# Patient Record
Sex: MALE | Race: WHITE | Employment: OTHER | URBAN - METROPOLITAN AREA
[De-identification: names, ages, dates, MRNs, and addresses within clinical notes are randomized per-mention and may not be internally consistent; named-entity substitution may affect disease eponyms.]

---

## 2017-01-06 ENCOUNTER — HOSPITAL ENCOUNTER (INPATIENT)
Age: 82
LOS: 10 days | Discharge: HOME | DRG: 871 | End: 2017-01-16
Attending: EMERGENCY MEDICINE | Admitting: HOSPITALIST
Payer: MEDICARE

## 2017-01-06 ENCOUNTER — APPOINTMENT (OUTPATIENT)
Dept: GENERAL RADIOLOGY | Age: 82
DRG: 871 | End: 2017-01-06
Attending: EMERGENCY MEDICINE
Payer: MEDICARE

## 2017-01-06 DIAGNOSIS — E87.1 HYPONATREMIA: ICD-10-CM

## 2017-01-06 DIAGNOSIS — H10.33 ACUTE CONJUNCTIVITIS OF BOTH EYES, UNSPECIFIED ACUTE CONJUNCTIVITIS TYPE: ICD-10-CM

## 2017-01-06 DIAGNOSIS — J18.9 COMMUNITY ACQUIRED PNEUMONIA: Primary | ICD-10-CM

## 2017-01-06 DIAGNOSIS — R65.10 SIRS (SYSTEMIC INFLAMMATORY RESPONSE SYNDROME) (HCC): ICD-10-CM

## 2017-01-06 PROBLEM — J15.9 COMMUNITY ACQUIRED BACTERIAL PNEUMONIA: Status: ACTIVE | Noted: 2017-01-06

## 2017-01-06 PROBLEM — A41.9 SEPSIS (HCC): Status: ACTIVE | Noted: 2017-01-06

## 2017-01-06 LAB
ALBUMIN SERPL BCP-MCNC: 3.2 G/DL (ref 3.4–5)
ALBUMIN/GLOB SERPL: 0.9 {RATIO} (ref 0.8–1.7)
ALP SERPL-CCNC: 118 U/L (ref 45–117)
ALT SERPL-CCNC: 41 U/L (ref 16–61)
ANION GAP BLD CALC-SCNC: 10 MMOL/L (ref 3–18)
APPEARANCE UR: CLEAR
AST SERPL W P-5'-P-CCNC: 33 U/L (ref 15–37)
BACTERIA URNS QL MICRO: NEGATIVE /HPF
BASOPHILS # BLD AUTO: 0 K/UL (ref 0–0.06)
BASOPHILS # BLD: 0 % (ref 0–2)
BILIRUB SERPL-MCNC: 0.7 MG/DL (ref 0.2–1)
BILIRUB UR QL: NEGATIVE
BNP SERPL-MCNC: 6466 PG/ML (ref 0–1800)
BUN SERPL-MCNC: 30 MG/DL (ref 7–18)
BUN/CREAT SERPL: 21 (ref 12–20)
CALCIUM SERPL-MCNC: 8.5 MG/DL (ref 8.5–10.1)
CHLORIDE SERPL-SCNC: 98 MMOL/L (ref 100–108)
CO2 SERPL-SCNC: 23 MMOL/L (ref 21–32)
COLOR UR: ABNORMAL
CREAT SERPL-MCNC: 1.41 MG/DL (ref 0.6–1.3)
DIFFERENTIAL METHOD BLD: ABNORMAL
EOSINOPHIL # BLD: 0 K/UL (ref 0–0.4)
EOSINOPHIL NFR BLD: 0 % (ref 0–5)
EPITH CASTS URNS QL MICRO: NORMAL /LPF (ref 0–5)
ERYTHROCYTE [DISTWIDTH] IN BLOOD BY AUTOMATED COUNT: 13.2 % (ref 11.6–14.5)
GLOBULIN SER CALC-MCNC: 3.4 G/DL (ref 2–4)
GLUCOSE SERPL-MCNC: 140 MG/DL (ref 74–99)
GLUCOSE UR STRIP.AUTO-MCNC: NEGATIVE MG/DL
HCT VFR BLD AUTO: 40.2 % (ref 36–48)
HGB BLD-MCNC: 13.4 G/DL (ref 13–16)
HGB UR QL STRIP: NEGATIVE
KETONES UR QL STRIP.AUTO: NEGATIVE MG/DL
LACTATE BLD-SCNC: 0.8 MMOL/L (ref 0.4–2)
LACTATE BLD-SCNC: 2.2 MMOL/L (ref 0.4–2)
LEUKOCYTE ESTERASE UR QL STRIP.AUTO: NEGATIVE
LYMPHOCYTES # BLD AUTO: 4 % (ref 21–52)
LYMPHOCYTES # BLD: 0.5 K/UL (ref 0.9–3.6)
MCH RBC QN AUTO: 30.6 PG (ref 24–34)
MCHC RBC AUTO-ENTMCNC: 33.3 G/DL (ref 31–37)
MCV RBC AUTO: 91.8 FL (ref 74–97)
MONOCYTES # BLD: 1.3 K/UL (ref 0.05–1.2)
MONOCYTES NFR BLD AUTO: 11 % (ref 3–10)
NEUTS SEG # BLD: 10 K/UL (ref 1.8–8)
NEUTS SEG NFR BLD AUTO: 85 % (ref 40–73)
NITRITE UR QL STRIP.AUTO: NEGATIVE
PH UR STRIP: 5.5 [PH] (ref 5–8)
PLATELET # BLD AUTO: 270 K/UL (ref 135–420)
PMV BLD AUTO: 9.2 FL (ref 9.2–11.8)
POTASSIUM SERPL-SCNC: 4.1 MMOL/L (ref 3.5–5.5)
PROT SERPL-MCNC: 6.6 G/DL (ref 6.4–8.2)
PROT UR STRIP-MCNC: 300 MG/DL
RBC # BLD AUTO: 4.38 M/UL (ref 4.7–5.5)
RBC #/AREA URNS HPF: NORMAL /HPF (ref 0–5)
SODIUM SERPL-SCNC: 131 MMOL/L (ref 136–145)
SP GR UR REFRACTOMETRY: 1.02 (ref 1–1.03)
TSH SERPL DL<=0.05 MIU/L-ACNC: 3.66 UIU/ML (ref 0.36–3.74)
UROBILINOGEN UR QL STRIP.AUTO: 1 EU/DL (ref 0.2–1)
WBC # BLD AUTO: 11.8 K/UL (ref 4.6–13.2)
WBC URNS QL MICRO: NORMAL /HPF (ref 0–4)

## 2017-01-06 PROCEDURE — 80053 COMPREHEN METABOLIC PANEL: CPT | Performed by: EMERGENCY MEDICINE

## 2017-01-06 PROCEDURE — 87086 URINE CULTURE/COLONY COUNT: CPT | Performed by: EMERGENCY MEDICINE

## 2017-01-06 PROCEDURE — 81001 URINALYSIS AUTO W/SCOPE: CPT | Performed by: EMERGENCY MEDICINE

## 2017-01-06 PROCEDURE — 74011000250 HC RX REV CODE- 250: Performed by: EMERGENCY MEDICINE

## 2017-01-06 PROCEDURE — 93005 ELECTROCARDIOGRAM TRACING: CPT

## 2017-01-06 PROCEDURE — 74011250637 HC RX REV CODE- 250/637: Performed by: HOSPITALIST

## 2017-01-06 PROCEDURE — 74011000258 HC RX REV CODE- 258: Performed by: EMERGENCY MEDICINE

## 2017-01-06 PROCEDURE — 83605 ASSAY OF LACTIC ACID: CPT

## 2017-01-06 PROCEDURE — 87040 BLOOD CULTURE FOR BACTERIA: CPT | Performed by: EMERGENCY MEDICINE

## 2017-01-06 PROCEDURE — 96365 THER/PROPH/DIAG IV INF INIT: CPT

## 2017-01-06 PROCEDURE — 74011250636 HC RX REV CODE- 250/636: Performed by: HOSPITALIST

## 2017-01-06 PROCEDURE — 83880 ASSAY OF NATRIURETIC PEPTIDE: CPT | Performed by: EMERGENCY MEDICINE

## 2017-01-06 PROCEDURE — 74011250637 HC RX REV CODE- 250/637: Performed by: EMERGENCY MEDICINE

## 2017-01-06 PROCEDURE — 74011250637 HC RX REV CODE- 250/637: Performed by: FAMILY MEDICINE

## 2017-01-06 PROCEDURE — 77030020263 HC SOL INJ SOD CL0.9% LFCR 1000ML

## 2017-01-06 PROCEDURE — 96368 THER/DIAG CONCURRENT INF: CPT

## 2017-01-06 PROCEDURE — 74011250636 HC RX REV CODE- 250/636: Performed by: EMERGENCY MEDICINE

## 2017-01-06 PROCEDURE — 74011000250 HC RX REV CODE- 250: Performed by: HOSPITALIST

## 2017-01-06 PROCEDURE — 71010 XR CHEST PORT: CPT

## 2017-01-06 PROCEDURE — 85025 COMPLETE CBC W/AUTO DIFF WBC: CPT | Performed by: EMERGENCY MEDICINE

## 2017-01-06 PROCEDURE — 84443 ASSAY THYROID STIM HORMONE: CPT | Performed by: EMERGENCY MEDICINE

## 2017-01-06 PROCEDURE — 77010033678 HC OXYGEN DAILY

## 2017-01-06 PROCEDURE — 96366 THER/PROPH/DIAG IV INF ADDON: CPT

## 2017-01-06 PROCEDURE — 99285 EMERGENCY DEPT VISIT HI MDM: CPT

## 2017-01-06 PROCEDURE — 65660000000 HC RM CCU STEPDOWN

## 2017-01-06 RX ORDER — ENALAPRIL MALEATE 10 MG/1
20 TABLET ORAL DAILY
Status: DISCONTINUED | OUTPATIENT
Start: 2017-01-07 | End: 2017-01-16 | Stop reason: HOSPADM

## 2017-01-06 RX ORDER — BENZONATATE 100 MG/1
100 CAPSULE ORAL
Status: DISCONTINUED | OUTPATIENT
Start: 2017-01-06 | End: 2017-01-16 | Stop reason: HOSPADM

## 2017-01-06 RX ORDER — LEVOFLOXACIN 5 MG/ML
750 INJECTION, SOLUTION INTRAVENOUS
Status: DISCONTINUED | OUTPATIENT
Start: 2017-01-06 | End: 2017-01-11

## 2017-01-06 RX ORDER — ONDANSETRON 2 MG/ML
4 INJECTION INTRAMUSCULAR; INTRAVENOUS
Status: DISCONTINUED | OUTPATIENT
Start: 2017-01-06 | End: 2017-01-16 | Stop reason: HOSPADM

## 2017-01-06 RX ORDER — ADHESIVE BANDAGE
30 BANDAGE TOPICAL DAILY PRN
Status: DISCONTINUED | OUTPATIENT
Start: 2017-01-06 | End: 2017-01-16 | Stop reason: HOSPADM

## 2017-01-06 RX ORDER — METOPROLOL TARTRATE 50 MG/1
50 TABLET ORAL 2 TIMES DAILY
Status: DISCONTINUED | OUTPATIENT
Start: 2017-01-06 | End: 2017-01-16 | Stop reason: HOSPADM

## 2017-01-06 RX ORDER — SODIUM CHLORIDE 0.9 % (FLUSH) 0.9 %
5-10 SYRINGE (ML) INJECTION AS NEEDED
Status: DISCONTINUED | OUTPATIENT
Start: 2017-01-06 | End: 2017-01-16 | Stop reason: HOSPADM

## 2017-01-06 RX ORDER — HYDROCODONE BITARTRATE AND ACETAMINOPHEN 5; 325 MG/1; MG/1
1 TABLET ORAL
Status: DISCONTINUED | OUTPATIENT
Start: 2017-01-06 | End: 2017-01-16 | Stop reason: HOSPADM

## 2017-01-06 RX ORDER — DICYCLOMINE HYDROCHLORIDE 10 MG/1
10 CAPSULE ORAL
Status: DISCONTINUED | OUTPATIENT
Start: 2017-01-06 | End: 2017-01-16 | Stop reason: HOSPADM

## 2017-01-06 RX ORDER — ACETAMINOPHEN 500 MG
1000 TABLET ORAL
Status: COMPLETED | OUTPATIENT
Start: 2017-01-06 | End: 2017-01-06

## 2017-01-06 RX ORDER — PANTOPRAZOLE SODIUM 40 MG/1
40 TABLET, DELAYED RELEASE ORAL
Status: DISCONTINUED | OUTPATIENT
Start: 2017-01-07 | End: 2017-01-16 | Stop reason: HOSPADM

## 2017-01-06 RX ORDER — ZOLPIDEM TARTRATE 5 MG/1
5 TABLET ORAL
Status: DISCONTINUED | OUTPATIENT
Start: 2017-01-06 | End: 2017-01-09

## 2017-01-06 RX ORDER — AMLODIPINE BESYLATE 10 MG/1
10 TABLET ORAL DAILY
Status: DISCONTINUED | OUTPATIENT
Start: 2017-01-07 | End: 2017-01-16 | Stop reason: HOSPADM

## 2017-01-06 RX ORDER — CIPROFLOXACIN HYDROCHLORIDE 3.5 MG/ML
1 SOLUTION/ DROPS TOPICAL
Status: DISPENSED | OUTPATIENT
Start: 2017-01-06 | End: 2017-01-08

## 2017-01-06 RX ORDER — SODIUM CHLORIDE 9 MG/ML
50 INJECTION, SOLUTION INTRAVENOUS CONTINUOUS
Status: DISCONTINUED | OUTPATIENT
Start: 2017-01-06 | End: 2017-01-07

## 2017-01-06 RX ADMIN — ACETAMINOPHEN 1000 MG: 500 TABLET ORAL at 17:01

## 2017-01-06 RX ADMIN — SODIUM CHLORIDE 1000 ML: 900 INJECTION, SOLUTION INTRAVENOUS at 16:03

## 2017-01-06 RX ADMIN — AZTREONAM 2 G: 2 INJECTION, POWDER, LYOPHILIZED, FOR SOLUTION INTRAMUSCULAR; INTRAVENOUS at 23:33

## 2017-01-06 RX ADMIN — METOPROLOL TARTRATE 50 MG: 50 TABLET ORAL at 22:23

## 2017-01-06 RX ADMIN — AZTREONAM 2 G: 2 INJECTION, POWDER, LYOPHILIZED, FOR SOLUTION INTRAMUSCULAR; INTRAVENOUS at 17:01

## 2017-01-06 RX ADMIN — ZOLPIDEM TARTRATE 5 MG: 5 TABLET, FILM COATED ORAL at 23:27

## 2017-01-06 RX ADMIN — BENZONATATE 100 MG: 100 CAPSULE ORAL at 23:27

## 2017-01-06 RX ADMIN — CIPROFLOXACIN HYDROCHLORIDE 1 DROP: 3 SOLUTION/ DROPS OPHTHALMIC at 23:27

## 2017-01-06 RX ADMIN — LEVOFLOXACIN 750 MG: 5 INJECTION, SOLUTION INTRAVENOUS at 17:36

## 2017-01-06 RX ADMIN — APIXABAN 5 MG: 2.5 TABLET, FILM COATED ORAL at 22:23

## 2017-01-06 RX ADMIN — SODIUM CHLORIDE 50 ML/HR: 900 INJECTION, SOLUTION INTRAVENOUS at 22:22

## 2017-01-06 NOTE — IP AVS SNAPSHOT
Current Discharge Medication List  
  
Take these medications at their scheduled times Dose & Instructions Dispensing Information Comments Morning Noon Evening Bedtime  
 amLODIPine 10 mg tablet Commonly known as:  [place] Citron Your next dose is: Today, Tomorrow Other:  ____________ Dose:  10 mg Take 1 Tab by mouth daily. Quantity:  90 Tab Refills:  3 ELIQUIS 5 mg tablet Generic drug:  apixaban Your next dose is: Today, Tomorrow Other:  ____________ Dose:  5 mg Take 5 mg by mouth two (2) times a day. Refills:  0  
     
   
   
   
  
 enalapril 20 mg tablet Commonly known as:  Sheree Goring Your next dose is: Today, Tomorrow Other:  ____________ Dose:  20 mg Take 20 mg by mouth daily. Refills:  0  
     
   
   
   
  
 levoFLOXacin 750 mg tablet Commonly known as:  Sandy Stallion Your next dose is: Today, Tomorrow Other:  ____________ Dose:  750 mg Take 1 Tab by mouth every fourty-eight (48) hours. Quantity:  5 Tab Refills:  0  
     
   
   
   
  
 metoprolol tartrate 100 mg IR tablet Commonly known as:  LOPRESSOR Your next dose is: Today, Tomorrow Other:  ____________ Dose:  100 mg Take 100 mg by mouth daily. Refills:  0 PriLOSEC OTC 20 mg tablet Generic drug:  omeprazole Your next dose is: Today, Tomorrow Other:  ____________ Dose:  20 mg Take 20 mg by mouth daily. Indications: HEARTBURN Refills:  0 Take these medications as needed Dose & Instructions Dispensing Information Comments Morning Noon Evening Bedtime BENTYL 10 mg capsule Generic drug:  dicyclomine Your next dose is: Today, Tomorrow Other:  ____________ Dose:  10 mg Take 10 mg by mouth three (3) times daily as needed for Nausea (max 3x/day). Refills:  0  
     
   
   
   
  
 nitroglycerin 0.3 mg SL tablet Commonly known as:  NITROSTAT Your next dose is: Today, Tomorrow Other:  ____________ Dose:  0.3 mg  
1 Tab by SubLINGual route every five (5) minutes as needed for Chest Pain (up to 3 total 1 every 5 min). Quantity:  1 Bottle Refills:  0 Where to Get Your Medications Information about where to get these medications is not yet available ! Ask your nurse or doctor about these medications  
  levoFLOXacin 750 mg tablet  
 nitroglycerin 0.3 mg SL tablet

## 2017-01-06 NOTE — IP AVS SNAPSHOT
Lissett Wolfe 
 
 
 [address] [place] [address] 
[phone] Patient: Lance Osler MRN: [id number] WG You are allergic to the following Allergen Reactions Beta-Blockers (Beta-Adrenergic Blocking Agts) Drowsiness Penicillins Swelling Statins-Hmg-Coa Reductase Inhibitors Drowsiness Immunizations Administered for This Admission Name Date Influenza Vaccine (Quad) PF  Deferred () Recent Documentation Height Weight BMI Smoking Status 1.753 m 64.2 kg 20.91 kg/m2 Former Smoker Emergency Contacts Name Discharge Info Relation Home Work Mobile Rylan Clements  Spouse [4] [phone] Lani Beebe  Spouse [3] [phone] About your hospitalization You were admitted on:  2017 You last received care in the:  [address] [place] [address] Road You were discharged on:  2017 Unit phone number:  [phone] Why you were hospitalized Your primary diagnosis was:  Sepsis (Hcc) Your diagnoses also included:  Coronary Atherosclerosis Of Native Coronary Artery, Dyslipidemia, Atrial Fibrillation (Hcc), Community Acquired Bacterial Pneumonia, Cap (Community Acquired Pneumonia) Providers Seen During Your Hospitalizations Provider Role Specialty Primary office phone Ander Cuba DO Attending Provider Emergency Medicine [phone] Glory Snyder MD Attending Provider [facility] [phone] Mellisa Small MD Attending Provider Internal Medicine [phone] Your Primary Care Physician (PCP) Primary Care Physician Office Phone Office Fax [address]274 [address]  [phone] Follow-up Information Follow up With Details Comments Contact Info Luis Alfredo Chowdhury MD Schedule an appointment as soon as possible for a visit in 1 week  [facility] [address] 64 [facility] 83 [phone] 
[phone] Teodoro Mercedes MD Schedule an appointment as soon as possible for a visit in 1 week  [address] [address] [phone] Current Discharge Medication List  
  
START taking these medications Dose & Instructions Dispensing Information Comments Morning Noon Evening Bedtime  
 levoFLOXacin 750 mg tablet Commonly known as:  Sanjana Born Your next dose is: Today, Tomorrow Other:  _________ Dose:  750 mg Take 1 Tab by mouth every fourty-eight (48) hours. Quantity:  5 Tab Refills:  0  
     
   
   
   
  
 nitroglycerin 0.3 mg SL tablet Commonly known as:  NITROSTAT Your next dose is: Today, Tomorrow Other:  _________ Dose:  0.3 mg  
1 Tab by SubLINGual route every five (5) minutes as needed for Chest Pain (up to 3 total 1 every 5 min). Quantity:  1 Bottle Refills:  0 CONTINUE these medications which have NOT CHANGED Dose & Instructions Dispensing Information Comments Morning Noon Evening Bedtime  
 amLODIPine 10 mg tablet Commonly known as:  Sima Darting Your next dose is: Today, Tomorrow Other:  _________ Dose:  10 mg Take 1 Tab by mouth daily. Quantity:  90 Tab Refills:  3 BENTYL 10 mg capsule Generic drug:  dicyclomine Your next dose is: Today, Tomorrow Other:  _________ Dose:  10 mg Take 10 mg by mouth three (3) times daily as needed for Nausea (max 3x/day). Refills:  0  
     
   
   
   
  
 ELIQUIS 5 mg tablet Generic drug:  apixaban Your next dose is: Today, Tomorrow Other:  _________ Dose:  5 mg Take 5 mg by mouth two (2) times a day. Refills:  0  
     
   
   
   
  
 enalapril 20 mg tablet Commonly known as:  Henrique Wing  
   
 Your next dose is: Today, Tomorrow Other:  _________ Dose:  20 mg Take 20 mg by mouth daily. Refills:  0  
     
   
   
   
  
 metoprolol tartrate 100 mg IR tablet Commonly known as:  LOPRESSOR Your next dose is: Today, Tomorrow Other:  _________ Dose:  100 mg Take 100 mg by mouth daily. Refills:  0 PriLOSEC OTC 20 mg tablet Generic drug:  omeprazole Your next dose is: Today, Tomorrow Other:  _________ Dose:  20 mg Take 20 mg by mouth daily. Indications: HEARTBURN Refills:  0 Where to Get Your Medications Information on where to get these meds will be given to you by the nurse or doctor. ! Ask your nurse or doctor about these medications  
  levoFLOXacin 750 mg tablet  
 nitroglycerin 0.3 mg SL tablet Discharge Instructions Angina: Care Instructions Your Care Instructions You have a problem called angina. Angina happens when there is not enough blood flow to your heart muscle. Angina is a sign of coronary artery disease (CAD). CAD occurs when blood vessels that supply the heart become narrowed. Having CAD increases your risk of a heart attack. Chest pain or pressure is the most common symptom of angina. But some people have other symptoms, like: 
· Pain, pressure, or a strange feeling in the back, neck, jaw, or upper belly, or in one or both shoulders or arms. · Shortness of breath. · Nausea or vomiting. · Lightheadedness or sudden weakness. · Fast or irregular heartbeat. Women are somewhat more likely than men to have angina symptoms like shortness of breath, nausea, and back or jaw pain. Angina can be dangerous. That's why it is important to pay attention to your symptoms. Know what is typical for you, learn how to control your symptoms, and understand when you need to get treatment. A change in your usual pattern of symptoms is an emergency. It may mean that you are having a heart attack. The doctor has checked you carefully, but problems can develop later. If you notice any problems or new symptoms, get medical treatment right away. Follow-up care is a key part of your treatment and safety. Be sure to make and go to all appointments, and call your doctor if you are having problems. It's also a good idea to know your test results and keep a list of the medicines you take. How can you care for yourself at home? Medicines · If your doctor has given you nitroglycerin for angina symptoms, keep it with you at all times. If you have symptoms, sit down and rest, and take the first dose of nitroglycerin as directed. If your symptoms get worse or are not getting better within 5 minutes, call 911 right away. Stay on the phone. The emergency  will give you further instructions. · If your doctor advises it, take 1 low-dose aspirin a day to prevent heart attack. · Be safe with medicines. Take your medicines exactly as prescribed. Call your doctor if you think you are having a problem with your medicine. You will get more details on the specific medicines your doctor prescribes. Lifestyle changes · Do not smoke. If you need help quitting, talk to your doctor about stop-smoking programs and medicines. These can increase your chances of quitting for good. · Eat a heart-healthy diet that is low in saturated fat and salt, and is high in fiber. Talk to your doctor or a dietitian about healthy eating. · Stay at a healthy weight. Or lose weight if you need to. Activity · Talk to your doctor about a level of activity that is safe for you. · If an activity causes angina symptoms, stop and rest. 
When should you call for help? Call 911 anytime you think you may need emergency care. For example, call if: 
· You passed out (lost consciousness). · You have symptoms of a heart attack. These may include: ¨ Chest pain or pressure, or a strange feeling in the chest. 
¨ Sweating. ¨ Shortness of breath. ¨ Nausea or vomiting. ¨ Pain, pressure, or a strange feeling in the back, neck, jaw, or upper belly or in one or both shoulders or arms. ¨ Lightheadedness or sudden weakness. ¨ A fast or irregular heartbeat. After you call 911, the  may tell you to chew 1 adult-strength or 2 to 4 low-dose aspirin. Wait for an ambulance. Do not try to drive yourself. · You have angina symptoms that do not go away with rest or are not getting better within 5 minutes after you take a dose of nitroglycerin. Call your doctor now or seek immediate medical care if: 
· You are having angina symptoms more often than usual, or they are different or worse than usual. 
· You feel dizzy or lightheaded, or you feel like you may faint. Watch closely for changes in your health, and be sure to contact your doctor if you have any problems. Where can you learn more? Go to http://mitraWebGen Systemsshaw.info/. Enter H129 in the search box to learn more about \"Angina: Care Instructions. \" Current as of: January 27, 2016 Content Version: 11.1 © 3641-7772 [employer]. Care instructions adapted under license by [employer] (which disclaims liability or warranty for this information). If you have questions about a medical condition or this instruction, always ask your healthcare professional. [place] [address] any warranty or liability for your use of this information. Atrial Fibrillation: Care Instructions Your Care Instructions Atrial fibrillation is an irregular and often fast heartbeat. Treating this condition is important for several reasons. It can cause blood clots, which can travel from your heart to your brain and cause a stroke. If you have a fast heartbeat, you may feel lightheaded, dizzy, and weak.  An irregular heartbeat can also increase your risk for heart failure. Atrial fibrillation is often the result of another heart condition, such as high blood pressure or coronary artery disease. Making changes to improve your heart condition will help you stay healthy and active. Follow-up care is a key part of your treatment and safety. Be sure to make and go to all appointments, and call your doctor if you are having problems. It's also a good idea to know your test results and keep a list of the medicines you take. How can you care for yourself at home? Medicines · Take your medicines exactly as prescribed. Call your doctor if you think you are having a problem with your medicine. You will get more details on the specific medicines your doctor prescribes. · If your doctor has given you a blood thinner to prevent a stroke, be sure you get instructions about how to take your medicine safely. Blood thinners can cause serious bleeding problems. · Do not take any vitamins, over-the-counter drugs, or herbal products without talking to your doctor first. 
Lifestyle changes · Do not smoke. Smoking can increase your chance of a stroke and heart attack. If you need help quitting, talk to your doctor about stop-smoking programs and medicines. These can increase your chances of quitting for good. · Eat a heart-healthy diet. · Stay at a healthy weight. Lose weight if you need to. · Limit alcohol to 2 drinks a day for men and 1 drink a day for women. Too much alcohol can cause health problems. · Avoid colds and flu. Get a pneumococcal vaccine shot. If you have had one before, ask your doctor whether you need another dose. Get a flu shot every year. If you must be around people with colds or flu, wash your hands often. Activity · If your doctor recommends it, get more exercise. Walking is a good choice. Bit by bit, increase the amount you walk every day.  Try for at least 30 minutes on most days of the week. You also may want to swim, bike, or do other activities. Your doctor may suggest that you join a cardiac rehabilitation program so that you can have help increasing your physical activity safely. · Start light exercise if your doctor says it is okay. Even a small amount will help you get stronger, have more energy, and manage stress. Walking is an easy way to get exercise. Start out by walking a little more than you did in the hospital. Gradually increase the amount you walk. · When you exercise, watch for signs that your heart is working too hard. You are pushing too hard if you cannot talk while you are exercising. If you become short of breath or dizzy or have chest pain, sit down and rest immediately. · Check your pulse regularly. Place two fingers on the artery at the palm side of your wrist, in line with your thumb. If your heartbeat seems uneven or fast, talk to your doctor. When should you call for help? Call 911 anytime you think you may need emergency care. For example, call if: 
· You have symptoms of a heart attack. These may include: ¨ Chest pain or pressure, or a strange feeling in the chest. 
¨ Sweating. ¨ Shortness of breath. ¨ Nausea or vomiting. ¨ Pain, pressure, or a strange feeling in the back, neck, jaw, or upper belly or in one or both shoulders or arms. ¨ Lightheadedness or sudden weakness. ¨ A fast or irregular heartbeat. After you call 911, the  may tell you to chew 1 adult-strength or 2 to 4 low-dose aspirin. Wait for an ambulance. Do not try to drive yourself. · You have symptoms of a stroke. These may include: 
¨ Sudden numbness, tingling, weakness, or loss of movement in your face, arm, or leg, especially on only one side of your body. ¨ Sudden vision changes. ¨ Sudden trouble speaking. ¨ Sudden confusion or trouble understanding simple statements. ¨ Sudden problems with walking or balance. ¨ A sudden, severe headache that is different from past headaches. · You passed out (lost consciousness). Call your doctor now or seek immediate medical care if: 
· You have new or increased shortness of breath. · You feel dizzy or lightheaded, or you feel like you may faint. · Your heart rate becomes irregular. · You can feel your heart flutter in your chest or skip heartbeats. Tell your doctor if these symptoms are new or worse. Watch closely for changes in your health, and be sure to contact your doctor if you have any problems. Where can you learn more? Go to http://mitra-shaw.info/. Enter U020 in the search box to learn more about \"Atrial Fibrillation: Care Instructions. \" Current as of: January 27, 2016 Content Version: 11.1 © 2006-2016 [employer]. Care instructions adapted under license by [employer] (which disclaims liability or warranty for this information). If you have questions about a medical condition or this instruction, always ask your healthcare professional. [place] [address] any warranty or liability for your use of this information. Patient armband removed and shredded. DISCHARGE SUMMARY from Nurse The following personal items are in your possession at time of discharge: 
 
Dental Appliances: None Visual Aid: With patient, Glasses, At bedside Home Medications: None Jewelry: Watch, Ring, Bracelet (yellow ring black watch white and yellow bracelets ) Clothing: Bathrobe, Pajamas, Slippers, Undergarments, Socks Other Valuables: Cell Phone Personal Items Sent to Safe: none PATIENT INSTRUCTIONS: 
 
After general anesthesia or intravenous sedation, for 24 hours or while taking prescription Narcotics: · Limit your activities · Do not drive and operate hazardous machinery · Do not make important personal or business decisions · Do  not drink alcoholic beverages · If you have not urinated within 8 hours after discharge, please contact your surgeon on call. Report the following to your surgeon: 
· Excessive pain, swelling, redness or odor of or around the surgical area · Temperature over 100.5 · Nausea and vomiting lasting longer than 4 hours or if unable to take medications · Any signs of decreased circulation or nerve impairment to extremity: change in color, persistent  numbness, tingling, coldness or increase pain · Any questions What to do at Home: 
Recommended activity: Activity as tolerated. If you experience any of the following symptoms shortness of breath, difficulty breathing, weight gain of more than 5 pounds in one week, chest pain, nausea, vomiting, diarrhea, temperature greater than 100.5 or bleeding please follow up with your primary care physician or call 911. *  Please give a list of your current medications to your Primary Care Provider. *  Please update this list whenever your medications are discontinued, doses are 
    changed, or new medications (including over-the-counter products) are added. *  Please carry medication information at all times in case of emergency situations. These are general instructions for a healthy lifestyle: No smoking/ No tobacco products/ Avoid exposure to second hand smoke Surgeon General's Warning:  Quitting smoking now greatly reduces serious risk to your health. Obesity, smoking, and sedentary lifestyle greatly increases your risk for illness A healthy diet, regular physical exercise & weight monitoring are important for maintaining a healthy lifestyle You may be retaining fluid if you have a history of heart failure or if you experience any of the following symptoms:  Weight gain of 3 pounds or more overnight or 5 pounds in a week, increased swelling in our hands or feet or shortness of breath while lying flat in bed.   Please call your doctor as soon as you notice any of these symptoms; do not wait until your next office visit. Recognize signs and symptoms of STROKE: 
 
F-face looks uneven A-arms unable to move or move unevenly S-speech slurred or non-existent T-time-call 911 as soon as signs and symptoms begin-DO NOT go Back to bed or wait to see if you get better-TIME IS BRAIN. Warning Signs of HEART ATTACK Call 911 if you have these symptoms: 
? Chest discomfort. Most heart attacks involve discomfort in the center of the chest that lasts more than a few minutes, or that goes away and comes back. It can feel like uncomfortable pressure, squeezing, fullness, or pain. ? Discomfort in other areas of the upper body. Symptoms can include pain or discomfort in one or both arms, the back, neck, jaw, or stomach. ? Shortness of breath with or without chest discomfort. ? Other signs may include breaking out in a cold sweat, nausea, or lightheadedness. Don't wait more than five minutes to call [address]! Fast action can save your life. Calling 911 is almost always the fastest way to get lifesaving treatment. Emergency Medical Services staff can begin treatment when they arrive  up to an hour sooner than if someone gets to the hospital by car. The discharge information has been reviewed with the patient. The patient verbalized understanding. Discharge medications reviewed with the patient and appropriate educational materials and side effects teaching were provided. Discharge Instructions Attachments/References PNEUMONIA (ENGLISH) LEVOFLOXACIN (BY INJECTION) (ENGLISH) NITROGLYCERIN, RAPID RELEASE (BY MOUTH) (ENGLISH) Discharge Orders None [facility] Announcement We are excited to announce that we are making your provider's discharge notes available to you in [employer].   You will see these notes when they are completed and signed by the physician that discharged you from your recent hospital stay. If you have any questions or concerns about any information you see in [employer], please call the Health Information Department where you were seen or reach out to your Primary Care Provider for more information about your plan of care. Introducing [facility]! Giovanni Cameron introduces [employer] patient portal. Now you can access parts of your medical record, email your doctor's office, and request medication refills online. 1. In your internet browser, go to [email or web address] 2. Click on the First Time User? Click Here link in the Sign In box. You will see the New Member Sign Up page. 3. Enter your [employer] Access Code exactly as it appears below. You will not need to use this code after youve completed the sign-up process. If you do not sign up before the expiration date, you must request a new code. · [employer] Access Code: [email or web address] Expires: 3/26/2017  3:35 PM 
 
4. Enter the last four digits of your Social Security Number (xxxx) and Date of Birth (mm/dd/yyyy) as indicated and click Submit. You will be taken to the next sign-up page. 5. Create a [employer] ID. This will be your [employer] login ID and cannot be changed, so think of one that is secure and easy to remember. 6. Create a [employer] password. You can change your password at any time. 7. Enter your Password Reset Question and Answer. This can be used at a later time if you forget your password. 8. Enter your e-mail address. You will receive e-mail notification when new information is available in [address] 9. Click Sign Up. You can now view and download portions of your medical record. 10. Click the Download Summary menu link to download a portable copy of your medical information.  
 
If you have questions, please visit the Frequently Asked Questions section of [employer]. Remember, MyChart is NOT to be used for urgent needs. For medical emergencies, dial 911. Now available from your iPhone and Android! General Information Please provide this summary of care documentation to your next provider. Patient Signature:  ____________________________________________________________ Date:  ____________________________________________________________  
  
Erin Newcomer Provider Signature:  ____________________________________________________________ Date:  ____________________________________________________________ More Information Pneumonia: Care Instructions Your Care Instructions Pneumonia is an infection of the lungs. Most cases are caused by infections from bacteria or viruses. Pneumonia may be mild or very severe. If it is caused by bacteria, you will be treated with antibiotics. It may take a few weeks to a few months to recover fully from pneumonia, depending on how sick you were and whether your overall health is good. Follow-up care is a key part of your treatment and safety. Be sure to make and go to all appointments, and call your doctor if you are having problems. Its also a good idea to know your test results and keep a list of the medicines you take. How can you care for yourself at home? · Take your antibiotics exactly as directed. Do not stop taking the medicine just because you are feeling better. You need to take the full course of antibiotics. · Take your medicines exactly as prescribed. Call your doctor if you think you are having a problem with your medicine. · Get plenty of rest and sleep. You may feel weak and tired for a while, but your energy level will improve with time. · To prevent dehydration, drink plenty of fluids, enough so that your urine is light yellow or clear like water.  Choose water and other caffeine-free clear liquids until you feel better. If you have kidney, heart, or liver disease and have to limit fluids, talk with your doctor before you increase the amount of fluids you drink. · Take care of your cough so you can rest. A cough that brings up mucus from your lungs is common with pneumonia. It is one way your body gets rid of the infection. But if coughing keeps you from resting or causes severe fatigue and chest-wall pain, talk to your doctor. He or she may suggest that you take a medicine to reduce the cough. · Use a vaporizer or humidifier to add moisture to your bedroom. Follow the directions for cleaning the machine. · Do not smoke or allow others to smoke around you. Smoke will make your cough last longer. If you need help quitting, talk to your doctor about stop-smoking programs and medicines. These can increase your chances of quitting for good. · Take an over-the-counter pain medicine, such as acetaminophen (Tylenol), ibuprofen (Advil, Motrin), or naproxen (Aleve). Read and follow all instructions on the label. · Do not take two or more pain medicines at the same time unless the doctor told you to. Many pain medicines have acetaminophen, which is Tylenol. Too much acetaminophen (Tylenol) can be harmful. · If you were given a spirometer to measure how well your lungs are working, use it as instructed. This can help your doctor tell how your recovery is going. · To prevent pneumonia in the future, talk to your doctor about getting a flu vaccine (once a year) and a pneumococcal vaccine (one time only for most people). When should you call for help? Call 911 anytime you think you may need emergency care. For example, call if: 
· You have severe trouble breathing. Call your doctor now or seek immediate medical care if: 
· You cough up dark brown or bloody mucus (sputum). · You have new or worse trouble breathing. · You are dizzy or lightheaded, or you feel like you may faint. Watch closely for changes in your health, and be sure to contact your doctor if: 
· You have a new or higher fever. · You are coughing more deeply or more often. · You are not getting better after 2 days (48 hours). · You do not get better as expected. Where can you learn more? Go to http://mitra-shaw.info/. Enter [id number] in the search box to learn more about \"Pneumonia: Care Instructions. \" Current as of: May 23, 2016 Content Version: 11.1 © 9963-1000 [employer]. Care instructions adapted under license by [employer] (which disclaims liability or warranty for this information). If you have questions about a medical condition or this instruction, always ask your healthcare professional. [address] any warranty or liability for your use of this information. Levofloxacin (By injection) Levofloxacin ([id number]-a-sin) Treats infections. This medicine is a quinolone antibiotic. Brand Name(s):Levaquin There may be other brand names for this medicine. When This Medicine Should Not Be Used: This medicine is not right for everyone. You should not receive this medicine if you had an allergic reaction to levofloxacin or to similar medicines. How to Use This Medicine:  
Injectable · Your doctor will prescribe your dose and schedule. This medicine is given through a needle placed in a vein. · A nurse or other health provider will give you this medicine. · Drink extra fluids so you will urinate more often and help prevent kidney problems. · This medicine should come with a Medication Guide. Ask your pharmacist for a copy if you do not have one. · Your doctor may give you a few doses of this medicine until your condition improves, and then you may be switched to an oral medicine that works the same way. Talk to your doctor if you have any concerns about this. Drugs and Foods to Avoid: Ask your doctor or pharmacist before using any other medicine, including over-the-counter medicines, vitamins, and herbal products. · Some medicines and foods can affect how levofloxacin works. Tell your doctor if you are using any of the following: ¨ Theophylline ¨ Steroid medicine (such as hydrocortisone, methylprednisolone, prednisone) ¨ Blood thinner (such as warfarin) ¨ Diabetes medicine ¨ NSAID pain or arthritis medicine (such as aspirin, diclofenac, ibuprofen, naproxen, celecoxib) ¨ Medicine for heart rhythm problems (such as quinidine, procainamide, amiodarone, sotalol) Warnings While Using This Medicine: · Tell your doctor if you are pregnant or breastfeeding, or if you have kidney disease, liver disease, diabetes, heart disease, heart rhythm problems (such as QT prolongation or a slow heartbeat), myasthenia gravis, or a history of seizures, epilepsy, head injury, or stroke. Tell your doctor if you have ever had tendon or joint problems, including rheumatoid arthritis, or if you have received a transplant. · This medicine may cause the following problems: 
¨ Tendinitis and tendon rupture (may happen after treatment ends) ¨ Liver damage ¨ Severe diarrhea ¨ Nerve damage in the arms or legs ¨ Heart rhythm changes ¨ Blood sugar level changes · This medicine may make you feel dizzy or lightheaded. Do not drive or do anything else that could be dangerous until you know how this medicine affects you. · This medicine can cause diarrhea. Call your doctor if the diarrhea becomes severe, does not stop, or is bloody. Do not take any medicine to stop diarrhea until you have talked to your doctor. Diarrhea can occur 2 months or more after you stop taking this medicine. · This medicine may make your skin more sensitive to sunlight. Wear sunscreen. Do not use sunlamps or tanning beds.  
· Tell any doctor or dentist who treats you that you are using this medicine. This medicine may affect certain medical test results. · Call your doctor if your symptoms do not improve or if they get worse. Possible Side Effects While Using This Medicine:  
Call your doctor right away if you notice any of these side effects: · Allergic reaction: Itching or hives, swelling in your face or hands, swelling or tingling in your mouth or throat, chest tightness, trouble breathing · Blistering, peeling, or red skin rash · Change in how much or how often you urinate · Dark urine or pale stools, nausea, vomiting, loss of appetite, stomach pain, yellow skin or eyes · Diarrhea that may contain blood · Fainting, dizziness, or lightheadedness · Fast, slow, or uneven heartbeat, chest pain · Numbness, tingling, or burning pain in your hands, arms, legs, or feet · Pain, stiffness, swelling, or bruises around your ankle, leg, shoulder, or other joint · Seizures, severe headache, unusual thoughts or behaviors, trouble sleeping, confusion · Unusual bleeding, bruising, or weakness If you notice these less serious side effects, talk with your doctor: · Mild headache · Mild nausea or diarrhea · Pain, itching, burning, swelling, or a lump under your skin where the needle is placed If you notice other side effects that you think are caused by this medicine, tell your doctor. Call your doctor for medical advice about side effects. You may report side effects to FDA at 4-270-FDA-1029 © 2016 [address] is for End User's use only and may not be sold, redistributed or otherwise used for commercial purposes. The above information is an  only. It is not intended as medical advice for individual conditions or treatments. Talk to your doctor, nurse or pharmacist before following any medical regimen to see if it is safe and effective for you. Nitroglycerin, Rapid Release (By mouth) Nitroglycerin (jzk-[id number]-MLXB-er-in) Treats or prevents angina (chest pain). This medicine is a nitrate. Brand Name(s):NitroMist, Nitroglycerin Lingual Aerosol, Nitrolingual, Nitroquick, Nitrostat There may be other brand names for this medicine. When This Medicine Should Not Be Used: This medicine is not right for everyone. Do not use it if you had an allergic reaction to nitroglycerin or similar medicines. How to Use This Medicine:  
Spray, Tablet · Your doctor will tell you how much medicine to use. Do not use more than directed. Sit down before you take this medicine, because it could make you lightheaded. · You may use this medicine 5 to 10 minutes before an activity that can cause angina. This may help prevent the attack. · Read and follow the patient instructions that come with this medicine. Talk to your doctor or pharmacist if you have any questions. · Tablets: Wet the tablet with saliva and place it under your tongue or inside your cheek. Let the tablet dissolve. Do not chew, crush, or swallow the tablet. Wait 5 minutes. If you still have pain, take a second tablet. Do not take more than 3 tablets in 15 minutes. If you still have pain after you take a total of 3 tablets, this is an emergency. Call 911. Do not drive yourself to the hospital. 
· Store the tablets at room temperature in the original container, away from heat, moisture, and direct light. · Spray: ¨ To prime the spray before you use it for the first time:  
§ Point the Nitrolingual® Pumpspray bottle away from your face. Pump the spray 5 times. The medicine is now ready to use. § If you do not use the medicine for 6 weeks, pump the spray 1 time to re-prime the bottle. § If you do not use the medicine for 3 months, pump the spray 5 times to re-prime the bottle. § Point the Spiration away from your face. Pump the spray 10 times. The medicine is now ready to use. § If you do not use the medicine for 6 weeks, pump the spray 2 times to re-prime the bottle. ¨ To use the spray:  
§ Hold the bottle upright and close to your mouth. Open your mouth and spray the medicine onto or under your tongue. Close your mouth right away. Do not inhale the spray or get it in your eyes. Do not rinse your mouth for at least 5 to 10 minutes. § Wait 5 minutes. If you still have pain, use another spray. Do not use more than 3 sprays within 15 minutes. § If you still have pain after you use a total of 3 sprays, this is an emergency. Call 911. Do not drive yourself to the hospital. 
¨ Do not shake the bottle. Do not use the spray near heat, open flame, or while smoking. ¨ Check the fluid level regularly according to the medicine directions. Refill your prescription before the level falls too low. ¨ Store the spray in an upright position at room temperature, away from heat. Do not open or burn the spray container. Drugs and Foods to Avoid: Ask your doctor or pharmacist before using any other medicine, including over-the-counter medicines, vitamins, and herbal products. · Do not use this medicine if you are also using avanafil, riociguat, sildenafil, tadalafil, or vardenafil. · Some foods and medicines can affect how nitroglycerin works. Tell your doctor if you are using any of the following: ¨ Alteplase, aspirin, heparin ¨ Blood pressure medicines ¨ Diuretic (water pill) ¨ Ergot medicines · Tell your doctor if you are using any medicine that makes your mouth dry, such as medicines that treat depression. Warnings While Using This Medicine: · Tell your doctor if you are pregnant or breastfeeding, or if you have kidney disease, anemia, low blood pressure, heart failure, heart disease, an enlarged heart, or a recent heart attack or stroke. Tell your doctor if you have a history of a head injury. · This medicine may cause severe low blood pressure. This can make you dizzy or lightheaded.  Do not drive or do anything else that could be dangerous until you know how this medicine affects you. These symptoms may be worse if you drink alcohol. · Medicine that treats chest pain sometimes causes headaches when you first start using it. This is normal. Do not stop using the medicine to avoid headaches. Ask your doctor if you can take aspirin or acetaminophen to treat the headache. · Keep all medicine out of the reach of children. Never share your medicine with anyone. Possible Side Effects While Using This Medicine:  
Call your doctor right away if you notice any of these side effects: · Allergic reaction: Itching or hives, swelling in your face or hands, swelling or tingling in your mouth or throat, chest tightness, trouble breathing · Blurred vision · Increased chest pain, fast or slow heartbeat · Severe or ongoing dizziness, lightheadedness, or fainting · Throbbing, severe, or ongoing headache, confusion, low fever, vision problems · Trouble breathing, cold sweat, blue skin, lips, or nails · Warmth or redness in your face, neck, arms, or upper chest 
If you notice these less serious side effects, talk with your doctor: · Dry mouth · Nausea or vomiting · Numbness or tingling in your hands, ankles, or feet If you notice other side effects that you think are caused by this medicine, tell your doctor. Call your doctor for medical advice about side effects. You may report side effects to FDA at 0-799-FDA-1662 © 2016 [address] is for End User's use only and may not be sold, redistributed or otherwise used for commercial purposes. The above information is an  only. It is not intended as medical advice for individual conditions or treatments. Talk to your doctor, nurse or pharmacist before following any medical regimen to see if it is safe and effective for you.

## 2017-01-06 NOTE — H&P
Medicine History and Physical    Patient: Yelena Nunes   Age:  80 y.o. Assessment   Principal Problem:    Sepsis ([address] (1/6/2017)    Active Problems:    Dyslipidemia ()      Coronary artery disease ()      Atrial fibrillation ()      Overview: CHADS score 3  (+CHF, +HTN, +AGE, -DM, -CVA)      Community acquired bacterial pneumonia (1/6/2017)          Plan     1)  Sepsis 2/2 CAP:  Continue levaquin and aztreonam, tele monitoring, slow IVF, bmp and cbc in am    2)  Hx of Afib- continue elliquis    3)  CAD/CABG/HTN:  Continue BB, norvasc, vasotec    DISPO  -Pt to be admitted  at this time for reasons addressed above, continued hospitalization for ongoing assessment and treatment indicated     Anticipated Date of Discharge: 1/9  Anticipated Disposition (home, SNF) :home    Chief Complaint:   Chief Complaint   Patient presents with    Chest Pain    Cough    Shortness of Breath         HPI:   Yelena Nunes is a 80y.o. year old male who presents with increasing fatigue for at least last 1.5 weeks. Patient presented to ED for fatigue and dehydration about 1.5 weeks ago and had elevated TSH was seen by pcp since then whom did not change or add any meds. For last several days he has noticed cough , b/l eye exudate, increased fatigue. Family is at bedside during interview and provides some history along with the patient. He denies feeling feverish or having chills. He has chronic intermittent Nausea, no vomiting, hemetemesis, hemoptysis or melena. No dysuria. cxray and clinical exam with possible R sided infiltrate. + fever and increased wbc on labs. + dry cough no exudate      Review of Systems - positive responses in bold type   Constitutional: Negative for fever, chills, diaphoresis and unexpected weight change. HENT: Negative for ear pain, congestion, sore throat, rhinorrhea, drooling, trouble swallowing, neck pain and tinnitus.    Eyes: Negative for photophobia, pain, redness and visual disturbance. + exudate  Respiratory: negative for shortness of breath, cough, choking, chest tightness, wheezing or stridor. Cardiovascular: Negative for chest pain, palpitations and leg swelling. Gastrointestinal: Negative for nausea, vomiting, abdominal pain, diarrhea, constipation, blood in stool, abdominal distention and anal bleeding. Genitourinary: Negative for dysuria, urgency, frequency, hematuria, flank pain and difficulty urinating. Musculoskeletal: Negative for back pain and arthralgias. Skin: Negative for color change, rash and wound. Neurological: Negative for dizziness, seizures, syncope, speech difficulty, light-headedness or headaches. Hematological: Does not bruise/bleed easily. Psychiatric/Behavioral: Negative for suicidal ideas, hallucinations, behavioral problems, self-injury or agitation       Past Medical History:  Past Medical History   Diagnosis Date    Atrial fibrillation      CHADS score 3  (+CHF, +HTN, +AGE, -DM, -CVA)    CABG      2008   LIMA - LAD,   SVG - RCA    Cardiomyopathy      EF 30-35% (ECHO 6/14)    Coronary artery disease     Dyslipidemia     Hypertension     Hypothyroid     Pacemaker     Peripheral vascular disease     Renal artery stenosis      bilateral stents    Sick sinus syndrome        Past Surgical History:  Past Surgical History   Procedure Laterality Date    Hx coronary artery bypass graft       2008   LIMA - LAD,   SVG - RCA       Family History:  No family history on file.     Social History:  Social History     Social History    Marital status:      Spouse name: N/A    Number of children: N/A    Years of education: N/A     Social History Main Topics    Smoking status: Former Smoker    Smokeless tobacco: Not on file    Alcohol use No    Drug use: No    Sexual activity: Not on file     Other Topics Concern    Not on file     Social History Narrative       Home Medications:  Prior to Admission medications    Medication Sig Start Date End Date Taking? Authorizing Provider   apixaban (ELIQUIS) 5 mg tablet Take 5 mg by mouth two (2) times a day. Dustin Melo MD   metoprolol tartrate (LOPRESSOR) 100 mg IR tablet Take 100 mg by mouth daily. Dustin Melo MD   enalapril (VASOTEC) 20 mg tablet Take 20 mg by mouth daily. Dustin Melo MD   amLODIPine (NORVASC) 10 mg tablet Take 1 Tab by mouth daily. 5/29/15   Joie Flores MD   TRAMADOL HCL (TRAMADOL PO) Take 50 mg by mouth daily. Historical Provider   omeprazole (PRILOSEC OTC) 20 mg tablet Take 20 mg by mouth daily. Indications: HEARTBURN    Historical Provider   dicyclomine (BENTYL) 10 mg capsule Take 10 mg by mouth three (3) times daily as needed for Nausea (max 3x/day). Historical Provider       Allergies: Allergies   Allergen Reactions    Beta-Blockers (Beta-Adrenergic Blocking Agts) Drowsiness    Penicillins Swelling    Statins-Hmg-Coa Reductase Inhibitors Drowsiness           Physical Exam:     Visit Vitals    BP (!) 188/139    Pulse 70    Temp (!) 102.2 °F (39 °C)    Resp 23    Ht 5' 9\" (1.753 m)    Wt 63.5 kg (140 lb)    SpO2 98%    BMI 20.67 kg/m2       Physical Exam:  General appearance: alert, cooperative, no distress, appears stated age  Head: Normocephalic, without obvious abnormality, atraumatic  Neck: supple, trachea midline  Lungs: R LL crackles and decreased breath sounds  Heart: paced  Abdomen: soft, non-tender.  Bowel sounds normal. No masses,  no organomegaly  Extremities: extremities normal, atraumatic, no cyanosis or edema  Skin: Skin color, texture, turgor normal. No rashes or lesions  Neurologic: Grossly normal  PSY: mood and affect normal, appropriately behaved     Intake and Output:  Current Shift:     Last three shifts:       Lab/Data Reviewed:  CMP:   Lab Results   Component Value Date/Time     (L) 01/06/2017 03:35 PM    K 4.1 01/06/2017 03:35 PM    CL 98 (L) 01/06/2017 03:35 PM    CO2 23 01/06/2017 03:35 PM    AGAP 10 01/06/2017 03:35 PM     (H) 01/06/2017 03:35 PM    BUN 30 (H) 01/06/2017 03:35 PM    CREA 1.41 (H) 01/06/2017 03:35 PM    GFRAA 58 (L) 01/06/2017 03:35 PM    GFRNA 48 (L) 01/06/2017 03:35 PM    CA 8.5 01/06/2017 03:35 PM    ALB 3.2 (L) 01/06/2017 03:35 PM    TP 6.6 01/06/2017 03:35 PM    GLOB 3.4 01/06/2017 03:35 PM    AGRAT 0.9 01/06/2017 03:35 PM    SGOT 33 01/06/2017 03:35 PM    ALT 41 01/06/2017 03:35 PM     CBC:   Lab Results   Component Value Date/Time    WBC 11.8 01/06/2017 03:35 PM    HGB 13.4 01/06/2017 03:35 PM    HCT 40.2 01/06/2017 03:35 PM     01/06/2017 03:35 PM     All Cardiac Markers in the last 24 hours: No results found for: CPK, CKMMB, CKMB, RCK3, CKMBT, CKNDX, CKND1, DARNELL, TROPT, TROIQ, MARIA ESTHER, TROPT, TNIPOC, BNP, BNPP    Chest X-Ray is obtained; CXR reviewed independently -possible R UL PNA      EKG: tracing reviewed  independently - Ventricular paced    So Najera MD    January 6, 2017

## 2017-01-06 NOTE — ED PROVIDER NOTES
HPI Comments: 3:42 PM Meka Vyas is a 80 y.o. male with a history of hypertension, CABG, CAD, Afib, renal artery stenosis, and peripheral vascular disease presenting to the ED via EMS with dry cough that began one week ago. The patient also reports bilateral eye discharge, musculoskeletal CP, fever 102.2F, and he states his \"legs gave out\" as associated symptoms. He did not receive the influenza vaccine this season. Pt denies nausea, vomiting, and  diarrhea. No other complaints at this time. The history is provided by the patient and the EMS personnel. Past Medical History:   Diagnosis Date    Atrial fibrillation      CHADS score 3  (+CHF, +HTN, +AGE, -DM, -CVA)    CABG      2008   LIMA - LAD,   SVG - RCA    Cardiomyopathy      EF 30-35% (ECHO 6/14)    Coronary artery disease     Dyslipidemia     Hypertension     Hypothyroid     Pacemaker     Peripheral vascular disease     Renal artery stenosis      bilateral stents    Sick sinus syndrome        Past Surgical History:   Procedure Laterality Date    Hx coronary artery bypass graft       2008   LIMA - LAD,   SVG - RCA         No family history on file. Social History     Social History    Marital status:      Spouse name: N/A    Number of children: N/A    Years of education: N/A     Occupational History    Not on file. Social History Main Topics    Smoking status: Former Smoker    Smokeless tobacco: Not on file    Alcohol use No    Drug use: No    Sexual activity: Not on file     Other Topics Concern    Not on file     Social History Narrative         ALLERGIES: Beta-blockers (beta-adrenergic blocking agts); Penicillins; and Statins-hmg-coa reductase inhibitors    Review of Systems   Constitutional: Positive for fever. Negative for appetite change, chills, diaphoresis, fatigue and unexpected weight change.    HENT: Negative for congestion, dental problem, ear discharge, ear pain, hearing loss, nosebleeds, rhinorrhea, sinus pressure, sore throat, tinnitus, trouble swallowing and voice change. Eyes: Positive for discharge. Negative for photophobia, pain, redness and visual disturbance. Respiratory: Positive for cough. Negative for choking, chest tightness, shortness of breath, wheezing and stridor. Cardiovascular: Positive for chest pain. Negative for palpitations and leg swelling. Gastrointestinal: Negative for abdominal distention, abdominal pain, anal bleeding, blood in stool, constipation, diarrhea, nausea and vomiting. Genitourinary: Negative for decreased urine volume, difficulty urinating, discharge, dysuria, flank pain, frequency, genital sores, hematuria, penile pain, penile swelling, scrotal swelling, testicular pain and urgency. Musculoskeletal: Negative for neck pain and neck stiffness. Neurological: Negative for tremors, seizures, syncope, speech difficulty, weakness, light-headedness and headaches. Hematological: Negative for adenopathy. Does not bruise/bleed easily. Psychiatric/Behavioral: Negative for agitation, behavioral problems, confusion, hallucinations, self-injury, sleep disturbance and suicidal ideas. The patient is not nervous/anxious and is not hyperactive. All other systems reviewed and are negative. Vitals:    01/06/17 1534 01/06/17 1543 01/06/17 1553 01/06/17 1635   BP:       Pulse: 70      Resp: 23      Temp:  98.6 °F (37 °C) (!) 102.2 °F (39 °C)    SpO2: 98%      Weight:    63.5 kg (140 lb)   Height:    5' 9\" (1.753 m)            Physical Exam   Constitutional: He is oriented to person, place, and time. He appears well-developed and well-nourished. He appears distressed. HENT:   Head: Normocephalic and atraumatic. Right Ear: External ear normal.   Left Ear: External ear normal.   Nose: Nose normal.   Mouth/Throat: Oropharynx is clear and moist. No oropharyngeal exudate. Eyes: EOM are normal. Pupils are equal, round, and reactive to light. Right eye exhibits no discharge. Left eye exhibits no discharge. No scleral icterus. Conjunctiva mild injected bilaterally. Crusting of both eyelashes. Yellow drainage noted from both eyes. Neck: Normal range of motion. Neck supple. JVD present. No tracheal deviation present. No thyromegaly present. Cardiovascular: Normal rate, regular rhythm and intact distal pulses. Exam reveals no gallop and no friction rub. Murmur (Gr II/VI systolic murmur) heard. Pulmonary/Chest: Effort normal and breath sounds normal. No stridor. No respiratory distress. He has no wheezes. He has no rales. He exhibits no tenderness. Abdominal: Soft. Bowel sounds are normal. He exhibits no distension and no mass. There is no tenderness. There is no rebound and no guarding. Genitourinary: Penis normal.   Musculoskeletal: Normal range of motion. He exhibits no edema, tenderness or deformity. Lymphadenopathy:     He has no cervical adenopathy. Neurological: He is alert and oriented to person, place, and time. No cranial nerve deficit. He exhibits normal muscle tone. Coordination normal.   Skin: Skin is warm and dry. No rash noted. He is not diaphoretic. No erythema. No pallor. Psychiatric: He has a normal mood and affect. His behavior is normal. Judgment and thought content normal.   Nursing note and vitals reviewed. MDM  Number of Diagnoses or Management Options  Acute conjunctivitis of both eyes, unspecified acute conjunctivitis type:   Community acquired pneumonia:   Hyponatremia:   SIRS (systemic inflammatory response syndrome) ([facility] [address]):   Diagnosis management comments: Differential includes:  Pneumonia, CHF, Bronchitis, Influenza, Arrhythmia, metabolic derangement. Patient rectal temp was noted to 102.2. Sepsis ordered set initiated.          Amount and/or Complexity of Data Reviewed  Clinical lab tests: ordered and reviewed  Tests in the radiology section of CPT®: ordered and reviewed  Tests in the medicine section of CPT®: reviewed and ordered  Discussion of test results with the performing providers: yes  Decide to obtain previous medical records or to obtain history from someone other than the patient: yes  Obtain history from someone other than the patient: yes  Review and summarize past medical records: yes  Discuss the patient with other providers: yes  Independent visualization of images, tracings, or specimens: yes    Risk of Complications, Morbidity, and/or Mortality  Presenting problems: high  Diagnostic procedures: high  Management options: high      ED Course       Procedures    -------------------------------------------------------------------------------------------------------------------  PROGRESS NOTE:  4:17 PM Pt's sons at the bedside speaking with Zuleika Seals DO. Sons state he was recently seen by his PCP.   5:07 PM Pt reevaluated at this time and is resting comfortably in NAD. Discussed results and findings, as well as, diagnosis and plan for admission. Pt's family verbalizes understanding and agreement with plan. All questions addressed at this time. CONSULTATIONS:  5:05 PM Zuleika Seals DO discussed care with Dr. Jd Lockett, [facility]) who accepts the patient for admission. Standard discussion; including history of patients chief complaint, available diagnostic results, and treatment course.       ORDERS:  Orders Placed This Encounter    SEVERE SEPSIS AND SEPTIC SHOCK BUNDLE INITIATED    CULTURE, BLOOD    CULTURE, BLOOD    CULTURE, URINE    XR CHEST PORT    URINALYSIS W/ RFLX MICROSCOPIC    METABOLIC PANEL, COMPREHENSIVE    CBC WITH AUTOMATED DIFF    TSH 3RD GENERATION    PRO-BNP    POC LACTIC ACID    VITAL SIGNS - PER UNIT ROUTINE    STRICT I & O    NEUROLOGIC STATUS ASSESSMENT - PER UNIT ROUTINE    NOTIFY PROVIDER: SPECIFY Notify provider within one hour to start vasopressors if patient is unable to maintain a MAP of greater than or equal to 65 mmHg despite fluid resuscitation CONTINUOUS STAT    MEASURE RECTAL TEMPERATURE    POC LACTIC ACID    EKG, 12 LEAD, INITIAL    SALINE LOCK IV ONE TIME STAT    sodium chloride (NS) flush 5-10 mL    aztreonam (AZACTAM) 2 g in 0.9% sodium chloride (MBP/ADV) 100 mL MBP    levoFLOXacin (LEVAQUIN) 750 mg in D5W IVPB    sodium chloride 0.9 % bolus infusion 1,000 mL    acetaminophen (TYLENOL) tablet 1,000 mg    DISCONTD: vancomycin (VANCOCIN) 1,000 mg in 0.9% sodium chloride (MBP/ADV) 250 mL adv           EKG INTERPRETATIONS:  ED EKG interpretation:  Rhythm: ventricular paced rhythm . Rate (approx.): 70; This EKG was interpreted by Sin Aguilar DO,ED Provider. RADIOLOGY RESULTS:    XR CHEST PORT      Right upper lobe opacity   Per Sin Aguilar DO             LAB RESULTS:    Recent Results (from the past 12 hour(s))   METABOLIC PANEL, COMPREHENSIVE    Collection Time: 01/06/17  3:35 PM   Result Value Ref Range    Sodium 131 (L) 136 - 145 mmol/L    Potassium 4.1 3.5 - 5.5 mmol/L    Chloride 98 (L) 100 - 108 mmol/L    CO2 23 21 - 32 mmol/L    Anion gap 10 3.0 - 18 mmol/L    Glucose 140 (H) 74 - 99 mg/dL    BUN 30 (H) 7.0 - 18 MG/DL    Creatinine 1.41 (H) 0.6 - 1.3 MG/DL    BUN/Creatinine ratio 21 (H) 12 - 20      GFR est AA 58 (L) >60 ml/min/1.73m2    GFR est non-AA 48 (L) >60 ml/min/1.73m2    Calcium 8.5 8.5 - 10.1 MG/DL    Bilirubin, total 0.7 0.2 - 1.0 MG/DL    ALT 41 16 - 61 U/L    AST 33 15 - 37 U/L    Alk.  phosphatase 118 (H) 45 - 117 U/L    Protein, total 6.6 6.4 - 8.2 g/dL    Albumin 3.2 (L) 3.4 - 5.0 g/dL    Globulin 3.4 2.0 - 4.0 g/dL    A-G Ratio 0.9 0.8 - 1.7     CBC WITH AUTOMATED DIFF    Collection Time: 01/06/17  3:35 PM   Result Value Ref Range    WBC 11.8 4.6 - 13.2 K/uL    RBC 4.38 (L) 4.70 - 5.50 M/uL    HGB 13.4 13.0 - 16.0 g/dL    HCT 40.2 36.0 - 48.0 %    MCV 91.8 74.0 - 97.0 FL    MCH 30.6 24.0 - 34.0 PG    MCHC 33.3 31.0 - 37.0 g/dL    RDW 13.2 11.6 - 14.5 %    PLATELET 783 585 - 420 K/uL    MPV 9.2 9.2 - 11.8 FL    NEUTROPHILS 85 (H) 40 - 73 %    LYMPHOCYTES 4 (L) 21 - 52 %    MONOCYTES 11 (H) 3 - 10 %    EOSINOPHILS 0 0 - 5 %    BASOPHILS 0 0 - 2 %    ABS. NEUTROPHILS 10.0 (H) 1.8 - 8.0 K/UL    ABS. LYMPHOCYTES 0.5 (L) 0.9 - 3.6 K/UL    ABS. MONOCYTES 1.3 (H) 0.05 - 1.2 K/UL    ABS. EOSINOPHILS 0.0 0.0 - 0.4 K/UL    ABS. BASOPHILS 0.0 0.0 - 0.06 K/UL    DF AUTOMATED     EKG, 12 LEAD, INITIAL    Collection Time: 01/06/17  3:38 PM   Result Value Ref Range    Ventricular Rate 70 BPM    Atrial Rate 68 BPM    QRS Duration 168 ms    Q-T Interval 426 ms    QTC Calculation (Bezet) 460 ms    Calculated R Axis -86 degrees    Calculated T Axis 84 degrees    Diagnosis       Ventricular-paced rhythm  Abnormal ECG  When compared with ECG of 26-DEC-2016 15:30,  No significant change was found     POC LACTIC ACID    Collection Time: 01/06/17  3:45 PM   Result Value Ref Range    Lactic Acid (POC) 2.2 (HH) 0.4 - 2.0 mmol/L         DISPOSITION:  Diagnosis:   1. Community acquired pneumonia    2. SIRS (systemic inflammatory response syndrome) (HCC)    3. Hyponatremia            Disposition: admitted      Follow-up Information     None                 Patient's Medications   Start Taking    No medications on file   Continue Taking    AMLODIPINE (NORVASC) 10 MG TABLET    Take 1 Tab by mouth daily. APIXABAN (ELIQUIS) 5 MG TABLET    Take 5 mg by mouth two (2) times a day. DICYCLOMINE (BENTYL) 10 MG CAPSULE    Take 10 mg by mouth three (3) times daily as needed for Nausea (max 3x/day). ENALAPRIL (VASOTEC) 20 MG TABLET    Take 20 mg by mouth daily. METOPROLOL TARTRATE (LOPRESSOR) 100 MG IR TABLET    Take 100 mg by mouth daily. OMEPRAZOLE (PRILOSEC OTC) 20 MG TABLET    Take 20 mg by mouth daily. Indications: HEARTBURN    TRAMADOL HCL (TRAMADOL PO)    Take 50 mg by mouth daily.    These Medications have changed    No medications on file   Stop Taking    No medications on file -------------------------------------------------------------------------------------------------------------------  Orville Okeefe am scribing for and in the presence of Jesus Jones DO. Signed by: Tiam Morales, 01/06/17, 3:53 PM        Provider Attestation:  I personally performed the services described in the documentation, reviewed the documentation, as recorded by the scribe in my presence, and it accurately and completely records my words and actions. Dr. Kelleen Olszewski.  Maycol Roe D.O. 3:53 PM

## 2017-01-06 NOTE — PROGRESS NOTES
Reason for Renal Dosing:  Per Renal Dosing Policy    Patient clinical status and labs ordered/reviewed. Pt Weight Weight: 63.5 kg (140 lb)   Serum Creatinine Lab Results   Component Value Date/Time    Creatinine 1.41 01/06/2017 03:35 PM    Creatinine (POC) 1.2 06/13/2014 11:43 AM       Creatinine Clearance Estimated Creatinine Clearance: 33.8 mL/min (based on Cr of 1.41). BUN Lab Results   Component Value Date/Time    BUN 30 01/06/2017 03:35 PM    BUN (POC) 21 06/13/2014 11:43 AM       WBC Lab Results   Component Value Date/Time    WBC 11.8 01/06/2017 03:35 PM      Temperature Temp: (!) 102.2 °F (39 °C)     HR Pulse (Heart Rate): 70     BP BP: (!) 188/139             Drug type: Antibiotic indicated for  Pneumonia (CAP)    Drug/dose: Levofloxacin 750 mg every 24 hours was adjusted to Levofloxacin 750 mg every 48 hours     Continue to monitor.     Signed Linden Gould, PHARMD  Date 1/6/2017  Time 4:49 PM

## 2017-01-07 ENCOUNTER — APPOINTMENT (OUTPATIENT)
Dept: GENERAL RADIOLOGY | Age: 82
DRG: 871 | End: 2017-01-07
Attending: HOSPITALIST
Payer: MEDICARE

## 2017-01-07 LAB
ANION GAP BLD CALC-SCNC: 10 MMOL/L (ref 3–18)
ATRIAL RATE: 68 BPM
BUN SERPL-MCNC: 31 MG/DL (ref 7–18)
BUN/CREAT SERPL: 26 (ref 12–20)
CALCIUM SERPL-MCNC: 7.8 MG/DL (ref 8.5–10.1)
CALCULATED R AXIS, ECG10: -86 DEGREES
CALCULATED T AXIS, ECG11: 84 DEGREES
CHLORIDE SERPL-SCNC: 101 MMOL/L (ref 100–108)
CK MB CFR SERPL CALC: 2.9 % (ref 0–4)
CK MB SERPL-MCNC: 2.4 NG/ML (ref 0.5–3.6)
CK SERPL-CCNC: 83 U/L (ref 39–308)
CO2 SERPL-SCNC: 23 MMOL/L (ref 21–32)
CREAT SERPL-MCNC: 1.18 MG/DL (ref 0.6–1.3)
DIAGNOSIS, 93000: NORMAL
ERYTHROCYTE [DISTWIDTH] IN BLOOD BY AUTOMATED COUNT: 13.4 % (ref 11.6–14.5)
GLUCOSE SERPL-MCNC: 102 MG/DL (ref 74–99)
HCT VFR BLD AUTO: 33.8 % (ref 36–48)
HGB BLD-MCNC: 11.2 G/DL (ref 13–16)
MCH RBC QN AUTO: 30.6 PG (ref 24–34)
MCHC RBC AUTO-ENTMCNC: 33.1 G/DL (ref 31–37)
MCV RBC AUTO: 92.3 FL (ref 74–97)
PLATELET # BLD AUTO: 226 K/UL (ref 135–420)
PMV BLD AUTO: 9.3 FL (ref 9.2–11.8)
POTASSIUM SERPL-SCNC: 4.1 MMOL/L (ref 3.5–5.5)
Q-T INTERVAL, ECG07: 426 MS
QRS DURATION, ECG06: 168 MS
QTC CALCULATION (BEZET), ECG08: 460 MS
RBC # BLD AUTO: 3.66 M/UL (ref 4.7–5.5)
SODIUM SERPL-SCNC: 134 MMOL/L (ref 136–145)
TROPONIN I SERPL-MCNC: 0.03 NG/ML (ref 0–0.04)
TROPONIN I SERPL-MCNC: <0.02 NG/ML (ref 0–0.04)
VENTRICULAR RATE, ECG03: 70 BPM
WBC # BLD AUTO: 9.4 K/UL (ref 4.6–13.2)

## 2017-01-07 PROCEDURE — 77030029684 HC NEB SM VOL KT MONA -A

## 2017-01-07 PROCEDURE — 71010 XR CHEST PORT: CPT

## 2017-01-07 PROCEDURE — G8979 MOBILITY GOAL STATUS: HCPCS

## 2017-01-07 PROCEDURE — 85027 COMPLETE CBC AUTOMATED: CPT | Performed by: HOSPITALIST

## 2017-01-07 PROCEDURE — 97535 SELF CARE MNGMENT TRAINING: CPT

## 2017-01-07 PROCEDURE — 77010033678 HC OXYGEN DAILY

## 2017-01-07 PROCEDURE — G8978 MOBILITY CURRENT STATUS: HCPCS

## 2017-01-07 PROCEDURE — 74011000250 HC RX REV CODE- 250: Performed by: EMERGENCY MEDICINE

## 2017-01-07 PROCEDURE — 74011000250 HC RX REV CODE- 250: Performed by: HOSPITALIST

## 2017-01-07 PROCEDURE — 74011250637 HC RX REV CODE- 250/637: Performed by: HOSPITALIST

## 2017-01-07 PROCEDURE — 74011000258 HC RX REV CODE- 258: Performed by: EMERGENCY MEDICINE

## 2017-01-07 PROCEDURE — 93005 ELECTROCARDIOGRAM TRACING: CPT

## 2017-01-07 PROCEDURE — 74011250637 HC RX REV CODE- 250/637

## 2017-01-07 PROCEDURE — 97116 GAIT TRAINING THERAPY: CPT

## 2017-01-07 PROCEDURE — 80048 BASIC METABOLIC PNL TOTAL CA: CPT | Performed by: HOSPITALIST

## 2017-01-07 PROCEDURE — 74011250636 HC RX REV CODE- 250/636: Performed by: HOSPITALIST

## 2017-01-07 PROCEDURE — 82550 ASSAY OF CK (CPK): CPT | Performed by: HOSPITALIST

## 2017-01-07 PROCEDURE — 74011000250 HC RX REV CODE- 250

## 2017-01-07 PROCEDURE — 97165 OT EVAL LOW COMPLEX 30 MIN: CPT

## 2017-01-07 PROCEDURE — 36415 COLL VENOUS BLD VENIPUNCTURE: CPT | Performed by: HOSPITALIST

## 2017-01-07 PROCEDURE — 97161 PT EVAL LOW COMPLEX 20 MIN: CPT

## 2017-01-07 PROCEDURE — 65660000000 HC RM CCU STEPDOWN

## 2017-01-07 PROCEDURE — 74011250637 HC RX REV CODE- 250/637: Performed by: FAMILY MEDICINE

## 2017-01-07 PROCEDURE — 94640 AIRWAY INHALATION TREATMENT: CPT

## 2017-01-07 RX ORDER — MORPHINE SULFATE 2 MG/ML
2 INJECTION, SOLUTION INTRAMUSCULAR; INTRAVENOUS
Status: DISCONTINUED | OUTPATIENT
Start: 2017-01-07 | End: 2017-01-16 | Stop reason: HOSPADM

## 2017-01-07 RX ORDER — IPRATROPIUM BROMIDE AND ALBUTEROL SULFATE 2.5; .5 MG/3ML; MG/3ML
SOLUTION RESPIRATORY (INHALATION)
Status: COMPLETED
Start: 2017-01-07 | End: 2017-01-07

## 2017-01-07 RX ORDER — NITROGLYCERIN 0.4 MG/1
TABLET SUBLINGUAL
Status: DISPENSED
Start: 2017-01-07 | End: 2017-01-08

## 2017-01-07 RX ORDER — MORPHINE SULFATE 2 MG/ML
2 INJECTION, SOLUTION INTRAMUSCULAR; INTRAVENOUS ONCE
Status: COMPLETED | OUTPATIENT
Start: 2017-01-07 | End: 2017-01-07

## 2017-01-07 RX ORDER — NITROGLYCERIN 0.4 MG/1
0.4 TABLET SUBLINGUAL AS NEEDED
Status: DISCONTINUED | OUTPATIENT
Start: 2017-01-07 | End: 2017-01-16 | Stop reason: HOSPADM

## 2017-01-07 RX ORDER — NITROGLYCERIN 0.4 MG/1
TABLET SUBLINGUAL
Status: COMPLETED
Start: 2017-01-07 | End: 2017-01-07

## 2017-01-07 RX ORDER — IPRATROPIUM BROMIDE AND ALBUTEROL SULFATE 2.5; .5 MG/3ML; MG/3ML
3 SOLUTION RESPIRATORY (INHALATION)
Status: DISCONTINUED | OUTPATIENT
Start: 2017-01-07 | End: 2017-01-16 | Stop reason: HOSPADM

## 2017-01-07 RX ADMIN — CIPROFLOXACIN HYDROCHLORIDE 1 DROP: 3 SOLUTION/ DROPS OPHTHALMIC at 14:30

## 2017-01-07 RX ADMIN — ENALAPRIL MALEATE 20 MG: 10 TABLET ORAL at 10:33

## 2017-01-07 RX ADMIN — METOPROLOL TARTRATE 50 MG: 50 TABLET ORAL at 10:33

## 2017-01-07 RX ADMIN — IPRATROPIUM BROMIDE AND ALBUTEROL SULFATE 3 ML: .5; 3 SOLUTION RESPIRATORY (INHALATION) at 13:19

## 2017-01-07 RX ADMIN — CIPROFLOXACIN HYDROCHLORIDE 1 DROP: 3 SOLUTION/ DROPS OPHTHALMIC at 17:36

## 2017-01-07 RX ADMIN — CIPROFLOXACIN HYDROCHLORIDE 1 DROP: 3 SOLUTION/ DROPS OPHTHALMIC at 16:00

## 2017-01-07 RX ADMIN — METOPROLOL TARTRATE 50 MG: 50 TABLET ORAL at 17:36

## 2017-01-07 RX ADMIN — MORPHINE SULFATE 2 MG: 2 INJECTION, SOLUTION INTRAMUSCULAR; INTRAVENOUS at 13:08

## 2017-01-07 RX ADMIN — CIPROFLOXACIN HYDROCHLORIDE 1 DROP: 3 SOLUTION/ DROPS OPHTHALMIC at 22:38

## 2017-01-07 RX ADMIN — BENZONATATE 100 MG: 100 CAPSULE ORAL at 22:37

## 2017-01-07 RX ADMIN — CIPROFLOXACIN HYDROCHLORIDE 1 DROP: 3 SOLUTION/ DROPS OPHTHALMIC at 10:34

## 2017-01-07 RX ADMIN — AZTREONAM 2 G: 2 INJECTION, POWDER, LYOPHILIZED, FOR SOLUTION INTRAMUSCULAR; INTRAVENOUS at 17:36

## 2017-01-07 RX ADMIN — NITROGLYCERIN: 0.4 TABLET SUBLINGUAL at 12:49

## 2017-01-07 RX ADMIN — IPRATROPIUM BROMIDE AND ALBUTEROL SULFATE 3 ML: .5; 3 SOLUTION RESPIRATORY (INHALATION) at 12:51

## 2017-01-07 RX ADMIN — APIXABAN 5 MG: 2.5 TABLET, FILM COATED ORAL at 10:33

## 2017-01-07 RX ADMIN — HYDROCODONE BITARTRATE AND ACETAMINOPHEN 1 TABLET: 5; 325 TABLET ORAL at 12:49

## 2017-01-07 RX ADMIN — APIXABAN 5 MG: 2.5 TABLET, FILM COATED ORAL at 17:36

## 2017-01-07 RX ADMIN — BENZONATATE 100 MG: 100 CAPSULE ORAL at 10:40

## 2017-01-07 RX ADMIN — ZOLPIDEM TARTRATE 5 MG: 5 TABLET, FILM COATED ORAL at 22:37

## 2017-01-07 RX ADMIN — PANTOPRAZOLE SODIUM 40 MG: 40 TABLET, DELAYED RELEASE ORAL at 10:33

## 2017-01-07 RX ADMIN — CIPROFLOXACIN HYDROCHLORIDE 1 DROP: 3 SOLUTION/ DROPS OPHTHALMIC at 20:37

## 2017-01-07 RX ADMIN — AZTREONAM 2 G: 2 INJECTION, POWDER, LYOPHILIZED, FOR SOLUTION INTRAMUSCULAR; INTRAVENOUS at 10:33

## 2017-01-07 RX ADMIN — AMLODIPINE BESYLATE 10 MG: 10 TABLET ORAL at 10:33

## 2017-01-07 NOTE — PROGRESS NOTES
Medicine Progress Note    Patient: Severiano Skeeter   Age:  80 y.o.  DOA: 1/6/2017   Admit Dx / CC: CAP (community acquired pneumonia)  LOS:  LOS: 1 day     Assessment/Plan   Principal Problem:    Sepsis ([address] (1/6/2017)    Active Problems:    Dyslipidemia ()      Coronary artery disease ()      Atrial fibrillation ()      Overview: CHADS score 3  (+CHF, +HTN, +AGE, -DM, -CVA)      Community acquired bacterial pneumonia (1/6/2017)      CAP (community acquired pneumonia) (1/6/2017)        Additional Plan notes     1) Sepsis 2/2 CAP: RUL pna ,Continue levaquin and aztreonam, tele monitoring, stop fluids, bmp and cbc in am     2) Hx of Afib- continue elliquis     3) CAD/CABG/HTN: Continue BB, norvasc, vasotec     4)  CP- significant hx, RRT this am, not relieved with nitro x 1, morphine with some affect, neb given,  Xray read pending. Trop negative, repeat 7 pm      DISPO     Anticipated Date of Discharge: 1/9  Anticipated Disposition (home, SNF) : home    Subjective:   Patient seen and examined. Complains of mid sternal pressure like CP. Objective:     Visit Vitals    /61    Pulse 70    Temp 98.2 °F (36.8 °C)    Resp 20    Ht 5' 9\" (1.753 m)    Wt 67.4 kg (148 lb 8 oz)    SpO2 94%    BMI 21.93 kg/m2       Physical Exam:  General appearance: alert, cooperative, no distress, appears stated age  Head: Normocephalic, without obvious abnormality, atraumatic  Neck: supple, trachea midline  Lungs: decrease bs R Lung upper and lower. Heart: regular rate and rhythm, S1, S2 normal, no murmur, click, rub or gallop  Abdomen: soft, non-tender.  Bowel sounds normal. No masses,  no organomegaly  Extremities: extremities normal, atraumatic, no cyanosis or edema  Skin: Skin color, texture, turgor normal. No rashes or lesions  Neurologic: Grossly normal  PSY: Mood and affect normal, appropriately behaved    Intake and Output:  Current Shift:     Last three shifts:  01/05 1901 - 01/07 0700  In: 240 [P.O.:240]  Out: 200 [Urine:200]    Lab/Data Reviewed:  CMP:   Lab Results   Component Value Date/Time     (L) 01/07/2017 04:00 AM    K 4.1 01/07/2017 04:00 AM     01/07/2017 04:00 AM    CO2 23 01/07/2017 04:00 AM    AGAP 10 01/07/2017 04:00 AM     (H) 01/07/2017 04:00 AM    BUN 31 (H) 01/07/2017 04:00 AM    CREA 1.18 01/07/2017 04:00 AM    GFRAA >60 01/07/2017 04:00 AM    GFRNA 59 (L) 01/07/2017 04:00 AM    CA 7.8 (L) 01/07/2017 04:00 AM    ALB 3.2 (L) 01/06/2017 03:35 PM    TP 6.6 01/06/2017 03:35 PM    GLOB 3.4 01/06/2017 03:35 PM    AGRAT 0.9 01/06/2017 03:35 PM    SGOT 33 01/06/2017 03:35 PM    ALT 41 01/06/2017 03:35 PM     CBC:   Lab Results   Component Value Date/Time    WBC 9.4 01/07/2017 04:00 AM    HGB 11.2 (L) 01/07/2017 04:00 AM    HCT 33.8 (L) 01/07/2017 04:00 AM     01/07/2017 04:00 AM     All Cardiac Markers in the last 24 hours:   Lab Results   Component Value Date/Time    CPK 83 01/07/2017 12:48 PM    CKMB 2.4 01/07/2017 12:48 PM    CKND1 2.9 01/07/2017 12:48 PM    TROIQ 0.03 01/07/2017 12:48 PM       Medications Reviewed:  Current Facility-Administered Medications   Medication Dose Route Frequency    nitroglycerin (NITROSTAT) tablet 0.4 mg  0.4 mg SubLINGual PRN    albuterol-ipratropium (DUO-NEB) 2.5 MG-0.5 MG/3 ML  3 mL Nebulization Q4H PRN    nitroglycerin (NITROSTAT) 0.4 mg tablet        morphine injection 2 mg  2 mg IntraVENous Q3H PRN    sodium chloride (NS) flush 5-10 mL  5-10 mL IntraVENous PRN    aztreonam (AZACTAM) 2 g in 0.9% sodium chloride (MBP/ADV) 100 mL MBP  2 g IntraVENous Q8H    levoFLOXacin (LEVAQUIN) 750 mg in D5W IVPB  750 mg IntraVENous Q48H    amLODIPine (NORVASC) tablet 10 mg  10 mg Oral DAILY    apixaban (ELIQUIS) tablet 5 mg  5 mg Oral BID    dicyclomine (BENTYL) capsule 10 mg  10 mg Oral TID PRN    enalapril (VASOTEC) tablet 20 mg  20 mg Oral DAILY    metoprolol tartrate (LOPRESSOR) tablet 50 mg  50 mg Oral BID    HYDROcodone-acetaminophen (NORCO) 5-325 mg per tablet 1 Tab  1 Tab Oral Q4H PRN    ondansetron (ZOFRAN) injection 4 mg  4 mg IntraVENous Q4H PRN    magnesium hydroxide (MILK OF MAGNESIA) 400 mg/5 mL oral suspension 30 mL  30 mL Oral DAILY PRN    ciprofloxacin HCl (CILOXIN) 0.3 % ophthalmic solution 1 Drop  1 Drop Both Eyes Q2H    pantoprazole (PROTONIX) tablet 40 mg  40 mg Oral ACB    influenza vaccine 2016-17 (36mos+)(PF) (FLUZONE/FLUARIX/FLULAVAL QUAD) injection 0.5 mL  0.5 mL IntraMUSCular PRIOR TO DISCHARGE    benzonatate (TESSALON) capsule 100 mg  100 mg Oral TID PRN    zolpidem (AMBIEN) tablet 5 mg  5 mg Oral QHS PRN       Zenaida Mina MD    January 7, 2017

## 2017-01-07 NOTE — PROGRESS NOTES
TRANSFER - IN REPORT:    Verbal report received from sbar(name) on Dmitri Gregorio  being received from ed(unit) for routine progression of care      Report consisted of patients Situation, Background, Assessment and   Recommendations(SBAR). Information from the following report(s) SBAR, Kardex, Intake/Output, MAR, Recent Results, Med Rec Status and Cardiac Rhythm v paced was reviewed with the receiving nurse. Opportunity for questions and clarification was provided. Assessment completed upon patients arrival to unit and care assumed. 2100  Pt connected to telemetry and verified with tech, v paced. Admission database completed at this time. Pt resting comfortably, call bell within reach, side rails up x 3, bed low and locked. Pt instructed to call for assistance    2240  Pt requesting something for sleep and cough. Page out to hospitalist.    2245  Call back from hospitalist new order for tesslon perles and haile. 2345  Pt requesting not to be woken up for eye drops. Bedside and Verbal shift change report given to *** (oncoming nurse) by ciro tinoco rn  (offgoing nurse). Report included the following information SBAR, Kardex, Intake/Output, MAR, Recent Results and Cardiac Rhythm v paced.

## 2017-01-07 NOTE — PROGRESS NOTES
RRT:  Chest pressure. Mid sternal     systolic, gave 1 nitro, duoneb. Still SOB  Stat xray ordered, stop fluids, 2 mg morphine. q 6 prn duoneb. Monitor. Sating 98% on 5 L currently.   By exam wheezing b/L

## 2017-01-07 NOTE — ROUTINE PROCESS
Responded to RRT this morning. Primary RN, charge RN, RT, NS and MD at bedside. Orders received, see MD note. No need for transfer at this time. Will continue to monitor.

## 2017-01-07 NOTE — PROGRESS NOTES
Problem: Mobility Impaired (Adult and Pediatric)  Goal: *Acute Goals and Plan of Care (Insert Text)  Physical Therapy Goals  Initiated 1/7/2017 and to be accomplished within 7 day(s)   1. Patient will transfer from bed to chair and chair to bed with modified independence using the least restrictive device. 2. Patient will perform sit to stand with modified independence. 3. Patient will ambulate with modified independence for 150  feet with the least restrictive device. 4. Patient will ascend/descend 3 stairs with bilateral handrail(s) with supervision/set-up. Outcome: Progressing Towards Goal  PHYSICAL THERAPY EVALUATION     Patient: Clair Hernández (89 y.o. male)  Date: 1/7/2017  Primary Diagnosis: CAP (community acquired pneumonia)        Precautions: Fall         ASSESSMENT :  Patient is an 79 yo male admitted to the hospital following episode of fatigue and SOB. Based on the objective data described below, the patient presents with gait instability and overall dec activity tolerance with SOB upon exertion. During evaluation, pt demo independent bed mobility, (S) for sit to stand transfer, (S) to ambulate 100ft w/ RW on 5L O2. Pt required intermittent cueing to maintain body within limits of RW however demo ability to maneuver around obstacles without imbalance using RW. Pt with inc fatigue noted and elevated BP following gait training. Pt lives home w/ wife with family members nearby and access to bathroom/bedroom on first floor. Patient will benefit from skilled intervention to address the above impairments.   Patients rehabilitation potential is considered to be Good  Factors which may influence rehabilitation potential include:   [ ]         None noted  [ ]         Mental ability/status  [ ]         Medical condition  [ ]         Home/family situation and support systems  [ ]         Safety awareness  [ ]         Pain tolerance/management  [ ]         Other:      Recommendations for nursing:  Written on communication board: (S) w/ RW and O2  Verbally communicated to: Nurse       PLAN :  Recommendations and Planned Interventions:  [ ]           Bed Mobility Training             [X]    Neuromuscular Re-Education  [X]           Transfer Training                   [ ]    Orthotic/Prosthetic Training  [X]           Gait Training                          [ ]    Modalities  [X]           Therapeutic Exercises          [ ]    Edema Management/Control  [X]           Therapeutic Activities            [X]    Patient and Family Training/Education  [ ]           Other (comment):     Frequency/Duration: Patient will be followed by physical therapy 1-2 times per day/4-7 days per week to address goals. Discharge Recommendations: Home w/ RW once able to negotiate 3 steps (pt has RW at home)   Further Equipment Recommendations for Discharge: Pt has RW. Home O2 may be necessary          SUBJECTIVE:   Patient stated I feel OK, I plan on going home from here.       OBJECTIVE DATA SUMMARY:       Past Medical History   Diagnosis Date    Atrial fibrillation         CHADS score 3  (+CHF, +HTN, +AGE, -DM, -CVA)    CABG         2008   LIMA - LAD,   SVG - RCA    Cardiomyopathy         EF 30-35% (ECHO 6/14)    Coronary artery disease      Dyslipidemia      Hypertension      Hypothyroid      Pacemaker      Peripheral vascular disease      Renal artery stenosis         bilateral stents    Sick sinus syndrome       Past Surgical History   Procedure Laterality Date    Hx coronary artery bypass graft           2008   LIMA - LAD,   SVG - RCA     Barriers to Learning/Limitations: None  Compensate with: visual, verbal, tactile, kinesthetic cues/model     G CODE:Mobility  Current  CI= 1-19%   Goal  CI= 1-19%.   The severity rating is based on the Other Functional Assessment     Eval Complexity: History: HIGH Complexity :3+ comorbidities / personal factors will impact the outcome/ POC Exam:MEDIUM Complexity : 3 Standardized tests and measures addressing body structure, function, activity limitation and / or participation in recreation  Presentation: LOW Complexity : Stable, uncomplicated  Clinical Decision Making:Low Complexity Stable and/or unchanging characteristics Overall Complexity:LOW      Prior Level of Function/Home Situation:   Home Situation  Home Environment: Private residence  # Steps to Enter: 3  Rails to Enter: Yes  Hand Rails : Bilateral  One/Two Story Residence: Two story  # of Interior Steps: 13  Height of Each Step (in): 7 inches  Interior Rails: Right  Lift Chair Available: No  Living Alone: No (Lives with wife)  Support Systems: Family member(s), Child(jay)  Patient Expects to be Discharged to[de-identified] Private residence  Current DME Used/Available at Home: Cane, straight  Tub or Shower Type: Tub (w seat and grab bars)  Critical Behavior:  Neurologic State: Alert; Appropriate for age  Orientation Level: Oriented X4  Cognition: Appropriate decision making; Follows commands     Psychosocial  Patient Behaviors: Calm  Purposeful Interaction: Yes  Strength:  5/5 LE myotomes    Tone & Sensation: WNL   Range Of Motion: WNL   Functional Mobility:  Bed Mobility:  Rolling: Independent  Supine to Sit: Independent  Sit to Supine: Independent     Transfers:  Sit to Stand: Supervision  Stand to Sit: Supervision   Balance:   Sitting: Intact; Without support  Standing: Intact; With support  Ambulation/Gait Training:  Distance (ft): 100 Feet (ft)  Assistive Device: Walker, rolling  Ambulation - Level of Assistance: Supervision  Base of Support: Narrowed     Speed/Pilar: Slow  Step Length: Left shortened;Right shortened     Pain:  Pre treatment pain level: 0/10  Post treatment pain level: 0/10  Pain Scale 1: Numeric (0 - 10)   Activity Tolerance:   Generally decreased  Please refer to the flowsheet for vital signs taken during this treatment.   After treatment:   [ ]         Patient left in no apparent distress sitting up in chair  [X]         Patient left in no apparent distress in bed  [X]         Call bell left within reach  [X]         Nursing notified  [ ]         Caregiver present  [ ]         Bed alarm activated      COMMUNICATION/EDUCATION:   [X]         Fall prevention education was provided and the patient/caregiver indicated understanding. [X]         Patient/family have participated as able in goal setting and plan of care. [ ]         Patient/family agree to work toward stated goals and plan of care. [ ]         Patient understands intent and goals of therapy, but is neutral about his/her participation. [ ]         Patient is unable to participate in goal setting and plan of care.      Thank you for this referral.  Fernanda Brothers, DPT   Time Calculation: 29 mins

## 2017-01-07 NOTE — PROGRESS NOTES
[facility]   Discharge Planning/ Assessment    Reasons for Intervention: Chart reviewed. Met with pt., verified all demographics. States has MCR/BC ins. [facility] Dr. Zia Alegria is his PCP. NOK: Zehra Elizabeth, spouse, with whom he lives with & designates can participate in his discharge process. Has cane. States independent with ADL's prior to admit. PLAN: home when medically stable. Will cont to follow for any needs. Pat [address] [facility] [address] [address].      High Risk Criteria  [x] Yes  []No   Physician Referral  [] Yes  [x]No        Date    Nursing Referral  [] Yes  [x]No        Date    Patient/Family Request  [] Yes  [x]No        Date       Resources:    Medicare  [x] Yes  []No   Medicaid  [] Yes  [x]No   No Resources  [] Yes  [x]No   Private Insurance  [x] Yes  []No    Name/Phone Number    Other  [] Yes  [x]No        (i.e. Workman's Comp)         Prior Services:    Prior Services  [] Yes  [x]No   Home Health  [] Yes  [x]No   [address]  [] Yes  [x]No        Number of [address]  [] Yes  [x]No       Meals on Wheels  [] Yes  [x]No   Office on Aging  [] Yes  [x]No   Transportation Services  [] Yes  [x]No   Nursing Home  [] Yes  [x]No        Nursing Home Name    [facility]  [] Yes  [x]No        [address] Name    Other       Information Source:      Information obtained from  [x] Patient  [] Parent   [] [address]  [] Child  [] Spouse   [] Significant Other/Partner   [] Friend      [] EMS    [] Nursing Home Chart          [] Other:   Chart Review  [x] Yes  []No     Family/Support System:    Patient lives with  [] Alone    [x] Spouse   [] Significant Other  [] Children  [] Caretaker   [] Parent  [] Sibling     [] Other       Other Support System:    Is the patient responsible for care of others  [] Yes  [x]No   Information of person caring for patient on  discharge    Managers financial affairs independently  [x] Yes  []No   If no, explain: Status Prior to Admission:    Mental Status  [x] Awake  [x] Alert  [x] Oriented  [x] Quiet/Calm [] Lethargic/Sedated   [] Disoriented  [] Restless/Anxious  [] Combative   Personal Care  [] Dependent  [x] Independent Personal Care  [] Requires Assistance   Meal Preparation Ability  [x] Independent   [] Standby Assistance   [] Minimal Assistance   [] Moderate Assistance  [] Maximum Assistance     [] Total Assistance   Chores  [x] Independent with Chores   [] N/A Nursing Home Resident   [] Requires Assistance   Bowel/Bladder  [x] Continent  [] Catheter  [] Incontinent  [] Ostomy Self-Care    [] Urine Diversion Self-Care  [] Maximum Assistance     [] Total Assistance   Number of Persons needed for assistance    DME at home  [] Yaima Valentin  [x] Stacia Valentin   [] Commode    [] Bathroom/Grab Bars  [] Hospital Bed  [] Nebulizer  [] Oxygen           [] Raised Toilet Seat  [] Shower Chair  [] Side Rails for Bed   [] Tub Transfer Bench   [] Spencer Stewart  [] Carl Ivory, Standard      [] Other:   Vendor      Treatment Presently Receiving:    Current Treatments  [] Chemotherapy  [] Dialysis  [] Insulin  [x] IVAB [x] IVF   [] O2  [] PCA   [x] PT   [] RT   [] Tube Feedings   [] Wound Care     Psychosocial Evaluation:    Verbalized Knowledge of Disease Process  [] Patient  []Family   Coping with Disease Process  [] Patient  []Family   Requires Further Counseling Coping with Disease Process  [] Patient  []Family     Identified Projected Needs:    Home Health Aid  [] Yes  [x]No   Transportation  [] Yes  [x]No   Education  [] Yes  [x]No        Specific Education     Financial Counseling  [] Yes  [x]No   Inability to Care for Self/Will Require 24 hour care  [] Yes  [x]No   Pain Management  [] Yes  [x]No   Home Infusion Therapy  [] Yes  [x]No   Oxygen Therapy  [] Yes  [x]No   DME  [] Yes  [x]No   Long Term Care Placement  [] Yes  [x]No   Rehab  [] Yes  [x]No   Physical Therapy  [] Yes  [x]No   Needs Anticipated At This Time  [] Yes []No     Intra-Hospital Referral:    Home Health Liasion  [] Yes  [x]No     [] Yes  [x]No   Patient Representative  [] Yes  [x]No   Staff for Teaching Needs  [] Yes  [x]No   Specialty Teaching Needs     Diabetic Educator  [] Yes  [x]No   Referral for Diabetic Educator Needed  [] Yes  [x]No  If Yes, place order for Nutritionist or Diabetic Consult     Tentative Discharge Plan:    Home with No Services  [x] Yes  []No   Home with [address]  [] Yes  [x]No        If Yes, specify type    Home Care Program  [] Yes  [x]No        If Yes, specify type    Meals on Wheels  [] Yes  [x]No   Office of Aging  [] Yes  [x]No   NHP  [] Yes  [x]No   Return to the Nursing Home  [] Yes  [x]No   Rehab Therapy  [] Yes  [x]No   Acute Rehab  [] Yes  [x]No   Subacute Rehab  [] Yes  [x]No   Private Care  [] Yes  [x]No   Substance Abuse Referral  [] Yes  [x]No   Transportation  [] Yes  [x]No   Chore Service  [] Yes  [x]No   Inpatient Hospice  [] Yes  [x]No   OP RT  [] Yes  [x] No   OP Hemo  [] Yes  [x] No   OP PT  [] Yes  [x]No   Support Group  [] Yes  [x]No   Reach to Recovery  [] Yes  [x]No   OP Oncology Clinic  [] Yes  [x]No   Clinic Appointment  [] Yes  [x]No   DME  [] Yes  [x]No   Comments    Name of D/C Planner or  Given to Patient or Family Anthony Albrecht   Phone Number Pager: [phone]        Extension Ext. [phone]. [phone]   Date 1-7-2017   Time    If you are discharged home, whom do you designate to participate in your discharge plan and receive any information needed? Enter name of Pepe Ross        Phone # of designee [phone]        Address of [address] [place] [address].  Estefani Pedro [id number]        Updated         Patient refused to designate any           individual

## 2017-01-07 NOTE — ACP (ADVANCE CARE PLANNING)
Patient has designated ___his spouse_____________________ to participate in his/her discharge plan and to receive any needed information. Name: Haily Hood  [facility]  [facility]  [place] [facility] [address]  Phone number: [phone]

## 2017-01-07 NOTE — PROGRESS NOTES
1030 Pt sleeping in bed, NAD. Assessment completed, scheduled meds provided, PRN cough meds provided. Call bell in reach, will continue to monitor. 1430 Pt sitting up in bed talking on phone, pt states he feel much better. Pt able to speak without shortness of breath, pt denies pressure in chest at this time.

## 2017-01-07 NOTE — PROGRESS NOTES
Problem: Self Care Deficits Care Plan (Adult)  Goal: *Acute Goals and Plan of Care (Insert Text)  Outcome: Resolved/Met Date Met:  01/07/17  OCCUPATIONAL THERAPY EVALUATION/DISCHARGE     Patient: [facility] (15 y.o. male)  Date: 1/7/2017  Primary Diagnosis: CAP (community acquired pneumonia)        Precautions:   Fall      ASSESSMENT AND RECOMMENDATIONS:  Based on the objective data described below, the patient is able to perform basic self care tasks and functional transfers without assistance. Min SOB noted with activity. Patient educated on energy conservation techniques, pursed lip breathing and self pacing. He verbalized/demonstrated understanding. Patient has supportive wife at home to assist prn and all needed DME for home safety. Skilled occupational therapy is not indicated at this time. Discharge Recommendations: None  Further Equipment Recommendations for Discharge: N/A       COMPLEXITY      Eval Complexity: History: LOW Complexity : Brief history review ; Examination: LOW Complexity : 1-3 performance deficits relating to physical, cognitive , or psychosocial skils that result in activity limitations and / or participation restrictions ; Decision Making:LOW Complexity : No comorbidities that affect functional and no verbal or physical assistance needed to complete eval tasks          G-CODES:      Self Care  Current  CI= 1-19%   Goal  CI= 1-19%   D/C  CI= 1-19%. The severity rating is based on the Other :functional assessment      SUBJECTIVE:   Patient stated I need to go to the bathroom.       OBJECTIVE DATA SUMMARY:       Past Medical History   Diagnosis Date    Atrial fibrillation         CHADS score 3  (+CHF, +HTN, +AGE, -DM, -CVA)    CABG         2008   LIMA - LAD,   SVG - RCA    Cardiomyopathy         EF 30-35% (ECHO 6/14)    Coronary artery disease      Dyslipidemia      Hypertension      Hypothyroid      Pacemaker      Peripheral vascular disease      Renal artery stenosis         bilateral stents    Sick sinus syndrome       Past Surgical History   Procedure Laterality Date    Hx coronary artery bypass graft           2008   LIMA - LAD,   SVG - RCA     Prior Level of Function/Home Situation: Pt was independent with basic self care tasks and used a SPC occasionally for functional mobility PTA. Home Situation  Home Environment: Private residence  # Steps to Enter: 3  Rails to Enter: Yes  Hand Rails : Bilateral  One/Two Story Residence: Two story  # of Interior Steps: 13  Height of Each Step (in): 7 inches  Interior Rails: Right  Lift Chair Available: No  Living Alone: No (Lives with wife)  Support Systems: Family member(s), Child(jay)  Patient Expects to be Discharged to[de-identified] Private residence  Current DME Used/Available at Home: Cane, straight  Tub or Shower Type: Tub/Shower combination (with grab bar/seat)  [X]     Right hand dominant       [ ]     Left hand dominant  Cognitive/Behavioral Status:  Neurologic State: Alert  Orientation Level: Oriented X4  Cognition: Appropriate decision making; Follows commands  Safety/Judgement: Awareness of environment; Fall prevention  Skin: Intact on UEs  Edema: None noted in UEs  Vision/Perceptual:    Acuity: Within Defined Limits    Coordination:  Fine Motor Skills-Upper: Left Intact; Right Intact    Gross Motor Skills-Upper: Left Intact; Right Intact  Balance:  Sitting: Intact; Without support  Standing: Intact; With support  Strength:  Strength:  Within functional limits (UEs)  Tone & Sensation:  Sensation: Intact (UEs)  Range of Motion:  AROM: Within functional limits (UEs)  Functional Mobility and Transfers for ADLs:  Bed Mobility:  Rolling: Independent  Supine to Sit: Independent  Sit to Supine: Independent  Transfers:  Sit to Stand: Supervision              Toilet Transfer : Supervision  ADL Assessment:  Feeding: Independent     Oral Facial Hygiene/Grooming: Independent     Bathing: Supervision     Upper Body Dressing: Independent     Lower Body Dressing: Independent     Toileting: Independent  ADL Intervention:  Patient maneuvered to the bathroom with supervision to stand and urinate. No assist given with clothing management or hygiene. He was also able to wash his face while standing at the sink without assistance. Cognitive Retraining  Safety/Judgement: Awareness of environment; Fall prevention     Pain:  Pt reports 0/10 pain or discomfort prior to treatment. Pt reports 0/10 pain or discomfort post treatment. Activity Tolerance:   Good  Please refer to the flowsheet for vital signs taken during this treatment. After treatment:   [ ]  Patient left in no apparent distress sitting up in chair  [X]  Patient left in no apparent distress in bed  [X]  Call bell left within reach  [ ]  Nursing notified  [ ]  Caregiver present  [ ]  Bed alarm activated      COMMUNICATION/EDUCATION: Patient educated on role of OT and POC. He verbalized understanding. Communication/Collaboration:  [X]      Home safety education was provided and the patient/caregiver indicated understanding. [X]      Patient/family have participated as able and agree with findings and recommendations. [ ]      Patient is unable to participate in plan of care at this time.      Jose Chapman MS OTR/L  Time Calculation: 25 mins

## 2017-01-07 NOTE — PROGRESS NOTES
conducted an initial consultation and Spiritual Assessment for Dayna Carmen, who is a 80 y.o.,male. Patients Primary Language is: [place]. According to the patients EMR Gnosticism Affiliation is: Other. The reason the Patient came to the hospital is:   Patient Active Problem List    Diagnosis Date Noted    [facility] 01/06/2017    Community acquired bacterial pneumonia 01/06/2017    CAP (community acquired pneumonia) 01/06/2017    CHF (congestive heart failure) ([facility] 09/07/2015    S/P CABG x 2 08/25/2015    Atherosclerotic NAHED (renal artery stenosis), bilateral ([facility] 08/25/2015    Intolerance of drug 08/25/2015    Chest pain 08/25/2015    Unstable angina ([facility] 08/25/2015    Hypertension     Dyslipidemia     Coronary artery disease     Cardiomyopathy     Atrial fibrillation     Sick sinus syndrome         The  provided the following Interventions:  Initiated a relationship of care and support. Explored issues of angelina, belief, spirituality and Latter day/ritual needs while hospitalized. Listened empathically. Provided information about Spiritual Care Services. Offered prayer and assurance of continued prayers on patient's behalf. Chart reviewed. The following outcomes were achieved:  Patient shared limited information about both their medical narrative and spiritual journey/beliefs. Patient processed feeling about current hospitalization. Patient expressed gratitude for 's visit. Assessment:  Patient does not have any Latter day/cultural needs that will affect patients preferences in health care. There are no further spiritual or Latter day issues which require intervention at this time. Plan:  Chaplains will continue to follow and will provide pastoral care on an as needed/requested basis.  recommends bedside caregivers page  on duty if patient shows signs of acute spiritual or emotional distress.       Tali Zabala [facility]  [phone]

## 2017-01-08 ENCOUNTER — APPOINTMENT (OUTPATIENT)
Dept: GENERAL RADIOLOGY | Age: 82
DRG: 871 | End: 2017-01-08
Attending: HOSPITALIST
Payer: MEDICARE

## 2017-01-08 LAB
ANION GAP BLD CALC-SCNC: 10 MMOL/L (ref 3–18)
ATRIAL RATE: 159 BPM
BACTERIA SPEC CULT: NORMAL
BUN SERPL-MCNC: 35 MG/DL (ref 7–18)
BUN/CREAT SERPL: 29 (ref 12–20)
CALCIUM SERPL-MCNC: 8.4 MG/DL (ref 8.5–10.1)
CALCULATED R AXIS, ECG10: -93 DEGREES
CALCULATED T AXIS, ECG11: 84 DEGREES
CHLORIDE SERPL-SCNC: 103 MMOL/L (ref 100–108)
CO2 SERPL-SCNC: 22 MMOL/L (ref 21–32)
CREAT SERPL-MCNC: 1.22 MG/DL (ref 0.6–1.3)
DIAGNOSIS, 93000: NORMAL
ERYTHROCYTE [DISTWIDTH] IN BLOOD BY AUTOMATED COUNT: 13.6 % (ref 11.6–14.5)
GLUCOSE SERPL-MCNC: 106 MG/DL (ref 74–99)
HCT VFR BLD AUTO: 36.5 % (ref 36–48)
HGB BLD-MCNC: 11.8 G/DL (ref 13–16)
MCH RBC QN AUTO: 30.3 PG (ref 24–34)
MCHC RBC AUTO-ENTMCNC: 32.3 G/DL (ref 31–37)
MCV RBC AUTO: 93.8 FL (ref 74–97)
PLATELET # BLD AUTO: 260 K/UL (ref 135–420)
PMV BLD AUTO: 9.4 FL (ref 9.2–11.8)
POTASSIUM SERPL-SCNC: 4.7 MMOL/L (ref 3.5–5.5)
Q-T INTERVAL, ECG07: 456 MS
QRS DURATION, ECG06: 188 MS
QTC CALCULATION (BEZET), ECG08: 492 MS
RBC # BLD AUTO: 3.89 M/UL (ref 4.7–5.5)
SERVICE CMNT-IMP: NORMAL
SODIUM SERPL-SCNC: 135 MMOL/L (ref 136–145)
VENTRICULAR RATE, ECG03: 70 BPM
WBC # BLD AUTO: 9.8 K/UL (ref 4.6–13.2)

## 2017-01-08 PROCEDURE — 74011000250 HC RX REV CODE- 250: Performed by: HOSPITALIST

## 2017-01-08 PROCEDURE — 74011250636 HC RX REV CODE- 250/636: Performed by: EMERGENCY MEDICINE

## 2017-01-08 PROCEDURE — 85027 COMPLETE CBC AUTOMATED: CPT | Performed by: HOSPITALIST

## 2017-01-08 PROCEDURE — 74011250636 HC RX REV CODE- 250/636

## 2017-01-08 PROCEDURE — 74011250637 HC RX REV CODE- 250/637: Performed by: FAMILY MEDICINE

## 2017-01-08 PROCEDURE — 74011250636 HC RX REV CODE- 250/636: Performed by: HOSPITALIST

## 2017-01-08 PROCEDURE — 77010033678 HC OXYGEN DAILY

## 2017-01-08 PROCEDURE — 74011250637 HC RX REV CODE- 250/637: Performed by: HOSPITALIST

## 2017-01-08 PROCEDURE — 74011000258 HC RX REV CODE- 258: Performed by: EMERGENCY MEDICINE

## 2017-01-08 PROCEDURE — 65660000000 HC RM CCU STEPDOWN

## 2017-01-08 PROCEDURE — 74011000250 HC RX REV CODE- 250: Performed by: EMERGENCY MEDICINE

## 2017-01-08 PROCEDURE — 71010 XR CHEST PORT: CPT

## 2017-01-08 PROCEDURE — 80048 BASIC METABOLIC PNL TOTAL CA: CPT | Performed by: HOSPITALIST

## 2017-01-08 PROCEDURE — 36415 COLL VENOUS BLD VENIPUNCTURE: CPT | Performed by: HOSPITALIST

## 2017-01-08 RX ORDER — FUROSEMIDE 10 MG/ML
20 INJECTION INTRAMUSCULAR; INTRAVENOUS ONCE
Status: COMPLETED | OUTPATIENT
Start: 2017-01-08 | End: 2017-01-08

## 2017-01-08 RX ORDER — FUROSEMIDE 10 MG/ML
INJECTION INTRAMUSCULAR; INTRAVENOUS
Status: COMPLETED
Start: 2017-01-08 | End: 2017-01-08

## 2017-01-08 RX ADMIN — CIPROFLOXACIN HYDROCHLORIDE 1 DROP: 3 SOLUTION/ DROPS OPHTHALMIC at 14:10

## 2017-01-08 RX ADMIN — IPRATROPIUM BROMIDE AND ALBUTEROL SULFATE 3 ML: .5; 3 SOLUTION RESPIRATORY (INHALATION) at 13:55

## 2017-01-08 RX ADMIN — APIXABAN 5 MG: 2.5 TABLET, FILM COATED ORAL at 17:40

## 2017-01-08 RX ADMIN — AMLODIPINE BESYLATE 10 MG: 10 TABLET ORAL at 11:44

## 2017-01-08 RX ADMIN — HYDROCODONE BITARTRATE AND ACETAMINOPHEN 1 TABLET: 5; 325 TABLET ORAL at 21:34

## 2017-01-08 RX ADMIN — ENALAPRIL MALEATE 20 MG: 10 TABLET ORAL at 11:44

## 2017-01-08 RX ADMIN — CIPROFLOXACIN HYDROCHLORIDE 1 DROP: 3 SOLUTION/ DROPS OPHTHALMIC at 21:34

## 2017-01-08 RX ADMIN — CIPROFLOXACIN HYDROCHLORIDE 1 DROP: 3 SOLUTION/ DROPS OPHTHALMIC at 06:11

## 2017-01-08 RX ADMIN — CIPROFLOXACIN HYDROCHLORIDE 1 DROP: 3 SOLUTION/ DROPS OPHTHALMIC at 12:00

## 2017-01-08 RX ADMIN — CIPROFLOXACIN HYDROCHLORIDE 1 DROP: 3 SOLUTION/ DROPS OPHTHALMIC at 00:42

## 2017-01-08 RX ADMIN — MORPHINE SULFATE 2 MG: 2 INJECTION, SOLUTION INTRAMUSCULAR; INTRAVENOUS at 13:55

## 2017-01-08 RX ADMIN — CIPROFLOXACIN HYDROCHLORIDE 1 DROP: 3 SOLUTION/ DROPS OPHTHALMIC at 03:55

## 2017-01-08 RX ADMIN — AZTREONAM 2 G: 2 INJECTION, POWDER, LYOPHILIZED, FOR SOLUTION INTRAMUSCULAR; INTRAVENOUS at 18:12

## 2017-01-08 RX ADMIN — APIXABAN 5 MG: 2.5 TABLET, FILM COATED ORAL at 11:44

## 2017-01-08 RX ADMIN — CIPROFLOXACIN HYDROCHLORIDE 1 DROP: 3 SOLUTION/ DROPS OPHTHALMIC at 02:06

## 2017-01-08 RX ADMIN — LEVOFLOXACIN 750 MG: 5 INJECTION, SOLUTION INTRAVENOUS at 21:59

## 2017-01-08 RX ADMIN — CIPROFLOXACIN HYDROCHLORIDE 1 DROP: 3 SOLUTION/ DROPS OPHTHALMIC at 07:33

## 2017-01-08 RX ADMIN — FUROSEMIDE 20 MG: 10 INJECTION INTRAMUSCULAR; INTRAVENOUS at 13:55

## 2017-01-08 RX ADMIN — CIPROFLOXACIN HYDROCHLORIDE 1 DROP: 3 SOLUTION/ DROPS OPHTHALMIC at 17:40

## 2017-01-08 RX ADMIN — ZOLPIDEM TARTRATE 5 MG: 5 TABLET, FILM COATED ORAL at 21:34

## 2017-01-08 RX ADMIN — PANTOPRAZOLE SODIUM 40 MG: 40 TABLET, DELAYED RELEASE ORAL at 11:44

## 2017-01-08 RX ADMIN — METOPROLOL TARTRATE 50 MG: 50 TABLET ORAL at 17:40

## 2017-01-08 RX ADMIN — FUROSEMIDE 20 MG: 10 INJECTION, SOLUTION INTRAMUSCULAR; INTRAVENOUS at 13:55

## 2017-01-08 RX ADMIN — AZTREONAM 2 G: 2 INJECTION, POWDER, LYOPHILIZED, FOR SOLUTION INTRAMUSCULAR; INTRAVENOUS at 11:40

## 2017-01-08 RX ADMIN — AZTREONAM 2 G: 2 INJECTION, POWDER, LYOPHILIZED, FOR SOLUTION INTRAMUSCULAR; INTRAVENOUS at 00:41

## 2017-01-08 RX ADMIN — METOPROLOL TARTRATE 50 MG: 50 TABLET ORAL at 11:44

## 2017-01-08 NOTE — PROGRESS NOTES
Medicine Progress Note    Patient: Baylee Bassett   Age:  80 y.o.  DOA: 1/6/2017   Admit Dx / CC: CAP (community acquired pneumonia)  LOS:  LOS: 2 days     Assessment/Plan   Principal Problem:    Sepsis ([address] (1/6/2017)    Active Problems:    Dyslipidemia ()      Coronary artery disease ()      Atrial fibrillation ()      Overview: CHADS score 3  (+CHF, +HTN, +AGE, -DM, -CVA)      Community acquired bacterial pneumonia (1/6/2017)      CAP (community acquired pneumonia) (1/6/2017)        Additional Plan notes   No changes today    1) Sepsis 2/2 CAP: RUL pna ,Continue levaquin and aztreonam, tele monitoring,     2) Hx of Afib- continue elliquis     3) CAD/CABG/HTN: Continue BB, norvasc, vasotec     4)  CP- non currently patient states mostly occurs after eating    DISPO     Anticipated Date of Discharge: 1/11  Anticipated Disposition (home, SNF) : home    Subjective:   Patient seen and examined. No complaints today , worsening SOB/CP with eating    Objective:     Visit Vitals    /70    Pulse 70    Temp 98.2 °F (36.8 °C)    Resp 18    Ht 5' 9\" (1.753 m)    Wt 64 kg (141 lb)    SpO2 91%    BMI 20.82 kg/m2       Physical Exam:  General appearance: alert, cooperative, no distress, appears stated age  Head: Normocephalic, without obvious abnormality, atraumatic  Neck: supple, trachea midline  Lungs: decrease bs R Lung upper and lower. Heart: regular rate and rhythm, S1, S2 normal, no murmur, click, rub or gallop  Abdomen: soft, non-tender.  Bowel sounds normal. No masses,  no organomegaly  Extremities: extremities normal, atraumatic, no cyanosis or edema  Skin: Skin color, texture, turgor normal. No rashes or lesions  Neurologic: Grossly normal  PSY: Mood and affect normal, appropriately behaved    Intake and Output:  Current Shift:     Last three shifts:  01/06 1901 - 01/08 0700  In: 1230 [P.O.:580; I.V.:650]  Out: 650 [Urine:650]    Lab/Data Reviewed:  CMP:   Lab Results   Component Value Date/Time  (L) 01/08/2017 04:25 AM    K 4.7 01/08/2017 04:25 AM     01/08/2017 04:25 AM    CO2 22 01/08/2017 04:25 AM    AGAP 10 01/08/2017 04:25 AM     (H) 01/08/2017 04:25 AM    BUN 35 (H) 01/08/2017 04:25 AM    CREA 1.22 01/08/2017 04:25 AM    GFRAA >60 01/08/2017 04:25 AM    GFRNA 56 (L) 01/08/2017 04:25 AM    CA 8.4 (L) 01/08/2017 04:25 AM     CBC:   Lab Results   Component Value Date/Time    WBC 9.8 01/08/2017 04:25 AM    HGB 11.8 (L) 01/08/2017 04:25 AM    HCT 36.5 01/08/2017 04:25 AM     01/08/2017 04:25 AM     All Cardiac Markers in the last 24 hours:   Lab Results   Component Value Date/Time    CPK 83 01/07/2017 12:48 PM    CKMB 2.4 01/07/2017 12:48 PM    CKND1 2.9 01/07/2017 12:48 PM    TROIQ <0.02 01/07/2017 07:00 PM    TROIQ 0.03 01/07/2017 12:48 PM       Medications Reviewed:  Current Facility-Administered Medications   Medication Dose Route Frequency    nitroglycerin (NITROSTAT) tablet 0.4 mg  0.4 mg SubLINGual PRN    albuterol-ipratropium (DUO-NEB) 2.5 MG-0.5 MG/3 ML  3 mL Nebulization Q4H PRN    morphine injection 2 mg  2 mg IntraVENous Q3H PRN    sodium chloride (NS) flush 5-10 mL  5-10 mL IntraVENous PRN    aztreonam (AZACTAM) 2 g in 0.9% sodium chloride (MBP/ADV) 100 mL MBP  2 g IntraVENous Q8H    levoFLOXacin (LEVAQUIN) 750 mg in D5W IVPB  750 mg IntraVENous Q48H    amLODIPine (NORVASC) tablet 10 mg  10 mg Oral DAILY    apixaban (ELIQUIS) tablet 5 mg  5 mg Oral BID    dicyclomine (BENTYL) capsule 10 mg  10 mg Oral TID PRN    enalapril (VASOTEC) tablet 20 mg  20 mg Oral DAILY    metoprolol tartrate (LOPRESSOR) tablet 50 mg  50 mg Oral BID    HYDROcodone-acetaminophen (NORCO) 5-325 mg per tablet 1 Tab  1 Tab Oral Q4H PRN    ondansetron (ZOFRAN) injection 4 mg  4 mg IntraVENous Q4H PRN    magnesium hydroxide (MILK OF MAGNESIA) 400 mg/5 mL oral suspension 30 mL  30 mL Oral DAILY PRN    ciprofloxacin HCl (CILOXIN) 0.3 % ophthalmic solution 1 Drop  1 Drop Both Eyes Q2H  pantoprazole (PROTONIX) tablet 40 mg  40 mg Oral ACB    influenza vaccine 2016-17 (36mos+)(PF) (FLUZONE/FLUARIX/FLULAVAL QUAD) injection 0.5 mL  0.5 mL IntraMUSCular PRIOR TO DISCHARGE    benzonatate (TESSALON) capsule 100 mg  100 mg Oral TID PRN    zolpidem (AMBIEN) tablet 5 mg  5 mg Oral QHS PRN       Tramaine German MD    January 8, 2017

## 2017-01-08 NOTE — PROGRESS NOTES
2033 -- Bedside and Verbal shift change report given to Mo Greenwood RN (oncoming nurse) by Sarabjit Mae RN (offgoing nurse). Report included the following information SBAR, Procedure Summary, Intake/Output, MAR, Recent Results. Pt up in bed, no indication of pain or acute distress. Bed locked and lowered, siderails up x3. Call bell at bedside, will continue to monitor. Clarified with pt to wake every 2 hours for eyedrops, pt agreed. Eye drops administered     2235 -- Eye drops administered.      0022 -- Shift re-assessment completed, no change in pt condition. Eye drops administered       0206 -- Eye drops administered. 1488  -- Eye drops administered. 9208 -- Shift re-assessment completed, no change in pt condition.      0735 -- Bedside and Verbal shift change report given to Sarabjit Lozano RN  (oncoming nurse) by Becky Horvath RN (offgoing nurse) Report included the following information SBAR, Procedure Summary, Intake/Output, MAR, Recent Results. Pt up in bed, sleeping, no indication of pain or acute distress. Bed locked and lowered, siderails up x3. Call bell at bedside. Eye drops administered.

## 2017-01-08 NOTE — PROGRESS NOTES
0725 Bedside and Verbal shift change report given to Nkechi Gonzalez RN (oncoming nurse) by Kamla Navas RN (offgoing nurse). Report included the following information SBAR, Procedure Summary, Intake/Output, MAR, Recent Results and Cardiac Rhythm V-paced. Pt resting in bed, NAD. No c/o pain at this time. Bed locked and lowered, siderails up x3. Call bell at bedside, will continue to monitor. 1128 Pt resting in bed, sleeping, NAD. Scheduled med provided. Assessment completed. Call bell in reach. 1320 Dr Niurka Andrade paged pt exhibiting SOB. Per Dr Vinicius Plummer IV morphine and 20mg IV lasix\" ordered one time. Will administer and continue to observe. 1520 Pt sleeping in bed, pt exhibiting shallow breathing, however O2 sats wnl. Will continue to monitor. 1739 Pt states he is feeling generally weak, VS obtained; wnl. Scheduled meds provided, will continue to monitor. 1745 RRT called. Stat Xray orders received. No other intervention ordered, will continue to monitor pt    2015 Dr. Trav Guillory paged for D-dimer/CTPA to r/o PE for SOB. Orders for CTA received. Bedside and Verbal shift change report given to Nkechi Gonzalez RN (oncoming nurse) by Kamla Navas RN (offgoing nurse). Report included the following information SBAR, Procedure Summary, Intake/Output, MAR, Recent Results and Cardiac Rhythm NSR.

## 2017-01-09 ENCOUNTER — APPOINTMENT (OUTPATIENT)
Dept: CT IMAGING | Age: 82
DRG: 871 | End: 2017-01-09
Attending: FAMILY MEDICINE
Payer: MEDICARE

## 2017-01-09 LAB
ANION GAP BLD CALC-SCNC: 11 MMOL/L (ref 3–18)
BUN SERPL-MCNC: 39 MG/DL (ref 7–18)
BUN/CREAT SERPL: 31 (ref 12–20)
CALCIUM SERPL-MCNC: 8.1 MG/DL (ref 8.5–10.1)
CHLORIDE SERPL-SCNC: 106 MMOL/L (ref 100–108)
CO2 SERPL-SCNC: 19 MMOL/L (ref 21–32)
CREAT SERPL-MCNC: 1.25 MG/DL (ref 0.6–1.3)
ERYTHROCYTE [DISTWIDTH] IN BLOOD BY AUTOMATED COUNT: 13.3 % (ref 11.6–14.5)
GLUCOSE SERPL-MCNC: 112 MG/DL (ref 74–99)
HCT VFR BLD AUTO: 33.7 % (ref 36–48)
HGB BLD-MCNC: 11 G/DL (ref 13–16)
MCH RBC QN AUTO: 30.1 PG (ref 24–34)
MCHC RBC AUTO-ENTMCNC: 32.6 G/DL (ref 31–37)
MCV RBC AUTO: 92.3 FL (ref 74–97)
PLATELET # BLD AUTO: 275 K/UL (ref 135–420)
PMV BLD AUTO: 9.1 FL (ref 9.2–11.8)
POTASSIUM SERPL-SCNC: 3.8 MMOL/L (ref 3.5–5.5)
RBC # BLD AUTO: 3.65 M/UL (ref 4.7–5.5)
SODIUM SERPL-SCNC: 136 MMOL/L (ref 136–145)
WBC # BLD AUTO: 10.3 K/UL (ref 4.6–13.2)

## 2017-01-09 PROCEDURE — 74011000258 HC RX REV CODE- 258: Performed by: EMERGENCY MEDICINE

## 2017-01-09 PROCEDURE — 71275 CT ANGIOGRAPHY CHEST: CPT

## 2017-01-09 PROCEDURE — 74011636320 HC RX REV CODE- 636/320: Performed by: HOSPITALIST

## 2017-01-09 PROCEDURE — 77010033678 HC OXYGEN DAILY

## 2017-01-09 PROCEDURE — 80048 BASIC METABOLIC PNL TOTAL CA: CPT | Performed by: HOSPITALIST

## 2017-01-09 PROCEDURE — 36415 COLL VENOUS BLD VENIPUNCTURE: CPT | Performed by: HOSPITALIST

## 2017-01-09 PROCEDURE — 85027 COMPLETE CBC AUTOMATED: CPT | Performed by: HOSPITALIST

## 2017-01-09 PROCEDURE — 74011000250 HC RX REV CODE- 250: Performed by: EMERGENCY MEDICINE

## 2017-01-09 PROCEDURE — 97110 THERAPEUTIC EXERCISES: CPT

## 2017-01-09 PROCEDURE — 74011250637 HC RX REV CODE- 250/637: Performed by: FAMILY MEDICINE

## 2017-01-09 PROCEDURE — 74011250637 HC RX REV CODE- 250/637: Performed by: HOSPITALIST

## 2017-01-09 PROCEDURE — 65660000000 HC RM CCU STEPDOWN

## 2017-01-09 PROCEDURE — 74011000250 HC RX REV CODE- 250: Performed by: NURSE PRACTITIONER

## 2017-01-09 RX ORDER — TETRAHYDROZOLINE HCL 0.05 %
1 DROPS OPHTHALMIC (EYE) 4 TIMES DAILY
Status: DISCONTINUED | OUTPATIENT
Start: 2017-01-09 | End: 2017-01-16 | Stop reason: HOSPADM

## 2017-01-09 RX ADMIN — PANTOPRAZOLE SODIUM 40 MG: 40 TABLET, DELAYED RELEASE ORAL at 08:50

## 2017-01-09 RX ADMIN — AMLODIPINE BESYLATE 10 MG: 10 TABLET ORAL at 08:49

## 2017-01-09 RX ADMIN — TETRAHYDROZOLINE HYDROCHLORIDE 1 DROP: 0.5 SOLUTION/ DROPS OPHTHALMIC at 17:24

## 2017-01-09 RX ADMIN — APIXABAN 5 MG: 2.5 TABLET, FILM COATED ORAL at 08:49

## 2017-01-09 RX ADMIN — AZTREONAM 2 G: 2 INJECTION, POWDER, LYOPHILIZED, FOR SOLUTION INTRAMUSCULAR; INTRAVENOUS at 08:44

## 2017-01-09 RX ADMIN — METOPROLOL TARTRATE 50 MG: 50 TABLET ORAL at 08:51

## 2017-01-09 RX ADMIN — TETRAHYDROZOLINE HYDROCHLORIDE 1 DROP: 0.5 SOLUTION/ DROPS OPHTHALMIC at 12:26

## 2017-01-09 RX ADMIN — ENALAPRIL MALEATE 20 MG: 10 TABLET ORAL at 08:54

## 2017-01-09 RX ADMIN — AZTREONAM 2 G: 2 INJECTION, POWDER, LYOPHILIZED, FOR SOLUTION INTRAMUSCULAR; INTRAVENOUS at 23:41

## 2017-01-09 RX ADMIN — APIXABAN 5 MG: 2.5 TABLET, FILM COATED ORAL at 17:20

## 2017-01-09 RX ADMIN — METOPROLOL TARTRATE 50 MG: 50 TABLET ORAL at 17:20

## 2017-01-09 RX ADMIN — TETRAHYDROZOLINE HYDROCHLORIDE 1 DROP: 0.5 SOLUTION/ DROPS OPHTHALMIC at 21:45

## 2017-01-09 RX ADMIN — BENZONATATE 100 MG: 100 CAPSULE ORAL at 23:40

## 2017-01-09 RX ADMIN — HYDROCODONE BITARTRATE AND ACETAMINOPHEN 1 TABLET: 5; 325 TABLET ORAL at 03:15

## 2017-01-09 RX ADMIN — BENZONATATE 100 MG: 100 CAPSULE ORAL at 04:53

## 2017-01-09 RX ADMIN — AZTREONAM 2 G: 2 INJECTION, POWDER, LYOPHILIZED, FOR SOLUTION INTRAMUSCULAR; INTRAVENOUS at 03:15

## 2017-01-09 RX ADMIN — IOPAMIDOL 80 ML: 612 INJECTION, SOLUTION INTRAVENOUS at 00:07

## 2017-01-09 RX ADMIN — AZTREONAM 2 G: 2 INJECTION, POWDER, LYOPHILIZED, FOR SOLUTION INTRAMUSCULAR; INTRAVENOUS at 17:22

## 2017-01-09 NOTE — PROGRESS NOTES
Rapid response called. Arrived promptly at pt's bedside and he c/o of intermittent SOB but denied CP  Vitals: Stable  Exam unremarkable  Plan:  Stat CXR ordered  Discussed with nurse and pt.

## 2017-01-09 NOTE — PROGRESS NOTES
[facility]  Hospitalist Division        Inpatient Daily Progress Note    Daily progress Note    Patient: Luis Gant MRN: [id number]  CSN: [id number]    YOB: 1930  Age: 80 y.o. Sex: male    DOA: 1/6/2017 LOS:  LOS: 3 days                    Chief Complaint:  Fatigue, dehydration       Subjective:      Standing at sink- brushing teeth- c/o fatigue. Denies chest pain. Reports shortness of breath on ambulation. Objective:      Visit Vitals    /71 (BP 1 Location: Left arm)    Pulse 70    Temp 97.7 °F (36.5 °C)    Resp 20    Ht 5' 9\" (1.753 m)    Wt 70.4 kg (155 lb 4.8 oz)    SpO2 90%    BMI 22.93 kg/m2         Physical Exam:  General appearance: alert, cooperative, appears stated age  Head: Normocephalic, without obvious abnormality, atraumatic  Lungs: clear to auscultation bilaterally  Heart: regular rate and rhythm, S1, S2 normal, no murmur, click, rub or gallop  Abdomen: soft, non tender, non distended. Normoactive bowel sounds  Extremities: extremities normal, atraumatic, no cyanosis.  BLE trace edema   Skin: Skin color, texture, turgor normal. No rashes or lesions  Neurologic: Grossly normal  PSY: appropriately behaved        Intake and Output:  Current Shift:  01/09 0701 - 01/09 1900  In: 240 [P.O.:240]  Out: 200 [Urine:200]  Last three shifts:  01/07 1901 - 01/09 0700  In: 730 [P.O.:180; I.V.:550]  Out: 450 [Urine:450]    Recent Results (from the past 24 hour(s))   METABOLIC PANEL, BASIC    Collection Time: 01/09/17  5:15 AM   Result Value Ref Range    Sodium 136 136 - 145 mmol/L    Potassium 3.8 3.5 - 5.5 mmol/L    Chloride 106 100 - 108 mmol/L    CO2 19 (L) 21 - 32 mmol/L    Anion gap 11 3.0 - 18 mmol/L    Glucose 112 (H) 74 - 99 mg/dL    BUN 39 (H) 7.0 - 18 MG/DL    Creatinine 1.25 0.6 - 1.3 MG/DL    BUN/Creatinine ratio 31 (H) 12 - 20      GFR est AA >60 >60 ml/min/1.73m2    GFR est non-AA 55 (L) >60 ml/min/1.73m2    Calcium 8.1 (L) 8.5 - 10.1 MG/DL   CBC W/O DIFF    Collection Time: 01/09/17  5:15 AM   Result Value Ref Range    WBC 10.3 4.6 - 13.2 K/uL    RBC 3.65 (L) 4.70 - 5.50 M/uL    HGB 11.0 (L) 13.0 - 16.0 g/dL    HCT 33.7 (L) 36.0 - 48.0 %    MCV 92.3 74.0 - 97.0 FL    MCH 30.1 24.0 - 34.0 PG    MCHC 32.6 31.0 - 37.0 g/dL    RDW 13.3 11.6 - 14.5 %    PLATELET 260 812 - 127 K/uL    MPV 9.1 (L) 9.2 - 11.8 FL           Lab Results   Component Value Date/Time    Glucose 112 01/09/2017 05:15 AM    Glucose 106 01/08/2017 04:25 AM    Glucose 102 01/07/2017 04:00 AM    Glucose 140 01/06/2017 03:35 PM    Glucose 118 12/26/2016 03:45 PM        Assessment/Plan:     Patient Active Problem List   Diagnosis Code    Hypertension     Dyslipidemia E78.5    Coronary artery disease I25.10    Cardiomyopathy I42.9    Atrial fibrillation I48.91    Sick sinus syndrome I49.5    S/P CABG x 2 Z95.1    Atherosclerotic NAHED (renal artery stenosis), bilateral (HCC) I70.1    Intolerance of drug Z78.9    Chest pain R07.9    Unstable angina ([facility]) I20.0    CHF (congestive heart failure) ([facility]) I50.9    Sepsis ([facility] [address] A41.9    Community acquired bacterial pneumonia J15.9    CAP (community acquired pneumonia) J18.9       A/P:  Sepsis 2/2 CAP: continue Azactam, Levaquin. Continue O2 Monitor respiratory status   Atrial fibrillation: continue Eliquis   CAD/HTN: continue Norvasc, Vasotec, Lopressor.  Monitor BP control   GI prophylaxis: Protonix   DVT prophylaxis: Eliquis covers   Continue PT   CBC, BMP in am       PRADIP ZelayaP-TONG Marquez [address]  Pager:  [phone]  Office:  [phone]

## 2017-01-09 NOTE — PROGRESS NOTES
1569 - Pt alert, sitting up eating. AM meds given. C/o eyes burning, which he says is r/t PNA. Crusting noted, with redness in both eyes. This has been a chronic problem, but has worsened lately. Denies eye itching. Dry cough. Denies pain. ~ 1535 - Bedside and Verbal shift change report given to Emi Pal RN (oncoming nurse) by Malika Sandoval RN (offgoing nurse). Report included the following information SBAR, Kardex, Intake/Output, MAR and Recent Results. Pt handed off in stable condition.

## 2017-01-09 NOTE — PROGRESS NOTES
1913 -- Bedside and Verbal shift change report given to Huang Garrett RN (oncoming nurse) by Lucia Edmonds RN (offgoing nurse). Report included the following information SBAR, Procedure Summary, Intake/Output, MAR, Recent Results. Pt up in bed, no indication of pain or acute distress. Bed locked and lowered, siderails up x3. Call bell at bedside, will continue to monitor. Clarified with pt to wake for hours for eyedrops, pt agreed. Eye drops administered. A change has been made to original order, pt to get eye drops only once tonight. 2130 -- Notified by ANNA Benson, blood pressure elevated 180/90. Assessed pt, sent 20 minutes reviewing steps in procedure for upcoming CT Scan. Pt verbalized how very nervous he was about procedure. Explain to pt that I will stay with him and provided PRN pain medication for headache. Pt states head just started hurting after being told he would be tested but was not given a specific time. Explained to pt as soon as Radiology calls with a time, I would let him know. 2254-- Blood pressure now is 140/75, will continue to monitor. 2350 -- Pt taken for CT Scan off unit for under dual control with Lucia Lowe. Oxygen applied. RN's waited with pt until test completed. 0015 -- Pt returned to floor. Pt back in room resting quietly. 7533 -- Shift re-assessment completed, no change in pt condition. 3962 -- PRN pain medication given for headache, will continue to monitor. 0590 -- Shift re-assessment completed, no change in pt condition. 0457 -- PRN cough medication administered. 0700 -- Bedside and Verbal shift change report given to Chen Thomas RN  (oncoming nurse) by Marga Pinedo RN (offgoing nurse) Report included the following information SBAR, Procedure Summary, Intake/Output, MAR, Recent Results. Pt up in bed, sleeping, no indication of pain or acute distress. Bed locked and lowered, siderails up x3. Call bell at bedside.  RN aware pt wants bath after breakfast.

## 2017-01-09 NOTE — PROGRESS NOTES
Nutrition initial assessment/  Plan of care      RECOMMENDATIONS:     1. Cardiac diet; 1500 ml   2. Monitor weight, labs and PO intake  3. RD to follow     GOALS:     1. PO intake meets >75% of protein/calorie needs by 1/16  2. Weight Maintenance/Gradual weight gain (1-2 lb by 1/16)       ASSESSMENT:     Weight status is classified as normal per BMI of 23. PO intake is adequate. Labs noted. Nutrition recommendations listed. RD to follow. Nutrition Diagnoses:   No nutrition diagnosis at this time. Nutrition Risk:  [] High  [] Moderate [x]  Low    SUBJECTIVE/OBJECTIVE:     Patient admitted with pneumonia. Denies food allergies and problems with chewing/swallowing. Patient reports having a good appetite PTA and weight has been stable at 140 lbs. Current weight on record is 155 lb. Per chart review, weight of 140 lb on previous admission on 9/7/15. Denies GI complaints. 100% intake of meals per vitals. Will monitor. Information Obtained from:    [x] Chart Review   [x] Patient   [] Family/Caregiver   [] Nurse/Physician   [] Interdisciplinary Meeting/Rounds    Diet:Cardiac diet; 1500 ml   Medications: [x] Reviewed    Allergies: [x] Reviewed   Encounter Diagnoses     ICD-10-CM ICD-9-CM   1. Community acquired pneumonia J18.9 486   2. SIRS (systemic inflammatory response syndrome) (HCC) R65.10 995.90   3. Hyponatremia E87.1 276.1   4.  Acute conjunctivitis of both eyes, unspecified acute conjunctivitis type H10.33 372.00     Past Medical History   Diagnosis Date    Atrial fibrillation      CHADS score 3  (+CHF, +HTN, +AGE, -DM, -CVA)    CABG      2008   LIMA - LAD,   SVG - RCA    Cardiomyopathy      EF 30-35% (ECHO 6/14)    Coronary artery disease     Dyslipidemia     Hypertension     Hypothyroid     Pacemaker     Peripheral vascular disease     Renal artery stenosis      bilateral stents    Sick sinus syndrome       Labs:  Lab Results   Component Value Date/Time    Sodium 136 01/09/2017 05:15 AM Potassium 3.8 01/09/2017 05:15 AM    Chloride 106 01/09/2017 05:15 AM    CO2 19 01/09/2017 05:15 AM    Anion gap 11 01/09/2017 05:15 AM    Glucose 112 01/09/2017 05:15 AM    BUN 39 01/09/2017 05:15 AM    Creatinine 1.25 01/09/2017 05:15 AM    Calcium 8.1 01/09/2017 05:15 AM    Magnesium 1.9 12/26/2016 03:45 PM    Phosphorus 3.4 03/01/2011 03:35 AM    Albumin 3.2 01/06/2017 03:35 PM     Anthropometrics: BMI (calculated): 23  Last 3 Recorded Weights in this Encounter    01/06/17 2041 01/08/17 0219 01/09/17 0245   Weight: 67.4 kg (148 lb 8 oz) 64 kg (141 lb) 70.4 kg (155 lb 4.8 oz)    Ht Readings from Last 1 Encounters:   01/09/17 5' 9\" (1.753 m)     Patient Vitals for the past 100 hrs:   % Diet Eaten   01/09/17 1139 100 %   01/07/17 1509 100 %     IBW: 160 lb %IBW: 97% UBW: 140 lb %UBW: 111%   [] Weight Loss [] Weight Gain [x] Weight Stable (per pt)    Estimated Nutrition Needs: [x] MSJ  [] Other:  Calories: 1790 Kcal Based on:   [x] Actual BW    Protein:   70-85 g Based on:   [x] Actual BW    Fluid:       2112-6062 ml Based on:   [x] Actual BW      [x] No Cultural, Yarsanism or ethnic dietary need identified.     [] Cultural, Yarsanism and ethnic food preferences identified and addressed     Wt Status:  [x] Normal (18.6 - 24.9) [] Underweight (<18.5) [] Overweight (25 - 29.9) [] Mild Obesity (30 - 34.9)  [] Moderate Obesity (35 - 39.9) [] Morbid Obesity (40+)     Nutrition Problems Identified:   [] Suboptimal PO intake   [] Food Allergies  [] Difficulty chewing/swallowing/poor dentition  [] Constipation/Diarrhea   [] Nausea/Vomiting   [x] None  [] Other:     Plan:   [x] Therapeutic Diet  []  Obtained/adjusted food preferences/tolerances and/or snacks options   []  Supplements added   [] Occupational therapy following for feeding techniques  []  HS snack added   []  Modify diet texture   []  Modify diet for food allergies   []  Assist with menu selection   [x]  Monitor PO intake on meal rounds   [x]  Continue inpatient monitoring and intervention   []  Participated in discharge planning/Interdisciplinary rounds/Team meetings   []  Other:     Education Needs:   [] Not appropriate for teaching at this time due to:   [x] Identified and addressed    Nutrition Monitoring and Evaluation:  [x] Continue ongoing monitoring and intervention  [] Other    Jody Mejía

## 2017-01-09 NOTE — PROGRESS NOTES
Problem: Mobility Impaired (Adult and Pediatric)  Goal: *Acute Goals and Plan of Care (Insert Text)  Physical Therapy Goals  Initiated 1/7/2017 and to be accomplished within 7 day(s)   1. Patient will transfer from bed to chair and chair to bed with modified independence using the least restrictive device. 2. Patient will perform sit to stand with modified independence. 3. Patient will ambulate with modified independence for 150  feet with the least restrictive device. 4. Patient will ascend/descend 3 stairs with bilateral handrail(s) with supervision/set-up. Outcome: Progressing Towards Goal  PHYSICAL THERAPY TREATMENT     Patient: Calista Nolasco (67 y.o. male)  Date: 1/9/2017  Diagnosis: CAP (community acquired pneumonia) Sepsis [facility])       Precautions: Fall  Chart, physical therapy assessment, plan of care and goals were reviewed. ASSESSMENT:  Pt's progress limited today secondary to oxygen desaturation. Pt moved supine to sit with (S) and sit <> stand transfers (S) w/ RW. Pt stood x 12 min, performed standing marches with 1 LOB posteriorly requiring min (A) to stabilize; pt's O2 sats maintained 84-87% on 6L O2 and decision was made not to ambulate. Pt performed seated and supine therex and provided exercises to perform each hour. Will continue to progress OOB activity as able. Progression toward goals:  [ ]      Improving appropriately and progressing toward goals  [X]      Improving slowly and progressing toward goals  [ ]      Not making progress toward goals and plan of care will be adjusted       PLAN:  Patient continues to benefit from skilled intervention to address the above impairments. Continue treatment per established plan of care. Discharge Recommendations:  Home Health vs [facility]  Further Equipment Recommendations for Discharge:  rolling walker and N/A       SUBJECTIVE:   Patient stated I'm just tired.       OBJECTIVE DATA SUMMARY:   Critical Behavior:  Neurologic State: Alert  Orientation Level: Oriented X4  Cognition: Appropriate decision making, Appropriate for age attention/concentration, Appropriate safety awareness, Follows commands  Safety/Judgement: Awareness of environment, Fall prevention  Functional Mobility Training:  Bed Mobility: Setup  Transfers:  Sit to Stand: Supervision  Stand to Sit: Supervision  Balance:  Standing: Intact; With support     Therapeutic Exercises: Ankle pumps x 15  Glut sets x 10  Quad sets x 10  LAQ x 10  Seated hip abd/add x 10  Standing marches x 15     Pain:  Pain Scale 1: Numeric (0 - 10)  Pain Intensity 1: 0  Pain Location 1: Head  Pain Orientation 1: Anterior  Pain Description 1: Aching  Pain Intervention(s) 1: Medication (see MAR)  Activity Tolerance:   Decreased  Please refer to the flowsheet for vital signs taken during this treatment.   After treatment:   [ ] Patient left in no apparent distress sitting up in chair  [X] Patient left in no apparent distress in bed  [X] Call bell left within reach  [X] Nursing notified  [ ] Caregiver present  [ ] Bed alarm activated      Juan Sinclair DPT   Time Calculation: 34 mins

## 2017-01-10 LAB
ANION GAP BLD CALC-SCNC: 11 MMOL/L (ref 3–18)
BASOPHILS # BLD AUTO: 0 K/UL (ref 0–0.06)
BASOPHILS # BLD: 0 % (ref 0–2)
BUN SERPL-MCNC: 39 MG/DL (ref 7–18)
BUN/CREAT SERPL: 32 (ref 12–20)
CALCIUM SERPL-MCNC: 8.4 MG/DL (ref 8.5–10.1)
CHLORIDE SERPL-SCNC: 106 MMOL/L (ref 100–108)
CO2 SERPL-SCNC: 21 MMOL/L (ref 21–32)
CREAT SERPL-MCNC: 1.21 MG/DL (ref 0.6–1.3)
DIFFERENTIAL METHOD BLD: ABNORMAL
EOSINOPHIL # BLD: 0.1 K/UL (ref 0–0.4)
EOSINOPHIL NFR BLD: 1 % (ref 0–5)
ERYTHROCYTE [DISTWIDTH] IN BLOOD BY AUTOMATED COUNT: 13.4 % (ref 11.6–14.5)
GLUCOSE SERPL-MCNC: 107 MG/DL (ref 74–99)
HCT VFR BLD AUTO: 34.9 % (ref 36–48)
HGB BLD-MCNC: 11.3 G/DL (ref 13–16)
LYMPHOCYTES # BLD AUTO: 8 % (ref 21–52)
LYMPHOCYTES # BLD: 0.8 K/UL (ref 0.9–3.6)
MCH RBC QN AUTO: 29.8 PG (ref 24–34)
MCHC RBC AUTO-ENTMCNC: 32.4 G/DL (ref 31–37)
MCV RBC AUTO: 92.1 FL (ref 74–97)
MONOCYTES # BLD: 0.8 K/UL (ref 0.05–1.2)
MONOCYTES NFR BLD AUTO: 8 % (ref 3–10)
NEUTS SEG # BLD: 7.7 K/UL (ref 1.8–8)
NEUTS SEG NFR BLD AUTO: 83 % (ref 40–73)
PLATELET # BLD AUTO: 311 K/UL (ref 135–420)
PMV BLD AUTO: 9.1 FL (ref 9.2–11.8)
POTASSIUM SERPL-SCNC: 3.7 MMOL/L (ref 3.5–5.5)
RBC # BLD AUTO: 3.79 M/UL (ref 4.7–5.5)
SODIUM SERPL-SCNC: 138 MMOL/L (ref 136–145)
WBC # BLD AUTO: 9.3 K/UL (ref 4.6–13.2)

## 2017-01-10 PROCEDURE — 74011250636 HC RX REV CODE- 250/636: Performed by: EMERGENCY MEDICINE

## 2017-01-10 PROCEDURE — 36415 COLL VENOUS BLD VENIPUNCTURE: CPT | Performed by: NURSE PRACTITIONER

## 2017-01-10 PROCEDURE — 74011250637 HC RX REV CODE- 250/637: Performed by: HOSPITALIST

## 2017-01-10 PROCEDURE — 77010033678 HC OXYGEN DAILY

## 2017-01-10 PROCEDURE — 65660000000 HC RM CCU STEPDOWN

## 2017-01-10 PROCEDURE — 74011000250 HC RX REV CODE- 250: Performed by: EMERGENCY MEDICINE

## 2017-01-10 PROCEDURE — 74011000258 HC RX REV CODE- 258: Performed by: EMERGENCY MEDICINE

## 2017-01-10 PROCEDURE — 85025 COMPLETE CBC W/AUTO DIFF WBC: CPT | Performed by: NURSE PRACTITIONER

## 2017-01-10 PROCEDURE — 74011250637 HC RX REV CODE- 250/637: Performed by: FAMILY MEDICINE

## 2017-01-10 PROCEDURE — 80048 BASIC METABOLIC PNL TOTAL CA: CPT | Performed by: NURSE PRACTITIONER

## 2017-01-10 RX ADMIN — AMLODIPINE BESYLATE 10 MG: 10 TABLET ORAL at 09:02

## 2017-01-10 RX ADMIN — BENZONATATE 100 MG: 100 CAPSULE ORAL at 21:30

## 2017-01-10 RX ADMIN — SALINE NASAL SPRAY 2 SPRAY: 1.5 SOLUTION NASAL at 17:36

## 2017-01-10 RX ADMIN — METOPROLOL TARTRATE 50 MG: 50 TABLET ORAL at 17:37

## 2017-01-10 RX ADMIN — AZTREONAM 2 G: 2 INJECTION, POWDER, LYOPHILIZED, FOR SOLUTION INTRAMUSCULAR; INTRAVENOUS at 09:02

## 2017-01-10 RX ADMIN — APIXABAN 5 MG: 2.5 TABLET, FILM COATED ORAL at 09:02

## 2017-01-10 RX ADMIN — LEVOFLOXACIN 750 MG: 5 INJECTION, SOLUTION INTRAVENOUS at 17:32

## 2017-01-10 RX ADMIN — ENALAPRIL MALEATE 20 MG: 10 TABLET ORAL at 09:02

## 2017-01-10 RX ADMIN — TETRAHYDROZOLINE HYDROCHLORIDE 1 DROP: 0.5 SOLUTION/ DROPS OPHTHALMIC at 09:02

## 2017-01-10 RX ADMIN — AZTREONAM 2 G: 2 INJECTION, POWDER, LYOPHILIZED, FOR SOLUTION INTRAMUSCULAR; INTRAVENOUS at 16:24

## 2017-01-10 RX ADMIN — APIXABAN 5 MG: 2.5 TABLET, FILM COATED ORAL at 17:37

## 2017-01-10 RX ADMIN — TETRAHYDROZOLINE HYDROCHLORIDE 1 DROP: 0.5 SOLUTION/ DROPS OPHTHALMIC at 17:35

## 2017-01-10 RX ADMIN — TETRAHYDROZOLINE HYDROCHLORIDE 1 DROP: 0.5 SOLUTION/ DROPS OPHTHALMIC at 21:32

## 2017-01-10 RX ADMIN — METOPROLOL TARTRATE 50 MG: 50 TABLET ORAL at 09:02

## 2017-01-10 RX ADMIN — PANTOPRAZOLE SODIUM 40 MG: 40 TABLET, DELAYED RELEASE ORAL at 09:02

## 2017-01-10 NOTE — PROGRESS NOTES
2340: assumed pt's care; AOX4, on 5l 02 via nc, resting in bed, c/o cough; Tessalon perle 1 cap given po; shift assessment done; denies any pain or other discomforts; Paced on tele; needs within reach    0200: quietly sleeping    0500: sleeping, no significant changes noted;  Paced on tele; no sob at rest    0730: Bedside and Verbal shift change report given to Aileen Adler RN (oncoming nurse) by Kirk Underwood RN (offgoing nurse). Report included the following information SBAR, Kardex, Intake/Output, Recent Results and Cardiac Rhythm paced.

## 2017-01-10 NOTE — PROGRESS NOTES
[facility]  Hospitalist Division        Inpatient Daily Progress Note    Daily progress Note    Patient: Poppy Perez MRN: [id number]  [facility]: [id number]    YOB: 1930  Age: 80 y.o. Sex: male    DOA: 1/6/2017 LOS:  LOS: 4 days                    Chief Complaint:  Fatigue, dehydration       Subjective:      C/o fatigue. Reports slept well overnight. Objective:      Visit Vitals    /78 (BP 1 Location: Right arm, BP Patient Position: Supine)    Pulse 71    Temp 97.4 °F (36.3 °C)    Resp 18    Ht 5' 9\" (1.753 m)    Wt 70.4 kg (155 lb 4.8 oz)    SpO2 92%    BMI 22.93 kg/m2         Physical Exam:  General appearance: alert and oriented, cooperative, no distress   Head: Normocephalic, without obvious abnormality, atraumatic  Lungs: diminished bases, fine crackles throughout   Heart: regular rate and rhythm, S1, S2 normal, no murmur, click, rub or gallop  Abdomen: soft, non tender, non distended. Normoactive bowel sounds  Extremities: extremities normal, atraumatic, no cyanosis.  BLE trace edema   Skin: Skin color, texture, turgor normal. No rashes or lesions  Neurologic: moves all 4 extremities   PSY: appropriately behaved        Intake and Output:  Current Shift:  01/10 0701 - 01/10 1900  In: 680 [P.O.:680]  Out: -   Last three shifts:  01/08 1901 - 01/10 0700  In: 890 [P.O.:440; I.V.:450]  Out: 700 [Urine:700]    Recent Results (from the past 24 hour(s))   CBC WITH AUTOMATED DIFF    Collection Time: 01/10/17  4:40 AM   Result Value Ref Range    WBC 9.3 4.6 - 13.2 K/uL    RBC 3.79 (L) 4.70 - 5.50 M/uL    HGB 11.3 (L) 13.0 - 16.0 g/dL    HCT 34.9 (L) 36.0 - 48.0 %    MCV 92.1 74.0 - 97.0 FL    MCH 29.8 24.0 - 34.0 PG    MCHC 32.4 31.0 - 37.0 g/dL    RDW 13.4 11.6 - 14.5 %    PLATELET 591 677 - 254 K/uL    MPV 9.1 (L) 9.2 - 11.8 FL    NEUTROPHILS 83 (H) 40 - 73 %    LYMPHOCYTES 8 (L) 21 - 52 %    MONOCYTES 8 3 - 10 %    EOSINOPHILS 1 0 - 5 % BASOPHILS 0 0 - 2 %    ABS. NEUTROPHILS 7.7 1.8 - 8.0 K/UL    ABS. LYMPHOCYTES 0.8 (L) 0.9 - 3.6 K/UL    ABS. MONOCYTES 0.8 0.05 - 1.2 K/UL    ABS. EOSINOPHILS 0.1 0.0 - 0.4 K/UL    ABS. BASOPHILS 0.0 0.0 - 0.06 K/UL    DF AUTOMATED     METABOLIC PANEL, BASIC    Collection Time: 01/10/17  4:40 AM   Result Value Ref Range    Sodium 138 136 - 145 mmol/L    Potassium 3.7 3.5 - 5.5 mmol/L    Chloride 106 100 - 108 mmol/L    CO2 21 21 - 32 mmol/L    Anion gap 11 3.0 - 18 mmol/L    Glucose 107 (H) 74 - 99 mg/dL    BUN 39 (H) 7.0 - 18 MG/DL    Creatinine 1.21 0.6 - 1.3 MG/DL    BUN/Creatinine ratio 32 (H) 12 - 20      GFR est AA >60 >60 ml/min/1.73m2    GFR est non-AA 57 (L) >60 ml/min/1.73m2    Calcium 8.4 (L) 8.5 - 10.1 MG/DL           Lab Results   Component Value Date/Time    Glucose 107 01/10/2017 04:40 AM    Glucose 112 01/09/2017 05:15 AM    Glucose 106 01/08/2017 04:25 AM    Glucose 102 01/07/2017 04:00 AM    Glucose 140 01/06/2017 03:35 PM        Assessment/Plan:     Patient Active Problem List   Diagnosis Code    Hypertension     Dyslipidemia E78.5    Coronary artery disease I25.10    Cardiomyopathy I42.9    Atrial fibrillation I48.91    Sick sinus syndrome I49.5    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 Continue O2. Monitor respiratory status   Atrial fibrillation: Eliquis   CAD/HTN: stable with current regimen of Norvasc, Vasotec, Lopressor.  Monitor BP   GI prophylaxis: Protonix   DVT prophylaxis: Eliquis covers   Continue PT as tolerated         Ronda Valdes, FNP-[facility]  Pager: [phone]  Office:  [phone]

## 2017-01-10 NOTE — ANCILLARY DISCHARGE INSTRUCTIONS
Patient and/or next of kin has been given the [facility] Important Message From Medicare About Your Rights\" letter and all questions were answered.

## 2017-01-11 LAB
ANION GAP BLD CALC-SCNC: 10 MMOL/L (ref 3–18)
BASOPHILS # BLD AUTO: 0 K/UL (ref 0–0.06)
BASOPHILS # BLD: 0 % (ref 0–2)
BUN SERPL-MCNC: 39 MG/DL (ref 7–18)
BUN/CREAT SERPL: 37 (ref 12–20)
CALCIUM SERPL-MCNC: 8 MG/DL (ref 8.5–10.1)
CHLORIDE SERPL-SCNC: 108 MMOL/L (ref 100–108)
CO2 SERPL-SCNC: 22 MMOL/L (ref 21–32)
CREAT SERPL-MCNC: 1.05 MG/DL (ref 0.6–1.3)
DIFFERENTIAL METHOD BLD: ABNORMAL
EOSINOPHIL # BLD: 0.1 K/UL (ref 0–0.4)
EOSINOPHIL NFR BLD: 1 % (ref 0–5)
ERYTHROCYTE [DISTWIDTH] IN BLOOD BY AUTOMATED COUNT: 13.5 % (ref 11.6–14.5)
GLUCOSE SERPL-MCNC: 104 MG/DL (ref 74–99)
HCT VFR BLD AUTO: 33.3 % (ref 36–48)
HGB BLD-MCNC: 10.8 G/DL (ref 13–16)
LYMPHOCYTES # BLD AUTO: 8 % (ref 21–52)
LYMPHOCYTES # BLD: 0.7 K/UL (ref 0.9–3.6)
MCH RBC QN AUTO: 29.9 PG (ref 24–34)
MCHC RBC AUTO-ENTMCNC: 32.4 G/DL (ref 31–37)
MCV RBC AUTO: 92.2 FL (ref 74–97)
MONOCYTES # BLD: 0.7 K/UL (ref 0.05–1.2)
MONOCYTES NFR BLD AUTO: 8 % (ref 3–10)
NEUTS SEG # BLD: 7.4 K/UL (ref 1.8–8)
NEUTS SEG NFR BLD AUTO: 83 % (ref 40–73)
PLATELET # BLD AUTO: 330 K/UL (ref 135–420)
PMV BLD AUTO: 9.3 FL (ref 9.2–11.8)
POTASSIUM SERPL-SCNC: 3.7 MMOL/L (ref 3.5–5.5)
RBC # BLD AUTO: 3.61 M/UL (ref 4.7–5.5)
SODIUM SERPL-SCNC: 140 MMOL/L (ref 136–145)
WBC # BLD AUTO: 8.9 K/UL (ref 4.6–13.2)

## 2017-01-11 PROCEDURE — 74011250637 HC RX REV CODE- 250/637: Performed by: FAMILY MEDICINE

## 2017-01-11 PROCEDURE — 74011000258 HC RX REV CODE- 258: Performed by: EMERGENCY MEDICINE

## 2017-01-11 PROCEDURE — 65660000000 HC RM CCU STEPDOWN

## 2017-01-11 PROCEDURE — 74011250636 HC RX REV CODE- 250/636: Performed by: NURSE PRACTITIONER

## 2017-01-11 PROCEDURE — 77010033678 HC OXYGEN DAILY

## 2017-01-11 PROCEDURE — 97116 GAIT TRAINING THERAPY: CPT

## 2017-01-11 PROCEDURE — 80048 BASIC METABOLIC PNL TOTAL CA: CPT | Performed by: NURSE PRACTITIONER

## 2017-01-11 PROCEDURE — 74011250636 HC RX REV CODE- 250/636: Performed by: HOSPITALIST

## 2017-01-11 PROCEDURE — 85025 COMPLETE CBC W/AUTO DIFF WBC: CPT | Performed by: NURSE PRACTITIONER

## 2017-01-11 PROCEDURE — 74011000250 HC RX REV CODE- 250: Performed by: EMERGENCY MEDICINE

## 2017-01-11 PROCEDURE — 36415 COLL VENOUS BLD VENIPUNCTURE: CPT | Performed by: NURSE PRACTITIONER

## 2017-01-11 PROCEDURE — 74011250637 HC RX REV CODE- 250/637: Performed by: HOSPITALIST

## 2017-01-11 RX ORDER — LEVOFLOXACIN 750 MG/1
750 TABLET ORAL
Status: DISCONTINUED | OUTPATIENT
Start: 2017-01-12 | End: 2017-01-16 | Stop reason: HOSPADM

## 2017-01-11 RX ADMIN — METOPROLOL TARTRATE 50 MG: 50 TABLET ORAL at 10:52

## 2017-01-11 RX ADMIN — BENZONATATE 100 MG: 100 CAPSULE ORAL at 10:52

## 2017-01-11 RX ADMIN — APIXABAN 5 MG: 2.5 TABLET, FILM COATED ORAL at 18:33

## 2017-01-11 RX ADMIN — TETRAHYDROZOLINE HYDROCHLORIDE 1 DROP: 0.5 SOLUTION/ DROPS OPHTHALMIC at 18:34

## 2017-01-11 RX ADMIN — ENALAPRIL MALEATE 20 MG: 10 TABLET ORAL at 10:52

## 2017-01-11 RX ADMIN — METHYLPREDNISOLONE SODIUM SUCCINATE 80 MG: 125 INJECTION, POWDER, FOR SOLUTION INTRAMUSCULAR; INTRAVENOUS at 18:34

## 2017-01-11 RX ADMIN — AZTREONAM 2 G: 2 INJECTION, POWDER, LYOPHILIZED, FOR SOLUTION INTRAMUSCULAR; INTRAVENOUS at 10:53

## 2017-01-11 RX ADMIN — METOPROLOL TARTRATE 50 MG: 50 TABLET ORAL at 18:33

## 2017-01-11 RX ADMIN — PANTOPRAZOLE SODIUM 40 MG: 40 TABLET, DELAYED RELEASE ORAL at 10:52

## 2017-01-11 RX ADMIN — TETRAHYDROZOLINE HYDROCHLORIDE 1 DROP: 0.5 SOLUTION/ DROPS OPHTHALMIC at 13:35

## 2017-01-11 RX ADMIN — APIXABAN 5 MG: 2.5 TABLET, FILM COATED ORAL at 10:52

## 2017-01-11 RX ADMIN — AZTREONAM 2 G: 2 INJECTION, POWDER, LYOPHILIZED, FOR SOLUTION INTRAMUSCULAR; INTRAVENOUS at 18:33

## 2017-01-11 RX ADMIN — AMLODIPINE BESYLATE 10 MG: 10 TABLET ORAL at 10:52

## 2017-01-11 RX ADMIN — TETRAHYDROZOLINE HYDROCHLORIDE 1 DROP: 0.5 SOLUTION/ DROPS OPHTHALMIC at 10:52

## 2017-01-11 RX ADMIN — MORPHINE SULFATE 2 MG: 2 INJECTION, SOLUTION INTRAMUSCULAR; INTRAVENOUS at 11:15

## 2017-01-11 RX ADMIN — METHYLPREDNISOLONE SODIUM SUCCINATE 80 MG: 125 INJECTION, POWDER, FOR SOLUTION INTRAMUSCULAR; INTRAVENOUS at 13:38

## 2017-01-11 RX ADMIN — AZTREONAM 2 G: 2 INJECTION, POWDER, LYOPHILIZED, FOR SOLUTION INTRAMUSCULAR; INTRAVENOUS at 00:01

## 2017-01-11 RX ADMIN — TETRAHYDROZOLINE HYDROCHLORIDE 1 DROP: 0.5 SOLUTION/ DROPS OPHTHALMIC at 22:58

## 2017-01-11 NOTE — PROGRESS NOTES
[facility]  Hospitalist Division        Inpatient Daily Progress Note    Daily progress Note    Patient: Silver Isaacs MRN: [id number]  CSN: [id number]    YOB: 1930  Age: 80 y.o. Sex: male    DOA: 1/6/2017 LOS:  LOS: 5 days                    Chief Complaint:  Fatigue, dehydration       Subjective:     Reports fatigue, poor sleep through the night. Denies fever, chills, cough or shortness of breath. In no distress. Objective:      Visit Vitals    /70 (BP 1 Location: Left arm, BP Patient Position: At rest)    Pulse 70    Temp 97.7 °F (36.5 °C)    Resp 18    Ht 5' 9\" (1.753 m)    Wt 69.9 kg (154 lb)    SpO2 96%    BMI 22.74 kg/m2         Physical Exam:  General appearance: alert and oriented, cooperative, no distress   Head: Normocephalic, without obvious abnormality, atraumatic  Lungs: diminished bases, fine crackles throughout   Heart: regular rate and rhythm, S1, S2 normal, no murmur, click, rub or gallop  Abdomen: soft, non tender, non distended. Normoactive bowel sounds  Extremities: extremities normal, atraumatic, no cyanosis. Trace BLE edema    Skin: Skin color, texture, turgor normal. No rashes or lesions  Neurologic: grossly normal    PSY: appropriately behaved        Intake and Output:  Current Shift:     Last three shifts:  01/09 1901 - 01/11 0700  In: 0274 [P.O.:1340;  I.V.:450]  Out: 1060 [Urine:1060]    Recent Results (from the past 24 hour(s))   CBC WITH AUTOMATED DIFF    Collection Time: 01/11/17  4:30 AM   Result Value Ref Range    WBC 8.9 4.6 - 13.2 K/uL    RBC 3.61 (L) 4.70 - 5.50 M/uL    HGB 10.8 (L) 13.0 - 16.0 g/dL    HCT 33.3 (L) 36.0 - 48.0 %    MCV 92.2 74.0 - 97.0 FL    MCH 29.9 24.0 - 34.0 PG    MCHC 32.4 31.0 - 37.0 g/dL    RDW 13.5 11.6 - 14.5 %    PLATELET 802 874 - 429 K/uL    MPV 9.3 9.2 - 11.8 FL    NEUTROPHILS 83 (H) 40 - 73 %    LYMPHOCYTES 8 (L) 21 - 52 %    MONOCYTES 8 3 - 10 %    EOSINOPHILS 1 0 - 5 % BASOPHILS 0 0 - 2 %    ABS. NEUTROPHILS 7.4 1.8 - 8.0 K/UL    ABS. LYMPHOCYTES 0.7 (L) 0.9 - 3.6 K/UL    ABS. MONOCYTES 0.7 0.05 - 1.2 K/UL    ABS. EOSINOPHILS 0.1 0.0 - 0.4 K/UL    ABS. BASOPHILS 0.0 0.0 - 0.06 K/UL    DF AUTOMATED     METABOLIC PANEL, BASIC    Collection Time: 01/11/17  4:30 AM   Result Value Ref Range    Sodium 140 136 - 145 mmol/L    Potassium 3.7 3.5 - 5.5 mmol/L    Chloride 108 100 - 108 mmol/L    CO2 22 21 - 32 mmol/L    Anion gap 10 3.0 - 18 mmol/L    Glucose 104 (H) 74 - 99 mg/dL    BUN 39 (H) 7.0 - 18 MG/DL    Creatinine 1.05 0.6 - 1.3 MG/DL    BUN/Creatinine ratio 37 (H) 12 - 20      GFR est AA >60 >60 ml/min/1.73m2    GFR est non-AA >60 >60 ml/min/1.73m2    Calcium 8.0 (L) 8.5 - 10.1 MG/DL           Lab Results   Component Value Date/Time    Glucose 104 01/11/2017 04:30 AM    Glucose 107 01/10/2017 04:40 AM    Glucose 112 01/09/2017 05:15 AM    Glucose 106 01/08/2017 04:25 AM    Glucose 102 01/07/2017 04:00 AM        Assessment/Plan:     Patient Active Problem List   Diagnosis Code    Hypertension     Dyslipidemia E78.5    Coronary artery disease I25.10    Cardiomyopathy I42.9    Atrial fibrillation I48.91    Sick sinus syndrome I49.5    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 Monitor respiratory status   Atrial fibrillation: Eliquis   CAD/HTN:  Remains stable with current regimen of Norvasc, Vasotec, Lopressor. Monitor BP   GI prophylaxis: Protonix   DVT prophylaxis: Eliquis covers   Continue PT as tolerated   Dispo: may require home O2, walking test closer to discharge.  Discussed with Care Management       Galina Hardy, FNP-BC  [address]  Hospitalist Division  Pager:  [phone]  Office:  [phone]

## 2017-01-11 NOTE — ROUTINE PROCESS
1930 Bedside and Verbal shift change report given to natacha han rn (oncoming nurse) by Lita Mckenna rn (offgoing nurse). Report included the following information SBAR, Kardex and MAR.

## 2017-01-11 NOTE — PROGRESS NOTES
Problem: Mobility Impaired (Adult and Pediatric)  Goal: *Acute Goals and Plan of Care (Insert Text)  Physical Therapy Goals  Initiated 1/7/2017 and to be accomplished within 7 day(s)   1. Patient will transfer from bed to chair and chair to bed with modified independence using the least restrictive device. 2. Patient will perform sit to stand with modified independence. 3. Patient will ambulate with modified independence for 150  feet with the least restrictive device. 4. Patient will ascend/descend 3 stairs with bilateral handrail(s) with supervision/set-up. Outcome: Progressing Towards Goal  PHYSICAL THERAPY TREATMENT     Patient: Kori Fuentes (18 y.o. male)  Date: 1/11/2017  Diagnosis: CAP (community acquired pneumonia) Sepsis [facility])  Precautions: Fall   Chart, physical therapy assessment, plan of care and goals were reviewed. ASSESSMENT:  SpO2 93% on 5L pre gait. Portable O2 tank has a 4 or 6L setting. Pt ambulated with RW, 80ft on 6L supplemental O2. Reciprocal gt pattern, decreased rubio, no LOB or path deviations. SpO2 upon returning to room 83% on 6L. Pt left sitting EOB of dinner. Notified nurse of saturation level. Education: slowing increase activity to build tolerance and endurance. Progression toward goals:  [X]      Improving appropriately and progressing toward goals  [X]      Improving slowly and progressing toward goals  [ ]      Not making progress toward goals and plan of care will be adjusted       PLAN:  Patient continues to benefit from skilled intervention to address the above impairments. Continue treatment per established plan of care. Discharge Recommendations:  Home Health vs None  Further Equipment Recommendations for Discharge:  Portable O2       SUBJECTIVE:   Patient stated I have been moving about the room myself.       OBJECTIVE DATA SUMMARY:   Critical Behavior:  Neurologic State: Alert, Appropriate for age, Eyes open spontaneously  Orientation Level: Oriented X4, Appropriate for age  Cognition: Appropriate decision making, Appropriate for age attention/concentration, Appropriate safety awareness, Follows commands  Safety/Judgement: Awareness of environment, Fall prevention  Functional Mobility Training:  Bed Mobility:  Supine to Sit: Supervision  Scooting: Supervision  Transfers:  Sit to Stand: Supervision  Stand to Sit: Supervision  Balance:  Sitting: Intact  Standing: Intact  Standing - Static: Good  Standing - Dynamic : Good  Ambulation/Gait Training:  Distance (ft): 80 Feet (ft)  Assistive Device: Gait belt;Walker, rolling; Other (comment) (portable O2)  Ambulation - Level of Assistance: Stand-by asssistance  Speed/Pilar: Slow  Pain:  Pre 0  Post 0  Pain Scale 1: Numeric (0 - 10)     Pain Location 1: Chest  Pain Orientation 1: Anterior  Pain Description 1: Aching     Activity Tolerance:   Fair  Please refer to the flowsheet for vital signs taken during this treatment.   After treatment:   [ ] Patient left in no apparent distress sitting up in chair  [X] Patient left in no apparent distress in bed  [X] Call bell left within reach  [X] Nursing notified  [ ] Caregiver present  [ ] Bed alarm activated      [place] PTA   Time Calculation: 20 mins

## 2017-01-11 NOTE — PROGRESS NOTES
2010: bedside shift report received from [facility]; Wang Monahan, on 5l 02 via nc, sting in bed, denies any pain or other discomforts; shift assessment done; needs within reach    2130: requested for Tessalon for cough; given as requested    2300: sleeping comfortably; no apparent distress noted    0030: awake, no needs voiced    0300: sleeping comfortably    0630: awake, no complaints voiced    0730: Bedside and Verbal shift change report given to Carli Garcia RN (oncoming nurse) by David Nunes RN (offgoing nurse). Report included the following information SBAR, Kardex, Intake/Output, Recent Results and Cardiac Rhythm Paced.

## 2017-01-11 NOTE — CONSULTS
PCCM  Consult, Initial, Brief  Weds 1/11/2017      Main problem: CAP    Interestingly, he was seen on 12/26 and had the following CXR:  \"Indication: Weakness, malaise. Comparison: None  Time of study - 1712  (12/26/2016). Findings: AP semierect portable chest   The heart size appears mildly enlarged. The patient is status post a CABG. A 2-D  cardiac device is present with lead tips overlying the right atrium and right  ventricle. There is mild elevation of the left hemidiaphragm. Mild atelectasis  appears evident at the lung bases. No pneumothorax. Cardiac leads overlie the chest.  Impression:  Status post CABG, minimal atelectasis at the lung bases. Imaging:   below. Also had a CT scan on 1/9      PEx:  A&A no distress  Mild possible conversational dyspnea. Hard to tell. Doesn;t c/o any SOB.  + JVD HOB 30  + soft right bruit referred from heart  Lungs essentially clear; no rhinchi or wheeze; no use of AMR  Cor +2/6 SM  Abd: mild obesity protuberant NT + bs (has had BMs & flatus)  Extr: RUE edema (inflitrated iv?); trace elsewhere; mild pitting LE bilat symm; thighs small symm  : WNL  Rectal not done. IMPRESSION:  # CAP     * not septic; no compelling evidence for there ever having been severe sepsis    * Can be on oral abx. No need for steroids. As far the the pneumonia can be treated as outpt. * no home O2 prior to this; doubtful he will need based on this infxn alone    * stopped smoking > 40 years ago    # CHF, chronic    * little worse on admission seems to have cleared on CXR of 1/8    * Afib also chronic    * has AICD     # Others    REC/ PLAN:  1. Stop iv's / steroids/ beta agonists   2. Po abx  3. Will see again if he remains hospitalized. The pneumonia just requires f/u CXR in about 2 months unless sx worsen/ recur.    4. Sputum culture if can obtain    -vini [phone]

## 2017-01-11 NOTE — PROGRESS NOTES
Patient discharge plan is to return home. He did not use oxygen at home prior to admission however he may require it at discharge,  Will alert Thelma Chao of possible need fr home O2.   Juni Ureña RN

## 2017-01-11 NOTE — ROUTINE PROCESS
0800 - assumed care of patient - sleeping    0900 - awake and alert - no complaints of pain - complains of dry nose - does have humidified oxygen in place via nasal cannula. Nasal spray ordered. SOB with ambulation to bathroom - reminded to call for assist getting OOB. AM meds given. Good appetite for breakfast.      1100 - sleeping - no complaints    1230 - sitting up for lunch - no complaints. 1400 - assisted to bathroom - continues with SOB with ambulation - no complaints of pain.     1600 - sleeping - no complaints

## 2017-01-11 NOTE — PROGRESS NOTES
2767 Bedside and Verbal shift change report given to Kenna Kerr RN (oncoming nurse) by  Karl Thrashercock nurseGaby. Report included the following information SBAR, Procedure Summary, Intake/Output, MAR, Recent Results and Cardiac Rhythm V-paced. Pt sleeping in bed, NAD. Bed locked and lowered, siderails up x3. Call bell in reach, will continue to monitor. 1048 Pt sitting up in bed, NAD,  Assessment completed, scheduled meds provided. PRN tessalon provided. Call bell at bedside. 1333 Pt resting in bed, scheduled meds provided. Pt needs met at this time. Call bell at bedside. 1525 Pt sleeping in bed, NAD, call bell at bedside. 1757 Pt provided with scheduled meds, PIV placed, Pt needs met at this time, solu-medrol discontinued per Dr Erica Verdin. Will continue to monitor. 2020 Bedside and Verbal shift change report given to Glenroy Suarez RN (oncoming nurse) by Kenna Kerr RN (offgoing nurse). Report included the following information SBAR, Procedure Summary, Intake/Output, MAR, Recent Results and Cardiac Rhythm V-paced.

## 2017-01-12 LAB
ANION GAP BLD CALC-SCNC: 12 MMOL/L (ref 3–18)
BACTERIA SPEC CULT: NORMAL
BACTERIA SPEC CULT: NORMAL
BASOPHILS # BLD AUTO: 0 K/UL (ref 0–0.06)
BASOPHILS # BLD: 0 % (ref 0–2)
BUN SERPL-MCNC: 41 MG/DL (ref 7–18)
BUN/CREAT SERPL: 44 (ref 12–20)
CALCIUM SERPL-MCNC: 8.3 MG/DL (ref 8.5–10.1)
CHLORIDE SERPL-SCNC: 106 MMOL/L (ref 100–108)
CO2 SERPL-SCNC: 21 MMOL/L (ref 21–32)
CREAT SERPL-MCNC: 0.94 MG/DL (ref 0.6–1.3)
DIFFERENTIAL METHOD BLD: ABNORMAL
EOSINOPHIL # BLD: 0 K/UL (ref 0–0.4)
EOSINOPHIL NFR BLD: 0 % (ref 0–5)
ERYTHROCYTE [DISTWIDTH] IN BLOOD BY AUTOMATED COUNT: 13.6 % (ref 11.6–14.5)
GLUCOSE SERPL-MCNC: 147 MG/DL (ref 74–99)
HCT VFR BLD AUTO: 34.7 % (ref 36–48)
HGB BLD-MCNC: 11.2 G/DL (ref 13–16)
LYMPHOCYTES # BLD AUTO: 5 % (ref 21–52)
LYMPHOCYTES # BLD: 0.4 K/UL (ref 0.9–3.6)
MCH RBC QN AUTO: 29.8 PG (ref 24–34)
MCHC RBC AUTO-ENTMCNC: 32.3 G/DL (ref 31–37)
MCV RBC AUTO: 92.3 FL (ref 74–97)
MONOCYTES # BLD: 0.1 K/UL (ref 0.05–1.2)
MONOCYTES NFR BLD AUTO: 1 % (ref 3–10)
NEUTS SEG # BLD: 7.3 K/UL (ref 1.8–8)
NEUTS SEG NFR BLD AUTO: 94 % (ref 40–73)
PLATELET # BLD AUTO: 379 K/UL (ref 135–420)
PMV BLD AUTO: 9.4 FL (ref 9.2–11.8)
POTASSIUM SERPL-SCNC: 4 MMOL/L (ref 3.5–5.5)
RBC # BLD AUTO: 3.76 M/UL (ref 4.7–5.5)
SERVICE CMNT-IMP: NORMAL
SERVICE CMNT-IMP: NORMAL
SODIUM SERPL-SCNC: 139 MMOL/L (ref 136–145)
WBC # BLD AUTO: 7.8 K/UL (ref 4.6–13.2)

## 2017-01-12 PROCEDURE — 36415 COLL VENOUS BLD VENIPUNCTURE: CPT | Performed by: NURSE PRACTITIONER

## 2017-01-12 PROCEDURE — 74011250637 HC RX REV CODE- 250/637: Performed by: FAMILY MEDICINE

## 2017-01-12 PROCEDURE — 93971 EXTREMITY STUDY: CPT

## 2017-01-12 PROCEDURE — 65660000000 HC RM CCU STEPDOWN

## 2017-01-12 PROCEDURE — 77010033678 HC OXYGEN DAILY

## 2017-01-12 PROCEDURE — 97116 GAIT TRAINING THERAPY: CPT

## 2017-01-12 PROCEDURE — 85025 COMPLETE CBC W/AUTO DIFF WBC: CPT | Performed by: NURSE PRACTITIONER

## 2017-01-12 PROCEDURE — 80048 BASIC METABOLIC PNL TOTAL CA: CPT | Performed by: NURSE PRACTITIONER

## 2017-01-12 PROCEDURE — 74011250637 HC RX REV CODE- 250/637: Performed by: HOSPITALIST

## 2017-01-12 PROCEDURE — 97110 THERAPEUTIC EXERCISES: CPT

## 2017-01-12 PROCEDURE — 74011250637 HC RX REV CODE- 250/637: Performed by: INTERNAL MEDICINE

## 2017-01-12 RX ADMIN — TETRAHYDROZOLINE HYDROCHLORIDE 1 DROP: 0.5 SOLUTION/ DROPS OPHTHALMIC at 17:58

## 2017-01-12 RX ADMIN — TETRAHYDROZOLINE HYDROCHLORIDE 1 DROP: 0.5 SOLUTION/ DROPS OPHTHALMIC at 13:00

## 2017-01-12 RX ADMIN — ENALAPRIL MALEATE 20 MG: 10 TABLET ORAL at 11:45

## 2017-01-12 RX ADMIN — METOPROLOL TARTRATE 50 MG: 50 TABLET ORAL at 17:57

## 2017-01-12 RX ADMIN — PANTOPRAZOLE SODIUM 40 MG: 40 TABLET, DELAYED RELEASE ORAL at 11:45

## 2017-01-12 RX ADMIN — TETRAHYDROZOLINE HYDROCHLORIDE 1 DROP: 0.5 SOLUTION/ DROPS OPHTHALMIC at 11:45

## 2017-01-12 RX ADMIN — SALINE NASAL SPRAY 2 SPRAY: 1.5 SOLUTION NASAL at 22:29

## 2017-01-12 RX ADMIN — TETRAHYDROZOLINE HYDROCHLORIDE 1 DROP: 0.5 SOLUTION/ DROPS OPHTHALMIC at 22:00

## 2017-01-12 RX ADMIN — LEVOFLOXACIN 750 MG: 750 TABLET, FILM COATED ORAL at 17:57

## 2017-01-12 RX ADMIN — METOPROLOL TARTRATE 50 MG: 50 TABLET ORAL at 11:45

## 2017-01-12 RX ADMIN — APIXABAN 5 MG: 2.5 TABLET, FILM COATED ORAL at 17:57

## 2017-01-12 RX ADMIN — AMLODIPINE BESYLATE 10 MG: 10 TABLET ORAL at 11:45

## 2017-01-12 RX ADMIN — BENZONATATE 100 MG: 100 CAPSULE ORAL at 22:28

## 2017-01-12 RX ADMIN — APIXABAN 5 MG: 2.5 TABLET, FILM COATED ORAL at 11:45

## 2017-01-12 NOTE — PROCEDURES
[facility]  *** FINAL REPORT ***    Name: Rosa Onofre  MRN: [id number]    Inpatient  : 10 Nov 1930  HIS Order #: [id number]  [address] [facility] [place] [facility] Visit #: [id number]  Date: 2017    TYPE OF TEST: Peripheral Venous Testing    REASON FOR TEST  Swelling    Left Arm:-  Deep venous thrombosis:           No  Superficial venous thrombosis:    No      INTERPRETATION/FINDINGS  Duplex images were obtained using 2-D gray scale, color flow, and  spectral Doppler analysis. Left arm :  1. Deep vein(s) visualized include the internal jugular, subclavian,  axillary, brachial, radial and ulnar veins. 2. No evidence of deep venous thrombosis detected in the veins  visualized. 3. No evidence of deep vein thrombosis in the contralateral subclavian   vein. 4. Superficial vein(s) visualized include the basilic (upper arm),  basilic (forearm), cephalic (upper arm) and cephalic (forearm) veins. 5. No evidence of superficial thrombosis detected. ADDITIONAL COMMENTS    I have personally reviewed the data relevant to the interpretation of  this  study. TECHNOLOGIST: Gutierrez Mckeon. RUY Guillermo  Signed: 2017 05:33 PM    PHYSICIAN: Wili Kiser.  Yifan Alvarado MD  Signed: 2017 11:31 PM

## 2017-01-12 NOTE — PROGRESS NOTES
[facility]  Hospitalist Division        Inpatient Daily Progress Note    Daily progress Note    Patient: Dodie Gutierrez MRN: [id number]  CSN: [id number]    YOB: 1930  Age: 80 y.o. Sex: male    DOA: 1/6/2017 LOS:  LOS: 6 days                    Chief Complaint:  Fatigue, dehydration       Subjective:     Sitting up in bed, doing leg exercises. Feeling better. Denies shortness of breath. In NAD. Objective:      Visit Vitals    /55 (BP 1 Location: Right arm, BP Patient Position: Supine)    Pulse 70    Temp 97.4 °F (36.3 °C)    Resp 18    Ht 5' 9\" (1.753 m)    Wt 69.9 kg (154 lb)    SpO2 97%    BMI 22.74 kg/m2         Physical Exam:  General appearance: alert and oriented, cooperative, no distress   Head: Normocephalic, without obvious abnormality, atraumatic  Lungs: diminished bases, fine crackles throughout   Heart: regular rate and rhythm, S1, S2 normal, no murmur, click, rub or gallop  Abdomen: soft, non tender, non distended. Normoactive bowel sounds  Extremities: extremities normal, atraumatic, no cyanosis.  LUE 2+ pitting edema   Skin: Skin color, texture, turgor normal. No rashes or lesions  Neurologic: grossly normal    PSY: appropriately behaved        Intake and Output:  Current Shift:  01/12 0701 - 01/12 1900  In: 700 [P.O.:700]  Out: -   Last three shifts:  01/10 1901 - 01/12 0700  In: 1660 [P.O.:1560; I.V.:100]  Out: 1160 [Urine:1160]    Recent Results (from the past 24 hour(s))   METABOLIC PANEL, BASIC    Collection Time: 01/12/17  4:30 AM   Result Value Ref Range    Sodium 139 136 - 145 mmol/L    Potassium 4.0 3.5 - 5.5 mmol/L    Chloride 106 100 - 108 mmol/L    CO2 21 21 - 32 mmol/L    Anion gap 12 3.0 - 18 mmol/L    Glucose 147 (H) 74 - 99 mg/dL    BUN 41 (H) 7.0 - 18 MG/DL    Creatinine 0.94 0.6 - 1.3 MG/DL    BUN/Creatinine ratio 44 (H) 12 - 20      GFR est AA >60 >60 ml/min/1.73m2    GFR est non-AA >60 >60 ml/min/1.73m2 Calcium 8.3 (L) 8.5 - 10.1 MG/DL   CBC WITH AUTOMATED DIFF    Collection Time: 01/12/17  4:30 AM   Result Value Ref Range    WBC 7.8 4.6 - 13.2 K/uL    RBC 3.76 (L) 4.70 - 5.50 M/uL    HGB 11.2 (L) 13.0 - 16.0 g/dL    HCT 34.7 (L) 36.0 - 48.0 %    MCV 92.3 74.0 - 97.0 FL    MCH 29.8 24.0 - 34.0 PG    MCHC 32.3 31.0 - 37.0 g/dL    RDW 13.6 11.6 - 14.5 %    PLATELET 763 028 - 936 K/uL    MPV 9.4 9.2 - 11.8 FL    NEUTROPHILS 94 (H) 40 - 73 %    LYMPHOCYTES 5 (L) 21 - 52 %    MONOCYTES 1 (L) 3 - 10 %    EOSINOPHILS 0 0 - 5 %    BASOPHILS 0 0 - 2 %    ABS. NEUTROPHILS 7.3 1.8 - 8.0 K/UL    ABS. LYMPHOCYTES 0.4 (L) 0.9 - 3.6 K/UL    ABS. MONOCYTES 0.1 0.05 - 1.2 K/UL    ABS. EOSINOPHILS 0.0 0.0 - 0.4 K/UL    ABS. 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 Continue PO Levaquin. Start to wean O2   Atrial fibrillation: Eliquis   CAD/HTN:  Remains stable with current regimen of Norvasc, Vasotec, Lopressor.  Monitor BP   GI prophylaxis: Protonix   DVT prophylaxis: Eliquis covers   Continue PT as tolerated    PVL CORTEZ Jimenez, [facility]  Hospitalist Division  Pager:  [phone]  Office:  [phone]

## 2017-01-12 NOTE — PROGRESS NOTES
Problem: Mobility Impaired (Adult and Pediatric)  Goal: *Acute Goals and Plan of Care (Insert Text)  Physical Therapy Goals  Initiated 1/7/2017 and to be accomplished within 7 day(s)   1. Patient will transfer from bed to chair and chair to bed with modified independence using the least restrictive device. 2. Patient will perform sit to stand with modified independence. 3. Patient will ambulate with modified independence for 150  feet with the least restrictive device. 4. Patient will ascend/descend 3 stairs with bilateral handrail(s) with supervision/set-up. Outcome: Progressing Towards Goal  PHYSICAL THERAPY TREATMENT     Patient: Nichole Allan (49 y.o. male)  Date: 1/12/2017  Diagnosis: CAP (community acquired pneumonia) Sepsis [facility])  Precautions: Fall   Chart, physical therapy assessment, plan of care and goals were reviewed. ASSESSMENT:  Pt on 5L supplemental O2 and SpO2 99% at rest.  Ambulated 160ft with RW, decreased rubio, multiple standing rest breaks. Unable to obtain SpO2 s/p gait. No difficulty breathing or SOB with activity. Pt reports working out and stretching 2x/day every day. Had pt performed sit to stand exercise involving a breathing exercise simultaneously. Provided pt with a sheet to perform calf and hamstring stretch in the bed. Education: LE exercises and stretches, breathing exercise  Progression toward goals:  [X]      Improving appropriately and progressing toward goals  [ ]      Improving slowly and progressing toward goals  [ ]      Not making progress toward goals and plan of care will be adjusted       PLAN:  Patient continues to benefit from skilled intervention to address the above impairments. Continue treatment per established plan of care. Discharge Recommendations:  Home Health vs None  Further Equipment Recommendations for Discharge:  Home O2 possibly       SUBJECTIVE:   Patient stated Ricardo Lopez had a routine at home, working out 2x/day and stretching.  OBJECTIVE DATA SUMMARY:   Critical Behavior:  Neurologic State: Alert, Appropriate for age  Orientation Level: Oriented X4  Cognition: Appropriate decision making, Appropriate for age attention/concentration, Appropriate safety awareness, Follows commands  Safety/Judgement: Awareness of environment, Fall prevention  Functional Mobility Training:  Bed Mobility:  Supine to Sit: Independent  Sit to Supine: Independent  Transfers:  Sit to Stand: Independent  Stand to Sit: Independent  Balance:  Sitting: Intact  Standing: Intact  Standing - Static: Good  Standing - Dynamic : Good  Ambulation/Gait Training:  Distance (ft): 160 Feet (ft)  Assistive Device: Gait belt;Walker, rolling; Other (comment) (portable O2 tank)  Ambulation - Level of Assistance: Stand-by asssistance  Speed/Pilar: Slow  Therapeutic Exercises:   Sit<>stand with breathing exercise 5x  LAQ, seated march  Supine calf and hamstring stretch with sheet 2-3 x20 seconds each  Pain:  Pre 0  Post 0  Pain Scale 1: Visual  Activity Tolerance:   Fair+  Please refer to the flowsheet for vital signs taken during this treatment.   After treatment:   [ ] Patient left in no apparent distress sitting up in chair  [X] Patient left in no apparent distress in bed  [X] Call bell left within reach  [ ] Nursing notified  [ ] Caregiver present  [ ] Bed alarm activated      [address] Robert Soares, PTA   Time Calculation: 29 mins

## 2017-01-12 NOTE — PROGRESS NOTES
2033 -- Bedside and Verbal shift change report given to Lela Pérez, RN (oncoming nurse) by Kaitlyn Nagel RN, RN (offgoing nurse). Report included the following information SBAR, Procedure Summary, Intake/Output, MAR, Recent Results and Cardiac Rhythm Paced/ A-FIB. Pt awake in bed no pain concerns. Bed locked and lowered, siderails up x3. Call bell at bedside, will continue to monitor.      0025 -- Shift re-assessment completed, no change in pt condition.      0454 -- Shift re-assessment completed, no change in pt condition.      2263 -- Bedside and Verbal shift change report given to Alec Cohen , RN  (oncoming nurse) by Guillermina Olivares RN (offgoing nurse) Report included the following information SBAR, Procedure Summary, Intake/Output, MAR, Recent Results. Pt up in bed, no indication of pain or acute distress. Bed locked and lowered, siderails up x3. Call bell at bedside.

## 2017-01-13 LAB
ANION GAP BLD CALC-SCNC: 11 MMOL/L (ref 3–18)
BASOPHILS # BLD AUTO: 0 K/UL (ref 0–0.06)
BASOPHILS # BLD: 0 % (ref 0–2)
BUN SERPL-MCNC: 50 MG/DL (ref 7–18)
BUN/CREAT SERPL: 49 (ref 12–20)
CALCIUM SERPL-MCNC: 8.7 MG/DL (ref 8.5–10.1)
CHLORIDE SERPL-SCNC: 105 MMOL/L (ref 100–108)
CO2 SERPL-SCNC: 23 MMOL/L (ref 21–32)
CREAT SERPL-MCNC: 1.02 MG/DL (ref 0.6–1.3)
DIFFERENTIAL METHOD BLD: ABNORMAL
EOSINOPHIL # BLD: 0 K/UL (ref 0–0.4)
EOSINOPHIL NFR BLD: 0 % (ref 0–5)
ERYTHROCYTE [DISTWIDTH] IN BLOOD BY AUTOMATED COUNT: 13.9 % (ref 11.6–14.5)
GLUCOSE SERPL-MCNC: 93 MG/DL (ref 74–99)
HCT VFR BLD AUTO: 36.2 % (ref 36–48)
HGB BLD-MCNC: 11.4 G/DL (ref 13–16)
LYMPHOCYTES # BLD AUTO: 11 % (ref 21–52)
LYMPHOCYTES # BLD: 1 K/UL (ref 0.9–3.6)
MCH RBC QN AUTO: 29.9 PG (ref 24–34)
MCHC RBC AUTO-ENTMCNC: 31.5 G/DL (ref 31–37)
MCV RBC AUTO: 95 FL (ref 74–97)
MONOCYTES # BLD: 0.5 K/UL (ref 0.05–1.2)
MONOCYTES NFR BLD AUTO: 5 % (ref 3–10)
NEUTS SEG # BLD: 7.9 K/UL (ref 1.8–8)
NEUTS SEG NFR BLD AUTO: 84 % (ref 40–73)
PLATELET # BLD AUTO: 358 K/UL (ref 135–420)
PMV BLD AUTO: 9.8 FL (ref 9.2–11.8)
POTASSIUM SERPL-SCNC: 4.4 MMOL/L (ref 3.5–5.5)
RBC # BLD AUTO: 3.81 M/UL (ref 4.7–5.5)
SODIUM SERPL-SCNC: 139 MMOL/L (ref 136–145)
WBC # BLD AUTO: 9.4 K/UL (ref 4.6–13.2)

## 2017-01-13 PROCEDURE — 74011250637 HC RX REV CODE- 250/637: Performed by: HOSPITALIST

## 2017-01-13 PROCEDURE — 97116 GAIT TRAINING THERAPY: CPT

## 2017-01-13 PROCEDURE — 36415 COLL VENOUS BLD VENIPUNCTURE: CPT | Performed by: NURSE PRACTITIONER

## 2017-01-13 PROCEDURE — 85025 COMPLETE CBC W/AUTO DIFF WBC: CPT | Performed by: NURSE PRACTITIONER

## 2017-01-13 PROCEDURE — 65660000000 HC RM CCU STEPDOWN

## 2017-01-13 PROCEDURE — 77010033678 HC OXYGEN DAILY

## 2017-01-13 PROCEDURE — 74011250637 HC RX REV CODE- 250/637: Performed by: FAMILY MEDICINE

## 2017-01-13 PROCEDURE — 80048 BASIC METABOLIC PNL TOTAL CA: CPT | Performed by: NURSE PRACTITIONER

## 2017-01-13 RX ADMIN — METOPROLOL TARTRATE 50 MG: 50 TABLET ORAL at 17:11

## 2017-01-13 RX ADMIN — PANTOPRAZOLE SODIUM 40 MG: 40 TABLET, DELAYED RELEASE ORAL at 08:32

## 2017-01-13 RX ADMIN — TETRAHYDROZOLINE HYDROCHLORIDE 1 DROP: 0.5 SOLUTION/ DROPS OPHTHALMIC at 22:00

## 2017-01-13 RX ADMIN — SALINE NASAL SPRAY 2 SPRAY: 1.5 SOLUTION NASAL at 14:30

## 2017-01-13 RX ADMIN — TETRAHYDROZOLINE HYDROCHLORIDE 1 DROP: 0.5 SOLUTION/ DROPS OPHTHALMIC at 18:00

## 2017-01-13 RX ADMIN — METOPROLOL TARTRATE 50 MG: 50 TABLET ORAL at 08:31

## 2017-01-13 RX ADMIN — APIXABAN 5 MG: 2.5 TABLET, FILM COATED ORAL at 08:31

## 2017-01-13 RX ADMIN — BENZONATATE 100 MG: 100 CAPSULE ORAL at 11:56

## 2017-01-13 RX ADMIN — ENALAPRIL MALEATE 20 MG: 10 TABLET ORAL at 08:31

## 2017-01-13 RX ADMIN — APIXABAN 5 MG: 2.5 TABLET, FILM COATED ORAL at 17:11

## 2017-01-13 RX ADMIN — AMLODIPINE BESYLATE 10 MG: 10 TABLET ORAL at 08:30

## 2017-01-13 RX ADMIN — BENZONATATE 100 MG: 100 CAPSULE ORAL at 21:56

## 2017-01-13 RX ADMIN — TETRAHYDROZOLINE HYDROCHLORIDE 1 DROP: 0.5 SOLUTION/ DROPS OPHTHALMIC at 09:00

## 2017-01-13 RX ADMIN — TETRAHYDROZOLINE HYDROCHLORIDE 1 DROP: 0.5 SOLUTION/ DROPS OPHTHALMIC at 13:00

## 2017-01-13 NOTE — PROGRESS NOTES
PCCM  Fri 1/13/2017    A&A OOB eating +BM + flatus    PEx:  Essentially unchanged  A&A Ox3  HEENT no thrush  Neck + HJR/ minimal JVD that decreases with quiet tidal insp but increases if he takes deep breath and holds. Cor RRR +M  Lungs: clear no crackles or wheezes  Abd protuberant typmanic sl tense but no distress NT   Extr  Trace pedal edeam bilat symm; persistent LUE edema    IMPRESSION  # See 1/12  # CAP  # compensated chronic CHF    PLAN:   He is moving in right direction; on all oral meds. From Pulm/ pneumonia perspective ok to discharge. From CHF/ overall frailty consideration up to hospitalists/ home caregivers.   Won't plan to see again but if still in hosp will see Monday  Dr Tomer Perez available via ICU for PCCM over the weekend  Can try off of supplemental O2 - SPO2 > 90 with exertion is ok (< 88 is required for home O2)   -[facility]  [phone]

## 2017-01-13 NOTE — PROGRESS NOTES
PCCM    Doing well up OOB walking on 02 4L -> have decreased to 2L    A&A ND  PERRl  No c-br  No JVD  +2/6 SM  abd soft  No CCE    Lungs: clear no wheeze       Date/Time Temp Pulse BP Arterial Line 1 BP (mmHg) BP Patient Position Resp SpO2 O2 Device O2 Flow Rate (L/min) Pre/Post Ductal Weight     01/12/17 1800 97.8 °F (36.6 °C) 70 151/70 -- Supine 18 92 % -- -- -- --     01/12/17 1518 97.4 °F (36.3 °C) 70 151/55 -- Supine 18 97 % -- -- -- --     01/12/17 0938 97.5 °F (36.4 °C) 70 120/64 -- Supine 18 94 % -- -- -- --     01/12/17 0626 97.5 °F (36.4 °C) 70 165/72 -- Head of bed elevated (Comment degrees) 18 92 % -- -- -- --     01/12/17 0212 97.7 °F (36.5 °C) 70 152/73 -- Head of bed elevated (Comment degrees) 18 92 % -- -- -- --     01/11/17 2131 97.4 °F (36.3 °C) 73 136/73 -- Head of bed elevated (Comment degrees) 18 93 % -- -- -- --     01/11/17 2033 -- -- -- -- -- -- -- Nasal cannula 5 l/min -- --     01/11/17 1721 97.5 °F (36.4 °C) 100 157/70 -- At rest               IMPRESSION  # CAP - doing well   * He has really good bs RLLF base so another CXR or imaging isn't necessary   * Probably wont need O2    # Heart Disease   * stable   * last echo here was 9/2015; had some PH probably due to chronically elevated LAP   * HTN - possibly elevated now due to having missed his outpt meds but he appears to have had all these restarted    #LUE swelling - had US -> NEGATIVE    PLAN (PULM):  Cont mgmnt   Assess for O2 need in AM by RT  CXR in 2 months by PCP or f/u with pulmonary    -vini [phone]

## 2017-01-13 NOTE — PROGRESS NOTES
0745 Received patient in bed. No c/o pain at this time. Patient concerned about oxygen titration form 5liters to 2 liters via Nasal cannula. O2 sats at 99 to 100% on 2Liters. C/o SOB when going to the restroom. Will continue to monitor. 0945 All am med given. No adverse reactions. Will continue to monitor. 1200 PRN Tessalon given for cough and effective. 1345 Patient ambulating to restroom. Denies any SOB at this time. Will continue monitor. 1824 Patient resting quietly. Tolerated all evening medications. Denies any pain or discomfort. Patient asked when will he get his ABT. Educated on times the antibiotic is given(every 48 hours). 1900 Bedside and Verbal shift change report given to Samina Moran RN  (oncoming nurse) by Octavio Wang RN (offgoing nurse) Report included the following information SBAR, Procedure Summary, Intake/Output, MAR, Recent Results.

## 2017-01-13 NOTE — PROGRESS NOTES
2019 -- Bedside and Verbal shift change report given to Terri Dallas RN (oncoming nurse) by Marilou Wen RN, RN (offgoing nurse). Report included the following information SBAR, Procedure Summary, Intake/Output, MAR, Recent Results and Cardiac Rhythm Paced/ A-FIB. Pt awake in bathroom performing self care, no pain concerns. Bed locked and lowered, siderails up x3. Call bell at bedside, will continue to monitor. 2228 -- PRN cough medication administered, will continue to monitor. 0016 -- Shift re-assessment completed, no change in pt condition.     0431 -- Shift re-assessment completed, no change in pt condition.       0755 -- Bedside and Verbal shift change report given to Kolby [address] , RN  (oncoming nurse) by Rosa Bridges RN (offgoing nurse) Report included the following information SBAR, Procedure Summary, Intake/Output, MAR, Recent Results. Pt up in bed, no indication of pain or acute distress. Bed locked and lowered, siderails up x3. Call bell at bedside.

## 2017-01-13 NOTE — PROGRESS NOTES
[facility]  Hospitalist Division        Inpatient Daily Progress Note    Daily progress Note    Patient: Dodie Gutierrez MRN: [id number]  CSN: [id number]    YOB: 1930  Age: 80 y.o. Sex: male    DOA: 1/6/2017 LOS:  LOS: 7 days                    Chief Complaint:  Fatigue, dehydration       Subjective:     Working with PT. No distress. Objective:      Visit Vitals    /73 (BP 1 Location: Right arm, BP Patient Position: Supine)    Pulse 70    Temp 97.8 °F (36.6 °C)    Resp 18    Ht 5' 9\" (1.753 m)    Wt 72.6 kg (160 lb)    SpO2 95%    BMI 23.63 kg/m2         Physical Exam:  General appearance: alert and oriented, cooperative, no distress   Head: Normocephalic, without obvious abnormality, atraumatic  Lungs: diminished throughout   Heart: regular rate and rhythm, S1, S2 normal, no murmur, click, rub or gallop  Abdomen: soft, non tender, non distended. Normoactive bowel sounds  Extremities: extremities normal, atraumatic, no cyanosis.  LUE 2+ pitting edema   Skin: Skin color, texture, turgor normal. No rashes or lesions  Neurologic: grossly normal    PSY: appropriately behaved        Intake and Output:  Current Shift:  01/13 0701 - 01/13 1900  In: 800 [P.O.:800]  Out: 950 [Urine:950]  Last three shifts:  01/11 1901 - 01/13 0700  In: 880 [P.O.:880]  Out: 600 [Urine:600]    Recent Results (from the past 24 hour(s))   METABOLIC PANEL, BASIC    Collection Time: 01/13/17  4:55 AM   Result Value Ref Range    Sodium 139 136 - 145 mmol/L    Potassium 4.4 3.5 - 5.5 mmol/L    Chloride 105 100 - 108 mmol/L    CO2 23 21 - 32 mmol/L    Anion gap 11 3.0 - 18 mmol/L    Glucose 93 74 - 99 mg/dL    BUN 50 (H) 7.0 - 18 MG/DL    Creatinine 1.02 0.6 - 1.3 MG/DL    BUN/Creatinine ratio 49 (H) 12 - 20      GFR est AA >60 >60 ml/min/1.73m2    GFR est non-AA >60 >60 ml/min/1.73m2    Calcium 8.7 8.5 - 10.1 MG/DL   CBC WITH AUTOMATED DIFF    Collection Time: 01/13/17  4:55 AM   Result Value Ref Range    WBC 9.4 4.6 - 13.2 K/uL    RBC 3.81 (L) 4.70 - 5.50 M/uL    HGB 11.4 (L) 13.0 - 16.0 g/dL    HCT 36.2 36.0 - 48.0 %    MCV 95.0 74.0 - 97.0 FL    MCH 29.9 24.0 - 34.0 PG    MCHC 31.5 31.0 - 37.0 g/dL    RDW 13.9 11.6 - 14.5 %    PLATELET 643 082 - 501 K/uL    MPV 9.8 9.2 - 11.8 FL    NEUTROPHILS 84 (H) 40 - 73 %    LYMPHOCYTES 11 (L) 21 - 52 %    MONOCYTES 5 3 - 10 %    EOSINOPHILS 0 0 - 5 %    BASOPHILS 0 0 - 2 %    ABS. NEUTROPHILS 7.9 1.8 - 8.0 K/UL    ABS. LYMPHOCYTES 1.0 0.9 - 3.6 K/UL    ABS. MONOCYTES 0.5 0.05 - 1.2 K/UL    ABS. EOSINOPHILS 0.0 0.0 - 0.4 K/UL    ABS. 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 Continue PO Levaquin. Continue to wean O2 as tolerated   Atrial fibrillation: Eliquis   CAD/HTN: stable with current regimen of Norvasc, Vasotec, Lopressor.  Monitor BP   GI prophylaxis: Protonix   DVT prophylaxis: Eliquis covers   Continue PT   PVL LUE negative for DVT       DAVID Cooney 83  Pager:  [phone]  Office:  [phone]

## 2017-01-13 NOTE — MED STUDENT NOTES
*ATTENTION:  This note has been created by a medical student for educational purposes only. Please do not refer to the content of this note for clinical decision-making, billing, or other purposes. Please see attending physicians note to obtain clinical information on this patient. *       Student Progress Note  Please refer to attendings daily rounding note for full details      Patient: Merlyn Chaudhari MRN: [id number]  CSN: [id number]    YOB: 1930  Age: 80 y.o. Sex: male    DOA: 1/6/2017 LOS:  LOS: 7 days          Chief Complaint:  \"I was feeling better yesterday. \"    Subjective:    Pt says that his O2 was turned down from 5L yesterday to 2L last night. Pt says he feels fine while lying in bed, but feels increased fatigue, SOB, lightheadedness, heart racing when he moves around his room. Symptoms dissipate with rest. Pt denies chest pain, N/V. Pt says swelling has gone down in his L arm, but it is still swollen. Pt complains of slight swelling in legs. Objective:      Visit Vitals    /67 (BP 1 Location: Right arm, BP Patient Position: Supine)    Pulse 70    Temp 97.8 °F (36.6 °C)    Resp 18    Ht 5' 9\" (1.753 m)    Wt 72.6 kg (160 lb)    SpO2 94%    BMI 23.63 kg/m2         Physical Exam:  Gen: well nourished, cooperative male in NAD  HEENT: Normocephalic, atraumatic  CV: RRR without murmurs, gallops, rubs. Pacemaker  Resp: CTA bilat. Normal respirations without use of accessory muscles. Abd: soft, nontender, nondistended  Extrem:  Warm, with distal pulses x4. Pitting edema in L forearm. Slight non-pitting edema in legs bilat. Skin: warm without trauma or rashes  Neuro: OAx3. Moves all extremities. Duplex L upper extremity vein - negative for thrombosis  I have reviewed the patient's Labs and Radiology studies. Assessment/Plan:     1) CAP: Continue PO Levaquin. Continue to wean O2. Hope to be off O2 prior to discharge. Continue PT.     2) Atrial fibrillation: Eliquis     3) CAD/HTN: Remains stable with current regimen of Norvasc, Vasotec, Lopressor. Monitor BP      4) LUE edema - monitor by physical exam. Consult vascular if worsening. Lily Luier  1/13/2017  11:04 AM  *ATTENTION:  This note has been created by a medical student for educational purposes only. Please do not refer to the content of this note for clinical decision-making, billing, or other purposes. Please see attending physicians note to obtain clinical information on this patient. *

## 2017-01-13 NOTE — PROGRESS NOTES
Problem: Mobility Impaired (Adult and Pediatric)  Goal: *Acute Goals and Plan of Care (Insert Text)  Physical Therapy Goals  Initiated 1/7/2017 and to be accomplished within 7 day(s)   1. Patient will transfer from bed to chair and chair to bed with modified independence using the least restrictive device. 2. Patient will perform sit to stand with modified independence. 3. Patient will ambulate with modified independence for 150  feet with the least restrictive device. 4. Patient will ascend/descend 3 stairs with bilateral handrail(s) with supervision/set-up. Outcome: Progressing Towards Goal  PHYSICAL THERAPY TREATMENT     Patient: Maged Velazco (65 y.o. male)  Date: 1/13/2017  Diagnosis: CAP (community acquired pneumonia) Sepsis [facility])     Precautions: Fall   Chart, physical therapy assessment, plan of care and goals were reviewed. ASSESSMENT:  Instructed pt to perform AP and opening/closing fingers to help reduce swelling, LUE>RUE. Pt on 2L O2 today. SpO2 is difficulty to obtain due to cold fingers and decreased perfusion. SpO2 pre gait 91% on 2L. Ambulated 120ft, very slow pace, reciprocal gt pattern, no LOB or path deviations. SpO2 93% s/p gait. Pt left sitting up in bed. Elevated LUE on 3 pillows to decreased edema. Education: Cont exercises, elevate UEs, take as many rest breaks as necessary to focus on breathing  Progression toward goals:  [ ]      Improving appropriately and progressing toward goals  [X]      Improving slowly and progressing toward goals  [ ]      Not making progress toward goals and plan of care will be adjusted       PLAN:  Patient continues to benefit from skilled intervention to address the above impairments. Continue treatment per established plan of care.   Discharge Recommendations:  Home Health  Further Equipment Recommendations for Discharge:  Possible home O2       SUBJECTIVE:   Patient stated It is a little more difficult with the oxygen turned down but I am ok.       OBJECTIVE DATA SUMMARY:   Critical Behavior:  Neurologic State: Appropriate for age, Alert  Orientation Level: Oriented X4  Cognition: Appropriate decision making, Appropriate for age attention/concentration, Appropriate safety awareness  Safety/Judgement: Awareness of environment, Fall prevention  Functional Mobility Training:  Bed Mobility:  Supine to Sit: Independent  Sit to Supine: Independent  Scooting: Independent  Transfers:  Sit to Stand: Supervision  Stand to Sit: Independent  Balance:  Sitting: Intact  Standing: Intact  Standing - Static: Good  Standing - Dynamic : Good  Ambulation/Gait Training:  Distance (ft): 120 Feet (ft)  Assistive Device: Gait belt;Walker, rolling; Other (comment) (portable O2 tank)  Ambulation - Level of Assistance: Stand-by asssistance  Speed/Pilar: Slow  Pain:  Pre 0  Post 0  Pain Scale 1: Numeric (0 - 10)  Activity Tolerance:   Fair+  Please refer to the flowsheet for vital signs taken during this treatment.   After treatment:   [ ] Patient left in no apparent distress sitting up in chair  [X] Patient left in no apparent distress in bed  [X] Call bell left within reach  [ ] Nursing notified  [ ] Caregiver present  [ ] Bed alarm activated      [address], PTA   Time Calculation: 26 mins

## 2017-01-13 NOTE — PROGRESS NOTES
Patient progressing, O2 to 2 liters via NC. Discharge plan is to return home with home oxygen if indicated. May benefit for home care follow up.   Edson Clark RN

## 2017-01-14 LAB
ANION GAP BLD CALC-SCNC: 11 MMOL/L (ref 3–18)
BASOPHILS # BLD AUTO: 0 K/UL (ref 0–0.06)
BASOPHILS # BLD: 0 % (ref 0–2)
BUN SERPL-MCNC: 40 MG/DL (ref 7–18)
BUN/CREAT SERPL: 40 (ref 12–20)
CALCIUM SERPL-MCNC: 8.4 MG/DL (ref 8.5–10.1)
CHLORIDE SERPL-SCNC: 104 MMOL/L (ref 100–108)
CO2 SERPL-SCNC: 23 MMOL/L (ref 21–32)
CREAT SERPL-MCNC: 1 MG/DL (ref 0.6–1.3)
DIFFERENTIAL METHOD BLD: ABNORMAL
EOSINOPHIL # BLD: 0.1 K/UL (ref 0–0.4)
EOSINOPHIL NFR BLD: 1 % (ref 0–5)
ERYTHROCYTE [DISTWIDTH] IN BLOOD BY AUTOMATED COUNT: 13.7 % (ref 11.6–14.5)
GLUCOSE SERPL-MCNC: 81 MG/DL (ref 74–99)
HCT VFR BLD AUTO: 36.8 % (ref 36–48)
HGB BLD-MCNC: 11.7 G/DL (ref 13–16)
LYMPHOCYTES # BLD AUTO: 11 % (ref 21–52)
LYMPHOCYTES # BLD: 0.9 K/UL (ref 0.9–3.6)
MCH RBC QN AUTO: 29.4 PG (ref 24–34)
MCHC RBC AUTO-ENTMCNC: 31.8 G/DL (ref 31–37)
MCV RBC AUTO: 92.5 FL (ref 74–97)
MONOCYTES # BLD: 0.5 K/UL (ref 0.05–1.2)
MONOCYTES NFR BLD AUTO: 7 % (ref 3–10)
NEUTS SEG # BLD: 6.4 K/UL (ref 1.8–8)
NEUTS SEG NFR BLD AUTO: 81 % (ref 40–73)
PLATELET # BLD AUTO: 447 K/UL (ref 135–420)
PMV BLD AUTO: 9.2 FL (ref 9.2–11.8)
POTASSIUM SERPL-SCNC: 4.4 MMOL/L (ref 3.5–5.5)
RBC # BLD AUTO: 3.98 M/UL (ref 4.7–5.5)
SODIUM SERPL-SCNC: 138 MMOL/L (ref 136–145)
WBC # BLD AUTO: 7.8 K/UL (ref 4.6–13.2)

## 2017-01-14 PROCEDURE — 80048 BASIC METABOLIC PNL TOTAL CA: CPT | Performed by: NURSE PRACTITIONER

## 2017-01-14 PROCEDURE — 74011250637 HC RX REV CODE- 250/637: Performed by: HOSPITALIST

## 2017-01-14 PROCEDURE — 77010033678 HC OXYGEN DAILY

## 2017-01-14 PROCEDURE — 74011250637 HC RX REV CODE- 250/637: Performed by: INTERNAL MEDICINE

## 2017-01-14 PROCEDURE — 36415 COLL VENOUS BLD VENIPUNCTURE: CPT | Performed by: NURSE PRACTITIONER

## 2017-01-14 PROCEDURE — 65660000000 HC RM CCU STEPDOWN

## 2017-01-14 PROCEDURE — 85025 COMPLETE CBC W/AUTO DIFF WBC: CPT | Performed by: NURSE PRACTITIONER

## 2017-01-14 RX ADMIN — TETRAHYDROZOLINE HYDROCHLORIDE 1 DROP: 0.5 SOLUTION/ DROPS OPHTHALMIC at 18:00

## 2017-01-14 RX ADMIN — TETRAHYDROZOLINE HYDROCHLORIDE 1 DROP: 0.5 SOLUTION/ DROPS OPHTHALMIC at 13:00

## 2017-01-14 RX ADMIN — LEVOFLOXACIN 750 MG: 750 TABLET, FILM COATED ORAL at 17:32

## 2017-01-14 RX ADMIN — METOPROLOL TARTRATE 50 MG: 50 TABLET ORAL at 09:18

## 2017-01-14 RX ADMIN — METOPROLOL TARTRATE 50 MG: 50 TABLET ORAL at 17:33

## 2017-01-14 RX ADMIN — APIXABAN 5 MG: 2.5 TABLET, FILM COATED ORAL at 17:32

## 2017-01-14 RX ADMIN — TETRAHYDROZOLINE HYDROCHLORIDE 1 DROP: 0.5 SOLUTION/ DROPS OPHTHALMIC at 22:00

## 2017-01-14 RX ADMIN — AMLODIPINE BESYLATE 10 MG: 10 TABLET ORAL at 09:18

## 2017-01-14 RX ADMIN — APIXABAN 5 MG: 2.5 TABLET, FILM COATED ORAL at 09:18

## 2017-01-14 RX ADMIN — PANTOPRAZOLE SODIUM 40 MG: 40 TABLET, DELAYED RELEASE ORAL at 09:18

## 2017-01-14 RX ADMIN — TETRAHYDROZOLINE HYDROCHLORIDE 1 DROP: 0.5 SOLUTION/ DROPS OPHTHALMIC at 09:00

## 2017-01-14 RX ADMIN — ENALAPRIL MALEATE 20 MG: 10 TABLET ORAL at 09:18

## 2017-01-14 NOTE — PROGRESS NOTES
3876 Received patient up in bed and awake. Edema to left arm smaller than yesterday. Elevated on 2 pillows. Patient alert and pleasant. Denies any pain or discomfort at this time. No symptoms of SOB or distress. Will continue to monitor. 0900 Patient ambulating to restroom. Denies any pain or discomfort. Some dyspnea on exertion, denies SOB when getting back into the bed. All am scheduled medications given. Will continue to monitor. 1045 No changes upon reassessment    1200 Patient in bed resting quietly. TV on. Will continue to monitor. 1405 Patient c/o some weakness in lower legs. Trace edema noted bilaterally. Pedal pulses present and strong. Patient stated \"I notice the weakness in my legs after therapy or going to the restroom. \" Skin color pink and appropriate, warm to the touch. Able to move legs without any foot drop. Will continue to monitor. 1650 Patient resting quietly. No changes since last assessment. No c/o pain or discomfort. 1735 PO ABT given and tolerated well. Patient in bed eating dinner. No c/o pain. Will continue to monitor. 1805 Patient up in bed. No c/o SOB or pain. Will continue to monitor.   1923 Bedside shift report given to Maryan Burrows RN (Oncoming nurse) by Arturo Powers RN

## 2017-01-14 NOTE — PROGRESS NOTES
Progress Note      Patient: Cleatus Mcburney               Sex: male          DOA: 1/6/2017       YOB: 1930      Age:  80 y.o.        LOS:  LOS: 8 days             CHIEF COMPLAINT:  Pneumonia with respiratory failure    Subjective:     Breathing okay, still requires oxygen    Objective:      Visit Vitals    /71    Pulse 89    Temp 97.4 °F (36.3 °C)    Resp 18    Ht 5' 9\" (1.753 m)    Wt 72.6 kg (160 lb)    SpO2 96%    BMI 23.63 kg/m2       Physical Exam:  Gen:  No distress, no complaint  Lungs:  Clear bilaterally, no wheeze or rhonchi  Heart:  Regular rate and rhythm, no murmurs or gallops  Abdomen:  Soft, non-tender, normal bowel sounds        Lab/Data Reviewed:  BMP:   Lab Results   Component Value Date/Time     01/14/2017 04:30 AM    K 4.4 01/14/2017 04:30 AM     01/14/2017 04:30 AM    CO2 23 01/14/2017 04:30 AM    AGAP 11 01/14/2017 04:30 AM    GLU 81 01/14/2017 04:30 AM    BUN 40 (H) 01/14/2017 04:30 AM    CREA 1.00 01/14/2017 04:30 AM    GFRAA >60 01/14/2017 04:30 AM    GFRNA >60 01/14/2017 04:30 AM     CBC:   Lab Results   Component Value Date/Time    WBC 7.8 01/14/2017 04:30 AM    HGB 11.7 (L) 01/14/2017 04:30 AM    HCT 36.8 01/14/2017 04:30 AM     (H) 01/14/2017 04:30 AM           Assessment/Plan     Principal Problem:    Sepsis ([address] (1/6/2017)    Active Problems:    Dyslipidemia ()      Coronary artery disease ()      Atrial fibrillation ()      Overview: CHADS score 3  (+CHF, +HTN, +AGE, -DM, -CVA)      Community acquired bacterial pneumonia (1/6/2017)      CAP (community acquired pneumonia) (1/6/2017)    Acute hypoxic respiratory failure, improving.  (present on admission)    Plan:  Continue with oxygen  Continue with antibiotics  Weaning oxygen as possible. Patient would like to discharge without oxygen if possible.

## 2017-01-14 NOTE — PROGRESS NOTES
1945 --  Bedside and Verbal shift change report given to Adelso Gibbons RN (oncoming nurse) by Silvio CARDONA/ Nitin CIFUENTES RN (offgoing nurse). Report included the following information SBAR, Procedure Summary, Intake/Output, MAR, Recent Results and Cardiac Rhythm Paced/ A-FIB. Pt awake in bathroom performing self care, no pain concerns. Bed locked and lowered, siderails up x3. Call bell at bedside, will continue to monitor.     2228 -- PRN cough medication administered, will continue to monitor.     0004 -- Shift re-assessment completed, no change in pt condition.       0403 -- Shift re-assessment completed, no change in pt condition. 4547 -- Notified by ANNA Landeros pt's blood pressure 171/77, assessed pt, pt states he just came from restroom and was having a coughing episode. Second blood pressure done manually 149/69, this is equal to pt's baseline.       0715 -- Bedside and Verbal shift change report given to Kolby France RN  (oncoming nurse) by Caryle Levine, RN (offgoing nurse) Report included the following information SBAR, Procedure Summary, Intake/Output, MAR, Recent Results. Pt up in bed, no indication of pain or acute distress. Bed locked and lowered, siderails up x3. Call bell at bedside.

## 2017-01-15 PROCEDURE — 74011250637 HC RX REV CODE- 250/637: Performed by: HOSPITALIST

## 2017-01-15 PROCEDURE — 77010033678 HC OXYGEN DAILY

## 2017-01-15 PROCEDURE — 65660000000 HC RM CCU STEPDOWN

## 2017-01-15 RX ADMIN — APIXABAN 5 MG: 2.5 TABLET, FILM COATED ORAL at 09:16

## 2017-01-15 RX ADMIN — METOPROLOL TARTRATE 50 MG: 50 TABLET ORAL at 09:16

## 2017-01-15 RX ADMIN — PANTOPRAZOLE SODIUM 40 MG: 40 TABLET, DELAYED RELEASE ORAL at 06:55

## 2017-01-15 RX ADMIN — ENALAPRIL MALEATE 20 MG: 10 TABLET ORAL at 09:19

## 2017-01-15 RX ADMIN — METOPROLOL TARTRATE 50 MG: 50 TABLET ORAL at 18:00

## 2017-01-15 RX ADMIN — TETRAHYDROZOLINE HYDROCHLORIDE 1 DROP: 0.5 SOLUTION/ DROPS OPHTHALMIC at 13:00

## 2017-01-15 RX ADMIN — AMLODIPINE BESYLATE 10 MG: 10 TABLET ORAL at 09:16

## 2017-01-15 RX ADMIN — TETRAHYDROZOLINE HYDROCHLORIDE 1 DROP: 0.5 SOLUTION/ DROPS OPHTHALMIC at 22:00

## 2017-01-15 RX ADMIN — TETRAHYDROZOLINE HYDROCHLORIDE 1 DROP: 0.5 SOLUTION/ DROPS OPHTHALMIC at 09:00

## 2017-01-15 RX ADMIN — APIXABAN 5 MG: 2.5 TABLET, FILM COATED ORAL at 18:00

## 2017-01-15 RX ADMIN — TETRAHYDROZOLINE HYDROCHLORIDE 1 DROP: 0.5 SOLUTION/ DROPS OPHTHALMIC at 18:00

## 2017-01-15 NOTE — PROGRESS NOTES
1808 [facility] care of patient. Patient up in bed with left arm elevated on 2 pillows to help with edema. Denies any pain or discomfort. Will continue to monitor. 0935 All am medications given and tolerated. Patient perform all ADL care independently. Plan to speak with doctor regarding titrating oxygen from 2liters to 1 liter and eventually 0 to discharge home. Patient pleasant and denies any pain or discomfort. Will continue to monitor. 1200 Patient c/o of some dyspepsia. Paged Dr. Lopez Person to order PRN antiacids. Crackers given per patient request. Awaiting return call. [address] leads on telebox. Patient up in bed. Will continue to monitor. 1750 No c/o dyspepsia or discomfort at this time. All evening meds given and tolerated well.     1900 Bedside shift report given to LUCAS CORONEL RN oncoming nurse by Thelma Lyon RN (offgoing nurse)

## 2017-01-15 NOTE — PROGRESS NOTES
Shift progress Note:  Assumed care of patient in bed awake, no complaints offered, uneventful night. No change in status or treatment. Call bell within reach.

## 2017-01-15 NOTE — PROGRESS NOTES
Progress Note      Patient: Yelena Nunes               Sex: male          DOA: 1/6/2017       YOB: 1930      Age:  80 y.o.        LOS:  LOS: 9 days             CHIEF COMPLAINT:  Pneumonia    Subjective:     Patient is breathing better   Starting to mobilize more    Objective:      Visit Vitals    /67 (BP 1 Location: Left arm, BP Patient Position: At rest)    Pulse 70    Temp 98.1 °F (36.7 °C)    Resp 18    Ht 5' 9\" (1.753 m)    Wt 72.6 kg (160 lb)    SpO2 93%    BMI 23.63 kg/m2       Physical Exam:  Gen:  No distress, no complaint  Lungs:  Clear bilaterally, no wheeze or rhonchi  Heart:  Regular rate and rhythm, no murmurs or gallops  Abdomen:  Soft, non-tender, normal bowel sounds        Lab/Data Reviewed:    Labs reviewed        Assessment/Plan     Principal Problem:    Sepsis ([address] (1/6/2017)    Active Problems:    Dyslipidemia ()      Coronary artery disease ()      Atrial fibrillation ()      Overview: CHADS score 3  (+CHF, +HTN, +AGE, -DM, -CVA)      Community acquired bacterial pneumonia (1/6/2017)      CAP (community acquired pneumonia) (1/6/2017)        Plan:  Continue with IV antibiotics  Continue with mobilization  Follow respiratory status  It is hopeful he can leave tomorrow +/- oxygen. He is hopeful for off oxygen.

## 2017-01-16 ENCOUNTER — HOME HEALTH ADMISSION (OUTPATIENT)
Dept: HOME HEALTH SERVICES | Facility: HOME HEALTH | Age: 82
End: 2017-01-16
Payer: MEDICARE

## 2017-01-16 VITALS
SYSTOLIC BLOOD PRESSURE: 96 MMHG | HEIGHT: 69 IN | OXYGEN SATURATION: 98 % | DIASTOLIC BLOOD PRESSURE: 52 MMHG | BODY MASS INDEX: 20.97 KG/M2 | TEMPERATURE: 97.7 F | RESPIRATION RATE: 16 BRPM | WEIGHT: 141.6 LBS | HEART RATE: 71 BPM

## 2017-01-16 LAB
ATRIAL RATE: 75 BPM
CALCULATED R AXIS, ECG10: -90 DEGREES
CALCULATED T AXIS, ECG11: 85 DEGREES
CK MB CFR SERPL CALC: 5.4 % (ref 0–4)
CK MB SERPL-MCNC: 2.1 NG/ML (ref 0.5–3.6)
CK SERPL-CCNC: 39 U/L (ref 39–308)
DIAGNOSIS, 93000: NORMAL
Q-T INTERVAL, ECG07: 470 MS
QRS DURATION, ECG06: 180 MS
QTC CALCULATION (BEZET), ECG08: 507 MS
TROPONIN I SERPL-MCNC: 0.02 NG/ML (ref 0–0.04)
VENTRICULAR RATE, ECG03: 70 BPM

## 2017-01-16 PROCEDURE — 74011250637 HC RX REV CODE- 250/637: Performed by: HOSPITALIST

## 2017-01-16 PROCEDURE — 36415 COLL VENOUS BLD VENIPUNCTURE: CPT | Performed by: HOSPITALIST

## 2017-01-16 PROCEDURE — 93005 ELECTROCARDIOGRAM TRACING: CPT

## 2017-01-16 PROCEDURE — 77030020263 HC SOL INJ SOD CL0.9% LFCR 1000ML

## 2017-01-16 PROCEDURE — 82550 ASSAY OF CK (CPK): CPT | Performed by: HOSPITALIST

## 2017-01-16 RX ORDER — SODIUM CHLORIDE 9 MG/ML
100 INJECTION, SOLUTION INTRAVENOUS CONTINUOUS
Status: DISCONTINUED | OUTPATIENT
Start: 2017-01-16 | End: 2017-01-16 | Stop reason: HOSPADM

## 2017-01-16 RX ORDER — NITROGLYCERIN 0.3 MG/1
0.3 TABLET SUBLINGUAL
Qty: 1 BOTTLE | Refills: 0 | Status: SHIPPED | OUTPATIENT
Start: 2017-01-16 | End: 2017-07-06 | Stop reason: SDUPTHER

## 2017-01-16 RX ORDER — LEVOFLOXACIN 750 MG/1
750 TABLET ORAL
Qty: 5 TAB | Refills: 0 | Status: SHIPPED | OUTPATIENT
Start: 2017-01-16 | End: 2017-02-15 | Stop reason: ALTCHOICE

## 2017-01-16 RX ADMIN — AMLODIPINE BESYLATE 10 MG: 10 TABLET ORAL at 09:44

## 2017-01-16 RX ADMIN — PANTOPRAZOLE SODIUM 40 MG: 40 TABLET, DELAYED RELEASE ORAL at 09:44

## 2017-01-16 RX ADMIN — APIXABAN 5 MG: 2.5 TABLET, FILM COATED ORAL at 09:44

## 2017-01-16 RX ADMIN — TETRAHYDROZOLINE HYDROCHLORIDE 1 DROP: 0.5 SOLUTION/ DROPS OPHTHALMIC at 09:00

## 2017-01-16 RX ADMIN — ENALAPRIL MALEATE 20 MG: 10 TABLET ORAL at 09:44

## 2017-01-16 RX ADMIN — METOPROLOL TARTRATE 50 MG: 50 TABLET ORAL at 09:45

## 2017-01-16 NOTE — MED STUDENT NOTES
*ATTENTION:  This note has been created by a medical student for educational purposes only. Please do not refer to the content of this note for clinical decision-making, billing, or other purposes. Please see attending physicians note to obtain clinical information on this patient. *       Student Progress Note  Please refer to attendings daily rounding note for full details      Patient: Santino Bianchi MRN: [id number]  CSN: [id number]    YOB: 1930  Age: 80 y.o. Sex: male    DOA: 1/6/2017 LOS:  LOS: 10 days          Chief Complaint:  Chest pain is gone    Subjective:      Pt is relaxing in bed eating his lunch. Pt says that earlier today he was doing PT without supplemental O2 for the first time this hospital stay. Pt began to have \"tightness\" in his chest that worsened over 2-3 minutes. Rapid response was called. MI work-up negative. Pt stable since episode. Pt says he feels fine and is ready for discharge. Pt agrees to call his Cardiologist on discharge and make appointment. Objective:      Visit Vitals    BP 96/52    Pulse 71    Temp 97.7 °F (36.5 °C)    Resp 16    Ht 5' 9\" (1.753 m)    Wt 72.6 kg (160 lb)    SpO2 98%    BMI 23.63 kg/m2       Physical Exam:  Gen: well nourished, cooperative male in NAD  HEENT: Normocephalic, atraumatic  CV: RRR without murmurs, gallops, rubs. Pacemaker  Resp: CTA bilat. Normal respirations without use of accessory muscles. Abd: soft, nontender, nondistended  Extrem: Warm, with distal pulses x4. Pitting edema in L forearm resolved  Skin: warm without trauma or rashes  Neuro: OAx3. Moves all extremities. I have reviewed the patient's Labs and Radiology studies. Assessment/Plan:      Pt ready for discharge. Continue below meds as outpt and make appointment with PCP and Cardiologist    1) CAP: Continue PO Levaquin     2) Atrial fibrillation: Eliquis      3) CAD/HTN: Remains stable with current regimen of Norvasc, Vasotec, Lopressor. Monitor BP           Nila Montana  1/16/2017  2:01 PM  *ATTENTION:  This note has been created by a medical student for educational purposes only. Please do not refer to the content of this note for clinical decision-making, billing, or other purposes. Please see attending physicians note to obtain clinical information on this patient. *

## 2017-01-16 NOTE — PROGRESS NOTES
conducted a Follow up consultation and Spiritual Assessment for Dayna Carmen, who is a 80 y.o.,male. The  provided the following Interventions:  Continued the relationship of care and support. Listened empathically. Offered prayer and assurance of continued prayer on patients behalf. Chart reviewed. The following outcomes were achieved:  Patient expressed gratitude for pastoral care visit. Assessment:  There are no further spiritual or [employer] issues which require Spiritual Care Services interventions at this time. Plan:  Chaplains will continue to follow and will provide pastoral care on an as needed/requested basis.  recommends bedside caregivers page  on duty if patient shows signs of acute spiritual or emotional distress.      [address] [facility]   Staff [address] [place] [address]   [phone]

## 2017-01-16 NOTE — PROGRESS NOTES
0 - Report received from [address] [place] [address] Avenue, RN. Orders, medications, and labs reviewed. [id number] - [facility] Orders, medications, and labs reviewed.

## 2017-01-16 NOTE — PROGRESS NOTES
Physical Therapy Note:    Attempted PT re-eval this date at 56, however pt standing in room packing up belongings stating he has been told by his doctor that he is going home. Pt states he has no concerns and feels ready to return home. Pt educated on activity pacing upon returning home. Will defer treatment today.     Thank you,  ELEAZAR HayesT

## 2017-01-16 NOTE — PROGRESS NOTES
responded to Rapid Response for  Annetta Cedillo, who is a 80 y.o.,male,     The  provided the following Interventions:  Provided crisis spiritual care and support. Offered prayers on behalf for the patient. Chart reviewed. The following outcomes were achieved: Patient was resting but talking with the doctor with his eye closed. Assessment:  There are no further spiritual or [employer] issues which require intervention at this time. Plan:  Chaplains will continue to follow and will provide spiritual care as needed.  recommends bedside caregivers page  on duty if patient or family shows signs of acute spiritual or emotional distress.      [employer]   Board Certified   [phone] - Office

## 2017-01-16 NOTE — PROGRESS NOTES
Plan is  To discharge home free from oxygen. Patient may benefit from home care for evaluation of respiratory effort, medication compliance and understanding and therapy.   Mary Bradley RN

## 2017-01-16 NOTE — DISCHARGE SUMMARY
Discharge Summary    Patient: Addie Cartagena               Sex: male          DOA: 1/6/2017         YOB: 1930      Age:  80 y.o.        LOS:  LOS: 10 days                Admit Date: 1/6/2017    Discharge Date: 1/16/2017    Discharge Medications:     Current Discharge Medication List      START taking these medications    Details   levoFLOXacin (LEVAQUIN) 750 mg tablet Take 1 Tab by mouth every fourty-eight (48) hours. Qty: 5 Tab, Refills: 0      nitroglycerin (NITROSTAT) 0.3 mg SL tablet 1 Tab by SubLINGual route every five (5) minutes as needed for Chest Pain (up to 3 total 1 every 5 min). Qty: 1 Bottle, Refills: 0         CONTINUE these medications which have NOT CHANGED    Details   apixaban (ELIQUIS) 5 mg tablet Take 5 mg by mouth two (2) times a day. metoprolol tartrate (LOPRESSOR) 100 mg IR tablet Take 100 mg by mouth daily. enalapril (VASOTEC) 20 mg tablet Take 20 mg by mouth daily. amLODIPine (NORVASC) 10 mg tablet Take 1 Tab by mouth daily. Qty: 90 Tab, Refills: 3      omeprazole (PRILOSEC OTC) 20 mg tablet Take 20 mg by mouth daily. Indications: HEARTBURN      dicyclomine (BENTYL) 10 mg capsule Take 10 mg by mouth three (3) times daily as needed for Nausea (max 3x/day). Follow-up: pcp, cards    Discharge Condition: Stable    Activity: Activity as tolerated    Diet: Resume previous diet    Labs:  Labs: Results:       Chemistry Recent Labs      01/14/17   0430   GLU  81   NA  138   K  4.4   CL  104   CO2  23   BUN  40*   CREA  1.00   CA  8.4*   AGAP  11   BUCR  40*      CBC w/Diff Recent Labs      01/14/17   0430   WBC  7.8   RBC  3.98*   HGB  11.7*   HCT  36.8   PLT  447*   GRANS  81*   LYMPH  11*   EOS  1      Cardiac Enzymes Recent Labs      01/16/17   1220   CPK  39   CKND1  5.4*      Coagulation No results for input(s): PTP, INR, APTT in the last 72 hours.     No lab exists for component: INREXT    Lipid Panel Lab Results   Component Value Date/Time Cholesterol, total 149 09/09/2015 03:10 AM    HDL Cholesterol 41 09/09/2015 03:10 AM    LDL, calculated 90 09/09/2015 03:10 AM    VLDL, calculated 18 09/09/2015 03:10 AM    Triglyceride 90 09/09/2015 03:10 AM    CHOL/HDL Ratio 3.6 09/09/2015 03:10 AM      BNP No results for input(s): BNPP in the last 72 hours. Liver Enzymes No results for input(s): TP, ALB, TBIL, AP, SGOT, GPT in the last 72 hours. No lab exists for component: DBIL   Thyroid Studies Lab Results   Component Value Date/Time    T4, Total 7.9 05/06/2010 08:50 AM    TSH 3.66 01/06/2017 03:35 PM          Imaging:     CTA chest  1. No evidence of pulmonary embolism.     2. Multifocal airspace disease, most pronounced in the right upper lobe most  likely representing multifocal pneumonia. There are additional superimposed  areas of atelectasis.     3. Small right pleural effusion.     4. Cardiomegaly, specifically right heart enlargement with mild increased  diameter of the main pulmonary artery segment as well as marked early reflux of  contrast into the IVC and hepatic veins suggesting elevated right heart  pressures and possibly superimposed tricuspid insufficiency.         Consults: Pulmonary/Critical Care    Treatment Team: Treatment Team: Attending Provider: Edith Anton MD; Care Manager: Otilia Gallo; Physical Therapist: Binnie Dubin Otto Ditty; Care Manager: Davide Menjivar, KATELIN; Consulting Provider: Rodger Gómez MD    Significant Diagnostic Studies: labs:   Recent Results (from the past 24 hour(s))   EKG, 12 LEAD, INITIAL    Collection Time: 01/16/17 12:09 PM   Result Value Ref Range    Ventricular Rate 70 BPM    Atrial Rate 75 BPM    QRS Duration 180 ms    Q-T Interval 470 ms    QTC Calculation (Bezet) 507 ms    Calculated R Axis -90 degrees    Calculated T Axis 85 degrees    Diagnosis       Wide QRS rhythm  Nonspecific intraventricular block  Possible Lateral infarct , age undetermined  Inferior infarct , age undetermined  Abnormal ECG  When compared with ECG of 07-JAN-2017 12:39,  Wide QRS rhythm has replaced Electronic ventricular pacemaker     CARDIAC PANEL,(CK, CKMB & TROPONIN)    Collection Time: 01/16/17 12:20 PM   Result Value Ref Range    CK 39 39 - 308 U/L    CK - MB 2.1 0.5 - 3.6 ng/ml    CK-MB Index 5.4 (H) 0.0 - 4.0 %    Troponin-I, Qt. 0.02 0.0 - 0.045 NG/ML         Discharge diagnoses:    Problem List as of 1/16/2017  Date Reviewed: 5/29/2015          Codes Class Noted - Resolved    * (Principal)Sepsis ([facility] ICD-10-CM: A41.9  ICD-9-CM: 038.9, 995.91  1/6/2017 - Present        Community acquired bacterial pneumonia ICD-10-CM: J15.9  ICD-9-CM: 482.9  1/6/2017 - Present        CAP (community acquired pneumonia) ICD-10-CM: J18.9  ICD-9-CM: 486  1/6/2017 - Present        CHF (congestive heart failure) ([facility] ICD-10-CM: I50.9  ICD-9-CM: 428.0  9/7/2015 - Present        S/P CABG x 2 ICD-10-CM: Z95.1  ICD-9-CM: V45.81  8/25/2015 - Present    Overview Signed 8/25/2015  2:15 PM by Cayden Mack MD     12/2008, LIMA to LAD, SVG to RCA             Atherosclerotic NAHED (renal artery stenosis), bilateral ([facility] ICD-10-CM: I70.1  ICD-9-CM: 440.1  8/25/2015 - Present    Overview Signed 8/25/2015  2:16 PM by Cayden Mack MD     S/P stenting R and L             Intolerance of drug ICD-10-CM: Z78.9  ICD-9-CM: 995.27  8/25/2015 - Present    Overview Addendum 8/25/2015  3:14 PM by Cayden Mack MD     Beta-blockers in the past, on metoprolol XL at time of admission             Chest pain ICD-10-CM: R07.9  ICD-9-CM: 786.50  8/25/2015 - Present        Unstable angina ([facility] ICD-10-CM: I20.0  ICD-9-CM: 411.1  8/25/2015 - Present        Hypertension ICD-9-CM: 402.11  Unknown - Present        Dyslipidemia ICD-10-CM: E78.5  ICD-9-CM: 272.4  Unknown - Present        Coronary artery disease ICD-10-CM: I25.10  ICD-9-CM: 414.01  Unknown - Present        Cardiomyopathy ICD-10-CM: I42.9  ICD-9-CM: 425.4  Unknown - Present    Overview Signed 4/29/2015 7:51 PM by Dinah Dietrich MD     EF 30-35% (ECHO 6/14)             Atrial fibrillation ICD-10-CM: I48.91  ICD-9-CM: 427.31  Unknown - Present    Overview Signed 4/29/2015  7:51 PM by Dinah Dietrich MD     CHADS score 3  (+CHF, +HTN, +AGE, -DM, -CVA)             Sick sinus syndrome ICD-10-CM: I49.5  ICD-9-CM: 427.81  Unknown - Present    Overview Signed 8/25/2015  2:15 PM by Jenna Ahn MD     Developed post op CABG                  Hospital Course:   Major issues addressed during hospitalization outlined  below. Luis Gant is a 80y.o. year old male who presents with increasing fatigue for at least last 1.5 weeks. Patient presented to ED for fatigue and dehydration about 1.5 weeks ago and had elevated TSH was seen by pcp since then whom did not change or add any meds. For last several days he has noticed cough , b/l eye exudate, increased fatigue. Family is at bedside during interview and provides some history along with the patient. He denies feeling feverish or having chills. He has chronic intermittent Nausea, no vomiting, hemetemesis, hemoptysis or melena. No dysuria. cxray and clinical exam with possible R sided infiltrate. + fever and increased wbc on labs. + dry cough no exudate    Patient had a long course 2/2 slowly improving pna. Initially he underwent CTA chest and pvl that was negative for DVT and PE. Pulm was consulted and patient was continue on levaquin and aztreonam for several days before being changed to levaquin alone and continuing on this until dc and script provided for additional treatment on dc. Patient did have episodes of intermittent cp with exertion and has a cardiac hx however he follows with a cardiologist, angina was not unstable and he had a cath about 7 months prior so no intervention done or consult was placed. Nitro prn was given and script provided on dc with recs to see pcp and cards in 1 week after going home.   He was walked and did well without need for 02 on day of dc.       Robert Rowland MD  January 16, 2017        Total time spent 35 minutes

## 2017-01-16 NOTE — DISCHARGE INSTRUCTIONS
Angina: Care Instructions  Your Care Instructions    You have a problem called angina. Angina happens when there is not enough blood flow to your heart muscle. Angina is a sign of coronary artery disease (CAD). CAD occurs when blood vessels that supply the heart become narrowed. Having CAD increases your risk of a heart attack. Chest pain or pressure is the most common symptom of angina. But some people have other symptoms, like:  · Pain, pressure, or a strange feeling in the back, neck, jaw, or upper belly, or in one or both shoulders or arms. · Shortness of breath. · Nausea or vomiting. · Lightheadedness or sudden weakness. · Fast or irregular heartbeat. Women are somewhat more likely than men to have angina symptoms like shortness of breath, nausea, and back or jaw pain. Angina can be dangerous. That's why it is important to pay attention to your symptoms. Know what is typical for you, learn how to control your symptoms, and understand when you need to get treatment. A change in your usual pattern of symptoms is an emergency. It may mean that you are having a heart attack. The doctor has checked you carefully, but problems can develop later. If you notice any problems or new symptoms, get medical treatment right away. Follow-up care is a key part of your treatment and safety. Be sure to make and go to all appointments, and call your doctor if you are having problems. It's also a good idea to know your test results and keep a list of the medicines you take. How can you care for yourself at home? Medicines  · If your doctor has given you nitroglycerin for angina symptoms, keep it with you at all times. If you have symptoms, sit down and rest, and take the first dose of nitroglycerin as directed. If your symptoms get worse or are not getting better within 5 minutes, call 911 right away. Stay on the phone. The emergency  will give you further instructions.   · If your doctor advises it, take 1 low-dose aspirin a day to prevent heart attack. · Be safe with medicines. Take your medicines exactly as prescribed. Call your doctor if you think you are having a problem with your medicine. You will get more details on the specific medicines your doctor prescribes. Lifestyle changes  · Do not smoke. If you need help quitting, talk to your doctor about stop-smoking programs and medicines. These can increase your chances of quitting for good. · Eat a heart-healthy diet that is low in saturated fat and salt, and is high in fiber. Talk to your doctor or a dietitian about healthy eating. · Stay at a healthy weight. Or lose weight if you need to. Activity  · Talk to your doctor about a level of activity that is safe for you. · If an activity causes angina symptoms, stop and rest.  When should you call for help? Call 911 anytime you think you may need emergency care. For example, call if:  · You passed out (lost consciousness). · You have symptoms of a heart attack. These may include:  ¨ Chest pain or pressure, or a strange feeling in the chest.  ¨ Sweating. ¨ Shortness of breath. ¨ Nausea or vomiting. ¨ Pain, pressure, or a strange feeling in the back, neck, jaw, or upper belly or in one or both shoulders or arms. ¨ Lightheadedness or sudden weakness. ¨ A fast or irregular heartbeat. After you call 911, the  may tell you to chew 1 adult-strength or 2 to 4 low-dose aspirin. Wait for an ambulance. Do not try to drive yourself. · You have angina symptoms that do not go away with rest or are not getting better within 5 minutes after you take a dose of nitroglycerin. Call your doctor now or seek immediate medical care if:  · You are having angina symptoms more often than usual, or they are different or worse than usual.  · You feel dizzy or lightheaded, or you feel like you may faint. Watch closely for changes in your health, and be sure to contact your doctor if you have any problems.   Where can you learn more? Go to http://mitra-shaw.info/. Enter H129 in the search box to learn more about \"Angina: Care Instructions. \"  Current as of: January 27, 2016  Content Version: 11.1  © 5185-8915 [employer]. Care instructions adapted under license by [employer] (which disclaims liability or warranty for this information). If you have questions about a medical condition or this instruction, always ask your healthcare professional. [address] any warranty or liability for your use of this information. Atrial Fibrillation: Care Instructions  Your Care Instructions    Atrial fibrillation is an irregular and often fast heartbeat. Treating this condition is important for several reasons. It can cause blood clots, which can travel from your heart to your brain and cause a stroke. If you have a fast heartbeat, you may feel lightheaded, dizzy, and weak. An irregular heartbeat can also increase your risk for heart failure. Atrial fibrillation is often the result of another heart condition, such as high blood pressure or coronary artery disease. Making changes to improve your heart condition will help you stay healthy and active. Follow-up care is a key part of your treatment and safety. Be sure to make and go to all appointments, and call your doctor if you are having problems. It's also a good idea to know your test results and keep a list of the medicines you take. How can you care for yourself at home? Medicines  · Take your medicines exactly as prescribed. Call your doctor if you think you are having a problem with your medicine. You will get more details on the specific medicines your doctor prescribes. · If your doctor has given you a blood thinner to prevent a stroke, be sure you get instructions about how to take your medicine safely. Blood thinners can cause serious bleeding problems.   · Do not take any vitamins, over-the-counter drugs, or herbal products without talking to your doctor first.  Lifestyle changes  · Do not smoke. Smoking can increase your chance of a stroke and heart attack. If you need help quitting, talk to your doctor about stop-smoking programs and medicines. These can increase your chances of quitting for good. · Eat a heart-healthy diet. · Stay at a healthy weight. Lose weight if you need to. · Limit alcohol to 2 drinks a day for men and 1 drink a day for women. Too much alcohol can cause health problems. · Avoid colds and flu. Get a pneumococcal vaccine shot. If you have had one before, ask your doctor whether you need another dose. Get a flu shot every year. If you must be around people with colds or flu, wash your hands often. Activity  · If your doctor recommends it, get more exercise. Walking is a good choice. Bit by bit, increase the amount you walk every day. Try for at least 30 minutes on most days of the week. You also may want to swim, bike, or do other activities. Your doctor may suggest that you join a cardiac rehabilitation program so that you can have help increasing your physical activity safely. · Start light exercise if your doctor says it is okay. Even a small amount will help you get stronger, have more energy, and manage stress. Walking is an easy way to get exercise. Start out by walking a little more than you did in the hospital. Gradually increase the amount you walk. · When you exercise, watch for signs that your heart is working too hard. You are pushing too hard if you cannot talk while you are exercising. If you become short of breath or dizzy or have chest pain, sit down and rest immediately. · Check your pulse regularly. Place two fingers on the artery at the palm side of your wrist, in line with your thumb. If your heartbeat seems uneven or fast, talk to your doctor. When should you call for help? Call 911 anytime you think you may need emergency care.  For example, call if:  · You have symptoms of a heart attack. These may include:  ¨ Chest pain or pressure, or a strange feeling in the chest.  ¨ Sweating. ¨ Shortness of breath. ¨ Nausea or vomiting. ¨ Pain, pressure, or a strange feeling in the back, neck, jaw, or upper belly or in one or both shoulders or arms. ¨ Lightheadedness or sudden weakness. ¨ A fast or irregular heartbeat. After you call 911, the  may tell you to chew 1 adult-strength or 2 to 4 low-dose aspirin. Wait for an ambulance. Do not try to drive yourself. · You have symptoms of a stroke. These may include:  ¨ Sudden numbness, tingling, weakness, or loss of movement in your face, arm, or leg, especially on only one side of your body. ¨ Sudden vision changes. ¨ Sudden trouble speaking. ¨ Sudden confusion or trouble understanding simple statements. ¨ Sudden problems with walking or balance. ¨ A sudden, severe headache that is different from past headaches. · You passed out (lost consciousness). Call your doctor now or seek immediate medical care if:  · You have new or increased shortness of breath. · You feel dizzy or lightheaded, or you feel like you may faint. · Your heart rate becomes irregular. · You can feel your heart flutter in your chest or skip heartbeats. Tell your doctor if these symptoms are new or worse. Watch closely for changes in your health, and be sure to contact your doctor if you have any problems. Where can you learn more? Go to http://mitra-shaw.info/. Enter U020 in the search box to learn more about \"Atrial Fibrillation: Care Instructions. \"  Current as of: January 27, 2016  Content Version: 11.1  © 7136-2113 [employer]. Care instructions adapted under license by [employer] (which disclaims liability or warranty for this information).  If you have questions about a medical condition or this instruction, always ask your healthcare professional. Jose F Rehman any warranty or liability for your use of this information. Patient armband removed and shredded. DISCHARGE SUMMARY from Nurse    The following personal items are in your possession at time of discharge:    Dental Appliances: None  Visual Aid: With patient, Glasses, At bedside     Home Medications: None  Jewelry: Watch, Ring, Bracelet (yellow ring black watch white and yellow bracelets )  Clothing: Bathrobe, Pajamas, Slippers, Undergarments, Socks  Other Valuables: Cell Phone  Personal Items Sent to Safe: none          PATIENT INSTRUCTIONS:    After general anesthesia or intravenous sedation, for 24 hours or while taking prescription Narcotics:  · Limit your activities  · Do not drive and operate hazardous machinery  · Do not make important personal or business decisions  · Do  not drink alcoholic beverages  · If you have not urinated within 8 hours after discharge, please contact your surgeon on call. Report the following to your surgeon:  · Excessive pain, swelling, redness or odor of or around the surgical area  · Temperature over 100.5  · Nausea and vomiting lasting longer than 4 hours or if unable to take medications  · Any signs of decreased circulation or nerve impairment to extremity: change in color, persistent  numbness, tingling, coldness or increase pain  · Any questions        What to do at Home:  Recommended activity: Activity as tolerated. If you experience any of the following symptoms shortness of breath, difficulty breathing, weight gain of more than 5 pounds in one week, chest pain, nausea, vomiting, diarrhea, temperature greater than 100.5 or bleeding please follow up with your primary care physician or call 911. *  Please give a list of your current medications to your Primary Care Provider. *  Please update this list whenever your medications are discontinued, doses are      changed, or new medications (including over-the-counter products) are added.     *  Please carry medication information at all times in case of emergency situations. These are general instructions for a healthy lifestyle:    No smoking/ No tobacco products/ Avoid exposure to second hand smoke    Surgeon General's Warning:  Quitting smoking now greatly reduces serious risk to your health. Obesity, smoking, and sedentary lifestyle greatly increases your risk for illness    A healthy diet, regular physical exercise & weight monitoring are important for maintaining a healthy lifestyle    You may be retaining fluid if you have a history of heart failure or if you experience any of the following symptoms:  Weight gain of 3 pounds or more overnight or 5 pounds in a week, increased swelling in our hands or feet or shortness of breath while lying flat in bed. Please call your doctor as soon as you notice any of these symptoms; do not wait until your next office visit. Recognize signs and symptoms of STROKE:    F-face looks uneven    A-arms unable to move or move unevenly    S-speech slurred or non-existent    T-time-call 911 as soon as signs and symptoms begin-DO NOT go       Back to bed or wait to see if you get better-TIME IS BRAIN. Warning Signs of HEART ATTACK     Call 911 if you have these symptoms:   Chest discomfort. Most heart attacks involve discomfort in the center of the chest that lasts more than a few minutes, or that goes away and comes back. It can feel like uncomfortable pressure, squeezing, fullness, or pain.  Discomfort in other areas of the upper body. Symptoms can include pain or discomfort in one or both arms, the back, neck, jaw, or stomach.  Shortness of breath with or without chest discomfort.  Other signs may include breaking out in a cold sweat, nausea, or lightheadedness. Don't wait more than five minutes to call 911 - MINUTES MATTER! Fast action can save your life. Calling 911 is almost always the fastest way to get lifesaving treatment.  Emergency Medical Services staff can begin treatment when they arrive -- up to an hour sooner than if someone gets to the hospital by car. The discharge information has been reviewed with the patient. The patient verbalized understanding. Discharge medications reviewed with the patient and appropriate educational materials and side effects teaching were provided.

## 2017-01-16 NOTE — PROGRESS NOTES
Nutrition follow up/  Plan of care      RECOMMENDATIONS:     1. Cardiac diet; 1500 ml   2. Monitor weight, labs and PO intake  3. RD to follow     GOALS:     1. Met/Ongoing: PO intake meets >75% of protein/calorie needs by 1/23  2. Met/Ongoing: Weight Maintenance/Gradual weight gain (1-2 lb by 1/23)       ASSESSMENT:     Weight status is classified as normal per BMI of 23.7. PO intake is adequate. Labs noted. Nutrition recommendations listed. RD to follow. Nutrition Risk:  [] High  [] Moderate [x]  Low    SUBJECTIVE/OBJECTIVE:     Patient admitted with pneumonia. Denies food allergies and problems with chewing/swallowing. Patient with a good appetite and eating all of meals. Noted plans for discharge this afternoon. Information Obtained from:    [x] Chart Review   [x] Patient   [] Family/Caregiver   [] Nurse/Physician   [] Interdisciplinary Meeting/Rounds    Diet:Cardiac diet; 1500 ml   Medications: [x] Reviewed    Allergies: [x] Reviewed   Encounter Diagnoses     ICD-10-CM ICD-9-CM   1. Community acquired pneumonia J18.9 486   2. SIRS (systemic inflammatory response syndrome) ([facility]) R65.10 995.90   3. Hyponatremia E87.1 276.1   4.  Acute conjunctivitis of both eyes, unspecified acute conjunctivitis type H10.33 372.00     Past Medical History   Diagnosis Date    Atrial fibrillation      CHADS score 3  (+CHF, +HTN, +AGE, -DM, -CVA)    CABG      2008   LIMA - LAD,   SVG - RCA    Cardiomyopathy      EF 30-35% (ECHO 6/14)    Coronary artery disease     Dyslipidemia     Hypertension     Hypothyroid     Pacemaker     Peripheral vascular disease     Renal artery stenosis      bilateral stents    Sick sinus syndrome       Labs:    Lab Results   Component Value Date/Time    Sodium 138 01/14/2017 04:30 AM    Potassium 4.4 01/14/2017 04:30 AM    Chloride 104 01/14/2017 04:30 AM    CO2 23 01/14/2017 04:30 AM    Anion gap 11 01/14/2017 04:30 AM    Glucose 81 01/14/2017 04:30 AM    BUN 40 01/14/2017 04:30 AM Creatinine 1.00 01/14/2017 04:30 AM    Calcium 8.4 01/14/2017 04:30 AM    Magnesium 1.9 12/26/2016 03:45 PM    Phosphorus 3.4 03/01/2011 03:35 AM    Albumin 3.2 01/06/2017 03:35 PM     Anthropometrics: BMI (calculated): 23.7  Last 3 Recorded Weights in this Encounter    01/10/17 0640 01/11/17 0632 01/13/17 0201   Weight: 69.4 kg (153 lb) 69.9 kg (154 lb) 72.6 kg (160 lb)      Ht Readings from Last 1 Encounters:   01/13/17 5' 9\" (1.753 m)     Patient Vitals for the past 100 hrs:   % Diet Eaten   01/16/17 1230 75 %   01/16/17 0939 100 %   01/15/17 1248 100 %   01/15/17 1003 100 %   01/14/17 1353 100 %   01/14/17 0922 100 %   01/13/17 1440 100 %   01/13/17 1039 100 %   01/13/17 0835 100 %   01/12/17 1850 100 %   01/12/17 1700 100 %   01/12/17 1300 100 %   01/12/17 1246 100 %     [] Weight Loss   [x] Weight Gain   [] Weight Stable    Nutrition Needs:   Calories: 1790 Kcal     Protein:   70-85 g      [x] No Cultural, Jewish or ethnic dietary need identified.     [] Cultural, Jewish and ethnic food preferences identified and addressed     Wt Status:  [x] Normal (18.6 - 24.9) [] Underweight (<18.5) [] Overweight (25 - 29.9) [] Mild Obesity (30 - 34.9)  [] Moderate Obesity (35 - 39.9) [] Morbid Obesity (40+)     Nutrition Problems Identified:   [] Suboptimal PO intake   [] Food Allergies  [] Difficulty chewing/swallowing/poor dentition  [] Constipation/Diarrhea   [] Nausea/Vomiting   [x] None  [] Other:     Plan:   [x] Therapeutic Diet  []  Obtained/adjusted food preferences/tolerances and/or snacks options   []  Supplements added   [] Occupational therapy following for feeding techniques  []  HS snack added   []  Modify diet texture   []  Modify diet for food allergies   []  Assist with menu selection   [x]  Monitor PO intake on meal rounds   [x]  Continue inpatient monitoring and intervention   []  Participated in discharge planning/Interdisciplinary rounds/Team meetings   []  Other:     Education Needs:   [] Not appropriate for teaching at this time due to:   [x] Identified and addressed    Nutrition Monitoring and Evaluation:  [x] Continue ongoing monitoring and intervention  [] Other    Rosendo Hays

## 2017-01-23 ENCOUNTER — HOME CARE VISIT (OUTPATIENT)
Dept: HOME HEALTH SERVICES | Facility: HOME HEALTH | Age: 82
End: 2017-01-23

## 2017-01-23 ENCOUNTER — HOME CARE VISIT (OUTPATIENT)
Dept: SCHEDULING | Facility: HOME HEALTH | Age: 82
End: 2017-01-23
Payer: MEDICARE

## 2017-01-23 VITALS
OXYGEN SATURATION: 93 % | SYSTOLIC BLOOD PRESSURE: 146 MMHG | HEART RATE: 71 BPM | BODY MASS INDEX: 21.33 KG/M2 | DIASTOLIC BLOOD PRESSURE: 64 MMHG | TEMPERATURE: 98.8 F | HEIGHT: 69 IN | WEIGHT: 144 LBS

## 2017-01-23 PROCEDURE — 400013 HH SOC

## 2017-01-23 PROCEDURE — 3331090002 HH PPS REVENUE DEBIT

## 2017-01-23 PROCEDURE — 3331090001 HH PPS REVENUE CREDIT

## 2017-01-23 PROCEDURE — G0151 HHCP-SERV OF PT,EA 15 MIN: HCPCS

## 2017-01-23 NOTE — ANCILLARY DISCHARGE INSTRUCTIONS
[facility]  Discharge Phone Call       After-Care Discharge Phone Call Questions: no answer    Were you able to get your prescriptions filled? Comment:      [] Yes  []No    Comment if answer is \"No\"   Are you taking your medication(s) as your doctor ordered? Do you understand the purpose of your medications? Comment:    [] Yes  []No    Comment if answer is \"No\"   Are you taking any other medications that are not on the list?  Comment:      [] Yes  []No    Comment if answer is \"Yes\"   Do you have any questions about your medications? Comment:    [] Yes  []No    Comment if answer is \"Yes\"   Did you make your follow-up appointments (if the hospital did not do this before  discharge)? Comment:    [] Yes  []No    Comment if answer is \"No\"   Is there any reason you might not be able to keep your follow-up appointments? Comment:     [] Yes  []No    Comment if answer is \"Yes\"   Do you have any questions about your care plan? Comment:    [] Yes  []No    Comment if answer is \"Yes\"   Do you have a good understanding of how you should manage your health? Comment:    [] Yes  []No    Comment if answer is \"Yes\"   Do you know which symptoms to watch for that would mean you would need to call your doctor right away? Comment:      [] Yes  []No    Comment if answer is \"No\"   Do you have any questions about the follow up process or any instructions that we have provided? Comment:    [] Yes  []No    Comment if answer is \"Yes\"   Did staff take your preferences into account?

## 2017-01-24 ENCOUNTER — HOME CARE VISIT (OUTPATIENT)
Dept: HOME HEALTH SERVICES | Facility: HOME HEALTH | Age: 82
End: 2017-01-24
Payer: MEDICARE

## 2017-01-24 PROCEDURE — 3331090002 HH PPS REVENUE DEBIT

## 2017-01-24 PROCEDURE — 3331090001 HH PPS REVENUE CREDIT

## 2017-01-25 ENCOUNTER — HOSPITAL ENCOUNTER (EMERGENCY)
Age: 82
Discharge: HOME | End: 2017-01-26
Attending: EMERGENCY MEDICINE | Admitting: HOSPITALIST
Payer: MEDICARE

## 2017-01-25 ENCOUNTER — APPOINTMENT (OUTPATIENT)
Dept: GENERAL RADIOLOGY | Age: 82
End: 2017-01-25
Attending: EMERGENCY MEDICINE
Payer: MEDICARE

## 2017-01-25 ENCOUNTER — HOME CARE VISIT (OUTPATIENT)
Dept: HOME HEALTH SERVICES | Facility: HOME HEALTH | Age: 82
End: 2017-01-25
Payer: MEDICARE

## 2017-01-25 DIAGNOSIS — I50.23 ACUTE ON CHRONIC SYSTOLIC CONGESTIVE HEART FAILURE (HCC): Primary | ICD-10-CM

## 2017-01-25 LAB
ALBUMIN SERPL BCP-MCNC: 3.1 G/DL (ref 3.4–5)
ALBUMIN/GLOB SERPL: 0.9 {RATIO} (ref 0.8–1.7)
ALP SERPL-CCNC: 111 U/L (ref 45–117)
ALT SERPL-CCNC: 33 U/L (ref 16–61)
ANION GAP BLD CALC-SCNC: 9 MMOL/L (ref 3–18)
APPEARANCE UR: CLEAR
APTT PPP: 32.7 SEC (ref 23–36.4)
AST SERPL W P-5'-P-CCNC: 35 U/L (ref 15–37)
BACTERIA URNS QL MICRO: NEGATIVE /HPF
BASOPHILS # BLD AUTO: 0 K/UL (ref 0–0.06)
BASOPHILS # BLD: 0 % (ref 0–2)
BILIRUB SERPL-MCNC: 0.4 MG/DL (ref 0.2–1)
BILIRUB UR QL: NEGATIVE
BNP SERPL-MCNC: 4303 PG/ML (ref 0–1800)
BUN SERPL-MCNC: 23 MG/DL (ref 7–18)
BUN/CREAT SERPL: 19 (ref 12–20)
CALCIUM SERPL-MCNC: 8.6 MG/DL (ref 8.5–10.1)
CHLORIDE SERPL-SCNC: 101 MMOL/L (ref 100–108)
CK MB CFR SERPL CALC: 4.9 % (ref 0–4)
CK MB SERPL-MCNC: 3.5 NG/ML (ref 0.5–3.6)
CK SERPL-CCNC: 72 U/L (ref 39–308)
CO2 SERPL-SCNC: 28 MMOL/L (ref 21–32)
COLOR UR: YELLOW
CREAT SERPL-MCNC: 1.18 MG/DL (ref 0.6–1.3)
DIFFERENTIAL METHOD BLD: ABNORMAL
EOSINOPHIL # BLD: 0.1 K/UL (ref 0–0.4)
EOSINOPHIL NFR BLD: 1 % (ref 0–5)
EPITH CASTS URNS QL MICRO: NORMAL /LPF (ref 0–5)
ERYTHROCYTE [DISTWIDTH] IN BLOOD BY AUTOMATED COUNT: 14.2 % (ref 11.6–14.5)
GLOBULIN SER CALC-MCNC: 3.6 G/DL (ref 2–4)
GLUCOSE SERPL-MCNC: 92 MG/DL (ref 74–99)
GLUCOSE UR STRIP.AUTO-MCNC: NEGATIVE MG/DL
HCT VFR BLD AUTO: 39.7 % (ref 36–48)
HGB BLD-MCNC: 12.9 G/DL (ref 13–16)
HGB UR QL STRIP: NEGATIVE
INR PPP: 1.4 (ref 0.8–1.2)
KETONES UR QL STRIP.AUTO: NEGATIVE MG/DL
LEUKOCYTE ESTERASE UR QL STRIP.AUTO: NEGATIVE
LYMPHOCYTES # BLD AUTO: 13 % (ref 21–52)
LYMPHOCYTES # BLD: 0.9 K/UL (ref 0.9–3.6)
MCH RBC QN AUTO: 29.8 PG (ref 24–34)
MCHC RBC AUTO-ENTMCNC: 32.5 G/DL (ref 31–37)
MCV RBC AUTO: 91.7 FL (ref 74–97)
MONOCYTES # BLD: 0.5 K/UL (ref 0.05–1.2)
MONOCYTES NFR BLD AUTO: 7 % (ref 3–10)
NEUTS SEG # BLD: 5.5 K/UL (ref 1.8–8)
NEUTS SEG NFR BLD AUTO: 79 % (ref 40–73)
NITRITE UR QL STRIP.AUTO: NEGATIVE
PH UR STRIP: 7 [PH] (ref 5–8)
PLATELET # BLD AUTO: 285 K/UL (ref 135–420)
PMV BLD AUTO: 9.1 FL (ref 9.2–11.8)
POTASSIUM SERPL-SCNC: 3.9 MMOL/L (ref 3.5–5.5)
PROT SERPL-MCNC: 6.7 G/DL (ref 6.4–8.2)
PROT UR STRIP-MCNC: ABNORMAL MG/DL
PROTHROMBIN TIME: 16.8 SEC (ref 11.5–15.2)
RBC # BLD AUTO: 4.33 M/UL (ref 4.7–5.5)
RBC #/AREA URNS HPF: 0 /HPF (ref 0–5)
SODIUM SERPL-SCNC: 138 MMOL/L (ref 136–145)
SP GR UR REFRACTOMETRY: 1.01 (ref 1–1.03)
TROPONIN I SERPL-MCNC: 0.02 NG/ML (ref 0–0.04)
TSH SERPL DL<=0.05 MIU/L-ACNC: 4.55 UIU/ML (ref 0.36–3.74)
UROBILINOGEN UR QL STRIP.AUTO: 0.2 EU/DL (ref 0.2–1)
WBC # BLD AUTO: 7 K/UL (ref 4.6–13.2)
WBC URNS QL MICRO: NORMAL /HPF (ref 0–4)

## 2017-01-25 PROCEDURE — 83880 ASSAY OF NATRIURETIC PEPTIDE: CPT | Performed by: EMERGENCY MEDICINE

## 2017-01-25 PROCEDURE — 93005 ELECTROCARDIOGRAM TRACING: CPT

## 2017-01-25 PROCEDURE — 3331090002 HH PPS REVENUE DEBIT

## 2017-01-25 PROCEDURE — 85730 THROMBOPLASTIN TIME PARTIAL: CPT | Performed by: EMERGENCY MEDICINE

## 2017-01-25 PROCEDURE — 85610 PROTHROMBIN TIME: CPT | Performed by: EMERGENCY MEDICINE

## 2017-01-25 PROCEDURE — 85025 COMPLETE CBC W/AUTO DIFF WBC: CPT | Performed by: EMERGENCY MEDICINE

## 2017-01-25 PROCEDURE — 99285 EMERGENCY DEPT VISIT HI MDM: CPT

## 2017-01-25 PROCEDURE — 74011250636 HC RX REV CODE- 250/636: Performed by: HOSPITALIST

## 2017-01-25 PROCEDURE — 3331090001 HH PPS REVENUE CREDIT

## 2017-01-25 PROCEDURE — 81001 URINALYSIS AUTO W/SCOPE: CPT | Performed by: EMERGENCY MEDICINE

## 2017-01-25 PROCEDURE — 84443 ASSAY THYROID STIM HORMONE: CPT | Performed by: EMERGENCY MEDICINE

## 2017-01-25 PROCEDURE — 71020 XR CHEST PA LAT: CPT

## 2017-01-25 PROCEDURE — 80053 COMPREHEN METABOLIC PANEL: CPT | Performed by: EMERGENCY MEDICINE

## 2017-01-25 PROCEDURE — 96374 THER/PROPH/DIAG INJ IV PUSH: CPT

## 2017-01-25 PROCEDURE — 94762 N-INVAS EAR/PLS OXIMTRY CONT: CPT

## 2017-01-25 PROCEDURE — 82550 ASSAY OF CK (CPK): CPT | Performed by: EMERGENCY MEDICINE

## 2017-01-25 RX ORDER — ENALAPRIL MALEATE 10 MG/1
20 TABLET ORAL DAILY
Status: DISCONTINUED | OUTPATIENT
Start: 2017-01-26 | End: 2017-01-26 | Stop reason: HOSPADM

## 2017-01-25 RX ORDER — SODIUM CHLORIDE 0.9 % (FLUSH) 0.9 %
5-10 SYRINGE (ML) INJECTION AS NEEDED
Status: DISCONTINUED | OUTPATIENT
Start: 2017-01-25 | End: 2017-01-26 | Stop reason: HOSPADM

## 2017-01-25 RX ORDER — SODIUM CHLORIDE 0.9 % (FLUSH) 0.9 %
5-10 SYRINGE (ML) INJECTION EVERY 8 HOURS
Status: DISCONTINUED | OUTPATIENT
Start: 2017-01-25 | End: 2017-01-26 | Stop reason: HOSPADM

## 2017-01-25 RX ORDER — FUROSEMIDE 10 MG/ML
20 INJECTION INTRAMUSCULAR; INTRAVENOUS ONCE
Status: COMPLETED | OUTPATIENT
Start: 2017-01-25 | End: 2017-01-25

## 2017-01-25 RX ORDER — AMLODIPINE BESYLATE 5 MG/1
10 TABLET ORAL DAILY
Status: DISCONTINUED | OUTPATIENT
Start: 2017-01-26 | End: 2017-01-26 | Stop reason: HOSPADM

## 2017-01-25 RX ORDER — NITROGLYCERIN 0.4 MG/1
0.3 TABLET SUBLINGUAL
Status: DISCONTINUED | OUTPATIENT
Start: 2017-01-25 | End: 2017-01-26 | Stop reason: HOSPADM

## 2017-01-25 RX ORDER — FUROSEMIDE 10 MG/ML
40 INJECTION INTRAMUSCULAR; INTRAVENOUS DAILY
Status: DISCONTINUED | OUTPATIENT
Start: 2017-01-26 | End: 2017-01-26 | Stop reason: HOSPADM

## 2017-01-25 RX ORDER — PANTOPRAZOLE SODIUM 20 MG/1
20 TABLET, DELAYED RELEASE ORAL
Status: DISCONTINUED | OUTPATIENT
Start: 2017-01-26 | End: 2017-01-26 | Stop reason: HOSPADM

## 2017-01-25 RX ADMIN — FUROSEMIDE 20 MG: 10 INJECTION, SOLUTION INTRAMUSCULAR; INTRAVENOUS at 21:24

## 2017-01-25 NOTE — ED TRIAGE NOTES
Cough for 2-3 days  Due for cardiac work up tomorrow. MD sent patient over here to be evaluated for cough.

## 2017-01-25 NOTE — ED NOTES
Assumed care of pt, pt states he is here due to chest tightness that started today, was told to come to the ER by PCP to be evaluated. Pt has left arm swelling and bilateral lower extremity swelling. Pt has also complained of increased SOB. Pt is able to talk in full sentences.  Pt given urinal to collect urine specimen

## 2017-01-25 NOTE — ED PROVIDER NOTES
HPI Comments: 3:10 PM Santino Bianchi is a 80 y.o. male with a hx of HTN, CAD, dyslipidemia, CABG, cardiomyopathy, A-fib, a pacemaker, and renal artery stenosis who presents to the ED c/o chest tightness that started at 1100 this morning. He states that he woke up this morning feeling well, but at 1100 he had a sudden onset of generalized weakness, chest tightness, and shortness of breath. He reports that he called his Cardiologist's office and they recommended he come to the ED. He does have an appointment with Dr. Hannon, Cardiology, because he has been retaining fluid. He is not on home O2. The pt denies fever, cough, HA, N/V/D, diaphoresis, and any further complaints. The history is provided by the patient. Past Medical History:   Diagnosis Date    Atrial fibrillation      CHADS score 3  (+CHF, +HTN, +AGE, -DM, -CVA)    CABG      2008   LIMA - LAD,   SVG - RCA    Cardiomyopathy      EF 30-35% (ECHO 6/14)    Coronary artery disease     Dyslipidemia     Hypertension     Hypothyroid     Pacemaker     Peripheral vascular disease     Renal artery stenosis      bilateral stents    Sick sinus syndrome        Past Surgical History:   Procedure Laterality Date    Hx coronary artery bypass graft       2008   LIMA - LAD,   SVG - RCA         History reviewed. No pertinent family history. Social History     Social History    Marital status:      Spouse name: N/A    Number of children: N/A    Years of education: N/A     Occupational History    Not on file. Social History Main Topics    Smoking status: Former Smoker    Smokeless tobacco: Not on file    Alcohol use No    Drug use: No    Sexual activity: Not on file     Other Topics Concern    Not on file     Social History Narrative         ALLERGIES: Beta-blockers (beta-adrenergic blocking agts);  Penicillins; and Statins-hmg-coa reductase inhibitors    Review of Systems   Constitutional: Negative for appetite change, chills, diaphoresis, fatigue, fever and unexpected weight change. HENT: Negative for congestion, dental problem, ear discharge, ear pain, hearing loss, nosebleeds, rhinorrhea, sinus pressure, sore throat, tinnitus, trouble swallowing and voice change. Eyes: Negative for photophobia, pain, discharge, redness and visual disturbance. Respiratory: Positive for chest tightness and shortness of breath. Negative for cough, choking, wheezing and stridor. Cardiovascular: Negative for palpitations and leg swelling. Gastrointestinal: Negative for abdominal distention, abdominal pain, anal bleeding, blood in stool, constipation, diarrhea, nausea and vomiting. Genitourinary: Negative for decreased urine volume, difficulty urinating, discharge, dysuria, flank pain, frequency, genital sores, hematuria, penile pain, penile swelling, scrotal swelling, testicular pain and urgency. Musculoskeletal: Negative for neck pain and neck stiffness. Neurological: Positive for weakness. Negative for tremors, seizures, syncope, speech difficulty, light-headedness and headaches. Hematological: Negative for adenopathy. Does not bruise/bleed easily. Psychiatric/Behavioral: Negative for agitation, behavioral problems, confusion, hallucinations, self-injury, sleep disturbance and suicidal ideas. The patient is not nervous/anxious and is not hyperactive. Vitals:    01/25/17 1421   BP: 147/77   Pulse: 70   Resp: 16   Temp: 98 °F (36.7 °C)   SpO2: 99%   Weight: 63.5 kg (140 lb)   Height: 5' 9\" (1.753 m)            Physical Exam   Constitutional: He is oriented to person, place, and time. He appears well-developed and well-nourished. He appears distressed. 80year old  male in moderate distress. HENT:   Head: Normocephalic and atraumatic. Right Ear: External ear normal.   Left Ear: External ear normal.   Nose: Nose normal.   Mouth/Throat: Oropharynx is clear and moist. No oropharyngeal exudate.    Eyes: Conjunctivae and EOM are normal. Pupils are equal, round, and reactive to light. Right eye exhibits no discharge. Left eye exhibits no discharge. No scleral icterus. Neck: Normal range of motion. Neck supple. JVD present. No tracheal deviation present. No thyromegaly present. Cardiovascular: Normal rate, regular rhythm and intact distal pulses. Exam reveals no gallop and no friction rub. Murmur (Gr II/VI systolic murmur) heard. Pulmonary/Chest: No stridor. He is in respiratory distress (Mild respiratory distress). He has no wheezes. He has rhonchi (Right base). He has no rales. He exhibits no tenderness. Tachypnea   Abdominal: Soft. Bowel sounds are normal. He exhibits no distension and no mass. There is no tenderness. There is no rebound and no guarding. Musculoskeletal: Normal range of motion. He exhibits edema. He exhibits no tenderness. 1+ edema bilateral LE   Lymphadenopathy:     He has no cervical adenopathy. Neurological: He is alert and oriented to person, place, and time. He has normal reflexes. No cranial nerve deficit. He exhibits normal muscle tone. Coordination normal.   Skin: Skin is warm and dry. No rash noted. He is not diaphoretic. No erythema. No pallor. Psychiatric: He has a normal mood and affect. His behavior is normal. Judgment and thought content normal.   Nursing note and vitals reviewed. MDM  Number of Diagnoses or Management Options  Acute on chronic systolic congestive heart failure [facility]):   Diagnosis management comments: Differential includes: COPD, CHF, Pneumonia, Anemia, URI, metabolic derangement, ACS, Angina.             Amount and/or Complexity of Data Reviewed  Clinical lab tests: ordered and reviewed  Tests in the radiology section of CPT®: reviewed and ordered  Tests in the medicine section of CPT®: ordered and reviewed  Discussion of test results with the performing providers: yes  Obtain history from someone other than the patient: yes  Review and summarize past medical records: yes  Discuss the patient with other providers: yes  Independent visualization of images, tracings, or specimens: yes    Risk of Complications, Morbidity, and/or Mortality  Presenting problems: high  Diagnostic procedures: high  Management options: high    Patient Progress  Patient progress: stable    ED Course       Procedures    ED Encounter Orders:  Orders Placed This Encounter    XR CHEST PA LAT    CBC WITH AUTOMATED DIFF    METABOLIC PANEL, COMPREHENSIVE    CARDIAC PANEL,(CK, CKMB & TROPONIN)    PRO-BNP    URINALYSIS W/ RFLX MICROSCOPIC    TSH 3RD GENERATION    PTT    PROTHROMBIN TIME + INR    DIET CARDIAC Regular    PULSE OXIMETRY SPOT CHECK    CARDIAC MONITORING    EKG, 12 LEAD, INITIAL    SALINE LOCK IV ONE TIME STAT    BLOOD PRESSURE MONITOR    sodium chloride (NS) flush 5-10 mL    sodium chloride (NS) flush 5-10 mL         Vitals:  Patient Vitals for the past 12 hrs:   Temp Pulse Resp BP SpO2   01/25/17 1421 98 °F (36.7 °C) 70 16 147/77 99 %     Pulse ox reviewed and WNL    Medications ordered:   Medications   sodium chloride (NS) flush 5-10 mL (not administered)   sodium chloride (NS) flush 5-10 mL (not administered)         Lab findings:  Recent Results (from the past 12 hour(s))   EKG, 12 LEAD, INITIAL    Collection Time: 01/25/17  2:14 PM   Result Value Ref Range    Ventricular Rate 70 BPM    Atrial Rate 72 BPM    QRS Duration 184 ms    Q-T Interval 486 ms    QTC Calculation (Bezet) 524 ms    Calculated R Axis -84 degrees    Calculated T Axis 85 degrees    Diagnosis       Ventricular-paced rhythm  Abnormal ECG  When compared with ECG of 16-JAN-2017 12:09,  No significant change was found     CBC WITH AUTOMATED DIFF    Collection Time: 01/25/17  3:05 PM   Result Value Ref Range    WBC 7.0 4.6 - 13.2 K/uL    RBC 4.33 (L) 4.70 - 5.50 M/uL    HGB 12.9 (L) 13.0 - 16.0 g/dL    HCT 39.7 36.0 - 48.0 %    MCV 91.7 74.0 - 97.0 FL    MCH 29.8 24.0 - 34.0 PG    MCHC 32.5 31.0 - 37.0 g/dL RDW 14.2 11.6 - 14.5 %    PLATELET 546 730 - 846 K/uL    MPV 9.1 (L) 9.2 - 11.8 FL    NEUTROPHILS 79 (H) 40 - 73 %    LYMPHOCYTES 13 (L) 21 - 52 %    MONOCYTES 7 3 - 10 %    EOSINOPHILS 1 0 - 5 %    BASOPHILS 0 0 - 2 %    ABS. NEUTROPHILS 5.5 1.8 - 8.0 K/UL    ABS. LYMPHOCYTES 0.9 0.9 - 3.6 K/UL    ABS. MONOCYTES 0.5 0.05 - 1.2 K/UL    ABS. EOSINOPHILS 0.1 0.0 - 0.4 K/UL    ABS. BASOPHILS 0.0 0.0 - 0.06 K/UL    DF AUTOMATED     METABOLIC PANEL, COMPREHENSIVE    Collection Time: 01/25/17  3:05 PM   Result Value Ref Range    Sodium 138 136 - 145 mmol/L    Potassium 3.9 3.5 - 5.5 mmol/L    Chloride 101 100 - 108 mmol/L    CO2 28 21 - 32 mmol/L    Anion gap 9 3.0 - 18 mmol/L    Glucose 92 74 - 99 mg/dL    BUN 23 (H) 7.0 - 18 MG/DL    Creatinine 1.18 0.6 - 1.3 MG/DL    BUN/Creatinine ratio 19 12 - 20      GFR est AA >60 >60 ml/min/1.73m2    GFR est non-AA 59 (L) >60 ml/min/1.73m2    Calcium 8.6 8.5 - 10.1 MG/DL    Bilirubin, total 0.4 0.2 - 1.0 MG/DL    ALT 33 16 - 61 U/L    AST 35 15 - 37 U/L    Alk.  phosphatase 111 45 - 117 U/L    Protein, total 6.7 6.4 - 8.2 g/dL    Albumin 3.1 (L) 3.4 - 5.0 g/dL    Globulin 3.6 2.0 - 4.0 g/dL    A-G Ratio 0.9 0.8 - 1.7     CARDIAC PANEL,(CK, CKMB & TROPONIN)    Collection Time: 01/25/17  3:05 PM   Result Value Ref Range    CK 72 39 - 308 U/L    CK - MB 3.5 0.5 - 3.6 ng/ml    CK-MB Index 4.9 (H) 0.0 - 4.0 %    Troponin-I, Qt. 0.02 0.0 - 0.045 NG/ML   PRO-BNP    Collection Time: 01/25/17  3:05 PM   Result Value Ref Range    NT pro-BNP 4303 (H) 0 - 1800 PG/ML   TSH 3RD GENERATION    Collection Time: 01/25/17  3:05 PM   Result Value Ref Range    TSH 4.55 (H) 0.36 - 3.74 uIU/mL   PTT    Collection Time: 01/25/17  3:05 PM   Result Value Ref Range    aPTT 32.7 23.0 - 36.4 SEC   PROTHROMBIN TIME + INR    Collection Time: 01/25/17  3:05 PM   Result Value Ref Range    Prothrombin time 16.8 (H) 11.5 - 15.2 sec    INR 1.4 (H) 0.8 - 1.2     URINALYSIS W/ RFLX MICROSCOPIC    Collection Time: 01/25/17  5:00 PM   Result Value Ref Range    Color YELLOW      Appearance CLEAR      Specific gravity 1.015 1.005 - 1.030      pH (UA) 7.0 5.0 - 8.0      Protein TRACE (A) NEG mg/dL    Glucose NEGATIVE  NEG mg/dL    Ketone NEGATIVE  NEG mg/dL    Bilirubin NEGATIVE  NEG      Blood NEGATIVE  NEG      Urobilinogen 0.2 0.2 - 1.0 EU/dL    Nitrites NEGATIVE  NEG      Leukocyte Esterase NEGATIVE  NEG     URINE MICROSCOPIC ONLY    Collection Time: 01/25/17  5:00 PM   Result Value Ref Range    WBC 0 to 1 0 - 4 /hpf    RBC 0 0 - 5 /hpf    Epithelial cells FEW 0 - 5 /lpf    Bacteria NEGATIVE  NEG /hpf       EKG interpretation by ED Physician:  ED EKG interpretation:  Rhythm: Paced rhythm. Rate (approx.): 70BPM; Axis: normal; ; QRS interval: normal ; ST/T wave: no acute ST/T wave changes; in all Leads: ; Other findings: normal. This EKG was interpreted by Lorna Sage DO,ED Provider. X-Ray, CT or other radiology findings or impressions:  XR CHEST PA LAT   Final Result   Impression:        1. Right upper lobe infiltrate improved but not resolved. 2. Small right pleural effusion remains. 3. Cardiomegaly without CHF. Per Dr. Jayson Cabrera       Progress notes, Consult notes or additional Procedure notes:   Consult:  Discussed care with Dr. Karina Saenz, Specialty: Hospitalist  Standard discussion; including history of patients chief complaint, available diagnostic results, and treatment course. He agrees on admission  4:26 PM, 1/25/2017   Consult:  Discussed care with Cardiology PA [address]  Standard discussion; including history of patients chief complaint, available diagnostic results, and treatment course. She agrees on consult.  5:08 PM, 1/25/2017       Reevaluation of patient:   4:26 PM I have reassessed the patient and discussed results and diagnosis. Pt will be admitted. Patient understands and verbalizes agreement with plan. Disposition:  Diagnosis:   1.  Acute on chronic systolic congestive heart failure ([facility] [address] Disposition: Admit       Patient's Medications   Start Taking    No medications on file   Continue Taking    AMLODIPINE (NORVASC) 10 MG TABLET    Take 1 Tab by mouth daily. APIXABAN (ELIQUIS) 5 MG TABLET    Take 5 mg by mouth two (2) times a day. BENZONATATE (TESSALON) 100 MG CAPSULE    Take 100 mg by mouth three (3) times daily as needed for Cough. 1-2 tablets every 8 hours as needed for cough    DICYCLOMINE (BENTYL) 10 MG CAPSULE    Take 10 mg by mouth three (3) times daily as needed for Nausea (max 3x/day). ENALAPRIL (VASOTEC) 20 MG TABLET    Take 20 mg by mouth daily. FUROSEMIDE (LASIX) 40 MG TABLET    Take 40 mg by mouth daily. LEVOFLOXACIN (LEVAQUIN) 750 MG TABLET    Take 1 Tab by mouth every fourty-eight (48) hours. METOPROLOL TARTRATE (LOPRESSOR) 100 MG IR TABLET    Take 100 mg by mouth daily. NITROGLYCERIN (NITROSTAT) 0.3 MG SL TABLET    1 Tab by SubLINGual route every five (5) minutes as needed for Chest Pain (up to 3 total 1 every 5 min). OMEPRAZOLE (PRILOSEC OTC) 20 MG TABLET    Take 40 mg by mouth two (2) times a day. Indications: HEARTBURN   These Medications have changed    No medications on file   Stop Taking    No medications on file         SCRIBE ATTESTATION STATEMENT  Documented by: Christy Zarate for, and in the presence of, Jocelyne Salgado DO 3:24 PM    Signed by: Tima Norman, 01/25/17 3:24 PM      PROVIDER ATTESTATION STATEMENT  I personally performed the services described in the documentation, reviewed the documentation, as recorded by the scribe in my presence, and it accurately and completely records my words and actions.   Jocelyne Salgado DO

## 2017-01-26 VITALS
TEMPERATURE: 98 F | HEART RATE: 70 BPM | WEIGHT: 140 LBS | BODY MASS INDEX: 20.73 KG/M2 | OXYGEN SATURATION: 72 % | DIASTOLIC BLOOD PRESSURE: 60 MMHG | SYSTOLIC BLOOD PRESSURE: 133 MMHG | RESPIRATION RATE: 26 BRPM | HEIGHT: 69 IN

## 2017-01-26 LAB
ATRIAL RATE: 72 BPM
CALCULATED R AXIS, ECG10: -84 DEGREES
CALCULATED T AXIS, ECG11: 85 DEGREES
DIAGNOSIS, 93000: NORMAL
Q-T INTERVAL, ECG07: 486 MS
QRS DURATION, ECG06: 184 MS
QTC CALCULATION (BEZET), ECG08: 524 MS
VENTRICULAR RATE, ECG03: 70 BPM

## 2017-01-26 PROCEDURE — 74011250637 HC RX REV CODE- 250/637: Performed by: HOSPITALIST

## 2017-01-26 PROCEDURE — 3331090002 HH PPS REVENUE DEBIT

## 2017-01-26 PROCEDURE — 3331090001 HH PPS REVENUE CREDIT

## 2017-01-26 PROCEDURE — 74011250636 HC RX REV CODE- 250/636: Performed by: HOSPITALIST

## 2017-01-26 PROCEDURE — 93306 TTE W/DOPPLER COMPLETE: CPT

## 2017-01-26 RX ADMIN — ENALAPRIL MALEATE 20 MG: 10 TABLET ORAL at 11:03

## 2017-01-26 RX ADMIN — AMLODIPINE BESYLATE 10 MG: 5 TABLET ORAL at 11:01

## 2017-01-26 RX ADMIN — FUROSEMIDE 40 MG: 10 INJECTION, SOLUTION INTRAMUSCULAR; INTRAVENOUS at 11:05

## 2017-01-26 RX ADMIN — PANTOPRAZOLE SODIUM 20 MG: 20 TABLET, DELAYED RELEASE ORAL at 11:01

## 2017-01-26 RX ADMIN — APIXABAN 5 MG: 5 TABLET, FILM COATED ORAL at 11:04

## 2017-01-26 NOTE — ED NOTES
Purposeful rounding completed:    Side rails up x 1:  YES  Bed in low position and wheels locked: YES  Call bell within reach: YES  Comfort addressed: YES    Toileting needs addressed: YES  Plan of care reviewed/updated with patient and or family members: YES  IV site assessed: YES  Pain assessed and addressed: YES, 0    PAtient transferred ton hospital bed at this time

## 2017-01-26 NOTE — DISCHARGE INSTRUCTIONS

## 2017-01-26 NOTE — CONSULTS
[facility] - Consult Note    Date of  Admission: 1/25/2017  2:09 PM   Primary Care Physician:  Marni Beard MD  Patient seen and examined independently. At this point ,it appears patient could be discharged. Would continue po lasix as outpatient. Agree with assessment and plan as noted below. Marilyn Reed MD   Assessment:     Patient Active Problem List   Diagnosis Code    Hypertension     Dyslipidemia E78.5    Coronary artery disease I25.10    Cardiomyopathy I42.9    Atrial fibrillation I48.91    Sick sinus syndrome I49.5    S/P CABG x 2 Z95.1    Atherosclerotic NAHED (renal artery stenosis), bilateral (HCC) I70.1    Intolerance of drug Z78.9    Chest pain R07.9    Unstable angina ([facility]) I20.0    CHF (congestive heart failure) ([facility]) I50.9    Sepsis ([facility] [address] A41.9    Community acquired bacterial pneumonia J15.9    CAP (community acquired pneumonia) J18.9    Acute on chronic systolic CHF (congestive heart failure) ([facility]) I50.23         - Respiratory distress on admission, concerns for volume overload  - Recent hospital discharge for community acquired PNA  - Chronic afib, on Eliquis  - Hypertension  - CAD s/p CABG in 2008-- LIMA-LAD, SVG-RCA  - Pacemaker implantation 2008  - Cardiac cath 09/2015 with patent grafts, severe native disease. Nuclear stress test no ischemia. Plan:     - Does not appear significantly volume overloaded on exam. No pulmonary edema on CXR. Some peripheral edema but suspect that it is related to previous hospital admission. From cardiac standpoint ok to discharge. Continue with PO Lasix at discharge. - Echocardiogram done today, full report to follow  - Continue with present cardiac Rx: Eliquis, Amlodipine, Enalapril     History of Present Illness: This is a 80 y.o. male admitted for Acute on chronic systolic CHF (congestive heart failure) ([facility] [address]     Patient complains of:  Peripheral edema    80year old with a history of CAD, hypertension, afib, hyperlipidemia, s/p PPM implantation presented to the ED with concerns of peripheral edema. He was hospitalized recently with PNA. He was on Lasix after his follow up with PCP. He called his PCP yesterday again and they instructed him to come to the ED. He denies any recent chest pain, worsening dyspnea on exertion, orthopnea or PND. Cardiac risk factors: dyslipidemia, male gender, hypertension      Review of Symptoms:  Except as stated above include:  Constitutional:  negative  Respiratory:  negative  Cardiovascular:  negative  Gastrointestinal: negative  Genitourinary:  negative  Musculoskeletal:  Negative  Neurological:  Negative  Dermatological:  Negative  Endocrinological: Negative  Psychological:  Negative    A comprehensive review of systems was negative except for that written in the HPI. Past Medical History:     Past Medical History   Diagnosis Date    Atrial fibrillation      CHADS score 3  (+CHF, +HTN, +AGE, -DM, -CVA)    CABG      2008   LIMA - LAD,   SVG - RCA    Cardiomyopathy      EF 30-35% (ECHO 6/14)    Coronary artery disease     Dyslipidemia     Hypertension     Hypothyroid     Pacemaker     Peripheral vascular disease     Renal artery stenosis      bilateral stents    Sick sinus syndrome          Social History:     Social History     Social History    Marital status:      Spouse name: N/A    Number of children: N/A    Years of education: N/A     Social History Main Topics    Smoking status: Former Smoker    Smokeless tobacco: None    Alcohol use No    Drug use: No    Sexual activity: Not Asked     Other Topics Concern    None     Social History Narrative        Family History:   History reviewed. No pertinent family history. Medications:      Allergies   Allergen Reactions    Beta-Blockers (Beta-Adrenergic Blocking Agts) Drowsiness    Penicillins Swelling    Statins-Hmg-Coa Reductase Inhibitors Drowsiness        Current Facility-Administered Medications   Medication Dose Route Frequency    sodium chloride (NS) flush 5-10 mL  5-10 mL IntraVENous Q8H    sodium chloride (NS) flush 5-10 mL  5-10 mL IntraVENous PRN    furosemide (LASIX) injection 40 mg  40 mg IntraVENous DAILY    amLODIPine (NORVASC) tablet 10 mg  10 mg Oral DAILY    apixaban (ELIQUIS) tablet 5 mg  5 mg Oral BID    enalapril (VASOTEC) tablet 20 mg  20 mg Oral DAILY    nitroglycerin (NITROSTAT) tablet 0.4 mg  0.4 mg SubLINGual Q5MIN PRN    pantoprazole (PROTONIX) tablet 20 mg  20 mg Oral ACB     Current Outpatient Prescriptions   Medication Sig    benzonatate (TESSALON) 100 mg capsule Take 100 mg by mouth three (3) times daily as needed for Cough. 1-2 tablets every 8 hours as needed for cough    furosemide (LASIX) 40 mg tablet Take 40 mg by mouth daily.  levoFLOXacin (LEVAQUIN) 750 mg tablet Take 1 Tab by mouth every fourty-eight (48) hours.  nitroglycerin (NITROSTAT) 0.3 mg SL tablet 1 Tab by SubLINGual route every five (5) minutes as needed for Chest Pain (up to 3 total 1 every 5 min).  apixaban (ELIQUIS) 5 mg tablet Take 5 mg by mouth two (2) times a day.  metoprolol tartrate (LOPRESSOR) 100 mg IR tablet Take 100 mg by mouth daily.  enalapril (VASOTEC) 20 mg tablet Take 20 mg by mouth daily.  amLODIPine (NORVASC) 10 mg tablet Take 1 Tab by mouth daily.  omeprazole (PRILOSEC OTC) 20 mg tablet Take 40 mg by mouth two (2) times a day. Indications: HEARTBURN    dicyclomine (BENTYL) 10 mg capsule Take 10 mg by mouth three (3) times daily as needed for Nausea (max 3x/day).          Physical Exam:     Visit Vitals    BP (!) 153/94 (BP Patient Position: At rest)    Pulse 70    Temp 98 °F (36.7 °C)    Resp 15    Ht 5' 9\" (1.753 m)    Wt 140 lb (63.5 kg)    SpO2 99%    BMI 20.67 kg/m2     BP Readings from Last 3 Encounters:   01/26/17 (!) 153/94   01/23/17 146/64   01/16/17 96/52     Pulse Readings from Last 3 Encounters:   01/26/17 70   01/23/17 71   01/16/17 71     Wt Readings from Last 3 Encounters:   01/25/17 140 lb (63.5 kg)   01/23/17 144 lb (65.3 kg)   01/16/17 141 lb 9.6 oz (64.2 kg)       General:  alert, cooperative, no distress  Neck:  nontender, no JVD  Lungs:  clear to auscultation bilaterally  Heart:  regular rate and rhythm, S1, S2 normal, no murmur, click, rub or gallop  Abdomen:  abdomen is soft without significant tenderness, masses, organomegaly or guarding  Extremities:  no edema of bilateral LE's, right upper extremity edema  Skin: Warm and dry. no hyperpigmentation, vitiligo, or suspicious lesions  Neuro: alert, oriented x3, affect appropriate  Psych: non focal     Data Review:     Recent Labs      01/25/17   1505   WBC  7.0   HGB  12.9*   HCT  39.7   PLT  285     Recent Labs      01/25/17   1505   NA  138   K  3.9   CL  101   CO2  28   GLU  92   BUN  23*   CREA  1.18   CA  8.6   ALB  3.1*   SGOT  35   ALT  33   INR  1.4*       Results for orders placed or performed during the hospital encounter of 01/25/17   EKG, 12 LEAD, INITIAL   Result Value Ref Range    Ventricular Rate 70 BPM    Atrial Rate 72 BPM    QRS Duration 184 ms    Q-T Interval 486 ms    QTC Calculation (Bezet) 524 ms    Calculated R Axis -84 degrees    Calculated T Axis 85 degrees    Diagnosis       Ventricular-paced rhythm  Abnormal ECG  When compared with ECG of 16-JAN-2017 12:09,  No significant change was found         All Cardiac Markers in the last 24 hours:    Lab Results   Component Value Date/Time    CPK 72 01/25/2017 03:05 PM    CKMB 3.5 01/25/2017 03:05 PM    CKND1 4.9 (H) 01/25/2017 03:05 PM    TROIQ 0.02 01/25/2017 03:05 PM       Last Lipid:    Lab Results   Component Value Date/Time    Cholesterol, total 149 09/09/2015 03:10 AM    HDL Cholesterol 41 09/09/2015 03:10 AM    LDL, calculated 90 09/09/2015 03:10 AM    Triglyceride 90 09/09/2015 03:10 AM    CHOL/HDL Ratio 3.6 09/09/2015 03:10 AM       Signed By: Khalif Sarabia.  Dawood Lyman     January 26, 2017

## 2017-01-26 NOTE — PROGRESS NOTES
Discussed with Cardiology. Patient has good Echocardiogram results and is appropriate for discharge. Reviewed with patient who is happy to go. He will discharge today. He reports that he does not need any prescriptions.   Will discuss with his daughter-in-law who works today in our ICU (with his permission.)

## 2017-01-26 NOTE — H&P
[address]    Name:  Christian Bazan  MR#:  [id number]  :  11/10/1930  Account #:  [de-identified]  Date of Adm:  2017      CHIEF COMPLAINT: Shortness of breath, chest tightness and bilateral  lower extremity swelling. HISTORY OF PRESENT ILLNESS: The patient is an 59-year-old male  who has a history of atrial fibrillation, CHF, hypertension,  hypothyroidism, coronary artery disease with CABG in . The  patient states that he was recently admitted and discharged  approximately 1 week ago for pneumonia. Upon discharge, he reports  having some swelling in his right upper extremity and some swelling in  his legs. He was seen by his primary physician 5 days ago and  followed up today. At the initial visit he was placed on Lasix. The  patient states he has been taking the Lasix for the past 4 days,  however, the swelling persists. He was called this morning to schedule  a cardiology appointment by the nurse in the office when she noticed  that he was having difficulty breathing on the phone. She then referred  him to his primary care physician who directed him to the ER. The  patient states that this morning he woke up feeling very well. He did a  few chores in the house, took a nap. After waking up from his nap in  the late morning, he noticed some weakness in his legs. He then  began to have some shortness of breath with exertion. After arriving to  the ED, the patient was noted to have bilateral pitting edema. He was  started on Lasix. The patient will be admitted for congestive heart  failure exacerbation.     Past Medical History   Diagnosis Date    Atrial fibrillation      CHADS score 3  (+CHF, +HTN, +AGE, -DM, -CVA)    CABG         LIMA - LAD,   SVG - RCA    Cardiomyopathy      EF 30-35% (ECHO )    Coronary artery disease     Dyslipidemia     Hypertension     Hypothyroid     Pacemaker     Peripheral vascular disease     Renal artery stenosis bilateral stents    Sick sinus syndrome        Past Surgical History   Procedure Laterality Date    Hx coronary artery bypass graft       2008   LIMA - LAD,   SVG - RCA       Social History     Social History    Marital status:      Spouse name: N/A    Number of children: N/A    Years of education: N/A     Occupational History    Not on file. Social History Main Topics    Smoking status: Former Smoker    Smokeless tobacco: Not on file    Alcohol use No    Drug use: No    Sexual activity: Not on file     Other Topics Concern    Not on file     Social History Narrative       History reviewed. No pertinent family history. Allergies   Allergen Reactions    Beta-Blockers (Beta-Adrenergic Blocking Agts) Drowsiness    Penicillins Swelling    Statins-Hmg-Coa Reductase Inhibitors Drowsiness       Review of Systems:  Positive in bold. Constitutional:  No fever or weight loss  HEENT:  No headache or visual changes  Cardiovascular:  No chest pain, no palpitations. +Chest tightness  Respiratory:  Non productive coughing, wheezing, + shortness of breath. GI:  No abdominal pain. No nausea or vomitting. No diarrhea  :  No hematuria or dysuria. No frequency, retention, urinary incontinence. Skin:  No rashes or moles  Neuro:  No seizures or syncope  Hematological:  No bruising or bleeding  Endocrine:  No diabetes. + thyroid disease    Physical Exam:      Visit Vitals    BP (!) 121/92    Pulse 70    Temp 98 °F (36.7 °C)    Resp 15    Ht 5' 9\" (1.753 m)    Wt 63.5 kg (140 lb)    SpO2 98%    BMI 20.67 kg/m2       Physical Exam:  Gen:  No distress, alert, awake and oriented x 4  HEENT:  Normal cephalic atraumatic, extra-occular movements are intact. Neck:  Supple, No JVD  Lungs:  Clear bilaterally, no wheeze, no rales, normal effort  Cardiovascular:  Regular Rate and Rhythm, normal S1 and S2, + audible murmur, systolic. Capillary refill: < 3 seconds.   Abdomen:  Soft, non tender, non distended, normal bowel sounds, no guarding. Extremities:  Well perfused, no cyanosis, left upper extremity with 2+ pitting, b/l lower extremity 2+ pitting around the ankles, 1+ along both shins. Neurological:  Awake and alert, CN's are intact, normal strength throughout extremities  Skin:  No rashes or moles. Turgor and color normal  Mental Status:  Normal thought process, does not appear anxious      Laboratory Studies: All lab results for the last 24 hours reviewed. Assessment/Plan     Active Problems:    Acute on chronic systolic CHF (congestive heart failure) ([facility] [address] (1/25/2017)        PLAN:    Respiratory: shortness of breath likely related to fluid overload. Will give lasix IV now and daily   Oxygen via nasal canula as needed     CV: A-Fib, HTN - blood pressure currently controlled, rate controlled. Admit to tele   Cardiology consult in the am.   Continue home medications   Cardiac diet with 1500cc fluid restriction. Daily weights     Heme:  DVT prophylaxis   Bilateral lower extremity compression devices    Metabolic/Endo: hypothyroidism   Continue home thyroid medications     Misc:  FULL CODE   Ambulate with assistance. Monitor intake and output   Monitor vitals as per unit   Replace electrolytes as needed. Follow up am labs.           MD Bhavna Delvalle MD [place] / [place]  D:  01/25/2017   21:39  T:  01/26/2017   00:28  Job #:  [id number]

## 2017-01-26 NOTE — ED NOTES
Purposeful rounding completed:    Side rails up x 1:  YES  Bed low and wheels and locked: YES  Call bell in reach: YES  Comfort addressed: YES    Toileting needs addressed: YES  Plan of care reviewed/updated with patient and or family members: YES  IV site assessed: YES  Pain assessed and addressed: YES, 0.

## 2017-01-26 NOTE — ED NOTES
I have reviewed discharge instructions with the patient. The patient verbalized understanding. Patient armband removed and shredded. VSS. Patient able to ambulate and walk out of ER. Patient did not want to use wheelchair.

## 2017-01-26 NOTE — ED NOTES
Purposeful rounding completed:    Side rails up x 1:  YES  Bed in low position and wheels locked: YES  Call bell within reach: YES  Comfort addressed: YES    Toileting needs addressed: YES  Plan of care reviewed/updated with patient and or family members: YES  IV site assessed: YES  Pain assessed and addressed: YES, 0    Patient provided meal tray at bedside

## 2017-01-26 NOTE — PROGRESS NOTES
Initial assessment completed with pt in ED bed 18. Prior to admission pt lived with his wife bianca 2 story home with 3 steps to enter. Pt was independent with ambulation and ADL's. Pt was open to [facility] and has a cane and a walker for DME. Pt states that his son, Vinita Luciano will provide transportation home.            Sherwin Kay RN, BSN, Formerly named [facility]  ED Outcomes Manager  Thursdays & Fridays   [phone] (phone)  [phone] (pager)

## 2017-01-26 NOTE — ED NOTES
Patient resting in bed. Lights dimmed for comfort. Bed in lowest position and call bell within reach. Purposeful rounding completed:    Side rails up x 1:  YES  Bed low and wheels and locked: YES  Call bell in reach: YES  Comfort addressed: YES    Toileting needs addressed: YES  Plan of care reviewed/updated with patient and or family members: YES  IV site assessed: YES  Pain assessed and addressed: YES, 0.

## 2017-01-26 NOTE — ED NOTES
Purposeful rounding completed:    Side rails up x 1:  YES  Bed in low position and wheels locked: YES  Call bell within reach: YES  Comfort addressed: YES    Toileting needs addressed: YES  Plan of care reviewed/updated with patient and or family members: YES  IV site assessed: YES  Pain assessed and addressed: No, patient sleeping in bed

## 2017-01-27 PROCEDURE — 3331090001 HH PPS REVENUE CREDIT

## 2017-01-27 PROCEDURE — 3331090002 HH PPS REVENUE DEBIT

## 2017-01-28 PROCEDURE — 3331090002 HH PPS REVENUE DEBIT

## 2017-01-28 PROCEDURE — 3331090001 HH PPS REVENUE CREDIT

## 2017-01-29 PROCEDURE — 3331090002 HH PPS REVENUE DEBIT

## 2017-01-29 PROCEDURE — 3331090001 HH PPS REVENUE CREDIT

## 2017-01-30 ENCOUNTER — HOME CARE VISIT (OUTPATIENT)
Dept: SCHEDULING | Facility: HOME HEALTH | Age: 82
End: 2017-01-30
Payer: MEDICARE

## 2017-01-30 PROCEDURE — 3331090002 HH PPS REVENUE DEBIT

## 2017-01-30 PROCEDURE — 3331090001 HH PPS REVENUE CREDIT

## 2017-01-31 PROCEDURE — 3331090002 HH PPS REVENUE DEBIT

## 2017-01-31 PROCEDURE — 3331090001 HH PPS REVENUE CREDIT

## 2017-02-01 VITALS — HEART RATE: 76 BPM | SYSTOLIC BLOOD PRESSURE: 146 MMHG | DIASTOLIC BLOOD PRESSURE: 68 MMHG | OXYGEN SATURATION: 93 %

## 2017-02-01 PROCEDURE — 3331090002 HH PPS REVENUE DEBIT

## 2017-02-01 PROCEDURE — 3331090001 HH PPS REVENUE CREDIT

## 2017-02-02 ENCOUNTER — HOME CARE VISIT (OUTPATIENT)
Dept: SCHEDULING | Facility: HOME HEALTH | Age: 82
End: 2017-02-02
Payer: MEDICARE

## 2017-02-02 PROCEDURE — 3331090002 HH PPS REVENUE DEBIT

## 2017-02-02 PROCEDURE — 3331090001 HH PPS REVENUE CREDIT

## 2017-02-02 PROCEDURE — G0157 HHC PT ASSISTANT EA 15: HCPCS

## 2017-02-03 PROCEDURE — 3331090001 HH PPS REVENUE CREDIT

## 2017-02-03 PROCEDURE — 3331090002 HH PPS REVENUE DEBIT

## 2017-02-04 PROCEDURE — 3331090002 HH PPS REVENUE DEBIT

## 2017-02-04 PROCEDURE — 3331090001 HH PPS REVENUE CREDIT

## 2017-02-05 PROCEDURE — 3331090001 HH PPS REVENUE CREDIT

## 2017-02-05 PROCEDURE — 3331090002 HH PPS REVENUE DEBIT

## 2017-02-06 ENCOUNTER — HOME CARE VISIT (OUTPATIENT)
Dept: SCHEDULING | Facility: HOME HEALTH | Age: 82
End: 2017-02-06
Payer: MEDICARE

## 2017-02-06 PROCEDURE — G0157 HHC PT ASSISTANT EA 15: HCPCS

## 2017-02-06 PROCEDURE — 3331090002 HH PPS REVENUE DEBIT

## 2017-02-06 PROCEDURE — 3331090001 HH PPS REVENUE CREDIT

## 2017-02-07 VITALS — SYSTOLIC BLOOD PRESSURE: 142 MMHG | DIASTOLIC BLOOD PRESSURE: 68 MMHG

## 2017-02-07 PROCEDURE — 3331090002 HH PPS REVENUE DEBIT

## 2017-02-07 PROCEDURE — 3331090001 HH PPS REVENUE CREDIT

## 2017-02-08 PROCEDURE — 3331090002 HH PPS REVENUE DEBIT

## 2017-02-08 PROCEDURE — 3331090001 HH PPS REVENUE CREDIT

## 2017-02-09 ENCOUNTER — HOME CARE VISIT (OUTPATIENT)
Dept: HOME HEALTH SERVICES | Facility: HOME HEALTH | Age: 82
End: 2017-02-09
Payer: MEDICARE

## 2017-02-09 PROCEDURE — 3331090002 HH PPS REVENUE DEBIT

## 2017-02-09 PROCEDURE — 3331090001 HH PPS REVENUE CREDIT

## 2017-02-10 ENCOUNTER — HOME CARE VISIT (OUTPATIENT)
Dept: SCHEDULING | Facility: HOME HEALTH | Age: 82
End: 2017-02-10
Payer: MEDICARE

## 2017-02-10 VITALS — HEART RATE: 78 BPM | DIASTOLIC BLOOD PRESSURE: 78 MMHG | OXYGEN SATURATION: 99 % | SYSTOLIC BLOOD PRESSURE: 160 MMHG

## 2017-02-10 PROCEDURE — G0157 HHC PT ASSISTANT EA 15: HCPCS

## 2017-02-10 PROCEDURE — 3331090001 HH PPS REVENUE CREDIT

## 2017-02-10 PROCEDURE — 3331090002 HH PPS REVENUE DEBIT

## 2017-02-11 PROCEDURE — 3331090002 HH PPS REVENUE DEBIT

## 2017-02-11 PROCEDURE — 3331090001 HH PPS REVENUE CREDIT

## 2017-02-12 PROCEDURE — 3331090001 HH PPS REVENUE CREDIT

## 2017-02-12 PROCEDURE — 3331090002 HH PPS REVENUE DEBIT

## 2017-02-13 ENCOUNTER — HOME CARE VISIT (OUTPATIENT)
Dept: SCHEDULING | Facility: HOME HEALTH | Age: 82
End: 2017-02-13
Payer: MEDICARE

## 2017-02-13 PROCEDURE — 3331090002 HH PPS REVENUE DEBIT

## 2017-02-13 PROCEDURE — G0151 HHCP-SERV OF PT,EA 15 MIN: HCPCS

## 2017-02-13 PROCEDURE — 3331090003 HH PPS REVENUE ADJ

## 2017-02-13 PROCEDURE — 3331090001 HH PPS REVENUE CREDIT

## 2017-02-14 VITALS
DIASTOLIC BLOOD PRESSURE: 72 MMHG | SYSTOLIC BLOOD PRESSURE: 172 MMHG | TEMPERATURE: 98.5 F | OXYGEN SATURATION: 96 % | HEART RATE: 74 BPM

## 2017-02-14 PROCEDURE — 3331090002 HH PPS REVENUE DEBIT

## 2017-02-14 PROCEDURE — 3331090001 HH PPS REVENUE CREDIT

## 2017-02-15 ENCOUNTER — OFFICE VISIT (OUTPATIENT)
Dept: CARDIOLOGY CLINIC | Age: 82
End: 2017-02-15

## 2017-02-15 VITALS
OXYGEN SATURATION: 90 % | HEART RATE: 69 BPM | DIASTOLIC BLOOD PRESSURE: 65 MMHG | SYSTOLIC BLOOD PRESSURE: 162 MMHG | HEIGHT: 69 IN | WEIGHT: 149 LBS | BODY MASS INDEX: 22.07 KG/M2

## 2017-02-15 DIAGNOSIS — E78.5 DYSLIPIDEMIA: ICD-10-CM

## 2017-02-15 DIAGNOSIS — I49.5 SSS (SICK SINUS SYNDROME) (HCC): ICD-10-CM

## 2017-02-15 DIAGNOSIS — I50.22 CHRONIC SYSTOLIC CONGESTIVE HEART FAILURE (HCC): ICD-10-CM

## 2017-02-15 DIAGNOSIS — F41.9 ANXIETY: ICD-10-CM

## 2017-02-15 DIAGNOSIS — I70.1 ATHEROSCLEROTIC RAS (RENAL ARTERY STENOSIS), BILATERAL (HCC): ICD-10-CM

## 2017-02-15 DIAGNOSIS — I48.20 CHRONIC ATRIAL FIBRILLATION (HCC): ICD-10-CM

## 2017-02-15 DIAGNOSIS — Z95.1 S/P CABG X 2: ICD-10-CM

## 2017-02-15 DIAGNOSIS — I25.10 ATHEROSCLEROSIS OF NATIVE CORONARY ARTERY OF NATIVE HEART WITHOUT ANGINA PECTORIS: ICD-10-CM

## 2017-02-15 DIAGNOSIS — I10 ESSENTIAL HYPERTENSION: Primary | ICD-10-CM

## 2017-02-15 DIAGNOSIS — I42.9 CARDIOMYOPATHY (HCC): ICD-10-CM

## 2017-02-15 PROCEDURE — 3331090002 HH PPS REVENUE DEBIT

## 2017-02-15 PROCEDURE — 3331090001 HH PPS REVENUE CREDIT

## 2017-02-15 RX ORDER — LORAZEPAM 0.5 MG/1
TABLET ORAL
Qty: 25 TAB | Refills: 1 | Status: SHIPPED | OUTPATIENT
Start: 2017-02-15 | End: 2018-01-01

## 2017-02-15 RX ORDER — LORAZEPAM 0.5 MG/1
TABLET ORAL
Refills: 1 | COMMUNITY
Start: 2016-12-31 | End: 2017-02-15 | Stop reason: SDUPTHER

## 2017-02-15 RX ORDER — METOPROLOL SUCCINATE 50 MG/1
50 TABLET, EXTENDED RELEASE ORAL DAILY
COMMUNITY
Start: 2017-01-03 | End: 2018-01-01

## 2017-02-15 NOTE — LETTER
3/22/2017 Patient:  Larry Higgins YOB: 1930 Date of Visit: 2/15/2017 Dear MD Ellen Pathak [phone] [id number] VIA Facsimile: [phone] 
 : Thank you for referring Mr. Kirstin Montoya to me for evaluation/treatment. Below are the relevant portions of my assessment and plan of care. Subjective:  
   Kirstin Montoya is in the office today for cardiac evaluation. He is an 68-year-old man that has been followed by Dr. Tarah Tidwell in the past.  He has a history of hypertension, coronary artery disease, dyslipidemia, prior coronary bypass grafting, cardiomyopathy, atrial fibrillation, pacemaker and renal artery stenosis. He was recently evaluated in the emergency department at [facility]. He was observed and ultimately not admitted. He responded well to some intravenous diuretics. He was complaining of shortness of breath and swelling at that time. The patient had coronary bypass grafting in 2008. Prior to his coronary bypass, he was experiencing primarily easy fatigability. He said at that time, he just felt more tired than usual.  He had cardiac catheterization done at [facility] and had surgery at [facility] the following day. He had repeat cardiac catheterization done in 2014 and also in 2016. After both catheterizations, he was told that the grafts were fine and there was no need for percutaneous intervention. In the office today, he says that he is feeling a little more tired than usual lately. He is not sleeping as well. He did sleep well the night before this evaluation. He has been weighing himself fairly regularly. He has been taking Lasix when his ankles swell. He sleeps on two pillows primarily to alleviate his reflux symptoms. He has had no PND. He has had no palpitations. He also takes an occasional lorazepam when he is nervous.   The lorazepam seems to help him from getting more unnecessarily concerned about his symptoms. Patient Active Problem List  
 Diagnosis Date Noted  Essential hypertension 02/27/2017  Anxiety 02/27/2017  Chronic systolic congestive heart failure ([facility] [address] 01/25/2017  S/P CABG x 2 08/25/2015  Atherosclerotic NAHED (renal artery stenosis), bilateral ([facility] [address] 08/25/2015  Dyslipidemia  Coronary artery disease  Cardiomyopathy  Atrial fibrillation  Sick sinus syndrome Current Outpatient Prescriptions Medication Sig Dispense Refill  metoprolol succinate (TOPROL XL) 50 mg XL tablet 50 mg.  LORazepam (ATIVAN) 0.5 mg tablet TK 1 T PO  BID PRN 25 Tab 1  
 furosemide (LASIX) 40 mg tablet Take 40 mg by mouth daily.  nitroglycerin (NITROSTAT) 0.3 mg SL tablet 1 Tab by SubLINGual route every five (5) minutes as needed for Chest Pain (up to 3 total 1 every 5 min). 1 Bottle 0  
 apixaban (ELIQUIS) 5 mg tablet Take 5 mg by mouth two (2) times a day.  enalapril (VASOTEC) 20 mg tablet Take 20 mg by mouth daily.  amLODIPine (NORVASC) 10 mg tablet Take 1 Tab by mouth daily. 90 Tab 3  
 omeprazole (PRILOSEC OTC) 20 mg tablet Take 40 mg by mouth two (2) times a day. Indications: HEARTBURN Allergies Allergen Reactions  Beta-Blockers (Beta-Adrenergic Blocking Agts) Drowsiness  Penicillins Swelling  Statins-Hmg-Coa Reductase Inhibitors Drowsiness Past Medical History:  
Diagnosis Date  Atrial fibrillation CHADS score 3  (+CHF, +HTN, +AGE, -DM, -CVA)  CABG   
 2008   LIMA - LAD,   SVG - RCA  Cardiomyopathy EF 30-35% (ECHO 6/14)  Coronary artery disease  Dyslipidemia  Hypertension  Hypothyroid  Pacemaker  Peripheral vascular disease  Renal artery stenosis   
 bilateral stents  Sick sinus syndrome Past Surgical History:  
Procedure Laterality Date  HX CORONARY ARTERY BYPASS GRAFT 2008   LIMA - LAD,   SVG - RCA No family history on file. History Smoking Status  Former Smoker Smokeless Tobacco  
 Not on file Review of Systems, additional: 
Constitutional: negative Eyes: negative Respiratory: negative Cardiovascular: positive for fatigue Gastrointestinal: negative Musculoskeletal:negative Neurological: negative Behvioral/Psych: negative Endocrine: negative ENT: negative Objective:  
 
Visit Vitals  /65  Pulse 69  Ht 5' 9\" (1.753 m)  Wt 67.6 kg (149 lb)  SpO2 90%  BMI 22 kg/m2 General:  alert, cooperative, no distress Chest Wall: inspection normal - no chest wall deformities or tenderness, respiratory effort normal  
Lung: clear to auscultation bilaterally Heart:  normal rate and regular rhythm, no gallops noted Abdomen: soft, non-tender. Bowel sounds normal. No masses,  no organomegaly Extremities: extremities normal, atraumatic, no cyanosis or edema Skin: no rashes Neuro: alert, oriented, normal speech, no focal findings or movement disorder noted Assessment/Plan: ICD-10-CM ICD-9-CM 1. Essential hypertension, elevated systolic in office today J66 401.9 2. Atherosclerosis of native coronary artery of native heart without angina pectoris I25.10 414.01   
3. Cardiomyopathy, last echo done 1/26/2017; EF 55 to 60%. , mild to moderate MR I42.9 425.4 4. Chronic atrial fibrillation ([address] on Metoprolol for rate control and Eliquis for stroke prophylaxis. I48.2 427.31   
5. Sick sinus syndrome I49.5 427.81   
6. Atherosclerotic NAHED (renal artery stenosis), bilateral (HCC) I70.1 440.1 7. S/P CABG x 2, 2008, LIMA-LAD, SVG-RCA, stable. RT 6 weeks Z95.1 V45.81   
8. Dyslipidemia E78.5 272.4 9. Chronic systolic congestive heart,  failure ([address] encouraged to weigh daily. RT 6 weeks. I50.22 428.22   
  428.0 10. Anxiety, takes rare lorazepam F41.9 300.00 If you have questions, please do not hesitate to call me. I look forward to following Mr. Petros Paula along with you. Sincerely, Helio Echeverria MD

## 2017-02-15 NOTE — MR AVS SNAPSHOT
Visit Information Date & Time Provider Department Dept. Phone Encounter #  
 2/15/2017  3:30 PM Tonya Fuentes MD [facility] Specialist at [facility]\[address] [phone] [id number] Follow-up Instructions Return in about 6 weeks (around 3/29/2017). Your Appointments 3/29/2017 10:45 AM  
Follow Up with Tonya Fuentes MD  
Cardio Specialist at [facility] [address] [facility] [address] [address] [address]  
  
   
 [address] [facility] Upcoming Health Maintenance Date Due ZOSTER VACCINE AGE 60> 11/10/1990 GLAUCOMA SCREENING Q2Y 11/10/1995 Pneumococcal 65+ Low/Medium Risk (1 of 2 - PCV13) 11/10/1995 MEDICARE YEARLY EXAM 11/10/1995 INFLUENZA AGE 9 TO ADULT 8/1/2016 DTaP/Tdap/Td series (2 - Td) 9/10/2024 Allergies as of 2/15/2017  Review Complete On: 2/15/2017 By: Poli Mcneill RN Severity Noted Reaction Type Reactions Beta-blockers (Beta-adrenergic Blocking Agts)  08/24/2015    Drowsiness Penicillins  06/13/2014    Swelling Statins-hmg-coa Reductase Inhibitors  08/24/2015    Drowsiness Current Immunizations  Never Reviewed Name Date Tdap 9/10/2014  9:38 PM  
  
 Not reviewed this visit Vitals BP Pulse Height(growth percentile) Weight(growth percentile) SpO2 BMI  
 162/65 69 5' 9\" (1.753 m) 149 lb (67.6 kg) 90% 22 kg/m2 Smoking Status Former Smoker BMI and BSA Data Body Mass Index Body Surface Area  
 22 kg/m 2 1.81 m 2 Your Updated Medication List  
  
   
This list is accurate as of: 2/15/17  4:13 PM.  Always use your most recent med list. amLODIPine 10 mg tablet Commonly known as:  Yudy Landeros Take 1 Tab by mouth daily. ELIQUIS 5 mg tablet Generic drug:  apixaban Take 5 mg by mouth two (2) times a day. enalapril 20 mg tablet Commonly known as:  Juan Manuel Brownlee  
 Take 20 mg by mouth daily. furosemide 40 mg tablet Commonly known as:  LASIX Take 40 mg by mouth daily. LORazepam 0.5 mg tablet Commonly known as:  ATIVAN TK 1 T PO  BID PRN  
  
 nitroglycerin 0.3 mg SL tablet Commonly known as:  NITROSTAT  
1 Tab by SubLINGual route every five (5) minutes as needed for Chest Pain (up to 3 total 1 every 5 min). PriLOSEC OTC 20 mg tablet Generic drug:  omeprazole Take 40 mg by mouth two (2) times a day. Indications: HEARTBURN  
  
 TOPROL XL 50 mg XL tablet Generic drug:  metoprolol succinate 50 mg.  
  
  
  
  
Prescriptions Printed Refills LORazepam (ATIVAN) 0.5 mg tablet 1 Sig: TK 1 T PO  BID PRN Class: Print Follow-up Instructions Return in about 6 weeks (around 3/29/2017). Patient Instructions Zyrtec once daily or as needed Introducing [facility]! Dear Mia Mchugh: Thank you for requesting a [employer] account. Our records indicate that you already have an active [employer] account. You can access your account anytime at [email or web address] Did you know that you can access your hospital and ER discharge instructions at any time in [employer]? You can also review all of your test results from your hospital stay or ER visit. Additional Information If you have questions, please visit the Frequently Asked Questions section of the [employer] website at [email or web address] Remember, [employer] is NOT to be used for urgent needs. For medical emergencies, dial 911. Now available from your iPhone and Android! Please provide this summary of care documentation to your next provider. Your primary care clinician is listed as [facility]. If you have any questions after today's visit, please call [phone].

## 2017-02-16 PROCEDURE — 3331090001 HH PPS REVENUE CREDIT

## 2017-02-16 PROCEDURE — 3331090002 HH PPS REVENUE DEBIT

## 2017-02-17 PROCEDURE — 3331090002 HH PPS REVENUE DEBIT

## 2017-02-17 PROCEDURE — 3331090001 HH PPS REVENUE CREDIT

## 2017-02-18 PROCEDURE — 3331090001 HH PPS REVENUE CREDIT

## 2017-02-18 PROCEDURE — 3331090002 HH PPS REVENUE DEBIT

## 2017-02-19 PROCEDURE — 3331090002 HH PPS REVENUE DEBIT

## 2017-02-19 PROCEDURE — 3331090001 HH PPS REVENUE CREDIT

## 2017-02-20 PROCEDURE — 3331090002 HH PPS REVENUE DEBIT

## 2017-02-20 PROCEDURE — 3331090001 HH PPS REVENUE CREDIT

## 2017-02-27 PROBLEM — F41.9 ANXIETY: Status: ACTIVE | Noted: 2017-02-27

## 2017-02-27 PROBLEM — J15.9 COMMUNITY ACQUIRED BACTERIAL PNEUMONIA: Status: RESOLVED | Noted: 2017-01-06 | Resolved: 2017-02-27

## 2017-02-27 PROBLEM — I10 ESSENTIAL HYPERTENSION: Status: ACTIVE | Noted: 2017-02-27

## 2017-02-27 PROBLEM — I50.22 CHRONIC SYSTOLIC CONGESTIVE HEART FAILURE (HCC): Status: ACTIVE | Noted: 2017-01-25

## 2017-02-27 PROBLEM — A41.9 SEPSIS (HCC): Status: RESOLVED | Noted: 2017-01-06 | Resolved: 2017-02-27

## 2017-02-27 PROBLEM — J18.9 CAP (COMMUNITY ACQUIRED PNEUMONIA): Status: RESOLVED | Noted: 2017-01-06 | Resolved: 2017-02-27

## 2017-02-27 NOTE — PROGRESS NOTES
Subjective:      Eileen Goss is in the office today for cardiac evaluation. He is an 43-year-old man that has been followed by Dr. Lindsay Smith in the past.  He has a history of hypertension, coronary artery disease, dyslipidemia, prior coronary bypass grafting, cardiomyopathy, atrial fibrillation, pacemaker and renal artery stenosis. He was recently evaluated in the emergency department at [facility]. He was observed and ultimately not admitted. He responded well to some intravenous diuretics. He was complaining of shortness of breath and swelling at that time. The patient had coronary bypass grafting in 2008. Prior to his coronary bypass, he was experiencing primarily easy fatigability. He said at that time, he just felt more tired than usual.  He had cardiac catheterization done at [facility] and had surgery at [facility] the following day. He had repeat cardiac catheterization done in 2014 and also in 2016. After both catheterizations, he was told that the grafts were fine and there was no need for percutaneous intervention. In the office today, he says that he is feeling a little more tired than usual lately. He is not sleeping as well. He did sleep well the night before this evaluation. He has been weighing himself fairly regularly. He has been taking Lasix when his ankles swell. He sleeps on two pillows primarily to alleviate his reflux symptoms. He has had no PND. He has had no palpitations. He also takes an occasional lorazepam when he is nervous.   The lorazepam seems to help him from getting more unnecessarily concerned about his symptoms.                      Patient Active Problem List    Diagnosis Date Noted    Essential hypertension 02/27/2017    Anxiety 02/27/2017    Chronic systolic congestive heart failure ([facility] [address] 01/25/2017    S/P CABG x 2 08/25/2015    Atherosclerotic NAHED (renal artery stenosis), bilateral ([facility] [address] 08/25/2015    Dyslipidemia     Coronary artery disease     Cardiomyopathy     Atrial fibrillation     Sick sinus syndrome      Current Outpatient Prescriptions   Medication Sig Dispense Refill    metoprolol succinate (TOPROL XL) 50 mg XL tablet 50 mg.  LORazepam (ATIVAN) 0.5 mg tablet TK 1 T PO  BID PRN 25 Tab 1    furosemide (LASIX) 40 mg tablet Take 40 mg by mouth daily.  nitroglycerin (NITROSTAT) 0.3 mg SL tablet 1 Tab by SubLINGual route every five (5) minutes as needed for Chest Pain (up to 3 total 1 every 5 min). 1 Bottle 0    apixaban (ELIQUIS) 5 mg tablet Take 5 mg by mouth two (2) times a day.  enalapril (VASOTEC) 20 mg tablet Take 20 mg by mouth daily.  amLODIPine (NORVASC) 10 mg tablet Take 1 Tab by mouth daily. 90 Tab 3    omeprazole (PRILOSEC OTC) 20 mg tablet Take 40 mg by mouth two (2) times a day. Indications: HEARTBURN       Allergies   Allergen Reactions    Beta-Blockers (Beta-Adrenergic Blocking Agts) Drowsiness    Penicillins Swelling    Statins-Hmg-Coa Reductase Inhibitors Drowsiness     Past Medical History:   Diagnosis Date    Atrial fibrillation     CHADS score 3  (+CHF, +HTN, +AGE, -DM, -CVA)    CABG     2008   LIMA - LAD,   SVG - RCA    Cardiomyopathy     EF 30-35% (ECHO 6/14)    Coronary artery disease     Dyslipidemia     Hypertension     Hypothyroid     Pacemaker     Peripheral vascular disease     Renal artery stenosis     bilateral stents    Sick sinus syndrome      Past Surgical History:   Procedure Laterality Date    HX CORONARY ARTERY BYPASS GRAFT      2008   LIMA - LAD,   SVG - RCA     No family history on file.   History   Smoking Status    Former Smoker   Smokeless Tobacco    Not on file          Review of Systems, additional:  Constitutional: negative  Eyes: negative  Respiratory: negative  Cardiovascular: positive for fatigue  Gastrointestinal: negative  Musculoskeletal:negative  Neurological: negative  Behvioral/Psych: negative  Endocrine: negative  ENT: negative    Objective:     Visit Vitals    /65    Pulse 69    Ht 5' 9\" (1.753 m)    Wt 67.6 kg (149 lb)    SpO2 90%    BMI 22 kg/m2     General:  alert, cooperative, no distress   Chest Wall: inspection normal - no chest wall deformities or tenderness, respiratory effort normal   Lung: clear to auscultation bilaterally   Heart:  normal rate and regular rhythm, no gallops noted   Abdomen: soft, non-tender. Bowel sounds normal. No masses,  no organomegaly   Extremities: extremities normal, atraumatic, no cyanosis or edema Skin: no rashes   Neuro: alert, oriented, normal speech, no focal findings or movement disorder noted       Assessment/Plan:       ICD-10-CM ICD-9-CM    1. Essential hypertension, elevated systolic in office today T80 401.9    2. Atherosclerosis of native coronary artery of native heart without angina pectoris I25.10 414.01    3. Cardiomyopathy, last echo done 1/26/2017; EF 55 to 60%. , mild to moderate MR I42.9 425.4    4. Chronic atrial fibrillation ([facility] [address] on Metoprolol for rate control and Eliquis for stroke prophylaxis. I48.2 427.31    5. Sick sinus syndrome I49.5 427.81    6. Atherosclerotic NAHED (renal artery stenosis), bilateral (HCC) I70.1 440.1    7. S/P CABG x 2, 2008, LIMA-LAD, SVG-RCA, stable. RT 6 weeks Z95.1 V45.81    8. Dyslipidemia E78.5 272.4    9. Chronic systolic congestive heart,  failure ([facility] [address] encouraged to weigh daily. RT 6 weeks. I50.22 428.22      428.0    10.  Anxiety, takes rare lorazepam F41.9 300.00

## 2017-03-22 NOTE — COMMUNICATION BODY
Subjective:      Radha Child is in the office today for cardiac evaluation. He is an 66-year-old man that has been followed by Dr. Ashleigh Almanzar in the past.  He has a history of hypertension, coronary artery disease, dyslipidemia, prior coronary bypass grafting, cardiomyopathy, atrial fibrillation, pacemaker and renal artery stenosis. He was recently evaluated in the emergency department at [facility]. He was observed and ultimately not admitted. He responded well to some intravenous diuretics. He was complaining of shortness of breath and swelling at that time. The patient had coronary bypass grafting in 2008. Prior to his coronary bypass, he was experiencing primarily easy fatigability. He said at that time, he just felt more tired than usual.  He had cardiac catheterization done at [facility] and had surgery at [facility] the following day. He had repeat cardiac catheterization done in 2014 and also in 2016. After both catheterizations, he was told that the grafts were fine and there was no need for percutaneous intervention. In the office today, he says that he is feeling a little more tired than usual lately. He is not sleeping as well. He did sleep well the night before this evaluation. He has been weighing himself fairly regularly. He has been taking Lasix when his ankles swell. He sleeps on two pillows primarily to alleviate his reflux symptoms. He has had no PND. He has had no palpitations. He also takes an occasional lorazepam when he is nervous.   The lorazepam seems to help him from getting more unnecessarily concerned about his symptoms.                      Patient Active Problem List    Diagnosis Date Noted    Essential hypertension 02/27/2017    Anxiety 02/27/2017    Chronic systolic congestive heart failure ([facility] [address] 01/25/2017    S/P CABG x 2 08/25/2015    Atherosclerotic NAHED (renal artery stenosis), bilateral ([facility] [address] 08/25/2015    Dyslipidemia     Coronary artery disease     Cardiomyopathy     Atrial fibrillation     Sick sinus syndrome      Current Outpatient Prescriptions   Medication Sig Dispense Refill    metoprolol succinate (TOPROL XL) 50 mg XL tablet 50 mg.  LORazepam (ATIVAN) 0.5 mg tablet TK 1 T PO  BID PRN 25 Tab 1    furosemide (LASIX) 40 mg tablet Take 40 mg by mouth daily.  nitroglycerin (NITROSTAT) 0.3 mg SL tablet 1 Tab by SubLINGual route every five (5) minutes as needed for Chest Pain (up to 3 total 1 every 5 min). 1 Bottle 0    apixaban (ELIQUIS) 5 mg tablet Take 5 mg by mouth two (2) times a day.  enalapril (VASOTEC) 20 mg tablet Take 20 mg by mouth daily.  amLODIPine (NORVASC) 10 mg tablet Take 1 Tab by mouth daily. 90 Tab 3    omeprazole (PRILOSEC OTC) 20 mg tablet Take 40 mg by mouth two (2) times a day. Indications: HEARTBURN       Allergies   Allergen Reactions    Beta-Blockers (Beta-Adrenergic Blocking Agts) Drowsiness    Penicillins Swelling    Statins-Hmg-Coa Reductase Inhibitors Drowsiness     Past Medical History:   Diagnosis Date    Atrial fibrillation     CHADS score 3  (+CHF, +HTN, +AGE, -DM, -CVA)    CABG     2008   LIMA - LAD,   SVG - RCA    Cardiomyopathy     EF 30-35% (ECHO 6/14)    Coronary artery disease     Dyslipidemia     Hypertension     Hypothyroid     Pacemaker     Peripheral vascular disease     Renal artery stenosis     bilateral stents    Sick sinus syndrome      Past Surgical History:   Procedure Laterality Date    HX CORONARY ARTERY BYPASS GRAFT      2008   LIMA - LAD,   SVG - RCA     No family history on file.   History   Smoking Status    Former Smoker   Smokeless Tobacco    Not on file          Review of Systems, additional:  Constitutional: negative  Eyes: negative  Respiratory: negative  Cardiovascular: positive for fatigue  Gastrointestinal: negative  Musculoskeletal:negative  Neurological: negative  Behvioral/Psych: negative  Endocrine: negative  ENT: negative    Objective:     Visit Vitals    /65    Pulse 69    Ht 5' 9\" (1.753 m)    Wt 67.6 kg (149 lb)    SpO2 90%    BMI 22 kg/m2     General:  alert, cooperative, no distress   Chest Wall: inspection normal - no chest wall deformities or tenderness, respiratory effort normal   Lung: clear to auscultation bilaterally   Heart:  normal rate and regular rhythm, no gallops noted   Abdomen: soft, non-tender. Bowel sounds normal. No masses,  no organomegaly   Extremities: extremities normal, atraumatic, no cyanosis or edema Skin: no rashes   Neuro: alert, oriented, normal speech, no focal findings or movement disorder noted       Assessment/Plan:       ICD-10-CM ICD-9-CM    1. Essential hypertension, elevated systolic in office today O43 401.9    2. Atherosclerosis of native coronary artery of native heart without angina pectoris I25.10 414.01    3. Cardiomyopathy, last echo done 1/26/2017; EF 55 to 60%. , mild to moderate MR I42.9 425.4    4. Chronic atrial fibrillation ([facility] [address] on Metoprolol for rate control and Eliquis for stroke prophylaxis. I48.2 427.31    5. Sick sinus syndrome I49.5 427.81    6. Atherosclerotic NAHED (renal artery stenosis), bilateral (HCC) I70.1 440.1    7. S/P CABG x 2, 2008, LIMA-LAD, SVG-RCA, stable. RT 6 weeks Z95.1 V45.81    8. Dyslipidemia E78.5 272.4    9. Chronic systolic congestive heart,  failure ([facility] [address] encouraged to weigh daily. RT 6 weeks. I50.22 428.22      428.0    10.  Anxiety, takes rare lorazepam F41.9 300.00

## 2017-03-29 ENCOUNTER — OFFICE VISIT (OUTPATIENT)
Dept: CARDIOLOGY CLINIC | Age: 82
End: 2017-03-29

## 2017-03-29 VITALS
BODY MASS INDEX: 21.92 KG/M2 | DIASTOLIC BLOOD PRESSURE: 73 MMHG | WEIGHT: 148 LBS | OXYGEN SATURATION: 96 % | HEIGHT: 69 IN | SYSTOLIC BLOOD PRESSURE: 143 MMHG | HEART RATE: 70 BPM

## 2017-03-29 DIAGNOSIS — F41.9 ANXIETY: ICD-10-CM

## 2017-03-29 DIAGNOSIS — Z95.1 S/P CABG X 2: ICD-10-CM

## 2017-03-29 DIAGNOSIS — I49.5 SSS (SICK SINUS SYNDROME) (HCC): ICD-10-CM

## 2017-03-29 DIAGNOSIS — I50.22 CHRONIC SYSTOLIC CONGESTIVE HEART FAILURE (HCC): ICD-10-CM

## 2017-03-29 DIAGNOSIS — I42.8 OTHER CARDIOMYOPATHY (HCC): ICD-10-CM

## 2017-03-29 DIAGNOSIS — E78.5 DYSLIPIDEMIA: ICD-10-CM

## 2017-03-29 DIAGNOSIS — I25.10 ATHEROSCLEROSIS OF NATIVE CORONARY ARTERY OF NATIVE HEART WITHOUT ANGINA PECTORIS: Primary | ICD-10-CM

## 2017-03-29 DIAGNOSIS — I70.1 ATHEROSCLEROTIC RAS (RENAL ARTERY STENOSIS), BILATERAL (HCC): ICD-10-CM

## 2017-03-29 DIAGNOSIS — I48.20 CHRONIC ATRIAL FIBRILLATION (HCC): ICD-10-CM

## 2017-03-29 DIAGNOSIS — I10 ESSENTIAL HYPERTENSION: ICD-10-CM

## 2017-03-29 RX ORDER — ESZOPICLONE 1 MG/1
1 TABLET, FILM COATED ORAL
COMMUNITY
Start: 2017-03-06 | End: 2017-04-26 | Stop reason: ALTCHOICE

## 2017-03-29 RX ORDER — MIRTAZAPINE 7.5 MG/1
7.5 TABLET, FILM COATED ORAL
COMMUNITY
Start: 2017-03-17 | End: 2017-04-26 | Stop reason: ALTCHOICE

## 2017-03-29 NOTE — MR AVS SNAPSHOT
Visit Information Date & Time Provider Department Dept. Phone Encounter #  
 3/29/2017 10:45 AM Poonam Nascimento MD [facility] [phone] [id number] Follow-up Instructions Return in about 4 weeks (around 4/26/2017). Your Appointments 4/26/2017 10:45 AM  
Follow Up with Poonam Nascimento MD  
Cardio Specialist at [facility] [address] Upcoming Health Maintenance Date Due ZOSTER VACCINE AGE 60> 11/10/1990 GLAUCOMA SCREENING Q2Y 11/10/1995 Pneumococcal 65+ Low/Medium Risk (1 of 2 - PCV13) 11/10/1995 MEDICARE YEARLY EXAM 11/10/1995 INFLUENZA AGE 9 TO ADULT 8/1/2016 DTaP/Tdap/Td series (2 - Td) 9/10/2024 Allergies as of 3/29/2017  Review Complete On: 3/29/2017 By: Skyler Germain RN Severity Noted Reaction Type Reactions Beta-blockers (Beta-adrenergic Blocking Agts)  08/24/2015    Drowsiness Penicillins  06/13/2014    Swelling Statins-hmg-coa Reductase Inhibitors  08/24/2015    Drowsiness Current Immunizations  Never Reviewed Name Date Tdap 9/10/2014  9:38 PM  
  
 Not reviewed this visit Vitals BP Pulse Height(growth percentile) Weight(growth percentile) SpO2 BMI  
 143/73 70 5' 9\" (1.753 m) 148 lb (67.1 kg) 96% 21.86 kg/m2 Smoking Status Former Smoker BMI and BSA Data Body Mass Index Body Surface Area  
 21.86 kg/m 2 1.81 m 2 Your Updated Medication List  
  
   
This list is accurate as of: 3/29/17 11:37 AM.  Always use your most recent med list. amLODIPine 10 mg tablet Commonly known as:  Lopez Mario Take 1 Tab by mouth daily. ELIQUIS 5 mg tablet Generic drug:  apixaban Take 5 mg by mouth two (2) times a day. enalapril 20 mg tablet Commonly known as:  Leticia Bella  
 Take 20 mg by mouth daily. eszopiclone 1 mg tablet Commonly known as:  LUNESTA  
1 mg.  
  
 furosemide 40 mg tablet Commonly known as:  LASIX Take 40 mg by mouth daily. LORazepam 0.5 mg tablet Commonly known as:  ATIVAN TK 1 T PO  BID PRN  
  
 mirtazapine 7.5 mg tablet Commonly known as:  REMERON  
7.5 mg.  
  
 nitroglycerin 0.3 mg SL tablet Commonly known as:  NITROSTAT  
1 Tab by SubLINGual route every five (5) minutes as needed for Chest Pain (up to 3 total 1 every 5 min). PriLOSEC OTC 20 mg tablet Generic drug:  omeprazole Take 40 mg by mouth two (2) times a day. Indications: HEARTBURN  
  
 TOPROL XL 50 mg XL tablet Generic drug:  metoprolol succinate 50 mg. Follow-up Instructions Return in about 4 weeks (around 4/26/2017). Patient Instructions Take Lasix 60mg x 2 days Introducing [facility] & [facility]! Dear Robe So: Thank you for requesting a [employer] account. Our records indicate that you already have an active [employer] account. You can access your account anytime at [email or web address] Did you know that you can access your hospital and ER discharge instructions at any time in [employer]? You can also review all of your test results from your hospital stay or ER visit. Additional Information If you have questions, please visit the Frequently Asked Questions section of the [employer] website at [email or web address] Remember, [employer] is NOT to be used for urgent needs. For medical emergencies, dial 911. Now available from your iPhone and Android! Please provide this summary of care documentation to your next provider. Your primary care clinician is listed as [facility]. If you have any questions after today's visit, please call [phone].

## 2017-03-31 NOTE — PROGRESS NOTES
Subjective:        Mr. Aleisha Silva is in the office today for cardiac reevaluation. He is an 80-year-old man that has been followed by Dr. Shavonne Powers in the past.  He has a history of hypertension, coronary artery disease, dyslipidemia, prior coronary bypass grafting, cardiomyopathy, atrial fibrillation, pacemaker, renal artery stenosis and congestive heart failure. He had coronary bypass grafting in 2008. Prior to his coronary bypass, he was experiencing primarily easy fatigability. He subsequently had an abnormal nuclear stress test.  He subsequently had cardiac catheterization followed by surgery at [facility] the following day. He had repeat cardiac catheterizations done in 2014 and also in 2016. After both catheterizations, he was told that the grafts were fine and there was no need for percutaneous intervention. He has continued to have [facility] come to the home. He is doing well with it. He does experience exertional dyspnea. He was quite active in the past.  Over the last two months, he has had a definite decrease in his ability to be physically active. He has been weighing himself daily. He has only taken one nitroglycerin in the last month. He does seem to have some discomfort in his chest when he pushes himself physically, which seems consistent with angina. He seems stable in that it does not occur at rest.  He has had no  PND or orthopnea.                        .        Patient Active Problem List    Diagnosis Date Noted    Essential hypertension 02/27/2017    Anxiety 02/27/2017    Chronic systolic congestive heart failure ([address] 01/25/2017    S/P CABG x 2 08/25/2015    Atherosclerotic NAHED (renal artery stenosis), bilateral ([address] 08/25/2015    Dyslipidemia     Coronary artery disease of native artery of native heart with stable angina pectoris ([address]     Cardiomyopathy     Atrial fibrillation     Sick sinus syndrome      Current Outpatient Prescriptions   Medication Sig Dispense Refill    eszopiclone (LUNESTA) 1 mg tablet 1 mg.  mirtazapine (REMERON) 7.5 mg tablet 7.5 mg.      metoprolol succinate (TOPROL XL) 50 mg XL tablet 50 mg.  LORazepam (ATIVAN) 0.5 mg tablet TK 1 T PO  BID PRN 25 Tab 1    furosemide (LASIX) 40 mg tablet Take 40 mg by mouth daily.  nitroglycerin (NITROSTAT) 0.3 mg SL tablet 1 Tab by SubLINGual route every five (5) minutes as needed for Chest Pain (up to 3 total 1 every 5 min). 1 Bottle 0    apixaban (ELIQUIS) 5 mg tablet Take 5 mg by mouth two (2) times a day.  enalapril (VASOTEC) 20 mg tablet Take 20 mg by mouth daily.  amLODIPine (NORVASC) 10 mg tablet Take 1 Tab by mouth daily. 90 Tab 3    omeprazole (PRILOSEC OTC) 20 mg tablet Take 40 mg by mouth two (2) times a day. Indications: HEARTBURN       Allergies   Allergen Reactions    Beta-Blockers (Beta-Adrenergic Blocking Agts) Drowsiness    Penicillins Swelling    Statins-Hmg-Coa Reductase Inhibitors Drowsiness     Past Medical History:   Diagnosis Date    Atrial fibrillation     CHADS score 3  (+CHF, +HTN, +AGE, -DM, -CVA)    CABG     2008   LIMA - LAD,   SVG - RCA    Cardiomyopathy     EF 30-35% (ECHO 6/14)    Coronary artery disease     Dyslipidemia     Hypertension     Hypothyroid     Pacemaker     Peripheral vascular disease     Renal artery stenosis     bilateral stents    Sick sinus syndrome      Past Surgical History:   Procedure Laterality Date    HX CORONARY ARTERY BYPASS GRAFT      2008   LIMA - LAD,   SVG - RCA     No family history on file.   History   Smoking Status    Former Smoker   Smokeless Tobacco    Not on file          Review of Systems, additional:  Constitutional: negative  Eyes: negative  Respiratory: negative  Cardiovascular: positive for chest pressure/discomfort, dyspnea on exertion  Gastrointestinal: negative  Musculoskeletal:negative  Neurological: negative  Behvioral/Psych: negative  Endocrine: negative  ENT: negative    Objective:     Visit Vitals    /73    Pulse 70    Ht 5' 9\" (1.753 m)    Wt 67.1 kg (148 lb)    SpO2 96%    BMI 21.86 kg/m2     General:  alert, cooperative, no distress. There is JVD to the angle of the mandible at 30 degrees   Chest Wall: inspection normal - no chest wall deformities or tenderness, respiratory effort normal   Lung: clear to auscultation bilaterally   Heart:  normal rate and regular rhythm, S1 and S2 normal, no murmurs noted, no gallops noted   Abdomen: soft, non-tender. Bowel sounds normal. No masses,  no organomegaly   Extremities: extremities normal, atraumatic, no cyanosis or edema Skin: no rashes   Neuro: alert, oriented, normal speech, no focal findings or movement disorder noted         Assessment/Plan:         ICD-10-CM ICD-9-CM    1. Atherosclerosis of native coronary artery of native heart without angina pectoris I25.10 414.01    2. Other cardiomyopathy ([facility] [address] I42.8     3. Chronic atrial fibrillation ([facility] [address] continue Eliquis and BB I48.2 427.31    4. Sick sinus syndrome I49.5 427.81    5. Atherosclerotic NAHED (renal artery stenosis), bilateral (HCC) I70.1 440.1    6. Chronic systolic congestive heart failure ([address] will increase Lasix to 60 mg daily times 2 days and then down to 40 mg daily. Continue to weigh daily. I50.22 428.22      428.0    7. Dyslipidemia, followed by PCP E78.5 272.4    8. Essential hypertension I10 401.9    9. S/P CABG x 2, 2008 Last cardiac catheterization 2016; continue medical therapy. Z95.1 V45.81    10.  Anxiety F41.9 300.00

## 2017-04-26 ENCOUNTER — OFFICE VISIT (OUTPATIENT)
Dept: CARDIOLOGY CLINIC | Age: 82
End: 2017-04-26

## 2017-04-26 VITALS
SYSTOLIC BLOOD PRESSURE: 142 MMHG | OXYGEN SATURATION: 90 % | WEIGHT: 146 LBS | BODY MASS INDEX: 21.62 KG/M2 | HEIGHT: 69 IN | HEART RATE: 69 BPM | DIASTOLIC BLOOD PRESSURE: 65 MMHG

## 2017-04-26 DIAGNOSIS — I50.22 CHRONIC SYSTOLIC CONGESTIVE HEART FAILURE (HCC): ICD-10-CM

## 2017-04-26 DIAGNOSIS — E78.5 DYSLIPIDEMIA: ICD-10-CM

## 2017-04-26 DIAGNOSIS — I70.1 ATHEROSCLEROTIC RAS (RENAL ARTERY STENOSIS), BILATERAL (HCC): ICD-10-CM

## 2017-04-26 DIAGNOSIS — I42.8 OTHER CARDIOMYOPATHY (HCC): ICD-10-CM

## 2017-04-26 DIAGNOSIS — F41.9 ANXIETY: ICD-10-CM

## 2017-04-26 DIAGNOSIS — Z95.1 S/P CABG X 2: ICD-10-CM

## 2017-04-26 DIAGNOSIS — I10 ESSENTIAL HYPERTENSION: ICD-10-CM

## 2017-04-26 DIAGNOSIS — I49.5 SSS (SICK SINUS SYNDROME) (HCC): ICD-10-CM

## 2017-04-26 DIAGNOSIS — M79.606 PAIN OF LOWER EXTREMITY, UNSPECIFIED LATERALITY: Primary | ICD-10-CM

## 2017-04-26 DIAGNOSIS — I25.118 CORONARY ARTERY DISEASE OF NATIVE ARTERY OF NATIVE HEART WITH STABLE ANGINA PECTORIS (HCC): ICD-10-CM

## 2017-04-26 DIAGNOSIS — I48.20 CHRONIC ATRIAL FIBRILLATION (HCC): ICD-10-CM

## 2017-04-26 DIAGNOSIS — Z78.9 STATIN INTOLERANCE: ICD-10-CM

## 2017-04-26 NOTE — PROGRESS NOTES
1. Have you been to the ER, urgent care clinic since your last visit? Hospitalized since your last visit? No    2. Have you seen or consulted any other health care providers outside of the [facility] since your last visit? Include any pap smears or colon screening.  No

## 2017-04-26 NOTE — MR AVS SNAPSHOT
Visit Information Date & Time Provider Department Dept. Phone Encounter #  
 4/26/2017 10:45 AM Sandee Beckman  [facility] Specialist at [facility] [phone] [id number] Follow-up Instructions Return in about 6 weeks (around 6/7/2017). Upcoming Health Maintenance Date Due ZOSTER VACCINE AGE 60> 11/10/1990 GLAUCOMA SCREENING Q2Y 11/10/1995 Pneumococcal 65+ Low/Medium Risk (1 of 2 - PCV13) 11/10/1995 MEDICARE YEARLY EXAM 11/10/1995 INFLUENZA AGE 9 TO ADULT 8/1/2016 DTaP/Tdap/Td series (2 - Td) 9/10/2024 Allergies as of 4/26/2017  Review Complete On: 4/26/2017 By: [facility] Severity Noted Reaction Type Reactions Beta-blockers (Beta-adrenergic Blocking Agts)  08/24/2015    Drowsiness Penicillins  06/13/2014    Swelling Statins-hmg-coa Reductase Inhibitors  08/24/2015    Drowsiness Current Immunizations  Never Reviewed Name Date Tdap 9/10/2014  9:38 PM  
  
 Not reviewed this visit You Were Diagnosed With   
  
 Codes Comments Pain of lower extremity, unspecified laterality    -  Primary ICD-10-CM: M79.606 ICD-9-CM: 729.5 Vitals BP Pulse Height(growth percentile) Weight(growth percentile) SpO2 BMI  
 142/65 69 5' 9\" (1.753 m) 146 lb (66.2 kg) 90% 21.56 kg/m2 Smoking Status Former Smoker BMI and BSA Data Body Mass Index Body Surface Area  
 21.56 kg/m 2 1.8 m 2 Your Updated Medication List  
  
   
This list is accurate as of: 4/26/17 11:33 AM.  Always use your most recent med list. amLODIPine 10 mg tablet Commonly known as:  Aspen Arnt Take 1 Tab by mouth daily. ELIQUIS 5 mg tablet Generic drug:  apixaban Take 5 mg by mouth two (2) times a day. enalapril 20 mg tablet Commonly known as:  Kelley Haus Take 20 mg by mouth daily. furosemide 40 mg tablet Commonly known as:  LASIX Take 40 mg by mouth daily. LORazepam 0.5 mg tablet Commonly known as:  ATIVAN TK 1 T PO  BID PRN  
  
 nitroglycerin 0.3 mg SL tablet Commonly known as:  NITROSTAT  
1 Tab by SubLINGual route every five (5) minutes as needed for Chest Pain (up to 3 total 1 every 5 min). PriLOSEC OTC 20 mg tablet Generic drug:  omeprazole Take 40 mg by mouth two (2) times a day. Indications: HEARTBURN  
  
 TOPROL XL 50 mg XL tablet Generic drug:  metoprolol succinate Take 50 mg by mouth daily. Follow-up Instructions Return in about 6 weeks (around 6/7/2017). Introducing [facility] SERVICES! Dear Bernadette Ingram: Thank you for requesting a [employer] account. Our records indicate that you already have an active [employer] account. You can access your account anytime at [email or web address] Did you know that you can access your hospital and ER discharge instructions at any time in [employer]? You can also review all of your test results from your hospital stay or ER visit. Additional Information If you have questions, please visit the Frequently Asked Questions section of the [employer] website at [email or web address] Remember, [employer] is NOT to be used for urgent needs. For medical emergencies, dial 911. Now available from your iPhone and Android! Please provide this summary of care documentation to your next provider. Your primary care clinician is listed as [facility]. If you have any questions after today's visit, please call [phone].

## 2017-05-01 NOTE — PROGRESS NOTES
Subjective:      Marleen Roman is in the office today for cardiac reevaluation. He is an 51-year-old man that has been followed by Dr. Huang Briscoe in the past.  He has a history of hypertension, coronary artery disease, dyslipidemia, prior coronary bypass grafting, cardiomyopathy, atrial fibrillation, pacemaker, renal artery stenosis, and congestive heart failure. The patient had coronary bypass grafting in 2008. Prior to his coronary bypass, he was experiencing primarily easy fatigability. He subsequently had an abnormal nuclear stress test, which led to cardiac catheterization and bypass surgery done at [facility].      He had repeat cardiac catheterizations done in 2014 and in 2016. Medical therapy was continued and no percutaneous intervention or other recommendations were done at that time. He has been having [facility] come to the home and he is doing well with it. He has noticed that if he exerts himself physically, he does have some discomfort in his chest, which seems consistent with angina. This seems stable and does not occur at rest.  He has had no PND or orthopnea. In the office today, he says he is doing pretty well. He has been weighing himself religiously. He has not taken any Lasix in the past two to three days. He says his legs are really weak at times. His breathing has been pretty good.                   Patient Active Problem List    Diagnosis Date Noted    Essential hypertension 02/27/2017    Anxiety 02/27/2017    Chronic systolic congestive heart failure ([facility] [address] 01/25/2017    S/P CABG x 2 08/25/2015    Atherosclerotic NAHED (renal artery stenosis), bilateral ([facility] [address] 08/25/2015    Dyslipidemia     Coronary artery disease of native artery of native heart with stable angina pectoris (HCC)     Cardiomyopathy     Atrial fibrillation     Sick sinus syndrome      Current Outpatient Prescriptions   Medication Sig Dispense Refill    metoprolol succinate (TOPROL XL) 50 mg XL tablet Take 50 mg by mouth daily.  LORazepam (ATIVAN) 0.5 mg tablet TK 1 T PO  BID PRN 25 Tab 1    furosemide (LASIX) 40 mg tablet Take 40 mg by mouth daily.  nitroglycerin (NITROSTAT) 0.3 mg SL tablet 1 Tab by SubLINGual route every five (5) minutes as needed for Chest Pain (up to 3 total 1 every 5 min). 1 Bottle 0    apixaban (ELIQUIS) 5 mg tablet Take 5 mg by mouth two (2) times a day.  enalapril (VASOTEC) 20 mg tablet Take 20 mg by mouth daily.  amLODIPine (NORVASC) 10 mg tablet Take 1 Tab by mouth daily. 90 Tab 3    omeprazole (PRILOSEC OTC) 20 mg tablet Take 40 mg by mouth two (2) times a day. Indications: HEARTBURN       Allergies   Allergen Reactions    Beta-Blockers (Beta-Adrenergic Blocking Agts) Drowsiness    Penicillins Swelling    Statins-Hmg-Coa Reductase Inhibitors Drowsiness     Past Medical History:   Diagnosis Date    Atrial fibrillation     CHADS score 3  (+CHF, +HTN, +AGE, -DM, -CVA)    CABG     2008   LIMA - LAD,   SVG - RCA    Cardiomyopathy     EF 30-35% (ECHO 6/14)    Coronary artery disease     Dyslipidemia     Hypertension     Hypothyroid     Pacemaker     Peripheral vascular disease     Renal artery stenosis     bilateral stents    Sick sinus syndrome      Past Surgical History:   Procedure Laterality Date    HX CORONARY ARTERY BYPASS GRAFT      2008   LIMA - LAD,   SVG - RCA     No family history on file.   History   Smoking Status    Former Smoker   Smokeless Tobacco    Not on file          Review of Systems, additional:  Constitutional: negative  Eyes: negative  Respiratory: negative  Cardiovascular: positive for fatigue, dyspnea on exertion  Gastrointestinal: negative  Musculoskeletal:negative  Neurological: negative  Behvioral/Psych: negative  Endocrine: negative  ENT: negative    Objective:     Visit Vitals    /65    Pulse 69    Ht 5' 9\" (1.753 m)    Wt 146 lb (66.2 kg)    SpO2 90%    BMI 21.56 kg/m2     General:  alert, cooperative, no distress   Chest Wall: inspection normal - no chest wall deformities or tenderness, respiratory effort normal   Lung: clear to auscultation bilaterally   Heart:  normal rate and regular rhythm, systolic murmur 2/6 at 2nd right intercostal space   Abdomen: soft, non-tender. Bowel sounds normal. No masses,  no organomegaly   Extremities: extremities normal, atraumatic, no cyanosis or edema. Pulses both feet markedly reduced. Skin: no rashes   Neuro: alert, oriented, normal speech, no focal findings or movement disorder noted         Assessment/Plan:       ICD-10-CM ICD-9-CM    1. Pain of lower extremity, unspecified laterality, will order lower extremity arterial duplex studies M79.606 729.5 LOWER EXT ART PVR WITH EXERCISE   2. Coronary artery disease of native artery of native heart with stable angina pectoris ([facility] [address] I25.118 414.01      413.9    3. Other cardiomyopathy ([facility] [address] I42.8     4. Chronic atrial fibrillation (HCC) I48.2 427.31    5. Sick sinus syndrome I49.5 427.81    6. Atherosclerotic NAHED (renal artery stenosis), bilateral (HCC) I70.1 440.1    7. Chronic systolic congestive heart failure ([facility] [address] now closely following body weight I50.22 428.22      428.0    8. Essential hypertension, reasonably controlled in office today. I10 401.9    9. S/P CABG x 2, 2008, LIMA-LAD, SVG-RCA Z95.1 V45.81    10. Dyslipidemia, need to address statin intolerance and PCSK9 candidacy with him next  visit. E78.5 272.4    11.  Anxiety F41.9 300.00

## 2017-05-03 PROBLEM — Z78.9 STATIN INTOLERANCE: Status: ACTIVE | Noted: 2017-05-03

## 2017-05-04 ENCOUNTER — HOSPITAL ENCOUNTER (OUTPATIENT)
Dept: VASCULAR SURGERY | Age: 82
Discharge: HOME | End: 2017-05-04
Attending: INTERNAL MEDICINE
Payer: MEDICARE

## 2017-05-04 DIAGNOSIS — M79.606 PAIN OF LOWER EXTREMITY, UNSPECIFIED LATERALITY: ICD-10-CM

## 2017-05-04 PROCEDURE — 93923 UPR/LXTR ART STDY 3+ LVLS: CPT

## 2017-05-04 NOTE — PROCEDURES
[facility]  *** PRELIMINARY REPORT ***    Name: Dora Hussein  MRN: [id number]    Outpatient  : 10 Nov 1930  HIS Order #: [id number]  [address] [facility] Visit #: [id number]  Date: 04 May 2017    TYPE OF TEST: Peripheral Arterial Testing    REASON FOR TEST  Limb pain, pulse weakness    Right Leg  Segmentals: Abnormal                     mmHg  Brachial         147  High thigh  Low thigh        133  Calf              98  Posterior tibial   0  Dorsalis pedis    85  Peroneal  Metatarsal  Toe pressure      58  Doppler:    Abnormal  Ankle/Brachial: 0.58    Site of occlusive disease:-  femoral, popliteal and tibioperoneal segments and the digits    Left Leg  Segmentals: Abnormal                     mmHg  Brachial         142  High thigh  Low thigh        168  Calf              99  Posterior tibial  67  Dorsalis pedis    82  Peroneal  Metatarsal  Toe pressure      74  Doppler:    Abnormal  Ankle/Brachial: 0.56    Site of occlusive disease:-  tibioperoneal segment and the digits    INTERPRETATION/FINDINGS  Physiologic testing was performed using continuous wave Doppler and  segmental pressures. 1. Moderate peripheral arterial disease indicated at rest in the right   leg. 2. Disease is located in the femoral-popliteal and tibioperoneal  segments on the right side. 3. Moderate peripheral arterial disease indicated at rest in the left  leg. 4. Disease is located in the tibioperoneal segment on the left side. 5. The right ankle/brachial index is 0.58 and the left ankle/brachial  index is 0.56.  6. The right digital/brachial index is (abnormal) . 39 and the left  digital/brachial index is (abnormal) . 50.    ADDITIONAL COMMENTS  The right posterior tibial pressure was not obtained due to the signal   was audibly too low to take an accurate pressure. I have personally reviewed the data relevant to the interpretation of  this study. TECHNOLOGIST: Darlene Banerjee.  RUY Guillermo  Signed: 2017 03:19 PM

## 2017-05-04 NOTE — PROCEDURES
[facility]  *** FINAL REPORT ***    Name: Candida Hendricks  MRN: [id number]    Outpatient  : 10 Nov 1930  HIS Order #: [id number]  [address] [facility] Visit #: [id number]  Date: 04 May 2017    TYPE OF TEST: Peripheral Arterial Testing    REASON FOR TEST  Limb pain, pulse weakness    Right Leg  Segmentals: Abnormal                     mmHg  Brachial         147  High thigh  Low thigh        133  Calf              98  Posterior tibial   0  Dorsalis pedis    85  Peroneal  Metatarsal  Toe pressure      58  Doppler:    Abnormal  Ankle/Brachial: 0.58    Site of occlusive disease:-  femoral, popliteal and tibioperoneal segments and the digits    Left Leg  Segmentals: Abnormal                     mmHg  Brachial         142  High thigh  Low thigh        168  Calf              99  Posterior tibial  67  Dorsalis pedis    82  Peroneal  Metatarsal  Toe pressure      74  Doppler:    Abnormal  Ankle/Brachial: 0.56    Site of occlusive disease:-  tibioperoneal segment and the digits    INTERPRETATION/FINDINGS  Physiologic testing was performed using continuous wave Doppler and  segmental pressures. 1. Moderate peripheral arterial disease indicated at rest in the right   leg. 2. Disease is located in the femoral-popliteal and tibioperoneal  segments on the right side. 3. Moderate peripheral arterial disease indicated at rest in the left  leg. 4. Disease is located in the tibioperoneal segments on the left side. 5. The right ankle/brachial index is 0.58 and the left ankle/brachial  index is 0.56.  6. The right digital/brachial index is (abnormal) . 39 and the left  digital/brachial index is (abnormal) . 50.    ADDITIONAL COMMENTS  The right posterior tibial artery pressure was not obtained due to the   signal was audibly too low to get an accurate pressure. I have personally reviewed the data relevant to the interpretation of  this  study. TECHNOLOGIST: Neli Rice.  RUY Guillermo  Signed: 2017 03:51 PM    PHYSICIAN: Leeanne Johnson. Maxi Mark MD  Signed: 05/04/2017 04:25 PM

## 2017-05-12 NOTE — COMMUNICATION BODY
Subjective:      Michael Cornell is in the office today for cardiac reevaluation. He is an 80-year-old man that has been followed by Dr. Joseph Mckay in the past.  He has a history of hypertension, coronary artery disease, dyslipidemia, prior coronary bypass grafting, cardiomyopathy, atrial fibrillation, pacemaker, renal artery stenosis, and congestive heart failure. The patient had coronary bypass grafting in 2008. Prior to his coronary bypass, he was experiencing primarily easy fatigability. He subsequently had an abnormal nuclear stress test, which led to cardiac catheterization and bypass surgery done at [facility].      He had repeat cardiac catheterizations done in 2014 and in 2016. Medical therapy was continued and no percutaneous intervention or other recommendations were done at that time. [facility] and he is doing well with it. He has noticed that if he exerts himself physically, he does have some discomfort in his chest, which seems consistent with angina. This seems stable and does not occur at rest.  He has had no PND or orthopnea. In the office today, he says he is doing pretty well. He has been weighing himself religiously. He has not taken any Lasix in the past two to three days. He says his legs are really weak at times. His breathing has been pretty good.                   Patient Active Problem List    Diagnosis Date Noted    Essential hypertension 02/27/2017    Anxiety 02/27/2017    Chronic systolic congestive heart failure ([facility] [address] 01/25/2017    S/P CABG x 2 08/25/2015    Atherosclerotic NHAED (renal artery stenosis), bilateral ([facility] [address] 08/25/2015    Dyslipidemia     Coronary artery disease of native artery of native heart with stable angina pectoris (HCC)     Cardiomyopathy     Atrial fibrillation     Sick sinus syndrome      Current Outpatient Prescriptions   Medication Sig Dispense Refill    metoprolol succinate (TOPROL XL) 50 mg XL tablet Take 50 mg by mouth daily.  LORazepam (ATIVAN) 0.5 mg tablet TK 1 T PO  BID PRN 25 Tab 1    furosemide (LASIX) 40 mg tablet Take 40 mg by mouth daily.  nitroglycerin (NITROSTAT) 0.3 mg SL tablet 1 Tab by SubLINGual route every five (5) minutes as needed for Chest Pain (up to 3 total 1 every 5 min). 1 Bottle 0    apixaban (ELIQUIS) 5 mg tablet Take 5 mg by mouth two (2) times a day.  enalapril (VASOTEC) 20 mg tablet Take 20 mg by mouth daily.  amLODIPine (NORVASC) 10 mg tablet Take 1 Tab by mouth daily. 90 Tab 3    omeprazole (PRILOSEC OTC) 20 mg tablet Take 40 mg by mouth two (2) times a day. Indications: HEARTBURN       Allergies   Allergen Reactions    Beta-Blockers (Beta-Adrenergic Blocking Agts) Drowsiness    Penicillins Swelling    Statins-Hmg-Coa Reductase Inhibitors Drowsiness     Past Medical History:   Diagnosis Date    Atrial fibrillation     CHADS score 3  (+CHF, +HTN, +AGE, -DM, -CVA)    CABG     2008   LIMA - LAD,   SVG - RCA    Cardiomyopathy     EF 30-35% (ECHO 6/14)    Coronary artery disease     Dyslipidemia     Hypertension     Hypothyroid     Pacemaker     Peripheral vascular disease     Renal artery stenosis     bilateral stents    Sick sinus syndrome      Past Surgical History:   Procedure Laterality Date    HX CORONARY ARTERY BYPASS GRAFT      2008   LIMA - LAD,   SVG - RCA     No family history on file.   History   Smoking Status    Former Smoker   Smokeless Tobacco    Not on file          Review of Systems, additional:  Constitutional: negative  Eyes: negative  Respiratory: negative  Cardiovascular: positive for fatigue, dyspnea on exertion  Gastrointestinal: negative  Musculoskeletal:negative  Neurological: negative  Behvioral/Psych: negative  Endocrine: negative  ENT: negative    Objective:     Visit Vitals    /65    Pulse 69    Ht 5' 9\" (1.753 m)    Wt 146 lb (66.2 kg)    SpO2 90%    BMI 21.56 kg/m2     General:  alert, cooperative, no distress   Chest Wall: inspection normal - no chest wall deformities or tenderness, respiratory effort normal   Lung: clear to auscultation bilaterally   Heart:  normal rate and regular rhythm, systolic murmur 2/6 at 2nd right intercostal space   Abdomen: soft, non-tender. Bowel sounds normal. No masses,  no organomegaly   Extremities: extremities normal, atraumatic, no cyanosis or edema. Pulses both feet markedly reduced. Skin: no rashes   Neuro: alert, oriented, normal speech, no focal findings or movement disorder noted         Assessment/Plan:       ICD-10-CM ICD-9-CM    1. Pain of lower extremity, unspecified laterality, will order lower extremity arterial duplex studies M79.606 729.5 LOWER EXT ART PVR WITH EXERCISE   2. Coronary artery disease of native artery of native heart with stable angina pectoris ([facility] [address] I25.118 414.01      413.9    3. Other cardiomyopathy ([facility] [address] I42.8     4. Chronic atrial fibrillation (HCC) I48.2 427.31    5. Sick sinus syndrome I49.5 427.81    6. Atherosclerotic NAHED (renal artery stenosis), bilateral (HCC) I70.1 440.1    7. Chronic systolic congestive heart failure ([facility] [address] now closely following body weight I50.22 428.22      428.0    8. Essential hypertension, reasonably controlled in office today. I10 401.9    9. S/P CABG x 2, 2008, LIMA-LAD, SVG-RCA Z95.1 V45.81    10. Dyslipidemia, need to address statin intolerance and PCSK9 candidacy with him next  visit. E78.5 272.4    11.  Anxiety F41.9 300.00

## 2017-06-07 ENCOUNTER — OFFICE VISIT (OUTPATIENT)
Dept: CARDIOLOGY CLINIC | Age: 82
End: 2017-06-07

## 2017-06-07 VITALS
OXYGEN SATURATION: 98 % | WEIGHT: 144 LBS | BODY MASS INDEX: 21.33 KG/M2 | HEIGHT: 69 IN | HEART RATE: 70 BPM | DIASTOLIC BLOOD PRESSURE: 69 MMHG | SYSTOLIC BLOOD PRESSURE: 153 MMHG

## 2017-06-07 DIAGNOSIS — I50.22 CHRONIC SYSTOLIC CONGESTIVE HEART FAILURE (HCC): ICD-10-CM

## 2017-06-07 DIAGNOSIS — I48.20 CHRONIC ATRIAL FIBRILLATION (HCC): ICD-10-CM

## 2017-06-07 DIAGNOSIS — F41.9 ANXIETY: ICD-10-CM

## 2017-06-07 DIAGNOSIS — E78.5 DYSLIPIDEMIA: ICD-10-CM

## 2017-06-07 DIAGNOSIS — I70.1 ATHEROSCLEROTIC RAS (RENAL ARTERY STENOSIS), BILATERAL (HCC): Primary | ICD-10-CM

## 2017-06-07 DIAGNOSIS — Z78.9 STATIN INTOLERANCE: ICD-10-CM

## 2017-06-07 DIAGNOSIS — I10 ESSENTIAL HYPERTENSION: ICD-10-CM

## 2017-06-07 DIAGNOSIS — I49.5 SSS (SICK SINUS SYNDROME) (HCC): ICD-10-CM

## 2017-06-07 DIAGNOSIS — I25.118 CORONARY ARTERY DISEASE OF NATIVE ARTERY OF NATIVE HEART WITH STABLE ANGINA PECTORIS (HCC): ICD-10-CM

## 2017-06-07 DIAGNOSIS — Z95.1 S/P CABG X 2: ICD-10-CM

## 2017-06-07 DIAGNOSIS — I42.8 OTHER CARDIOMYOPATHY (HCC): ICD-10-CM

## 2017-06-07 RX ORDER — PANTOPRAZOLE SODIUM 40 MG/1
TABLET, DELAYED RELEASE ORAL
Refills: 2 | COMMUNITY
Start: 2017-05-16 | End: 2018-01-01

## 2017-06-07 NOTE — LETTER
7/21/2017 4:01 PM 
 
Patient:  Jax Love YOB: 1930 Date of Visit: 6/7/2017 Dear MD Shannon Cuevaney 
[facility] [phone] VIA Facsimile: [phone] 
 : Thank you for referring Mr. Russel Vargas to me for evaluation/treatment. Below are the relevant portions of my assessment and plan of care. Subjective:  
   Russel Vargas is in the office today for cardiac reevaluation. He is an 60-year-old man that has a history of hypertension, coronary artery disease, dyslipidemia, prior coronary bypass grafting, cardiomyopathy, atrial fibrillation, pacemaker, renal artery stenosis, and congestive heart failure. The patient had coronary bypass grafting in 2008. Prior to his coronary bypass, he was experiencing primarily easy fatigability. He has had repeat cardiac catheterizations since the time of his surgery, the last of which was done in 2016. Medical therapy was continued with no percutaneous intervention advised. In the office today, the patient says he still occasionally experiences some chest discomfort. It generally occurs with such activities as lifting or carrying something fairly heavy. He describes it as his chest gets tighter. There has been no change in the pattern. He has noticed chest discomfort episodes at rest.  He has had no nocturnal episodes. He does relate that when he bends over, he is breathless for a short while. He has had no PND or orthopnea. He has had no palpitations, near syncope or syncope. He does complain of easy fatigue. Patient Active Problem List  
 Diagnosis Date Noted  Statin intolerance 05/03/2017  Essential hypertension 02/27/2017  Anxiety 02/27/2017  Chronic systolic congestive heart failure ([address] 01/25/2017  S/P CABG x 2 08/25/2015  Atherosclerotic NAHED (renal artery stenosis), bilateral ([address] 08/25/2015  Dyslipidemia  Coronary artery disease of native artery of native heart with stable angina pectoris ([facility] [address]  Cardiomyopathy  Atrial fibrillation  Sick sinus syndrome Current Outpatient Prescriptions Medication Sig Dispense Refill  pantoprazole (PROTONIX) 40 mg tablet TK 1 T PO D 20 TO 30 MINUTES BEFORE A MEAL  2  
 metoprolol succinate (TOPROL XL) 50 mg XL tablet Take 50 mg by mouth daily.  LORazepam (ATIVAN) 0.5 mg tablet TK 1 T PO  BID PRN 25 Tab 1  
 furosemide (LASIX) 40 mg tablet Take 40 mg by mouth daily.  nitroglycerin (NITROSTAT) 0.3 mg SL tablet 1 Tab by SubLINGual route every five (5) minutes as needed for Chest Pain (up to 3 total 1 every 5 min). 1 Bottle 0  
 apixaban (ELIQUIS) 5 mg tablet Take 5 mg by mouth two (2) times a day.  enalapril (VASOTEC) 20 mg tablet Take 20 mg by mouth daily.  amLODIPine (NORVASC) 10 mg tablet Take 1 Tab by mouth daily. 90 Tab 3 Allergies Allergen Reactions  Beta-Blockers (Beta-Adrenergic Blocking Agts) Drowsiness  Penicillins Swelling  Statins-Hmg-Coa Reductase Inhibitors Drowsiness Past Medical History:  
Diagnosis Date  Atrial fibrillation CHADS score 3  (+CHF, +HTN, +AGE, -DM, -CVA)  CABG   
 2008   LIMA - LAD,   SVG - RCA  Cardiomyopathy EF 30-35% (ECHO 6/14)  Coronary artery disease  Dyslipidemia  Hypertension  Hypothyroid  Pacemaker  Peripheral vascular disease  Renal artery stenosis   
 bilateral stents  Sick sinus syndrome Past Surgical History:  
Procedure Laterality Date  HX CORONARY ARTERY BYPASS GRAFT    
 2008   LIMA - LAD,   SVG - RCA No family history on file. History Smoking Status  Former Smoker Smokeless Tobacco  
 Not on file Review of Systems, additional: 
Constitutional: negative Eyes: negative Respiratory: negative Cardiovascular: positive for fatigue, exertional chest pressure/discomfort Gastrointestinal: negative Musculoskeletal:negative Neurological: negative Behvioral/Psych: negative Endocrine: negative ENT: negative Objective:  
 
Visit Vitals  /69  Pulse 70  
 Ht 5' 9\" (1.753 m)  Wt 144 lb (65.3 kg)  SpO2 98%  BMI 21.27 kg/m2 General:  alert, cooperative, no distress, appears stated age Chest Wall: inspection normal - no chest wall deformities or tenderness, respiratory effort normal  
Lung: clear to auscultation bilaterally Heart:  normal rate, regular rhythm, normal S1, S2, no murmurs, rubs, clicks or gallops Abdomen: soft, non-tender. Bowel sounds normal. No masses,  no organomegaly Extremities: extremities normal, atraumatic, no cyanosis or edema Skin: no rashes Neuro: alert, oriented, normal speech, no focal findings or movement disorder noted Assessment/Plan: ICD-10-CM ICD-9-CM 1. Atherosclerotic NAHED (renal artery stenosis), bilateral (HCC) I70.1 440.1 2. Chronic atrial fibrillation (HCC) I48.2 427.31   
3. Other cardiomyopathy ([facility] [address] I42.8 4. Chronic systolic congestive heart failure (HCC) I50.22 428.22   
  428.0 5. Essential hypertension I10 401.9 6. Coronary artery disease of native artery of native heart with stable angina pectoris ([address] occurs with carrying heavy items and similar physical stress, continue present cardiac Rx. Recommended Coenzyme Q 10. RT 3 mos I25.118 414.01   
  413.9 7. Sick sinus syndrome I49.5 427.81   
8. Anxiety F41.9 300.00   
9. Dyslipidemia, lipid profile ordered E78.5 272.4 10. S/P CABG x 2 Z95.1 V45.81   
11. Statin intolerance Z78.9 995.27 If you have questions, please do not hesitate to call me. I look forward to following Mr. Teetee Yost along with you. Sincerely, Michael Barton MD

## 2017-06-07 NOTE — PROGRESS NOTES
1. Have you been to the ER, urgent care clinic since your last visit? Hospitalized since your last visit? No    2. Have you seen or consulted any other health care providers outside of the [address] [place] [address] since your last visit? Include any pap smears or colon screening.  No

## 2017-06-07 NOTE — MR AVS SNAPSHOT
Visit Information Date & Time Provider Department Dept. Phone Encounter #  
 6/7/2017 11:00 AM Ramón Cummings MD [address] Specialist at [facility] [phone] [id number] Follow-up Instructions Return in about 3 months (around 9/7/2017). Your Appointments 9/6/2017 10:45 AM  
Follow Up with Ramón Cummings MD  
Cardio Specialist at [facility] Appt Note: 3 m f/u  
 [address] [address] [address]  
  
   
 [address] Upcoming Health Maintenance Date Due ZOSTER VACCINE AGE 60> 11/10/1990 GLAUCOMA SCREENING Q2Y 11/10/1995 Pneumococcal 65+ Low/Medium Risk (1 of 2 - PCV13) 11/10/1995 MEDICARE YEARLY EXAM 11/10/1995 INFLUENZA AGE 9 TO ADULT 8/1/2017 DTaP/Tdap/Td series (2 - Td) 9/10/2024 Allergies as of 6/7/2017  Review Complete On: 6/7/2017 By: Lashonda Yen LPN Severity Noted Reaction Type Reactions Beta-blockers (Beta-adrenergic Blocking Agts)  08/24/2015    Drowsiness Penicillins  06/13/2014    Swelling Statins-hmg-coa Reductase Inhibitors  08/24/2015    Drowsiness Current Immunizations  Never Reviewed Name Date Tdap 9/10/2014  9:38 PM  
  
 Not reviewed this visit Vitals BP Pulse Height(growth percentile) Weight(growth percentile) SpO2 BMI  
 153/69 70 5' 9\" (1.753 m) 144 lb (65.3 kg) 98% 21.27 kg/m2 Smoking Status Former Smoker BMI and BSA Data Body Mass Index Body Surface Area  
 21.27 kg/m 2 1.78 m 2 Your Updated Medication List  
  
   
This list is accurate as of: 6/7/17 11:30 AM.  Always use your most recent med list. amLODIPine 10 mg tablet Commonly known as:  [place] Take 1 Tab by mouth daily. ELIQUIS 5 mg tablet Generic drug:  apixaban Take 5 mg by mouth two (2) times a day. enalapril 20 mg tablet Commonly known as:  Cordie Bianca Take 20 mg by mouth daily. furosemide 40 mg tablet Commonly known as:  LASIX Take 40 mg by mouth daily. LORazepam 0.5 mg tablet Commonly known as:  ATIVAN TK 1 T PO  BID PRN  
  
 nitroglycerin 0.3 mg SL tablet Commonly known as:  NITROSTAT  
1 Tab by SubLINGual route every five (5) minutes as needed for Chest Pain (up to 3 total 1 every 5 min). pantoprazole 40 mg tablet Commonly known as:  PROTONIX TK 1 T PO D 20 TO 30 MINUTES BEFORE A MEAL  
  
 TOPROL XL 50 mg XL tablet Generic drug:  metoprolol succinate Take 50 mg by mouth daily. Follow-up Instructions Return in about 3 months (around 9/7/2017). Introducing [address]! Dear Claudine Medley: Thank you for requesting a Submitnet account. Our records indicate that you already have an active Submitnet account. You can access your account anytime at [email or web address] Did you know that you can access your hospital and ER discharge instructions at any time in Submitnet? You can also review all of your test results from your hospital stay or ER visit. Additional Information If you have questions, please visit the Frequently Asked Questions section of the Submitnet website at [email or web address] Remember, Submitnet is NOT to be used for urgent needs. For medical emergencies, dial 911. Now available from your iPhone and Android! Please provide this summary of care documentation to your next provider. Your primary care clinician is listed as [facility]. If you have any questions after today's visit, please call [phone].

## 2017-06-15 ENCOUNTER — HOSPITAL ENCOUNTER (OUTPATIENT)
Dept: ULTRASOUND IMAGING | Age: 82
Discharge: HOME | End: 2017-06-15
Attending: INTERNAL MEDICINE
Payer: MEDICARE

## 2017-06-15 DIAGNOSIS — K21.9 ESOPHAGEAL REFLUX: ICD-10-CM

## 2017-06-15 PROCEDURE — 76705 ECHO EXAM OF ABDOMEN: CPT

## 2017-06-21 NOTE — PROGRESS NOTES
Subjective:      Marlen Morgan is in the office today for cardiac reevaluation. He is an 42-year-old man that has a history of hypertension, coronary artery disease, dyslipidemia, prior coronary bypass grafting, cardiomyopathy, atrial fibrillation, pacemaker, renal artery stenosis, and congestive heart failure. The patient had coronary bypass grafting in 2008. Prior to his coronary bypass, he was experiencing primarily easy fatigability. He has had repeat cardiac catheterizations since the time of his surgery, the last of which was done in 2016. Medical therapy was continued with no percutaneous intervention advised. In the office today, the patient says he still occasionally experiences some chest discomfort. It generally occurs with such activities as lifting or carrying something fairly heavy. He describes it as his chest gets tighter. There has been no change in the pattern. He has noticed chest discomfort episodes at rest.  He has had no nocturnal episodes. He does relate that when he bends over, he is breathless for a short while. He has had no PND or orthopnea. He has had no palpitations, near syncope or syncope. He does complain of easy fatigue.                      Patient Active Problem List    Diagnosis Date Noted    Statin intolerance 05/03/2017    Essential hypertension 02/27/2017    Anxiety 02/27/2017    Chronic systolic congestive heart failure ([facility] [address] 01/25/2017    S/P CABG x 2 08/25/2015    Atherosclerotic NAHED (renal artery stenosis), bilateral ([facility] [address] 08/25/2015    Dyslipidemia     Coronary artery disease of native artery of native heart with stable angina pectoris (HCC)     Cardiomyopathy     Atrial fibrillation     Sick sinus syndrome      Current Outpatient Prescriptions   Medication Sig Dispense Refill    pantoprazole (PROTONIX) 40 mg tablet TK 1 T PO D 20 TO 30 MINUTES BEFORE A MEAL  2    metoprolol succinate (TOPROL XL) 50 mg XL tablet Take 50 mg by mouth daily.      LORazepam (ATIVAN) 0.5 mg tablet TK 1 T PO  BID PRN 25 Tab 1    furosemide (LASIX) 40 mg tablet Take 40 mg by mouth daily.  nitroglycerin (NITROSTAT) 0.3 mg SL tablet 1 Tab by SubLINGual route every five (5) minutes as needed for Chest Pain (up to 3 total 1 every 5 min). 1 Bottle 0    apixaban (ELIQUIS) 5 mg tablet Take 5 mg by mouth two (2) times a day.  enalapril (VASOTEC) 20 mg tablet Take 20 mg by mouth daily.  amLODIPine (NORVASC) 10 mg tablet Take 1 Tab by mouth daily. 90 Tab 3     Allergies   Allergen Reactions    Beta-Blockers (Beta-Adrenergic Blocking Agts) Drowsiness    Penicillins Swelling    Statins-Hmg-Coa Reductase Inhibitors Drowsiness     Past Medical History:   Diagnosis Date    Atrial fibrillation     CHADS score 3  (+CHF, +HTN, +AGE, -DM, -CVA)    CABG     2008   LIMA - LAD,   SVG - RCA    Cardiomyopathy     EF 30-35% (ECHO 6/14)    Coronary artery disease     Dyslipidemia     Hypertension     Hypothyroid     Pacemaker     Peripheral vascular disease     Renal artery stenosis     bilateral stents    Sick sinus syndrome      Past Surgical History:   Procedure Laterality Date    HX CORONARY ARTERY BYPASS GRAFT      2008   LIMA - LAD,   SVG - RCA     No family history on file.   History   Smoking Status    Former Smoker   Smokeless Tobacco    Not on file          Review of Systems, additional:  Constitutional: negative  Eyes: negative  Respiratory: negative  Cardiovascular: positive for fatigue, exertional chest pressure/discomfort  Gastrointestinal: negative  Musculoskeletal:negative  Neurological: negative  Behvioral/Psych: negative  Endocrine: negative  ENT: negative    Objective:     Visit Vitals    /69    Pulse 70    Ht 5' 9\" (1.753 m)    Wt 144 lb (65.3 kg)    SpO2 98%    BMI 21.27 kg/m2     General:  alert, cooperative, no distress, appears stated age   Chest Wall: inspection normal - no chest wall deformities or tenderness, respiratory effort normal   Lung: clear to auscultation bilaterally   Heart:  normal rate, regular rhythm, normal S1, S2, no murmurs, rubs, clicks or gallops   Abdomen: soft, non-tender. Bowel sounds normal. No masses,  no organomegaly   Extremities: extremities normal, atraumatic, no cyanosis or edema Skin: no rashes   Neuro: alert, oriented, normal speech, no focal findings or movement disorder noted         Assessment/Plan:       ICD-10-CM ICD-9-CM    1. Atherosclerotic NAHED (renal artery stenosis), bilateral (HCC) I70.1 440.1    2. Chronic atrial fibrillation (HCC) I48.2 427.31    3. Other cardiomyopathy ([facility] [address] I42.8     4. Chronic systolic congestive heart failure (HCC) I50.22 428.22      428.0    5. Essential hypertension I10 401.9    6. Coronary artery disease of native artery of native heart with stable angina pectoris ([facility] [address] occurs with carrying heavy items and similar physical stress, continue present cardiac Rx. Recommended Coenzyme Q 10. RT 3 mos I25.118 414.01      413.9    7. Sick sinus syndrome I49.5 427.81    8. Anxiety F41.9 300.00    9. Dyslipidemia, lipid profile ordered E78.5 272.4    10. S/P CABG x 2 Z95.1 V45.81    11.  Statin intolerance Z78.9 995.27

## 2017-07-06 RX ORDER — NITROGLYCERIN 0.3 MG/1
0.3 TABLET SUBLINGUAL
Qty: 1 BOTTLE | Refills: 2 | Status: SHIPPED | OUTPATIENT
Start: 2017-07-06 | End: 2018-01-01

## 2017-07-08 ENCOUNTER — APPOINTMENT (OUTPATIENT)
Dept: GENERAL RADIOLOGY | Age: 82
End: 2017-07-08
Attending: EMERGENCY MEDICINE
Payer: MEDICARE

## 2017-07-08 ENCOUNTER — HOSPITAL ENCOUNTER (EMERGENCY)
Age: 82
Discharge: HOME | End: 2017-07-09
Attending: EMERGENCY MEDICINE
Payer: MEDICARE

## 2017-07-08 DIAGNOSIS — R06.09 DOE (DYSPNEA ON EXERTION): ICD-10-CM

## 2017-07-08 DIAGNOSIS — I50.22 CHRONIC SYSTOLIC CONGESTIVE HEART FAILURE (HCC): Primary | ICD-10-CM

## 2017-07-08 DIAGNOSIS — R07.89 CHEST TIGHTNESS: ICD-10-CM

## 2017-07-08 LAB
BASOPHILS # BLD AUTO: 0 K/UL (ref 0–0.06)
BASOPHILS # BLD: 1 % (ref 0–2)
DIFFERENTIAL METHOD BLD: ABNORMAL
EOSINOPHIL # BLD: 0 K/UL (ref 0–0.4)
EOSINOPHIL NFR BLD: 1 % (ref 0–5)
ERYTHROCYTE [DISTWIDTH] IN BLOOD BY AUTOMATED COUNT: 17.8 % (ref 11.6–14.5)
HCT VFR BLD AUTO: 36.6 % (ref 36–48)
HGB BLD-MCNC: 11.4 G/DL (ref 13–16)
LYMPHOCYTES # BLD AUTO: 17 % (ref 21–52)
LYMPHOCYTES # BLD: 0.9 K/UL (ref 0.9–3.6)
MCH RBC QN AUTO: 27 PG (ref 24–34)
MCHC RBC AUTO-ENTMCNC: 31.1 G/DL (ref 31–37)
MCV RBC AUTO: 86.5 FL (ref 74–97)
MONOCYTES # BLD: 0.5 K/UL (ref 0.05–1.2)
MONOCYTES NFR BLD AUTO: 10 % (ref 3–10)
NEUTS SEG # BLD: 4 K/UL (ref 1.8–8)
NEUTS SEG NFR BLD AUTO: 71 % (ref 40–73)
PLATELET # BLD AUTO: 200 K/UL (ref 135–420)
PMV BLD AUTO: 9 FL (ref 9.2–11.8)
RBC # BLD AUTO: 4.23 M/UL (ref 4.7–5.5)
WBC # BLD AUTO: 5.5 K/UL (ref 4.6–13.2)

## 2017-07-08 PROCEDURE — 85025 COMPLETE CBC W/AUTO DIFF WBC: CPT | Performed by: EMERGENCY MEDICINE

## 2017-07-08 PROCEDURE — 96374 THER/PROPH/DIAG INJ IV PUSH: CPT

## 2017-07-08 PROCEDURE — 83880 ASSAY OF NATRIURETIC PEPTIDE: CPT | Performed by: EMERGENCY MEDICINE

## 2017-07-08 PROCEDURE — 82550 ASSAY OF CK (CPK): CPT | Performed by: EMERGENCY MEDICINE

## 2017-07-08 PROCEDURE — 99285 EMERGENCY DEPT VISIT HI MDM: CPT

## 2017-07-08 PROCEDURE — 83735 ASSAY OF MAGNESIUM: CPT | Performed by: EMERGENCY MEDICINE

## 2017-07-08 PROCEDURE — 80048 BASIC METABOLIC PNL TOTAL CA: CPT | Performed by: EMERGENCY MEDICINE

## 2017-07-08 PROCEDURE — 71010 XR CHEST PORT: CPT

## 2017-07-08 PROCEDURE — 93005 ELECTROCARDIOGRAM TRACING: CPT

## 2017-07-08 PROCEDURE — 74011250636 HC RX REV CODE- 250/636: Performed by: EMERGENCY MEDICINE

## 2017-07-08 RX ORDER — FUROSEMIDE 10 MG/ML
20 INJECTION INTRAMUSCULAR; INTRAVENOUS
Status: COMPLETED | OUTPATIENT
Start: 2017-07-08 | End: 2017-07-08

## 2017-07-08 RX ADMIN — FUROSEMIDE 20 MG: 10 INJECTION, SOLUTION INTRAMUSCULAR; INTRAVENOUS at 23:35

## 2017-07-09 VITALS
HEART RATE: 70 BPM | DIASTOLIC BLOOD PRESSURE: 73 MMHG | TEMPERATURE: 98.3 F | OXYGEN SATURATION: 99 % | SYSTOLIC BLOOD PRESSURE: 149 MMHG | RESPIRATION RATE: 17 BRPM

## 2017-07-09 LAB
ANION GAP BLD CALC-SCNC: 9 MMOL/L (ref 3–18)
ATRIAL RATE: 50 BPM
BNP SERPL-MCNC: 2469 PG/ML (ref 0–1800)
BUN SERPL-MCNC: 32 MG/DL (ref 7–18)
BUN/CREAT SERPL: 20 (ref 12–20)
CALCIUM SERPL-MCNC: 8.6 MG/DL (ref 8.5–10.1)
CALCULATED P AXIS, ECG09: 57 DEGREES
CALCULATED R AXIS, ECG10: -86 DEGREES
CALCULATED T AXIS, ECG11: 94 DEGREES
CHLORIDE SERPL-SCNC: 103 MMOL/L (ref 100–108)
CK MB CFR SERPL CALC: 3.9 % (ref 0–4)
CK MB SERPL-MCNC: 3.3 NG/ML (ref 5–25)
CK SERPL-CCNC: 85 U/L (ref 39–308)
CO2 SERPL-SCNC: 25 MMOL/L (ref 21–32)
CREAT SERPL-MCNC: 1.6 MG/DL (ref 0.6–1.3)
DIAGNOSIS, 93000: NORMAL
GLUCOSE SERPL-MCNC: 85 MG/DL (ref 74–99)
MAGNESIUM SERPL-MCNC: 2.4 MG/DL (ref 1.6–2.6)
POTASSIUM SERPL-SCNC: 4.8 MMOL/L (ref 3.5–5.5)
Q-T INTERVAL, ECG07: 444 MS
QRS DURATION, ECG06: 164 MS
QTC CALCULATION (BEZET), ECG08: 479 MS
SODIUM SERPL-SCNC: 137 MMOL/L (ref 136–145)
TROPONIN I SERPL-MCNC: 0.02 NG/ML (ref 0–0.04)
VENTRICULAR RATE, ECG03: 70 BPM

## 2017-07-09 NOTE — ED NOTES
I have reviewed discharge instructions with the patient and caregiver. The patient and caregiver verbalized understanding. Patient armband removed and shredded. Patient discharged via w/c to lobby with son.

## 2017-07-09 NOTE — DISCHARGE INSTRUCTIONS
SPECIFIC PATIENT INSTRUCTIONS FROM THE PHYSICIAN WHO TREATED YOU IN THE ER TODAY:  1. Return if any concerns or worsening of condition(s)  2. Increase your lasix by taking double your normal dose for the next 2 days. 3. FOLLOW UP APPOINTMENT:  Your primary doctor in 2-4 days. Chest Pain: Care Instructions  Your Care Instructions  There are many things that can cause chest pain. Some are not serious and will get better on their own in a few days. But some kinds of chest pain need more testing and treatment. Your doctor may have recommended a follow-up visit in the next 8 to 12 hours. If you are not getting better, you may need more tests or treatment. Even though your doctor has released you, you still need to watch for any problems. The doctor carefully checked you, but sometimes problems can develop later. If you have new symptoms or if your symptoms do not get better, get medical care right away. If you have worse or different chest pain or pressure that lasts more than 5 minutes or you passed out (lost consciousness), call 911 or seek other emergency help right away. A medical visit is only one step in your treatment. Even if you feel better, you still need to do what your doctor recommends, such as going to all suggested follow-up appointments and taking medicines exactly as directed. This will help you recover and help prevent future problems. How can you care for yourself at home? · Rest until you feel better. · Take your medicine exactly as prescribed. Call your doctor if you think you are having a problem with your medicine. · Do not drive after taking a prescription pain medicine. When should you call for help? Call 911 if:  · You passed out (lost consciousness). · You have severe difficulty breathing. · You have symptoms of a heart attack. These may include:  ¨ Chest pain or pressure, or a strange feeling in your chest.  ¨ Sweating. ¨ Shortness of breath.   ¨ Nausea or vomiting. ¨ Pain, pressure, or a strange feeling in your back, neck, jaw, or upper belly or in one or both shoulders or arms. ¨ Lightheadedness or sudden weakness. ¨ A fast or irregular heartbeat. After you call 911, the  may tell you to chew 1 adult-strength or 2 to 4 low-dose aspirin. Wait for an ambulance. Do not try to drive yourself. Call your doctor today if:  · You have any trouble breathing. · Your chest pain gets worse. · You are dizzy or lightheaded, or you feel like you may faint. · You are not getting better as expected. · You are having new or different chest pain. Where can you learn more? Go to http://mitra-shaw.info/. Enter A120 in the search box to learn more about \"Chest Pain: Care Instructions. \"  Current as of: March 20, 2017  Content Version: 11.3  © 9232-7562 [employer]. Care instructions adapted under license by [employer] (which disclaims liability or warranty for this information). If you have questions about a medical condition or this instruction, always ask your healthcare professional. [place] [address] any warranty or liability for your use of this information. Heart Failure: Care Instructions  Your Care Instructions    Heart failure occurs when your heart does not pump as much blood as the body needs. Failure does not mean that the heart has stopped pumping but rather that it is not pumping as well as it should. Over time, this causes fluid buildup in your lungs and other parts of your body. Fluid buildup can cause shortness of breath, fatigue, swollen ankles, and other problems. By taking medicines regularly, reducing sodium (salt) in your diet, checking your weight every day, and making lifestyle changes, you can feel better and live longer. Follow-up care is a key part of your treatment and safety. Be sure to make and go to all appointments, and call your doctor if you are having problems. It's also a good idea to know your test results and keep a list of the medicines you take. How can you care for yourself at home? Medicines  · Be safe with medicines. Take your medicines exactly as prescribed. Call your doctor if you think you are having a problem with your medicine. · Do not take any vitamins, over-the-counter medicine, or herbal products without talking to your doctor first. Ifrah Onawa not take ibuprofen (Advil or Motrin) and naproxen (Aleve) without talking to your doctor first. They could make your heart failure worse. · You may be taking some of the following medicine. ¨ Beta-blockers can slow heart rate, decrease blood pressure, and improve your condition. Taking a beta-blocker may lower your chance of needing to be hospitalized. ¨ Angiotensin-converting enzyme inhibitors (ACEIs) reduce the heart's workload, lower blood pressure, and reduce swelling. Taking an ACEI may lower your chance of needing to be hospitalized again. ¨ Angiotensin II receptor blockers (ARBs) work like ACEIs. Your doctor may prescribe them instead of ACEIs. ¨ Diuretics, also called water pills, reduce swelling. ¨ Potassium supplements replace this important mineral, which is sometimes lost with diuretics. ¨ Aspirin and other blood thinners prevent blood clots, which can cause a stroke or heart attack. You will get more details on the specific medicines your doctor prescribes. Diet  · Your doctor may suggest that you limit sodium to 2,000 milligrams (mg) a day or less. That is less than 1 teaspoon of salt a day, including all the salt you eat in cooking or in packaged foods. People get most of their sodium from processed foods. Fast food and restaurant meals also tend to be very high in sodium. · Ask your doctor how much liquid you can drink each day. You may have to limit liquids. Weight  · Weigh yourself without clothing at the same time each day. Record your weight.  Call your doctor if you have a sudden weight gain, such as more than 2 to 3 pounds in a day or 5 pounds in a week. (Your doctor may suggest a different range of weight gain.) A sudden weight gain may mean that your heart failure is getting worse. Activity level  · Start light exercise (if your doctor says it is okay). Even if you can only do a small amount, exercise will help you get stronger, have more energy, and manage your weight and your stress. Walking is an easy way to get exercise. Start out by walking a little more than you did before. Bit by bit, increase the amount you walk. · When you exercise, watch for signs that your heart is working too hard. You are pushing yourself too hard if you cannot talk while you are exercising. If you become short of breath or dizzy or have chest pain, stop, sit down, and rest.  · If you feel \"wiped out\" the day after you exercise, walk slower or for a shorter distance until you can work up to a better pace. · Get enough rest at night. Sleeping with 1 or 2 pillows under your upper body and head may help you breathe easier. Lifestyle changes  · Do not smoke. Smoking can make a heart condition worse. If you need help quitting, talk to your doctor about stop-smoking programs and medicines. These can increase your chances of quitting for good. Quitting smoking may be the most important step you can take to protect your heart. · Limit alcohol to 2 drinks a day for men and 1 drink a day for women. Too much alcohol can cause health problems. · Avoid getting sick from colds and the flu. Get a pneumococcal vaccine shot. If you have had one before, ask your doctor whether you need another dose. Get a flu shot each year. If you must be around people with colds or the flu, wash your hands often. When should you call for help? Call 911 if you have symptoms of sudden heart failure such as:  · You have severe trouble breathing. · You cough up pink, foamy mucus. · You have a new irregular or rapid heartbeat.   Call your doctor now or seek immediate medical care if:  · You have new or increased shortness of breath. · You are dizzy or lightheaded, or you feel like you may faint. · You have sudden weight gain, such as more than 2 to 3 pounds in a day or 5 pounds in a week. (Your doctor may suggest a different range of weight gain.)  · You have increased swelling in your legs, ankles, or feet. · You are suddenly so tired or weak that you cannot do your usual activities. Watch closely for changes in your health, and be sure to contact your doctor if:  · You develop new symptoms. Where can you learn more? Go to http://mitra-shaw.info/. Enter A278 in the search box to learn more about \"Heart Failure: Care Instructions. \"  Current as of: April 3, 2017  Content Version: 11.3  © 9435-3901 [employer]. Care instructions adapted under license by [employer] (which disclaims liability or warranty for this information). If you have questions about a medical condition or this instruction, always ask your healthcare professional. [address] any warranty or liability for your use of this information. Oomba Activation    Thank you for requesting access to Oomba. Please follow the instructions below to securely access and download your online medical record. Oomba allows you to send messages to your doctor, view your test results, renew your prescriptions, schedule appointments, and more. How Do I Sign Up? 1. In your internet browser, go to [email or web address]. 2. Click on the First Time User? Click Here link in the Sign In box. You will see the New Member Sign Up page. 3. Enter your Oomba Access Code exactly as it appears below. You will not need to use this code after youve completed the sign-up process. If you do not sign up before the expiration date, you must request a new code. Oomba Access Code:  Activation code not generated  Current PhotoBox Status: Active (This is the date your PhotoBox access code will )    4. Enter the last four digits of your Social Security Number (xxxx) and Date of Birth (mm/dd/yyyy) as indicated and click Submit. You will be taken to the next sign-up page. 5. Create a PhotoBox ID. This will be your PhotoBox login ID and cannot be changed, so think of one that is secure and easy to remember. 6. Create a PhotoBox password. You can change your password at any time. 7. Enter your Password Reset Question and Answer. This can be used at a later time if you forget your password. 8. Enter your e-mail address. You will receive e-mail notification when new information is available in [address] 9. Click Sign Up. You can now view and download portions of your medical record. 10. Click the Download Summary menu link to download a portable copy of your medical information. Additional Information    If you have questions, please visit the Frequently Asked Questions section of the PhotoBox website at [email or web address] Remember, PhotoBox is NOT to be used for urgent needs. For medical emergencies, dial 911.

## 2017-07-09 NOTE — ED PROVIDER NOTES
[facility] EMERGENCY DEPT      10:58 PM Nga Zuñiga is a 80 y.o. male with a history of HTN, Dyslipidemia, CAD, A-Fib, CHF and Hypothyroid who presents to the emergency department c/o chest tightness onset 3 days ago. Pt reports associated symptoms of SOB and lightheadedness. The patient explains the pain is constant, but exacerbated with exertion. No other concerns at this time. PCP: Alfredo Dangelo MD      No current facility-administered medications for this encounter. Current Outpatient Prescriptions   Medication Sig    apixaban (ELIQUIS) 5 mg tablet Take 5 mg by mouth two (2) times a day.  nitroglycerin (NITROSTAT) 0.3 mg SL tablet 1 Tab by SubLINGual route every five (5) minutes as needed for Chest Pain (up to 3 total 1 every 5 min).  pantoprazole (PROTONIX) 40 mg tablet TK 1 T PO D 20 TO 30 MINUTES BEFORE A MEAL    metoprolol succinate (TOPROL XL) 50 mg XL tablet Take 50 mg by mouth daily.  LORazepam (ATIVAN) 0.5 mg tablet TK 1 T PO  BID PRN    furosemide (LASIX) 40 mg tablet Take 40 mg by mouth daily.  enalapril (VASOTEC) 20 mg tablet Take 20 mg by mouth daily.  amLODIPine (NORVASC) 10 mg tablet Take 1 Tab by mouth daily. Past Medical History:   Diagnosis Date    Atrial fibrillation     CHADS score 3  (+CHF, +HTN, +AGE, -DM, -CVA)    CABG     2008   LIMA - LAD,   SVG - RCA    Cardiomyopathy     EF 30-35% (ECHO 6/14)    Coronary artery disease     Dyslipidemia     Hypertension     Hypothyroid     Pacemaker     Peripheral vascular disease     Renal artery stenosis     bilateral stents    Sick sinus syndrome        Past Surgical History:   Procedure Laterality Date    HX CORONARY ARTERY BYPASS GRAFT      2008   LIMA - LAD,   SVG - RCA       History reviewed. No pertinent family history.     Social History     Social History    Marital status:      Spouse name: N/A    Number of children: N/A    Years of education: N/A     Occupational History    Not on file. Social History Main Topics    Smoking status: Former Smoker    Smokeless tobacco: Not on file    Alcohol use No    Drug use: No    Sexual activity: Not on file     Other Topics Concern    Not on file     Social History Narrative       Allergies   Allergen Reactions    Beta-Blockers (Beta-Adrenergic Blocking Agts) Drowsiness    Penicillins Swelling    Statins-Hmg-Coa Reductase Inhibitors Drowsiness       Patient's primary care provider (as noted in EPIC):  Carlita Olivarez MD    REVIEW OF SYSTEMS:  Constitutional:  Negative for diaphoresis. HENT:  Negative for congestion. Respiratory:  Negative for stridor. Cardiovascular:  Negative for palpitations. Gastrointestinal:  Negative for diarrhea. Genitourinary:  Negative for flank pain. Musculoskeletal:  Negative for back pain. Skin:  Negative for pallor. Neurological: Negative. Negative for weakness. Visit Vitals    /69    Pulse 70    Temp 98.3 °F (36.8 °C)    Resp 18    SpO2 96%       PHYSICAL EXAM:    CONSTITUTIONAL:  Alert, in no apparent distress;  well developed;  well nourished. HEAD:  Normocephalic, atraumatic. EYES:  EOMI. Non-icteric sclera. Normal conjunctiva. ENTM:  Nose:  no rhinorrhea. Throat:  no erythema or exudate, mucous membranes moist.  NECK:  No JVD. Supple    RESPIRATORY:  Decreased BS in bilateral lower 1/3 lung fields, but good air movement. CARDIOVASCULAR:  Regular rate and rhythm. Holosystolic murmur, rubs, or gallops. GI:  Normal bowel sounds, abdomen soft and non-tender. No rebound or guarding. BACK:  Non-tender. UPPER EXT:  Normal inspection. LOWER EXT:  2+ b/l LE pitting edema, no calf tenderness. Distal pulses intact. NEURO:  Moves all four extremities, and grossly normal motor exam.  SKIN:  No rashes;  Normal for age. PSYCH:  Alert and normal affect.     DIFFERENTIAL DIAGNOSES/ MEDICAL DECISION MAKING:   Shortness of breath etiologies include chronic obstructive pulmonary disease (COPD), acute asthma exacerbation, congestive heart failure, pneumonia, acute bronchitis, pulmonary embolism, upper respiratory infection, cardiac event to include acute coronary syndrome, acute myocardial infarction or a combination of the above (ex URI on top of COPD thus causing respiratory distress).       Abnormal lab results from this emergency department encounter:  Labs Reviewed   CBC WITH AUTOMATED DIFF - Abnormal; Notable for the following:        Result Value    RBC 4.23 (*)     HGB 11.4 (*)     RDW 17.8 (*)     MPV 9.0 (*)     LYMPHOCYTES 17 (*)     All other components within normal limits   METABOLIC PANEL, BASIC - Abnormal; Notable for the following:     BUN 32 (*)     Creatinine 1.60 (*)     GFR est AA 50 (*)     GFR est non-AA 41 (*)     All other components within normal limits   PRO-BNP - Abnormal; Notable for the following:     NT pro-BNP 2469 (*)     All other components within normal limits   MAGNESIUM   CARDIAC PANEL,(CK, CKMB & TROPONIN)       Lab values for this patient within approximately the last 12 hours:  Recent Results (from the past 12 hour(s))   EKG, 12 LEAD, INITIAL    Collection Time: 07/08/17  9:49 PM   Result Value Ref Range    Ventricular Rate 70 BPM    Atrial Rate 50 BPM    QRS Duration 164 ms    Q-T Interval 444 ms    QTC Calculation (Bezet) 479 ms    Calculated P Axis 57 degrees    Calculated R Axis -86 degrees    Calculated T Axis 94 degrees    Diagnosis       Ventricular-paced rhythm  Abnormal ECG  When compared with ECG of 25-JAN-2017 14:14,  No significant change was found     CBC WITH AUTOMATED DIFF    Collection Time: 07/08/17 10:16 PM   Result Value Ref Range    WBC 5.5 4.6 - 13.2 K/uL    RBC 4.23 (L) 4.70 - 5.50 M/uL    HGB 11.4 (L) 13.0 - 16.0 g/dL    HCT 36.6 36.0 - 48.0 %    MCV 86.5 74.0 - 97.0 FL    MCH 27.0 24.0 - 34.0 PG    MCHC 31.1 31.0 - 37.0 g/dL    RDW 17.8 (H) 11.6 - 14.5 %    PLATELET 974 088 - 568 K/uL    MPV 9.0 (L) 9.2 - 11.8 FL    NEUTROPHILS 71 40 - 73 %    LYMPHOCYTES 17 (L) 21 - 52 %    MONOCYTES 10 3 - 10 %    EOSINOPHILS 1 0 - 5 %    BASOPHILS 1 0 - 2 %    ABS. NEUTROPHILS 4.0 1.8 - 8.0 K/UL    ABS. LYMPHOCYTES 0.9 0.9 - 3.6 K/UL    ABS. MONOCYTES 0.5 0.05 - 1.2 K/UL    ABS. EOSINOPHILS 0.0 0.0 - 0.4 K/UL    ABS. BASOPHILS 0.0 0.0 - 0.06 K/UL    DF AUTOMATED     METABOLIC PANEL, BASIC    Collection Time: 07/08/17 10:16 PM   Result Value Ref Range    Sodium 137 136 - 145 mmol/L    Potassium 4.8 3.5 - 5.5 mmol/L    Chloride 103 100 - 108 mmol/L    CO2 25 21 - 32 mmol/L    Anion gap 9 3.0 - 18 mmol/L    Glucose 85 74 - 99 mg/dL    BUN 32 (H) 7.0 - 18 MG/DL    Creatinine 1.60 (H) 0.6 - 1.3 MG/DL    BUN/Creatinine ratio 20 12 - 20      GFR est AA 50 (L) >60 ml/min/1.73m2    GFR est non-AA 41 (L) >60 ml/min/1.73m2    Calcium 8.6 8.5 - 10.1 MG/DL   MAGNESIUM    Collection Time: 07/08/17 10:16 PM   Result Value Ref Range    Magnesium 2.4 1.6 - 2.6 mg/dL   CARDIAC PANEL,(CK, CKMB & TROPONIN)    Collection Time: 07/08/17 10:16 PM   Result Value Ref Range    CK 85 39 - 308 U/L    CK - MB 3.3 <3.6 ng/ml    CK-MB Index 3.9 0.0 - 4.0 %    Troponin-I, Qt. 0.02 0.0 - 0.045 NG/ML   PRO-BNP    Collection Time: 07/08/17 10:16 PM   Result Value Ref Range    NT pro-BNP 2469 (H) 0 - 1800 PG/ML       Radiologist and cardiologist interpretations if available at time of this note:  No results found. Portable (A-P view) CXR:  Preliminary review of x-rays by ED Physician. Interpretation of chest X-ray shows, no infiltrates, no pneumothorax, no CHF, no effusion. CABG staples noted. Pacemaker. Interpreted by ED Physician:  Cardiac Monitor Strip interpretation is Paced about 70 bpm, No ST changes noted, Rate is normal.  12 Lead EKG Interpretation is Paced rhythm about 70 bpm, normal width QRS. Interpretation: PACED RHYTHM AT NORMAL RATE.     Medication(s) ordered for patient during this emergency visit encounter:  Medications   furosemide (LASIX) injection 20 mg (20 mg IntraVENous Given 7/8/17 5018)     ED COURSE: Given presentation of chest tightness and SOB for 3 days, an acute cardiac event can be assessed with one set of cardiac enzymes. IMPRESSION AND MEDICAL DECISION MAKING:  Based upon the patient's presentation with noted HPI and PE, along with the work up done in the emergency department, I believe that the patient is having acute decompensated congestive heart failure. I do believe that the patient can be discharged home and can follow up with primary doctor within the next few days. Will increase patients diuretic dosing per the discharge instructions. DIAGNOSIS:    1. Acute decompensated congestive heart failure. 2. Chest tightness  3. SOB/ FROST. SPECIFIC PATIENT INSTRUCTIONS FROM THE PHYSICIAN WHO TREATED YOU IN THE ER TODAY:  1. Return if any concerns or worsening of condition(s)  2. Increase your lasix by taking double your normal dose for the next 2 days. 3. FOLLOW UP APPOINTMENT:  Your primary doctor in 2-4 days. Katerine De Jesus M.D. Provider Attestation:  If a scribe was utilized in generation of this patient record, I personally performed the services described in the documentation, reviewed the documentation, as recorded by the scribe in my presence, and it accurately records the patient's history of presenting illness, review of systems, patient physical examination, and procedures performed by me as the attending physician. Katerine De Jesus M.D. [facility] Board Certified Emergency Physician  7/8/2017.  10:57 PM    Scribruby Vald[address]inos 8 for and in presence of Tomy Uriostegui MD (07/08/17)      Physician Attestation  I personally preformed the services described in this documentation, reviewed and edited the documentation which was dictated to the scribe in my presence, and it accurately records my words and actions.      Tomy Uriostegui MD (07/08/17)      Signed by: Tima Clancy, 07/08/17, 10:58 PM

## 2017-07-21 NOTE — COMMUNICATION BODY
Subjective:      Zak Xiong is in the office today for cardiac reevaluation. He is an 59-year-old man that has a history of hypertension, coronary artery disease, dyslipidemia, prior coronary bypass grafting, cardiomyopathy, atrial fibrillation, pacemaker, renal artery stenosis, and congestive heart failure. The patient had coronary bypass grafting in 2008. Prior to his coronary bypass, he was experiencing primarily easy fatigability. He has had repeat cardiac catheterizations since the time of his surgery, the last of which was done in 2016. Medical therapy was continued with no percutaneous intervention advised. In the office today, the patient says he still occasionally experiences some chest discomfort. It generally occurs with such activities as lifting or carrying something fairly heavy. He describes it as his chest gets tighter. There has been no change in the pattern. He has noticed chest discomfort episodes at rest.  He has had no nocturnal episodes. He does relate that when he bends over, he is breathless for a short while. He has had no PND or orthopnea. He has had no palpitations, near syncope or syncope. He does complain of easy fatigue.                      Patient Active Problem List    Diagnosis Date Noted    Statin intolerance 05/03/2017    Essential hypertension 02/27/2017    Anxiety 02/27/2017    Chronic systolic congestive heart failure ([facility] [address] 01/25/2017    S/P CABG x 2 08/25/2015    Atherosclerotic NAHED (renal artery stenosis), bilateral ([facility] [address] 08/25/2015    Dyslipidemia     Coronary artery disease of native artery of native heart with stable angina pectoris (HCC)     Cardiomyopathy     Atrial fibrillation     Sick sinus syndrome      Current Outpatient Prescriptions   Medication Sig Dispense Refill    pantoprazole (PROTONIX) 40 mg tablet TK 1 T PO D 20 TO 30 MINUTES BEFORE A MEAL  2    metoprolol succinate (TOPROL XL) 50 mg XL tablet Take 50 mg by mouth daily.      LORazepam (ATIVAN) 0.5 mg tablet TK 1 T PO  BID PRN 25 Tab 1    furosemide (LASIX) 40 mg tablet Take 40 mg by mouth daily.  nitroglycerin (NITROSTAT) 0.3 mg SL tablet 1 Tab by SubLINGual route every five (5) minutes as needed for Chest Pain (up to 3 total 1 every 5 min). 1 Bottle 0    apixaban (ELIQUIS) 5 mg tablet Take 5 mg by mouth two (2) times a day.  enalapril (VASOTEC) 20 mg tablet Take 20 mg by mouth daily.  amLODIPine (NORVASC) 10 mg tablet Take 1 Tab by mouth daily. 90 Tab 3     Allergies   Allergen Reactions    Beta-Blockers (Beta-Adrenergic Blocking Agts) Drowsiness    Penicillins Swelling    Statins-Hmg-Coa Reductase Inhibitors Drowsiness     Past Medical History:   Diagnosis Date    Atrial fibrillation     CHADS score 3  (+CHF, +HTN, +AGE, -DM, -CVA)    CABG     2008   LIMA - LAD,   SVG - RCA    Cardiomyopathy     EF 30-35% (ECHO 6/14)    Coronary artery disease     Dyslipidemia     Hypertension     Hypothyroid     Pacemaker     Peripheral vascular disease     Renal artery stenosis     bilateral stents    Sick sinus syndrome      Past Surgical History:   Procedure Laterality Date    HX CORONARY ARTERY BYPASS GRAFT      2008   LIMA - LAD,   SVG - RCA     No family history on file.   History   Smoking Status    Former Smoker   Smokeless Tobacco    Not on file          Review of Systems, additional:  Constitutional: negative  Eyes: negative  Respiratory: negative  Cardiovascular: positive for fatigue, exertional chest pressure/discomfort  Gastrointestinal: negative  Musculoskeletal:negative  Neurological: negative  Behvioral/Psych: negative  Endocrine: negative  ENT: negative    Objective:     Visit Vitals    /69    Pulse 70    Ht 5' 9\" (1.753 m)    Wt 144 lb (65.3 kg)    SpO2 98%    BMI 21.27 kg/m2     General:  alert, cooperative, no distress, appears stated age   Chest Wall: inspection normal - no chest wall deformities or tenderness, respiratory effort normal   Lung: clear to auscultation bilaterally   Heart:  normal rate, regular rhythm, normal S1, S2, no murmurs, rubs, clicks or gallops   Abdomen: soft, non-tender. Bowel sounds normal. No masses,  no organomegaly   Extremities: extremities normal, atraumatic, no cyanosis or edema Skin: no rashes   Neuro: alert, oriented, normal speech, no focal findings or movement disorder noted         Assessment/Plan:       ICD-10-CM ICD-9-CM    1. Atherosclerotic NAHED (renal artery stenosis), bilateral (HCC) I70.1 440.1    2. Chronic atrial fibrillation (HCC) I48.2 427.31    3. Other cardiomyopathy ([facility] [address] I42.8     4. Chronic systolic congestive heart failure (HCC) I50.22 428.22      428.0    5. Essential hypertension I10 401.9    6. Coronary artery disease of native artery of native heart with stable angina pectoris ([facility] [address] occurs with carrying heavy items and similar physical stress, continue present cardiac Rx. Recommended Coenzyme Q 10. RT 3 mos I25.118 414.01      413.9    7. Sick sinus syndrome I49.5 427.81    8. Anxiety F41.9 300.00    9. Dyslipidemia, lipid profile ordered E78.5 272.4    10. S/P CABG x 2 Z95.1 V45.81    11.  Statin intolerance Z78.9 995.27

## 2017-08-14 ENCOUNTER — OFFICE VISIT (OUTPATIENT)
Dept: CARDIOLOGY CLINIC | Age: 82
End: 2017-08-14

## 2017-08-14 VITALS
SYSTOLIC BLOOD PRESSURE: 126 MMHG | BODY MASS INDEX: 21.33 KG/M2 | HEIGHT: 69 IN | WEIGHT: 144 LBS | DIASTOLIC BLOOD PRESSURE: 69 MMHG | HEART RATE: 70 BPM | OXYGEN SATURATION: 97 %

## 2017-08-14 DIAGNOSIS — E78.5 DYSLIPIDEMIA: ICD-10-CM

## 2017-08-14 DIAGNOSIS — I48.20 CHRONIC ATRIAL FIBRILLATION (HCC): ICD-10-CM

## 2017-08-14 DIAGNOSIS — Z95.1 S/P CABG X 2: ICD-10-CM

## 2017-08-14 DIAGNOSIS — I10 ESSENTIAL HYPERTENSION: ICD-10-CM

## 2017-08-14 DIAGNOSIS — I49.5 SSS (SICK SINUS SYNDROME) (HCC): ICD-10-CM

## 2017-08-14 DIAGNOSIS — Z78.9 STATIN INTOLERANCE: ICD-10-CM

## 2017-08-14 DIAGNOSIS — I50.22 CHRONIC SYSTOLIC CONGESTIVE HEART FAILURE (HCC): ICD-10-CM

## 2017-08-14 DIAGNOSIS — I42.8 OTHER CARDIOMYOPATHY (HCC): ICD-10-CM

## 2017-08-14 DIAGNOSIS — M17.0 OSTEOARTHRITIS OF BOTH KNEES, UNSPECIFIED OSTEOARTHRITIS TYPE: ICD-10-CM

## 2017-08-14 DIAGNOSIS — I25.118 CORONARY ARTERY DISEASE OF NATIVE ARTERY OF NATIVE HEART WITH STABLE ANGINA PECTORIS (HCC): ICD-10-CM

## 2017-08-14 DIAGNOSIS — I70.1 ATHEROSCLEROTIC RAS (RENAL ARTERY STENOSIS), BILATERAL (HCC): Primary | ICD-10-CM

## 2017-08-14 RX ORDER — AMLODIPINE BESYLATE 5 MG/1
5 TABLET ORAL DAILY
COMMUNITY
End: 2018-01-01

## 2017-08-14 NOTE — PATIENT INSTRUCTIONS
Decrease amlodipine to 5mg every morning    Take Toprol XL in the morning    Take Enalapril at night    Keep a blood pressure log    Return in 6 weeks

## 2017-08-14 NOTE — MR AVS SNAPSHOT
Visit Information Date & Time Provider Department Dept. Phone Encounter #  
 8/14/2017 10:15 AM Марина Lockhart MD [address] [place] [address] Specialist at [place] [address] [id number] Follow-up Instructions Return in about 6 weeks (around 9/25/2017). Your Appointments 10/9/2017 10:15 AM  
Follow Up with Марина Lockhart MD  
Cardio Specialist at [facility] [address] [facility]) Appt Note: 6 week follow up  
 [facility] [address] Upcoming Health Maintenance Date Due ZOSTER VACCINE AGE 60> 9/10/1990 GLAUCOMA SCREENING Q2Y 11/10/1995 Pneumococcal 65+ Low/Medium Risk (1 of 2 - PCV13) 11/10/1995 MEDICARE YEARLY EXAM 11/10/1995 INFLUENZA AGE 9 TO ADULT 8/1/2017 DTaP/Tdap/Td series (2 - Td) 9/10/2024 Allergies as of 8/14/2017  Review Complete On: 7/8/2017 By: Milton Holguin MD  
  
 Severity Noted Reaction Type Reactions Beta-blockers (Beta-adrenergic Blocking Agts)  08/24/2015    Drowsiness Penicillins  06/13/2014    Swelling Statins-hmg-coa Reductase Inhibitors  08/24/2015    Drowsiness Current Immunizations  Never Reviewed Name Date Tdap 9/10/2014  9:38 PM  
  
 Not reviewed this visit Vitals BP Pulse Height(growth percentile) Weight(growth percentile) SpO2 BMI  
 126/69 70 5' 9\" (1.753 m) 144 lb (65.3 kg) 97% 21.27 kg/m2 Smoking Status Former Smoker Vitals History BMI and BSA Data Body Mass Index Body Surface Area  
 21.27 kg/m 2 1.78 m 2 Preferred Pharmacy Pharmacy Name Phone [place] [address] [facility] [address] [facility] [phone] Your Updated Medication List  
  
   
This list is accurate as of: 8/14/17 11:05 AM.  Always use your most recent med list. amLODIPine 5 mg tablet Commonly known as:  Sharda Chimes Take 5 mg by mouth daily. ELIQUIS 5 mg tablet Generic drug:  apixaban Take 5 mg by mouth two (2) times a day. enalapril 20 mg tablet Commonly known as:  Michial Curia Take 20 mg by mouth daily. furosemide 40 mg tablet Commonly known as:  LASIX Take 40 mg by mouth daily. LORazepam 0.5 mg tablet Commonly known as:  ATIVAN TK 1 T PO  BID PRN  
  
 nitroglycerin 0.3 mg SL tablet Commonly known as:  NITROSTAT  
1 Tab by SubLINGual route every five (5) minutes as needed for Chest Pain (up to 3 total 1 every 5 min). pantoprazole 40 mg tablet Commonly known as:  PROTONIX TK 1 T PO D 20 TO 30 MINUTES BEFORE A MEAL  
  
 TOPROL XL 50 mg XL tablet Generic drug:  metoprolol succinate Take 50 mg by mouth daily. Follow-up Instructions Return in about 6 weeks (around 9/25/2017). Patient Instructions Decrease amlodipine to 5mg every morning Take Toprol XL in the morning Take Enalapril at night Keep a blood pressure log Return in 6 weeks Introducing [facility] SERVICES! Dear Angelo Castro: Thank you for requesting a [employer] account. Our records indicate that you already have an active [employer] account. You can access your account anytime at [email or web address] Did you know that you can access your hospital and ER discharge instructions at any time in [employer]? You can also review all of your test results from your hospital stay or ER visit. Additional Information If you have questions, please visit the Frequently Asked Questions section of the [employer] website at [email or web address] Remember, [employer] is NOT to be used for urgent needs. For medical emergencies, dial 911. Now available from your iPhone and Android! Please provide this summary of care documentation to your next provider. Your primary care clinician is listed as [facility]. If you have any questions after today's visit, please call [phone].

## 2017-08-20 PROBLEM — M17.0 OSTEOARTHRITIS OF BOTH KNEES: Status: ACTIVE | Noted: 2017-08-20

## 2017-08-20 NOTE — PROGRESS NOTES
Subjective:      Ranulfo Carey is in the office today for cardiac reevaluation. He is an 80-year-old man that has a history of hypertension, coronary artery disease, dyslipidemia, prior coronary bypass grafting, cardiomyopathy, atrial fibrillation, pacemaker, renal artery stenosis and congestive heart failure. The patient had prior coronary bypass grafting in 2008. Prior to his coronary bypass surgery, he was experiencing primarily easy fatigability. He has had repeat cardiac catheterizations since the time of his surgery, the last of which was done in 2016. At that time, medical therapy was advised. The patient does have occasional episodes of chest discomfort when he is doing fairly strenuous activities such as lifting or carrying something fairly heavy. He describes it as a tightness in his chest.  There has been no change in pattern. He has had no episodes at rest or at night. In the office today, he says that he feels more tired lately. He also believes that he has a little less exercise tolerance. The chest tightness that occurs with extreme activities is about the same. He has been having some pain in his knee, but primarily instability of the knee. He tells me today that he is contemplating surgery.                            Patient Active Problem List    Diagnosis Date Noted    Osteoarthritis of both knees 08/20/2017    Statin intolerance 05/03/2017    Essential hypertension 02/27/2017    Anxiety 02/27/2017    Chronic systolic congestive heart failure ([address] 01/25/2017    S/P CABG x 2 08/25/2015    Atherosclerotic NAHED (renal artery stenosis), bilateral ([address] 08/25/2015    Dyslipidemia     Coronary artery disease of native artery of native heart with stable angina pectoris (HCC)     Cardiomyopathy     Atrial fibrillation     Sick sinus syndrome      Current Outpatient Prescriptions   Medication Sig Dispense Refill    amLODIPine (NORVASC) 5 mg tablet Take 5 mg by mouth daily.      nitroglycerin (NITROSTAT) 0.3 mg SL tablet 1 Tab by SubLINGual route every five (5) minutes as needed for Chest Pain (up to 3 total 1 every 5 min). 1 Bottle 2    pantoprazole (PROTONIX) 40 mg tablet TK 1 T PO D 20 TO 30 MINUTES BEFORE A MEAL  2    metoprolol succinate (TOPROL XL) 50 mg XL tablet Take 50 mg by mouth daily.  LORazepam (ATIVAN) 0.5 mg tablet TK 1 T PO  BID PRN 25 Tab 1    furosemide (LASIX) 40 mg tablet Take 40 mg by mouth daily.  apixaban (ELIQUIS) 5 mg tablet Take 5 mg by mouth two (2) times a day.  enalapril (VASOTEC) 20 mg tablet Take 20 mg by mouth daily. Allergies   Allergen Reactions    Beta-Blockers (Beta-Adrenergic Blocking Agts) Drowsiness    Penicillins Swelling    Statins-Hmg-Coa Reductase Inhibitors Drowsiness     Past Medical History:   Diagnosis Date    Atrial fibrillation     CHADS score 3  (+CHF, +HTN, +AGE, -DM, -CVA)    CABG     2008   LIMA - LAD,   SVG - RCA    Cardiomyopathy     EF 30-35% (ECHO 6/14)    Coronary artery disease     Dyslipidemia     Hypertension     Hypothyroid     Pacemaker     Peripheral vascular disease     Renal artery stenosis     bilateral stents    Sick sinus syndrome      Past Surgical History:   Procedure Laterality Date    HX CORONARY ARTERY BYPASS GRAFT      2008   LIMA - LAD,   SVG - RCA     No family history on file.   History   Smoking Status    Former Smoker   Smokeless Tobacco    Not on file          Review of Systems, additional:  Constitutional: negative  Eyes: negative  Respiratory: negative  Cardiovascular: positive for chest pressure/discomfort, fatigue, dyspnea on exertion  Gastrointestinal: negative  Musculoskeletal:positive for arthralgias  Neurological: negative  Behvioral/Psych: negative  Endocrine: negative  ENT: negative    Objective:     Visit Vitals    /69    Pulse 70    Ht 5' 9\" (1.753 m)    Wt 144 lb (65.3 kg)    SpO2 97%    BMI 21.27 kg/m2     General:  alert, cooperative, no distress   Chest Wall: inspection normal - no chest wall deformities or tenderness, respiratory effort normal   Lung: clear to auscultation bilaterally   Heart:  normal rate and regular rhythm, no murmurs noted   Abdomen: soft, non-tender. Bowel sounds normal. No masses,  no organomegaly   Extremities: extremities normal, atraumatic, no cyanosis or edema Skin: no rashes   Neuro: alert, oriented, normal speech, no focal findings or movement disorder noted         Assessment/Plan:       ICD-10-CM ICD-9-CM    1. Renal artery stenoses  440.1    2. Chronic atrial fibrillation ([facility] [address] on Eliquis for stroke prevention and BB for rate control. I48.2 427.31    3. Other cardiomyopathy ([facility] [address] EF by nuclear scanning 09/2015 was 60%, EF was 30-35% by Echo 06/2014 I42.8     4. Chronic systolic congestive heart failure (HCC) I50.22 428.22      428.0    5. Coronary artery disease of native artery of native heart with stable angina pectoris ([facility] [address] stable symptoms. RT 6 weeks I25.118 414.01      413.9    6. Essential hypertension, controlled in office today, complaining of fatigue. Will decrease amlodipine to 5 mg qHS and have him take Enalapril at night. RT 6 weeks. I10 401.9    7. Sick sinus syndrome I49.5 427.81    8. Dyslipidemia E78.5 272.4    9. S/P CABG x 2 , 2008, LIMA-LAD, SVG-RCA Z95.1 V45.81    10. Statin intolerance Z78.9 995.27    11.  Osteoarthritis of both knees, unspecified osteoarthritis type.,contemplating surgery but would be high risk in great likelihood M17.0 715.96

## 2017-09-15 ENCOUNTER — OFFICE VISIT (OUTPATIENT)
Dept: CARDIOLOGY CLINIC | Age: 82
End: 2017-09-15

## 2017-09-15 VITALS
BODY MASS INDEX: 21.62 KG/M2 | SYSTOLIC BLOOD PRESSURE: 170 MMHG | HEIGHT: 69 IN | HEART RATE: 71 BPM | DIASTOLIC BLOOD PRESSURE: 75 MMHG | WEIGHT: 146 LBS | OXYGEN SATURATION: 98 %

## 2017-09-15 DIAGNOSIS — I42.8 OTHER CARDIOMYOPATHY (HCC): ICD-10-CM

## 2017-09-15 DIAGNOSIS — I70.1 ATHEROSCLEROTIC RAS (RENAL ARTERY STENOSIS), BILATERAL (HCC): Primary | ICD-10-CM

## 2017-09-15 DIAGNOSIS — E78.5 DYSLIPIDEMIA: ICD-10-CM

## 2017-09-15 DIAGNOSIS — Z78.9 STATIN INTOLERANCE: ICD-10-CM

## 2017-09-15 DIAGNOSIS — Z95.1 S/P CABG X 2: ICD-10-CM

## 2017-09-15 DIAGNOSIS — I49.5 SSS (SICK SINUS SYNDROME) (HCC): ICD-10-CM

## 2017-09-15 DIAGNOSIS — I48.20 CHRONIC ATRIAL FIBRILLATION (HCC): ICD-10-CM

## 2017-09-15 DIAGNOSIS — I10 ESSENTIAL HYPERTENSION: ICD-10-CM

## 2017-09-15 RX ORDER — ISOSORBIDE MONONITRATE 30 MG/1
30 TABLET, EXTENDED RELEASE ORAL DAILY
Qty: 30 TAB | Refills: 6 | Status: SHIPPED | OUTPATIENT
Start: 2017-09-15 | End: 2018-01-01

## 2017-09-15 NOTE — MR AVS SNAPSHOT
Visit Information Date & Time Provider Department Dept. Phone Encounter #  
 9/15/2017  1:45 PM Lavonia Schilder, MD [address] [place] [address] Specialist at [phone] Follow-up Instructions Return in about 1 week (around 9/22/2017). Your Appointments 9/20/2017 10:00 AM  
Follow Up with Lavonia Schilder, MD  
Cardio Specialist at [facility]/[address]) Appt Note: 1 week fu-  
 [facility] [address]  
  
    
 10/9/2017 10:15 AM  
Follow Up with Lavonia Schilder, MD  
Cardio Specialist at [facility]/[address]) Appt Note: 6 week follow up  
 [facility] [address] [id number]  
[phone] Upcoming Health Maintenance Date Due ZOSTER VACCINE AGE 60> 9/10/1990 GLAUCOMA SCREENING Q2Y 11/10/1995 Pneumococcal 65+ Low/Medium Risk (1 of 2 - PCV13) 11/10/1995 MEDICARE YEARLY EXAM 11/10/1995 INFLUENZA AGE 9 TO ADULT 8/1/2017 DTaP/Tdap/Td series (2 - Td) 9/10/2024 Allergies as of 9/15/2017  Review Complete On: 9/15/2017 By: Elizabeth Araya RN Severity Noted Reaction Type Reactions Beta-blockers (Beta-adrenergic Blocking Agts)  08/24/2015    Drowsiness Penicillins  06/13/2014    Swelling Statins-hmg-coa Reductase Inhibitors  08/24/2015    Drowsiness Current Immunizations  Never Reviewed Name Date Tdap 9/10/2014  9:38 PM  
  
 Not reviewed this visit Vitals BP Pulse Height(growth percentile) Weight(growth percentile) SpO2 BMI  
 170/75 71 5' 9\" (1.753 m) 146 lb (66.2 kg) 98% 21.56 kg/m2 Smoking Status Former Smoker BMI and BSA Data Body Mass Index Body Surface Area  
 21.56 kg/m 2 1.8 m 2 Preferred Pharmacy Pharmacy Name Phone  [place], [address] [facility] [place] [phone] Your Updated Medication List  
  
   
This list is accurate as of: 9/15/17  2:12 PM.  Always use your most recent med list. amLODIPine 5 mg tablet Commonly known as:  Georgie Raddle Take 5 mg by mouth daily. ELIQUIS 5 mg tablet Generic drug:  apixaban Take 5 mg by mouth two (2) times a day. enalapril 20 mg tablet Commonly known as:  Reina Ann Take 20 mg by mouth daily. furosemide 40 mg tablet Commonly known as:  LASIX Take 40 mg by mouth daily. LORazepam 0.5 mg tablet Commonly known as:  ATIVAN TK 1 T PO  BID PRN  
  
 nitroglycerin 0.3 mg SL tablet Commonly known as:  NITROSTAT  
1 Tab by SubLINGual route every five (5) minutes as needed for Chest Pain (up to 3 total 1 every 5 min). pantoprazole 40 mg tablet Commonly known as:  PROTONIX TK 1 T PO D 20 TO 30 MINUTES BEFORE A MEAL  
  
 TOPROL XL 50 mg XL tablet Generic drug:  metoprolol succinate Take 50 mg by mouth daily. Follow-up Instructions Return in about 1 week (around 9/22/2017). Patient Instructions Start Imdur 30mg Daily Take Lasix today and tomorrow then every Friday Introducing [facility] & HEALTH SERVICES! Dear Thao Caro: Thank you for requesting a [employer] account. Our records indicate that you already have an active [employer] account. You can access your account anytime at [email or web address]. [employer] Did you know that you can access your hospital and ER discharge instructions at any time in [employer]? You can also review all of your test results from your hospital stay or ER visit. Additional Information If you have questions, please visit the Frequently Asked Questions section of the [employer] website at [email or web address]. [employer]/. Remember, [employer] is NOT to be used for urgent needs. For medical emergencies, dial 911. Now available from your iPhone and Android! Please provide this summary of care documentation to your next provider. Your primary care clinician is listed as [facility]. If you have any questions after today's visit, please call [phone].

## 2017-09-20 ENCOUNTER — HOSPITAL ENCOUNTER (OUTPATIENT)
Dept: LAB | Age: 82
Discharge: HOME | End: 2017-09-20
Payer: MEDICARE

## 2017-09-20 ENCOUNTER — OFFICE VISIT (OUTPATIENT)
Dept: CARDIOLOGY CLINIC | Age: 82
End: 2017-09-20

## 2017-09-20 VITALS
SYSTOLIC BLOOD PRESSURE: 157 MMHG | HEART RATE: 71 BPM | DIASTOLIC BLOOD PRESSURE: 74 MMHG | BODY MASS INDEX: 21.62 KG/M2 | HEIGHT: 69 IN | WEIGHT: 146 LBS | OXYGEN SATURATION: 96 %

## 2017-09-20 DIAGNOSIS — I50.22 CHRONIC SYSTOLIC CONGESTIVE HEART FAILURE (HCC): ICD-10-CM

## 2017-09-20 DIAGNOSIS — I48.20 CHRONIC ATRIAL FIBRILLATION (HCC): ICD-10-CM

## 2017-09-20 DIAGNOSIS — I70.1 ATHEROSCLEROTIC RAS (RENAL ARTERY STENOSIS), BILATERAL (HCC): ICD-10-CM

## 2017-09-20 DIAGNOSIS — I49.5 SSS (SICK SINUS SYNDROME) (HCC): ICD-10-CM

## 2017-09-20 DIAGNOSIS — E78.5 DYSLIPIDEMIA: ICD-10-CM

## 2017-09-20 DIAGNOSIS — F41.9 ANXIETY: ICD-10-CM

## 2017-09-20 DIAGNOSIS — R07.89 OTHER CHEST PAIN: ICD-10-CM

## 2017-09-20 DIAGNOSIS — Z78.9 STATIN INTOLERANCE: ICD-10-CM

## 2017-09-20 DIAGNOSIS — I42.8 OTHER CARDIOMYOPATHY (HCC): ICD-10-CM

## 2017-09-20 DIAGNOSIS — I10 ESSENTIAL HYPERTENSION: ICD-10-CM

## 2017-09-20 DIAGNOSIS — I25.118 CORONARY ARTERY DISEASE OF NATIVE ARTERY OF NATIVE HEART WITH STABLE ANGINA PECTORIS (HCC): ICD-10-CM

## 2017-09-20 DIAGNOSIS — Z95.1 S/P CABG X 2: ICD-10-CM

## 2017-09-20 DIAGNOSIS — R07.89 OTHER CHEST PAIN: Primary | ICD-10-CM

## 2017-09-20 LAB
ALBUMIN SERPL-MCNC: 4 G/DL (ref 3.4–5)
ALBUMIN/GLOB SERPL: 1.3 {RATIO} (ref 0.8–1.7)
ALP SERPL-CCNC: 95 U/L (ref 45–117)
ALT SERPL-CCNC: 25 U/L (ref 16–61)
ANION GAP SERPL CALC-SCNC: 8 MMOL/L (ref 3–18)
AST SERPL-CCNC: 29 U/L (ref 15–37)
BASOPHILS # BLD: 0 K/UL (ref 0–0.06)
BASOPHILS NFR BLD: 0 % (ref 0–2)
BILIRUB SERPL-MCNC: 0.8 MG/DL (ref 0.2–1)
BUN SERPL-MCNC: 25 MG/DL (ref 7–18)
BUN/CREAT SERPL: 19 (ref 12–20)
CALCIUM SERPL-MCNC: 9 MG/DL (ref 8.5–10.1)
CHLORIDE SERPL-SCNC: 102 MMOL/L (ref 100–108)
CO2 SERPL-SCNC: 28 MMOL/L (ref 21–32)
CREAT SERPL-MCNC: 1.33 MG/DL (ref 0.6–1.3)
DIFFERENTIAL METHOD BLD: ABNORMAL
EOSINOPHIL # BLD: 0.1 K/UL (ref 0–0.4)
EOSINOPHIL NFR BLD: 1 % (ref 0–5)
ERYTHROCYTE [DISTWIDTH] IN BLOOD BY AUTOMATED COUNT: 17 % (ref 11.6–14.5)
GLOBULIN SER CALC-MCNC: 3.1 G/DL (ref 2–4)
GLUCOSE SERPL-MCNC: 82 MG/DL (ref 74–99)
HCT VFR BLD AUTO: 41.6 % (ref 36–48)
HGB BLD-MCNC: 12.7 G/DL (ref 13–16)
INR PPP: 1.4 (ref 0.8–1.2)
LYMPHOCYTES # BLD: 0.5 K/UL (ref 0.9–3.6)
LYMPHOCYTES NFR BLD: 9 % (ref 21–52)
MCH RBC QN AUTO: 28 PG (ref 24–34)
MCHC RBC AUTO-ENTMCNC: 30.5 G/DL (ref 31–37)
MCV RBC AUTO: 91.8 FL (ref 74–97)
MONOCYTES # BLD: 0.7 K/UL (ref 0.05–1.2)
MONOCYTES NFR BLD: 11 % (ref 3–10)
NEUTS SEG # BLD: 4.8 K/UL (ref 1.8–8)
NEUTS SEG NFR BLD: 79 % (ref 40–73)
PLATELET # BLD AUTO: 201 K/UL (ref 135–420)
PMV BLD AUTO: 9.2 FL (ref 9.2–11.8)
POTASSIUM SERPL-SCNC: 4.5 MMOL/L (ref 3.5–5.5)
PROT SERPL-MCNC: 7.1 G/DL (ref 6.4–8.2)
PROTHROMBIN TIME: 16.3 SEC (ref 11.5–15.2)
RBC # BLD AUTO: 4.53 M/UL (ref 4.7–5.5)
SODIUM SERPL-SCNC: 138 MMOL/L (ref 136–145)
WBC # BLD AUTO: 6.1 K/UL (ref 4.6–13.2)

## 2017-09-20 PROCEDURE — 85610 PROTHROMBIN TIME: CPT | Performed by: INTERNAL MEDICINE

## 2017-09-20 PROCEDURE — 85025 COMPLETE CBC W/AUTO DIFF WBC: CPT | Performed by: INTERNAL MEDICINE

## 2017-09-20 PROCEDURE — 80053 COMPREHEN METABOLIC PANEL: CPT | Performed by: INTERNAL MEDICINE

## 2017-09-20 PROCEDURE — 36415 COLL VENOUS BLD VENIPUNCTURE: CPT | Performed by: INTERNAL MEDICINE

## 2017-09-20 NOTE — MR AVS SNAPSHOT
Visit Information Date & Time Provider Department Dept. Phone Encounter #  
 9/20/2017 10:00 AM Azam Denton MD [address] [place] [address] Specialist at [place] [address] [id number] Follow-up Instructions Return in about 1 week (around 9/27/2017). Your Appointments 10/9/2017 10:15 AM  
Follow Up with Azam Denton MD  
Cardio Specialist at [facility] Appt Note: 6 week follow up  
 [address] [facility] [address]  
  
   
 [address] [facility] [address] [place] Upcoming Health Maintenance Date Due ZOSTER VACCINE AGE 60> 9/10/1990 GLAUCOMA SCREENING Q2Y 11/10/1995 Pneumococcal 65+ Low/Medium Risk (1 of 2 - PCV13) 11/10/1995 MEDICARE YEARLY EXAM 11/10/1995 INFLUENZA AGE 9 TO ADULT 8/1/2017 DTaP/Tdap/Td series (2 - Td) 9/10/2024 Allergies as of 9/20/2017  Review Complete On: 9/20/2017 By: Alen Estrada LPN Severity Noted Reaction Type Reactions Beta-blockers (Beta-adrenergic Blocking Agts)  08/24/2015    Drowsiness Penicillins  06/13/2014    Swelling Statins-hmg-coa Reductase Inhibitors  08/24/2015    Drowsiness Current Immunizations  Never Reviewed Name Date Tdap 9/10/2014  9:38 PM  
  
 Not reviewed this visit You Were Diagnosed With   
  
 Codes Comments Other chest pain    -  Primary ICD-10-CM: R07.89 ICD-9-CM: 786.59 Coronary artery disease of native artery of native heart with stable angina pectoris ([facility] [address]     ICD-10-CM: I25.118 
ICD-9-CM: 414.01, 413.9 Vitals BP Pulse Height(growth percentile) Weight(growth percentile) SpO2 BMI  
 157/74 71 5' 9\" (1.753 m) 146 lb (66.2 kg) 96% 21.56 kg/m2 Smoking Status Former Smoker BMI and BSA Data Body Mass Index Body Surface Area  
 21.56 kg/m 2 1.8 m 2 Preferred Pharmacy Pharmacy Name Phone [place], [address] [phone] Your Updated Medication List  
  
   
This list is accurate as of: 9/20/17 10:44 AM.  Always use your most recent med list. amLODIPine 5 mg tablet Commonly known as:  [place] Take 5 mg by mouth daily. ELIQUIS 5 mg tablet Generic drug:  apixaban Take 5 mg by mouth two (2) times a day. enalapril 20 mg tablet Commonly known as:  Lewayne Rife Take 20 mg by mouth daily. furosemide 40 mg tablet Commonly known as:  LASIX Take 40 mg by mouth daily. isosorbide mononitrate ER 30 mg tablet Commonly known as:  IMDUR Take 1 Tab by mouth daily. LORazepam 0.5 mg tablet Commonly known as:  ATIVAN TK 1 T PO  BID PRN  
  
 nitroglycerin 0.3 mg SL tablet Commonly known as:  NITROSTAT  
1 Tab by SubLINGual route every five (5) minutes as needed for Chest Pain (up to 3 total 1 every 5 min). pantoprazole 40 mg tablet Commonly known as:  PROTONIX TK 1 T PO D 20 TO 30 MINUTES BEFORE A MEAL  
  
 TOPROL XL 50 mg XL tablet Generic drug:  metoprolol succinate Take 50 mg by mouth daily. We Performed the Following AMB POC EKG ROUTINE W/ 12 LEADS, INTER & REP [51195 CPT(R)] Follow-up Instructions Return in about 1 week (around 9/27/2017). To-Do List   
 09/20/2017 Lab:  CBC WITH AUTOMATED DIFF   
  
 09/20/2017 Lab:  METABOLIC PANEL, COMPREHENSIVE   
  
 09/20/2017 Imaging:  NM CARDIAC SPECT W STRS/REST MULT   
  
 09/20/2017 ECG:  NUCLEAR STRESS TEST   
  
 09/20/2017 Lab:  PROTHROMBIN TIME + INR Referral Information Referral ID Referred By Referred To  
  
 [id number] Carolyne Pedroza Not Available Visits Status Start Date End Date 1 New Request 9/20/17 9/20/18  If your referral has a status of pending review or denied, additional information will be sent to support the outcome of this decision. Introducing [facility] & HEALTH SERVICES! Dear Christiano Akins: Thank you for requesting a [employer] account. Our records indicate that you already have an active [employer] account. You can access your account anytime at [email or web address] Did you know that you can access your hospital and ER discharge instructions at any time in [employer]? You can also review all of your test results from your hospital stay or ER visit. Additional Information If you have questions, please visit the Frequently Asked Questions section of the [employer] website at [email or web address] Remember, [employer] is NOT to be used for urgent needs. For medical emergencies, dial 911. Now available from your iPhone and Android! Please provide this summary of care documentation to your next provider. Your primary care clinician is listed as [facility]. If you have any questions after today's visit, please call [phone].

## 2017-09-22 NOTE — PROGRESS NOTES
Subjective:      Nilton Smith is in the office today for cardiac reevaluation. He is an 19-year-old man that has a history of hypertension, coronary artery disease, dyslipidemia, prior coronary bypass grafting, cardiomyopathy, atrial fibrillation, pacemaker, renal artery stenosis and congestive heart failure. The patient had prior coronary bypass grafting in 2008. Prior to his coronary bypass surgery, he was experiencing primarily easy fatigability. He has had repeat cardiac catheterizations since the time of his surgery, the last of which was done in 2016. At that time, medical therapy was advised. The patient has recently had an increasing episodes of chest discomfort. At the time of his last visit, he was started on low dose Isosorbide. He believes his chest discomfort was worse than it had been. He has had no episodes at rest or at night. Patient Active Problem List    Diagnosis Date Noted    Osteoarthritis of both knees 08/20/2017    Statin intolerance 05/03/2017    Essential hypertension 02/27/2017    Anxiety 02/27/2017    Chronic systolic congestive heart failure ([facility] [address] 01/25/2017    S/P CABG x 2 08/25/2015    Atherosclerotic NAHED (renal artery stenosis), bilateral ([facility] [address] [facility] 08/25/2015    Dyslipidemia     Coronary artery disease of native artery of native heart with stable angina pectoris (HCC)     Cardiomyopathy     Atrial fibrillation     Sick sinus syndrome      Current Outpatient Prescriptions   Medication Sig Dispense Refill    isosorbide mononitrate ER (IMDUR) 30 mg tablet Take 1 Tab by mouth daily. 30 Tab 6    amLODIPine (NORVASC) 5 mg tablet Take 5 mg by mouth daily.  nitroglycerin (NITROSTAT) 0.3 mg SL tablet 1 Tab by SubLINGual route every five (5) minutes as needed for Chest Pain (up to 3 total 1 every 5 min).  1 Bottle 2    pantoprazole (PROTONIX) 40 mg tablet TK 1 T PO D 20 TO 30 MINUTES BEFORE A MEAL  2    metoprolol succinate (TOPROL XL) 50 mg XL tablet Take 50 mg by mouth daily.  LORazepam (ATIVAN) 0.5 mg tablet TK 1 T PO  BID PRN 25 Tab 1    furosemide (LASIX) 40 mg tablet Take 40 mg by mouth daily.  apixaban (ELIQUIS) 5 mg tablet Take 5 mg by mouth two (2) times a day.  enalapril (VASOTEC) 20 mg tablet Take 20 mg by mouth daily. Allergies   Allergen Reactions    Beta-Blockers (Beta-Adrenergic Blocking Agts) Drowsiness    Penicillins Swelling    Statins-Hmg-Coa Reductase Inhibitors Drowsiness     Past Medical History:   Diagnosis Date    Atrial fibrillation     CHADS score 3  (+CHF, +HTN, +AGE, -DM, -CVA)    CABG     2008   LIMA - LAD,   SVG - RCA    Cardiomyopathy     EF 30-35% (ECHO 6/14)    Coronary artery disease     Dyslipidemia     Hypertension     Hypothyroid     Pacemaker     Peripheral vascular disease     Renal artery stenosis     bilateral stents    Sick sinus syndrome      Past Surgical History:   Procedure Laterality Date    HX CORONARY ARTERY BYPASS GRAFT      2008   LIMA - LAD,   SVG - RCA     No family history on file. History   Smoking Status    Former Smoker   Smokeless Tobacco    Not on file          Review of Systems, additional:  Constitutional: negative  Eyes: negative  Respiratory: negative  Cardiovascular: positive for chest pressure/discomfort, fatigue, dyspnea on exertion  Gastrointestinal: negative  Musculoskeletal:positive for arthralgias  Neurological: negative  Behvioral/Psych: negative  Endocrine: negative  ENT: negative    Objective:     Visit Vitals    /74    Pulse 71    Ht 5' 9\" (1.753 m)    Wt 146 lb (66.2 kg)    SpO2 96%    BMI 21.56 kg/m2     General:  alert, cooperative, no distress   Chest Wall: inspection normal - no chest wall deformities or tenderness, respiratory effort normal   Lung: clear to auscultation bilaterally   Heart:  normal rate and regular rhythm, no murmurs noted   Abdomen: soft, non-tender.  Bowel sounds normal. No masses,  no organomegaly Extremities: extremities normal, atraumatic, no cyanosis or edema Skin: no rashes   Neuro: alert, oriented, normal speech, no focal findings or movement disorder noted         Assessment/Plan:       ICD-10-CM ICD-9-CM    1. Renal artery stenoses  440.1    2. Chronic atrial fibrillation ([facility] [address] on Eliquis for stroke prevention and BB for rate control. I48.2 427.31    3. Other cardiomyopathy ([facility] [address] EF by nuclear scanning 09/2015 was 60%, EF was 30-35% by Echo 06/2014 I42.8     4. Chronic systolic congestive heart failure (HCC) I50.22 428.22      428.0    5. Coronary artery disease of native artery of native heart with stable angina pectoris [facility]), now with symptoms of more chest discomfort and incraesing SOB. Will order nuclear stress test. RT 1 week I25.118 414.01      413.9    6. Essential hypertension, controlled in office today, complaining of fatigue. Will decrease amlodipine to 5 mg qHS and have him take Enalapril at night. RT 6 weeks. I10 401.9    7. Sick sinus syndrome I49.5 427.81    8. Dyslipidemia E78.5 272.4    9. S/P CABG x 2 , 2008, LIMA-LAD, SVG-RCA  Z95.1 V45.81    10. Statin intolerance Z78.9 995.27    11.  Osteoarthritis of both knees, unspecified osteoarthritis type.,contemplating surgery    M17.0 715.96

## 2017-09-24 NOTE — PROGRESS NOTES
Subjective:      Kaylah Kate is in the office today for cardiac reevaluation. He is an 51-year-old man that has a history of hypertension, coronary artery disease, dyslipidemia, prior coronary bypass grafting, cardiomyopathy, atrial fibrillation, pacemaker, renal artery stenosis and congestive heart failure. The patient had prior coronary bypass grafting in 2008. Prior to his coronary bypass surgery, he was experiencing primarily easy fatigability. He has had repeat cardiac catheterizations since the time of his surgery, the last of which was done in 2016. At that time, medical therapy was advised. The patient has had occasional episodes of chest discomfort when he is doing fairly strenuous activities such as lifting or carrying something fairly heavy. He describes it as a tightness in his chest.  There has been no change in pattern. He has had no episodes at rest or at night. In the office today, he says that he feels more tired lately and more breathless at times. He has been sleeping more. His weight has increased 2 lbs. Patient Active Problem List    Diagnosis Date Noted    Osteoarthritis of both knees 08/20/2017    Statin intolerance 05/03/2017    Essential hypertension 02/27/2017    Anxiety 02/27/2017    Chronic systolic congestive heart failure ([facility] 01/25/2017    S/P CABG x 2 08/25/2015    Atherosclerotic NAHED (renal artery stenosis), bilateral ([facility] 08/25/2015    Dyslipidemia     Coronary artery disease of native artery of native heart with stable angina pectoris (HCC)     Cardiomyopathy     Atrial fibrillation     Sick sinus syndrome      Current Outpatient Prescriptions   Medication Sig Dispense Refill    isosorbide mononitrate ER (IMDUR) 30 mg tablet Take 1 Tab by mouth daily. 30 Tab 6    amLODIPine (NORVASC) 5 mg tablet Take 5 mg by mouth daily.       nitroglycerin (NITROSTAT) 0.3 mg SL tablet 1 Tab by SubLINGual route every five (5) minutes as needed for Chest Pain (up to 3 total 1 every 5 min). 1 Bottle 2    pantoprazole (PROTONIX) 40 mg tablet TK 1 T PO D 20 TO 30 MINUTES BEFORE A MEAL  2    metoprolol succinate (TOPROL XL) 50 mg XL tablet Take 50 mg by mouth daily.  LORazepam (ATIVAN) 0.5 mg tablet TK 1 T PO  BID PRN 25 Tab 1    furosemide (LASIX) 40 mg tablet Take 40 mg by mouth daily.  apixaban (ELIQUIS) 5 mg tablet Take 5 mg by mouth two (2) times a day.  enalapril (VASOTEC) 20 mg tablet Take 20 mg by mouth daily. Allergies   Allergen Reactions    Beta-Blockers (Beta-Adrenergic Blocking Agts) Drowsiness    Penicillins Swelling    Statins-Hmg-Coa Reductase Inhibitors Drowsiness     Past Medical History:   Diagnosis Date    Atrial fibrillation     CHADS score 3  (+CHF, +HTN, +AGE, -DM, -CVA)    CABG     2008   LIMA - LAD,   SVG - RCA    Cardiomyopathy     EF 30-35% (ECHO 6/14)    Coronary artery disease     Dyslipidemia     Hypertension     Hypothyroid     Pacemaker     Peripheral vascular disease     Renal artery stenosis     bilateral stents    Sick sinus syndrome      Past Surgical History:   Procedure Laterality Date    HX CORONARY ARTERY BYPASS GRAFT      2008   LIMA - LAD,   SVG - RCA     No family history on file.   History   Smoking Status    Former Smoker   Smokeless Tobacco    Not on file          Review of Systems, additional:  Constitutional: negative  Eyes: negative  Respiratory: negative  Cardiovascular: positive for chest pressure/discomfort, fatigue, dyspnea on exertion  Gastrointestinal: negative  Musculoskeletal:positive for arthralgias  Neurological: negative  Behvioral/Psych: negative  Endocrine: negative  ENT: negative    Objective:     Visit Vitals    /75    Pulse 71    Ht 5' 9\" (1.753 m)    Wt 146 lb (66.2 kg)    SpO2 98%    BMI 21.56 kg/m2     General:  alert, cooperative, no distress   Chest Wall: inspection normal - no chest wall deformities or tenderness, respiratory effort normal   Lung: clear to auscultation bilaterally   Heart:  normal rate and regular rhythm, no murmurs noted   Abdomen: soft, non-tender. Bowel sounds normal. No masses,  no organomegaly   Extremities: extremities normal, atraumatic, no cyanosis or edema Skin: no rashes   Neuro: alert, oriented, normal speech, no focal findings or movement disorder noted         Assessment/Plan:       ICD-10-CM ICD-9-CM    1. Renal artery stenoses  440.1    2. Chronic atrial fibrillation ([facility] on Eliquis for stroke prevention and BB for rate control. I48.2 427.31    3. Other cardiomyopathy ([facility] EF by nuclear scanning 09/2015 was 60%, EF was 30-35% by Echo 06/2014 I42.8     4. Chronic systolic congestive heart failure ([facility] will add Imdur 30 mg daily . Repeat Lasix later today. RT 5 days I50.22 428.22      428.0    5. Coronary artery disease of native artery of native heart with stable angina pectoris ([facility] I25.118 414.01      413.9    6. Essential hypertension, controlled in office, systolic BP increased today I10 401.9    7. Sick sinus syndrome I49.5 427.81    8. Dyslipidemia E78.5 272.4    9. S/P CABG x 2 , 2008, LIMA-LAD, SVG-RCA Z95.1 V45.81    10. Statin intolerance Z78.9 995.27    11.  Osteoarthritis of both knees, unspecified osteoarthritis type.,contemplating surgery but would be high risk in great likelihood M17.0 715.96

## 2017-09-25 ENCOUNTER — HOSPITAL ENCOUNTER (OUTPATIENT)
Dept: NON INVASIVE DIAGNOSTICS | Age: 82
Discharge: HOME | End: 2017-09-25
Attending: INTERNAL MEDICINE
Payer: MEDICARE

## 2017-09-25 ENCOUNTER — HOSPITAL ENCOUNTER (OUTPATIENT)
Dept: NUCLEAR MEDICINE | Age: 82
Discharge: HOME | End: 2017-09-25
Attending: INTERNAL MEDICINE
Payer: MEDICARE

## 2017-09-25 DIAGNOSIS — R07.89 OTHER CHEST PAIN: ICD-10-CM

## 2017-09-25 DIAGNOSIS — I25.118 CORONARY ARTERY DISEASE OF NATIVE ARTERY OF NATIVE HEART WITH STABLE ANGINA PECTORIS (HCC): ICD-10-CM

## 2017-09-25 PROCEDURE — 78452 HT MUSCLE IMAGE SPECT MULT: CPT

## 2017-09-25 PROCEDURE — 93017 CV STRESS TEST TRACING ONLY: CPT | Performed by: INTERNAL MEDICINE

## 2017-09-25 PROCEDURE — 74011250636 HC RX REV CODE- 250/636: Performed by: INTERNAL MEDICINE

## 2017-09-25 RX ADMIN — REGADENOSON 0.4 MG: 0.08 INJECTION, SOLUTION INTRAVENOUS at 09:48

## 2017-09-26 ENCOUNTER — TELEPHONE (OUTPATIENT)
Dept: CARDIOLOGY CLINIC | Age: 82
End: 2017-09-26

## 2017-10-24 ENCOUNTER — HOSPITAL ENCOUNTER (OUTPATIENT)
Dept: PHYSICAL THERAPY | Age: 82
Discharge: HOME | End: 2017-10-24
Attending: INTERNAL MEDICINE
Payer: MEDICARE

## 2017-10-24 ENCOUNTER — HOSPITAL ENCOUNTER (OUTPATIENT)
Dept: GENERAL RADIOLOGY | Age: 82
Discharge: HOME | End: 2017-10-24
Attending: INTERNAL MEDICINE
Payer: MEDICARE

## 2017-10-24 DIAGNOSIS — R13.12 OROPHARYNGEAL DYSPHAGIA: ICD-10-CM

## 2017-10-24 PROCEDURE — 92611 MOTION FLUOROSCOPY/SWALLOW: CPT

## 2017-10-24 PROCEDURE — G8996 SWALLOW CURRENT STATUS: HCPCS

## 2017-10-24 PROCEDURE — 74011000255 HC RX REV CODE- 255: Performed by: INTERNAL MEDICINE

## 2017-10-24 PROCEDURE — G8997 SWALLOW GOAL STATUS: HCPCS

## 2017-10-24 PROCEDURE — 74230 X-RAY XM SWLNG FUNCJ C+: CPT

## 2017-10-24 PROCEDURE — G8998 SWALLOW D/C STATUS: HCPCS

## 2017-10-24 RX ADMIN — BARIUM SULFATE 700 MG: 700 TABLET ORAL at 10:41

## 2017-10-24 RX ADMIN — BARIUM SULFATE 30 ML: 0.81 POWDER, FOR SUSPENSION ORAL at 10:41

## 2017-10-24 RX ADMIN — BARIUM SULFATE 20 ML: 400 PASTE ORAL at 10:41

## 2017-10-24 NOTE — PROGRESS NOTES
Outpatient Modified Barium Swallow Evaluation    Patient: Jeronimo Segura (62 y.o. male)  Date: 10/24/2017  Primary Diagnosis: Other dysphagia [R13.19]        Precautions: aspiration       Videofluoroscopy Results  MBS completed secondary to pt reports of frequent coughing with food; pmhx sig for recurrent PNA. MBS completed with x1 instance of aspiration with serial swallows of thin liquids with straw; consistent mod-VF penetration with straw. Aspiration decreased to penetration when performing chin tuck for airway protection. No aspiration evident with pudding, cracker, or 13 mm Ba+ tablet with thin liquid wash. Decreased hyolaryngeal excursion noted with mild vallecular/pyriform residue. Pt presents with mild pharyngeal dysphagia, as evidenced above, which places pt at risk for aspiration. At this time, safest for regular solid, thin liquid diet (NO STRAWS/ONE SIP AT A TIME). SLP utilized video of study for visual feedback, education, and recommendations for pt; verbalized comprehension. Video Flouroscopic Procedures  [x] Lateral View   [] A-P View [] Scanned to level of Sternum    [x] Seated at 90 deg.   [] Other:    Presentation:    [x] Spoon   [x] Cup   [x] Straw   [] Syringe   [x] Consecutive Swallows  [] Other:    Consistencies:   [x] Ba+ liquid   [] Ba+ liquid (nectar)   [] Ba+ liquid (honey)   [x] Ba+ puree [x] Ba+ cookie [x] 13 mm Ba pill with thin Ba wash    Testing Discontinued:   [] Due to:    Treatment Techniques Attempted  [] Head Turn: [] Right [] Left     [] Head Tilt: [] Right [] Left     [x] Chin Down: unsuccessful  [] Small Sips/bites:  [] Effortful swallow:  [] Double swallow:  [] Other:    Results  Dysphagia Present:     [x] Yes  [] No    Ratings of Dysphagia:     [x] Mild  [] Moderate  [] Severe    Stages of Breakdown:   [] Oral  [x] Pharyngeal  [] Esophageal    Aspiration:   [x] Yes    [] No  [x] At Risk     Cough: [x] Yes      [] No     Penetration:   [x] Yes    [] No     Cough: [x] Yes      [] No   [] Flash/trace   [x] Mod   [x] To Chords          Consistency Aspirated:   Consistency Penetrated:   [x] Thin Liquid     [x] Thin Liquid  [] Nectar-thick Liquid    [] Nectar-thick Liquid   [] Honey-thick Liquid    [] Honey-thick Liquid   [] Puree     [] Puree  [] Solid     [] Solid  [] 13 mm Ba pill with     [] 13 mm Ba pill with      Motility Problems with:  [] Lip Closure:    [] Mastication:   [] Bolus Formation/control:   [] A-P Transport:  [] Tongue Base Retraction:  [] Swallow Response (delayed):  [] Velopharyngeal Closure:  [x] Laryngeal Elevation/adduction:  [] Epiglottic Inversion:  [] Pharyngeal motility/sensation:  [] Cricopharyngeal Relaxation:  [] Esophageal Peristalsis:  [] Other:    Timing of Aspiration/Penetration:  [] Before Swallow:  [x] During Swallow:  [x] After Swallow:    Transit Time Delay:  [] >1 Second  Oral  [x] >1 Second Pharyngeal  [] >20 Second Esophageal     Residuals:  [x] Vallecula:    [x] Mild  [] Mod  [] Severe  [x] Pyriform Sinus:   [x] Mild  [] Mod  [] Severe  [] Posterior Pharyngeal Wall:  [] Mild  [] Mod  [] Severe    GCODES(GN):GCODESwallowing:  Swallow Current Status CI= 1-19%   Swallow Goal Status CI= 1-19%   Swallow D/C Status CI= 1-19%     The severity rating is based on the following outcomes:    National Outcomes Measures (NOMS) - HEBERT Noms Swallow Level 6  8-point Penetration-Aspiration Scale - Score 7  Professional Judgement

## 2017-10-26 ENCOUNTER — HOSPITAL ENCOUNTER (OUTPATIENT)
Dept: PHYSICAL THERAPY | Age: 82
Discharge: HOME | End: 2017-10-26
Payer: MEDICARE

## 2017-10-26 PROCEDURE — G8997 SWALLOW GOAL STATUS: HCPCS

## 2017-10-26 PROCEDURE — G8996 SWALLOW CURRENT STATUS: HCPCS

## 2017-10-26 PROCEDURE — 92507 TX SP LANG VOICE COMM INDIV: CPT

## 2017-10-26 PROCEDURE — 92610 EVALUATE SWALLOWING FUNCTION: CPT

## 2017-10-26 NOTE — PROGRESS NOTES
[facility]  [address]  Ph: [phone]  Fax: [phone]    Plan of Care/ Statement of Necessity for Speech Therapy Services    Patient name: Edilson Boogie Start of Care: 10/26/2017   Referral source: Dee Wilburn MD : 11/10/1930    Medical Diagnosis: Other dysphagia [R13.19]   Onset Date:    Treatment Diagnosis: oropharyngeal dysphagia   Prior Hospitalization: see medical history Provider#: [id number]   Medications: Verified on Patient summary List    Comorbidities: CHF, pacemaker   Prior Level of Function: Regular thin liquid diet    The Plan of Care and following information is based on the information from the initial evaluation. Assessment/ key information: Patient seen for swallowing evaluation s/p MBS 10/24/17 with the following results, \"MBS completed with x1 instance of aspiration with serial swallows of thin liquids with straw; consistent mod-VF penetration with straw. Aspiration decreased to penetration when performing chin tuck for airway protection. No aspiration evident with pudding, cracker, or 13 mm Ba+ tablet with thin liquid wash. Decreased hyolaryngeal excursion noted with mild vallecular/pyriform residue. Pt presents with mild pharyngeal dysphagia, as evidenced above, which places pt at risk for aspiration. At this time, safest for regular solid, thin liquid diet (NO STRAWS/ONE SIP AT A TIME). SLP utilized video of study for visual feedback, education, and recommendations for pt; verbalized comprehension\" Patient reports utilizing small sips and bites at home with a regular diet. PMHx of recurrent pneumonia. Patient completed bedside swallow evaluation with mild oropharyngeal dysphagia appreciated. Patient with functional oral motor structures, movements, and strength. Patient with thin liquids via cup sip. Patient with small sips Claudia with 0 s/sx of aspiration across all trials, however reports minimal discomfort.  Patient with chin tuck with intermittent throat clears. SLP recommending continue regular diet with thin liquids with STRICT safe swallowing guidelines. SLP recommend skilled ST services to address swallow deficits and complete swallow exercise program.    Problem List:   Dysphagia    Treatment Plan may include any combination of the following: Dysphagia Treatment and Treatment of Swallowing      Patient / Family readiness to learn indicated by: asking questions, trying to perform skills and interest    Persons(s) to be included in education:   patient (P)    Barriers to Learning/Limitations: None    Patient Goal (s): Swallow with comfort    Patient Self Reported Health Status: fair    Rehabilitation Potential: good    Short Term Goals: To be accomplished in 1-2 weeks  Pt will:   1. Complete pharyngeal swallow exercises (hard swallow, breath hold, push, pull, zach, mendelsohn, supraglottic, super-supraglottic, shaker, as appropriate) in therapy and at home in 4/5 trials with min A given visual/verbal cues to increase pharyngeal muscle strength. 2. Demonstrate ability to utilize aspiration/reflux precautions and compensatory strategies (double swallow; diet consistency adjustment; alternate liquids/solids; small sips/bites, meds crushed) to decrease the reported incidences of dysphagia and s/sx of aspiration with min A with visual/verbal cues. Long Term Goals: To be accomplished in 2-4 weeks   Pt will:   1. Tolerate L.R.D. to facilitate maintenance of hydration/nutrition needs without overt s/sx of aspiration in 3/5 trials with min A with visual/verbal cues.        Frequency / Duration: Patient to be seen 1-2 times per week for 4 weeks:    G Codes:  Swallowing:  Swallow Current Status CI= 1-19%   Swallow Goal Status CH= 0%    The severity rating is based on the following outcomes:    National Outcomes Measures (NOMS)    Patient/ Caregiver education and instruction: Diagnosis, prognosis, Swallowing Precautions and Exercises    Certification Period: 10.26.17-11.23.17    [facility] 10/26/2017 11:56 AM  ______________________________________________________________________    I certify that the above Therapy Services are being furnished while the patient is under my care. I agree with the treatment plan and certify that this therapy is necessary. Physician's Signature:____________________  Date:___________Time:_________    Please sign and return to [facility].   Ph: [phone]  Fax: [phone]

## 2017-10-30 ENCOUNTER — OFFICE VISIT (OUTPATIENT)
Dept: CARDIOLOGY CLINIC | Age: 82
End: 2017-10-30

## 2017-10-30 ENCOUNTER — HOSPITAL ENCOUNTER (OUTPATIENT)
Dept: GENERAL RADIOLOGY | Age: 82
Discharge: HOME | End: 2017-10-30
Attending: INTERNAL MEDICINE
Payer: MEDICARE

## 2017-10-30 VITALS
WEIGHT: 145 LBS | BODY MASS INDEX: 21.48 KG/M2 | DIASTOLIC BLOOD PRESSURE: 76 MMHG | SYSTOLIC BLOOD PRESSURE: 148 MMHG | HEIGHT: 69 IN | HEART RATE: 69 BPM

## 2017-10-30 DIAGNOSIS — F41.9 ANXIETY: ICD-10-CM

## 2017-10-30 DIAGNOSIS — I50.22 CHRONIC SYSTOLIC CONGESTIVE HEART FAILURE (HCC): ICD-10-CM

## 2017-10-30 DIAGNOSIS — E78.5 DYSLIPIDEMIA: ICD-10-CM

## 2017-10-30 DIAGNOSIS — I48.20 CHRONIC ATRIAL FIBRILLATION (HCC): ICD-10-CM

## 2017-10-30 DIAGNOSIS — I10 ESSENTIAL HYPERTENSION: ICD-10-CM

## 2017-10-30 DIAGNOSIS — Z78.9 STATIN INTOLERANCE: ICD-10-CM

## 2017-10-30 DIAGNOSIS — Z95.1 S/P CABG X 2: ICD-10-CM

## 2017-10-30 DIAGNOSIS — I25.118 CORONARY ARTERY DISEASE OF NATIVE ARTERY OF NATIVE HEART WITH STABLE ANGINA PECTORIS (HCC): ICD-10-CM

## 2017-10-30 DIAGNOSIS — I49.5 SSS (SICK SINUS SYNDROME) (HCC): ICD-10-CM

## 2017-10-30 DIAGNOSIS — I42.8 OTHER CARDIOMYOPATHY (HCC): ICD-10-CM

## 2017-10-30 DIAGNOSIS — R06.02 SOB (SHORTNESS OF BREATH): Primary | ICD-10-CM

## 2017-10-30 DIAGNOSIS — R06.02 SOB (SHORTNESS OF BREATH): ICD-10-CM

## 2017-10-30 DIAGNOSIS — I70.1 ATHEROSCLEROTIC RAS (RENAL ARTERY STENOSIS), BILATERAL (HCC): ICD-10-CM

## 2017-10-30 PROCEDURE — 71020 XR CHEST PA LAT: CPT

## 2017-10-30 NOTE — MR AVS SNAPSHOT
Visit Information Date & Time Provider Department Dept. Phone Encounter #  
 10/30/2017  9:30 AM Neyda Arreguin MD [address] [place] [address] Specialist at [facility] [phone] [id number] Follow-up Instructions Return in about 1 week (around 11/6/2017). Upcoming Health Maintenance Date Due ZOSTER VACCINE AGE 60> 9/10/1990 GLAUCOMA SCREENING Q2Y 11/10/1995 Pneumococcal 65+ Low/Medium Risk (1 of 2 - PCV13) 11/10/1995 MEDICARE YEARLY EXAM 11/10/1995 INFLUENZA AGE 9 TO ADULT 8/1/2017 DTaP/Tdap/Td series (2 - Td) 9/10/2024 Allergies as of 10/30/2017  Review Complete On: 10/30/2017 By: Doreen Moss Severity Noted Reaction Type Reactions Beta-blockers (Beta-adrenergic Blocking Agts)  08/24/2015    Drowsiness Penicillins  06/13/2014    Swelling Statins-hmg-coa Reductase Inhibitors  08/24/2015    Drowsiness Current Immunizations  Never Reviewed Name Date Tdap 9/10/2014  9:38 PM  
  
 Not reviewed this visit You Were Diagnosed With   
  
 Codes Comments SOB (shortness of breath)    -  Primary ICD-10-CM: R06.02 
ICD-9-CM: 786.05 Chronic systolic congestive heart failure (HCC)     ICD-10-CM: I50.22 ICD-9-CM: 428.22, 428.0 Vitals BP Pulse Height(growth percentile) Weight(growth percentile) BMI Smoking Status 148/76 69 5' 9\" (1.753 m) 145 lb (65.8 kg) 21.41 kg/m2 Former Smoker Vitals History BMI and BSA Data Body Mass Index Body Surface Area  
 21.41 kg/m 2 1.79 m 2 Preferred Pharmacy Pharmacy Name Phone [place], [address] [facility] [phone] Your Updated Medication List  
  
   
This list is accurate as of: 10/30/17 10:33 AM.  Always use your most recent med list. amLODIPine 5 mg tablet Commonly known as:  Berry Fanti Take 5 mg by mouth daily. ELIQUIS 5 mg tablet Generic drug:  apixaban Take 5 mg by mouth two (2) times a day. enalapril 20 mg tablet Commonly known as:  Fredie Handing Take 20 mg by mouth daily. furosemide 40 mg tablet Commonly known as:  LASIX Take 40 mg by mouth daily. isosorbide mononitrate ER 30 mg tablet Commonly known as:  IMDUR Take 1 Tab by mouth daily. LORazepam 0.5 mg tablet Commonly known as:  ATIVAN TK 1 T PO  BID PRN  
  
 nitroglycerin 0.3 mg SL tablet Commonly known as:  NITROSTAT  
1 Tab by SubLINGual route every five (5) minutes as needed for Chest Pain (up to 3 total 1 every 5 min). pantoprazole 40 mg tablet Commonly known as:  PROTONIX TK 1 T PO D 20 TO 30 MINUTES BEFORE A MEAL  
  
 TOPROL XL 50 mg XL tablet Generic drug:  metoprolol succinate Take 50 mg by mouth daily. Follow-up Instructions Return in about 1 week (around 11/6/2017). To-Do List   
 10/30/2017 Imaging:  XR CHEST PA LAT   
  
 10/30/2017 Imaging:  XR UPPER GI W KUB/ BA SWALLOW   
  
 10/31/2017 11:30 AM  
  Appointment with [address] [place] [address] PT [address] ([phone])  
  
 11/07/2017 11:30 AM  
  Appointment with [address] [place] [address] PT [place] [address] [facility] ([phone])  
  
 11/14/2017 11:30 AM  
  Appointment with [address] [place] [address] PT [place] [address] IM ([phone])  
  
 11/21/2017 12:30 PM  
  Appointment with [address] [place] [address] [facility] ([phone])  
  
 11/28/2017 11:30 AM  
  Appointment with [address] [place] [address] [place] [facility] [address] [facility] ([phone]) [facility] SERVICES! Dear Luisana Dennis: Thank you for requesting a be2 account. Our records indicate that you already have an active be2 account. You can access your account anytime at [email or web address] Did you know that you can access your hospital and ER discharge instructions at any time in MyChart?   You can also review all of your test results from your hospital stay or ER visit. Additional Information If you have questions, please visit the Frequently Asked Questions section of the [employer] website at [email or web address] Remember, [employer] is NOT to be used for urgent needs. For medical emergencies, dial 911. Now available from your iPhone and Android! Please provide this summary of care documentation to your next provider. Your primary care clinician is listed as Jennifer Bolaños. If you have any questions after today's visit, please call [phone].

## 2017-10-30 NOTE — PROGRESS NOTES
1. Have you been to the ER, urgent care clinic since your last visit? Hospitalized since your last visit? No      2. Have you seen or consulted any other health care providers outside of the [address] [place] [address] since your last visit? Include any pap smears or colon screening.  No

## 2017-10-31 ENCOUNTER — HOSPITAL ENCOUNTER (OUTPATIENT)
Dept: PHYSICAL THERAPY | Age: 82
Discharge: HOME | End: 2017-10-31
Payer: MEDICARE

## 2017-10-31 PROCEDURE — 92526 ORAL FUNCTION THERAPY: CPT

## 2017-10-31 NOTE — PROGRESS NOTES
ST DAILY TREATMENT NOTE    Patient Name: Mari Latif  Date:10/31/2017  : 11/10/1930  [x]  Patient  Verified  Payor: Payor: [place] MEDICARE / Plan: [place] MEDICARE PART A & B / Product Type: Medicare /   In time:1130  Out time:1200  Total Treatment Time (min): 30  Visit #: 1 of 8-10    Treatment Diagnosis: Other dysphagia [R13.19]    SUBJECTIVE  Pain Level (0-10 scale): 0  Any medication changes, allergies to medications, adverse drug reactions, diagnosis change, or new procedure performed?: [x] No    [] Yes (see summary sheet for update)    Subjective functional status/changes:   [] No changes reported  Patient reports completing chest xray with no acute findings. OBJECTIVE  Treatment provided includes:  Increase/Improve:   []  Voice Quality []  Cognitive Linguistic Skills []  Laryngeal/Pharyngeal Exercises   []  Vocal Loudness []  Reading Comprehension [x]  Swallowing Skills    []  Vocal Cord Function []  Auditory Comprehension []  Oral Motor Skills   []  Resonance []  Writing Skills []  Compensatory strategies    []  Speech Intelligibility []  Expressive Language []  Attention   []  Breath Support/Coord. []  Receptive language []  Memory   []  Articulation []  Safety Awareness []    []  Fluency []  Word Retrieval []        Treatment Provided:  -completed laryngeal strengthening exercises with Ally 9/10 oppportunities. -thin liquid trials with safe swallowing guidelines in 100% of trials.     Patient/Caregiver  Education: [x] Review HEP      HEP/Handouts given: laryngeal strengthening exercises (x15 reps, 3-5x/day)    Pain Level (0-10 scale) post treatment: 0    ASSESSMENT   [x]   Improving appropriately and progressing toward goals  []   Improving slowly and progressing toward goals  []   Approximating goals/maximum potential  [x]   Continues to benefit from skilled therapy to address remaining functional deficits  []   Not progressing toward goals and plan of care will be adjusted    Patient will continue to benefit from skilled therapy to address remaining functional deficits: dysphagia    Progress towards goals / Updated goals:  Patient is demonstrating steady progress towards goals. Patient reports completing exercises 3x/day with minimal difficulty. Patient able to recall all safe swallowing guidelines and reports utilizing at home. Patient would continue to benefit from dysphagia therapy. PLAN  [x]  Continue plan of care  []  Modify Goals/Treatment Plan      []  Discharge due to:  [] Other:    Short-Term Goals:  Pt will:   1. Complete pharyngeal swallow exercises (hard swallow, breath hold, push, pull, zach, mendelsohn, supraglottic, super-supraglottic, shaker, as appropriate) in therapy and at home in 4/5 trials with min A given visual/verbal cues to increase pharyngeal muscle strength. 2. Demonstrate ability to utilize aspiration/reflux precautions and compensatory strategies (double swallow; diet consistency adjustment; alternate liquids/solids; small sips/bites, meds crushed) to decrease the reported incidences of dysphagia and s/sx of aspiration with min A with visual/verbal cues.        [id number] [place] [address] SLP 10/31/2017  11:34 AM    Future Appointments  Date Time Provider Rk Catherine   11/6/2017 10:45 AM Michelle Longoria MD [address] [place] [address]   11/7/2017 11:30 AM Mara Quiroz [address] [facility]   11/14/2017 11:30 AM Mara Quiroz [address] [facility]   11/21/2017 12:30 PM Mara Quiroz [address] [facility]   11/28/2017 11:30 AM Mara Fraire[address] [facility]

## 2017-11-01 ENCOUNTER — APPOINTMENT (OUTPATIENT)
Dept: PHYSICAL THERAPY | Age: 82
End: 2017-11-01

## 2017-11-02 ENCOUNTER — HOSPITAL ENCOUNTER (OUTPATIENT)
Dept: GENERAL RADIOLOGY | Age: 82
Discharge: HOME | End: 2017-11-02
Attending: INTERNAL MEDICINE
Payer: MEDICARE

## 2017-11-02 DIAGNOSIS — R06.02 SOB (SHORTNESS OF BREATH): ICD-10-CM

## 2017-11-02 PROCEDURE — 74246 X-RAY XM UPR GI TRC 2CNTRST: CPT

## 2017-11-02 PROCEDURE — 74011000255 HC RX REV CODE- 255: Performed by: INTERNAL MEDICINE

## 2017-11-02 PROCEDURE — 74011000250 HC RX REV CODE- 250: Performed by: INTERNAL MEDICINE

## 2017-11-02 RX ADMIN — BARIUM SULFATE 135 ML: 980 POWDER, FOR SUSPENSION ORAL at 09:30

## 2017-11-02 RX ADMIN — BARIUM SULFATE 176 G: 960 POWDER, FOR SUSPENSION ORAL at 09:42

## 2017-11-02 RX ADMIN — ANTACID/ANTIFLATULENT 4 G: 380; 550; 10; 10 GRANULE, EFFERVESCENT ORAL at 09:30

## 2017-11-04 NOTE — PROGRESS NOTES
Subjective:      Jeffry Jade is in the office today for cardiac reevaluation. He is an 80-year-old man that has a history of hypertension, coronary artery disease, dyslipidemia, prior coronary bypass grafting, cardiomyopathy, atrial fibrillation, pacemaker, renal artery stenosis and congestive heart failure. The patient had prior coronary bypass grafting in 2008. Prior to his coronary bypass surgery, he was experiencing primarily easy fatigability. He has had repeat cardiac catheterizations since the time of his surgery, the last of which was done in 2016. At that time, medical therapy was advised. The patient had recently experienced increasing episodes of chest discomfort. He id not tolerate oral nitrate therapy. A nuclear stress test was done on 9/25/2017 which was essentially normal. In the office today, he has been complaining of postprandial nausea. He feels \"all swollen up\". He feels like there is \" a balloon in there \". He had a recent Barium speech study. Patient Active Problem List    Diagnosis Date Noted    Osteoarthritis of both knees 08/20/2017    Statin intolerance 05/03/2017    Essential hypertension 02/27/2017    Anxiety 02/27/2017    Chronic systolic congestive heart failure ([address] 01/25/2017    S/P CABG x 2 08/25/2015    Atherosclerotic NAHED (renal artery stenosis), bilateral ([address] 08/25/2015    Dyslipidemia     Coronary artery disease of native artery of native heart with stable angina pectoris (HCC)     Cardiomyopathy     Atrial fibrillation     Sick sinus syndrome      Current Outpatient Prescriptions   Medication Sig Dispense Refill    isosorbide mononitrate ER (IMDUR) 30 mg tablet Take 1 Tab by mouth daily. 30 Tab 6    amLODIPine (NORVASC) 5 mg tablet Take 5 mg by mouth daily.  nitroglycerin (NITROSTAT) 0.3 mg SL tablet 1 Tab by SubLINGual route every five (5) minutes as needed for Chest Pain (up to 3 total 1 every 5 min).  1 Bottle 2    pantoprazole (PROTONIX) 40 mg tablet TK 1 T PO D 20 TO 30 MINUTES BEFORE A MEAL  2    metoprolol succinate (TOPROL XL) 50 mg XL tablet Take 50 mg by mouth daily.  LORazepam (ATIVAN) 0.5 mg tablet TK 1 T PO  BID PRN 25 Tab 1    furosemide (LASIX) 40 mg tablet Take 40 mg by mouth daily.  apixaban (ELIQUIS) 5 mg tablet Take 5 mg by mouth two (2) times a day.  enalapril (VASOTEC) 20 mg tablet Take 20 mg by mouth daily. Allergies   Allergen Reactions    Beta-Blockers (Beta-Adrenergic Blocking Agts) Drowsiness    Penicillins Swelling    Statins-Hmg-Coa Reductase Inhibitors Drowsiness     Past Medical History:   Diagnosis Date    Atrial fibrillation     CHADS score 3  (+CHF, +HTN, +AGE, -DM, -CVA)    CABG     2008   LIMA - LAD,   SVG - RCA    Cardiomyopathy     EF 30-35% (ECHO 6/14)    Coronary artery disease     Dyslipidemia     Hypertension     Hypothyroid     Pacemaker     Peripheral vascular disease     Renal artery stenosis     bilateral stents    Sick sinus syndrome      Past Surgical History:   Procedure Laterality Date    HX CORONARY ARTERY BYPASS GRAFT      2008   LIMA - LAD,   SVG - RCA     No family history on file.   History   Smoking Status    Former Smoker   Smokeless Tobacco    Never Used          Review of Systems, additional:  Constitutional: negative  Eyes: negative  Respiratory: negative  Cardiovascular: positive for chest pressure/discomfort, fatigue, dyspnea on exertion  Gastrointestinal: negative  Musculoskeletal:positive for arthralgias  Neurological: negative  Behvioral/Psych: negative  Endocrine: negative  ENT: negative    Objective:     Visit Vitals    /76    Pulse 69    Ht 5' 9\" (1.753 m)    Wt 145 lb (65.8 kg)    BMI 21.41 kg/m2     General:  alert, cooperative, no distress   Chest Wall: inspection normal - no chest wall deformities or tenderness, respiratory effort normal   Lung: clear to auscultation bilaterally   Heart:  normal rate and regular rhythm, no murmurs noted   Abdomen: soft, non-tender. Bowel sounds normal. No masses,  no organomegaly   Extremities: extremities normal, atraumatic, no cyanosis or edema Skin: no rashes   Neuro: alert, oriented, normal speech, no focal findings or movement disorder noted         Assessment/Plan:       ICD-10-CM ICD-9-CM    1. Renal artery stenoses  440.1    2. Chronic atrial fibrillation ([facility] on Eliquis for stroke prevention and BB for rate control. I48.2 427.31    3. Other cardiomyopathy ([facility] EF by nuclear scanning 09/2015 was 60%, EF was 30-35% by Echo 06/2014 I42.8     4. Chronic systolic congestive heart failure (HCC) I50.22 428.22      428.0    5. Coronary artery disease of native artery of native heart with stable angina pectoris ([facility] Lexiscan myoview done 9/25/2017 No convincing ischemia and EF 65%. I25.118 414.01      413.9    6. Essential hypertension, controlled in office today, complaining of fatigue. Will decrease amlodipine to 5 mg qHS and have him take Enalapril at night. RT 6 weeks. I10 401.9    7. Sick sinus syndrome I49.5 427.81    8. Dyslipidemia E78.5 272.4    9. S/P CABG x 2 , 2008, LIMA-LAD, SVG-RCA  Z95.1 V45.81    10. Statin intolerance Z78.9 995.27    11.  Osteoarthritis of both knees, unspecified osteoarthritis type M17.0 715.96    12     Nausea, occurs after eating, will arrange for UGI

## 2017-11-06 ENCOUNTER — OFFICE VISIT (OUTPATIENT)
Dept: CARDIOLOGY CLINIC | Age: 82
End: 2017-11-06

## 2017-11-06 VITALS
DIASTOLIC BLOOD PRESSURE: 68 MMHG | BODY MASS INDEX: 21.48 KG/M2 | HEART RATE: 69 BPM | SYSTOLIC BLOOD PRESSURE: 136 MMHG | HEIGHT: 69 IN | WEIGHT: 145 LBS

## 2017-11-06 DIAGNOSIS — I70.1 ATHEROSCLEROTIC RAS (RENAL ARTERY STENOSIS), BILATERAL (HCC): ICD-10-CM

## 2017-11-06 DIAGNOSIS — E78.5 DYSLIPIDEMIA: ICD-10-CM

## 2017-11-06 DIAGNOSIS — I42.8 OTHER CARDIOMYOPATHY (HCC): ICD-10-CM

## 2017-11-06 DIAGNOSIS — Z78.9 STATIN INTOLERANCE: ICD-10-CM

## 2017-11-06 DIAGNOSIS — F41.9 ANXIETY: ICD-10-CM

## 2017-11-06 DIAGNOSIS — I50.22 CHRONIC SYSTOLIC CONGESTIVE HEART FAILURE (HCC): ICD-10-CM

## 2017-11-06 DIAGNOSIS — R10.84 GENERALIZED ABDOMINAL PAIN: ICD-10-CM

## 2017-11-06 DIAGNOSIS — I25.118 CORONARY ARTERY DISEASE OF NATIVE ARTERY OF NATIVE HEART WITH STABLE ANGINA PECTORIS (HCC): ICD-10-CM

## 2017-11-06 DIAGNOSIS — I10 ESSENTIAL HYPERTENSION: ICD-10-CM

## 2017-11-06 DIAGNOSIS — Z95.1 S/P CABG X 2: ICD-10-CM

## 2017-11-06 DIAGNOSIS — I48.20 CHRONIC ATRIAL FIBRILLATION (HCC): ICD-10-CM

## 2017-11-06 DIAGNOSIS — I49.5 SSS (SICK SINUS SYNDROME) (HCC): ICD-10-CM

## 2017-11-06 DIAGNOSIS — K55.9 MESENTERIC ISCHEMIA (HCC): Primary | ICD-10-CM

## 2017-11-06 NOTE — PROGRESS NOTES
1. Have you been to the ER, urgent care clinic since your last visit? Hospitalized since your last visit? No    2. Have you seen or consulted any other health care providers outside of the [address] [place] [address] since your last visit? Include any pap smears or colon screening.  No

## 2017-11-07 ENCOUNTER — HOSPITAL ENCOUNTER (OUTPATIENT)
Dept: PHYSICAL THERAPY | Age: 82
Discharge: HOME | End: 2017-11-07
Payer: MEDICARE

## 2017-11-07 PROCEDURE — 92526 ORAL FUNCTION THERAPY: CPT

## 2017-11-07 NOTE — PROGRESS NOTES
ST DAILY TREATMENT NOTE    Patient Name: Kori Fuentes  Date:2017  : 11/10/1930  [x]  Patient  Verified  Payor: Payor: [place] MED[place] / Plan: [place] MEDICARE PART A & B / Product Type: Medicare /   In time:1130  Out time: 1155  Total Treatment Time (min): 25  Visit #: 2 of 8-10    Treatment Diagnosis: Other dysphagia [R13.19]    SUBJECTIVE  Pain Level (0-10 scale): 0  Any medication changes, allergies to medications, adverse drug reactions, diagnosis change, or new procedure performed?: [x] No    [] Yes (see summary sheet for update)    Subjective functional status/changes:   [] No changes reported  Patient reports endoscopy came back normal and no aspiration in any position. Further, patient reports increased fatigue this day. OBJECTIVE  Treatment provided includes:  Increase/Improve:  []  Voice Quality []  Cognitive Linguistic Skills [x]  Laryngeal/Pharyngeal Exercises   []  Vocal Loudness []  Reading Comprehension [x]  Swallowing Skills    []  Vocal Cord Function []  Auditory Comprehension []  Oral Motor Skills   []  Resonance []  Writing Skills []  Compensatory strategies    []  Speech Intelligibility []  Expressive Language []  Attention   []  Breath Support/Coord. []  Receptive language []  Memory   []  Articulation []  Safety Awareness []    []  Fluency []  Word Retrieval []      Treatment Provided:  -patient completed laryngeal strengthening, adduction, and tongue base retraction exercises Claudia  -patient participated in therapeutic snack of regular and thin liquids with safe swallowing guidelines in 100% of opportunities Claudia.     Patient/Caregiver  Education: [x] Review HEP      HEP/Handouts given: continue current diet recommendations, laryngeal strengthening exercises    Pain Level (0-10 scale) post treatment: 0    ASSESSMENT   [x]   Improving appropriately and progressing toward goals  []   Improving slowly and progressing toward goals  [x]   Approximating goals/maximum potential  []   Continues to benefit from skilled therapy to address remaining functional deficits  []   Not progressing toward goals and plan of care will be adjusted    Patient will continue to benefit from skilled therapy to address remaining functional deficits: dysphagia    Progress towards goals / Updated goals:  Patient is demonstrating steady progress towards goals. Patient able to complete all exercises Claudia. Patient reported no instances of aspiration over endoscopy study. Patient instructed to bring lunch to next therapy session for full diet tolerance observation and recommendations. x1 throat clear throughout session, otherwise no change in vocal quality, coughing, watery eyes or other s/s of aspiration. Recommend 1-2 more therapy sessions to address goals. PLAN  [x]  Continue plan of care  []  Modify Goals/Treatment Plan      []  Discharge due to:  [] Other:    Short-Term Goals:  Pt will:   1. Complete pharyngeal swallow exercises (hard swallow, breath hold, push, pull, zach, mendelsohn, supraglottic, super-supraglottic, shaker, as appropriate) in therapy and at home in 4/5 trials with min A given visual/verbal cues to increase pharyngeal muscle strength. 2. Demonstrate ability to utilize aspiration/reflux precautions and compensatory strategies (double swallow; diet consistency adjustment; alternate liquids/solids; small sips/bites, meds crushed) to decrease the reported incidences of dysphagia and s/sx of aspiration with min A with visual/verbal cues.      [id number] [facility] SLP 11/7/2017  11:09 AM    Future Appointments  Date Time Provider Rk Catherine   11/7/2017 11:30 AM [id number] [address]   11/14/2017 11:30 AM Alexandre Fraire0 [facility]   11/21/2017 12:30 PM Alexandre [facility]   11/27/2017 10:45 AM Joann Lawler MD [address] [place] [address]   11/28/2017 11:30 AM Alexandre [facility]

## 2017-11-09 ENCOUNTER — HOSPITAL ENCOUNTER (OUTPATIENT)
Dept: VASCULAR SURGERY | Age: 82
Discharge: HOME | End: 2017-11-09
Attending: INTERNAL MEDICINE
Payer: MEDICARE

## 2017-11-09 ENCOUNTER — TELEPHONE (OUTPATIENT)
Dept: CARDIOLOGY CLINIC | Age: 82
End: 2017-11-09

## 2017-11-09 DIAGNOSIS — R10.84 GENERALIZED ABDOMINAL PAIN: ICD-10-CM

## 2017-11-09 DIAGNOSIS — K55.9 MESENTERIC ISCHEMIA (HCC): ICD-10-CM

## 2017-11-09 DIAGNOSIS — I77.1 CELIAC ARTERY STENOSIS (HCC): Primary | ICD-10-CM

## 2017-11-09 PROCEDURE — 93975 VASCULAR STUDY: CPT

## 2017-11-09 NOTE — TELEPHONE ENCOUNTER
Verbal order and read back per Jose E Centeno MD     Visceral artery duplex test is abnormal - needs to be referred to Dr. Zheng Kumar for evaluation -has severe stenosis of celiac artery       Referral done for Dr. Gonzalez Urban for patient to call the office for results

## 2017-11-09 NOTE — PROCEDURES
[facility]  *** FINAL REPORT ***    Name: Pam Pierson  MRN: [id number]    Outpatient  : 10 Nov 1930  HIS Order #: [id number]  [address] [facility] Visit #: [id number]  Date: 2017    TYPE OF TEST: Visceral Arterial Duplex    REASON FOR TEST  Abdominal pain    Aortic PSV:  63.7 cm/s  Diameter AP:     cm   TV:     cm    Mesenteric:-                  Prox   Mid   Dist Ratio Stenosis          Aneurysm                  ----- ----- ----- ----- ----------------- ------------  SMA:            208.0 223.0 118.3  3.5 Normal  Celiac:         359.5               5.6 Severe > 70%  Hepatic:         75.9               1.2 Normal  Splenic:        124.4               2.0 Normal  MARIE:             95.8               1.5 Normal  :    INTERPRETATION/FINDINGS  Duplex images were obtained using 2-D gray scale, color flow, and  spectral Doppler analysis. MESENTERIC:  1. No significant stenosis identified in the superior mesenteric  artery. Patent superior mesenteric vein. 2. 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 3. No significant stenosis identified in the hepatic artery. 4. No significant stenosis identified in the splenic artery. 5. No significant stenosis identified in the inferior mesenteric  artery. ADDITIONAL COMMENTS  Reported to Luh Santana LPN. I have personally reviewed the data relevant to the interpretation of  this  study. TECHNOLOGIST: Douglas Vides. RUY Guillermo  Signed: 2017 10:13 AM    PHYSICIAN: Mira Ortiz.  Tami Ribeiro MD  Signed: 11/10/2017 12:30 AM

## 2017-11-11 PROBLEM — K55.9 MESENTERIC ISCHEMIA (HCC): Status: ACTIVE | Noted: 2017-11-11

## 2017-11-11 NOTE — PROGRESS NOTES
Subjective:      Sangeeta West is in the office today for cardiac reevaluation. He is an 30-year-old man that has a history of hypertension, coronary artery disease, dyslipidemia, prior coronary bypass grafting, cardiomyopathy, atrial fibrillation, pacemaker, renal artery stenosis and congestive heart failure. The patient had prior coronary bypass grafting in 2008. Prior to his coronary bypass surgery, he was experiencing primarily easy fatigability. He has had repeat cardiac catheterizations since the time of his surgery, the last of which was done in 2016. At that time, medical therapy was advised. He had a nuclear stress test on 9/27/2017 taht did not show evidence for ischemia and his EF was normal     The patient had recently experienced increasing episodes of chest discomfort. . In the office today, he has been complaining of postprandial nausea and pain. He had a recent Barium speech study as well as a UGI/barium swallow with results in chart. Also of note is that he believes when he takes his AM blood pressure meds, his symptoms are worse concerning for a vascular etiology. Patient Active Problem List    Diagnosis Date Noted    Mesenteric ischemia ([facility] 11/11/2017    Osteoarthritis of both knees 08/20/2017    Statin intolerance 05/03/2017    Essential hypertension 02/27/2017    Anxiety 02/27/2017    Chronic systolic congestive heart failure ([facility] 01/25/2017    S/P CABG x 2 08/25/2015    Atherosclerotic NAHED (renal artery stenosis), bilateral (HCC) 08/25/2015    Dyslipidemia     Coronary artery disease of native artery of native heart with stable angina pectoris (HCC)     Cardiomyopathy     Atrial fibrillation     Sick sinus syndrome      Current Outpatient Prescriptions   Medication Sig Dispense Refill    isosorbide mononitrate ER (IMDUR) 30 mg tablet Take 1 Tab by mouth daily. 30 Tab 6    amLODIPine (NORVASC) 5 mg tablet Take 5 mg by mouth daily.       nitroglycerin (NITROSTAT) 0.3 mg SL tablet 1 Tab by SubLINGual route every five (5) minutes as needed for Chest Pain (up to 3 total 1 every 5 min). 1 Bottle 2    pantoprazole (PROTONIX) 40 mg tablet TK 1 T PO D 20 TO 30 MINUTES BEFORE A MEAL  2    metoprolol succinate (TOPROL XL) 50 mg XL tablet Take 50 mg by mouth daily.  LORazepam (ATIVAN) 0.5 mg tablet TK 1 T PO  BID PRN 25 Tab 1    furosemide (LASIX) 40 mg tablet Take 40 mg by mouth daily.  apixaban (ELIQUIS) 5 mg tablet Take 5 mg by mouth two (2) times a day.  enalapril (VASOTEC) 20 mg tablet Take 20 mg by mouth daily. Allergies   Allergen Reactions    Beta-Blockers (Beta-Adrenergic Blocking Agts) Drowsiness    Penicillins Swelling    Statins-Hmg-Coa Reductase Inhibitors Drowsiness     Past Medical History:   Diagnosis Date    Atrial fibrillation     CHADS score 3  (+CHF, +HTN, +AGE, -DM, -CVA)    CABG     2008   LIMA - LAD,   SVG - RCA    Cardiomyopathy     EF 30-35% (ECHO 6/14)    Coronary artery disease     Dyslipidemia     Hypertension     Hypothyroid     Pacemaker     Peripheral vascular disease     Renal artery stenosis     bilateral stents    Sick sinus syndrome      Past Surgical History:   Procedure Laterality Date    HX CORONARY ARTERY BYPASS GRAFT      2008   LIMA - LAD,   SVG - RCA     No family history on file.   History   Smoking Status    Former Smoker   Smokeless Tobacco    Never Used          Review of Systems, additional:  Constitutional: negative  Eyes: negative  Respiratory: negative  Cardiovascular: positive for chest pressure/discomfort, fatigue, dyspnea on exertion  Gastrointestinal: negative  Musculoskeletal:positive for arthralgias  Neurological: negative  Behvioral/Psych: negative  Endocrine: negative  ENT: negative    Objective:     Visit Vitals    /68    Pulse 69    Ht 5' 9\" (1.753 m)    Wt 145 lb (65.8 kg)    BMI 21.41 kg/m2     General:  alert, cooperative, no distress   Chest Wall: inspection normal - no chest wall deformities or tenderness, respiratory effort normal   Lung: clear to auscultation bilaterally   Heart:  normal rate and regular rhythm, no murmurs noted   Abdomen: soft, non-tender. Bowel sounds normal. No masses,  no organomegaly   Extremities: extremities normal, atraumatic, no cyanosis or edema Skin: no rashes   Neuro: alert, oriented, normal speech, no focal findings or movement disorder noted         Assessment/Plan:       ICD-10-CM ICD-9-CM    1. Renal artery stenoses  440.1    2. Chronic atrial fibrillation ([facility] on Eliquis for stroke prevention and BB for rate control. I48.2 427.31    3. Other cardiomyopathy ([facility] EF by nuclear scanning 09/2015 was 60%, EF was 30-35% by Echo 06/2014 I42.8     4. Chronic systolic congestive heart failure (HCC) I50.22 428.22      428.0    5. Coronary artery disease of native artery of native heart with stable angina pectoris ([facility] Lexiscan myoview done 9/25/2017 No convincing ischemia and EF 65%. I25.118 414.01      413.9    6. Essential hypertension, controlled in office today, complaining of fatigue. I10 401.9    7. Sick sinus syndrome I49.5 427.81    8. Dyslipidemia E78.5 272.4    9. S/P CABG x 2 , 2008, LIMA-LAD, SVG-RCA  Z95.1 V45.81    10. Statin intolerance Z78.9 995.27    11.  Osteoarthritis of both knees, unspecified osteoarthritis type M17.0 715.96    12     Nausea and pain, occurs after eating, will order abdominal duplex to evaluate for mesenteric ischemia

## 2017-11-14 ENCOUNTER — HOSPITAL ENCOUNTER (OUTPATIENT)
Dept: PHYSICAL THERAPY | Age: 82
Discharge: HOME | End: 2017-11-14
Payer: MEDICARE

## 2017-11-14 PROCEDURE — 92526 ORAL FUNCTION THERAPY: CPT

## 2017-11-14 PROCEDURE — G8998 SWALLOW D/C STATUS: HCPCS

## 2017-11-14 PROCEDURE — G8997 SWALLOW GOAL STATUS: HCPCS

## 2017-11-14 NOTE — PROGRESS NOTES
[facility] - [address]  Ph: [phone]  Fax: [phone]    Speech Therapy Daily Note Discharge Summary  Date:2017  Patient name: Stephanie Arthur Start of Care: 10/26/17   Referral source: Shira Skelton MD : 11/10/1930   Medical/Treatment Diagnosis: Other dysphagia [R13.19] Onset Date:     Prior Hospitalization: see medical history Provider#: [id number]   Medications: Verified on Patient Summary List    Comorbidities: CHF, pacemaker  Prior Level of Function:Regular, thin liquid diet    Visits from Start of Care: 3    Missed Visits: 0    Reporting Period : 10/26/17 to 17    [x]  Patient  Verified  Payor: [place] MEDICARE / Plan: [place] MEDICARE PART A & B / Product Type: Medicare /   In time:1131  Out time:1155  Total Treatment Time (min): 24  Visit #: 3 of 8-10    SUBJECTIVE  Pain Level (0-10 scale): 0  Any medication changes, allergies to medications, adverse drug reactions, diagnosis change, or new procedure performed?: [x] No    [] Yes (see summary sheet for update)    Subjective functional status/changes:   [] No changes reported  Patient reports improved tolerance of meals at home. OBJECTIVE  Treatment provided includes:  Increase/Improve:  []  Voice Quality []  Cognitive Linguistic Skills []  Laryngeal/Pharyngeal Exercises   []  Vocal Loudness []  Reading Comprehension [x]  Swallowing Skills    []  Vocal Cord Function []  Auditory Comprehension []  Oral Motor Skills   []  Resonance []  Writing Skills []  Compensatory strategies    []  Speech Intelligibility []  Expressive Language []  Attention   []  Breath Support/Coord.  []  Receptive language []  Memory   []  Articulation []  Safety Awareness []    []  Fluency []  Word Retrieval []        Treatment Provided:  -completed meal of thin liquids and regular with compensatory strategies 0 s/sx of aspiration in 10/10 opportunities, required min verbal cues to utilize compensatory strategies fading to Claudia. Patient/Caregiver  Education: [x] Review HEP      HEP/Handouts given: continue exercises 1x/day, regular diet with STRICT safe swallowing guidelines. Pain Level (0-10 scale) post treatment: 0    Summary of Care:  Goal: 1. Complete pharyngeal swallow exercises (hard swallow, breath hold, push, pull, zach, mendelsohn, supraglottic, super-supraglottic, shaker, as appropriate) in therapy and at home in 4/5 trials with min A given visual/verbal cues to increase pharyngeal muscle strength. Status at last note/certification:  Patient with thin liquids via cup sip. Patient with small sips Claudia with 0 s/sx of aspiration across all trials, however reports minimal discomfort. Patient with chin tuck with intermittent throat clears. SLP recommending continue regular diet with thin liquids with STRICT safe swallowing guidelines. Status at discharge: met -patient completed laryngeal strengthening, adduction, and tongue base retraction exercises Claudia    Goal: 2. Demonstrate ability to utilize aspiration/reflux precautions and compensatory strategies (double swallow; diet consistency adjustment; alternate liquids/solids; small sips/bites, meds crushed) to decrease the reported incidences of dysphagia and s/sx of aspiration with min A with visual/verbal cues. Status at last note/certification:  Patient with thin liquids via cup sip. Patient with small sips Claudia with 0 s/sx of aspiration across all trials, however reports minimal discomfort. Patient with chin tuck with intermittent throat clears. SLP recommending continue regular diet with thin liquids with STRICT safe swallowing guidelines. Status at discharge: met -completed meal of thin liquids and regular with compensatory strategies 0 s/sx of aspiration in 10/10 opportunities, required min verbal cues to utilize compensatory strategies fading to Claudia. ASSESSMENT: Patient has met all dysphagia goals.  Patient able to complete all exercises Claudia and reports completing as HEP 3x/day. Patient reported no instances of aspiration over endoscopy study. Patient tolerated regular/thin liquid meal with use of safe swallowing guidelines (chin tuck, alt food-drink) Claudia and with 0 s/sx of aspiration with use of safe swallowing guidelines. Patient does not report discomfort or pain. Patient is tolerating safest, least restrictive diet and independently utilizes strategies which decreases patient's aspiration risk. Patient accordingly has met all short term goals and will be discharged from therapy.      G Codes:  Nara Herr: [id number] Swallow Goal Status CH= 0%   Swallow D/C Status CI= 1-19%    The severity rating is based on the following outcomes:    National Outcomes Measures (NOMS)    RECOMMENDATIONS:  [x]Discontinue therapy: [x]Patient has reached or is progressing toward set goals      []Patient is non-compliant or has abdicated      []Due to lack of appreciable progress towards set goals    [id number] [facility] SLP 11/14/2017 11:22 AM

## 2017-11-21 ENCOUNTER — HOSPITAL ENCOUNTER (OUTPATIENT)
Dept: PHYSICAL THERAPY | Age: 82
End: 2017-11-21
Payer: MEDICARE

## 2017-11-27 ENCOUNTER — OFFICE VISIT (OUTPATIENT)
Dept: CARDIOLOGY CLINIC | Age: 82
End: 2017-11-27

## 2017-11-27 VITALS
SYSTOLIC BLOOD PRESSURE: 145 MMHG | HEIGHT: 69 IN | HEART RATE: 69 BPM | DIASTOLIC BLOOD PRESSURE: 72 MMHG | WEIGHT: 147 LBS | BODY MASS INDEX: 21.77 KG/M2 | OXYGEN SATURATION: 97 %

## 2017-11-27 DIAGNOSIS — I25.118 CORONARY ARTERY DISEASE OF NATIVE ARTERY OF NATIVE HEART WITH STABLE ANGINA PECTORIS (HCC): ICD-10-CM

## 2017-11-27 DIAGNOSIS — I25.5 ISCHEMIC CARDIOMYOPATHY: ICD-10-CM

## 2017-11-27 DIAGNOSIS — Z95.1 S/P CABG X 2: ICD-10-CM

## 2017-11-27 DIAGNOSIS — K55.9 MESENTERIC ISCHEMIA (HCC): ICD-10-CM

## 2017-11-27 DIAGNOSIS — I50.22 CHRONIC SYSTOLIC CONGESTIVE HEART FAILURE (HCC): ICD-10-CM

## 2017-11-27 DIAGNOSIS — I48.20 CHRONIC ATRIAL FIBRILLATION (HCC): ICD-10-CM

## 2017-11-27 DIAGNOSIS — E78.5 DYSLIPIDEMIA: ICD-10-CM

## 2017-11-27 DIAGNOSIS — I70.1 ATHEROSCLEROTIC RAS (RENAL ARTERY STENOSIS), BILATERAL (HCC): ICD-10-CM

## 2017-11-27 DIAGNOSIS — F41.9 ANXIETY: ICD-10-CM

## 2017-11-27 DIAGNOSIS — I49.5 SSS (SICK SINUS SYNDROME) (HCC): Primary | ICD-10-CM

## 2017-11-27 DIAGNOSIS — Z78.9 STATIN INTOLERANCE: ICD-10-CM

## 2017-11-27 DIAGNOSIS — I10 ESSENTIAL HYPERTENSION: ICD-10-CM

## 2017-11-28 ENCOUNTER — APPOINTMENT (OUTPATIENT)
Dept: PHYSICAL THERAPY | Age: 82
End: 2017-11-28
Payer: MEDICARE

## 2017-12-09 NOTE — PROGRESS NOTES
Subjective:      Reji Navjottheresa is in the office today for cardiac reevaluation. He is an 51-year-old man that has a history of hypertension, coronary artery disease, dyslipidemia, prior coronary bypass grafting, cardiomyopathy, atrial fibrillation, pacemaker, renal artery stenosis and congestive heart failure. The patient had prior coronary bypass grafting in 2008. Prior to his coronary bypass surgery, he was experiencing primarily easy fatigability. He has had repeat cardiac catheterizations since the time of his surgery, the last of which was done in 2016. At that time, medical therapy was advised. He had a nuclear stress test on 9/27/2017 that did not show evidence for ischemia and his EF was normal     The patient had recently experienced increasing episodes of chest discomfort. . He has  been complaining of postprandial nausea and pain. He had a recent Barium speech study as well as a UGI/barium swallow with results in chart. A duplex scan of the abdominal vasculature was ordered . 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 Patient has an appointment to see Dr Zheng church  today. He continues to complain of FROST. He has had SOB, particularly when he bends over.           Patient Active Problem List    Diagnosis Date Noted    Mesenteric ischemia ([facility] 11/11/2017    Osteoarthritis of both knees 08/20/2017    Statin intolerance 05/03/2017    Essential hypertension 02/27/2017    Anxiety 02/27/2017    Chronic systolic congestive heart failure ([facility] 01/25/2017    S/P CABG x 2 08/25/2015    Atherosclerotic NAHED (renal artery stenosis), bilateral (HCC) 08/25/2015    Dyslipidemia     Coronary artery disease of native artery of native heart with stable angina pectoris (HCC)     Cardiomyopathy     Atrial fibrillation     Sick sinus syndrome      Current Outpatient Prescriptions   Medication Sig Dispense Refill    isosorbide mononitrate ER (IMDUR) 30 mg tablet Take 1 Tab by mouth daily. 30 Tab 6    amLODIPine (NORVASC) 5 mg tablet Take 5 mg by mouth daily.  nitroglycerin (NITROSTAT) 0.3 mg SL tablet 1 Tab by SubLINGual route every five (5) minutes as needed for Chest Pain (up to 3 total 1 every 5 min). 1 Bottle 2    pantoprazole (PROTONIX) 40 mg tablet TK 1 T PO D 20 TO 30 MINUTES BEFORE A MEAL  2    metoprolol succinate (TOPROL XL) 50 mg XL tablet Take 50 mg by mouth daily.  LORazepam (ATIVAN) 0.5 mg tablet TK 1 T PO  BID PRN 25 Tab 1    furosemide (LASIX) 40 mg tablet Take 40 mg by mouth daily.  apixaban (ELIQUIS) 5 mg tablet Take 5 mg by mouth two (2) times a day.  enalapril (VASOTEC) 20 mg tablet Take 20 mg by mouth daily. Allergies   Allergen Reactions    Beta-Blockers (Beta-Adrenergic Blocking Agts) Drowsiness    Penicillins Swelling    Statins-Hmg-Coa Reductase Inhibitors Drowsiness     Past Medical History:   Diagnosis Date    Atrial fibrillation     CHADS score 3  (+CHF, +HTN, +AGE, -DM, -CVA)    CABG     2008   LIMA - LAD,   SVG - RCA    Cardiomyopathy     EF 30-35% (ECHO 6/14)    Coronary artery disease     Dyslipidemia     Hypertension     Hypothyroid     Pacemaker     Peripheral vascular disease     Renal artery stenosis     bilateral stents    Sick sinus syndrome      Past Surgical History:   Procedure Laterality Date    HX CORONARY ARTERY BYPASS GRAFT      2008   LIMA - LAD,   SVG - RCA     No family history on file.   History   Smoking Status    Former Smoker   Smokeless Tobacco    Never Used          Review of Systems, additional:  Constitutional: negative  Eyes: negative  Respiratory: negative  Cardiovascular: positive for chest pressure/discomfort, fatigue, dyspnea on exertion  Gastrointestinal: negative  Musculoskeletal:positive for arthralgias  Neurological: negative  Behvioral/Psych: negative  Endocrine: negative  ENT: negative    Objective:     Visit Vitals    /72    Pulse 69    Ht 5' 9\" (1.753 m)    Wt 147 lb (66.7 kg)    SpO2 97%    BMI 21.71 kg/m2     General:  alert, cooperative, no distress   Chest Wall: inspection normal - no chest wall deformities or tenderness, respiratory effort normal   Lung: clear to auscultation bilaterally   Heart:  normal rate and regular rhythm, no murmurs noted   Abdomen: soft, non-tender. Bowel sounds normal. No masses,  no organomegaly   Extremities: extremities normal, atraumatic, no cyanosis or edema Skin: no rashes   Neuro: alert, oriented, normal speech, no focal findings or movement disorder noted         Assessment/Plan:       ICD-10-CM ICD-9-CM    1. Renal artery stenoses  440.1    2. Chronic atrial fibrillation ([facility] on Eliquis for stroke prevention and BB for rate control. Stable. I48.2 427.31    3. Other cardiomyopathy ([facility] EF by nuclear scanning 09/2015 was 60%, EF was 30-35% by Echo 06/2014 I42.8     4. Chronic systolic congestive heart failure (HCC) I50.22 428.22      428.0    5. Coronary artery disease of native artery of native heart with stable angina pectoris ([facility] Lexiscan myoview done 9/25/2017 No convincing evidence for ischemia and EF 65%. I25.118 414.01      413.9    6. Essential hypertension, controlled in office today, complaining of fatigue. I10 401.9    7. Sick sinus syndrome I49.5 427.81    8. Dyslipidemia E78.5 272.4    9. S/P CABG x 2 , 2008, LIMA-LAD, SVG-RCA  Z95.1 V45.81    10. Statin intolerance Z78.9 995.27    11. Osteoarthritis of both knees, unspecified osteoarthritis type M17.0 715.96    12     Nausea and pain, occurs after eating, duplex of abdominal vasculature; greater than 70% celiac artery. Will have vascular surgery evaluate.

## 2018-01-01 ENCOUNTER — APPOINTMENT (OUTPATIENT)
Dept: VASCULAR SURGERY | Age: 83
DRG: 243 | End: 2018-01-01
Attending: INTERNAL MEDICINE
Payer: MEDICARE

## 2018-01-01 ENCOUNTER — APPOINTMENT (OUTPATIENT)
Dept: CT IMAGING | Age: 83
DRG: 306 | End: 2018-01-01
Attending: INTERNAL MEDICINE
Payer: MEDICARE

## 2018-01-01 ENCOUNTER — APPOINTMENT (OUTPATIENT)
Dept: GENERAL RADIOLOGY | Age: 83
DRG: 243 | End: 2018-01-01
Attending: EMERGENCY MEDICINE
Payer: MEDICARE

## 2018-01-01 ENCOUNTER — TELEPHONE (OUTPATIENT)
Dept: CARDIOLOGY CLINIC | Age: 83
End: 2018-01-01

## 2018-01-01 ENCOUNTER — HOSPITAL ENCOUNTER (OUTPATIENT)
Age: 83
Setting detail: OBSERVATION
Discharge: HOME HEALTH | End: 2018-07-13
Attending: EMERGENCY MEDICINE | Admitting: HOSPITALIST
Payer: MEDICARE

## 2018-01-01 ENCOUNTER — HOSPITAL ENCOUNTER (EMERGENCY)
Age: 83
Discharge: HOME | End: 2018-05-04
Attending: EMERGENCY MEDICINE
Payer: MEDICARE

## 2018-01-01 ENCOUNTER — APPOINTMENT (OUTPATIENT)
Dept: CT IMAGING | Age: 83
DRG: 243 | End: 2018-01-01
Attending: EMERGENCY MEDICINE
Payer: MEDICARE

## 2018-01-01 ENCOUNTER — APPOINTMENT (OUTPATIENT)
Dept: CT IMAGING | Age: 83
End: 2018-01-01
Attending: EMERGENCY MEDICINE
Payer: MEDICARE

## 2018-01-01 ENCOUNTER — OFFICE VISIT (OUTPATIENT)
Dept: CARDIOLOGY CLINIC | Age: 83
End: 2018-01-01

## 2018-01-01 ENCOUNTER — APPOINTMENT (OUTPATIENT)
Dept: CT IMAGING | Age: 83
DRG: 243 | End: 2018-01-01
Attending: FAMILY MEDICINE
Payer: MEDICARE

## 2018-01-01 ENCOUNTER — APPOINTMENT (OUTPATIENT)
Dept: GENERAL RADIOLOGY | Age: 83
End: 2018-01-01
Attending: PHYSICIAN ASSISTANT
Payer: MEDICARE

## 2018-01-01 ENCOUNTER — APPOINTMENT (OUTPATIENT)
Dept: GENERAL RADIOLOGY | Age: 83
End: 2018-01-01
Attending: EMERGENCY MEDICINE
Payer: MEDICARE

## 2018-01-01 ENCOUNTER — HOSPITAL ENCOUNTER (EMERGENCY)
Age: 83
Discharge: HOME | End: 2018-12-31
Attending: EMERGENCY MEDICINE
Payer: MEDICARE

## 2018-01-01 ENCOUNTER — HOME HEALTH ADMISSION (OUTPATIENT)
Dept: HOME HEALTH SERVICES | Facility: HOME HEALTH | Age: 83
End: 2018-01-01

## 2018-01-01 ENCOUNTER — HOSPITAL ENCOUNTER (EMERGENCY)
Age: 83
Discharge: HOME | End: 2018-04-25
Attending: EMERGENCY MEDICINE
Payer: MEDICARE

## 2018-01-01 ENCOUNTER — CLINICAL SUPPORT (OUTPATIENT)
Dept: CARDIOLOGY CLINIC | Age: 83
End: 2018-01-01

## 2018-01-01 ENCOUNTER — HOSPITAL ENCOUNTER (INPATIENT)
Age: 83
LOS: 4 days | Discharge: NURSING, REHAB OR LONG-TERM CARE | DRG: 306 | End: 2018-01-29
Attending: EMERGENCY MEDICINE | Admitting: HOSPITALIST
Payer: MEDICARE

## 2018-01-01 ENCOUNTER — ANTI-COAG VISIT (OUTPATIENT)
Dept: CARDIOLOGY CLINIC | Age: 83
End: 2018-01-01

## 2018-01-01 ENCOUNTER — APPOINTMENT (OUTPATIENT)
Dept: CT IMAGING | Age: 83
End: 2018-01-01
Attending: NURSE PRACTITIONER
Payer: MEDICARE

## 2018-01-01 ENCOUNTER — HOME CARE VISIT (OUTPATIENT)
Dept: HOME HEALTH SERVICES | Facility: HOME HEALTH | Age: 83
End: 2018-01-01

## 2018-01-01 ENCOUNTER — HOSPITAL ENCOUNTER (OUTPATIENT)
Age: 83
Setting detail: OBSERVATION
Discharge: HOME | End: 2018-01-21
Attending: EMERGENCY MEDICINE | Admitting: INTERNAL MEDICINE
Payer: MEDICARE

## 2018-01-01 ENCOUNTER — HOSPITAL ENCOUNTER (INPATIENT)
Age: 83
LOS: 3 days | Discharge: HOME | DRG: 378 | End: 2019-01-03
Attending: EMERGENCY MEDICINE | Admitting: FAMILY MEDICINE
Payer: MEDICARE

## 2018-01-01 ENCOUNTER — HOSPITAL ENCOUNTER (INPATIENT)
Age: 83
LOS: 5 days | Discharge: HOME HEALTH | DRG: 243 | End: 2018-10-03
Attending: EMERGENCY MEDICINE | Admitting: FAMILY MEDICINE
Payer: MEDICARE

## 2018-01-01 ENCOUNTER — APPOINTMENT (OUTPATIENT)
Dept: GENERAL RADIOLOGY | Age: 83
DRG: 243 | End: 2018-01-01
Attending: INTERNAL MEDICINE
Payer: MEDICARE

## 2018-01-01 ENCOUNTER — HOSPITAL ENCOUNTER (INPATIENT)
Age: 83
LOS: 9 days | Discharge: HOME HEALTH | End: 2018-02-07
Attending: INTERNAL MEDICINE | Admitting: INTERNAL MEDICINE

## 2018-01-01 ENCOUNTER — HOME CARE VISIT (OUTPATIENT)
Dept: SCHEDULING | Facility: HOME HEALTH | Age: 83
End: 2018-01-01

## 2018-01-01 ENCOUNTER — APPOINTMENT (OUTPATIENT)
Dept: NON INVASIVE DIAGNOSTICS | Age: 83
End: 2018-01-01
Attending: HOSPITALIST
Payer: MEDICARE

## 2018-01-01 ENCOUNTER — HOSPITAL ENCOUNTER (EMERGENCY)
Age: 83
Discharge: HOME | End: 2018-04-10
Attending: EMERGENCY MEDICINE | Admitting: EMERGENCY MEDICINE
Payer: MEDICARE

## 2018-01-01 ENCOUNTER — APPOINTMENT (OUTPATIENT)
Dept: GENERAL RADIOLOGY | Age: 83
DRG: 306 | End: 2018-01-01
Attending: EMERGENCY MEDICINE
Payer: MEDICARE

## 2018-01-01 ENCOUNTER — HOSPITAL ENCOUNTER (EMERGENCY)
Age: 83
Discharge: HOME | End: 2018-12-18
Attending: EMERGENCY MEDICINE
Payer: MEDICARE

## 2018-01-01 ENCOUNTER — APPOINTMENT (OUTPATIENT)
Dept: NUCLEAR MEDICINE | Age: 83
DRG: 378 | End: 2018-01-01
Attending: EMERGENCY MEDICINE
Payer: MEDICARE

## 2018-01-01 VITALS
HEART RATE: 81 BPM | TEMPERATURE: 97.5 F | HEIGHT: 69 IN | SYSTOLIC BLOOD PRESSURE: 163 MMHG | OXYGEN SATURATION: 100 % | WEIGHT: 137.79 LBS | RESPIRATION RATE: 18 BRPM | DIASTOLIC BLOOD PRESSURE: 78 MMHG | BODY MASS INDEX: 20.41 KG/M2

## 2018-01-01 VITALS
WEIGHT: 140 LBS | SYSTOLIC BLOOD PRESSURE: 138 MMHG | OXYGEN SATURATION: 100 % | TEMPERATURE: 98 F | DIASTOLIC BLOOD PRESSURE: 70 MMHG | BODY MASS INDEX: 20.73 KG/M2 | RESPIRATION RATE: 15 BRPM | HEART RATE: 71 BPM | HEIGHT: 69 IN

## 2018-01-01 VITALS
RESPIRATION RATE: 18 BRPM | DIASTOLIC BLOOD PRESSURE: 80 MMHG | WEIGHT: 134.4 LBS | HEIGHT: 69 IN | BODY MASS INDEX: 19.91 KG/M2 | SYSTOLIC BLOOD PRESSURE: 164 MMHG | OXYGEN SATURATION: 100 % | HEART RATE: 67 BPM | TEMPERATURE: 97.7 F

## 2018-01-01 VITALS
HEIGHT: 69 IN | HEART RATE: 71 BPM | DIASTOLIC BLOOD PRESSURE: 64 MMHG | OXYGEN SATURATION: 96 % | SYSTOLIC BLOOD PRESSURE: 123 MMHG | BODY MASS INDEX: 21.18 KG/M2 | WEIGHT: 143 LBS

## 2018-01-01 VITALS
HEART RATE: 66 BPM | OXYGEN SATURATION: 93 % | SYSTOLIC BLOOD PRESSURE: 149 MMHG | BODY MASS INDEX: 20.88 KG/M2 | WEIGHT: 141 LBS | HEIGHT: 69 IN | DIASTOLIC BLOOD PRESSURE: 72 MMHG

## 2018-01-01 VITALS
TEMPERATURE: 97.7 F | HEIGHT: 69 IN | SYSTOLIC BLOOD PRESSURE: 125 MMHG | BODY MASS INDEX: 21.13 KG/M2 | RESPIRATION RATE: 16 BRPM | DIASTOLIC BLOOD PRESSURE: 62 MMHG | HEART RATE: 70 BPM | WEIGHT: 142.64 LBS | OXYGEN SATURATION: 93 %

## 2018-01-01 VITALS
RESPIRATION RATE: 19 BRPM | DIASTOLIC BLOOD PRESSURE: 72 MMHG | TEMPERATURE: 97.4 F | SYSTOLIC BLOOD PRESSURE: 142 MMHG | OXYGEN SATURATION: 100 % | HEART RATE: 70 BPM

## 2018-01-01 VITALS
OXYGEN SATURATION: 92 % | SYSTOLIC BLOOD PRESSURE: 129 MMHG | BODY MASS INDEX: 25.1 KG/M2 | WEIGHT: 170 LBS | HEART RATE: 80 BPM | DIASTOLIC BLOOD PRESSURE: 73 MMHG | TEMPERATURE: 95 F | RESPIRATION RATE: 15 BRPM

## 2018-01-01 VITALS
TEMPERATURE: 97.5 F | OXYGEN SATURATION: 98 % | DIASTOLIC BLOOD PRESSURE: 71 MMHG | RESPIRATION RATE: 17 BRPM | HEART RATE: 71 BPM | SYSTOLIC BLOOD PRESSURE: 164 MMHG

## 2018-01-01 VITALS
BODY MASS INDEX: 21.56 KG/M2 | HEART RATE: 79 BPM | WEIGHT: 146 LBS | SYSTOLIC BLOOD PRESSURE: 140 MMHG | DIASTOLIC BLOOD PRESSURE: 68 MMHG

## 2018-01-01 VITALS
TEMPERATURE: 97.9 F | RESPIRATION RATE: 16 BRPM | HEART RATE: 70 BPM | HEIGHT: 69 IN | BODY MASS INDEX: 23.22 KG/M2 | DIASTOLIC BLOOD PRESSURE: 80 MMHG | SYSTOLIC BLOOD PRESSURE: 161 MMHG | OXYGEN SATURATION: 95 % | WEIGHT: 156.8 LBS

## 2018-01-01 VITALS
WEIGHT: 142 LBS | HEIGHT: 69 IN | BODY MASS INDEX: 21.03 KG/M2 | OXYGEN SATURATION: 98 % | SYSTOLIC BLOOD PRESSURE: 154 MMHG | HEART RATE: 70 BPM | DIASTOLIC BLOOD PRESSURE: 77 MMHG

## 2018-01-01 VITALS
HEIGHT: 69 IN | SYSTOLIC BLOOD PRESSURE: 132 MMHG | DIASTOLIC BLOOD PRESSURE: 72 MMHG | WEIGHT: 144 LBS | OXYGEN SATURATION: 97 % | BODY MASS INDEX: 21.33 KG/M2 | HEART RATE: 81 BPM

## 2018-01-01 VITALS
HEIGHT: 69 IN | OXYGEN SATURATION: 100 % | SYSTOLIC BLOOD PRESSURE: 102 MMHG | HEART RATE: 80 BPM | RESPIRATION RATE: 23 BRPM | BODY MASS INDEX: 22.07 KG/M2 | DIASTOLIC BLOOD PRESSURE: 59 MMHG | TEMPERATURE: 97.8 F | WEIGHT: 149 LBS

## 2018-01-01 VITALS
WEIGHT: 138 LBS | OXYGEN SATURATION: 95 % | DIASTOLIC BLOOD PRESSURE: 79 MMHG | HEIGHT: 69 IN | SYSTOLIC BLOOD PRESSURE: 146 MMHG | BODY MASS INDEX: 20.44 KG/M2 | TEMPERATURE: 97.4 F | HEART RATE: 80 BPM | RESPIRATION RATE: 18 BRPM

## 2018-01-01 VITALS
HEART RATE: 70 BPM | SYSTOLIC BLOOD PRESSURE: 155 MMHG | HEIGHT: 69 IN | BODY MASS INDEX: 20.73 KG/M2 | DIASTOLIC BLOOD PRESSURE: 74 MMHG | OXYGEN SATURATION: 93 % | WEIGHT: 140 LBS

## 2018-01-01 VITALS — DIASTOLIC BLOOD PRESSURE: 59 MMHG | SYSTOLIC BLOOD PRESSURE: 115 MMHG

## 2018-01-01 DIAGNOSIS — F41.9 ANXIETY: Primary | ICD-10-CM

## 2018-01-01 DIAGNOSIS — I35.0 AORTIC STENOSIS, MODERATE: Primary | ICD-10-CM

## 2018-01-01 DIAGNOSIS — I25.5 ISCHEMIC CARDIOMYOPATHY: ICD-10-CM

## 2018-01-01 DIAGNOSIS — I48.20 CHRONIC ATRIAL FIBRILLATION (HCC): ICD-10-CM

## 2018-01-01 DIAGNOSIS — Z95.1 S/P CABG X 2: ICD-10-CM

## 2018-01-01 DIAGNOSIS — F41.9 ANXIETY: ICD-10-CM

## 2018-01-01 DIAGNOSIS — I25.118 CORONARY ARTERY DISEASE OF NATIVE ARTERY OF NATIVE HEART WITH STABLE ANGINA PECTORIS (HCC): ICD-10-CM

## 2018-01-01 DIAGNOSIS — E78.5 DYSLIPIDEMIA: ICD-10-CM

## 2018-01-01 DIAGNOSIS — I50.22 CHRONIC SYSTOLIC CONGESTIVE HEART FAILURE (HCC): ICD-10-CM

## 2018-01-01 DIAGNOSIS — R10.31 ABDOMINAL PAIN, RIGHT LOWER QUADRANT: Primary | ICD-10-CM

## 2018-01-01 DIAGNOSIS — R00.1 SYMPTOMATIC BRADYCARDIA: ICD-10-CM

## 2018-01-01 DIAGNOSIS — R55 SYNCOPE AND COLLAPSE: Primary | ICD-10-CM

## 2018-01-01 DIAGNOSIS — R06.02 SOB (SHORTNESS OF BREATH) ON EXERTION: Primary | ICD-10-CM

## 2018-01-01 DIAGNOSIS — R55 SYNCOPE, UNSPECIFIED SYNCOPE TYPE: ICD-10-CM

## 2018-01-01 DIAGNOSIS — E83.51 HYPOCALCEMIA: ICD-10-CM

## 2018-01-01 DIAGNOSIS — I48.91 A-FIB (HCC): ICD-10-CM

## 2018-01-01 DIAGNOSIS — I49.5 SSS (SICK SINUS SYNDROME) (HCC): ICD-10-CM

## 2018-01-01 DIAGNOSIS — R60.1 ANASARCA: ICD-10-CM

## 2018-01-01 DIAGNOSIS — K92.2 GASTROINTESTINAL HEMORRHAGE, UNSPECIFIED GASTROINTESTINAL HEMORRHAGE TYPE: Primary | ICD-10-CM

## 2018-01-01 DIAGNOSIS — Z78.9 STATIN INTOLERANCE: ICD-10-CM

## 2018-01-01 DIAGNOSIS — I10 ESSENTIAL HYPERTENSION: ICD-10-CM

## 2018-01-01 DIAGNOSIS — R06.02 SOB (SHORTNESS OF BREATH) ON EXERTION: ICD-10-CM

## 2018-01-01 DIAGNOSIS — I71.40 ABDOMINAL AORTIC ANEURYSM (AAA) WITHOUT RUPTURE: ICD-10-CM

## 2018-01-01 DIAGNOSIS — I25.118 CORONARY ARTERY DISEASE OF NATIVE ARTERY OF NATIVE HEART WITH STABLE ANGINA PECTORIS (HCC): Primary | ICD-10-CM

## 2018-01-01 DIAGNOSIS — I50.22 CHRONIC SYSTOLIC CONGESTIVE HEART FAILURE (HCC): Primary | ICD-10-CM

## 2018-01-01 DIAGNOSIS — R20.8 BURNING SENSATION: Primary | ICD-10-CM

## 2018-01-01 DIAGNOSIS — I50.21 ACUTE SYSTOLIC CONGESTIVE HEART FAILURE (HCC): ICD-10-CM

## 2018-01-01 DIAGNOSIS — Z86.79 HISTORY OF CHF (CONGESTIVE HEART FAILURE): ICD-10-CM

## 2018-01-01 DIAGNOSIS — Z51.89 VISIT FOR WOUND CHECK: Primary | ICD-10-CM

## 2018-01-01 DIAGNOSIS — I35.0 AORTIC STENOSIS, MODERATE: ICD-10-CM

## 2018-01-01 DIAGNOSIS — S81.831A PUNCTURE WOUND OF RIGHT LOWER LEG, INITIAL ENCOUNTER: Primary | ICD-10-CM

## 2018-01-01 DIAGNOSIS — Z79.01 CHRONIC ANTICOAGULATION: ICD-10-CM

## 2018-01-01 DIAGNOSIS — I70.1 ATHEROSCLEROTIC RAS (RENAL ARTERY STENOSIS), BILATERAL (HCC): ICD-10-CM

## 2018-01-01 DIAGNOSIS — Z95.0 CARDIAC PACEMAKER IN SITU: ICD-10-CM

## 2018-01-01 DIAGNOSIS — R42 LIGHTHEADEDNESS: ICD-10-CM

## 2018-01-01 DIAGNOSIS — R93.89 ABNORMAL CHEST CT: ICD-10-CM

## 2018-01-01 DIAGNOSIS — I77.1 CELIAC ARTERY STENOSIS (HCC): ICD-10-CM

## 2018-01-01 DIAGNOSIS — I50.32 DIASTOLIC CHF, CHRONIC (HCC): ICD-10-CM

## 2018-01-01 DIAGNOSIS — R55 SYNCOPE AND COLLAPSE: ICD-10-CM

## 2018-01-01 DIAGNOSIS — R55 CARDIAC SYNCOPE: ICD-10-CM

## 2018-01-01 DIAGNOSIS — I48.20 CHRONIC ATRIAL FIBRILLATION (HCC): Primary | ICD-10-CM

## 2018-01-01 DIAGNOSIS — I49.5 SSS (SICK SINUS SYNDROME) (HCC): Primary | ICD-10-CM

## 2018-01-01 DIAGNOSIS — R40.0 DAYTIME SOMNOLENCE: Primary | ICD-10-CM

## 2018-01-01 DIAGNOSIS — R80.9 PROTEINURIA, UNSPECIFIED TYPE: ICD-10-CM

## 2018-01-01 DIAGNOSIS — M79.661 PAIN IN RIGHT LOWER LEG: ICD-10-CM

## 2018-01-01 DIAGNOSIS — Z79.01 ANTICOAGULANT LONG-TERM USE: ICD-10-CM

## 2018-01-01 DIAGNOSIS — I35.0 AORTIC VALVE STENOSIS, ETIOLOGY OF CARDIAC VALVE DISEASE UNSPECIFIED: ICD-10-CM

## 2018-01-01 DIAGNOSIS — I50.9 ACUTE ON CHRONIC CONGESTIVE HEART FAILURE, UNSPECIFIED HEART FAILURE TYPE (HCC): ICD-10-CM

## 2018-01-01 DIAGNOSIS — M17.0 OSTEOARTHRITIS OF BOTH KNEES, UNSPECIFIED OSTEOARTHRITIS TYPE: ICD-10-CM

## 2018-01-01 DIAGNOSIS — R60.0 MILD PERIPHERAL EDEMA: Primary | ICD-10-CM

## 2018-01-01 DIAGNOSIS — I10 HYPERTENSION, UNSPECIFIED TYPE: Primary | ICD-10-CM

## 2018-01-01 DIAGNOSIS — I50.9 CHRONIC CONGESTIVE HEART FAILURE, UNSPECIFIED HEART FAILURE TYPE (HCC): ICD-10-CM

## 2018-01-01 DIAGNOSIS — R06.03 ACUTE RESPIRATORY DISTRESS: Primary | ICD-10-CM

## 2018-01-01 DIAGNOSIS — D64.9 ANEMIA, UNSPECIFIED TYPE: ICD-10-CM

## 2018-01-01 DIAGNOSIS — R07.9 CHEST PAIN ON EXERTION: Primary | ICD-10-CM

## 2018-01-01 LAB
ABO + RH BLD: NORMAL
ALBUMIN SERPL-MCNC: 2.9 G/DL (ref 3.4–5)
ALBUMIN SERPL-MCNC: 3 G/DL (ref 3.4–5)
ALBUMIN SERPL-MCNC: 3.4 G/DL (ref 3.4–5)
ALBUMIN SERPL-MCNC: 3.4 G/DL (ref 3.4–5)
ALBUMIN SERPL-MCNC: 3.6 G/DL (ref 3.4–5)
ALBUMIN SERPL-MCNC: 3.7 G/DL (ref 3.4–5)
ALBUMIN SERPL-MCNC: 3.8 G/DL (ref 3.4–5)
ALBUMIN/GLOB SERPL: 0.6 {RATIO} (ref 0.8–1.7)
ALBUMIN/GLOB SERPL: 0.8 {RATIO} (ref 0.8–1.7)
ALBUMIN/GLOB SERPL: 0.9 {RATIO} (ref 0.8–1.7)
ALBUMIN/GLOB SERPL: 1 {RATIO} (ref 0.8–1.7)
ALBUMIN/GLOB SERPL: 1 {RATIO} (ref 0.8–1.7)
ALP SERPL-CCNC: 109 U/L (ref 45–117)
ALP SERPL-CCNC: 117 U/L (ref 45–117)
ALP SERPL-CCNC: 117 U/L (ref 45–117)
ALP SERPL-CCNC: 126 U/L (ref 45–117)
ALP SERPL-CCNC: 128 U/L (ref 45–117)
ALP SERPL-CCNC: 138 U/L (ref 45–117)
ALP SERPL-CCNC: 140 U/L (ref 45–117)
ALP SERPL-CCNC: 162 U/L (ref 45–117)
ALP SERPL-CCNC: 170 U/L (ref 45–117)
ALP SERPL-CCNC: 98 U/L (ref 45–117)
ALT SERPL-CCNC: 15 U/L (ref 16–61)
ALT SERPL-CCNC: 16 U/L (ref 16–61)
ALT SERPL-CCNC: 16 U/L (ref 16–61)
ALT SERPL-CCNC: 17 U/L (ref 16–61)
ALT SERPL-CCNC: 17 U/L (ref 16–61)
ALT SERPL-CCNC: 19 U/L (ref 16–61)
ALT SERPL-CCNC: 21 U/L (ref 16–61)
ALT SERPL-CCNC: 21 U/L (ref 16–61)
ALT SERPL-CCNC: 25 U/L (ref 16–61)
ALT SERPL-CCNC: 25 U/L (ref 16–61)
AMORPH CRY URNS QL MICRO: ABNORMAL
ANION GAP BLD CALC-SCNC: 16 MMOL/L (ref 10–20)
ANION GAP SERPL CALC-SCNC: 10 MMOL/L (ref 3–18)
ANION GAP SERPL CALC-SCNC: 11 MMOL/L (ref 3–18)
ANION GAP SERPL CALC-SCNC: 12 MMOL/L (ref 3–18)
ANION GAP SERPL CALC-SCNC: 6 MMOL/L (ref 3–18)
ANION GAP SERPL CALC-SCNC: 6 MMOL/L (ref 3–18)
ANION GAP SERPL CALC-SCNC: 7 MMOL/L (ref 3–18)
ANION GAP SERPL CALC-SCNC: 8 MMOL/L (ref 3–18)
ANION GAP SERPL CALC-SCNC: 9 MMOL/L (ref 3–18)
APPEARANCE UR: CLEAR
APTT PPP: 39.1 SEC (ref 23–36.4)
APTT PPP: 40.3 SEC (ref 23–36.4)
ARTERIAL PATENCY WRIST A: YES
ARTERIAL PATENCY WRIST A: YES
AST SERPL-CCNC: 21 U/L (ref 15–37)
AST SERPL-CCNC: 24 U/L (ref 15–37)
AST SERPL-CCNC: 25 U/L (ref 15–37)
AST SERPL-CCNC: 27 U/L (ref 15–37)
AST SERPL-CCNC: 29 U/L (ref 15–37)
AST SERPL-CCNC: 29 U/L (ref 15–37)
AST SERPL-CCNC: 34 U/L (ref 15–37)
AST SERPL-CCNC: 42 U/L (ref 15–37)
ATRIAL RATE: 326 BPM
ATRIAL RATE: 340 BPM
ATRIAL RATE: 340 BPM
ATRIAL RATE: 344 BPM
ATRIAL RATE: 66 BPM
ATRIAL RATE: 67 BPM
ATRIAL RATE: 72 BPM
ATRIAL RATE: 72 BPM
ATRIAL RATE: 87 BPM
BACTERIA URNS QL MICRO: ABNORMAL /HPF
BACTERIA URNS QL MICRO: NEGATIVE /HPF
BACTERIA URNS QL MICRO: NEGATIVE /HPF
BASE DEFICIT BLD-SCNC: 2 MMOL/L
BASE EXCESS BLD CALC-SCNC: 1 MMOL/L
BASOPHILS # BLD: 0 K/UL (ref 0–0.06)
BASOPHILS # BLD: 0 K/UL (ref 0–0.1)
BASOPHILS NFR BLD: 0 % (ref 0–2)
BASOPHILS NFR BLD: 1 % (ref 0–2)
BDY SITE: ABNORMAL
BDY SITE: ABNORMAL
BILIRUB SERPL-MCNC: 0.5 MG/DL (ref 0.2–1)
BILIRUB SERPL-MCNC: 0.7 MG/DL (ref 0.2–1)
BILIRUB SERPL-MCNC: 0.8 MG/DL (ref 0.2–1)
BILIRUB SERPL-MCNC: 0.8 MG/DL (ref 0.2–1)
BILIRUB SERPL-MCNC: 0.9 MG/DL (ref 0.2–1)
BILIRUB SERPL-MCNC: 0.9 MG/DL (ref 0.2–1)
BILIRUB SERPL-MCNC: 1 MG/DL (ref 0.2–1)
BILIRUB UR QL: NEGATIVE
BLOOD GROUP ANTIBODIES SERPL: NORMAL
BNP SERPL-MCNC: 2698 PG/ML (ref 0–1800)
BNP SERPL-MCNC: 3012 PG/ML (ref 0–1800)
BNP SERPL-MCNC: 4942 PG/ML (ref 0–1800)
BNP SERPL-MCNC: 5222 PG/ML (ref 0–1800)
BNP SERPL-MCNC: 6098 PG/ML (ref 0–1800)
BNP SERPL-MCNC: 7421 PG/ML (ref 0–1800)
BODY TEMPERATURE: 97.9
BODY TEMPERATURE: 98.7
BUN BLD-MCNC: 32 MG/DL (ref 7–18)
BUN SERPL-MCNC: 26 MG/DL (ref 7–18)
BUN SERPL-MCNC: 27 MG/DL (ref 7–18)
BUN SERPL-MCNC: 27 MG/DL (ref 7–18)
BUN SERPL-MCNC: 29 MG/DL (ref 7–18)
BUN SERPL-MCNC: 30 MG/DL (ref 7–18)
BUN SERPL-MCNC: 31 MG/DL (ref 7–18)
BUN SERPL-MCNC: 31 MG/DL (ref 7–18)
BUN SERPL-MCNC: 32 MG/DL (ref 7–18)
BUN SERPL-MCNC: 33 MG/DL (ref 7–18)
BUN SERPL-MCNC: 34 MG/DL (ref 7–18)
BUN SERPL-MCNC: 34 MG/DL (ref 7–18)
BUN SERPL-MCNC: 36 MG/DL (ref 7–18)
BUN SERPL-MCNC: 41 MG/DL (ref 7–18)
BUN SERPL-MCNC: 53 MG/DL (ref 7–18)
BUN SERPL-MCNC: 55 MG/DL (ref 7–18)
BUN/CREAT SERPL: 18 (ref 12–20)
BUN/CREAT SERPL: 20 (ref 12–20)
BUN/CREAT SERPL: 20 (ref 12–20)
BUN/CREAT SERPL: 21 (ref 12–20)
BUN/CREAT SERPL: 22 (ref 12–20)
BUN/CREAT SERPL: 22 (ref 12–20)
BUN/CREAT SERPL: 24 (ref 12–20)
BUN/CREAT SERPL: 25 (ref 12–20)
BUN/CREAT SERPL: 26 (ref 12–20)
BUN/CREAT SERPL: 28 (ref 12–20)
CA-I BLD-MCNC: 1.07 MMOL/L (ref 1.12–1.32)
CALCIUM SERPL-MCNC: 8.1 MG/DL (ref 8.5–10.1)
CALCIUM SERPL-MCNC: 8.1 MG/DL (ref 8.5–10.1)
CALCIUM SERPL-MCNC: 8.2 MG/DL (ref 8.5–10.1)
CALCIUM SERPL-MCNC: 8.3 MG/DL (ref 8.5–10.1)
CALCIUM SERPL-MCNC: 8.4 MG/DL (ref 8.5–10.1)
CALCIUM SERPL-MCNC: 8.5 MG/DL (ref 8.5–10.1)
CALCIUM SERPL-MCNC: 8.6 MG/DL (ref 8.5–10.1)
CALCIUM SERPL-MCNC: 8.6 MG/DL (ref 8.5–10.1)
CALCIUM SERPL-MCNC: 8.8 MG/DL (ref 8.5–10.1)
CALCIUM SERPL-MCNC: 8.8 MG/DL (ref 8.5–10.1)
CALCIUM SERPL-MCNC: 8.9 MG/DL (ref 8.5–10.1)
CALCIUM SERPL-MCNC: 8.9 MG/DL (ref 8.5–10.1)
CALCIUM SERPL-MCNC: 9.1 MG/DL (ref 8.5–10.1)
CALCIUM SERPL-MCNC: 9.1 MG/DL (ref 8.5–10.1)
CALCIUM SERPL-MCNC: 9.2 MG/DL (ref 8.5–10.1)
CALCULATED R AXIS, ECG10: -69 DEGREES
CALCULATED R AXIS, ECG10: -78 DEGREES
CALCULATED R AXIS, ECG10: -80 DEGREES
CALCULATED R AXIS, ECG10: -81 DEGREES
CALCULATED R AXIS, ECG10: -82 DEGREES
CALCULATED R AXIS, ECG10: -85 DEGREES
CALCULATED R AXIS, ECG10: -86 DEGREES
CALCULATED R AXIS, ECG10: -87 DEGREES
CALCULATED R AXIS, ECG10: -90 DEGREES
CALCULATED T AXIS, ECG11: 102 DEGREES
CALCULATED T AXIS, ECG11: 102 DEGREES
CALCULATED T AXIS, ECG11: 105 DEGREES
CALCULATED T AXIS, ECG11: 107 DEGREES
CALCULATED T AXIS, ECG11: 91 DEGREES
CALCULATED T AXIS, ECG11: 92 DEGREES
CALCULATED T AXIS, ECG11: 92 DEGREES
CALCULATED T AXIS, ECG11: 93 DEGREES
CALCULATED T AXIS, ECG11: 99 DEGREES
CHLORIDE BLD-SCNC: 102 MMOL/L (ref 100–108)
CHLORIDE SERPL-SCNC: 100 MMOL/L (ref 100–108)
CHLORIDE SERPL-SCNC: 101 MMOL/L (ref 100–108)
CHLORIDE SERPL-SCNC: 103 MMOL/L (ref 100–108)
CHLORIDE SERPL-SCNC: 104 MMOL/L (ref 100–108)
CHLORIDE SERPL-SCNC: 105 MMOL/L (ref 100–108)
CHLORIDE SERPL-SCNC: 106 MMOL/L (ref 100–108)
CHLORIDE SERPL-SCNC: 108 MMOL/L (ref 100–108)
CHLORIDE SERPL-SCNC: 97 MMOL/L (ref 100–108)
CHLORIDE SERPL-SCNC: 98 MMOL/L (ref 100–108)
CHOLEST SERPL-MCNC: 110 MG/DL
CK MB CFR SERPL CALC: 1.8 % (ref 0–4)
CK MB CFR SERPL CALC: 2.8 % (ref 0–4)
CK MB CFR SERPL CALC: 4.7 % (ref 0–4)
CK MB CFR SERPL CALC: 4.8 % (ref 0–4)
CK MB CFR SERPL CALC: 4.8 % (ref 0–4)
CK MB CFR SERPL CALC: 5.5 % (ref 0–4)
CK MB CFR SERPL CALC: 5.6 % (ref 0–4)
CK MB SERPL-MCNC: 2.1 NG/ML (ref 5–25)
CK MB SERPL-MCNC: 3 NG/ML (ref 5–25)
CK MB SERPL-MCNC: 3.1 NG/ML (ref 5–25)
CK MB SERPL-MCNC: 3.2 NG/ML (ref 5–25)
CK MB SERPL-MCNC: 3.2 NG/ML (ref 5–25)
CK MB SERPL-MCNC: 4 NG/ML (ref 5–25)
CK MB SERPL-MCNC: 5.4 NG/ML (ref 5–25)
CK SERPL-CCNC: 116 U/L (ref 39–308)
CK SERPL-CCNC: 116 U/L (ref 39–308)
CK SERPL-CCNC: 62 U/L (ref 39–308)
CK SERPL-CCNC: 64 U/L (ref 39–308)
CK SERPL-CCNC: 68 U/L (ref 39–308)
CK SERPL-CCNC: 72 U/L (ref 39–308)
CK SERPL-CCNC: 99 U/L (ref 39–308)
CO2 BLD-SCNC: 24 MMOL/L (ref 19–24)
CO2 SERPL-SCNC: 22 MMOL/L (ref 21–32)
CO2 SERPL-SCNC: 22 MMOL/L (ref 21–32)
CO2 SERPL-SCNC: 23 MMOL/L (ref 21–32)
CO2 SERPL-SCNC: 24 MMOL/L (ref 21–32)
CO2 SERPL-SCNC: 24 MMOL/L (ref 21–32)
CO2 SERPL-SCNC: 25 MMOL/L (ref 21–32)
CO2 SERPL-SCNC: 26 MMOL/L (ref 21–32)
CO2 SERPL-SCNC: 27 MMOL/L (ref 21–32)
CO2 SERPL-SCNC: 28 MMOL/L (ref 21–32)
CO2 SERPL-SCNC: 28 MMOL/L (ref 21–32)
CO2 SERPL-SCNC: 29 MMOL/L (ref 21–32)
CO2 SERPL-SCNC: 30 MMOL/L (ref 21–32)
CO2 SERPL-SCNC: 30 MMOL/L (ref 21–32)
COLOR UR: YELLOW
CREAT SERPL-MCNC: 1.1 MG/DL (ref 0.6–1.3)
CREAT SERPL-MCNC: 1.27 MG/DL (ref 0.6–1.3)
CREAT SERPL-MCNC: 1.27 MG/DL (ref 0.6–1.3)
CREAT SERPL-MCNC: 1.3 MG/DL (ref 0.6–1.3)
CREAT SERPL-MCNC: 1.3 MG/DL (ref 0.6–1.3)
CREAT SERPL-MCNC: 1.32 MG/DL (ref 0.6–1.3)
CREAT SERPL-MCNC: 1.33 MG/DL (ref 0.6–1.3)
CREAT SERPL-MCNC: 1.34 MG/DL (ref 0.6–1.3)
CREAT SERPL-MCNC: 1.35 MG/DL (ref 0.6–1.3)
CREAT SERPL-MCNC: 1.38 MG/DL (ref 0.6–1.3)
CREAT SERPL-MCNC: 1.45 MG/DL (ref 0.6–1.3)
CREAT SERPL-MCNC: 1.47 MG/DL (ref 0.6–1.3)
CREAT SERPL-MCNC: 1.47 MG/DL (ref 0.6–1.3)
CREAT SERPL-MCNC: 1.53 MG/DL (ref 0.6–1.3)
CREAT SERPL-MCNC: 1.54 MG/DL (ref 0.6–1.3)
CREAT SERPL-MCNC: 1.56 MG/DL (ref 0.6–1.3)
CREAT SERPL-MCNC: 1.61 MG/DL (ref 0.6–1.3)
CREAT SERPL-MCNC: 1.71 MG/DL (ref 0.6–1.3)
CREAT SERPL-MCNC: 1.95 MG/DL (ref 0.6–1.3)
CREAT SERPL-MCNC: 2.12 MG/DL (ref 0.6–1.3)
CREAT UR-MCNC: 1.5 MG/DL (ref 0.6–1.3)
D DIMER PPP FEU-MCNC: 2.52 UG/ML(FEU)
DIAGNOSIS, 93000: NORMAL
DIFFERENTIAL METHOD BLD: ABNORMAL
ECHO AV AREA VTI: 2.3 CM2
ECHO AV AREA/BSA VTI: 1.3 CM2/M2
ECHO AV MEAN GRADIENT: 10.5 MMHG
ECHO AV PEAK GRADIENT: 0 MMHG
ECHO AV PEAK VELOCITY: 0 CM/S
ECHO AV REGURGITANT PHT: 421.2 CM
ECHO AV VTI: 41.58 CM
ECHO EST RA PRESSURE: 15 MMHG
ECHO LA VOL 2C: 93.34 ML (ref 18–58)
ECHO LA VOL 4C: 90.52 ML (ref 18–58)
ECHO LA VOLUME INDEX A2C: 52.91 ML/M2
ECHO LA VOLUME INDEX A4C: 51.31 ML/M2
ECHO LV EDV A2C: 37.9 ML
ECHO LV EDV A4C: 101.5 ML
ECHO LV EDV INDEX A4C: 57.5 ML/M2
ECHO LV EDV NDEX A2C: 21.5 ML/M2
ECHO LV EJECTION FRACTION A4C: 55 %
ECHO LV ESV A4C: 45.7 ML
ECHO LV ESV INDEX A4C: 25.9 ML/M2
ECHO LV INTERNAL DIMENSION DIASTOLIC: 3.39 CM (ref 4.2–5.9)
ECHO LV INTERNAL DIMENSION SYSTOLIC: 3.05 CM
ECHO LV ISOVOLUMETRIC RELAXATION TIME (IVRT): 61 MS
ECHO LV IVSD: 1.63 CM (ref 0.6–1)
ECHO LV MASS 2D: 203.9 G (ref 88–224)
ECHO LV MASS INDEX 2D: 115.6 G/M2
ECHO LV POSTERIOR WALL DIASTOLIC: 1.26 CM (ref 0.6–1)
ECHO LVOT DIAM: 1.98 CM
ECHO LVOT PEAK GRADIENT: 14 MMHG
ECHO LVOT PEAK VELOCITY: 186.78 CM/S
ECHO LVOT VTI: 30.86 CM
ECHO MV A VELOCITY: 2.32 CM/S
ECHO MV AREA PHT: 4.8 CM2
ECHO MV E DECELERATION TIME (DT): 156.9 MS
ECHO MV E VELOCITY: 0.92 CM/S
ECHO MV E/A RATIO: 39.7
ECHO MV PRESSURE HALF TIME (PHT): 45.5 MS
ECHO PULMONARY ARTERY SYSTOLIC PRESSURE (PASP): 21.3 MMHG
ECHO RIGHT VENTRICULAR SYSTOLIC PRESSURE (RVSP): 21.3 MMHG
ECHO TV REGURGITANT MAX VELOCITY: -125.92 CM/S
ECHO TV REGURGITANT PEAK GRADIENT: 6.3 MMHG
EOSINOPHIL # BLD: 0 K/UL (ref 0–0.4)
EOSINOPHIL # BLD: 0.1 K/UL (ref 0–0.4)
EOSINOPHIL NFR BLD: 0 % (ref 0–5)
EOSINOPHIL NFR BLD: 1 % (ref 0–5)
EOSINOPHIL NFR BLD: 2 % (ref 0–5)
EOSINOPHIL NFR BLD: 3 % (ref 0–5)
EPITH CASTS URNS QL MICRO: ABNORMAL /LPF (ref 0–5)
EPITH CASTS URNS QL MICRO: NORMAL /LPF (ref 0–5)
EPITH CASTS URNS QL MICRO: NORMAL /LPF (ref 0–5)
ERYTHROCYTE [DISTWIDTH] IN BLOOD BY AUTOMATED COUNT: 17.4 % (ref 11.6–14.5)
ERYTHROCYTE [DISTWIDTH] IN BLOOD BY AUTOMATED COUNT: 17.6 % (ref 11.6–14.5)
ERYTHROCYTE [DISTWIDTH] IN BLOOD BY AUTOMATED COUNT: 17.7 % (ref 11.6–14.5)
ERYTHROCYTE [DISTWIDTH] IN BLOOD BY AUTOMATED COUNT: 17.9 % (ref 11.6–14.5)
ERYTHROCYTE [DISTWIDTH] IN BLOOD BY AUTOMATED COUNT: 18.1 % (ref 11.6–14.5)
ERYTHROCYTE [DISTWIDTH] IN BLOOD BY AUTOMATED COUNT: 18.3 % (ref 11.6–14.5)
ERYTHROCYTE [DISTWIDTH] IN BLOOD BY AUTOMATED COUNT: 18.4 % (ref 11.6–14.5)
ERYTHROCYTE [DISTWIDTH] IN BLOOD BY AUTOMATED COUNT: 18.4 % (ref 11.6–14.5)
ERYTHROCYTE [DISTWIDTH] IN BLOOD BY AUTOMATED COUNT: 18.6 % (ref 11.6–14.5)
ERYTHROCYTE [DISTWIDTH] IN BLOOD BY AUTOMATED COUNT: 18.6 % (ref 11.6–14.5)
ERYTHROCYTE [DISTWIDTH] IN BLOOD BY AUTOMATED COUNT: 18.7 % (ref 11.6–14.5)
ERYTHROCYTE [DISTWIDTH] IN BLOOD BY AUTOMATED COUNT: 19.5 % (ref 11.6–14.5)
ERYTHROCYTE [DISTWIDTH] IN BLOOD BY AUTOMATED COUNT: 19.7 % (ref 11.6–14.5)
ERYTHROCYTE [DISTWIDTH] IN BLOOD BY AUTOMATED COUNT: 19.7 % (ref 11.6–14.5)
ERYTHROCYTE [DISTWIDTH] IN BLOOD BY AUTOMATED COUNT: 19.9 % (ref 11.6–14.5)
ERYTHROCYTE [DISTWIDTH] IN BLOOD BY AUTOMATED COUNT: 20.1 % (ref 11.6–14.5)
ETHANOL SERPL-MCNC: <3 MG/DL (ref 0–3)
GAS FLOW.O2 O2 DELIVERY SYS: ABNORMAL L/MIN
GAS FLOW.O2 O2 DELIVERY SYS: ABNORMAL L/MIN
GAS FLOW.O2 SETTING OXYMISER: 4 L/M
GLOBULIN SER CALC-MCNC: 3.1 G/DL (ref 2–4)
GLOBULIN SER CALC-MCNC: 3.1 G/DL (ref 2–4)
GLOBULIN SER CALC-MCNC: 3.2 G/DL (ref 2–4)
GLOBULIN SER CALC-MCNC: 3.6 G/DL (ref 2–4)
GLOBULIN SER CALC-MCNC: 3.7 G/DL (ref 2–4)
GLOBULIN SER CALC-MCNC: 3.9 G/DL (ref 2–4)
GLOBULIN SER CALC-MCNC: 4.5 G/DL (ref 2–4)
GLOBULIN SER CALC-MCNC: 4.5 G/DL (ref 2–4)
GLOBULIN SER CALC-MCNC: 4.7 G/DL (ref 2–4)
GLOBULIN SER CALC-MCNC: 4.8 G/DL (ref 2–4)
GLUCOSE BLD STRIP.AUTO-MCNC: 99 MG/DL (ref 74–106)
GLUCOSE SERPL-MCNC: 103 MG/DL (ref 74–99)
GLUCOSE SERPL-MCNC: 106 MG/DL (ref 74–99)
GLUCOSE SERPL-MCNC: 115 MG/DL (ref 74–99)
GLUCOSE SERPL-MCNC: 118 MG/DL (ref 74–99)
GLUCOSE SERPL-MCNC: 120 MG/DL (ref 74–99)
GLUCOSE SERPL-MCNC: 120 MG/DL (ref 74–99)
GLUCOSE SERPL-MCNC: 124 MG/DL (ref 74–99)
GLUCOSE SERPL-MCNC: 125 MG/DL (ref 74–99)
GLUCOSE SERPL-MCNC: 77 MG/DL (ref 74–99)
GLUCOSE SERPL-MCNC: 78 MG/DL (ref 74–99)
GLUCOSE SERPL-MCNC: 78 MG/DL (ref 74–99)
GLUCOSE SERPL-MCNC: 80 MG/DL (ref 74–99)
GLUCOSE SERPL-MCNC: 81 MG/DL (ref 74–99)
GLUCOSE SERPL-MCNC: 84 MG/DL (ref 74–99)
GLUCOSE SERPL-MCNC: 85 MG/DL (ref 74–99)
GLUCOSE SERPL-MCNC: 91 MG/DL (ref 74–99)
GLUCOSE SERPL-MCNC: 91 MG/DL (ref 74–99)
GLUCOSE SERPL-MCNC: 98 MG/DL (ref 74–99)
GLUCOSE UR STRIP.AUTO-MCNC: NEGATIVE MG/DL
HCO3 BLD-SCNC: 22.3 MMOL/L (ref 22–26)
HCO3 BLD-SCNC: 24.4 MMOL/L (ref 22–26)
HCT VFR BLD AUTO: 30.7 % (ref 36–48)
HCT VFR BLD AUTO: 31.7 % (ref 36–48)
HCT VFR BLD AUTO: 32.9 % (ref 36–48)
HCT VFR BLD AUTO: 34.8 % (ref 36–48)
HCT VFR BLD AUTO: 35.2 % (ref 36–48)
HCT VFR BLD AUTO: 35.7 % (ref 36–48)
HCT VFR BLD AUTO: 35.7 % (ref 36–48)
HCT VFR BLD AUTO: 36.3 % (ref 36–48)
HCT VFR BLD AUTO: 36.4 % (ref 36–48)
HCT VFR BLD AUTO: 36.9 % (ref 36–48)
HCT VFR BLD AUTO: 37.7 % (ref 36–48)
HCT VFR BLD AUTO: 38.8 % (ref 36–48)
HCT VFR BLD AUTO: 39.5 % (ref 36–48)
HCT VFR BLD AUTO: 39.9 % (ref 36–48)
HCT VFR BLD AUTO: 40.5 % (ref 36–48)
HCT VFR BLD AUTO: 40.5 % (ref 36–48)
HCT VFR BLD AUTO: 41 % (ref 36–48)
HCT VFR BLD AUTO: 43.3 % (ref 36–48)
HCT VFR BLD AUTO: 43.7 % (ref 36–48)
HCT VFR BLD CALC: 37 % (ref 36–49)
HDLC SERPL-MCNC: 33 MG/DL (ref 40–60)
HDLC SERPL: 3.3 {RATIO} (ref 0–5)
HGB BLD-MCNC: 10 G/DL (ref 13–16)
HGB BLD-MCNC: 10.1 G/DL (ref 13–16)
HGB BLD-MCNC: 10.5 G/DL (ref 13–16)
HGB BLD-MCNC: 10.7 G/DL (ref 13–16)
HGB BLD-MCNC: 10.8 G/DL (ref 13–16)
HGB BLD-MCNC: 11 G/DL (ref 13–16)
HGB BLD-MCNC: 11.1 G/DL (ref 13–16)
HGB BLD-MCNC: 11.2 G/DL (ref 13–16)
HGB BLD-MCNC: 11.4 G/DL (ref 13–16)
HGB BLD-MCNC: 11.9 G/DL (ref 13–16)
HGB BLD-MCNC: 12.2 G/DL (ref 13–16)
HGB BLD-MCNC: 12.4 G/DL (ref 13–16)
HGB BLD-MCNC: 12.5 G/DL (ref 13–16)
HGB BLD-MCNC: 12.5 G/DL (ref 13–16)
HGB BLD-MCNC: 12.6 G/DL (ref 12–16)
HGB BLD-MCNC: 12.6 G/DL (ref 13–16)
HGB BLD-MCNC: 13.3 G/DL (ref 13–16)
HGB BLD-MCNC: 13.5 G/DL (ref 13–16)
HGB BLD-MCNC: 8.9 G/DL (ref 13–16)
HGB BLD-MCNC: 9.1 G/DL (ref 13–16)
HGB UR QL STRIP: ABNORMAL
HGB UR QL STRIP: NEGATIVE
INR BLD: 1.2
INR PPP: 1.6 (ref 0.8–1.2)
INR PPP: 1.7 (ref 0.8–1.2)
INR PPP: 1.9 (ref 0.8–1.2)
INR PPP: 2 (ref 0.8–1.2)
INR PPP: 2 (ref 0.8–1.2)
INR PPP: 2.1 (ref 0.8–1.2)
KETONES UR QL STRIP.AUTO: NEGATIVE MG/DL
LACTATE BLD-SCNC: 1.08 MMOL/L (ref 0.4–2)
LDLC SERPL CALC-MCNC: 55.6 MG/DL (ref 0–100)
LEFT CCA DIST DIAS: 0 CM/S
LEFT CCA DIST SYS: 50.45 CM/S
LEFT CCA MID DIAS: 0 CM/S
LEFT CCA MID SYS: 63.53 CM/S
LEFT CCA PROX DIAS: 0 CM/S
LEFT CCA PROX SYS: 65.3 CM/S
LEFT ECA DIAS: 0 CM/S
LEFT ECA SYS: 92.49 CM/S
LEFT ICA DIST DIAS: 11.16 CM/S
LEFT ICA DIST SYS: 55.78 CM/S
LEFT ICA MID DIAS: 12.93 CM/S
LEFT ICA MID SYS: 102.83 CM/S
LEFT ICA PROX DIAS: 19.11 CM/S
LEFT ICA PROX SYS: 98.74 CM/S
LEFT ICA/CCA SYS: 1.57
LEFT SUBCLAVIAN DIAS: 0 CM/S
LEFT SUBCLAVIAN SYS: 92.37 CM/S
LEFT VERTEBRAL DIAS: 9.34 CM/S
LEFT VERTEBRAL SYS: 36.75 CM/S
LEUKOCYTE ESTERASE UR QL STRIP.AUTO: NEGATIVE
LIPID PROFILE,FLP: ABNORMAL
LYMPHOCYTES # BLD: 0.3 K/UL (ref 0.9–3.6)
LYMPHOCYTES # BLD: 0.3 K/UL (ref 0.9–3.6)
LYMPHOCYTES # BLD: 0.4 K/UL (ref 0.9–3.6)
LYMPHOCYTES # BLD: 0.5 K/UL (ref 0.9–3.6)
LYMPHOCYTES # BLD: 0.6 K/UL (ref 0.9–3.6)
LYMPHOCYTES # BLD: 0.6 K/UL (ref 0.9–3.6)
LYMPHOCYTES # BLD: 0.7 K/UL (ref 0.9–3.6)
LYMPHOCYTES # BLD: 0.8 K/UL (ref 0.9–3.6)
LYMPHOCYTES # BLD: 0.9 K/UL (ref 0.9–3.6)
LYMPHOCYTES # BLD: 0.9 K/UL (ref 0.9–3.6)
LYMPHOCYTES NFR BLD: 10 % (ref 21–52)
LYMPHOCYTES NFR BLD: 10 % (ref 21–52)
LYMPHOCYTES NFR BLD: 12 % (ref 21–52)
LYMPHOCYTES NFR BLD: 13 % (ref 21–52)
LYMPHOCYTES NFR BLD: 14 % (ref 21–52)
LYMPHOCYTES NFR BLD: 16 % (ref 21–52)
LYMPHOCYTES NFR BLD: 17 % (ref 21–52)
LYMPHOCYTES NFR BLD: 6 % (ref 21–52)
LYMPHOCYTES NFR BLD: 8 % (ref 21–52)
MAGNESIUM SERPL-MCNC: 2.1 MG/DL (ref 1.6–2.6)
MAGNESIUM SERPL-MCNC: 2.2 MG/DL (ref 1.6–2.6)
MAGNESIUM SERPL-MCNC: 2.5 MG/DL (ref 1.6–2.6)
MCH RBC QN AUTO: 23.1 PG (ref 24–34)
MCH RBC QN AUTO: 23.1 PG (ref 24–34)
MCH RBC QN AUTO: 23.8 PG (ref 24–34)
MCH RBC QN AUTO: 25.3 PG (ref 24–34)
MCH RBC QN AUTO: 25.4 PG (ref 24–34)
MCH RBC QN AUTO: 25.6 PG (ref 24–34)
MCH RBC QN AUTO: 26.2 PG (ref 24–34)
MCH RBC QN AUTO: 26.3 PG (ref 24–34)
MCH RBC QN AUTO: 26.6 PG (ref 24–34)
MCH RBC QN AUTO: 26.7 PG (ref 24–34)
MCH RBC QN AUTO: 26.8 PG (ref 24–34)
MCH RBC QN AUTO: 26.9 PG (ref 24–34)
MCH RBC QN AUTO: 27.2 PG (ref 24–34)
MCHC RBC AUTO-ENTMCNC: 28.7 G/DL (ref 31–37)
MCHC RBC AUTO-ENTMCNC: 29 G/DL (ref 31–37)
MCHC RBC AUTO-ENTMCNC: 29 G/DL (ref 31–37)
MCHC RBC AUTO-ENTMCNC: 29.8 G/DL (ref 31–37)
MCHC RBC AUTO-ENTMCNC: 30 G/DL (ref 31–37)
MCHC RBC AUTO-ENTMCNC: 30.1 G/DL (ref 31–37)
MCHC RBC AUTO-ENTMCNC: 30.2 G/DL (ref 31–37)
MCHC RBC AUTO-ENTMCNC: 30.3 G/DL (ref 31–37)
MCHC RBC AUTO-ENTMCNC: 30.3 G/DL (ref 31–37)
MCHC RBC AUTO-ENTMCNC: 30.4 G/DL (ref 31–37)
MCHC RBC AUTO-ENTMCNC: 30.7 G/DL (ref 31–37)
MCHC RBC AUTO-ENTMCNC: 30.8 G/DL (ref 31–37)
MCHC RBC AUTO-ENTMCNC: 30.9 G/DL (ref 31–37)
MCHC RBC AUTO-ENTMCNC: 31.1 G/DL (ref 31–37)
MCV RBC AUTO: 79.7 FL (ref 74–97)
MCV RBC AUTO: 80.5 FL (ref 74–97)
MCV RBC AUTO: 81.9 FL (ref 74–97)
MCV RBC AUTO: 83.1 FL (ref 74–97)
MCV RBC AUTO: 83.8 FL (ref 74–97)
MCV RBC AUTO: 84.8 FL (ref 74–97)
MCV RBC AUTO: 85.2 FL (ref 74–97)
MCV RBC AUTO: 85.2 FL (ref 74–97)
MCV RBC AUTO: 86.2 FL (ref 74–97)
MCV RBC AUTO: 86.5 FL (ref 74–97)
MCV RBC AUTO: 86.6 FL (ref 74–97)
MCV RBC AUTO: 86.6 FL (ref 74–97)
MCV RBC AUTO: 86.7 FL (ref 74–97)
MCV RBC AUTO: 86.7 FL (ref 74–97)
MCV RBC AUTO: 86.8 FL (ref 74–97)
MCV RBC AUTO: 87.1 FL (ref 74–97)
MCV RBC AUTO: 87.1 FL (ref 74–97)
MCV RBC AUTO: 87.2 FL (ref 74–97)
MCV RBC AUTO: 88.6 FL (ref 74–97)
MONOCYTES # BLD: 0.2 K/UL (ref 0.05–1.2)
MONOCYTES # BLD: 0.4 K/UL (ref 0.05–1.2)
MONOCYTES # BLD: 0.5 K/UL (ref 0.05–1.2)
MONOCYTES # BLD: 0.6 K/UL (ref 0.05–1.2)
MONOCYTES # BLD: 0.8 K/UL (ref 0.05–1.2)
MONOCYTES # BLD: 0.8 K/UL (ref 0.05–1.2)
MONOCYTES NFR BLD: 10 % (ref 3–10)
MONOCYTES NFR BLD: 10 % (ref 3–10)
MONOCYTES NFR BLD: 11 % (ref 3–10)
MONOCYTES NFR BLD: 12 % (ref 3–10)
MONOCYTES NFR BLD: 12 % (ref 3–10)
MONOCYTES NFR BLD: 13 % (ref 3–10)
MONOCYTES NFR BLD: 14 % (ref 3–10)
MONOCYTES NFR BLD: 5 % (ref 3–10)
MONOCYTES NFR BLD: 6 % (ref 3–10)
MONOCYTES NFR BLD: 9 % (ref 3–10)
MONOCYTES NFR BLD: 9 % (ref 3–10)
NEUTS SEG # BLD: 3 K/UL (ref 1.8–8)
NEUTS SEG # BLD: 3.1 K/UL (ref 1.8–8)
NEUTS SEG # BLD: 3.2 K/UL (ref 1.8–8)
NEUTS SEG # BLD: 3.3 K/UL (ref 1.8–8)
NEUTS SEG # BLD: 3.5 K/UL (ref 1.8–8)
NEUTS SEG # BLD: 3.7 K/UL (ref 1.8–8)
NEUTS SEG # BLD: 4 K/UL (ref 1.8–8)
NEUTS SEG # BLD: 4 K/UL (ref 1.8–8)
NEUTS SEG # BLD: 4.1 K/UL (ref 1.8–8)
NEUTS SEG # BLD: 4.1 K/UL (ref 1.8–8)
NEUTS SEG # BLD: 4.2 K/UL (ref 1.8–8)
NEUTS SEG # BLD: 4.7 K/UL (ref 1.8–8)
NEUTS SEG # BLD: 4.9 K/UL (ref 1.8–8)
NEUTS SEG # BLD: 5.1 K/UL (ref 1.8–8)
NEUTS SEG # BLD: 6.3 K/UL (ref 1.8–8)
NEUTS SEG NFR BLD: 68 % (ref 40–73)
NEUTS SEG NFR BLD: 71 % (ref 40–73)
NEUTS SEG NFR BLD: 71 % (ref 40–73)
NEUTS SEG NFR BLD: 73 % (ref 40–73)
NEUTS SEG NFR BLD: 74 % (ref 40–73)
NEUTS SEG NFR BLD: 74 % (ref 40–73)
NEUTS SEG NFR BLD: 75 % (ref 40–73)
NEUTS SEG NFR BLD: 77 % (ref 40–73)
NEUTS SEG NFR BLD: 78 % (ref 40–73)
NEUTS SEG NFR BLD: 79 % (ref 40–73)
NEUTS SEG NFR BLD: 80 % (ref 40–73)
NEUTS SEG NFR BLD: 84 % (ref 40–73)
NEUTS SEG NFR BLD: 86 % (ref 40–73)
NITRITE UR QL STRIP.AUTO: NEGATIVE
O2/TOTAL GAS SETTING VFR VENT: 0.5 %
O2/TOTAL GAS SETTING VFR VENT: 36 %
PCO2 BLD: 33.5 MMHG (ref 35–45)
PCO2 BLD: 34.8 MMHG (ref 35–45)
PEEP RESPIRATORY: 7 CMH2O
PH BLD: 7.42 [PH] (ref 7.35–7.45)
PH BLD: 7.47 [PH] (ref 7.35–7.45)
PH UR STRIP: 5 [PH] (ref 5–8)
PH UR STRIP: 5.5 [PH] (ref 5–8)
PH UR STRIP: 6.5 [PH] (ref 5–8)
PH UR STRIP: 7 [PH] (ref 5–8)
PIP ISTAT,IPIP: 12
PISA AR MAX VEL: 481.95 CM/S
PLATELET # BLD AUTO: 156 K/UL (ref 135–420)
PLATELET # BLD AUTO: 169 K/UL (ref 135–420)
PLATELET # BLD AUTO: 173 K/UL (ref 135–420)
PLATELET # BLD AUTO: 173 K/UL (ref 135–420)
PLATELET # BLD AUTO: 175 K/UL (ref 135–420)
PLATELET # BLD AUTO: 176 K/UL (ref 135–420)
PLATELET # BLD AUTO: 177 K/UL (ref 135–420)
PLATELET # BLD AUTO: 177 K/UL (ref 135–420)
PLATELET # BLD AUTO: 179 K/UL (ref 135–420)
PLATELET # BLD AUTO: 180 K/UL (ref 135–420)
PLATELET # BLD AUTO: 181 K/UL (ref 135–420)
PLATELET # BLD AUTO: 183 K/UL (ref 135–420)
PLATELET # BLD AUTO: 184 K/UL (ref 135–420)
PLATELET # BLD AUTO: 188 K/UL (ref 135–420)
PLATELET # BLD AUTO: 204 K/UL (ref 135–420)
PLATELET # BLD AUTO: 204 K/UL (ref 135–420)
PLATELET # BLD AUTO: 205 K/UL (ref 135–420)
PLATELET # BLD AUTO: 214 K/UL (ref 135–420)
PLATELET # BLD AUTO: 270 K/UL (ref 135–420)
PMV BLD AUTO: 10 FL (ref 9.2–11.8)
PMV BLD AUTO: 10 FL (ref 9.2–11.8)
PMV BLD AUTO: 8.9 FL (ref 9.2–11.8)
PMV BLD AUTO: 9.1 FL (ref 9.2–11.8)
PMV BLD AUTO: 9.3 FL (ref 9.2–11.8)
PMV BLD AUTO: 9.4 FL (ref 9.2–11.8)
PMV BLD AUTO: 9.5 FL (ref 9.2–11.8)
PMV BLD AUTO: 9.5 FL (ref 9.2–11.8)
PMV BLD AUTO: 9.6 FL (ref 9.2–11.8)
PMV BLD AUTO: 9.7 FL (ref 9.2–11.8)
PMV BLD AUTO: 9.8 FL (ref 9.2–11.8)
PMV BLD AUTO: 9.8 FL (ref 9.2–11.8)
PMV BLD AUTO: 9.9 FL (ref 9.2–11.8)
PO2 BLD: 169 MMHG (ref 80–100)
PO2 BLD: 93 MMHG (ref 80–100)
POTASSIUM BLD-SCNC: 4.8 MMOL/L (ref 3.5–5.5)
POTASSIUM SERPL-SCNC: 3.2 MMOL/L (ref 3.5–5.5)
POTASSIUM SERPL-SCNC: 3.5 MMOL/L (ref 3.5–5.5)
POTASSIUM SERPL-SCNC: 3.5 MMOL/L (ref 3.5–5.5)
POTASSIUM SERPL-SCNC: 3.7 MMOL/L (ref 3.5–5.5)
POTASSIUM SERPL-SCNC: 3.7 MMOL/L (ref 3.5–5.5)
POTASSIUM SERPL-SCNC: 3.9 MMOL/L (ref 3.5–5.5)
POTASSIUM SERPL-SCNC: 3.9 MMOL/L (ref 3.5–5.5)
POTASSIUM SERPL-SCNC: 4 MMOL/L (ref 3.5–5.5)
POTASSIUM SERPL-SCNC: 4.1 MMOL/L (ref 3.5–5.5)
POTASSIUM SERPL-SCNC: 4.2 MMOL/L (ref 3.5–5.5)
POTASSIUM SERPL-SCNC: 4.3 MMOL/L (ref 3.5–5.5)
POTASSIUM SERPL-SCNC: 4.4 MMOL/L (ref 3.5–5.5)
POTASSIUM SERPL-SCNC: 4.5 MMOL/L (ref 3.5–5.5)
POTASSIUM SERPL-SCNC: 4.6 MMOL/L (ref 3.5–5.5)
POTASSIUM SERPL-SCNC: 4.7 MMOL/L (ref 3.5–5.5)
POTASSIUM SERPL-SCNC: 4.9 MMOL/L (ref 3.5–5.5)
PRESSURE SUPPORT SETTING VENT: 5 CMH2O
PROT SERPL-MCNC: 6 G/DL (ref 6.4–8.2)
PROT SERPL-MCNC: 6.1 G/DL (ref 6.4–8.2)
PROT SERPL-MCNC: 6.2 G/DL (ref 6.4–8.2)
PROT SERPL-MCNC: 6.6 G/DL (ref 6.4–8.2)
PROT SERPL-MCNC: 7.3 G/DL (ref 6.4–8.2)
PROT SERPL-MCNC: 7.4 G/DL (ref 6.4–8.2)
PROT SERPL-MCNC: 7.7 G/DL (ref 6.4–8.2)
PROT SERPL-MCNC: 7.9 G/DL (ref 6.4–8.2)
PROT SERPL-MCNC: 8.1 G/DL (ref 6.4–8.2)
PROT SERPL-MCNC: 8.6 G/DL (ref 6.4–8.2)
PROT UR STRIP-MCNC: 30 MG/DL
PROT UR STRIP-MCNC: 30 MG/DL
PROT UR STRIP-MCNC: 300 MG/DL
PROT UR STRIP-MCNC: 300 MG/DL
PROTHROMBIN TIME: 18.7 SEC (ref 11.5–15.2)
PROTHROMBIN TIME: 19.4 SEC (ref 11.5–15.2)
PROTHROMBIN TIME: 21.6 SEC (ref 11.5–15.2)
PROTHROMBIN TIME: 21.8 SEC (ref 11.5–15.2)
PROTHROMBIN TIME: 22.1 SEC (ref 11.5–15.2)
PROTHROMBIN TIME: 23.8 SEC (ref 11.5–15.2)
PT POC: NORMAL SECONDS
Q-T INTERVAL, ECG07: 446 MS
Q-T INTERVAL, ECG07: 448 MS
Q-T INTERVAL, ECG07: 454 MS
Q-T INTERVAL, ECG07: 460 MS
Q-T INTERVAL, ECG07: 466 MS
Q-T INTERVAL, ECG07: 466 MS
Q-T INTERVAL, ECG07: 474 MS
Q-T INTERVAL, ECG07: 478 MS
Q-T INTERVAL, ECG07: 480 MS
QRS DURATION, ECG06: 166 MS
QRS DURATION, ECG06: 170 MS
QRS DURATION, ECG06: 176 MS
QRS DURATION, ECG06: 176 MS
QRS DURATION, ECG06: 178 MS
QRS DURATION, ECG06: 182 MS
QRS DURATION, ECG06: 186 MS
QRS DURATION, ECG06: 186 MS
QRS DURATION, ECG06: 188 MS
QTC CALCULATION (BEZET), ECG08: 497 MS
QTC CALCULATION (BEZET), ECG08: 503 MS
QTC CALCULATION (BEZET), ECG08: 503 MS
QTC CALCULATION (BEZET), ECG08: 504 MS
QTC CALCULATION (BEZET), ECG08: 506 MS
QTC CALCULATION (BEZET), ECG08: 511 MS
QTC CALCULATION (BEZET), ECG08: 516 MS
QTC CALCULATION (BEZET), ECG08: 518 MS
QTC CALCULATION (BEZET), ECG08: 520 MS
RBC # BLD AUTO: 3.85 M/UL (ref 4.7–5.5)
RBC # BLD AUTO: 3.94 M/UL (ref 4.7–5.5)
RBC # BLD AUTO: 3.96 M/UL (ref 4.7–5.5)
RBC # BLD AUTO: 4.13 M/UL (ref 4.7–5.5)
RBC # BLD AUTO: 4.19 M/UL (ref 4.7–5.5)
RBC # BLD AUTO: 4.2 M/UL (ref 4.7–5.5)
RBC # BLD AUTO: 4.21 M/UL (ref 4.7–5.5)
RBC # BLD AUTO: 4.25 M/UL (ref 4.7–5.5)
RBC # BLD AUTO: 4.26 M/UL (ref 4.7–5.5)
RBC # BLD AUTO: 4.33 M/UL (ref 4.7–5.5)
RBC # BLD AUTO: 4.35 M/UL (ref 4.7–5.5)
RBC # BLD AUTO: 4.38 M/UL (ref 4.7–5.5)
RBC # BLD AUTO: 4.58 M/UL (ref 4.7–5.5)
RBC # BLD AUTO: 4.61 M/UL (ref 4.7–5.5)
RBC # BLD AUTO: 4.65 M/UL (ref 4.7–5.5)
RBC # BLD AUTO: 4.68 M/UL (ref 4.7–5.5)
RBC # BLD AUTO: 4.7 M/UL (ref 4.7–5.5)
RBC # BLD AUTO: 4.99 M/UL (ref 4.7–5.5)
RBC # BLD AUTO: 5.02 M/UL (ref 4.7–5.5)
RBC #/AREA URNS HPF: 0 /HPF (ref 0–5)
RBC #/AREA URNS HPF: 0 /HPF (ref 0–5)
RBC #/AREA URNS HPF: ABNORMAL /HPF (ref 0–5)
RBC #/AREA URNS HPF: NORMAL /HPF (ref 0–5)
RIGHT CCA DIST DIAS: 9.16 CM/S
RIGHT CCA DIST SYS: 59.33 CM/S
RIGHT CCA MID DIAS: 7.56 CM/S
RIGHT CCA MID SYS: 75.25 CM/S
RIGHT CCA PROX DIAS: 0 CM/S
RIGHT CCA PROX SYS: 87.99 CM/S
RIGHT ECA DIAS: 18.71 CM/S
RIGHT ECA SYS: 111.09 CM/S
RIGHT ICA DIST DIAS: 19.51 CM/S
RIGHT ICA DIST SYS: 96.75 CM/S
RIGHT ICA MID DIAS: 22.69 CM/S
RIGHT ICA MID SYS: 100.73 CM/S
RIGHT ICA PROX DIAS: 17.12 CM/S
RIGHT ICA PROX SYS: 90.38 CM/S
RIGHT ICA/CCA SYS: 1.14
RIGHT SUBCLAVIAN DIAS: 0 CM/S
RIGHT SUBCLAVIAN SYS: 61.32 CM/S
RIGHT VERTEBRAL DIAS: 9.62 CM/S
RIGHT VERTEBRAL SYS: 47.18 CM/S
SAO2 % BLD: 100 % (ref 92–97)
SAO2 % BLD: 98 % (ref 92–97)
SERVICE CMNT-IMP: ABNORMAL
SERVICE CMNT-IMP: ABNORMAL
SODIUM BLD-SCNC: 137 MMOL/L (ref 136–145)
SODIUM SERPL-SCNC: 135 MMOL/L (ref 136–145)
SODIUM SERPL-SCNC: 136 MMOL/L (ref 136–145)
SODIUM SERPL-SCNC: 136 MMOL/L (ref 136–145)
SODIUM SERPL-SCNC: 137 MMOL/L (ref 136–145)
SODIUM SERPL-SCNC: 137 MMOL/L (ref 136–145)
SODIUM SERPL-SCNC: 138 MMOL/L (ref 136–145)
SODIUM SERPL-SCNC: 139 MMOL/L (ref 136–145)
SODIUM SERPL-SCNC: 140 MMOL/L (ref 136–145)
SODIUM SERPL-SCNC: 140 MMOL/L (ref 136–145)
SP GR UR REFRACTOMETRY: 1.01 (ref 1–1.03)
SP GR UR REFRACTOMETRY: 1.01 (ref 1–1.03)
SP GR UR REFRACTOMETRY: 1.02 (ref 1–1.03)
SP GR UR REFRACTOMETRY: 1.02 (ref 1–1.03)
SPECIMEN EXP DATE BLD: NORMAL
SPECIMEN TYPE: ABNORMAL
SPECIMEN TYPE: ABNORMAL
T4 FREE SERPL-MCNC: 1 NG/DL (ref 0.7–1.5)
TOTAL RESP. RATE, ITRR: 29
TRIGL SERPL-MCNC: 107 MG/DL (ref ?–150)
TROPONIN I SERPL-MCNC: 0.02 NG/ML (ref 0–0.04)
TROPONIN I SERPL-MCNC: 0.03 NG/ML (ref 0–0.04)
TROPONIN I SERPL-MCNC: 0.04 NG/ML (ref 0–0.04)
TROPONIN I SERPL-MCNC: 0.05 NG/ML (ref 0–0.04)
TROPONIN I SERPL-MCNC: 0.06 NG/ML (ref 0–0.04)
TSH SERPL DL<=0.05 MIU/L-ACNC: 4.4 UIU/ML (ref 0.36–3.74)
TSH SERPL DL<=0.05 MIU/L-ACNC: 5.3 UIU/ML (ref 0.36–3.74)
UROBILINOGEN UR QL STRIP.AUTO: 0.2 EU/DL (ref 0.2–1)
UROBILINOGEN UR QL STRIP.AUTO: 1 EU/DL (ref 0.2–1)
VALID INTERNAL CONTROL?: YES
VENTRICULAR RATE, ECG03: 70 BPM
VENTRICULAR RATE, ECG03: 72 BPM
VENTRICULAR RATE, ECG03: 73 BPM
VENTRICULAR RATE, ECG03: 77 BPM
VENTRICULAR RATE, ECG03: 81 BPM
VLDLC SERPL CALC-MCNC: 21.4 MG/DL
WBC # BLD AUTO: 4 K/UL (ref 4.6–13.2)
WBC # BLD AUTO: 4.1 K/UL (ref 4.6–13.2)
WBC # BLD AUTO: 4.4 K/UL (ref 4.6–13.2)
WBC # BLD AUTO: 4.4 K/UL (ref 4.6–13.2)
WBC # BLD AUTO: 4.5 K/UL (ref 4.6–13.2)
WBC # BLD AUTO: 4.9 K/UL (ref 4.6–13.2)
WBC # BLD AUTO: 4.9 K/UL (ref 4.6–13.2)
WBC # BLD AUTO: 5 K/UL (ref 4.6–13.2)
WBC # BLD AUTO: 5.2 K/UL (ref 4.6–13.2)
WBC # BLD AUTO: 5.3 K/UL (ref 4.6–13.2)
WBC # BLD AUTO: 5.4 K/UL (ref 4.6–13.2)
WBC # BLD AUTO: 5.5 K/UL (ref 4.6–13.2)
WBC # BLD AUTO: 5.7 K/UL (ref 4.6–13.2)
WBC # BLD AUTO: 5.9 K/UL (ref 4.6–13.2)
WBC # BLD AUTO: 6.2 K/UL (ref 4.6–13.2)
WBC # BLD AUTO: 6.4 K/UL (ref 4.6–13.2)
WBC # BLD AUTO: 7.3 K/UL (ref 4.6–13.2)
WBC URNS QL MICRO: ABNORMAL /HPF (ref 0–4)
WBC URNS QL MICRO: NORMAL /HPF (ref 0–4)

## 2018-01-01 PROCEDURE — 74011250636 HC RX REV CODE- 250/636: Performed by: INTERNAL MEDICINE

## 2018-01-01 PROCEDURE — 65390000012 HC CONDITION CODE 44 OBSERVATION

## 2018-01-01 PROCEDURE — 93005 ELECTROCARDIOGRAM TRACING: CPT

## 2018-01-01 PROCEDURE — A9560 TC99M LABELED RBC: HCPCS

## 2018-01-01 PROCEDURE — 99153 MOD SED SAME PHYS/QHP EA: CPT | Performed by: INTERNAL MEDICINE

## 2018-01-01 PROCEDURE — 74011250637 HC RX REV CODE- 250/637

## 2018-01-01 PROCEDURE — 93306 TTE W/DOPPLER COMPLETE: CPT

## 2018-01-01 PROCEDURE — 71045 X-RAY EXAM CHEST 1 VIEW: CPT

## 2018-01-01 PROCEDURE — 84439 ASSAY OF FREE THYROXINE: CPT | Performed by: FAMILY MEDICINE

## 2018-01-01 PROCEDURE — 96374 THER/PROPH/DIAG INJ IV PUSH: CPT

## 2018-01-01 PROCEDURE — 99218 HC RM OBSERVATION: CPT

## 2018-01-01 PROCEDURE — 93880 EXTRACRANIAL BILAT STUDY: CPT

## 2018-01-01 PROCEDURE — 99285 EMERGENCY DEPT VISIT HI MDM: CPT

## 2018-01-01 PROCEDURE — 77030020263 HC SOL INJ SOD CL0.9% LFCR 1000ML

## 2018-01-01 PROCEDURE — 92526 ORAL FUNCTION THERAPY: CPT

## 2018-01-01 PROCEDURE — 85610 PROTHROMBIN TIME: CPT | Performed by: EMERGENCY MEDICINE

## 2018-01-01 PROCEDURE — 0JH604Z INSERTION OF PACEMAKER, SINGLE CHAMBER INTO CHEST SUBCUTANEOUS TISSUE AND FASCIA, OPEN APPROACH: ICD-10-PCS | Performed by: INTERNAL MEDICINE

## 2018-01-01 PROCEDURE — 84443 ASSAY THYROID STIM HORMONE: CPT | Performed by: EMERGENCY MEDICINE

## 2018-01-01 PROCEDURE — 70450 CT HEAD/BRAIN W/O DYE: CPT

## 2018-01-01 PROCEDURE — 36415 COLL VENOUS BLD VENIPUNCTURE: CPT | Performed by: NURSE PRACTITIONER

## 2018-01-01 PROCEDURE — 82550 ASSAY OF CK (CPK): CPT | Performed by: HOSPITALIST

## 2018-01-01 PROCEDURE — 81001 URINALYSIS AUTO W/SCOPE: CPT

## 2018-01-01 PROCEDURE — 74011000250 HC RX REV CODE- 250: Performed by: INTERNAL MEDICINE

## 2018-01-01 PROCEDURE — 74011250637 HC RX REV CODE- 250/637: Performed by: INTERNAL MEDICINE

## 2018-01-01 PROCEDURE — 80048 BASIC METABOLIC PNL TOTAL CA: CPT | Performed by: INTERNAL MEDICINE

## 2018-01-01 PROCEDURE — 74011250637 HC RX REV CODE- 250/637: Performed by: PHYSICIAN ASSISTANT

## 2018-01-01 PROCEDURE — 94640 AIRWAY INHALATION TREATMENT: CPT

## 2018-01-01 PROCEDURE — 85610 PROTHROMBIN TIME: CPT

## 2018-01-01 PROCEDURE — 85027 COMPLETE CBC AUTOMATED: CPT | Performed by: FAMILY MEDICINE

## 2018-01-01 PROCEDURE — 74011000250 HC RX REV CODE- 250: Performed by: NURSE PRACTITIONER

## 2018-01-01 PROCEDURE — 36415 COLL VENOUS BLD VENIPUNCTURE: CPT | Performed by: INTERNAL MEDICINE

## 2018-01-01 PROCEDURE — 77030019580 HC CBL PACE MEDT -B: Performed by: INTERNAL MEDICINE

## 2018-01-01 PROCEDURE — 77010033678 HC OXYGEN DAILY

## 2018-01-01 PROCEDURE — G8978 MOBILITY CURRENT STATUS: HCPCS

## 2018-01-01 PROCEDURE — 83735 ASSAY OF MAGNESIUM: CPT | Performed by: PHYSICIAN ASSISTANT

## 2018-01-01 PROCEDURE — 82803 BLOOD GASES ANY COMBINATION: CPT

## 2018-01-01 PROCEDURE — 74011000258 HC RX REV CODE- 258: Performed by: HOSPITALIST

## 2018-01-01 PROCEDURE — 74011250636 HC RX REV CODE- 250/636: Performed by: EMERGENCY MEDICINE

## 2018-01-01 PROCEDURE — 96365 THER/PROPH/DIAG IV INF INIT: CPT

## 2018-01-01 PROCEDURE — 77030037878 HC DRSG MEPILEX >48IN BORD MOLN -B

## 2018-01-01 PROCEDURE — 83880 ASSAY OF NATRIURETIC PEPTIDE: CPT | Performed by: EMERGENCY MEDICINE

## 2018-01-01 PROCEDURE — 85025 COMPLETE CBC W/AUTO DIFF WBC: CPT | Performed by: NURSE PRACTITIONER

## 2018-01-01 PROCEDURE — 74011000250 HC RX REV CODE- 250: Performed by: HOSPITALIST

## 2018-01-01 PROCEDURE — 84484 ASSAY OF TROPONIN QUANT: CPT | Performed by: EMERGENCY MEDICINE

## 2018-01-01 PROCEDURE — 97116 GAIT TRAINING THERAPY: CPT

## 2018-01-01 PROCEDURE — 74011000272 HC RX REV CODE- 272: Performed by: INTERNAL MEDICINE

## 2018-01-01 PROCEDURE — 94762 N-INVAS EAR/PLS OXIMTRY CONT: CPT

## 2018-01-01 PROCEDURE — 65660000001 HC RM ICU INTERMED STEPDOWN

## 2018-01-01 PROCEDURE — 74011250637 HC RX REV CODE- 250/637: Performed by: FAMILY MEDICINE

## 2018-01-01 PROCEDURE — 65660000000 HC RM CCU STEPDOWN

## 2018-01-01 PROCEDURE — 96361 HYDRATE IV INFUSION ADD-ON: CPT

## 2018-01-01 PROCEDURE — 85025 COMPLETE CBC W/AUTO DIFF WBC: CPT | Performed by: PHYSICIAN ASSISTANT

## 2018-01-01 PROCEDURE — 77030032490 HC SLV COMPR SCD KNE COVD -B

## 2018-01-01 PROCEDURE — 80053 COMPREHEN METABOLIC PANEL: CPT | Performed by: NURSE PRACTITIONER

## 2018-01-01 PROCEDURE — C1898 LEAD, PMKR, OTHER THAN TRANS: HCPCS | Performed by: INTERNAL MEDICINE

## 2018-01-01 PROCEDURE — 77030002933 HC SUT MCRYL J&J -A: Performed by: INTERNAL MEDICINE

## 2018-01-01 PROCEDURE — 71046 X-RAY EXAM CHEST 2 VIEWS: CPT

## 2018-01-01 PROCEDURE — 80053 COMPREHEN METABOLIC PANEL: CPT | Performed by: EMERGENCY MEDICINE

## 2018-01-01 PROCEDURE — 77030011256 HC DRSG MEPILEX <16IN NO BORD MOLN -A

## 2018-01-01 PROCEDURE — 85730 THROMBOPLASTIN TIME PARTIAL: CPT | Performed by: HOSPITALIST

## 2018-01-01 PROCEDURE — 85025 COMPLETE CBC W/AUTO DIFF WBC: CPT | Performed by: EMERGENCY MEDICINE

## 2018-01-01 PROCEDURE — 74011250636 HC RX REV CODE- 250/636

## 2018-01-01 PROCEDURE — 86900 BLOOD TYPING SEROLOGIC ABO: CPT

## 2018-01-01 PROCEDURE — 83605 ASSAY OF LACTIC ACID: CPT

## 2018-01-01 PROCEDURE — G8980 MOBILITY D/C STATUS: HCPCS

## 2018-01-01 PROCEDURE — 81001 URINALYSIS AUTO W/SCOPE: CPT | Performed by: PHYSICIAN ASSISTANT

## 2018-01-01 PROCEDURE — 74011250637 HC RX REV CODE- 250/637: Performed by: NURSE PRACTITIONER

## 2018-01-01 PROCEDURE — 77030031139 HC SUT VCRL2 J&J -A: Performed by: INTERNAL MEDICINE

## 2018-01-01 PROCEDURE — 85025 COMPLETE CBC W/AUTO DIFF WBC: CPT

## 2018-01-01 PROCEDURE — 36415 COLL VENOUS BLD VENIPUNCTURE: CPT | Performed by: HOSPITALIST

## 2018-01-01 PROCEDURE — 71250 CT THORAX DX C-: CPT

## 2018-01-01 PROCEDURE — 74011250637 HC RX REV CODE- 250/637: Performed by: HOSPITALIST

## 2018-01-01 PROCEDURE — 85379 FIBRIN DEGRADATION QUANT: CPT | Performed by: EMERGENCY MEDICINE

## 2018-01-01 PROCEDURE — 83735 ASSAY OF MAGNESIUM: CPT | Performed by: EMERGENCY MEDICINE

## 2018-01-01 PROCEDURE — 82550 ASSAY OF CK (CPK): CPT | Performed by: EMERGENCY MEDICINE

## 2018-01-01 PROCEDURE — 99283 EMERGENCY DEPT VISIT LOW MDM: CPT

## 2018-01-01 PROCEDURE — 77030003629 HC NDL PERC VASC COOK -A: Performed by: INTERNAL MEDICINE

## 2018-01-01 PROCEDURE — 77030033263 HC DRSG MEPILEX 16-48IN BORD MOLN -B

## 2018-01-01 PROCEDURE — 85610 PROTHROMBIN TIME: CPT | Performed by: HOSPITALIST

## 2018-01-01 PROCEDURE — 74011636320 HC RX REV CODE- 636/320: Performed by: EMERGENCY MEDICINE

## 2018-01-01 PROCEDURE — 74011000250 HC RX REV CODE- 250: Performed by: EMERGENCY MEDICINE

## 2018-01-01 PROCEDURE — 80061 LIPID PANEL: CPT | Performed by: HOSPITALIST

## 2018-01-01 PROCEDURE — 71275 CT ANGIOGRAPHY CHEST: CPT

## 2018-01-01 PROCEDURE — 80048 BASIC METABOLIC PNL TOTAL CA: CPT | Performed by: FAMILY MEDICINE

## 2018-01-01 PROCEDURE — 93970 EXTREMITY STUDY: CPT

## 2018-01-01 PROCEDURE — 85025 COMPLETE CBC W/AUTO DIFF WBC: CPT | Performed by: HOSPITALIST

## 2018-01-01 PROCEDURE — 94660 CPAP INITIATION&MGMT: CPT

## 2018-01-01 PROCEDURE — 85025 COMPLETE CBC W/AUTO DIFF WBC: CPT | Performed by: INTERNAL MEDICINE

## 2018-01-01 PROCEDURE — 74011000258 HC RX REV CODE- 258: Performed by: EMERGENCY MEDICINE

## 2018-01-01 PROCEDURE — 80048 BASIC METABOLIC PNL TOTAL CA: CPT | Performed by: NURSE PRACTITIONER

## 2018-01-01 PROCEDURE — 80053 COMPREHEN METABOLIC PANEL: CPT

## 2018-01-01 PROCEDURE — 84484 ASSAY OF TROPONIN QUANT: CPT

## 2018-01-01 PROCEDURE — 74011636320 HC RX REV CODE- 636/320: Performed by: INTERNAL MEDICINE

## 2018-01-01 PROCEDURE — 74011250636 HC RX REV CODE- 250/636: Performed by: FAMILY MEDICINE

## 2018-01-01 PROCEDURE — 97165 OT EVAL LOW COMPLEX 30 MIN: CPT

## 2018-01-01 PROCEDURE — 74011250637 HC RX REV CODE- 250/637: Performed by: EMERGENCY MEDICINE

## 2018-01-01 PROCEDURE — 85027 COMPLETE CBC AUTOMATED: CPT | Performed by: INTERNAL MEDICINE

## 2018-01-01 PROCEDURE — 74011000250 HC RX REV CODE- 250

## 2018-01-01 PROCEDURE — 80307 DRUG TEST PRSMV CHEM ANLYZR: CPT | Performed by: EMERGENCY MEDICINE

## 2018-01-01 PROCEDURE — 80047 BASIC METABLC PNL IONIZED CA: CPT

## 2018-01-01 PROCEDURE — 83880 ASSAY OF NATRIURETIC PEPTIDE: CPT

## 2018-01-01 PROCEDURE — 97162 PT EVAL MOD COMPLEX 30 MIN: CPT

## 2018-01-01 PROCEDURE — C1893 INTRO/SHEATH, FIXED,NON-PEEL: HCPCS | Performed by: INTERNAL MEDICINE

## 2018-01-01 PROCEDURE — 99284 EMERGENCY DEPT VISIT MOD MDM: CPT

## 2018-01-01 PROCEDURE — 02HK3JZ INSERTION OF PACEMAKER LEAD INTO RIGHT VENTRICLE, PERCUTANEOUS APPROACH: ICD-10-PCS | Performed by: INTERNAL MEDICINE

## 2018-01-01 PROCEDURE — 77030029684 HC NEB SM VOL KT MONA -A

## 2018-01-01 PROCEDURE — 65270000029 HC RM PRIVATE

## 2018-01-01 PROCEDURE — 36600 WITHDRAWAL OF ARTERIAL BLOOD: CPT

## 2018-01-01 PROCEDURE — 33216 INSERT 1 ELECTRODE PM-DEFIB: CPT | Performed by: INTERNAL MEDICINE

## 2018-01-01 PROCEDURE — 80053 COMPREHEN METABOLIC PANEL: CPT | Performed by: HOSPITALIST

## 2018-01-01 PROCEDURE — C1786 PMKR, SINGLE, RATE-RESP: HCPCS | Performed by: INTERNAL MEDICINE

## 2018-01-01 PROCEDURE — 77030021352 HC CBL LD SYS DISP COVD -B

## 2018-01-01 PROCEDURE — 85025 COMPLETE CBC W/AUTO DIFF WBC: CPT | Performed by: FAMILY MEDICINE

## 2018-01-01 PROCEDURE — 84484 ASSAY OF TROPONIN QUANT: CPT | Performed by: INTERNAL MEDICINE

## 2018-01-01 PROCEDURE — 81001 URINALYSIS AUTO W/SCOPE: CPT | Performed by: EMERGENCY MEDICINE

## 2018-01-01 PROCEDURE — 74177 CT ABD & PELVIS W/CONTRAST: CPT

## 2018-01-01 PROCEDURE — 77030005563 HC CATH URETH INT MMGH -A

## 2018-01-01 PROCEDURE — 33207 INSERT HEART PM VENTRICULAR: CPT | Performed by: INTERNAL MEDICINE

## 2018-01-01 PROCEDURE — 77030002996 HC SUT SLK J&J -A: Performed by: INTERNAL MEDICINE

## 2018-01-01 PROCEDURE — 85027 COMPLETE CBC AUTOMATED: CPT | Performed by: HOSPITALIST

## 2018-01-01 PROCEDURE — 77030013033 HC MSK BPAP/CPAP MMKA -B

## 2018-01-01 PROCEDURE — 82550 ASSAY OF CK (CPK): CPT | Performed by: PHYSICIAN ASSISTANT

## 2018-01-01 PROCEDURE — 36415 COLL VENOUS BLD VENIPUNCTURE: CPT | Performed by: FAMILY MEDICINE

## 2018-01-01 PROCEDURE — 96375 TX/PRO/DX INJ NEW DRUG ADDON: CPT

## 2018-01-01 PROCEDURE — 99152 MOD SED SAME PHYS/QHP 5/>YRS: CPT | Performed by: INTERNAL MEDICINE

## 2018-01-01 PROCEDURE — 90686 IIV4 VACC NO PRSV 0.5 ML IM: CPT | Performed by: INTERNAL MEDICINE

## 2018-01-01 PROCEDURE — 94664 DEMO&/EVAL PT USE INHALER: CPT

## 2018-01-01 PROCEDURE — 80053 COMPREHEN METABOLIC PANEL: CPT | Performed by: PHYSICIAN ASSISTANT

## 2018-01-01 PROCEDURE — 85730 THROMBOPLASTIN TIME PARTIAL: CPT | Performed by: EMERGENCY MEDICINE

## 2018-01-01 PROCEDURE — 80048 BASIC METABOLIC PNL TOTAL CA: CPT | Performed by: EMERGENCY MEDICINE

## 2018-01-01 PROCEDURE — 83880 ASSAY OF NATRIURETIC PEPTIDE: CPT | Performed by: PHYSICIAN ASSISTANT

## 2018-01-01 PROCEDURE — 94761 N-INVAS EAR/PLS OXIMETRY MLT: CPT

## 2018-01-01 PROCEDURE — G0299 HHS/HOSPICE OF RN EA 15 MIN: HCPCS

## 2018-01-01 PROCEDURE — 90471 IMMUNIZATION ADMIN: CPT

## 2018-01-01 PROCEDURE — C1769 GUIDE WIRE: HCPCS | Performed by: INTERNAL MEDICINE

## 2018-01-01 PROCEDURE — 74011636320 HC RX REV CODE- 636/320: Performed by: HOSPITALIST

## 2018-01-01 PROCEDURE — 84484 ASSAY OF TROPONIN QUANT: CPT | Performed by: NURSE PRACTITIONER

## 2018-01-01 PROCEDURE — 92610 EVALUATE SWALLOWING FUNCTION: CPT

## 2018-01-01 PROCEDURE — 80048 BASIC METABOLIC PNL TOTAL CA: CPT

## 2018-01-01 DEVICE — ENDOCARDIAL STEROID-ELUTING MR CONDITIONAL STYLET DELIVERED PACE/SENSE LEAD
Type: IMPLANTABLE DEVICE | Status: FUNCTIONAL
Brand: INGEVITY™ MRI

## 2018-01-01 DEVICE — PACEMAKER
Type: IMPLANTABLE DEVICE | Status: FUNCTIONAL
Brand: ACCOLADE™ MRI SR

## 2018-01-01 RX ORDER — ACETAMINOPHEN 325 MG/1
650 TABLET ORAL
Status: DISCONTINUED | OUTPATIENT
Start: 2018-01-01 | End: 2018-01-01 | Stop reason: HOSPADM

## 2018-01-01 RX ORDER — ISOSORBIDE MONONITRATE 30 MG/1
30 TABLET, EXTENDED RELEASE ORAL DAILY
Qty: 60 TAB | Refills: 0 | Status: SHIPPED | OUTPATIENT
Start: 2018-01-01 | End: 2018-01-01

## 2018-01-01 RX ORDER — FUROSEMIDE 10 MG/ML
20 INJECTION INTRAMUSCULAR; INTRAVENOUS
Status: COMPLETED | OUTPATIENT
Start: 2018-01-01 | End: 2018-01-01

## 2018-01-01 RX ORDER — WARFARIN 2.5 MG/1
2.5 TABLET ORAL DAILY
Qty: 45 TAB | Refills: 6 | Status: SHIPPED | OUTPATIENT
Start: 2018-01-01 | End: 2018-01-01 | Stop reason: ALTCHOICE

## 2018-01-01 RX ORDER — METOPROLOL SUCCINATE 50 MG/1
50 TABLET, EXTENDED RELEASE ORAL DAILY
Status: DISCONTINUED | OUTPATIENT
Start: 2018-01-01 | End: 2018-01-01 | Stop reason: HOSPADM

## 2018-01-01 RX ORDER — ENOXAPARIN SODIUM 100 MG/ML
40 INJECTION SUBCUTANEOUS EVERY 24 HOURS
Status: DISCONTINUED | OUTPATIENT
Start: 2018-01-01 | End: 2018-01-01 | Stop reason: SDUPTHER

## 2018-01-01 RX ORDER — NITROGLYCERIN 0.4 MG/1
0.4 TABLET SUBLINGUAL
Status: DISCONTINUED | OUTPATIENT
Start: 2018-01-01 | End: 2018-01-01 | Stop reason: HOSPADM

## 2018-01-01 RX ORDER — HYDROCODONE BITARTRATE AND ACETAMINOPHEN 5; 325 MG/1; MG/1
1 TABLET ORAL
Qty: 10 TAB | Refills: 0 | Status: SHIPPED | OUTPATIENT
Start: 2018-01-01 | End: 2018-01-01

## 2018-01-01 RX ORDER — ALPRAZOLAM 0.5 MG/1
0.25 TABLET ORAL
Status: DISCONTINUED | OUTPATIENT
Start: 2018-01-01 | End: 2019-01-01 | Stop reason: HOSPADM

## 2018-01-01 RX ORDER — FUROSEMIDE 40 MG/1
40 TABLET ORAL
COMMUNITY
Start: 2017-12-26

## 2018-01-01 RX ORDER — ESCITALOPRAM OXALATE 5 MG/1
TABLET ORAL
Qty: 30 TAB | Refills: 1 | Status: SHIPPED | OUTPATIENT
Start: 2018-01-01 | End: 2018-01-01

## 2018-01-01 RX ORDER — MORPHINE SULFATE IN 0.9 % NACL 1 MG/ML
1 PLASTIC BAG, INJECTION (ML) INTRAVENOUS
Status: DISCONTINUED | OUTPATIENT
Start: 2018-01-01 | End: 2018-01-01 | Stop reason: HOSPADM

## 2018-01-01 RX ORDER — PANTOPRAZOLE SODIUM 40 MG/1
40 TABLET, DELAYED RELEASE ORAL
Status: DISCONTINUED | OUTPATIENT
Start: 2018-01-01 | End: 2018-01-01 | Stop reason: HOSPADM

## 2018-01-01 RX ORDER — LORAZEPAM 0.5 MG/1
0.5 TABLET ORAL
Status: DISCONTINUED | OUTPATIENT
Start: 2018-01-01 | End: 2018-01-01 | Stop reason: HOSPADM

## 2018-01-01 RX ORDER — HYDROCODONE POLISTIREX AND CHLORPHENIRAMINE POLISTIREX 10; 8 MG/5ML; MG/5ML
5 SUSPENSION, EXTENDED RELEASE ORAL
Qty: 60 ML | Refills: 0 | Status: SHIPPED | OUTPATIENT
Start: 2018-01-01 | End: 2018-01-01

## 2018-01-01 RX ORDER — LORAZEPAM 1 MG/1
1 TABLET ORAL
Qty: 30 TAB | Refills: 0 | Status: SHIPPED | OUTPATIENT
Start: 2018-01-01 | End: 2018-01-01

## 2018-01-01 RX ORDER — DIAZEPAM 5 MG/1
5 TABLET ORAL
Status: COMPLETED | OUTPATIENT
Start: 2018-01-01 | End: 2018-01-01

## 2018-01-01 RX ORDER — ENALAPRIL MALEATE 10 MG/1
20 TABLET ORAL DAILY
Status: DISCONTINUED | OUTPATIENT
Start: 2018-01-01 | End: 2018-01-01 | Stop reason: HOSPADM

## 2018-01-01 RX ORDER — AMLODIPINE BESYLATE 5 MG/1
5 TABLET ORAL DAILY
Status: DISCONTINUED | OUTPATIENT
Start: 2018-01-01 | End: 2018-01-01 | Stop reason: HOSPADM

## 2018-01-01 RX ORDER — OXYCODONE AND ACETAMINOPHEN 5; 325 MG/1; MG/1
1 TABLET ORAL
Status: DISCONTINUED | OUTPATIENT
Start: 2018-01-01 | End: 2018-01-01 | Stop reason: HOSPADM

## 2018-01-01 RX ORDER — FUROSEMIDE 20 MG/1
20 TABLET ORAL
Status: DISCONTINUED | OUTPATIENT
Start: 2018-01-01 | End: 2018-01-01

## 2018-01-01 RX ORDER — FUROSEMIDE 20 MG/1
TABLET ORAL
Qty: 60 TAB | Refills: 0 | Status: SHIPPED | OUTPATIENT
Start: 2018-01-01 | End: 2018-01-01 | Stop reason: SDUPTHER

## 2018-01-01 RX ORDER — AMLODIPINE BESYLATE 5 MG/1
5 TABLET ORAL DAILY
Qty: 60 TAB | Refills: 0 | Status: SHIPPED | OUTPATIENT
Start: 2018-01-01

## 2018-01-01 RX ORDER — RANOLAZINE 500 MG/1
500 TABLET, EXTENDED RELEASE ORAL 2 TIMES DAILY
Qty: 60 TAB | Refills: 0 | Status: SHIPPED | OUTPATIENT
Start: 2018-01-01 | End: 2018-01-01

## 2018-01-01 RX ORDER — VANCOMYCIN HYDROCHLORIDE 1 G/20ML
INJECTION, POWDER, LYOPHILIZED, FOR SOLUTION INTRAVENOUS AS NEEDED
Status: DISCONTINUED | OUTPATIENT
Start: 2018-01-01 | End: 2018-01-01

## 2018-01-01 RX ORDER — GUAIFENESIN 600 MG/1
600 TABLET, EXTENDED RELEASE ORAL EVERY 12 HOURS
Status: DISCONTINUED | OUTPATIENT
Start: 2018-01-01 | End: 2018-01-01 | Stop reason: HOSPADM

## 2018-01-01 RX ORDER — HEPARIN SODIUM 200 [USP'U]/100ML
INJECTION, SOLUTION INTRAVENOUS AS NEEDED
Status: DISCONTINUED | OUTPATIENT
Start: 2018-01-01 | End: 2018-01-01

## 2018-01-01 RX ORDER — NITROGLYCERIN 0.4 MG/1
0.4 TABLET SUBLINGUAL
Qty: 60 TAB | Refills: 0 | Status: SHIPPED | OUTPATIENT
Start: 2018-01-01

## 2018-01-01 RX ORDER — METOPROLOL SUCCINATE 50 MG/1
50 TABLET, EXTENDED RELEASE ORAL DAILY
Qty: 60 TAB | Refills: 0 | Status: SHIPPED | OUTPATIENT
Start: 2018-01-01

## 2018-01-01 RX ORDER — ESCITALOPRAM OXALATE 5 MG/1
5 TABLET ORAL DAILY
Qty: 30 TAB | Refills: 1 | Status: SHIPPED | OUTPATIENT
Start: 2018-01-01 | End: 2018-01-01 | Stop reason: SDUPTHER

## 2018-01-01 RX ORDER — ASPIRIN 325 MG
TABLET ORAL
Status: COMPLETED
Start: 2018-01-01 | End: 2018-01-01

## 2018-01-01 RX ORDER — RANOLAZINE 500 MG/1
500 TABLET, EXTENDED RELEASE ORAL 2 TIMES DAILY
Status: DISCONTINUED | OUTPATIENT
Start: 2018-01-01 | End: 2018-01-01 | Stop reason: HOSPADM

## 2018-01-01 RX ORDER — IPRATROPIUM BROMIDE AND ALBUTEROL SULFATE 2.5; .5 MG/3ML; MG/3ML
3 SOLUTION RESPIRATORY (INHALATION)
Status: DISCONTINUED | OUTPATIENT
Start: 2018-01-01 | End: 2018-01-01 | Stop reason: HOSPADM

## 2018-01-01 RX ORDER — MIDAZOLAM HYDROCHLORIDE 1 MG/ML
INJECTION, SOLUTION INTRAMUSCULAR; INTRAVENOUS AS NEEDED
Status: DISCONTINUED | OUTPATIENT
Start: 2018-01-01 | End: 2018-01-01

## 2018-01-01 RX ORDER — FENTANYL CITRATE 50 UG/ML
INJECTION, SOLUTION INTRAMUSCULAR; INTRAVENOUS AS NEEDED
Status: DISCONTINUED | OUTPATIENT
Start: 2018-01-01 | End: 2018-01-01

## 2018-01-01 RX ORDER — ACETAMINOPHEN 500 MG
1000 TABLET ORAL
Status: COMPLETED | OUTPATIENT
Start: 2018-01-01 | End: 2018-01-01

## 2018-01-01 RX ORDER — ONDANSETRON 2 MG/ML
4 INJECTION INTRAMUSCULAR; INTRAVENOUS
Status: DISCONTINUED | OUTPATIENT
Start: 2018-01-01 | End: 2018-01-01 | Stop reason: HOSPADM

## 2018-01-01 RX ORDER — TRAMADOL HYDROCHLORIDE 50 MG/1
50 TABLET ORAL
Status: DISCONTINUED | OUTPATIENT
Start: 2018-01-01 | End: 2019-01-01 | Stop reason: HOSPADM

## 2018-01-01 RX ORDER — ISOSORBIDE MONONITRATE 30 MG/1
30 TABLET, EXTENDED RELEASE ORAL DAILY
Status: DISCONTINUED | OUTPATIENT
Start: 2018-01-01 | End: 2018-01-01 | Stop reason: HOSPADM

## 2018-01-01 RX ORDER — ONDANSETRON 2 MG/ML
4 INJECTION INTRAMUSCULAR; INTRAVENOUS
Status: DISCONTINUED | OUTPATIENT
Start: 2018-01-01 | End: 2019-01-01 | Stop reason: HOSPADM

## 2018-01-01 RX ORDER — HYDROCODONE POLISTIREX AND CHLORPHENIRAMINE POLISTIREX 10; 8 MG/5ML; MG/5ML
5 SUSPENSION, EXTENDED RELEASE ORAL
Qty: 115 ML | Refills: 0 | Status: SHIPPED | OUTPATIENT
Start: 2018-01-01 | End: 2018-01-01

## 2018-01-01 RX ORDER — HYDROCODONE POLISTIREX AND CHLORPHENIRAMINE POLISTIREX 10; 8 MG/5ML; MG/5ML
5 SUSPENSION, EXTENDED RELEASE ORAL
Status: DISCONTINUED | OUTPATIENT
Start: 2018-01-01 | End: 2018-01-01 | Stop reason: HOSPADM

## 2018-01-01 RX ORDER — OMEPRAZOLE 40 MG/1
40 CAPSULE, DELAYED RELEASE ORAL DAILY
COMMUNITY

## 2018-01-01 RX ORDER — FUROSEMIDE 20 MG/1
20 TABLET ORAL DAILY
Status: DISCONTINUED | OUTPATIENT
Start: 2018-01-01 | End: 2018-01-01 | Stop reason: HOSPADM

## 2018-01-01 RX ORDER — METOCLOPRAMIDE 5 MG/1
5 TABLET ORAL
Status: DISCONTINUED | OUTPATIENT
Start: 2018-01-01 | End: 2018-01-01

## 2018-01-01 RX ORDER — METOPROLOL SUCCINATE 50 MG/1
50 TABLET, EXTENDED RELEASE ORAL DAILY
Status: DISCONTINUED | OUTPATIENT
Start: 2019-01-01 | End: 2019-01-01 | Stop reason: HOSPADM

## 2018-01-01 RX ORDER — APIXABAN 5 MG/1
TABLET, FILM COATED ORAL
Refills: 3 | COMMUNITY
Start: 2018-01-01

## 2018-01-01 RX ORDER — IPRATROPIUM BROMIDE AND ALBUTEROL SULFATE 2.5; .5 MG/3ML; MG/3ML
SOLUTION RESPIRATORY (INHALATION)
Status: COMPLETED
Start: 2018-01-01 | End: 2018-01-01

## 2018-01-01 RX ORDER — FUROSEMIDE 10 MG/ML
40 INJECTION INTRAMUSCULAR; INTRAVENOUS
Status: COMPLETED | OUTPATIENT
Start: 2018-01-01 | End: 2018-01-01

## 2018-01-01 RX ORDER — RANOLAZINE 500 MG/1
500 TABLET, EXTENDED RELEASE ORAL 2 TIMES DAILY
Qty: 180 TAB | Refills: 3 | Status: SHIPPED | OUTPATIENT
Start: 2018-01-01 | End: 2018-01-01

## 2018-01-01 RX ORDER — BUMETANIDE 0.25 MG/ML
0.5 INJECTION INTRAMUSCULAR; INTRAVENOUS EVERY 12 HOURS
Status: DISCONTINUED | OUTPATIENT
Start: 2018-01-01 | End: 2018-01-01 | Stop reason: HOSPADM

## 2018-01-01 RX ORDER — GUAIFENESIN 600 MG/1
600 TABLET, EXTENDED RELEASE ORAL EVERY 12 HOURS
Status: DISCONTINUED | OUTPATIENT
Start: 2018-01-01 | End: 2018-01-01 | Stop reason: SDUPTHER

## 2018-01-01 RX ORDER — ISOSORBIDE MONONITRATE 30 MG/1
30 TABLET, EXTENDED RELEASE ORAL DAILY
Status: CANCELLED | OUTPATIENT
Start: 2018-01-01

## 2018-01-01 RX ORDER — SODIUM CHLORIDE 9 MG/ML
150 INJECTION, SOLUTION INTRAVENOUS CONTINUOUS
Status: DISCONTINUED | OUTPATIENT
Start: 2018-01-01 | End: 2018-01-01 | Stop reason: HOSPADM

## 2018-01-01 RX ORDER — NITROGLYCERIN 0.4 MG/1
0.4 TABLET SUBLINGUAL
Status: DISCONTINUED | OUTPATIENT
Start: 2018-01-01 | End: 2019-01-01 | Stop reason: HOSPADM

## 2018-01-01 RX ORDER — SODIUM CHLORIDE 9 MG/ML
50 INJECTION, SOLUTION INTRAVENOUS CONTINUOUS
Status: DISCONTINUED | OUTPATIENT
Start: 2018-01-01 | End: 2018-01-01 | Stop reason: HOSPADM

## 2018-01-01 RX ORDER — LANOLIN ALCOHOL/MO/W.PET/CERES
3 CREAM (GRAM) TOPICAL
Status: DISCONTINUED | OUTPATIENT
Start: 2018-01-01 | End: 2018-01-01 | Stop reason: HOSPADM

## 2018-01-01 RX ORDER — FUROSEMIDE 10 MG/ML
20 INJECTION INTRAMUSCULAR; INTRAVENOUS ONCE
Status: COMPLETED | OUTPATIENT
Start: 2018-01-01 | End: 2018-01-01

## 2018-01-01 RX ORDER — SODIUM CHLORIDE 9 MG/ML
100 INJECTION, SOLUTION INTRAVENOUS CONTINUOUS
Status: DISPENSED | OUTPATIENT
Start: 2018-01-01 | End: 2018-01-01

## 2018-01-01 RX ORDER — IPRATROPIUM BROMIDE AND ALBUTEROL SULFATE 2.5; .5 MG/3ML; MG/3ML
3 SOLUTION RESPIRATORY (INHALATION) 2 TIMES DAILY
Status: DISCONTINUED | OUTPATIENT
Start: 2018-01-01 | End: 2018-01-01 | Stop reason: HOSPADM

## 2018-01-01 RX ORDER — CLINDAMYCIN HYDROCHLORIDE 150 MG/1
450 CAPSULE ORAL 3 TIMES DAILY
Qty: 27 CAP | Refills: 0 | Status: SHIPPED | OUTPATIENT
Start: 2018-01-01 | End: 2018-01-01

## 2018-01-01 RX ORDER — OXYCODONE AND ACETAMINOPHEN 5; 325 MG/1; MG/1
1 TABLET ORAL
Qty: 16 TAB | Refills: 0 | Status: SHIPPED | OUTPATIENT
Start: 2018-01-01 | End: 2018-01-01 | Stop reason: ALTCHOICE

## 2018-01-01 RX ORDER — FENTANYL CITRATE 50 UG/ML
75 INJECTION, SOLUTION INTRAMUSCULAR; INTRAVENOUS
Status: COMPLETED | OUTPATIENT
Start: 2018-01-01 | End: 2018-01-01

## 2018-01-01 RX ORDER — PANTOPRAZOLE SODIUM 40 MG/1
40 TABLET, DELAYED RELEASE ORAL DAILY
Status: DISCONTINUED | OUTPATIENT
Start: 2019-01-01 | End: 2019-01-01 | Stop reason: HOSPADM

## 2018-01-01 RX ORDER — SODIUM CHLORIDE 9 MG/ML
94 INJECTION, SOLUTION INTRAVENOUS ONCE
Status: COMPLETED | OUTPATIENT
Start: 2018-01-01 | End: 2018-01-01

## 2018-01-01 RX ORDER — LORAZEPAM 1 MG/1
1 TABLET ORAL
Status: DISCONTINUED | OUTPATIENT
Start: 2018-01-01 | End: 2018-01-01 | Stop reason: HOSPADM

## 2018-01-01 RX ORDER — ONDANSETRON 2 MG/ML
4 INJECTION INTRAMUSCULAR; INTRAVENOUS
Status: COMPLETED | OUTPATIENT
Start: 2018-01-01 | End: 2018-01-01

## 2018-01-01 RX ORDER — FUROSEMIDE 40 MG/1
40 TABLET ORAL DAILY
Status: DISCONTINUED | OUTPATIENT
Start: 2018-01-01 | End: 2018-01-01

## 2018-01-01 RX ORDER — BACITRACIN 500 UNIT/G
2 PACKET (EA) TOPICAL 3 TIMES DAILY
Status: DISCONTINUED | OUTPATIENT
Start: 2018-01-01 | End: 2018-01-01 | Stop reason: CLARIF

## 2018-01-01 RX ORDER — SODIUM CHLORIDE 9 MG/ML
100 INJECTION, SOLUTION INTRAVENOUS ONCE
Status: COMPLETED | OUTPATIENT
Start: 2018-01-01 | End: 2018-01-01

## 2018-01-01 RX ORDER — SODIUM CHLORIDE 0.9 % (FLUSH) 0.9 %
5-10 SYRINGE (ML) INJECTION AS NEEDED
Status: DISCONTINUED | OUTPATIENT
Start: 2018-01-01 | End: 2018-01-01 | Stop reason: HOSPADM

## 2018-01-01 RX ORDER — METOPROLOL SUCCINATE 50 MG/1
50 TABLET, EXTENDED RELEASE ORAL ONCE
Status: COMPLETED | OUTPATIENT
Start: 2018-01-01 | End: 2018-01-01

## 2018-01-01 RX ORDER — AMOXICILLIN 250 MG
1 CAPSULE ORAL
Status: DISCONTINUED | OUTPATIENT
Start: 2018-01-01 | End: 2018-01-01 | Stop reason: HOSPADM

## 2018-01-01 RX ORDER — ENALAPRIL MALEATE 10 MG/1
20 TABLET ORAL DAILY
Status: DISCONTINUED | OUTPATIENT
Start: 2019-01-01 | End: 2019-01-01 | Stop reason: HOSPADM

## 2018-01-01 RX ORDER — ONDANSETRON 4 MG/1
4 TABLET, ORALLY DISINTEGRATING ORAL
Status: DISCONTINUED | OUTPATIENT
Start: 2018-01-01 | End: 2018-01-01 | Stop reason: HOSPADM

## 2018-01-01 RX ORDER — FUROSEMIDE 40 MG/1
40 TABLET ORAL DAILY
Status: DISCONTINUED | OUTPATIENT
Start: 2018-01-01 | End: 2018-01-01 | Stop reason: HOSPADM

## 2018-01-01 RX ORDER — HEPARIN SODIUM 200 [USP'U]/100ML
INJECTION, SOLUTION INTRAVENOUS
Status: DISCONTINUED | OUTPATIENT
Start: 2018-01-01 | End: 2018-01-01

## 2018-01-01 RX ORDER — FUROSEMIDE 10 MG/ML
40 INJECTION INTRAMUSCULAR; INTRAVENOUS ONCE
Status: COMPLETED | OUTPATIENT
Start: 2018-01-01 | End: 2018-01-01

## 2018-01-01 RX ORDER — SODIUM CHLORIDE 0.9 % (FLUSH) 0.9 %
5-10 SYRINGE (ML) INJECTION EVERY 8 HOURS
Status: DISCONTINUED | OUTPATIENT
Start: 2018-01-01 | End: 2018-01-01 | Stop reason: HOSPADM

## 2018-01-01 RX ORDER — FUROSEMIDE 40 MG/1
40 TABLET ORAL 2 TIMES DAILY
Status: DISCONTINUED | OUTPATIENT
Start: 2019-01-01 | End: 2019-01-01 | Stop reason: HOSPADM

## 2018-01-01 RX ORDER — RANOLAZINE 500 MG/1
500 TABLET, EXTENDED RELEASE ORAL 2 TIMES DAILY
Status: DISCONTINUED | OUTPATIENT
Start: 2018-01-01 | End: 2018-01-01

## 2018-01-01 RX ORDER — GUAIFENESIN 600 MG/1
600 TABLET, EXTENDED RELEASE ORAL EVERY 12 HOURS
Qty: 60 TAB | Refills: 0 | Status: SHIPPED | OUTPATIENT
Start: 2018-01-01 | End: 2018-01-01 | Stop reason: ALTCHOICE

## 2018-01-01 RX ORDER — BENZONATATE 100 MG/1
100 CAPSULE ORAL
Status: DISCONTINUED | OUTPATIENT
Start: 2018-01-01 | End: 2018-01-01

## 2018-01-01 RX ORDER — AMLODIPINE BESYLATE 5 MG/1
5 TABLET ORAL ONCE
Status: COMPLETED | OUTPATIENT
Start: 2018-01-01 | End: 2018-01-01

## 2018-01-01 RX ORDER — ESCITALOPRAM OXALATE 10 MG/1
5 TABLET ORAL DAILY
Status: DISCONTINUED | OUTPATIENT
Start: 2018-01-01 | End: 2018-01-01

## 2018-01-01 RX ORDER — AMLODIPINE BESYLATE 5 MG/1
5 TABLET ORAL DAILY
Status: DISCONTINUED | OUTPATIENT
Start: 2019-01-01 | End: 2019-01-01 | Stop reason: HOSPADM

## 2018-01-01 RX ORDER — AMLODIPINE BESYLATE 5 MG/1
5 TABLET ORAL DAILY
Status: DISCONTINUED | OUTPATIENT
Start: 2018-01-01 | End: 2018-01-01

## 2018-01-01 RX ORDER — ESCITALOPRAM OXALATE 5 MG/1
TABLET ORAL
Qty: 30 TAB | Refills: 1 | Status: SHIPPED | OUTPATIENT
Start: 2018-01-01 | End: 2018-01-01 | Stop reason: SDUPTHER

## 2018-01-01 RX ORDER — BACITRACIN 500 UNIT/G
1 PACKET (EA) TOPICAL
Status: COMPLETED | OUTPATIENT
Start: 2018-01-01 | End: 2018-01-01

## 2018-01-01 RX ORDER — PANTOPRAZOLE SODIUM 40 MG/1
40 TABLET, DELAYED RELEASE ORAL
Qty: 60 TAB | Refills: 0 | Status: SHIPPED | OUTPATIENT
Start: 2018-01-01 | End: 2018-01-01 | Stop reason: ALTCHOICE

## 2018-01-01 RX ORDER — ASPIRIN 325 MG
325 TABLET ORAL DAILY
Status: DISPENSED | OUTPATIENT
Start: 2018-01-01 | End: 2018-01-01

## 2018-01-01 RX ORDER — HYDROCODONE BITARTRATE AND ACETAMINOPHEN 5; 325 MG/1; MG/1
1 TABLET ORAL
Status: DISCONTINUED | OUTPATIENT
Start: 2018-01-01 | End: 2018-01-01 | Stop reason: HOSPADM

## 2018-01-01 RX ORDER — GUAIFENESIN 100 MG/5ML
324 LIQUID (ML) ORAL
Status: COMPLETED | OUTPATIENT
Start: 2018-01-01 | End: 2018-01-01

## 2018-01-01 RX ORDER — LIDOCAINE HYDROCHLORIDE 10 MG/ML
INJECTION INFILTRATION; PERINEURAL AS NEEDED
Status: DISCONTINUED | OUTPATIENT
Start: 2018-01-01 | End: 2018-01-01

## 2018-01-01 RX ORDER — TRAMADOL HYDROCHLORIDE 50 MG/1
50 TABLET ORAL
COMMUNITY
End: 2018-01-01

## 2018-01-01 RX ORDER — ISOSORBIDE MONONITRATE 30 MG/1
30 TABLET, EXTENDED RELEASE ORAL DAILY
Status: DISCONTINUED | OUTPATIENT
Start: 2018-01-01 | End: 2018-01-01

## 2018-01-01 RX ORDER — ALPRAZOLAM 0.25 MG/1
0.25 TABLET ORAL
Qty: 30 TAB | Refills: 1 | Status: SHIPPED | OUTPATIENT
Start: 2018-01-01

## 2018-01-01 RX ORDER — TRAMADOL HYDROCHLORIDE 50 MG/1
TABLET ORAL
Refills: 0 | COMMUNITY
Start: 2018-01-01

## 2018-01-01 RX ADMIN — PANTOPRAZOLE SODIUM 40 MG: 40 TABLET, DELAYED RELEASE ORAL at 08:55

## 2018-01-01 RX ADMIN — FENTANYL CITRATE 75 MCG: 50 INJECTION, SOLUTION INTRAMUSCULAR; INTRAVENOUS at 09:12

## 2018-01-01 RX ADMIN — FUROSEMIDE 20 MG: 20 TABLET ORAL at 17:45

## 2018-01-01 RX ADMIN — GUAIFENESIN 600 MG: 600 TABLET, EXTENDED RELEASE ORAL at 21:36

## 2018-01-01 RX ADMIN — METOCLOPRAMIDE HYDROCHLORIDE 5 MG: 5 TABLET ORAL at 09:08

## 2018-01-01 RX ADMIN — METOPROLOL SUCCINATE 50 MG: 50 TABLET, EXTENDED RELEASE ORAL at 21:11

## 2018-01-01 RX ADMIN — ISOSORBIDE MONONITRATE 30 MG: 30 TABLET, EXTENDED RELEASE ORAL at 08:52

## 2018-01-01 RX ADMIN — APIXABAN 5 MG: 2.5 TABLET, FILM COATED ORAL at 20:23

## 2018-01-01 RX ADMIN — SODIUM CHLORIDE 100 ML/HR: 900 INJECTION, SOLUTION INTRAVENOUS at 00:30

## 2018-01-01 RX ADMIN — MELATONIN TAB 3 MG 3 MG: 3 TAB at 00:38

## 2018-01-01 RX ADMIN — ASPIRIN 325 MG ORAL TABLET 325 MG: 325 PILL ORAL at 17:30

## 2018-01-01 RX ADMIN — METOCLOPRAMIDE HYDROCHLORIDE 5 MG: 5 TABLET ORAL at 13:44

## 2018-01-01 RX ADMIN — IPRATROPIUM BROMIDE AND ALBUTEROL SULFATE 3 ML: .5; 3 SOLUTION RESPIRATORY (INHALATION) at 18:14

## 2018-01-01 RX ADMIN — LORAZEPAM 1 MG: 1 TABLET ORAL at 19:33

## 2018-01-01 RX ADMIN — AMLODIPINE BESYLATE 5 MG: 5 TABLET ORAL at 09:54

## 2018-01-01 RX ADMIN — METOPROLOL SUCCINATE 50 MG: 50 TABLET, EXTENDED RELEASE ORAL at 09:23

## 2018-01-01 RX ADMIN — RANOLAZINE 500 MG: 500 TABLET, FILM COATED, EXTENDED RELEASE ORAL at 22:02

## 2018-01-01 RX ADMIN — IPRATROPIUM BROMIDE AND ALBUTEROL SULFATE 3 ML: .5; 3 SOLUTION RESPIRATORY (INHALATION) at 17:45

## 2018-01-01 RX ADMIN — CALCIUM GLUCONATE 1000 MG: 94 INJECTION, SOLUTION INTRAVENOUS at 20:12

## 2018-01-01 RX ADMIN — IPRATROPIUM BROMIDE AND ALBUTEROL SULFATE 3 ML: .5; 3 SOLUTION RESPIRATORY (INHALATION) at 00:25

## 2018-01-01 RX ADMIN — AMLODIPINE BESYLATE 5 MG: 5 TABLET ORAL at 09:08

## 2018-01-01 RX ADMIN — FUROSEMIDE 20 MG: 20 TABLET ORAL at 18:14

## 2018-01-01 RX ADMIN — IPRATROPIUM BROMIDE AND ALBUTEROL SULFATE 3 ML: .5; 3 SOLUTION RESPIRATORY (INHALATION) at 17:13

## 2018-01-01 RX ADMIN — IPRATROPIUM BROMIDE AND ALBUTEROL SULFATE 3 ML: .5; 3 SOLUTION RESPIRATORY (INHALATION) at 00:28

## 2018-01-01 RX ADMIN — ISOSORBIDE MONONITRATE 30 MG: 30 TABLET, EXTENDED RELEASE ORAL at 09:42

## 2018-01-01 RX ADMIN — FUROSEMIDE 20 MG: 20 TABLET ORAL at 18:18

## 2018-01-01 RX ADMIN — GUAIFENESIN 600 MG: 600 TABLET, EXTENDED RELEASE ORAL at 00:00

## 2018-01-01 RX ADMIN — APIXABAN 5 MG: 5 TABLET, FILM COATED ORAL at 17:55

## 2018-01-01 RX ADMIN — SODIUM CHLORIDE 50 ML/HR: 900 INJECTION, SOLUTION INTRAVENOUS at 17:32

## 2018-01-01 RX ADMIN — METOCLOPRAMIDE HYDROCHLORIDE 5 MG: 5 TABLET ORAL at 17:49

## 2018-01-01 RX ADMIN — APIXABAN 2.5 MG: 2.5 TABLET, FILM COATED ORAL at 17:38

## 2018-01-01 RX ADMIN — IPRATROPIUM BROMIDE AND ALBUTEROL SULFATE: .5; 3 SOLUTION RESPIRATORY (INHALATION) at 21:00

## 2018-01-01 RX ADMIN — METOCLOPRAMIDE HYDROCHLORIDE 5 MG: 5 TABLET ORAL at 12:14

## 2018-01-01 RX ADMIN — METOCLOPRAMIDE HYDROCHLORIDE 5 MG: 5 TABLET ORAL at 17:18

## 2018-01-01 RX ADMIN — TIOTROPIUM BROMIDE 18 MCG: 18 CAPSULE ORAL; RESPIRATORY (INHALATION) at 11:24

## 2018-01-01 RX ADMIN — RANOLAZINE 500 MG: 500 TABLET, FILM COATED, EXTENDED RELEASE ORAL at 09:40

## 2018-01-01 RX ADMIN — AMLODIPINE BESYLATE 5 MG: 5 TABLET ORAL at 09:30

## 2018-01-01 RX ADMIN — METOPROLOL SUCCINATE 50 MG: 50 TABLET, EXTENDED RELEASE ORAL at 10:17

## 2018-01-01 RX ADMIN — METOCLOPRAMIDE HYDROCHLORIDE 5 MG: 5 TABLET ORAL at 09:40

## 2018-01-01 RX ADMIN — LORAZEPAM 0.5 MG: 0.5 TABLET ORAL at 05:25

## 2018-01-01 RX ADMIN — FUROSEMIDE 20 MG: 10 INJECTION, SOLUTION INTRAMUSCULAR; INTRAVENOUS at 16:49

## 2018-01-01 RX ADMIN — LORAZEPAM 0.5 MG: 0.5 TABLET ORAL at 22:13

## 2018-01-01 RX ADMIN — SODIUM CHLORIDE 250 ML: 900 INJECTION, SOLUTION INTRAVENOUS at 18:53

## 2018-01-01 RX ADMIN — ISOSORBIDE MONONITRATE 30 MG: 30 TABLET, EXTENDED RELEASE ORAL at 09:02

## 2018-01-01 RX ADMIN — LORAZEPAM 1 MG: 1 TABLET ORAL at 21:20

## 2018-01-01 RX ADMIN — RANOLAZINE 500 MG: 500 TABLET, FILM COATED, EXTENDED RELEASE ORAL at 17:49

## 2018-01-01 RX ADMIN — METOCLOPRAMIDE HYDROCHLORIDE 5 MG: 5 TABLET ORAL at 18:18

## 2018-01-01 RX ADMIN — HYDROCODONE POLISTIREX AND CHLORPHENIRAMINE POLISTIREX 5 ML: 10; 8 SUSPENSION, EXTENDED RELEASE ORAL at 22:34

## 2018-01-01 RX ADMIN — SODIUM CHLORIDE 50 ML/HR: 900 INJECTION, SOLUTION INTRAVENOUS at 22:16

## 2018-01-01 RX ADMIN — OXYCODONE AND ACETAMINOPHEN 1 TABLET: 5; 325 TABLET ORAL at 19:08

## 2018-01-01 RX ADMIN — PANTOPRAZOLE SODIUM 40 MG: 40 TABLET, DELAYED RELEASE ORAL at 08:31

## 2018-01-01 RX ADMIN — GUAIFENESIN 600 MG: 600 TABLET, EXTENDED RELEASE ORAL at 21:31

## 2018-01-01 RX ADMIN — HYDROCODONE POLISTIREX AND CHLORPHENIRAMINE POLISTIREX 5 ML: 10; 8 SUSPENSION, EXTENDED RELEASE ORAL at 00:47

## 2018-01-01 RX ADMIN — RANOLAZINE 500 MG: 500 TABLET, FILM COATED, EXTENDED RELEASE ORAL at 08:51

## 2018-01-01 RX ADMIN — AMLODIPINE BESYLATE 5 MG: 5 TABLET ORAL at 08:51

## 2018-01-01 RX ADMIN — HYDROCODONE POLISTIREX AND CHLORPHENIRAMINE POLISTIREX 5 ML: 10; 8 SUSPENSION, EXTENDED RELEASE ORAL at 21:31

## 2018-01-01 RX ADMIN — ISOSORBIDE MONONITRATE 30 MG: 30 TABLET, EXTENDED RELEASE ORAL at 09:23

## 2018-01-01 RX ADMIN — RANOLAZINE 500 MG: 500 TABLET, FILM COATED, EXTENDED RELEASE ORAL at 08:49

## 2018-01-01 RX ADMIN — IOPAMIDOL 50 ML: 755 INJECTION, SOLUTION INTRAVENOUS at 21:07

## 2018-01-01 RX ADMIN — RANOLAZINE 500 MG: 500 TABLET, FILM COATED, EXTENDED RELEASE ORAL at 09:23

## 2018-01-01 RX ADMIN — FUROSEMIDE 40 MG: 10 INJECTION, SOLUTION INTRAMUSCULAR; INTRAVENOUS at 14:23

## 2018-01-01 RX ADMIN — APIXABAN 5 MG: 5 TABLET, FILM COATED ORAL at 08:59

## 2018-01-01 RX ADMIN — MELATONIN TAB 3 MG 3 MG: 3 TAB at 21:14

## 2018-01-01 RX ADMIN — METOPROLOL SUCCINATE 50 MG: 50 TABLET, EXTENDED RELEASE ORAL at 08:30

## 2018-01-01 RX ADMIN — BUMETANIDE 0.5 MG: 0.25 INJECTION INTRAMUSCULAR; INTRAVENOUS at 08:20

## 2018-01-01 RX ADMIN — IPRATROPIUM BROMIDE AND ALBUTEROL SULFATE 3 ML: .5; 3 SOLUTION RESPIRATORY (INHALATION) at 18:00

## 2018-01-01 RX ADMIN — IPRATROPIUM BROMIDE AND ALBUTEROL SULFATE 3 ML: .5; 3 SOLUTION RESPIRATORY (INHALATION) at 09:00

## 2018-01-01 RX ADMIN — METOPROLOL SUCCINATE 50 MG: 50 TABLET, EXTENDED RELEASE ORAL at 10:29

## 2018-01-01 RX ADMIN — APIXABAN 5 MG: 2.5 TABLET, FILM COATED ORAL at 17:04

## 2018-01-01 RX ADMIN — IPRATROPIUM BROMIDE AND ALBUTEROL SULFATE 3 ML: .5; 3 SOLUTION RESPIRATORY (INHALATION) at 13:30

## 2018-01-01 RX ADMIN — METOCLOPRAMIDE HYDROCHLORIDE 5 MG: 5 TABLET ORAL at 08:49

## 2018-01-01 RX ADMIN — IPRATROPIUM BROMIDE AND ALBUTEROL SULFATE 3 ML: .5; 3 SOLUTION RESPIRATORY (INHALATION) at 21:00

## 2018-01-01 RX ADMIN — METOPROLOL SUCCINATE 50 MG: 50 TABLET, EXTENDED RELEASE ORAL at 08:55

## 2018-01-01 RX ADMIN — PANTOPRAZOLE SODIUM 40 MG: 40 TABLET, DELAYED RELEASE ORAL at 09:37

## 2018-01-01 RX ADMIN — APIXABAN 5 MG: 5 TABLET, FILM COATED ORAL at 09:55

## 2018-01-01 RX ADMIN — PANTOPRAZOLE SODIUM 40 MG: 40 TABLET, DELAYED RELEASE ORAL at 09:54

## 2018-01-01 RX ADMIN — BUMETANIDE 0.5 MG: 0.25 INJECTION INTRAMUSCULAR; INTRAVENOUS at 20:23

## 2018-01-01 RX ADMIN — RANOLAZINE 500 MG: 500 TABLET, FILM COATED, EXTENDED RELEASE ORAL at 18:17

## 2018-01-01 RX ADMIN — GUAIFENESIN 600 MG: 600 TABLET, EXTENDED RELEASE ORAL at 13:44

## 2018-01-01 RX ADMIN — AMLODIPINE BESYLATE 5 MG: 5 TABLET ORAL at 09:42

## 2018-01-01 RX ADMIN — FUROSEMIDE 20 MG: 10 INJECTION, SOLUTION INTRAMUSCULAR; INTRAVENOUS at 12:28

## 2018-01-01 RX ADMIN — IPRATROPIUM BROMIDE AND ALBUTEROL SULFATE 3 ML: .5; 3 SOLUTION RESPIRATORY (INHALATION) at 20:57

## 2018-01-01 RX ADMIN — ISOSORBIDE MONONITRATE 30 MG: 30 TABLET, EXTENDED RELEASE ORAL at 09:18

## 2018-01-01 RX ADMIN — PANTOPRAZOLE SODIUM 40 MG: 40 TABLET, DELAYED RELEASE ORAL at 09:06

## 2018-01-01 RX ADMIN — TIOTROPIUM BROMIDE 18 MCG: 18 CAPSULE ORAL; RESPIRATORY (INHALATION) at 09:31

## 2018-01-01 RX ADMIN — ISOSORBIDE MONONITRATE 30 MG: 30 TABLET, EXTENDED RELEASE ORAL at 10:42

## 2018-01-01 RX ADMIN — SODIUM CHLORIDE 48 ML: 900 INJECTION, SOLUTION INTRAVENOUS at 21:07

## 2018-01-01 RX ADMIN — PANTOPRAZOLE SODIUM 40 MG: 40 TABLET, DELAYED RELEASE ORAL at 10:45

## 2018-01-01 RX ADMIN — ISOSORBIDE MONONITRATE 30 MG: 30 TABLET, EXTENDED RELEASE ORAL at 09:40

## 2018-01-01 RX ADMIN — APIXABAN 2.5 MG: 2.5 TABLET, FILM COATED ORAL at 12:40

## 2018-01-01 RX ADMIN — APIXABAN 5 MG: 5 TABLET, FILM COATED ORAL at 09:23

## 2018-01-01 RX ADMIN — GUAIFENESIN 600 MG: 600 TABLET, EXTENDED RELEASE ORAL at 09:19

## 2018-01-01 RX ADMIN — ISOSORBIDE MONONITRATE 30 MG: 30 TABLET, EXTENDED RELEASE ORAL at 09:27

## 2018-01-01 RX ADMIN — TIOTROPIUM BROMIDE 18 MCG: 18 CAPSULE ORAL; RESPIRATORY (INHALATION) at 14:00

## 2018-01-01 RX ADMIN — TIOTROPIUM BROMIDE 18 MCG: 18 CAPSULE ORAL; RESPIRATORY (INHALATION) at 14:32

## 2018-01-01 RX ADMIN — OXYCODONE AND ACETAMINOPHEN 1 TABLET: 5; 325 TABLET ORAL at 00:38

## 2018-01-01 RX ADMIN — APIXABAN 2.5 MG: 2.5 TABLET, FILM COATED ORAL at 09:37

## 2018-01-01 RX ADMIN — APIXABAN 5 MG: 5 TABLET, FILM COATED ORAL at 09:02

## 2018-01-01 RX ADMIN — METOCLOPRAMIDE HYDROCHLORIDE 5 MG: 5 TABLET ORAL at 21:36

## 2018-01-01 RX ADMIN — RANOLAZINE 500 MG: 500 TABLET, FILM COATED, EXTENDED RELEASE ORAL at 18:01

## 2018-01-01 RX ADMIN — RANOLAZINE 500 MG: 500 TABLET, FILM COATED, EXTENDED RELEASE ORAL at 09:27

## 2018-01-01 RX ADMIN — IPRATROPIUM BROMIDE AND ALBUTEROL SULFATE 3 ML: .5; 3 SOLUTION RESPIRATORY (INHALATION) at 23:38

## 2018-01-01 RX ADMIN — IPRATROPIUM BROMIDE AND ALBUTEROL SULFATE 3 ML: .5; 3 SOLUTION RESPIRATORY (INHALATION) at 22:58

## 2018-01-01 RX ADMIN — RANOLAZINE 500 MG: 500 TABLET, FILM COATED, EXTENDED RELEASE ORAL at 10:29

## 2018-01-01 RX ADMIN — IOPAMIDOL 94 ML: 612 INJECTION, SOLUTION INTRAVENOUS at 09:59

## 2018-01-01 RX ADMIN — RANOLAZINE 500 MG: 500 TABLET, FILM COATED, EXTENDED RELEASE ORAL at 09:54

## 2018-01-01 RX ADMIN — METOCLOPRAMIDE HYDROCHLORIDE 5 MG: 5 TABLET ORAL at 21:04

## 2018-01-01 RX ADMIN — GUAIFENESIN 600 MG: 600 TABLET, EXTENDED RELEASE ORAL at 09:24

## 2018-01-01 RX ADMIN — AMLODIPINE BESYLATE 5 MG: 5 TABLET ORAL at 09:40

## 2018-01-01 RX ADMIN — BUMETANIDE 0.5 MG: 0.25 INJECTION INTRAMUSCULAR; INTRAVENOUS at 20:32

## 2018-01-01 RX ADMIN — METOCLOPRAMIDE HYDROCHLORIDE 5 MG: 5 TABLET ORAL at 09:19

## 2018-01-01 RX ADMIN — RANOLAZINE 500 MG: 500 TABLET, FILM COATED, EXTENDED RELEASE ORAL at 08:59

## 2018-01-01 RX ADMIN — METOPROLOL SUCCINATE 50 MG: 50 TABLET, EXTENDED RELEASE ORAL at 08:15

## 2018-01-01 RX ADMIN — AMLODIPINE BESYLATE 5 MG: 5 TABLET ORAL at 09:02

## 2018-01-01 RX ADMIN — PANTOPRAZOLE SODIUM 40 MG: 40 TABLET, DELAYED RELEASE ORAL at 09:18

## 2018-01-01 RX ADMIN — APIXABAN 5 MG: 5 TABLET, FILM COATED ORAL at 09:18

## 2018-01-01 RX ADMIN — AMLODIPINE BESYLATE 5 MG: 5 TABLET ORAL at 09:00

## 2018-01-01 RX ADMIN — FUROSEMIDE 40 MG: 10 INJECTION, SOLUTION INTRAMUSCULAR; INTRAVENOUS at 11:55

## 2018-01-01 RX ADMIN — IPRATROPIUM BROMIDE AND ALBUTEROL SULFATE 3 ML: .5; 3 SOLUTION RESPIRATORY (INHALATION) at 10:42

## 2018-01-01 RX ADMIN — IPRATROPIUM BROMIDE AND ALBUTEROL SULFATE 3 ML: .5; 3 SOLUTION RESPIRATORY (INHALATION) at 08:49

## 2018-01-01 RX ADMIN — APIXABAN 5 MG: 5 TABLET, FILM COATED ORAL at 17:45

## 2018-01-01 RX ADMIN — LORAZEPAM 1 MG: 1 TABLET ORAL at 19:27

## 2018-01-01 RX ADMIN — METOCLOPRAMIDE HYDROCHLORIDE 5 MG: 5 TABLET ORAL at 17:45

## 2018-01-01 RX ADMIN — BUMETANIDE 0.5 MG: 0.25 INJECTION INTRAMUSCULAR; INTRAVENOUS at 10:24

## 2018-01-01 RX ADMIN — RANOLAZINE 500 MG: 500 TABLET, FILM COATED, EXTENDED RELEASE ORAL at 17:13

## 2018-01-01 RX ADMIN — METOPROLOL SUCCINATE 50 MG: 50 TABLET, EXTENDED RELEASE ORAL at 09:47

## 2018-01-01 RX ADMIN — SODIUM CHLORIDE 50 ML/HR: 900 INJECTION, SOLUTION INTRAVENOUS at 22:17

## 2018-01-01 RX ADMIN — TIOTROPIUM BROMIDE 18 MCG: 18 CAPSULE ORAL; RESPIRATORY (INHALATION) at 14:21

## 2018-01-01 RX ADMIN — APIXABAN 2.5 MG: 2.5 TABLET, FILM COATED ORAL at 10:25

## 2018-01-01 RX ADMIN — IPRATROPIUM BROMIDE AND ALBUTEROL SULFATE 3 ML: .5; 3 SOLUTION RESPIRATORY (INHALATION) at 09:18

## 2018-01-01 RX ADMIN — SODIUM CHLORIDE 97 ML: 900 INJECTION, SOLUTION INTRAVENOUS at 15:18

## 2018-01-01 RX ADMIN — PANTOPRAZOLE SODIUM 40 MG: 40 TABLET, DELAYED RELEASE ORAL at 09:40

## 2018-01-01 RX ADMIN — PANTOPRAZOLE SODIUM 40 MG: 40 TABLET, DELAYED RELEASE ORAL at 09:08

## 2018-01-01 RX ADMIN — ISOSORBIDE MONONITRATE 30 MG: 30 TABLET, EXTENDED RELEASE ORAL at 08:49

## 2018-01-01 RX ADMIN — APIXABAN 5 MG: 5 TABLET, FILM COATED ORAL at 18:14

## 2018-01-01 RX ADMIN — TIOTROPIUM BROMIDE 18 MCG: 18 CAPSULE ORAL; RESPIRATORY (INHALATION) at 09:24

## 2018-01-01 RX ADMIN — APIXABAN 5 MG: 2.5 TABLET, FILM COATED ORAL at 17:13

## 2018-01-01 RX ADMIN — RANOLAZINE 500 MG: 500 TABLET, FILM COATED, EXTENDED RELEASE ORAL at 17:45

## 2018-01-01 RX ADMIN — PANTOPRAZOLE SODIUM 40 MG: 40 TABLET, DELAYED RELEASE ORAL at 09:23

## 2018-01-01 RX ADMIN — Medication 10 ML: at 22:15

## 2018-01-01 RX ADMIN — IPRATROPIUM BROMIDE AND ALBUTEROL SULFATE 3 ML: .5; 3 SOLUTION RESPIRATORY (INHALATION) at 09:40

## 2018-01-01 RX ADMIN — FUROSEMIDE 20 MG: 20 TABLET ORAL at 17:48

## 2018-01-01 RX ADMIN — ASPIRIN 325 MG ORAL TABLET 325 MG: 325 PILL ORAL at 08:55

## 2018-01-01 RX ADMIN — NITROGLYCERIN 0.4 MG: 0.4 TABLET SUBLINGUAL at 17:29

## 2018-01-01 RX ADMIN — METOPROLOL SUCCINATE 50 MG: 50 TABLET, EXTENDED RELEASE ORAL at 09:54

## 2018-01-01 RX ADMIN — SODIUM CHLORIDE 50 ML/HR: 900 INJECTION, SOLUTION INTRAVENOUS at 19:03

## 2018-01-01 RX ADMIN — RANOLAZINE 500 MG: 500 TABLET, FILM COATED, EXTENDED RELEASE ORAL at 09:42

## 2018-01-01 RX ADMIN — PANTOPRAZOLE SODIUM 40 MG: 40 TABLET, DELAYED RELEASE ORAL at 08:11

## 2018-01-01 RX ADMIN — METOCLOPRAMIDE HYDROCHLORIDE 5 MG: 5 TABLET ORAL at 10:42

## 2018-01-01 RX ADMIN — IPRATROPIUM BROMIDE AND ALBUTEROL SULFATE 3 ML: .5; 3 SOLUTION RESPIRATORY (INHALATION) at 04:55

## 2018-01-01 RX ADMIN — APIXABAN 5 MG: 5 TABLET, FILM COATED ORAL at 17:18

## 2018-01-01 RX ADMIN — AMLODIPINE BESYLATE 5 MG: 5 TABLET ORAL at 09:27

## 2018-01-01 RX ADMIN — GUAIFENESIN 600 MG: 600 TABLET, EXTENDED RELEASE ORAL at 10:42

## 2018-01-01 RX ADMIN — TIOTROPIUM BROMIDE 18 MCG: 18 CAPSULE ORAL; RESPIRATORY (INHALATION) at 14:04

## 2018-01-01 RX ADMIN — METOPROLOL SUCCINATE 50 MG: 50 TABLET, EXTENDED RELEASE ORAL at 09:06

## 2018-01-01 RX ADMIN — APIXABAN 2.5 MG: 2.5 TABLET, FILM COATED ORAL at 18:15

## 2018-01-01 RX ADMIN — ASPIRIN 325 MG ORAL TABLET 325 MG: 325 PILL ORAL at 09:30

## 2018-01-01 RX ADMIN — ENALAPRIL MALEATE 20 MG: 10 TABLET ORAL at 09:02

## 2018-01-01 RX ADMIN — APIXABAN 5 MG: 5 TABLET, FILM COATED ORAL at 18:17

## 2018-01-01 RX ADMIN — RANOLAZINE 500 MG: 500 TABLET, FILM COATED, EXTENDED RELEASE ORAL at 10:42

## 2018-01-01 RX ADMIN — FUROSEMIDE 20 MG: 20 TABLET ORAL at 08:51

## 2018-01-01 RX ADMIN — PANTOPRAZOLE SODIUM 40 MG: 40 TABLET, DELAYED RELEASE ORAL at 08:57

## 2018-01-01 RX ADMIN — MELATONIN TAB 3 MG 3 MG: 3 TAB at 21:20

## 2018-01-01 RX ADMIN — METOPROLOL SUCCINATE 50 MG: 50 TABLET, EXTENDED RELEASE ORAL at 09:30

## 2018-01-01 RX ADMIN — FUROSEMIDE 20 MG: 20 TABLET ORAL at 09:18

## 2018-01-01 RX ADMIN — SODIUM CHLORIDE 500 ML: 900 INJECTION, SOLUTION INTRAVENOUS at 19:36

## 2018-01-01 RX ADMIN — APIXABAN 5 MG: 5 TABLET, FILM COATED ORAL at 09:40

## 2018-01-01 RX ADMIN — METOCLOPRAMIDE HYDROCHLORIDE 5 MG: 5 TABLET ORAL at 13:16

## 2018-01-01 RX ADMIN — IPRATROPIUM BROMIDE AND ALBUTEROL SULFATE 3 ML: .5; 3 SOLUTION RESPIRATORY (INHALATION) at 09:54

## 2018-01-01 RX ADMIN — PANTOPRAZOLE SODIUM 40 MG: 40 TABLET, DELAYED RELEASE ORAL at 09:27

## 2018-01-01 RX ADMIN — METOPROLOL SUCCINATE 50 MG: 50 TABLET, EXTENDED RELEASE ORAL at 08:52

## 2018-01-01 RX ADMIN — Medication 1 MG: at 17:20

## 2018-01-01 RX ADMIN — FUROSEMIDE 20 MG: 10 INJECTION, SOLUTION INTRAMUSCULAR; INTRAVENOUS at 22:53

## 2018-01-01 RX ADMIN — ISOSORBIDE MONONITRATE 30 MG: 30 TABLET, EXTENDED RELEASE ORAL at 09:54

## 2018-01-01 RX ADMIN — PANTOPRAZOLE SODIUM 40 MG: 40 TABLET, DELAYED RELEASE ORAL at 08:15

## 2018-01-01 RX ADMIN — FUROSEMIDE 20 MG: 20 TABLET ORAL at 09:08

## 2018-01-01 RX ADMIN — METOCLOPRAMIDE HYDROCHLORIDE 5 MG: 5 TABLET ORAL at 22:04

## 2018-01-01 RX ADMIN — FUROSEMIDE 20 MG: 20 TABLET ORAL at 10:25

## 2018-01-01 RX ADMIN — BUMETANIDE 0.5 MG: 0.25 INJECTION INTRAMUSCULAR; INTRAVENOUS at 22:58

## 2018-01-01 RX ADMIN — PANTOPRAZOLE SODIUM 40 MG: 40 TABLET, DELAYED RELEASE ORAL at 08:49

## 2018-01-01 RX ADMIN — GUAIFENESIN 600 MG: 600 TABLET, EXTENDED RELEASE ORAL at 08:49

## 2018-01-01 RX ADMIN — AMLODIPINE BESYLATE 5 MG: 5 TABLET ORAL at 09:05

## 2018-01-01 RX ADMIN — FUROSEMIDE 40 MG: 40 TABLET ORAL at 09:02

## 2018-01-01 RX ADMIN — Medication 10 ML: at 14:00

## 2018-01-01 RX ADMIN — PANTOPRAZOLE SODIUM 40 MG: 40 TABLET, DELAYED RELEASE ORAL at 10:25

## 2018-01-01 RX ADMIN — METOCLOPRAMIDE HYDROCHLORIDE 5 MG: 5 TABLET ORAL at 08:52

## 2018-01-01 RX ADMIN — IPRATROPIUM BROMIDE AND ALBUTEROL SULFATE 3 ML: .5; 3 SOLUTION RESPIRATORY (INHALATION) at 18:18

## 2018-01-01 RX ADMIN — IPRATROPIUM BROMIDE AND ALBUTEROL SULFATE 3 ML: .5; 3 SOLUTION RESPIRATORY (INHALATION) at 03:58

## 2018-01-01 RX ADMIN — IPRATROPIUM BROMIDE AND ALBUTEROL SULFATE 3 ML: .5; 3 SOLUTION RESPIRATORY (INHALATION) at 08:52

## 2018-01-01 RX ADMIN — HYDROCODONE POLISTIREX AND CHLORPHENIRAMINE POLISTIREX 5 ML: 10; 8 SUSPENSION, EXTENDED RELEASE ORAL at 18:17

## 2018-01-01 RX ADMIN — APIXABAN 5 MG: 2.5 TABLET, FILM COATED ORAL at 18:02

## 2018-01-01 RX ADMIN — IPRATROPIUM BROMIDE AND ALBUTEROL SULFATE 3 ML: .5; 3 SOLUTION RESPIRATORY (INHALATION) at 17:19

## 2018-01-01 RX ADMIN — APIXABAN 5 MG: 5 TABLET, FILM COATED ORAL at 18:01

## 2018-01-01 RX ADMIN — LORAZEPAM 0.5 MG: 0.5 TABLET ORAL at 17:55

## 2018-01-01 RX ADMIN — BACITRACIN 1 PACKET: 500 OINTMENT TOPICAL at 12:33

## 2018-01-01 RX ADMIN — AMLODIPINE BESYLATE 5 MG: 5 TABLET ORAL at 10:28

## 2018-01-01 RX ADMIN — SODIUM CHLORIDE 500 ML: 900 INJECTION, SOLUTION INTRAVENOUS at 09:08

## 2018-01-01 RX ADMIN — APIXABAN 5 MG: 2.5 TABLET, FILM COATED ORAL at 09:42

## 2018-01-01 RX ADMIN — IPRATROPIUM BROMIDE AND ALBUTEROL SULFATE 3 ML: .5; 3 SOLUTION RESPIRATORY (INHALATION) at 16:29

## 2018-01-01 RX ADMIN — ENALAPRIL MALEATE 20 MG: 10 TABLET ORAL at 08:56

## 2018-01-01 RX ADMIN — IPRATROPIUM BROMIDE AND ALBUTEROL SULFATE 3 ML: .5; 3 SOLUTION RESPIRATORY (INHALATION) at 08:20

## 2018-01-01 RX ADMIN — METOCLOPRAMIDE HYDROCHLORIDE 5 MG: 5 TABLET ORAL at 21:37

## 2018-01-01 RX ADMIN — ENALAPRIL MALEATE 20 MG: 10 TABLET ORAL at 10:28

## 2018-01-01 RX ADMIN — APIXABAN 5 MG: 5 TABLET, FILM COATED ORAL at 09:08

## 2018-01-01 RX ADMIN — APIXABAN 5 MG: 2.5 TABLET, FILM COATED ORAL at 10:29

## 2018-01-01 RX ADMIN — ENALAPRIL MALEATE 20 MG: 10 TABLET ORAL at 09:36

## 2018-01-01 RX ADMIN — GUAIFENESIN 600 MG: 600 TABLET, EXTENDED RELEASE ORAL at 08:59

## 2018-01-01 RX ADMIN — FUROSEMIDE 20 MG: 20 TABLET ORAL at 08:59

## 2018-01-01 RX ADMIN — METOCLOPRAMIDE HYDROCHLORIDE 5 MG: 5 TABLET ORAL at 22:33

## 2018-01-01 RX ADMIN — GUAIFENESIN 600 MG: 600 TABLET, EXTENDED RELEASE ORAL at 22:34

## 2018-01-01 RX ADMIN — RANOLAZINE 500 MG: 500 TABLET, FILM COATED, EXTENDED RELEASE ORAL at 09:19

## 2018-01-01 RX ADMIN — GUAIFENESIN 600 MG: 600 TABLET, EXTENDED RELEASE ORAL at 21:00

## 2018-01-01 RX ADMIN — AMLODIPINE BESYLATE 5 MG: 5 TABLET ORAL at 09:47

## 2018-01-01 RX ADMIN — METOCLOPRAMIDE HYDROCHLORIDE 5 MG: 5 TABLET ORAL at 17:58

## 2018-01-01 RX ADMIN — METOPROLOL SUCCINATE 50 MG: 50 TABLET, EXTENDED RELEASE ORAL at 09:27

## 2018-01-01 RX ADMIN — RANOLAZINE 500 MG: 500 TABLET, FILM COATED, EXTENDED RELEASE ORAL at 18:02

## 2018-01-01 RX ADMIN — AMLODIPINE BESYLATE 5 MG: 5 TABLET ORAL at 10:29

## 2018-01-01 RX ADMIN — AMLODIPINE BESYLATE 5 MG: 5 TABLET ORAL at 08:49

## 2018-01-01 RX ADMIN — FUROSEMIDE 40 MG: 40 TABLET ORAL at 08:57

## 2018-01-01 RX ADMIN — FUROSEMIDE 20 MG: 20 TABLET ORAL at 17:58

## 2018-01-01 RX ADMIN — RANOLAZINE 500 MG: 500 TABLET, FILM COATED, EXTENDED RELEASE ORAL at 09:08

## 2018-01-01 RX ADMIN — PANTOPRAZOLE SODIUM 40 MG: 40 TABLET, DELAYED RELEASE ORAL at 09:47

## 2018-01-01 RX ADMIN — METOPROLOL SUCCINATE 50 MG: 50 TABLET, EXTENDED RELEASE ORAL at 10:42

## 2018-01-01 RX ADMIN — HYDROCODONE POLISTIREX AND CHLORPHENIRAMINE POLISTIREX 5 ML: 10; 8 SUSPENSION, EXTENDED RELEASE ORAL at 22:04

## 2018-01-01 RX ADMIN — METOCLOPRAMIDE HYDROCHLORIDE 5 MG: 5 TABLET ORAL at 21:31

## 2018-01-01 RX ADMIN — AMLODIPINE BESYLATE 5 MG: 5 TABLET ORAL at 09:21

## 2018-01-01 RX ADMIN — APIXABAN 5 MG: 5 TABLET, FILM COATED ORAL at 08:51

## 2018-01-01 RX ADMIN — IPRATROPIUM BROMIDE AND ALBUTEROL SULFATE 3 ML: .5; 3 SOLUTION RESPIRATORY (INHALATION) at 09:43

## 2018-01-01 RX ADMIN — NITROGLYCERIN 0.4 MG: 0.4 TABLET SUBLINGUAL at 17:20

## 2018-01-01 RX ADMIN — AMLODIPINE BESYLATE 5 MG: 5 TABLET ORAL at 09:37

## 2018-01-01 RX ADMIN — METOPROLOL SUCCINATE 50 MG: 50 TABLET, EXTENDED RELEASE ORAL at 09:19

## 2018-01-01 RX ADMIN — FUROSEMIDE 20 MG: 20 TABLET ORAL at 08:49

## 2018-01-01 RX ADMIN — ONDANSETRON HYDROCHLORIDE 4 MG: 2 INJECTION INTRAMUSCULAR; INTRAVENOUS at 09:08

## 2018-01-01 RX ADMIN — APIXABAN 2.5 MG: 2.5 TABLET, FILM COATED ORAL at 08:30

## 2018-01-01 RX ADMIN — METOPROLOL SUCCINATE 50 MG: 50 TABLET, EXTENDED RELEASE ORAL at 09:40

## 2018-01-01 RX ADMIN — AMLODIPINE BESYLATE 5 MG: 5 TABLET ORAL at 09:23

## 2018-01-01 RX ADMIN — IPRATROPIUM BROMIDE AND ALBUTEROL SULFATE 3 ML: .5; 3 SOLUTION RESPIRATORY (INHALATION) at 17:49

## 2018-01-01 RX ADMIN — APIXABAN 5 MG: 5 TABLET, FILM COATED ORAL at 08:49

## 2018-01-01 RX ADMIN — METOPROLOL SUCCINATE 50 MG: 50 TABLET, EXTENDED RELEASE ORAL at 08:49

## 2018-01-01 RX ADMIN — RANOLAZINE 500 MG: 500 TABLET, FILM COATED, EXTENDED RELEASE ORAL at 17:06

## 2018-01-01 RX ADMIN — RANOLAZINE 500 MG: 500 TABLET, FILM COATED, EXTENDED RELEASE ORAL at 17:55

## 2018-01-01 RX ADMIN — GUAIFENESIN 600 MG: 600 TABLET, EXTENDED RELEASE ORAL at 08:51

## 2018-01-01 RX ADMIN — FUROSEMIDE 40 MG: 10 INJECTION, SOLUTION INTRAMUSCULAR; INTRAVENOUS at 02:35

## 2018-01-01 RX ADMIN — FUROSEMIDE 20 MG: 20 TABLET ORAL at 09:54

## 2018-01-01 RX ADMIN — NITROGLYCERIN 0.4 MG: 0.4 TABLET SUBLINGUAL at 08:17

## 2018-01-01 RX ADMIN — ISOSORBIDE MONONITRATE 30 MG: 30 TABLET, EXTENDED RELEASE ORAL at 10:29

## 2018-01-01 RX ADMIN — AMLODIPINE BESYLATE 5 MG: 5 TABLET ORAL at 08:57

## 2018-01-01 RX ADMIN — METOCLOPRAMIDE HYDROCHLORIDE 5 MG: 5 TABLET ORAL at 22:34

## 2018-01-01 RX ADMIN — ACETAMINOPHEN 1000 MG: 500 TABLET, FILM COATED ORAL at 04:56

## 2018-01-01 RX ADMIN — TIOTROPIUM BROMIDE 18 MCG: 18 CAPSULE ORAL; RESPIRATORY (INHALATION) at 13:31

## 2018-01-01 RX ADMIN — IPRATROPIUM BROMIDE AND ALBUTEROL SULFATE 3 ML: .5; 3 SOLUTION RESPIRATORY (INHALATION) at 10:29

## 2018-01-01 RX ADMIN — IPRATROPIUM BROMIDE AND ALBUTEROL SULFATE 3 ML: .5; 3 SOLUTION RESPIRATORY (INHALATION) at 17:56

## 2018-01-01 RX ADMIN — TIOTROPIUM BROMIDE 18 MCG: 18 CAPSULE ORAL; RESPIRATORY (INHALATION) at 10:17

## 2018-01-01 RX ADMIN — AMLODIPINE BESYLATE 5 MG: 5 TABLET ORAL at 08:31

## 2018-01-01 RX ADMIN — AMLODIPINE BESYLATE 5 MG: 5 TABLET ORAL at 10:16

## 2018-01-01 RX ADMIN — APIXABAN 5 MG: 2.5 TABLET, FILM COATED ORAL at 09:05

## 2018-01-01 RX ADMIN — METOCLOPRAMIDE HYDROCHLORIDE 5 MG: 5 TABLET ORAL at 09:54

## 2018-01-01 RX ADMIN — PANTOPRAZOLE SODIUM 40 MG: 40 TABLET, DELAYED RELEASE ORAL at 10:16

## 2018-01-01 RX ADMIN — IPRATROPIUM BROMIDE AND ALBUTEROL SULFATE 3 ML: .5; 3 SOLUTION RESPIRATORY (INHALATION) at 18:19

## 2018-01-01 RX ADMIN — BUMETANIDE 0.5 MG: 0.25 INJECTION INTRAMUSCULAR; INTRAVENOUS at 09:06

## 2018-01-01 RX ADMIN — METOCLOPRAMIDE HYDROCHLORIDE 5 MG: 5 TABLET ORAL at 18:14

## 2018-01-01 RX ADMIN — APIXABAN 5 MG: 2.5 TABLET, FILM COATED ORAL at 09:27

## 2018-01-01 RX ADMIN — ISOSORBIDE MONONITRATE 30 MG: 30 TABLET, EXTENDED RELEASE ORAL at 08:56

## 2018-01-01 RX ADMIN — APIXABAN 2.5 MG: 2.5 TABLET, FILM COATED ORAL at 21:20

## 2018-01-01 RX ADMIN — GUAIFENESIN 600 MG: 600 TABLET, EXTENDED RELEASE ORAL at 22:25

## 2018-01-01 RX ADMIN — IPRATROPIUM BROMIDE AND ALBUTEROL SULFATE 3 ML: .5; 3 SOLUTION RESPIRATORY (INHALATION) at 08:25

## 2018-01-01 RX ADMIN — METOCLOPRAMIDE HYDROCHLORIDE 5 MG: 5 TABLET ORAL at 22:25

## 2018-01-01 RX ADMIN — METOPROLOL SUCCINATE 50 MG: 50 TABLET, EXTENDED RELEASE ORAL at 08:56

## 2018-01-01 RX ADMIN — GUAIFENESIN 600 MG: 600 TABLET, EXTENDED RELEASE ORAL at 13:31

## 2018-01-01 RX ADMIN — IPRATROPIUM BROMIDE AND ALBUTEROL SULFATE 3 ML: .5; 3 SOLUTION RESPIRATORY (INHALATION) at 12:47

## 2018-01-01 RX ADMIN — ISOSORBIDE MONONITRATE 30 MG: 30 TABLET, EXTENDED RELEASE ORAL at 09:08

## 2018-01-01 RX ADMIN — ISOSORBIDE MONONITRATE 30 MG: 30 TABLET, EXTENDED RELEASE ORAL at 08:59

## 2018-01-01 RX ADMIN — PANTOPRAZOLE SODIUM 40 MG: 40 TABLET, DELAYED RELEASE ORAL at 09:30

## 2018-01-01 RX ADMIN — BUMETANIDE 0.5 MG: 0.25 INJECTION INTRAMUSCULAR; INTRAVENOUS at 22:08

## 2018-01-01 RX ADMIN — LORAZEPAM 1 MG: 1 TABLET ORAL at 16:49

## 2018-01-01 RX ADMIN — FUROSEMIDE 20 MG: 20 TABLET ORAL at 17:55

## 2018-01-01 RX ADMIN — AMLODIPINE BESYLATE 5 MG: 5 TABLET ORAL at 21:11

## 2018-01-01 RX ADMIN — APIXABAN 5 MG: 5 TABLET, FILM COATED ORAL at 08:56

## 2018-01-01 RX ADMIN — PANTOPRAZOLE SODIUM 40 MG: 40 TABLET, DELAYED RELEASE ORAL at 08:51

## 2018-01-01 RX ADMIN — GUAIFENESIN 600 MG: 600 TABLET, EXTENDED RELEASE ORAL at 22:33

## 2018-01-01 RX ADMIN — PANTOPRAZOLE SODIUM 40 MG: 40 TABLET, DELAYED RELEASE ORAL at 10:29

## 2018-01-01 RX ADMIN — APIXABAN 5 MG: 5 TABLET, FILM COATED ORAL at 10:42

## 2018-01-01 RX ADMIN — RANOLAZINE 500 MG: 500 TABLET, FILM COATED, EXTENDED RELEASE ORAL at 18:14

## 2018-01-01 RX ADMIN — FUROSEMIDE 20 MG: 20 TABLET ORAL at 17:18

## 2018-01-01 RX ADMIN — METOPROLOL SUCCINATE 50 MG: 50 TABLET, EXTENDED RELEASE ORAL at 09:08

## 2018-01-01 RX ADMIN — DIAZEPAM 5 MG: 5 TABLET ORAL at 04:57

## 2018-01-01 RX ADMIN — METOCLOPRAMIDE HYDROCHLORIDE 5 MG: 5 TABLET ORAL at 11:32

## 2018-01-01 RX ADMIN — AMLODIPINE BESYLATE 5 MG: 5 TABLET ORAL at 09:18

## 2018-01-01 RX ADMIN — APIXABAN 5 MG: 5 TABLET, FILM COATED ORAL at 17:49

## 2018-01-01 RX ADMIN — OXYCODONE AND ACETAMINOPHEN 1 TABLET: 5; 325 TABLET ORAL at 10:54

## 2018-01-01 RX ADMIN — IPRATROPIUM BROMIDE AND ALBUTEROL SULFATE 3 ML: .5; 3 SOLUTION RESPIRATORY (INHALATION) at 13:43

## 2018-01-01 RX ADMIN — PANTOPRAZOLE SODIUM 40 MG: 40 TABLET, DELAYED RELEASE ORAL at 09:21

## 2018-01-01 RX ADMIN — PANTOPRAZOLE SODIUM 40 MG: 40 TABLET, DELAYED RELEASE ORAL at 08:58

## 2018-01-01 RX ADMIN — AMLODIPINE BESYLATE 5 MG: 5 TABLET ORAL at 10:42

## 2018-01-01 RX ADMIN — ASPIRIN 81 MG 324 MG: 81 TABLET ORAL at 18:47

## 2018-01-01 RX ADMIN — IPRATROPIUM BROMIDE AND ALBUTEROL SULFATE 3 ML: .5; 3 SOLUTION RESPIRATORY (INHALATION) at 12:26

## 2018-01-01 RX ADMIN — FUROSEMIDE 40 MG: 40 TABLET ORAL at 09:23

## 2018-01-01 RX ADMIN — INFLUENZA VIRUS VACCINE 0.5 ML: 15; 15; 15; 15 SUSPENSION INTRAMUSCULAR at 09:24

## 2018-01-01 RX ADMIN — METOCLOPRAMIDE HYDROCHLORIDE 5 MG: 5 TABLET ORAL at 08:55

## 2018-01-01 RX ADMIN — METOCLOPRAMIDE HYDROCHLORIDE 5 MG: 5 TABLET ORAL at 17:56

## 2018-01-01 RX ADMIN — GUAIFENESIN 600 MG: 600 TABLET, EXTENDED RELEASE ORAL at 09:40

## 2018-01-01 RX ADMIN — METOPROLOL SUCCINATE 50 MG: 50 TABLET, EXTENDED RELEASE ORAL at 09:02

## 2018-01-01 RX ADMIN — AMLODIPINE BESYLATE 5 MG: 5 TABLET ORAL at 08:55

## 2018-01-01 RX ADMIN — HYDROCODONE POLISTIREX AND CHLORPHENIRAMINE POLISTIREX 5 ML: 10; 8 SUSPENSION, EXTENDED RELEASE ORAL at 00:25

## 2018-01-01 RX ADMIN — FUROSEMIDE 20 MG: 20 TABLET ORAL at 09:37

## 2018-01-01 RX ADMIN — RANOLAZINE 500 MG: 500 TABLET, FILM COATED, EXTENDED RELEASE ORAL at 17:18

## 2018-01-01 RX ADMIN — METOPROLOL SUCCINATE 50 MG: 50 TABLET, EXTENDED RELEASE ORAL at 09:21

## 2018-01-01 RX ADMIN — TIOTROPIUM BROMIDE 18 MCG: 18 CAPSULE ORAL; RESPIRATORY (INHALATION) at 08:55

## 2018-01-01 RX ADMIN — TIOTROPIUM BROMIDE 18 MCG: 18 CAPSULE ORAL; RESPIRATORY (INHALATION) at 15:42

## 2018-01-01 RX ADMIN — FUROSEMIDE 20 MG: 20 TABLET ORAL at 10:42

## 2018-01-01 RX ADMIN — AMLODIPINE BESYLATE 5 MG: 5 TABLET ORAL at 14:45

## 2018-01-01 RX ADMIN — METOCLOPRAMIDE HYDROCHLORIDE 5 MG: 5 TABLET ORAL at 12:21

## 2018-01-01 RX ADMIN — IOPAMIDOL 60 ML: 755 INJECTION, SOLUTION INTRAVENOUS at 15:18

## 2018-01-01 RX ADMIN — OXYCODONE AND ACETAMINOPHEN 1 TABLET: 5; 325 TABLET ORAL at 05:49

## 2018-01-01 RX ADMIN — APIXABAN 5 MG: 5 TABLET, FILM COATED ORAL at 23:03

## 2018-01-03 ENCOUNTER — OFFICE VISIT (OUTPATIENT)
Dept: CARDIOLOGY CLINIC | Age: 83
End: 2018-01-03

## 2018-01-03 VITALS
HEART RATE: 66 BPM | SYSTOLIC BLOOD PRESSURE: 128 MMHG | BODY MASS INDEX: 21.62 KG/M2 | OXYGEN SATURATION: 93 % | DIASTOLIC BLOOD PRESSURE: 80 MMHG | HEIGHT: 69 IN | WEIGHT: 146 LBS

## 2018-01-03 DIAGNOSIS — I70.1 ATHEROSCLEROTIC RAS (RENAL ARTERY STENOSIS), BILATERAL (HCC): ICD-10-CM

## 2018-01-03 DIAGNOSIS — I10 ESSENTIAL HYPERTENSION: ICD-10-CM

## 2018-01-03 DIAGNOSIS — I48.20 CHRONIC ATRIAL FIBRILLATION (HCC): ICD-10-CM

## 2018-01-03 DIAGNOSIS — E78.5 DYSLIPIDEMIA: ICD-10-CM

## 2018-01-03 DIAGNOSIS — I25.5 ISCHEMIC CARDIOMYOPATHY: ICD-10-CM

## 2018-01-03 DIAGNOSIS — F41.9 ANXIETY: ICD-10-CM

## 2018-01-03 DIAGNOSIS — Z78.9 STATIN INTOLERANCE: ICD-10-CM

## 2018-01-03 DIAGNOSIS — I25.118 CORONARY ARTERY DISEASE OF NATIVE ARTERY OF NATIVE HEART WITH STABLE ANGINA PECTORIS (HCC): ICD-10-CM

## 2018-01-03 DIAGNOSIS — I49.5 SSS (SICK SINUS SYNDROME) (HCC): Primary | ICD-10-CM

## 2018-01-03 DIAGNOSIS — I50.22 CHRONIC SYSTOLIC CONGESTIVE HEART FAILURE (HCC): ICD-10-CM

## 2018-01-03 DIAGNOSIS — K55.9 MESENTERIC ISCHEMIA (HCC): ICD-10-CM

## 2018-01-03 DIAGNOSIS — Z95.1 S/P CABG X 2: ICD-10-CM

## 2018-01-03 NOTE — MR AVS SNAPSHOT
Visit Information Date & Time Provider Department Dept. Phone Encounter #  
 1/3/2018 10:45 AM Wili Squires  [facility] Specialist at [facility] [phone] [id number] Follow-up Instructions Return in about 6 weeks (around 2/14/2018). Upcoming Health Maintenance Date Due ZOSTER VACCINE AGE 60> 9/10/1990 GLAUCOMA SCREENING Q2Y 11/10/1995 Pneumococcal 65+ Low/Medium Risk (1 of 2 - PCV13) 11/10/1995 MEDICARE YEARLY EXAM 11/10/1995 DTaP/Tdap/Td series (2 - Td) 9/10/2024 Allergies as of 1/3/2018  Review Complete On: 1/3/2018 By: Tc Waldrop LPN Severity Noted Reaction Type Reactions Beta-blockers (Beta-adrenergic Blocking Agts)  08/24/2015    Drowsiness Penicillins  06/13/2014    Swelling Statins-hmg-coa Reductase Inhibitors  08/24/2015    Drowsiness Current Immunizations  Never Reviewed Name Date Tdap 9/10/2014  9:38 PM  
  
 Not reviewed this visit Vitals BP Pulse Height(growth percentile) Weight(growth percentile) SpO2 BMI  
 128/80 66 5' 9\" (1.753 m) 146 lb (66.2 kg) 93% 21.56 kg/m2 Smoking Status Former Smoker Vitals History BMI and BSA Data Body Mass Index Body Surface Area  
 21.56 kg/m 2 1.8 m 2 Preferred Pharmacy Pharmacy Name Phone [place], [address] [phone] Your Updated Medication List  
  
   
This list is accurate as of: 1/3/18 11:01 AM.  Always use your most recent med list. amLODIPine 5 mg tablet Commonly known as:  Letitia Prow Take 5 mg by mouth daily. ELIQUIS 5 mg tablet Generic drug:  apixaban Take 5 mg by mouth two (2) times a day. enalapril 20 mg tablet Commonly known as:  Leonela Mattock Take 20 mg by mouth daily. furosemide 40 mg tablet Commonly known as:  LASIX Take 40 mg by mouth daily. isosorbide mononitrate ER 30 mg tablet Commonly known as:  IMDUR Take 1 Tab by mouth daily. LORazepam 0.5 mg tablet Commonly known as:  ATIVAN TK 1 T PO  BID PRN  
  
 nitroglycerin 0.3 mg SL tablet Commonly known as:  NITROSTAT  
1 Tab by SubLINGual route every five (5) minutes as needed for Chest Pain (up to 3 total 1 every 5 min). pantoprazole 40 mg tablet Commonly known as:  PROTONIX TK 1 T PO D 20 TO 30 MINUTES BEFORE A MEAL  
  
 TOPROL XL 50 mg XL tablet Generic drug:  metoprolol succinate Take 50 mg by mouth daily. Follow-up Instructions Return in about 6 weeks (around 2/14/2018). Introducing [facility] SERVICES! Dear Adria Cunningham: Thank you for requesting a [facility] account. Our records indicate that you already have an active [facility] account. You can access your account anytime at [email or web address] Did you know that you can access your hospital and ER discharge instructions at any time in [facility]? You can also review all of your test results from your hospital stay or ER visit. Additional Information If you have questions, please visit the Frequently Asked Questions section of the [facility] website at [email or web address] Remember, [facility] is NOT to be used for urgent needs. For medical emergencies, dial 911. Now available from your iPhone and Android! Please provide this summary of care documentation to your next provider. Your primary care clinician is listed as Saniya Ballard. If you have any questions after today's visit, please call [phone].

## 2018-01-03 NOTE — LETTER
2/13/2018 Patient:  Jasper Bowser YOB: 1930 Date of Visit: 1/3/2018 Dear MD Ellen Malik [phone] VIA Facsimile: [phone] 
 : Thank you for referring Mr. Selvin Lambert to me for evaluation/treatment. Below are the relevant portions of my assessment and plan of care. Subjective:  
   Selvin Lambert is in the office today for cardiac reevaluation. He is an 79-year-old man that has a history of hypertension, coronary artery disease, dyslipidemia, prior coronary bypass grafting, cardiomyopathy, atrial fibrillation, pacemaker, renal artery stenosis, celiac artery stenosis and congestive heart failure. The patient had prior coronary bypass grafting in 2008. Prior to his coronary bypass surgery, he was experiencing primarily easy fatigability. He has had repeat cardiac catheterizations since the time of his surgery, the last of which was done in 2016. At that time, medical therapy was advised. He had a nuclear stress test on 9/27/2017 that did not show evidence for ischemia and his EF was normal  
 
The patient had recently experienced increasing episodes of chest discomfort. . He has  been complaining of postprandial nausea and pain. He had a recent Barium speech study as well as a UGI/barium swallow with results in chart. A duplex scan of the abdominal vasculature was ordered . 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 Vascular surgery has evaluated and feels intervention not required. He had his PPM interrogated the day prior to this evaluation. Some adjustment was done and was possibly a decrease in activity function and increase in pacing rate. He related that his breathing is not as good in cold weather. He has had some rare chest tightness when he is \"doing things\". Patient Active Problem List  
 Diagnosis Date Noted  Celiac artery stenosis ([facility] 01/10/2018  Osteoarthritis of both knees 08/20/2017  Statin intolerance 05/03/2017  Essential hypertension 02/27/2017  Anxiety 02/27/2017  Chronic systolic congestive heart failure ([facility] 01/25/2017  S/P CABG x 2 08/25/2015  Atherosclerotic NAHED (renal artery stenosis), bilateral ([facility] 08/25/2015  Dyslipidemia  Coronary artery disease of native artery of native heart with stable angina pectoris ([facility]  Cardiomyopathy  Atrial fibrillation  Sick sinus syndrome Current Outpatient Prescriptions Medication Sig Dispense Refill  isosorbide mononitrate ER (IMDUR) 30 mg tablet Take 1 Tab by mouth daily. 30 Tab 6  
 amLODIPine (NORVASC) 5 mg tablet Take 5 mg by mouth daily.  nitroglycerin (NITROSTAT) 0.3 mg SL tablet 1 Tab by SubLINGual route every five (5) minutes as needed for Chest Pain (up to 3 total 1 every 5 min). 1 Bottle 2  
 pantoprazole (PROTONIX) 40 mg tablet TK 1 T PO D 20 TO 30 MINUTES BEFORE A MEAL  2  
 metoprolol succinate (TOPROL XL) 50 mg XL tablet Take 50 mg by mouth daily.  LORazepam (ATIVAN) 0.5 mg tablet TK 1 T PO  BID PRN 25 Tab 1  
 furosemide (LASIX) 40 mg tablet Take 40 mg by mouth daily.  apixaban (ELIQUIS) 5 mg tablet Take 5 mg by mouth two (2) times a day.  enalapril (VASOTEC) 20 mg tablet Take 20 mg by mouth daily. Allergies Allergen Reactions  Beta-Blockers (Beta-Adrenergic Blocking Agts) Drowsiness  Penicillins Swelling  Statins-Hmg-Coa Reductase Inhibitors Drowsiness Past Medical History:  
Diagnosis Date  Atrial fibrillation CHADS score 3  (+CHF, +HTN, +AGE, -DM, -CVA)  CABG   
 2008   LIMA - LAD,   SVG - RCA  Cardiomyopathy EF 30-35% (ECHO 6/14)  Coronary artery disease  Dyslipidemia  Hypertension  Hypothyroid  Pacemaker  Peripheral vascular disease  Renal artery stenosis   
 bilateral stents  Sick sinus syndrome Past Surgical History: Procedure Laterality Date  HX CORONARY ARTERY BYPASS GRAFT    
 2008   LIMA - LAD,   SVG - RCA No family history on file. History Smoking Status  Former Smoker Smokeless Tobacco  
 Never Used Review of Systems, additional: 
Constitutional: negative Eyes: negative Respiratory: negative Cardiovascular: positive for chest pressure/discomfort, fatigue, dyspnea on exertion Gastrointestinal: negative Musculoskeletal:positive for arthralgias Neurological: negative Behvioral/Psych: negative Endocrine: negative ENT: negative Objective:  
 
Visit Vitals  /80  Pulse 66  Ht 5' 9\" (1.753 m)  Wt 146 lb (66.2 kg)  SpO2 93%  BMI 21.56 kg/m2 General:  alert, cooperative, no distress Chest Wall: inspection normal - no chest wall deformities or tenderness, respiratory effort normal  
Lung: clear to auscultation bilaterally Heart:  normal rate and regular rhythm, no murmurs noted Abdomen: soft, non-tender. Bowel sounds normal. No masses,  no organomegaly Extremities: extremities normal, atraumatic, no cyanosis or edema Skin: no rashes Neuro: alert, oriented, normal speech, no focal findings or movement disorder noted Assessment/Plan: ICD-10-CM ICD-9-CM 1. Renal artery stenoses  440.1 2. Chronic atrial fibrillation ([facility] on Eliquis for stroke prevention and BB for rate control. Stable. I48.2 427.31   
3. Other cardiomyopathy ([address] EF by nuclear scanning 09/2015 was 60%, EF was 30-35% by Echo 06/2014 I42.8 4. Chronic systolic congestive heart failure (HCC) I50.22 428.22   
  428.0 5. Coronary artery disease of native artery of native heart with stable angina pectoris ([address] Lexiscan myoview done 9/25/2017 No convincing evidence for ischemia and EF 65%. I25.118 414.01   
  413.9 6. Essential hypertension, controlled in office today, complaining of fatigue. I10 401.9 7.  Sick sinus syndrome I49.5 427.81   
 8. Dyslipidemia E78.5 272.4 9. S/P CABG x 2 , 2008, LIMA-LAD, SVG-RCA . RT 6 weeks. .  Z95.1 V45.81   
10. Statin intolerance Z78.9 995.27   
11. Osteoarthritis of both knees, unspecified osteoarthritis type M17.0 715.96   
12     Nausea and pain, occurs after eating, duplex of abdominal vasculature; greater than 70% celiac artery. Vascular surgery has evaluated. If you have questions, please do not hesitate to call me. I look forward to following Mr. Wesly Zarco along with you. Sincerely, Perla Emanuel MD

## 2018-01-03 NOTE — PROGRESS NOTES
1. Have you been to the ER, urgent care clinic since your last visit? Hospitalized since your last visit? No    2. Have you seen or consulted any other health care providers outside of the [address] [place] [address] since your last visit? Include any pap smears or colon screening.  No

## 2018-01-10 PROBLEM — I77.1 CELIAC ARTERY STENOSIS (HCC): Status: ACTIVE | Noted: 2018-01-10

## 2018-01-10 NOTE — PROGRESS NOTES
Subjective:      Medhat Guillen is in the office today for cardiac reevaluation. He is an 80-year-old man that has a history of hypertension, coronary artery disease, dyslipidemia, prior coronary bypass grafting, cardiomyopathy, atrial fibrillation, pacemaker, renal artery stenosis, celiac artery stenosis and congestive heart failure. The patient had prior coronary bypass grafting in 2008. Prior to his coronary bypass surgery, he was experiencing primarily easy fatigability. He has had repeat cardiac catheterizations since the time of his surgery, the last of which was done in 2016. At that time, medical therapy was advised. He had a nuclear stress test on 9/27/2017 that did not show evidence for ischemia and his EF was normal     The patient had recently experienced increasing episodes of chest discomfort. . He has  been complaining of postprandial nausea and pain. He had a recent Barium speech study as well as a UGI/barium swallow with results in chart. A duplex scan of the abdominal vasculature was ordered . 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 Vascular surgery has evaluated and feels intervention not required. He had his PPM interrogated the day prior to this evaluation. Some adjustment was done and was possibly a decrease in activity function and increase in pacing rate. He related that his breathing is not as good in cold weather. He has had some rare chest tightness when he is \"doing things\".             Patient Active Problem List    Diagnosis Date Noted    Celiac artery stenosis ([facility] 01/10/2018    Osteoarthritis of both knees 08/20/2017    Statin intolerance 05/03/2017    Essential hypertension 02/27/2017    Anxiety 02/27/2017    Chronic systolic congestive heart failure ([address] 01/25/2017    S/P CABG x 2 08/25/2015    Atherosclerotic NAHED (renal artery stenosis), bilateral ([address] 08/25/2015    Dyslipidemia     Coronary artery disease of native artery of native heart with stable angina pectoris (HCC)     Cardiomyopathy     Atrial fibrillation     Sick sinus syndrome      Current Outpatient Prescriptions   Medication Sig Dispense Refill    isosorbide mononitrate ER (IMDUR) 30 mg tablet Take 1 Tab by mouth daily. 30 Tab 6    amLODIPine (NORVASC) 5 mg tablet Take 5 mg by mouth daily.  nitroglycerin (NITROSTAT) 0.3 mg SL tablet 1 Tab by SubLINGual route every five (5) minutes as needed for Chest Pain (up to 3 total 1 every 5 min). 1 Bottle 2    pantoprazole (PROTONIX) 40 mg tablet TK 1 T PO D 20 TO 30 MINUTES BEFORE A MEAL  2    metoprolol succinate (TOPROL XL) 50 mg XL tablet Take 50 mg by mouth daily.  LORazepam (ATIVAN) 0.5 mg tablet TK 1 T PO  BID PRN 25 Tab 1    furosemide (LASIX) 40 mg tablet Take 40 mg by mouth daily.  apixaban (ELIQUIS) 5 mg tablet Take 5 mg by mouth two (2) times a day.  enalapril (VASOTEC) 20 mg tablet Take 20 mg by mouth daily. Allergies   Allergen Reactions    Beta-Blockers (Beta-Adrenergic Blocking Agts) Drowsiness    Penicillins Swelling    Statins-Hmg-Coa Reductase Inhibitors Drowsiness     Past Medical History:   Diagnosis Date    Atrial fibrillation     CHADS score 3  (+CHF, +HTN, +AGE, -DM, -CVA)    CABG     2008   LIMA - LAD,   SVG - RCA    Cardiomyopathy     EF 30-35% (ECHO 6/14)    Coronary artery disease     Dyslipidemia     Hypertension     Hypothyroid     Pacemaker     Peripheral vascular disease     Renal artery stenosis     bilateral stents    Sick sinus syndrome      Past Surgical History:   Procedure Laterality Date    HX CORONARY ARTERY BYPASS GRAFT      2008   LIMA - LAD,   SVG - RCA     No family history on file.   History   Smoking Status    Former Smoker   Smokeless Tobacco    Never Used          Review of Systems, additional:  Constitutional: negative  Eyes: negative  Respiratory: negative  Cardiovascular: positive for chest pressure/discomfort, fatigue, dyspnea on exertion  Gastrointestinal: negative  Musculoskeletal:positive for arthralgias  Neurological: negative  Behvioral/Psych: negative  Endocrine: negative  ENT: negative    Objective:     Visit Vitals    /80    Pulse 66    Ht 5' 9\" (1.753 m)    Wt 146 lb (66.2 kg)    SpO2 93%    BMI 21.56 kg/m2     General:  alert, cooperative, no distress   Chest Wall: inspection normal - no chest wall deformities or tenderness, respiratory effort normal   Lung: clear to auscultation bilaterally   Heart:  normal rate and regular rhythm, no murmurs noted   Abdomen: soft, non-tender. Bowel sounds normal. No masses,  no organomegaly   Extremities: extremities normal, atraumatic, no cyanosis or edema Skin: no rashes   Neuro: alert, oriented, normal speech, no focal findings or movement disorder noted         Assessment/Plan:       ICD-10-CM ICD-9-CM    1. Renal artery stenoses  440.1    2. Chronic atrial fibrillation ([facility] on Eliquis for stroke prevention and BB for rate control. Stable. I48.2 427.31    3. Other cardiomyopathy ([facility] EF by nuclear scanning 09/2015 was 60%, EF was 30-35% by Echo 06/2014 I42.8     4. Chronic systolic congestive heart failure (HCC) I50.22 428.22      428.0    5. Coronary artery disease of native artery of native heart with stable angina pectoris ([facility] Lexiscan myoview done 9/25/2017 No convincing evidence for ischemia and EF 65%. I25.118 414.01      413.9    6. Essential hypertension, controlled in office today, complaining of fatigue. I10 401.9    7. Sick sinus syndrome I49.5 427.81    8. Dyslipidemia E78.5 272.4    9. S/P CABG x 2 , 2008, LIMA-LAD, SVG-RCA . RT 6 weeks. .  Z95.1 V45.81    10. Statin intolerance Z78.9 995.27    11. Osteoarthritis of both knees, unspecified osteoarthritis type M17.0 715.96    12     Nausea and pain, occurs after eating, duplex of abdominal vasculature; greater than 70% celiac artery. Vascular surgery has evaluated.

## 2018-01-19 PROBLEM — R55 SYNCOPE AND COLLAPSE: Status: ACTIVE | Noted: 2018-01-01

## 2018-01-19 NOTE — IP AVS SNAPSHOT
[address] [address] 
[phone] Patient: Sarath Snell MRN: [id number] ECY:[id number] About your hospitalization You were admitted on:  January 19, 2018 You last received care in the:  [address] [place] [address] You were discharged on:  January 21, 2018 Why you were hospitalized Your primary diagnosis was:  Not on File Your diagnoses also included:  Syncope And Collapse Follow-up Information Follow up With Details Comments Contact Info [address] 
[place] [id number] 
[phone] Alvin Otoole MD In 2 days follow up with Dr. Roslyn Momin in 2-3 days, call for an appt [facility] [address] [facility] [place] [address] [id number] [phone] Your Scheduled Appointments Monday February 12, 2018  9:45 AM EST Follow Up with Alvin Otoole MD  
Cardio Specialist at [facility] [address] [place] [address]  
[phone] Discharge Orders None A check wilner indicates which time of day the medication should be taken. My Medications CONTINUE taking these medications Instructions Each Dose to Equal  
 Morning Noon Evening Bedtime  
 amLODIPine 5 mg tablet Commonly known as:  Christiano Lynch Your last dose was: Your next dose is: Take 5 mg by mouth daily. 5 mg ELIQUIS 5 mg tablet Generic drug:  apixaban Your last dose was: Your next dose is: Take 5 mg by mouth two (2) times a day. 5 mg  
    
   
   
   
  
 enalapril 20 mg tablet Commonly known as:  Hilda Forte Your last dose was: Your next dose is: Take 20 mg by mouth daily. 20 mg  
    
   
   
   
  
 furosemide 40 mg tablet Commonly known as:  LASIX Your last dose was: Your next dose is: Take 40 mg by mouth daily. 40 mg  
    
   
   
   
  
 isosorbide mononitrate ER 30 mg tablet Commonly known as:  IMDUR Your last dose was: Your next dose is: Take 1 Tab by mouth daily. 30 mg LORazepam 0.5 mg tablet Commonly known as:  ATIVAN Your last dose was: Your next dose is:    
   
   
 TK 1 T PO  BID PRN  
     
   
   
   
  
 nitroglycerin 0.3 mg SL tablet Commonly known as:  NITROSTAT Your last dose was: Your next dose is:    
   
   
 1 Tab by SubLINGual route every five (5) minutes as needed for Chest Pain (up to 3 total 1 every 5 min). 0.3 mg  
    
   
   
   
  
 pantoprazole 40 mg tablet Commonly known as:  PROTONIX Your last dose was: Your next dose is:    
   
   
 TK 1 T PO D 20 TO 30 MINUTES BEFORE A MEAL  
     
   
   
   
  
 TOPROL XL 50 mg XL tablet Generic drug:  metoprolol succinate Your last dose was: Your next dose is: Take 50 mg by mouth daily. 50 mg Discharge Instructions Chest Pain: Care Instructions Your Care Instructions There are many things that can cause chest pain. Some are not serious and will get better on their own in a few days. But some kinds of chest pain need more testing and treatment. Your doctor may have recommended a follow-up visit in the next 8 to 12 hours. If you are not getting better, you may need more tests or treatment. Even though your doctor has released you, you still need to watch for any problems. The doctor carefully checked you, but sometimes problems can develop later. If you have new symptoms or if your symptoms do not get better, get medical care right away. If you have worse or different chest pain or pressure that lasts more than 5 minutes or you passed out (lost consciousness), call 911 or seek other emergency help right away. A medical visit is only one step in your treatment. Even if you feel better, you still need to do what your doctor recommends, such as going to all suggested follow-up appointments and taking medicines exactly as directed. This will help you recover and help prevent future problems. How can you care for yourself at home? · Rest until you feel better. · Take your medicine exactly as prescribed. Call your doctor if you think you are having a problem with your medicine. · Do not drive after taking a prescription pain medicine. When should you call for help? Call 911 if: 
? · You passed out (lost consciousness). ? · You have severe difficulty breathing. ? · You have symptoms of a heart attack. These may include: ¨ Chest pain or pressure, or a strange feeling in your chest. 
¨ Sweating. ¨ Shortness of breath. ¨ Nausea or vomiting. ¨ Pain, pressure, or a strange feeling in your back, neck, jaw, or upper belly or in one or both shoulders or arms. ¨ Lightheadedness or sudden weakness. ¨ A fast or irregular heartbeat. After you call 911, the  may tell you to chew 1 adult-strength or 2 to 4 low-dose aspirin. Wait for an ambulance. Do not try to drive yourself. ?Call your doctor today if: 
? · You have any trouble breathing. ? · Your chest pain gets worse. ? · You are dizzy or lightheaded, or you feel like you may faint. ? · You are not getting better as expected. ? · You are having new or different chest pain. Where can you learn more? Go to http://mitra-shaw.info/. Enter A120 in the search box to learn more about \"Chest Pain: Care Instructions. \" Current as of: March 20, 2017 Content Version: 11.4 © 2851-9832 TapTalents. Care instructions adapted under license by NorSun (which disclaims liability or warranty for this information).  If you have questions about a medical condition or this instruction, always ask your healthcare professional. [place] [address] any warranty or liability for your use of this information. Fainting: Care Instructions Your Care Instructions When you faint, or pass out, you lose consciousness for a short time. A brief drop in blood flow to the brain often causes it. When you fall or lie down, more blood flows to your brain and you regain consciousness. Emotional stress, pain, or overheating-especially if you have been standing-can make you faint. In these cases, fainting is usually not serious. But fainting can be a sign of a more serious problem. Your doctor may want you to have more tests to rule out other causes. The treatment you need depends on the reason why you fainted. The doctor has checked you carefully, but problems can develop later. If you notice any problems or new symptoms, get medical treatment right away. Follow-up care is a key part of your treatment and safety. Be sure to make and go to all appointments, and call your doctor if you are having problems. It's also a good idea to know your test results and keep a list of the medicines you take. How can you care for yourself at home? · Drink plenty of fluids to prevent dehydration. If you have kidney, heart, or liver disease and have to limit fluids, talk with your doctor before you increase your fluid intake. When should you call for help? Call 911 anytime you think you may need emergency care. For example, call if: 
? · You have symptoms of a heart problem. These may include: ¨ Chest pain or pressure. ¨ Severe trouble breathing. ¨ A fast or irregular heartbeat. ¨ Lightheadedness or sudden weakness. ¨ Coughing up pink, foamy mucus. ¨ Passing out. After you call 911, the  may tell you to chew 1 adult-strength or 2 to 4 low-dose aspirin. Wait for an ambulance. Do not try to drive yourself. ? · You have symptoms of a stroke. These may include: ¨ Sudden numbness, tingling, weakness, or loss of movement in your face, arm, or leg, especially on only one side of your body. ¨ Sudden vision changes. ¨ Sudden trouble speaking. ¨ Sudden confusion or trouble understanding simple statements. ¨ Sudden problems with walking or balance. ¨ A sudden, severe headache that is different from past headaches. ? · You passed out (lost consciousness) again. ? Watch closely for changes in your health, and be sure to contact your doctor if: 
? · You do not get better as expected. Where can you learn more? Go to http://mitraAimWithshaw.info/. Enter K958 in the search box to learn more about \"Fainting: Care Instructions. \" Current as of: March 20, 2017 Content Version: 11.4 © 2909-7837 Oakmonkey. Care instructions adapted under license by Smacktive.[employer] (which disclaims liability or warranty for this information). If you have questions about a medical condition or this instruction, always ask your healthcare professional. [place] [address] any warranty or liability for your use of this information. DISCHARGE SUMMARY from Nurse PATIENT INSTRUCTIONS: 
 
 
F-face looks uneven A-arms unable to move or move unevenly S-speech slurred or non-existent T-time-call 911 as soon as signs and symptoms begin-DO NOT go Back to bed or wait to see if you get better-TIME IS BRAIN. Warning Signs of HEART ATTACK Call 911 if you have these symptoms: 
? Chest discomfort. Most heart attacks involve discomfort in the center of the chest that lasts more than a few minutes, or that goes away and comes back. It can feel like uncomfortable pressure, squeezing, fullness, or pain. ? Discomfort in other areas of the upper body. Symptoms can include pain or discomfort in one or both arms, the back, neck, jaw, or stomach. ? Shortness of breath with or without chest discomfort. ? Other signs may include breaking out in a cold sweat, nausea, or lightheadedness. Don't wait more than five minutes to call [address]! Fast action can save your life. Calling 911 is almost always the fastest way to get lifesaving treatment. Emergency Medical Services staff can begin treatment when they arrive  up to an hour sooner than if someone gets to the hospital by car. The discharge information has been reviewed with the patient. The patient verbalized understanding. Discharge medications reviewed with the patient and appropriate educational materials and side effects teaching were provided. Proxly Activation Thank you for requesting access to Proxly. Please follow the instructions below to securely access and download your online medical record. Proxly allows you to send messages to your doctor, view your test results, renew your prescriptions, schedule appointments, and more. How Do I Sign Up? 1. In your internet browser, go to www."[email or web address] 
2. Click on the First Time User? Click Here link in the Sign In box. You will be redirect to the New Member Sign Up page. 3. Enter your Proxly Access Code exactly as it appears below. You will not need to use this code after youve completed the sign-up process. If you do not sign up before the expiration date, you must request a new code. Proxly Access Code: Activation code not generated Current Proxly Status: Active (This is the date your Proxly access code will ) 4. Enter the last four digits of your Social Security Number (xxxx) and Date of Birth (mm/dd/yyyy) as indicated and click Submit. You will be taken to the next sign-up page. 5. Create a Proxly ID.  This will be your Proxly login ID and cannot be changed, so think of one that is secure and easy to remember. 6. Create a [employer] Systems password. You can change your password at any time. 7. Enter your Password Reset Question and Answer. This can be used at a later time if you forget your password. 8. Enter your e-mail address. You will receive e-mail notification when new information is available in [address] 9. Click Sign Up. You can now view and download portions of your medical record. 10. Click the Download Summary menu link to download a portable copy of your medical information. Additional Information If you have questions, please visit the Frequently Asked Questions section of the I-CAN Systems website at [email or web address] Remember, [employer]CAN Systems is NOT to be used for urgent needs. For medical emergencies, dial 911. Patient armband removed and shredded 
___________________________________________________________________________________________________________________________________ [facility] Announcement We are excited to announce that we are making your provider's discharge notes available to you in [employer]. You will see these notes when they are completed and signed by the physician that discharged you from your recent hospital stay. If you have any questions or concerns about any information you see in [employer], please call the Health Information Department where you were seen or reach out to your Primary Care Provider for more information about your plan of care. Introducing [facility]! Dear Kristi Dial: Thank you for requesting a I[employer] Systems account. Our records indicate that you already have an active [employer] Systems account. You can access your account anytime at [email or web address] Global [email or web address] [email or web address] Did you know that you can access your hospital and ER discharge instructions at any time in [employer] Systems? You can also review all of your test results from your hospital stay or ER visit. Additional Information If you have questions, please visit the Frequently Asked Questions section of the MyChart website at [email or web address] Remember, MyChart is NOT to be used for urgent needs. For medical emergencies, dial 911. Now available from your iPhone and Android! Providers Seen During Your Hospitalization Provider Specialty Primary office phone Shar Jones, DO Emergency Medicine [phone] Sandhya Woods, [address] [place] [address] Internal Medicine [phone] Darian Cummings, DO Internal Medicine [phone] Your Primary Care Physician (PCP) Primary Care Physician Office Phone Office Fax Jose Ronald Segun [address], [facility] [phone] You are allergic to the following Allergen Reactions Beta-Blockers (Beta-Adrenergic Blocking Agts) Drowsiness Penicillins Swelling Statins-Hmg-Coa Reductase Inhibitors Drowsiness Recent Documentation Height Weight BMI Smoking Status 1.753 m 71.1 kg 23.16 kg/m2 Former Smoker Emergency Contacts Name Discharge Info Relation Home Work Mobile [address]arson CAREGIVER [3] Spouse [3] [phone] Patient Belongings The following personal items are in your possession at time of discharge: 
  Dental Appliances: None  Visual Aid: None      Home Medications: None   Jewelry: Bracelet, Necklace, Watch, With patient  Clothing: At bedside, Footwear, Hat, Pants, Shirt, Socks, Sweater, Undergarments    Other Valuables: At bedside, Cell Phone  Personal Items Sent to Safe: none Discharge Instructions Attachments/References CHEST PAIN (ENGLISH) FAINTING (ENGLISH) CHEST PAIN: MUSCULOSKELETAL (ENGLISH) LIGHTHEADEDNESS OR FAINTNESS (ENGLISH) Patient Handouts Chest Pain: Care Instructions Your Care Instructions There are many things that can cause chest pain.  Some are not serious and will get better on their own in a few days. But some kinds of chest pain need more testing and treatment. Your doctor may have recommended a follow-up visit in the next 8 to 12 hours. If you are not getting better, you may need more tests or treatment. Even though your doctor has released you, you still need to watch for any problems. The doctor carefully checked you, but sometimes problems can develop later. If you have new symptoms or if your symptoms do not get better, get medical care right away. If you have worse or different chest pain or pressure that lasts more than 5 minutes or you passed out (lost consciousness), call 911 or seek other emergency help right away. A medical visit is only one step in your treatment. Even if you feel better, you still need to do what your doctor recommends, such as going to all suggested follow-up appointments and taking medicines exactly as directed. This will help you recover and help prevent future problems. How can you care for yourself at home? · Rest until you feel better. · Take your medicine exactly as prescribed. Call your doctor if you think you are having a problem with your medicine. · Do not drive after taking a prescription pain medicine. When should you call for help? Call 911 if: 
? · You passed out (lost consciousness). ? · You have severe difficulty breathing. ? · You have symptoms of a heart attack. These may include: ¨ Chest pain or pressure, or a strange feeling in your chest. 
¨ Sweating. ¨ Shortness of breath. ¨ Nausea or vomiting. ¨ Pain, pressure, or a strange feeling in your back, neck, jaw, or upper belly or in one or both shoulders or arms. ¨ Lightheadedness or sudden weakness. ¨ A fast or irregular heartbeat. After you call 911, the  may tell you to chew 1 adult-strength or 2 to 4 low-dose aspirin. Wait for an ambulance. Do not try to drive yourself. ?Call your doctor today if: 
? · You have any trouble breathing. ? · Your chest pain gets worse. ? · You are dizzy or lightheaded, or you feel like you may faint. ? · You are not getting better as expected. ? · You are having new or different chest pain. Where can you learn more? Go to http://mitra-shaw.info/. Enter A120 in the search box to learn more about \"Chest Pain: Care Instructions. \" Current as of: March 20, 2017 Content Version: 11.4 © 4131-4605 Abiogenix. Care instructions adapted under license by MobileAds (which disclaims liability or warranty for this information). If you have questions about a medical condition or this instruction, always ask your healthcare professional. [address] any warranty or liability for your use of this information. Fainting: Care Instructions Your Care Instructions When you faint, or pass out, you lose consciousness for a short time. A brief drop in blood flow to the brain often causes it. When you fall or lie down, more blood flows to your brain and you regain consciousness. Emotional stress, pain, or overheating-especially if you have been standing-can make you faint. In these cases, fainting is usually not serious. But fainting can be a sign of a more serious problem. Your doctor may want you to have more tests to rule out other causes. The treatment you need depends on the reason why you fainted. The doctor has checked you carefully, but problems can develop later. If you notice any problems or new symptoms, get medical treatment right away. Follow-up care is a key part of your treatment and safety. Be sure to make and go to all appointments, and call your doctor if you are having problems. It's also a good idea to know your test results and keep a list of the medicines you take. How can you care for yourself at home? · Drink plenty of fluids to prevent dehydration.  If you have kidney, heart, or liver disease and have to limit fluids, talk with your doctor before you increase your fluid intake. When should you call for help? Call 911 anytime you think you may need emergency care. For example, call if: 
? · You have symptoms of a heart problem. These may include: ¨ Chest pain or pressure. ¨ Severe trouble breathing. ¨ A fast or irregular heartbeat. ¨ Lightheadedness or sudden weakness. ¨ Coughing up pink, foamy mucus. ¨ Passing out. After you call 911, the  may tell you to chew 1 adult-strength or 2 to 4 low-dose aspirin. Wait for an ambulance. Do not try to drive yourself. ? · You have symptoms of a stroke. These may include: 
¨ Sudden numbness, tingling, weakness, or loss of movement in your face, arm, or leg, especially on only one side of your body. ¨ Sudden vision changes. ¨ Sudden trouble speaking. ¨ Sudden confusion or trouble understanding simple statements. ¨ Sudden problems with walking or balance. ¨ A sudden, severe headache that is different from past headaches. ? · You passed out (lost consciousness) again. ? Watch closely for changes in your health, and be sure to contact your doctor if: 
? · You do not get better as expected. Where can you learn more? Go to http://mitra-shaw.info/. Enter B966 in the search box to learn more about \"Fainting: Care Instructions. \" Current as of: March 20, 2017 Content Version: 11.4 © 8606-3770 maniaTV. Care instructions adapted under license by [employer] Corporation (which disclaims liability or warranty for this information). If you have questions about a medical condition or this instruction, always ask your healthcare professional. [place] [address] any warranty or liability for your use of this information. Musculoskeletal Chest Pain: Care Instructions Your Care Instructions Chest pain is not always a sign that something is wrong with your heart or that you have another serious problem. The doctor thinks your chest pain is caused by strained muscles or ligaments, inflamed chest cartilage, or another problem in your chest, rather than by your heart. You may need more tests to find the cause of your chest pain. Follow-up care is a key part of your treatment and safety. Be sure to make and go to all appointments, and call your doctor if you are having problems. It's also a good idea to know your test results and keep a list of the medicines you take. How can you care for yourself at home? · Take pain medicines exactly as directed. ¨ If the doctor gave you a prescription medicine for pain, take it as prescribed. ¨ If you are not taking a prescription pain medicine, ask your doctor if you can take an over-the-counter medicine. · Rest and protect the sore area. · Stop, change, or take a break from any activity that may be causing your pain or soreness. · Put ice or a cold pack on the sore area for 10 to 20 minutes at a time. Try to do this every 1 to 2 hours for the next 3 days (when you are awake) or until the swelling goes down. Put a thin cloth between the ice and your skin. · After 2 or 3 days, apply a heating pad set on low or a warm cloth to the area that hurts. Some doctors suggest that you go back and forth between hot and cold. · Do not wrap or tape your ribs for support. This may cause you to take smaller breaths, which could increase your risk of lung problems. · Mentholated creams such as Bengay or Icy Hot may soothe sore muscles. Follow the instructions on the package. · Follow your doctor's instructions for exercising. · Gentle stretching and massage may help you get better faster. Stretch slowly to the point just before pain begins, and hold the stretch for at least 15 to 30 seconds. Do this 3 or 4 times a day. Stretch just after you have applied heat. · As your pain gets better, slowly return to your normal activities. Any increased pain may be a sign that you need to rest a while longer. When should you call for help? Call 911 anytime you think you may need emergency care. For example, call if: 
? · You have chest pain or pressure. This may occur with: ¨ Sweating. ¨ Shortness of breath. ¨ Nausea or vomiting. ¨ Pain that spreads from the chest to the neck, jaw, or one or both shoulders or arms. ¨ Dizziness or lightheadedness. ¨ A fast or uneven pulse. After calling 911, chew 1 adult-strength aspirin. Wait for an ambulance. Do not try to drive yourself. ? · You have sudden chest pain and shortness of breath, or you cough up blood. ?Call your doctor now or seek immediate medical care if: 
? · You have any trouble breathing. ? · Your chest pain gets worse. ? · Your chest pain occurs consistently with exercise and is relieved by rest. ? Watch closely for changes in your health, and be sure to contact your doctor if: 
? · Your chest pain does not get better after 1 week. Where can you learn more? Go to http://mitra-shaw.info/. Enter V293 in the search box to learn more about \"Musculoskeletal Chest Pain: Care Instructions. \" Current as of: March 20, 2017 Content Version: 11.4 © 0293-2312 [facility]. Care instructions adapted under license by LegiTime Technologies (which disclaims liability or warranty for this information). If you have questions about a medical condition or this instruction, always ask your healthcare professional. [place] [address] any warranty or liability for your use of this information. Lightheadedness or Faintness: Care Instructions Your Care Instructions Lightheadedness is a feeling that you are about to faint or \"pass out. \" You do not feel as if you or your surroundings are moving.  It is different from vertigo, which is the feeling that you or things around you are spinning or tilting. Lightheadedness usually goes away or gets better when you lie down. If lightheadedness gets worse, it can lead to a fainting spell. It is common to feel lightheaded from time to time. Lightheadedness usually is not caused by a serious problem. It often is caused by a short-lasting drop in blood pressure and blood flow to your head that occurs when you get up too quickly from a seated or lying position. Follow-up care is a key part of your treatment and safety. Be sure to make and go to all appointments, and call your doctor if you are having problems. It's also a good idea to know your test results and keep a list of the medicines you take. How can you care for yourself at home? · Lie down for 1 or 2 minutes when you feel lightheaded. After lying down, sit up slowly and remain sitting for 1 to 2 minutes before slowly standing up. · Avoid movements, positions, or activities that have made you lightheaded in the past. 
· Get plenty of rest, especially if you have a cold or flu, which can cause lightheadedness. · Make sure you drink plenty of fluids, especially if you have a fever or have been sweating. · Do not drive or put yourself and others in danger while you feel lightheaded. When should you call for help? Call 911 anytime you think you may need emergency care. For example, call if: 
? · You have symptoms of a stroke. These may include: 
¨ Sudden numbness, tingling, weakness, or loss of movement in your face, arm, or leg, especially on only one side of your body. ¨ Sudden vision changes. ¨ Sudden trouble speaking. ¨ Sudden confusion or trouble understanding simple statements. ¨ Sudden problems with walking or balance. ¨ A sudden, severe headache that is different from past headaches. ? · You have symptoms of a heart attack. These may include: ¨ Chest pain or pressure, or a strange feeling in the chest. 
 ¨ Sweating. ¨ Shortness of breath. ¨ Nausea or vomiting. ¨ Pain, pressure, or a strange feeling in the back, neck, jaw, or upper belly or in one or both shoulders or arms. ¨ Lightheadedness or sudden weakness. ¨ A fast or irregular heartbeat. After you call 911, the  may tell you to chew 1 adult-strength or 2 to 4 low-dose aspirin. Wait for an ambulance. Do not try to drive yourself. ? Watch closely for changes in your health, and be sure to contact your doctor if: 
? · Your lightheadedness gets worse or does not get better with home care. Where can you learn more? Go to http://mitra-shaw.info/. Enter O608 in the search box to learn more about \"Lightheadedness or Faintness: Care Instructions. \" Current as of: March 20, 2017 Content Version: 11.4 © 3858-1921 [employer]. Care instructions adapted under license by [employer] (which disclaims liability or warranty for this information). If you have questions about a medical condition or this instruction, always ask your healthcare professional. [place] [address] any warranty or liability for your use of this information. Please provide this summary of care documentation to your next provider. Signatures-by signing, you are acknowledging that this After Visit Summary has been reviewed with you and you have received a copy. Patient Signature:  ____________________________________________________________ Date:  ____________________________________________________________  
  
Carolyne Kearney Provider Signature:  ____________________________________________________________ Date:  ____________________________________________________________

## 2018-01-19 NOTE — H&P
History and Physical    Patient: Myla Quiroz               Sex: male          DOA: 1/19/2018       YOB: 1930      Age:  80 y.o. Assessment/Plan   Obs    PreSyncope - TTE (possible AS), Tele, Pacer interrogation. Orthostatics neg in ER. Possible arrhythmia most likely  Stable Angina - difficult to say if this is worse than baseline. Cardiology consulted. Follow serial enzymes, EKG. -Pain likely non-cardiac, has had Abd duplex to r/o mesenteric ischemia. consider ranexa or elavil  CAD and Hx of CABG- NST 9/27/2017-no ischemia-normal EF, Cardiac cath 2016 -no results-   ICD/Pacer - [address] due to  SSS, placed 2008  Cardyomopathy EF30-35%  Afib CHADSVASC=3 - on eliquis  Hx of NAHED    Code status: Full  PPX:  DVT: Eliquis  GI: None indicated    HPI:     Chief Complaint   Patient presents with    Dizziness    Syncope    Chest Pain       87M c/o worsening moderate exertional substernal chest pain with SOB for the past week. Today, pt was walking across the room in his home when he experienced more of the CP and SOB, then had a syncopal event per ER, however patient states he never blacked out. Has defibrillator; cardiologist is Sofia Jaimes. Last nuclear stress test 9/2017. Thinks he was down only for seconds; fell onto the floor in home which was carpeted. Denies ETOH, tobacco, illicits. Took 2 SLN earlier today for CP. Cardiology consulted. Will admit for observation. Admitted 1/6 - 1/16/2017 for slowly improved CAP. CTA chest showed no PE. Completed levaquin/Aztreonam course.   Recently seen by Dr Sofia Jaimes 1/10/18    Past Medical History:   Diagnosis Date    Atrial fibrillation     CHADS score 3  (+CHF, +HTN, +AGE, -DM, -CVA)    CABG     2008   LIMA - LAD,   SVG - RCA    Cardiomyopathy     EF 30-35% (ECHO 6/14)    Coronary artery disease     Dyslipidemia     Hypertension     Hypothyroid     Pacemaker     Peripheral vascular disease     Renal artery stenosis     bilateral stents    Sick sinus syndrome        Prior to Admission Medications   Prescriptions Last Dose Informant Patient Reported? Taking? LORazepam (ATIVAN) 0.5 mg tablet   No No   Sig: TK 1 T PO  BID PRN   amLODIPine (NORVASC) 5 mg tablet   Yes No   Sig: Take 5 mg by mouth daily. apixaban (ELIQUIS) 5 mg tablet   Yes No   Sig: Take 5 mg by mouth two (2) times a day. enalapril (VASOTEC) 20 mg tablet   Yes No   Sig: Take 20 mg by mouth daily. furosemide (LASIX) 40 mg tablet   Yes No   Sig: Take 40 mg by mouth daily. isosorbide mononitrate ER (IMDUR) 30 mg tablet   No No   Sig: Take 1 Tab by mouth daily. metoprolol succinate (TOPROL XL) 50 mg XL tablet   Yes No   Sig: Take 50 mg by mouth daily. nitroglycerin (NITROSTAT) 0.3 mg SL tablet   No No   Si Tab by SubLINGual route every five (5) minutes as needed for Chest Pain (up to 3 total 1 every 5 min). pantoprazole (PROTONIX) 40 mg tablet   Yes No   Sig: TK 1 T PO D 20 TO 30 MINUTES BEFORE A MEAL      Facility-Administered Medications: None       Social History:  Social History     Social History    Marital status:      Spouse name: N/A    Number of children: N/A    Years of education: N/A     Occupational History    Not on file. Social History Main Topics    Smoking status: Former Smoker    Smokeless tobacco: Never Used    Alcohol use No    Drug use: No    Sexual activity: Not on file     Other Topics Concern    Not on file     Social History Narrative       Family History:  History reviewed. No pertinent family history. Surgical History:  Past Surgical History:   Procedure Laterality Date    HX CORONARY ARTERY BYPASS GRAFT      2008   LIMA - LAD,   SVG - RCA       Review of Systems  Constitutional:  No fever or weight loss  HEENT:  No headache or visual changes  Cardiovascular:  No chest pain or diaphoresis  Respiratory:  No coughing, wheezing, or shortness of breath. GI:  No nausea or vomitting.   No diarrhea  :  No hematuria or dysuria  Skin:  No rashes or moles  Neuro:  No seizures   Hematological:  No bruising or bleeding  Endocrine:  No diabetes or thyroid disease    Physical Exam:      Visit Vitals    /80    Pulse 70    Temp 98.6 °F (37 °C)    Resp 18    Ht 5' 9\" (1.753 m)    Wt 63.5 kg (140 lb)    SpO2 99%    BMI 20.67 kg/m2       Physical Exam:  Gen:  No distress, alert, thin  HEENT:  Normal cephalic atraumatic, extra-occular movements are intact. Neck:  Supple, No JVD  Lungs:  Clear bilaterally, no wheeze, no rales, normal effort  Heart:  Regular Rate and Rhythm, normal S1 and S2, no edema. Pacer L chest wall  Abdomen:  Soft, non tender, normal bowel sounds, no guarding. Extremities:  Well perfused, no cyanosis or edema  Neurological:  Awake and alert, CN's are intact, normal strength throughout extremities  Skin:  No rashes or moles  Psych:  Normal thought process, does not appear anxious    Laboratory Studies: All lab results for the last 24 hours reviewed.   Recent Results (from the past 12 hour(s))   EKG, 12 LEAD, INITIAL    Collection Time: 01/19/18  4:20 PM   Result Value Ref Range    Ventricular Rate 70 BPM    Atrial Rate 344 BPM    QRS Duration 186 ms    Q-T Interval 466 ms    QTC Calculation (Bezet) 503 ms    Calculated R Axis -86 degrees    Calculated T Axis 93 degrees    Diagnosis       Electronic ventricular pacemaker  When compared with ECG of 25-SEP-2017 09:39,  Electronic ventricular pacemaker has replaced Wide QRS rhythm  Confirmed by Ming Bennett ([id number]) on 1/19/2018 6:25:36 PM     CBC WITH AUTOMATED DIFF    Collection Time: 01/19/18  4:30 PM   Result Value Ref Range    WBC 5.5 4.6 - 13.2 K/uL    RBC 4.68 (L) 4.70 - 5.50 M/uL    HGB 12.5 (L) 13.0 - 16.0 g/dL    HCT 40.5 36.0 - 48.0 %    MCV 86.5 74.0 - 97.0 FL    MCH 26.7 24.0 - 34.0 PG    MCHC 30.9 (L) 31.0 - 37.0 g/dL    RDW 17.6 (H) 11.6 - 14.5 %    PLATELET 321 034 - 836 K/uL    MPV 9.7 9.2 - 11.8 FL    NEUTROPHILS 75 (H) 40 - 73 %    LYMPHOCYTES 13 (L) 21 - 52 %    MONOCYTES 11 (H) 3 - 10 %    EOSINOPHILS 1 0 - 5 %    BASOPHILS 0 0 - 2 %    ABS. NEUTROPHILS 4.0 1.8 - 8.0 K/UL    ABS. LYMPHOCYTES 0.7 (L) 0.9 - 3.6 K/UL    ABS. MONOCYTES 0.6 0.05 - 1.2 K/UL    ABS. EOSINOPHILS 0.1 0.0 - 0.4 K/UL    ABS.  BASOPHILS 0.0 0.0 - 0.06 K/UL    DF AUTOMATED     METABOLIC PANEL, BASIC    Collection Time: 01/19/18  4:30 PM   Result Value Ref Range    Sodium 138 136 - 145 mmol/L    Potassium 4.3 3.5 - 5.5 mmol/L    Chloride 103 100 - 108 mmol/L    CO2 27 21 - 32 mmol/L    Anion gap 8 3.0 - 18 mmol/L    Glucose 85 74 - 99 mg/dL    BUN 33 (H) 7.0 - 18 MG/DL    Creatinine 1.47 (H) 0.6 - 1.3 MG/DL    BUN/Creatinine ratio 22 (H) 12 - 20      GFR est AA 55 (L) >60 ml/min/1.73m2    GFR est non-AA 45 (L) >60 ml/min/1.73m2    Calcium 8.8 8.5 - 10.1 MG/DL   CARDIAC PANEL,(CK, CKMB & TROPONIN)    Collection Time: 01/19/18  4:30 PM   Result Value Ref Range    CK 99 39 - 308 U/L    CK - MB 5.4 (H) <3.6 ng/ml    CK-MB Index 5.5 (H) 0.0 - 4.0 %    Troponin-I, Qt. 0.02 0.0 - 0.045 NG/ML       Rad:  CT head 1/19

## 2018-01-19 NOTE — ED TRIAGE NOTES
Pt arrives via EMS, states he has been having chest pain all day today. Reports taking nitro x2 with no improvement. Pt mentions \"doing stuff around the house and all of a sudden I was on the floor\".

## 2018-01-19 NOTE — ED PROVIDER NOTES
HPI Comments: Pt presents with c/o worsening moderate exertional substernal chest pain with SOB for the past week. Today, pt was walking across the room in his home when he experienced more of the CP and SOB, then had a syncopal event. Has defibrillator; cardiologist is Jose Mares. Last nuclear stress test 9/2017. Thinks he was down only a short period of time; fell onto the floor in home which was carpeted. Denies ETOH, tobacco, illicits. Took two SLN earlier today for CP. Patient is a 80 y.o. male presenting with dizziness, syncope, and chest pain. The history is provided by the patient. Dizziness   Associated symptoms include shortness of breath and chest pain. Pertinent negatives include no vomiting, no headaches and no nausea. Syncope    Associated symptoms include chest pain and dizziness. Pertinent negatives include no palpitations, no fever, no abdominal pain, no nausea, no vomiting and no headaches. His past medical history is significant for syncope. Chest Pain (Angina)    Associated symptoms include dizziness, shortness of breath and syncope. Pertinent negatives include no abdominal pain, no cough, no fever, no headaches, no nausea, no palpitations and no vomiting. Past Medical History:   Diagnosis Date    Atrial fibrillation     CHADS score 3  (+CHF, +HTN, +AGE, -DM, -CVA)    CABG     2008   LIMA - LAD,   SVG - RCA    Cardiomyopathy     EF 30-35% (ECHO 6/14)    Coronary artery disease     Dyslipidemia     Hypertension     Hypothyroid     Pacemaker     Peripheral vascular disease     Renal artery stenosis     bilateral stents    Sick sinus syndrome        Past Surgical History:   Procedure Laterality Date    HX CORONARY ARTERY BYPASS GRAFT      2008   LIMA - LAD,   SVG - RCA         History reviewed. No pertinent family history.     Social History     Social History    Marital status:      Spouse name: N/A    Number of children: N/A    Years of education: N/A Occupational History    Not on file. Social History Main Topics    Smoking status: Former Smoker    Smokeless tobacco: Never Used    Alcohol use No    Drug use: No    Sexual activity: Not on file     Other Topics Concern    Not on file     Social History Narrative         ALLERGIES: Beta-blockers (beta-adrenergic blocking agts); Penicillins; and Statins-hmg-coa reductase inhibitors    Review of Systems   Constitutional: Negative. Negative for fever. Eyes: Negative. Respiratory: Positive for chest tightness and shortness of breath. Negative for cough, wheezing and stridor. Cardiovascular: Positive for chest pain and syncope. Negative for palpitations and leg swelling. Gastrointestinal: Negative. Negative for abdominal pain, nausea and vomiting. Endocrine: Negative. Genitourinary: Negative. Musculoskeletal: Negative. Skin: Negative. Allergic/Immunologic: Negative. Neurological: Positive for dizziness and syncope. Negative for headaches. Hematological: Negative. Psychiatric/Behavioral: Negative. All other systems reviewed and are negative. Vitals:    01/19/18 1626 01/19/18 1630 01/19/18 1630 01/19/18 1640   BP:   167/83 159/80   Pulse:    70   Resp: 16   18   Temp:  98.6 °F (37 °C)     SpO2: 100%   99%   Weight:       Height:                Physical Exam   Constitutional: He is oriented to person, place, and time. Vital signs are normal. He appears well-developed and well-nourished. He is active. Non-toxic appearance. He does not appear ill. No distress. HENT:   Head: Normocephalic and atraumatic. Eyes: EOM are normal.   Neck: Normal range of motion. Neck supple. Carotid bruit is not present. No tracheal deviation present. No thyromegaly present. Cardiovascular: Normal rate, regular rhythm, normal heart sounds and intact distal pulses. Exam reveals no gallop and no friction rub. No murmur heard.   Pulmonary/Chest: Effort normal and breath sounds normal. No stridor. No respiratory distress. He has no wheezes. He has no rales. He exhibits no tenderness. Abdominal: Soft. He exhibits no distension and no mass. There is no tenderness. There is no rebound, no guarding and no CVA tenderness. Musculoskeletal: Normal range of motion. Neurological: He is alert and oriented to person, place, and time. No cranial nerve deficit. Coordination normal.   Negative Romberg. Skin: Skin is warm, dry and intact. He is not diaphoretic. No pallor. Psychiatric: He has a normal mood and affect. His speech is normal and behavior is normal. Judgment and thought content normal.   Nursing note and vitals reviewed. MDM  Number of Diagnoses or Management Options  Chest pain on exertion:   Syncope and collapse:   Diagnosis management comments: Differential: arrhythmia; unstable angina; NSTEMI; syncope; orthostatic hypotention; vasovagal syncope; PE; cardiomyopathy    Spoke with Anival Martell for cardiology; agrees w/plan to have pt admitted for futher eval.  Will consult and see pt in the morning. Head CT pending. 6:04 PM  6:04 PM : Pt care transferred to [facility] [address]  ,ED provider. History of patient complaint(s), available diagnostic reports and current treatment plan has been discussed thoroughly. Bedside rounding on patient occured :yes . Intended disposition of patient :admit  Pending diagnostics reports and/or labs (please list): head CT; consult hospitalist team    I was the primary provider for this patient's care today in the ED. Pt knows he will be admitted to a telemetry floor pending head CT results. ED attending agrees with treatment plan. Head CT shows nothing acute. Spoke with hospitalist Ester Martel and will admit to his service.        Amount and/or Complexity of Data Reviewed  Clinical lab tests: ordered and reviewed  Tests in the radiology section of CPT®: ordered and reviewed      ED Course       Procedures      Recent Results (from the past 12 hour(s)) EKG, 12 LEAD, INITIAL    Collection Time: 01/19/18  4:20 PM   Result Value Ref Range    Ventricular Rate 70 BPM    Atrial Rate 344 BPM    QRS Duration 186 ms    Q-T Interval 466 ms    QTC Calculation (Bezet) 503 ms    Calculated R Axis -86 degrees    Calculated T Axis 93 degrees    Diagnosis       Electronic ventricular pacemaker  When compared with ECG of 25-SEP-2017 09:39,  Electronic ventricular pacemaker has replaced Wide QRS rhythm  Confirmed by Adina Narvaez ([id number]) on 1/19/2018 6:25:36 PM     CBC WITH AUTOMATED DIFF    Collection Time: 01/19/18  4:30 PM   Result Value Ref Range    WBC 5.5 4.6 - 13.2 K/uL    RBC 4.68 (L) 4.70 - 5.50 M/uL    HGB 12.5 (L) 13.0 - 16.0 g/dL    HCT 40.5 36.0 - 48.0 %    MCV 86.5 74.0 - 97.0 FL    MCH 26.7 24.0 - 34.0 PG    MCHC 30.9 (L) 31.0 - 37.0 g/dL    RDW 17.6 (H) 11.6 - 14.5 %    PLATELET 730 816 - 447 K/uL    MPV 9.7 9.2 - 11.8 FL    NEUTROPHILS 75 (H) 40 - 73 %    LYMPHOCYTES 13 (L) 21 - 52 %    MONOCYTES 11 (H) 3 - 10 %    EOSINOPHILS 1 0 - 5 %    BASOPHILS 0 0 - 2 %    ABS. NEUTROPHILS 4.0 1.8 - 8.0 K/UL    ABS. LYMPHOCYTES 0.7 (L) 0.9 - 3.6 K/UL    ABS. MONOCYTES 0.6 0.05 - 1.2 K/UL    ABS. EOSINOPHILS 0.1 0.0 - 0.4 K/UL    ABS.  BASOPHILS 0.0 0.0 - 0.06 K/UL    DF AUTOMATED     METABOLIC PANEL, BASIC    Collection Time: 01/19/18  4:30 PM   Result Value Ref Range    Sodium 138 136 - 145 mmol/L    Potassium 4.3 3.5 - 5.5 mmol/L    Chloride 103 100 - 108 mmol/L    CO2 27 21 - 32 mmol/L    Anion gap 8 3.0 - 18 mmol/L    Glucose 85 74 - 99 mg/dL    BUN 33 (H) 7.0 - 18 MG/DL    Creatinine 1.47 (H) 0.6 - 1.3 MG/DL    BUN/Creatinine ratio 22 (H) 12 - 20      GFR est AA 55 (L) >60 ml/min/1.73m2    GFR est non-AA 45 (L) >60 ml/min/1.73m2    Calcium 8.8 8.5 - 10.1 MG/DL   CARDIAC PANEL,(CK, CKMB & TROPONIN)    Collection Time: 01/19/18  4:30 PM   Result Value Ref Range    CK 99 39 - 308 U/L    CK - MB 5.4 (H) <3.6 ng/ml    CK-MB Index 5.5 (H) 0.0 - 4.0 %    Troponin-I, Qt. 0.02 0.0 - 0.045 NG/ML     6:05 PM  Diagnosis:   1. Chest pain on exertion    2. Syncope and collapse          Disposition: admit pending head CT    Follow-up Information     None          Patient's Medications   Start Taking    No medications on file   Continue Taking    AMLODIPINE (NORVASC) 5 MG TABLET    Take 5 mg by mouth daily. APIXABAN (ELIQUIS) 5 MG TABLET    Take 5 mg by mouth two (2) times a day. ENALAPRIL (VASOTEC) 20 MG TABLET    Take 20 mg by mouth daily. FUROSEMIDE (LASIX) 40 MG TABLET    Take 40 mg by mouth daily. ISOSORBIDE MONONITRATE ER (IMDUR) 30 MG TABLET    Take 1 Tab by mouth daily. LORAZEPAM (ATIVAN) 0.5 MG TABLET    TK 1 T PO  BID PRN    METOPROLOL SUCCINATE (TOPROL XL) 50 MG XL TABLET    Take 50 mg by mouth daily. NITROGLYCERIN (NITROSTAT) 0.3 MG SL TABLET    1 Tab by SubLINGual route every five (5) minutes as needed for Chest Pain (up to 3 total 1 every 5 min).     PANTOPRAZOLE (PROTONIX) 40 MG TABLET    TK 1 T PO D 20 TO 30 MINUTES BEFORE A MEAL   These Medications have changed    No medications on file   Stop Taking    No medications on file

## 2018-01-19 NOTE — IP AVS SNAPSHOT
[address] [place] [address] 
[phone] Patient: Awa Ahuja MRN: [id number] [id number] A check wilner indicates which time of day the medication should be taken. My Medications CONTINUE taking these medications Instructions Each Dose to Equal  
 Morning Noon Evening Bedtime  
 amLODIPine 5 mg tablet Commonly known as:  Jillyn Boast Your last dose was: Your next dose is: Take 5 mg by mouth daily. 5 mg ELIQUIS 5 mg tablet Generic drug:  apixaban Your last dose was: Your next dose is: Take 5 mg by mouth two (2) times a day. 5 mg  
    
   
   
   
  
 enalapril 20 mg tablet Commonly known as:  Tressia Flavors Your last dose was: Your next dose is: Take 20 mg by mouth daily. 20 mg  
    
   
   
   
  
 furosemide 40 mg tablet Commonly known as:  LASIX Your last dose was: Your next dose is: Take 40 mg by mouth daily. 40 mg  
    
   
   
   
  
 isosorbide mononitrate ER 30 mg tablet Commonly known as:  IMDUR Your last dose was: Your next dose is: Take 1 Tab by mouth daily. 30 mg LORazepam 0.5 mg tablet Commonly known as:  ATIVAN Your last dose was: Your next dose is:    
   
   
 TK 1 T PO  BID PRN  
     
   
   
   
  
 nitroglycerin 0.3 mg SL tablet Commonly known as:  NITROSTAT Your last dose was: Your next dose is:    
   
   
 1 Tab by SubLINGual route every five (5) minutes as needed for Chest Pain (up to 3 total 1 every 5 min). 0.3 mg  
    
   
   
   
  
 pantoprazole 40 mg tablet Commonly known as:  PROTONIX Your last dose was: Your next dose is:    
   
   
 TK 1 T PO D 20 TO 30 MINUTES BEFORE A MEAL  
     
   
   
   
  
 TOPROL XL 50 mg XL tablet Generic drug:  metoprolol succinate Your last dose was: Your next dose is: Take 50 mg by mouth daily.   
 50 mg

## 2018-01-20 NOTE — ROUTINE PROCESS
2045: Received patient from ER via wheelchair. A&Ox4. No sob on RA. Denies any chest pain , nausea or vomiting. Oriented patient to room & surroundings. Call light within reach. Family at bedside. 2100: Late dinner provided to the patient per patient request.     2217: IVF on NS at 50 ml/hr started per order. 2303: Due med given. 0107: EKG, 12 lead, Subsequent done per order. 0119: No change from previous assessment. 0330: Sleeping. 8096: No change from previous assessment. 0630: Slept good thru night. Needs attended. 0720: Bedside and Verbal shift change report given to Carolin Guadalupe RN (oncoming nurse) by me (offgoing nurse). Report included the following information SBAR, Kardex, Intake/Output, MAR and Recent Results.

## 2018-01-20 NOTE — PROGRESS NOTES
[employer]   Discharge Planning/ Assessment    Reasons for Intervention: Initial discharge planning interview. Face sheet data reviewed with pt. All information confirmed as correct. Pt with Medicare A&B primary and [facility] secondary. PCP is Dr. Zhanna Lance. Pt lives with his wife. He states PTA he was independent with ambulation and adl's. He states he uses a cane at times when outdoors. DME at home includes: cane and walker. Anticipated discharge plan is home.      High Risk Criteria  [] Yes  [x]No   Physician Referral  [] Yes  []No        Date    Nursing Referral  [] Yes  []No        Date    Patient/Family Request  [] Yes  []No        Date       Resources:    Medicare  [x] Yes  []No   Medicaid  [] Yes  []No   No Resources  [] Yes  []No   Private Insurance  [x] Yes  []No    Name/Phone Number    Other  [] Yes  []No        (i.e. Workman's Comp)         Prior Services:    Prior Services  [] Yes  []No   Home Health  [] Yes  []No   [address] [place]  [] Yes  []No        Number of [address]  [] Yes  []No       Meals on Wheels  [] Yes  []No   Office on Aging  [] Yes  []No   Transportation Services  [] Yes  []No   Nursing Home  [] Yes  []No        Nursing Home Name    [address]  [] Yes  []No        [address] Name    Other       Information Source:      Information obtained from  [x] Patient  [] Parent   [] [address]   [] Child  [] Spouse   [] Significant Other/Partner   [] Friend      [] EMS    [] Nursing Home Chart          [] Other:   Chart Review  [x] Yes  []No     Family/Support System:    Patient lives with  [] Alone    [x] Spouse   [] Significant Other  [] Children  [] Caretaker   [] Parent  [] Sibling     [] Other       Other Support System:    Is the patient responsible for care of others  [] Yes  [x]No   Information of person caring for patient on  discharge    Managers financial affairs independently  [x] Yes  []No   If no, explain:      Status Prior to Admission:    Mental Status  [x] Awake  [x] Alert  [x] Oriented  [] Quiet/Calm [] Lethargic/Sedated   [] Disoriented  [] Restless/Anxious  [] Combative   Personal Care  [] Dependent  [x] Independent Personal Care  [] Requires Assistance   Meal Preparation Ability  [x] Independent   [] Standby Assistance   [] Minimal Assistance   [] Moderate Assistance  [] Maximum Assistance     [] Total Assistance   Chores  [x] Independent with Chores   [] N/A Nursing Home Resident   [] Requires Assistance   Bowel/Bladder  [] Continent  [] Catheter  [] Incontinent  [] Ostomy Self-Care    [] Urine Diversion Self-Care  [] Maximum Assistance     [] Total Assistance   Number of Persons needed for assistance    DME at home  [] Kenya Benavides Click  [x] Stacia Benavides   [] Commode    [] Bathroom/Grab Bars  [] Hospital Bed  [] Nebulizer  [] Oxygen           [] Raised Toilet Seat  [] Shower Chair  [] Side Rails for Bed   [] Tub Transfer Bench   [x] Reid Ervin  [] Michelle Aas, Standard      [] Other:   Vendor      Treatment Presently Receiving:    Current Treatments  [] Chemotherapy  [] Dialysis  [] Insulin  [] IVAB [] IVF   [] O2  [] PCA   [] PT   [] RT   [] Tube Feedings   [] Wound Care     Psychosocial Evaluation:    Verbalized Knowledge of Disease Process  [] Patient  []Family   Coping with Disease Process  [] Patient  []Family   Requires Further Counseling Coping with Disease Process  [] Patient  []Family     Identified Projected Needs:    Home Health Aid  [] Yes  []No   Transportation  [] Yes  []No   Education  [] Yes  []No        Specific Education     Financial Counseling  [] Yes  []No   Inability to Care for Self/Will Require 24 hour care  [] Yes  []No   Pain Management  [] Yes  []No   Home Infusion Therapy  [] Yes  []No   Oxygen Therapy  [] Yes  []No   DME  [] Yes  []No   Long Term Care Placement  [] Yes  []No   Rehab  [] Yes  []No   Physical Therapy  [] Yes  []No   Needs Anticipated At This Time  [] Yes  [x]No Intra-Hospital Referral:    Home Health Liasion  [] Yes  []No     [] Yes  []No   Patient Representative  [] Yes  []No   Staff for Teaching Needs  [] Yes  []No   Specialty Teaching Needs     Diabetic Educator  [] Yes  []No   Referral for Diabetic Educator Needed  [] Yes  []No  If Yes, place order for Nutritionist or Diabetic Consult     Tentative Discharge Plan:    Home with No Services  [x] Yes  []No   Home with [address]  [] Yes  []No        If Yes, specify type    Home Care Program  [] Yes  []No        If Yes, specify type    Meals on Wheels  [] Yes  []No   Office of Aging  [] Yes  []No   NHP  [] Yes  []No   Return to the Nursing Home  [] Yes  []No   Rehab Therapy  [] Yes  []No   Acute Rehab  [] Yes  []No   Subacute Rehab  [] Yes  []No   Private Care  [] Yes  []No   Substance Abuse Referral  [] Yes  []No   Transportation  [] Yes  []No   Chore Service  [] Yes  []No   Inpatient Hospice  [] Yes  []No   OP RT  [] Yes  [] No   OP Hemo  [] Yes  [] No   OP PT  [] Yes  []No   Support Group  [] Yes  []No   Reach to Recovery  [] Yes  []No   OP Oncology Clinic  [] Yes  []No   Clinic Appointment  [] Yes  []No   DME  [] Yes  []No   Comments    Name of D/C Planner or  Given to Patient or [address] [place] [address], KATELIN Wolf [address]  Ph: [phone]  Pager: [phone]   Phone Number         Extension    Date 1/20/18   Time    If you are discharged home, whom do you designate to participate in your discharge plan and receive any information needed?      Enter name of designee Elizabeth Gong- wife    Phone number: [phone] (cell)          Phone # of designee         Address of designee         Updated         Patient refused to designate any           individual

## 2018-01-20 NOTE — PROGRESS NOTES
Problem: Falls - Risk of  Goal: *Absence of Falls  Document Agus Fall Risk and appropriate interventions in the flowsheet.    Outcome: Progressing Towards Goal  Fall Risk Interventions:            Medication Interventions: Patient to call before getting OOB, Teach patient to arise slowly         History of Falls Interventions: Door open when patient unattended, Room close to nurse's station

## 2018-01-20 NOTE — PROGRESS NOTES
Care Management Interventions  PCP Verified by CM: Yes  Mode of Transport at Discharge:  Other (see comment)  Transition of Care Consult (CM Consult): [address]: No  Reason Outside [place]: Patient declined ordered home care/hospice services  Discharge Durable Medical Equipment: No  Physical Therapy Consult: No  Occupational Therapy Consult: No  Speech Therapy Consult: No  Current Support Network: Lives with Spouse  Plan discussed with Pt/Family/Caregiver: Yes  Discharge Location  Discharge Placement: Home with home health

## 2018-01-20 NOTE — PROGRESS NOTES
Patient has designated __wife__ to participate in his/her discharge plan and to receive any needed information.      Name: Kirti Landing  Address:  Phone number: [phone] (cell)

## 2018-01-20 NOTE — PROGRESS NOTES
Progress Note      Patient: Chante Najera               Sex: male          DOA: 1/19/2018       YOB: 1930      Age:  80 y.o.        LOS:  LOS: 0 days             CHIEF COMPLAINT:    Subjective:     No significant events noted as he was sleeping at time of my evaluation. Objective:      Visit Vitals    /71 (BP 1 Location: Right arm)    Pulse 68    Temp 97.9 °F (36.6 °C)    Resp 18    Ht 5' 9\" (1.753 m)    Wt 140 lb (63.5 kg)    SpO2 95%    BMI 20.67 kg/m2       Physical Exam:  GEN: Sleeping in no distress  CVS: Normal S1S2, RRR  RESP: CTAB  ABD: Soft, non distended, +BS  EXT: No edema noted      Lab/Data Reviewed:  CMP:   Lab Results   Component Value Date/Time     01/20/2018 02:30 AM    K 3.9 01/20/2018 02:30 AM     01/20/2018 02:30 AM    CO2 25 01/20/2018 02:30 AM    AGAP 8 01/20/2018 02:30 AM    GLU 78 01/20/2018 02:30 AM    BUN 30 (H) 01/20/2018 02:30 AM    CREA 1.34 (H) 01/20/2018 02:30 AM    GFRAA >60 01/20/2018 02:30 AM    GFRNA 50 (L) 01/20/2018 02:30 AM    CA 8.3 (L) 01/20/2018 02:30 AM     CBC:   Lab Results   Component Value Date/Time    WBC 4.9 01/20/2018 02:30 AM    HGB 12.2 (L) 01/20/2018 02:30 AM    HCT 39.5 01/20/2018 02:30 AM     01/20/2018 02:30 AM           Assessment/Plan     Active Problems:    Syncope and collapse (1/19/2018)        Plan:  PreSyncope -  CT Head negative and Orthostatics negative in ER. Troponins negative. Continue Tele monitoring. Consulted Cardiology and I discussed with cardiologist Dr. Mike Spencer. Stable Angina -  CE negative and Cardiology consulted. CAD and Hx of CABG- NST 9/27/2017-no ischemia-normal EF, Cardiac cath 2016 -no results. Continue Metoprolol, Imdur, and Enalapril. ICD/Pacer - [employer] due to  [employer], placed 2008 - may need interrogation.  Consulted Cradiology  Cardyomopathy EF30-35%  Afib CHADSVASC=3 - on eliquis  Hx of NAHED  DVT: Dudley Franklin DO, MPH  Internal Medicine

## 2018-01-20 NOTE — PROGRESS NOTES
Nutrition initial assessment/Plan of care      RECOMMENDATIONS:     1. Cardiac diet  2. Monitor weight, labs and PO intake  3. RD to follow     GOALS:     1. PO intake meets >75% of protein/calorie needs by 1/27  2. Weight Maintenance (+/- 1-2 lb by 1/27)      ASSESSMENT:     Weight status is classified as normal per BMI of 20.7. However, patient is at nutrition risk due to BMI below 23 with patient above 72years of age. PO intake is adequate. Labs noted. Nutrition recommendations listed. RD to follow. Nutrition Diagnoses:   Underweight related to inadequate energy intake as evidenced by 87% IBW. Nutrition Risk:  [] High  [x] Moderate []  Low    SUBJECTIVE/OBJECTIVE:      In observation status. Patient admitted with chest pain. Patient with a good appetite and eating all of meals. 100% intake of breakfast meal this morning. UBW: 140 lb. No food allergies or feeding problems. Will monitor. Information Obtained from:    [x] Chart Review   [x] Patient   [] Family/Caregiver   [] Nurse/Physician   [] Interdisciplinary Meeting/Rounds    Diet: Cardiac diet  Medications: [x] Reviewed    Allergies: [x] Reviewed   Encounter Diagnoses     ICD-10-CM ICD-9-CM   1. Chest pain on exertion R07.9 786.50   2.  Syncope and collapse R55 780.2     Past Medical History:   Diagnosis Date    Atrial fibrillation     CHADS score 3  (+CHF, +HTN, +AGE, -DM, -CVA)    CABG     2008   LIMA - LAD,   SVG - RCA    Cardiomyopathy     EF 30-35% (ECHO 6/14)    Coronary artery disease     Dyslipidemia     Hypertension     Hypothyroid     Pacemaker     Peripheral vascular disease     Renal artery stenosis     bilateral stents    Sick sinus syndrome       Labs:  Lab Results   Component Value Date/Time    Sodium 137 01/20/2018 02:30 AM    Potassium 3.9 01/20/2018 02:30 AM    Chloride 104 01/20/2018 02:30 AM    CO2 25 01/20/2018 02:30 AM    Anion gap 8 01/20/2018 02:30 AM    Glucose 78 01/20/2018 02:30 AM    BUN 30 01/20/2018 02:30 AM    Creatinine 1.34 01/20/2018 02:30 AM    Calcium 8.3 01/20/2018 02:30 AM    Magnesium 2.4 07/08/2017 10:16 PM    Phosphorus 3.4 03/01/2011 03:35 AM    Albumin 4.0 09/20/2017 11:20 AM     Anthropometrics: BMI (calculated): 20.7  Last 3 Recorded Weights in this Encounter    01/19/18 1624   Weight: 63.5 kg (140 lb)    Ht Readings from Last 1 Encounters:   01/19/18 5' 9\" (1.753 m)     Patient Vitals for the past 100 hrs:   % Diet Eaten   01/20/18 0909 100 %     IBW: 160 lb %IBW: 87% UBW: 140 lb %UBW: 100%   [] Weight Loss [] Weight Gain [x] Weight Stable    Estimated Nutrition Needs: [x] MSJ    Calories: 9605-3364 Kcal Based on:   [x] Actual BW    Protein:   75-90 g Based on:   [x] Actual BW    Fluid:       0463-4797 ml Based on:   [x] Actual BW      [x] No Cultural, Gnosticism or ethnic dietary need identified.     [] Cultural, Gnosticism and ethnic food preferences identified and addressed     Wt Status:  [x] Normal (18.6 - 24.9) [] Underweight (<18.5) [] Overweight (25 - 29.9) [] Mild Obesity (30 - 34.9)  [] Moderate Obesity (35 - 39.9) [] Morbid Obesity (40+)     Nutrition Problems Identified:   [] Suboptimal PO intake   [] Food Allergies  [] Difficulty chewing/swallowing/poor dentition  [] Constipation/Diarrhea   [] Nausea/Vomiting   [x] None  [] Other:     Plan:   [x] Therapeutic Diet  []  Obtained/adjusted food preferences/tolerances and/or snacks options   []  Supplements added   [] Occupational therapy following for feeding techniques  []  HS snack added   []  Modify diet texture   []  Modify diet for food allergies   []  Assist with menu selection   [x]  Monitor PO intake on meal rounds   [x]  Continue inpatient monitoring and intervention   []  Participated in discharge planning/Interdisciplinary rounds/Team meetings   []  Other:     Education Needs:   [] Not appropriate for teaching at this time due to:   [x] Identified and addressed    Nutrition Monitoring and Evaluation:  [x] Continue ongoing monitoring and intervention  [] Other    Yumiko Dutta

## 2018-01-21 NOTE — ROUTINE PROCESS
0901-Assessment completed,call bell within reach, no distress noted, voiced no complaints at this time. am due medications given. 1230-no change in condition, no distress noted, voiced no complaints at this time. 1400-resting quietly in bed. 1630-no change in condition. No distress noted. 1715-complained of chest pain, RRT called, EKG obtained, vitals stable. 1720-Nitro SL, ASA, and morphine given. 1725-stated had some relief, another nitro SL given. 1734-stated pain was pretty much all gone, v.s.s., refuses anymore pain medications. 1755-pm due medications given and ativan given for anxiety per patient request.  1840-up to the bathroom with assist.  1920-Bedside and Verbal shift change report given to Rita TOLEDO (oncoming nurse) by charlotte (offgoing nurse). Report included the following information SBAR, MAR and Recent Results.

## 2018-01-21 NOTE — ROUTINE PROCESS
1938: Assumed care. Awake. No sob on RA. Denies any pain or discomfort at this time. Call light within reach. 2130: Resting quietly. 2344: No change from previous assessment. 0200: Sleeping.    0405: No change from previous assessment. 0630: Slept good thru night. Needs attended. 0720: Bedside and Verbal shift change report given to Ancelmo Graves RN (oncoming nurse) by me (offgoing nurse). Report included the following information SBAR, Kardex, Intake/Output, MAR and Recent Results.

## 2018-01-21 NOTE — PROGRESS NOTES
[facility]  -  Progress Note      Patient: Annalisa Puga MRN: [id number]  SSN: [id number]    YOB: 1930  Age: 80 y.o. Sex: male      Admit Date: 1/19/2018    Assessment:     Hospital Problems  Date Reviewed: 1/10/2018          Codes Class Noted POA    Syncope and collapse ICD-10-CM: R55  ICD-9-CM: 780.2  1/19/2018 Unknown              Plan:     As noted yesterday, there is no evidence of ACS currently. If patient is able to resume his baseline activities, he can be discharge to home for out patient follow up. Subjective:     No new complaints.      Objective:      Patient Vitals for the past 8 hrs:   Temp Pulse Resp BP SpO2   01/21/18 0737 97.9 °F (36.6 °C) 70 16 161/80 95 %   01/21/18 0405 97.6 °F (36.4 °C) 70 16 152/83 100 %         Patient Vitals for the past 96 hrs:   Weight   01/21/18 0550 71.1 kg (156 lb 12.8 oz)   01/19/18 1624 63.5 kg (140 lb)         Intake/Output Summary (Last 24 hours) at 01/21/18 0959  Last data filed at 01/21/18 0405   Gross per 24 hour   Intake              960 ml   Output             1025 ml   Net              -65 ml       Physical Exam:  General:  alert, cooperative, no distress, appears stated age  Neck:  no JVD  Lungs:  clear to auscultation bilaterally  Heart:  regular rate and rhythm, systolic murmur: systolic ejection 2/6, crescendo, decrescendo   Abdomen:  no guarding or rigidity  Extremities:  no edema    Data Review:     Labs: Results:       Chemistry Recent Labs      01/21/18   0321  01/20/18   0230  01/19/18   1630   GLU  106*  78  85   NA  139  137  138   K  4.1  3.9  4.3   CL  106  104  103   CO2  22  25  27   BUN  33*  30*  33*   CREA  1.27  1.34*  1.47*   CA  8.1*  8.3*  8.8   AGAP  11  8  8   BUCR  26*  22*  22*      CBC w/Diff Recent Labs      01/21/18   0321  01/20/18   0230  01/19/18   1630   WBC  5.5  4.9  5.5   RBC  4.35*  4.58*  4.68*   HGB  11.4*  12.2*  12.5*   HCT  37.7  39.5  40.5   PLT  173  173  156   APRYL   -- --   75*   LYMPH   --    --   13*   EOS   --    --   1      Cardiac Enzymes Lab Results   Component Value Date/Time    CPK 62 01/21/2018 03:21 AM    CPK 68 01/21/2018 12:15 AM    CPK 64 01/20/2018 05:25 PM    CKMB 3.0 01/21/2018 03:21 AM    CKMB 3.2 01/21/2018 12:15 AM    CKMB 3.1 01/20/2018 05:25 PM    CKND1 4.8 (H) 01/21/2018 03:21 AM    CKND1 4.7 (H) 01/21/2018 12:15 AM    CKND1 4.8 (H) 01/20/2018 05:25 PM    TROIQ 0.03 01/21/2018 03:21 AM    TROIQ 0.03 01/21/2018 12:15 AM    TROIQ 0.02 01/20/2018 05:25 PM      Coagulation No results for input(s): PTP, INR, APTT in the last 72 hours. No lab exists for component: INREXT    Lipid Panel Lab Results   Component Value Date/Time    Cholesterol, total 149 09/09/2015 03:10 AM    HDL Cholesterol 41 09/09/2015 03:10 AM    LDL, calculated 90 09/09/2015 03:10 AM    VLDL, calculated 18 09/09/2015 03:10 AM    Triglyceride 90 09/09/2015 03:10 AM    CHOL/HDL Ratio 3.6 09/09/2015 03:10 AM      BNP No results found for: BNP, BNPP, XBNPT   Liver Enzymes No results for input(s): TP, ALB, TBIL, AP, SGOT, GPT in the last 72 hours.     No lab exists for component: DBIL   Digoxin    Thyroid Studies Lab Results   Component Value Date/Time    T4, Total 7.9 05/06/2010 08:50 AM    TSH 4.55 01/25/2017 03:05 PM

## 2018-01-21 NOTE — PROGRESS NOTES
Problem: Falls - Risk of  Goal: *Absence of Falls  Document Agus Fall Risk and appropriate interventions in the flowsheet.    Outcome: Progressing Towards Goal  Fall Risk Interventions:            Medication Interventions: Patient to call before getting OOB         History of Falls Interventions: Door open when patient unattended

## 2018-01-21 NOTE — DISCHARGE INSTRUCTIONS
Chest Pain: Care Instructions  Your Care Instructions    There are many things that can cause chest pain. Some are not serious and will get better on their own in a few days. But some kinds of chest pain need more testing and treatment. Your doctor may have recommended a follow-up visit in the next 8 to 12 hours. If you are not getting better, you may need more tests or treatment. Even though your doctor has released you, you still need to watch for any problems. The doctor carefully checked you, but sometimes problems can develop later. If you have new symptoms or if your symptoms do not get better, get medical care right away. If you have worse or different chest pain or pressure that lasts more than 5 minutes or you passed out (lost consciousness), call 911 or seek other emergency help right away. A medical visit is only one step in your treatment. Even if you feel better, you still need to do what your doctor recommends, such as going to all suggested follow-up appointments and taking medicines exactly as directed. This will help you recover and help prevent future problems. How can you care for yourself at home? · Rest until you feel better. · Take your medicine exactly as prescribed. Call your doctor if you think you are having a problem with your medicine. · Do not drive after taking a prescription pain medicine. When should you call for help? Call 911 if:  ? · You passed out (lost consciousness). ? · You have severe difficulty breathing. ? · You have symptoms of a heart attack. These may include:  ¨ Chest pain or pressure, or a strange feeling in your chest.  ¨ Sweating. ¨ Shortness of breath. ¨ Nausea or vomiting. ¨ Pain, pressure, or a strange feeling in your back, neck, jaw, or upper belly or in one or both shoulders or arms. ¨ Lightheadedness or sudden weakness. ¨ A fast or irregular heartbeat.   After you call 911, the  may tell you to chew 1 adult-strength or 2 to 4 low-dose aspirin. Wait for an ambulance. Do not try to drive yourself. ?Call your doctor today if:  ? · You have any trouble breathing. ? · Your chest pain gets worse. ? · You are dizzy or lightheaded, or you feel like you may faint. ? · You are not getting better as expected. ? · You are having new or different chest pain. Where can you learn more? Go to http://mitra-shaw.info/. Enter A120 in the search box to learn more about \"Chest Pain: Care Instructions. \"  Current as of: March 20, 2017  Content Version: 11.4  © 6165-9755 [employer]. Care instructions adapted under license by [employer] (which disclaims liability or warranty for this information). If you have questions about a medical condition or this instruction, always ask your healthcare professional. [address] any warranty or liability for your use of this information. Fainting: Care Instructions  Your Care Instructions    When you faint, or pass out, you lose consciousness for a short time. A brief drop in blood flow to the brain often causes it. When you fall or lie down, more blood flows to your brain and you regain consciousness. Emotional stress, pain, or overheating-especially if you have been standing-can make you faint. In these cases, fainting is usually not serious. But fainting can be a sign of a more serious problem. Your doctor may want you to have more tests to rule out other causes. The treatment you need depends on the reason why you fainted. The doctor has checked you carefully, but problems can develop later. If you notice any problems or new symptoms, get medical treatment right away. Follow-up care is a key part of your treatment and safety. Be sure to make and go to all appointments, and call your doctor if you are having problems. It's also a good idea to know your test results and keep a list of the medicines you take.   How can you care for yourself at home? · Drink plenty of fluids to prevent dehydration. If you have kidney, heart, or liver disease and have to limit fluids, talk with your doctor before you increase your fluid intake. When should you call for help? Call 911 anytime you think you may need emergency care. For example, call if:  ? · You have symptoms of a heart problem. These may include:  ¨ Chest pain or pressure. ¨ Severe trouble breathing. ¨ A fast or irregular heartbeat. ¨ Lightheadedness or sudden weakness. ¨ Coughing up pink, foamy mucus. ¨ Passing out. After you call 911, the  may tell you to chew 1 adult-strength or 2 to 4 low-dose aspirin. Wait for an ambulance. Do not try to drive yourself. ? · You have symptoms of a stroke. These may include:  ¨ Sudden numbness, tingling, weakness, or loss of movement in your face, arm, or leg, especially on only one side of your body. ¨ Sudden vision changes. ¨ Sudden trouble speaking. ¨ Sudden confusion or trouble understanding simple statements. ¨ Sudden problems with walking or balance. ¨ A sudden, severe headache that is different from past headaches. ? · You passed out (lost consciousness) again. ? Watch closely for changes in your health, and be sure to contact your doctor if:  ? · You do not get better as expected. Where can you learn more? Go to http://mitra-shaw.info/. Enter V010 in the search box to learn more about \"Fainting: Care Instructions. \"  Current as of: March 20, 2017  Content Version: 11.4  © 9403-0435 [employer]. Care instructions adapted under license by "[employer] (which disclaims liability or warranty for this information). If you have questions about a medical condition or this instruction, always ask your healthcare professional. [place] [address] any warranty or liability for your use of this information.     DISCHARGE SUMMARY from Nurse    PATIENT INSTRUCTIONS:    After general anesthesia or intravenous sedation, for 24 hours or while taking prescription Narcotics:  · Limit your activities  · Do not drive and operate hazardous machinery  · Do not make important personal or business decisions  · Do  not drink alcoholic beverages  · If you have not urinated within 8 hours after discharge, please contact your surgeon on call. Report the following to your surgeon:  · Excessive pain, swelling, redness or odor of or around the surgical area  · Temperature over 100.5  · Nausea and vomiting lasting longer than 4 hours or if unable to take medications  · Any signs of decreased circulation or nerve impairment to extremity: change in color, persistent  numbness, tingling, coldness or increase pain  · Any questions    What to do at Home:  Recommended activity: Activity as tolerated. If you experience any of the following symptoms chest pain or shortness of breath or any concerns, please follow up with Primary Care physician. *  Please give a list of your current medications to your Primary Care Provider. *  Please update this list whenever your medications are discontinued, doses are      changed, or new medications (including over-the-counter products) are added. *  Please carry medication information at all times in case of emergency situations. These are general instructions for a healthy lifestyle:    No smoking/ No tobacco products/ Avoid exposure to second hand smoke  Surgeon General's Warning:  Quitting smoking now greatly reduces serious risk to your health.     Obesity, smoking, and sedentary lifestyle greatly increases your risk for illness    A healthy diet, regular physical exercise & weight monitoring are important for maintaining a healthy lifestyle    You may be retaining fluid if you have a history of heart failure or if you experience any of the following symptoms:  Weight gain of 3 pounds or more overnight or 5 pounds in a week, increased swelling in our hands or feet or shortness of breath while lying flat in bed. Please call your doctor as soon as you notice any of these symptoms; do not wait until your next office visit. Recognize signs and symptoms of STROKE:    F-face looks uneven    A-arms unable to move or move unevenly    S-speech slurred or non-existent    T-time-call 911 as soon as signs and symptoms begin-DO NOT go       Back to bed or wait to see if you get better-TIME IS BRAIN. Warning Signs of HEART ATTACK     Call 911 if you have these symptoms:   Chest discomfort. Most heart attacks involve discomfort in the center of the chest that lasts more than a few minutes, or that goes away and comes back. It can feel like uncomfortable pressure, squeezing, fullness, or pain.  Discomfort in other areas of the upper body. Symptoms can include pain or discomfort in one or both arms, the back, neck, jaw, or stomach.  Shortness of breath with or without chest discomfort.  Other signs may include breaking out in a cold sweat, nausea, or lightheadedness. Don't wait more than five minutes to call 911 - MINUTES MATTER! Fast action can save your life. Calling 911 is almost always the fastest way to get lifesaving treatment. Emergency Medical Services staff can begin treatment when they arrive -- up to an hour sooner than if someone gets to the hospital by car. The discharge information has been reviewed with the patient. The patient verbalized understanding. Discharge medications reviewed with the patient and appropriate educational materials and side effects teaching were provided. [employer] Activation    Thank you for requesting access to [employer]. Please follow the instructions below to securely access and download your online medical record. [employer] allows you to send messages to your doctor, view your test results, renew your prescriptions, schedule appointments, and more. How Do I Sign Up? 1.  In your internet browser, go to [email or web address]  2. Click on the First Time User? Click Here link in the Sign In box. You will be redirect to the New Member Sign Up page. 3. Enter your [employer] Access Code exactly as it appears below. You will not need to use this code after youve completed the sign-up process. If you do not sign up before the expiration date, you must request a new code. MyChart Access Code: Activation code not generated  Current [employer] Status: Active (This is the date your [employer] access code will )    4. Enter the last four digits of your Social Security Number (xxxx) and Date of Birth (mm/dd/yyyy) as indicated and click Submit. You will be taken to the next sign-up page. 5. Create a [employer] ID. This will be your [employer] login ID and cannot be changed, so think of one that is secure and easy to remember. 6. Create a [employer] password. You can change your password at any time. 7. Enter your Password Reset Question and Answer. This can be used at a later time if you forget your password. 8. Enter your e-mail address. You will receive e-mail notification when new information is available in [address] 9. Click Sign Up. You can now view and download portions of your medical record. 10. Click the Download Summary menu link to download a portable copy of your medical information. Additional Information    If you have questions, please visit the Frequently Asked Questions section of the [employer] website at [email or web address] Remember, [employer] is NOT to be used for urgent needs. For medical emergencies, dial 911.     Patient armband removed and shredded  ___________________________________________________________________________________________________________________________________

## 2018-01-21 NOTE — PROGRESS NOTES
Problem: Falls - Risk of  Goal: *Absence of Falls  Document Agus Fall Risk and appropriate interventions in the flowsheet.    Outcome: Progressing Towards Goal  Fall Risk Interventions:            Medication Interventions: Teach patient to arise slowly         History of Falls Interventions: Door open when patient unattended, Room close to nurse's station

## 2018-01-21 NOTE — PROGRESS NOTES
6775 Received report from off going RN. Patient alert and oriented. On telemetry, Box #45. Denies chest pain or shortness of breath. On room air. NS at 100ml/hr infusing left arm IV site. Callbell within reach. Patient instructed to call for any needs. 1000 Sitting up in chair at bedside. Denies pain. 4825 Discharge instructions given with questions answered. Discharged home with belongings.

## 2018-01-22 NOTE — DISCHARGE SUMMARY
Agusting Revolucijruby 96BLAINE  MR#: [id number]  : 11/10/1930  ACCOUNT #: [de-identified]   ADMIT DATE: 2018  DISCHARGE DATE: 2018    DISCHARGE DIAGNOSES:  1. Presyncope. The patient is an 55-year-old  male who presented to the Emergency Department of [facility] on 2018 with complaints of chest pain. He has been having moderate exertional substernal chest pain and associated was shortness of breath and he apparently passed out for a few seconds after falling onto the floor at home, but never really blacked out. He was admitted by the hospitalist service to observation status and serial cardiac enzymes done were negative. Cardiology was consulted and reviewed the echocardiogram that was done that revealed preserved left ventricular systolic function with ejection fraction estimated at 55% with mild to moderate aortic stenosis noted. In addition, there was severe tricuspid regurgitation and mild to moderate pulmonic regurgitation noted. He has a permanent pacemaker in place and cardiology recommended interrogation of the pacemaker as outpatient. He continued to do well with no recurrent episode of syncope this admission and was recommended by cardiology for discharge and to follow up with his regular cardiologist, Dr. Valentina Tejeda approximately 2-3 days post-discharge and this was relayed to the patient and he verbalized understanding. 2.  Chest pain. Chest pain subsequently resolved and 7 sets of troponins checked this admission were negative. He was monitored closely on telemetry and continued to do well with no recurrence of chest pain and thus was stable to be discharged in much improved condition. 3.  History of coronary artery disease status post CABG in the past.  4.  History of atrial fibrillation on Eliquis which was continued this admission and he was advised to continue Eliquis post-discharge.     5.  Status post AICD pacemaker placement in 2008. 6.  History of cardiomyopathy. 7.  Hyperlipidemia. 8.  Hypertension. 9.  Hypothyroidism. 10.  History of peripheral vascular disease. 11.  History of renal artery stenosis status post bilateral stent placement in the past.  12.  History of sick sinus syndrome. DISCHARGE CONDITION:  Stable and improved. DISCHARGE ACTIVITY:  As tolerated. DISCHARGE DIET:  Cardiac and low-fat, low-cholesterol diet. DISCHARGE MEDICATIONS:  1. Amlodipine 5 mg p.o. daily. 2.  Eliquis 5 mg p.o. twice daily. 3.  Enalapril 20 mg p.o. daily. 4.  Lasix 40 mg p.o. daily. 5.  Imdur 30 mg p.o. daily. 6.  Ativan 0.5 mg p.o. twice daily as needed. 7.  Toprol-XL 50 mg p.o. daily. 8.  Nitroglycerin 0.3 mg sublingual every 5 minutes as needed for chest pain. 9.  Protonix 40 mg p.o. daily. FOLLOW UP APPOINTMENT:  1. Patient was advised to follow up with primary care physician, Dr. Krystal Lance approximately 3-5 day post-discharge. 2.  Patient was advised to follow up with cardiologist, Dr. Viv Foster approximately 2-3 days post-discharge. PHYSICAL EXAMINATION:  On the day of discharge revealed the following:   VITAL SIGNS:  Temperature 97.9, heart rate 70, respiratory rate 16, blood pressure 161/80, oxygen saturation 95%. GENERAL:  He was awake, alert, oriented x3, communicating, in no appreciable distress. CARDIOVASCULAR:  Normal S1, S2, regular rate and rhythm. RESPIRATORY:  Chest was clear to auscultation bilaterally. ABDOMEN:  Soft, nontender, nondistended, no palpable organomegaly, bowel sounds noted. EXTREMITIES:  No edema was noted. NEUROLOGIC:  Cranial nerves II-XII are intact with no obvious focal motor or sensory deficits noted.           DO SHAGGY Fung/MN  D: 01/21/2018 10:04     T: 01/22/2018 01:43  JOB #: [id number]  CC: Toya Wren MD  CC: Krystal Lance  CC: Randall Records  [address] [facility] [address]  [address] [place] [address]

## 2018-01-25 PROBLEM — R06.02 SOB (SHORTNESS OF BREATH) ON EXERTION: Status: ACTIVE | Noted: 2018-01-01

## 2018-01-25 NOTE — IP AVS SNAPSHOT
[address] [address] 
[phone] Patient: Jasper Bowser MRN: [id number] MHB:13/18/[birth year]11 About your hospitalization You were admitted on:  January 25, 2018 You last received care in the:  [address] [place] [address] Road You were discharged on:  January 29, 2018 Why you were hospitalized Your primary diagnosis was:  Sob (Shortness Of Breath) On Exertion Your diagnoses also included:  Atrial Fibrillation (Hcc), Cardiomyopathy (Hcc), Coronary Artery Disease Of Native Artery Of Native Heart With Stable Angina Pectoris (Hcc), Dyslipidemia, Essential Hypertension, Atherosclerotic Itz (Renal Artery Stenosis), Bilateral (Hcc), Sss (Sick Sinus Syndrome) (Hcc), S/P Cabg X 2, Pleural Effusion, Right, Abnormal Chest Ct, Aortic Stenosis, Moderate Follow-up Information Follow up With Details Comments Contact Info Bekah Rosales MD On 1/30/2018 3pm  [address] [place] [address] 
[phone] Darlin Colindres MD Call in 1 month  [facility] [address] [facility] [place] [address] [phone] Yenni Ngo MD Call in 1 month pulmonology [address] [place] [address] [address] [facility] [address] 
[phone] Discharge Orders None A check winler indicates which time of day the medication should be taken. My Medications START taking these medications Instructions Each Dose to Equal  
 Morning Noon Evening Bedtime  
 chlorpheniramine-HYDROcodone 10-8 mg/5 mL suspension Commonly known as:  Pedrito Olivares Your last dose was: Your next dose is: Take 5 mL by mouth every twelve (12) hours as needed for Cough. Max Daily Amount: 10 mL. Indications: COLD SYMPTOMS, Cough 5 mL  
    
   
   
   
  
 ranolazine  mg SR tablet Commonly known as:  RANEXA Your last dose was: Your next dose is: Take 1 Tab by mouth two (2) times a day. Indications: Chronic Stable Angina Pectoris 500 mg CHANGE how you take these medications Instructions Each Dose to Equal  
 Morning Noon Evening Bedtime LORazepam 1 mg tablet Commonly known as:  ATIVAN What changed:   
- medication strength 
- how much to take 
- how to take this - when to take this 
- reasons to take this 
- additional instructions Your last dose was: Your next dose is: Take 1 Tab by mouth every six (6) hours as needed for Anxiety. Max Daily Amount: 4 mg.  
 1 mg CONTINUE taking these medications Instructions Each Dose to Equal  
 Morning Noon Evening Bedtime  
 amLODIPine 5 mg tablet Commonly known as:  Jo Dodge Your last dose was: Your next dose is: Take 5 mg by mouth daily. 5 mg ELIQUIS 5 mg tablet Generic drug:  apixaban Your last dose was: Your next dose is: Take 5 mg by mouth two (2) times a day. 5 mg  
    
   
   
   
  
 enalapril 20 mg tablet Commonly known as:  Martha Guerrero Your last dose was: Your next dose is: Take 20 mg by mouth daily. 20 mg  
    
   
   
   
  
 furosemide 40 mg tablet Commonly known as:  LASIX Your last dose was: Your next dose is: Take 40 mg by mouth daily. 40 mg  
    
   
   
   
  
 isosorbide mononitrate ER 30 mg tablet Commonly known as:  IMDUR Your last dose was: Your next dose is: Take 1 Tab by mouth daily. 30 mg  
    
   
   
   
  
 nitroglycerin 0.3 mg SL tablet Commonly known as:  NITROSTAT Your last dose was: Your next dose is:    
   
   
 1 Tab by SubLINGual route every five (5) minutes as needed for Chest Pain (up to 3 total 1 every 5 min). 0.3 mg  
    
   
   
   
  
 pantoprazole 40 mg tablet Commonly known as:  PROTONIX Your last dose was: Your next dose is:    
   
   
 TK 1 T PO D 20 TO 30 MINUTES BEFORE A MEAL  
     
   
   
   
  
 TOPROL XL 50 mg XL tablet Generic drug:  metoprolol succinate Your last dose was: Your next dose is: Take 50 mg by mouth daily. 50 mg Where to Get Your Medications Information on where to get these meds will be given to you by the nurse or doctor. ! Ask your nurse or doctor about these medications  
  chlorpheniramine-HYDROcodone 10-8 mg/5 mL suspension LORazepam 1 mg tablet  
 ranolazine  mg SR tablet Discharge Instructions DISCHARGE SUMMARY from Nurse PATIENT INSTRUCTIONS: 
 
After general anesthesia or intravenous sedation, for 24 hours or while taking prescription Narcotics: · Limit your activities · Do not drive and operate hazardous machinery · Do not make important personal or business decisions · Do  not drink alcoholic beverages · If you have not urinated within 8 hours after discharge, please contact your surgeon on call. Report the following to your surgeon: 
· Excessive pain, swelling, redness or odor of or around the surgical area · Temperature over 100.5 · Nausea and vomiting lasting longer than 4 hours or if unable to take medications · Any signs of decreased circulation or nerve impairment to extremity: change in color, persistent  numbness, tingling, coldness or increase pain · Any questions What to do at Home: 
Recommended activity: Activity as tolerated. If you experience any of the following symptoms shortness of breath, lightheadedness or chest pains, please follow up with primary care provider. *  Please give a list of your current medications to your Primary Care Provider.  
 
*  Please update this list whenever your medications are discontinued, doses are 
 changed, or new medications (including over-the-counter products) are added. *  Please carry medication information at all times in case of emergency situations. These are general instructions for a healthy lifestyle: No smoking/ No tobacco products/ Avoid exposure to second hand smoke Surgeon General's Warning:  Quitting smoking now greatly reduces serious risk to your health. Obesity, smoking, and sedentary lifestyle greatly increases your risk for illness A healthy diet, regular physical exercise & weight monitoring are important for maintaining a healthy lifestyle You may be retaining fluid if you have a history of heart failure or if you experience any of the following symptoms:  Weight gain of 3 pounds or more overnight or 5 pounds in a week, increased swelling in our hands or feet or shortness of breath while lying flat in bed. Please call your doctor as soon as you notice any of these symptoms; do not wait until your next office visit. Recognize signs and symptoms of STROKE: 
 
F-face looks uneven A-arms unable to move or move unevenly S-speech slurred or non-existent T-time-call 911 as soon as signs and symptoms begin-DO NOT go Back to bed or wait to see if you get better-TIME IS BRAIN. Warning Signs of HEART ATTACK Call 911 if you have these symptoms: 
? Chest discomfort. Most heart attacks involve discomfort in the center of the chest that lasts more than a few minutes, or that goes away and comes back. It can feel like uncomfortable pressure, squeezing, fullness, or pain. ? Discomfort in other areas of the upper body. Symptoms can include pain or discomfort in one or both arms, the back, neck, jaw, or stomach. ? Shortness of breath with or without chest discomfort. ? Other signs may include breaking out in a cold sweat, nausea, or lightheadedness. Don't wait more than five minutes to call [address]!  Fast action can save your life. Calling 911 is almost always the fastest way to get lifesaving treatment. Emergency Medical Services staff can begin treatment when they arrive  up to an hour sooner than if someone gets to the hospital by car. The discharge information has been reviewed with the patient. The patient verbalized understanding. Discharge medications reviewed with the patient and appropriate educational materials and side effects teaching were provided. ___________________________________________________________________________________________________________________________________ MyChart Activation Thank you for enrolling in [address] Please follow the instructions below to securely access your online medical record. [employer] allows you to send messages to your doctor, view your test results, renew your prescriptions, schedule appointments, and more. How Do I Sign Up? 1. In your internet browser, go to [email or web address]. 2. Click on the First Time User? Click Here link in the Sign In box. You will see the New Member Sign Up page. 3. Enter your [employer] Access Code exactly as it appears below. You will not need to use this code after youve completed the sign-up process. If you do not sign up before the expiration date, you must request a new code. [employer] Access Code: Activation code not generated Current [employer] Status: Active 4. Enter the last four digits of your Social Security Number (xxxx) and Date of Birth (mm/dd/yyyy) as indicated and click Submit. You will be taken to the next sign-up page. 5. Create a [employer] ID. This will be your [employer] login ID and cannot be changed, so think of one that is secure and easy to remember. 6. Create a [employer] password. You can change your password at any time. 7. Enter your Password Reset Question and Answer. This can be used at a later time if you forget your password. 8. Enter your e-mail address. You will receive e-mail notification when new information is available in [address] 9. Click Sign Up. You can now view your medical record. Additional Information Remember, MyChart is NOT to be used for urgent needs. For medical emergencies, dial 911. Now available from your iPhone and Android! Patient armband removed and shredded [employer] Announcement We are excited to announce that we are making your provider's discharge notes available to you in [facility]. You will see these notes when they are completed and signed by the physician that discharged you from your recent hospital stay. If you have any questions or concerns about any information you see in [facility], please call the Health Information Department where you were seen or reach out to your Primary Care Provider for more information about your plan of care. Introducing [facility]! Dear Le Asencio: Thank you for requesting a [facility] account. Our records indicate that you already have an active [facility] account. You can access your account anytime at [email or web address] [employer] Did you know that you can access your hospital and ER discharge instructions at any time in [facility]? You can also review all of your test results from your hospital stay or ER visit. Additional Information If you have questions, please visit the Frequently Asked Questions section of the [facility] website at [email or web address] Remember, MyChart is NOT to be used for urgent needs. For medical emergencies, dial 911. Now available from your iPhone and Android! Providers Seen During Your Hospitalization Provider Specialty Primary office phone Ranjith Calloway MD Emergency Medicine [phone] Denese Lefort, MD Internal Medicine [phone] Rhett Gonzalez MD Family Practice [phone] Coleman Seth, [address] [place] [address] Internal Medicine [phone] Your Primary Care Physician (PCP) Primary Care Physician Office Phone Office Fax Jose Mccarthy [address] [phone] You are allergic to the following Allergen Reactions Beta-Blockers (Beta-Adrenergic Blocking Agts) Drowsiness Penicillins Swelling Statins-Hmg-Coa Reductase Inhibitors Drowsiness Recent Documentation Height Weight BMI Smoking Status 1.753 m 64.7 kg 21.06 kg/m2 Former Smoker Emergency Contacts Name Discharge Info Relation Home Work Mobile [address] CAREGIVER [3] Spouse [3] [phone] Lani Beebe  Spouse [4] [phone] Patient Belongings The following personal items are in your possession at time of discharge: 
  Dental Appliances: None         Home Medications: None   Jewelry: Bracelet, With patient  Clothing: Pants, Shirt, Socks, Belt, Footwear, Jacket/Coat, Undergarments, With patient    Other Valuables: Ann Savage, [facility] [place] Please provide this summary of care documentation to your next provider. Signatures-by signing, you are acknowledging that this After Visit Summary has been reviewed with you and you have received a copy. Patient Signature:  ____________________________________________________________ Date:  ____________________________________________________________  
  
Vonda Terri Provider Signature:  ____________________________________________________________ Date:  ____________________________________________________________

## 2018-01-25 NOTE — ED TRIAGE NOTES
Patient reports non-productive cough, SOB worse on exertion and chest pains.   Patient arrives via EMS on CPAP and given 324 mg asa and 1 SL Nitro by EMS

## 2018-01-25 NOTE — PROGRESS NOTES
completed the initial Spiritual Assessment of the patient in bed 6 of the emergency room and offered Pastoral Care support. No family seen at time of this visit. Patient is on BY-Pap machine. .Patient does not have any Rastafarian/cultural needs that will affect patients preferences in health care.  Chaplains will continue to follow and will provide pastoral care on an as needed/requested basis     Chaplain Julius Werner   Board Certified [address] [place] [address]   [phone]

## 2018-01-25 NOTE — ED PROVIDER NOTES
EMERGENCY DEPARTMENT HISTORY AND PHYSICAL EXAM    11:04 AM      Date: 1/25/2018  Patient Name: Wilian Low    History of Presenting Illness     Chief Complaint   Patient presents with    Shortness of Breath         History Provided By: Patient    Chief Complaint: SOB  Duration:  Days (x3)  Timing:  Acute  Location: Chest  Quality: Tightness  Severity: N/A  Modifying Factors: SOB exacerbated by movement; relief to CP with nitro  Associated Symptoms: Exhbits cough and nausea; denies fever, abdominal pain, emesis, diarrhea      Additional History (Context): Wilian Low is a 80 y.o. male with HTN, HLD, CAD, CABG, AFib, and pacemaker who presents to the ED c/o SOB with associated CP onset three days ago. Pt was in the ED ten days ago for SOB and was admitted to the hospital for two days; pt states current episode of SOB is much worse. Per EMS pt was given 325 mg ASA and 1 NTG en route to the ED; pt stated while en route to the ED NTG provided relief to CP. Pt was additionally placed on CPAP while en route to the ED. Pt has had an unproductive cough and nausea since the onset of SOB but he denies fever, abdominal pain, emesis, diarrhea. Pt admits to being compliant with all medications however he is not on oxygen at home. PCP: Pj Morataya MD    Current Outpatient Prescriptions   Medication Sig Dispense Refill    isosorbide mononitrate ER (IMDUR) 30 mg tablet Take 1 Tab by mouth daily. 30 Tab 6    amLODIPine (NORVASC) 5 mg tablet Take 5 mg by mouth daily.  nitroglycerin (NITROSTAT) 0.3 mg SL tablet 1 Tab by SubLINGual route every five (5) minutes as needed for Chest Pain (up to 3 total 1 every 5 min). 1 Bottle 2    pantoprazole (PROTONIX) 40 mg tablet TK 1 T PO D 20 TO 30 MINUTES BEFORE A MEAL  2    metoprolol succinate (TOPROL XL) 50 mg XL tablet Take 50 mg by mouth daily.       LORazepam (ATIVAN) 0.5 mg tablet TK 1 T PO  BID PRN 25 Tab 1    furosemide (LASIX) 40 mg tablet Take 40 mg by mouth daily.  apixaban (ELIQUIS) 5 mg tablet Take 5 mg by mouth two (2) times a day.  enalapril (VASOTEC) 20 mg tablet Take 20 mg by mouth daily. Past History     Past Medical History:  Past Medical History:   Diagnosis Date    Atrial fibrillation     CHADS score 3  (+CHF, +HTN, +AGE, -DM, -CVA)    CABG     2008   LIMA - LAD,   SVG - RCA    Cardiomyopathy     EF 30-35% (ECHO 6/14)    Coronary artery disease     Dyslipidemia     Hypertension     Hypothyroid     Pacemaker     Peripheral vascular disease     Renal artery stenosis     bilateral stents    Sick sinus syndrome        Past Surgical History:  Past Surgical History:   Procedure Laterality Date    HX CORONARY ARTERY BYPASS GRAFT      2008   LIMA - LAD,   SVG - RCA       Family History:  No family history on file. Social History:  Social History   Substance Use Topics    Smoking status: Former Smoker    Smokeless tobacco: Never Used    Alcohol use No       Allergies: Allergies   Allergen Reactions    Beta-Blockers (Beta-Adrenergic Blocking Agts) Drowsiness    Penicillins Swelling    Statins-Hmg-Coa Reductase Inhibitors Drowsiness         Review of Systems     Review of Systems   Constitutional: Negative for chills. HENT: Negative for congestion and sore throat. Respiratory: Positive for cough and shortness of breath. Cardiovascular: Negative for chest pain. Gastrointestinal: Positive for nausea. Negative for abdominal pain, diarrhea and vomiting. Genitourinary: Negative for dysuria. Musculoskeletal: Negative for back pain. Skin: Negative for rash. Neurological: Negative for dizziness and headaches. All other systems reviewed and are negative. Physical Exam     Visit Vitals    /63    Pulse 70    Temp 98.3 °F (36.8 °C)    Resp 19    SpO2 100%       Physical Exam   Constitutional: He appears well-developed and well-nourished. Non-toxic appearance. He does not appear ill.  He appears distressed (moderate). HENT:   Head: Normocephalic and atraumatic. Eyes: Conjunctivae and lids are normal.   Neck: Neck supple. No meningeal signs   Cardiovascular: Normal rate, regular rhythm and normal pulses. Exam reveals no gallop and no friction rub. No murmur heard. Pulmonary/Chest: Effort normal. Tachypnea noted. No respiratory distress. He has no wheezes. He has rales (Bilaterally). Labored breathing    Abdominal: Soft. Normal appearance and bowel sounds are normal. He exhibits no distension and no pulsatile midline mass. There is no tenderness. There is no rebound, no guarding and no CVA tenderness. Musculoskeletal: He exhibits no edema or tenderness. The musculoskeletal exam of the lower extremities is normal without significant local tenderness. Lymphadenopathy:     He has no cervical adenopathy. Skin: Skin is warm, dry and intact. Psychiatric: He has a normal mood and affect. His speech is normal and behavior is normal. Cognition and memory are normal.   Oriented to person, time, place   Nursing note and vitals reviewed. Diagnostic Study Results     Labs -  Recent Results (from the past 12 hour(s))   EKG, 12 LEAD, INITIAL    Collection Time: 01/25/18 11:04 AM   Result Value Ref Range    Ventricular Rate 77 BPM    Atrial Rate 72 BPM    QRS Duration 176 ms    Q-T Interval 446 ms    QTC Calculation (Bezet) 504 ms    Calculated R Axis -90 degrees    Calculated T Axis 92 degrees    Diagnosis       Electronic ventricular pacemaker  When compared with ECG of 20-JAN-2018 17:19,  Vent.  rate has increased BY   4 BPM     CBC WITH AUTOMATED DIFF    Collection Time: 01/25/18 11:10 AM   Result Value Ref Range    WBC 7.3 4.6 - 13.2 K/uL    RBC 5.02 4.70 - 5.50 M/uL    HGB 13.5 13.0 - 16.0 g/dL    HCT 43.7 36.0 - 48.0 %    MCV 87.1 74.0 - 97.0 FL    MCH 26.9 24.0 - 34.0 PG    MCHC 30.9 (L) 31.0 - 37.0 g/dL    RDW 17.9 (H) 11.6 - 14.5 %    PLATELET 867 379 - 275 K/uL    MPV 9.4 9.2 - 11.8 FL    NEUTROPHILS 86 (H) 40 - 73 %    LYMPHOCYTES 8 (L) 21 - 52 %    MONOCYTES 6 3 - 10 %    EOSINOPHILS 0 0 - 5 %    BASOPHILS 0 0 - 2 %    ABS. NEUTROPHILS 6.3 1.8 - 8.0 K/UL    ABS. LYMPHOCYTES 0.6 (L) 0.9 - 3.6 K/UL    ABS. MONOCYTES 0.5 0.05 - 1.2 K/UL    ABS. EOSINOPHILS 0.0 0.0 - 0.4 K/UL    ABS. BASOPHILS 0.0 0.0 - 0.06 K/UL    DF AUTOMATED     METABOLIC PANEL, BASIC    Collection Time: 01/25/18 11:10 AM   Result Value Ref Range    Sodium 135 (L) 136 - 145 mmol/L    Potassium 4.5 3.5 - 5.5 mmol/L    Chloride 100 100 - 108 mmol/L    CO2 25 21 - 32 mmol/L    Anion gap 10 3.0 - 18 mmol/L    Glucose 120 (H) 74 - 99 mg/dL    BUN 27 (H) 7.0 - 18 MG/DL    Creatinine 1.47 (H) 0.6 - 1.3 MG/DL    BUN/Creatinine ratio 18 12 - 20      GFR est AA 55 (L) >60 ml/min/1.73m2    GFR est non-AA 45 (L) >60 ml/min/1.73m2    Calcium 8.9 8.5 - 10.1 MG/DL   CARDIAC PANEL,(CK, CKMB & TROPONIN)    Collection Time: 01/25/18 11:10 AM   Result Value Ref Range     39 - 308 U/L    CK - MB 2.1 <3.6 ng/ml    CK-MB Index 1.8 0.0 - 4.0 %    Troponin-I, Qt. 0.05 (H) 0.0 - 0.045 NG/ML   NT-PRO BNP    Collection Time: 01/25/18 11:10 AM   Result Value Ref Range    NT pro-BNP 7421 (H) 0 - 1800 PG/ML   POC G3    Collection Time: 01/25/18 11:23 AM   Result Value Ref Range    Device: BIPAP      FIO2 (POC) 0.50 %    pH (POC) 7.415 7.35 - 7.45      pCO2 (POC) 34.8 (L) 35.0 - 45.0 MMHG    pO2 (POC) 169 (H) 80 - 100 MMHG    HCO3 (POC) 22.3 22 - 26 MMOL/L    sO2 (POC) 100 (H) 92 - 97 %    Base deficit (POC) 2 mmol/L    PEEP/CPAP (POC) 7 cmH2O    PIP (POC) 12      Pressure support 5 cmH2O    Allens test (POC) YES      Total resp. rate 29      Site RIGHT RADIAL      Patient temp. 98.7      Specimen type (POC) ARTERIAL      Performed by Gene Lawrence        Radiologic Studies -   XR CHEST PORT   Final Result   IMPRESSION:     Stable small right pleural effusion. Stable enlargement of cardiopericardial  silhouette. No additional change. Medical Decision Making   I am the first provider for this patient. I reviewed the vital signs, available nursing notes, past medical history, past surgical history, family history and social history. Vital Signs-Reviewed the patient's vital signs. Pulse Oximetry Analysis -  100% on room air     Cardiac Monitor:  Rate: 70  Rhythm:  Ventricular paced rhythm     EKG: Interpreted by the EP. Time Interpreted: 11:04 AM   Rate: 77   Rhythm: Ventricular paced    Interpretation: No STEMI   Comparison: none    Records Reviewed: Old Medical Records (Time of Review: 11:04 AM)    ED Course: Progress Notes, Reevaluation, and Consults:  12:58 PM, 1/25/2018   Consult:  Discussed care with Dr. Woo Burciaga, hospitalist. Standard discussion; including history of patients chief complaint, available diagnostic results, and treatment course. Agrees to admit. Provider Notes (Medical Decision Making): SOB and CP on Bipap. CP improved prior to arrival. Doubt aortic dissection. Exam with rales, likely CHF exacerbation. Good UOP with IV Lasix. Requires hospitalization for further stabilization.     Procedures:   CRITICAL CARE (ASAP ONLY)  Performed by: EVELIN KATE  Authorized by: Trinity Goss     Critical care provider statement:     Critical care time (minutes):  42    Critical care start time:  1/25/2018 12:33 PM    Critical care end time:  1/25/2018 1:15 PM    Critical care time was exclusive of:  Separately billable procedures and treating other patients    Critical care was necessary to treat or prevent imminent or life-threatening deterioration of the following conditions:  Respiratory failure    Critical care was time spent personally by me on the following activities:  Blood draw for specimens, development of treatment plan with patient or surrogate, discussions with consultants, evaluation of patient's response to treatment, examination of patient, interpretation of cardiac output measurements, obtaining history from patient or surrogate, ventilator management, review of old charts, re-evaluation of patient's condition, pulse oximetry, ordering and review of radiographic studies, ordering and review of laboratory studies and ordering and performing treatments and interventions    I assumed direction of critical care for this patient from another provider in my specialty: yes          Critical Care Time: 42 minutes (see procedure note)    For Hospitalized Patients:    1. Hospitalization Decision Time:  The decision to hospitalize the patient was made by Dr. Faisal Diaz at 12:58 PM on 1/25/2018    2. Aspirin: Aspirin was given    Diagnosis     Clinical Impression:   1. Acute respiratory distress    2. Acute systolic congestive heart failure (HCC)        Disposition: Admit    Follow-up Information     None           Patient's Medications   Start Taking    No medications on file   Continue Taking    AMLODIPINE (NORVASC) 5 MG TABLET    Take 5 mg by mouth daily. APIXABAN (ELIQUIS) 5 MG TABLET    Take 5 mg by mouth two (2) times a day. ENALAPRIL (VASOTEC) 20 MG TABLET    Take 20 mg by mouth daily. FUROSEMIDE (LASIX) 40 MG TABLET    Take 40 mg by mouth daily. ISOSORBIDE MONONITRATE ER (IMDUR) 30 MG TABLET    Take 1 Tab by mouth daily. LORAZEPAM (ATIVAN) 0.5 MG TABLET    TK 1 T PO  BID PRN    METOPROLOL SUCCINATE (TOPROL XL) 50 MG XL TABLET    Take 50 mg by mouth daily. NITROGLYCERIN (NITROSTAT) 0.3 MG SL TABLET    1 Tab by SubLINGual route every five (5) minutes as needed for Chest Pain (up to 3 total 1 every 5 min).     PANTOPRAZOLE (PROTONIX) 40 MG TABLET    TK 1 T PO D 20 TO 30 MINUTES BEFORE A MEAL   These Medications have changed    No medications on file   Stop Taking    No medications on file     _______________________________    Attestations:  Scribe [facility] acting as a scribe for and in the presence of Jia Vegas MD      January 25, 2018 at 11:04 AM       Provider Attestation:      I personally performed the services described in the documentation, reviewed the documentation, as recorded by the scribe in my presence, and it accurately and completely records my words and actions.  January 25, 2018 at 11:04 AM - Benjamin Mejia MD    _______________________________

## 2018-01-25 NOTE — PROGRESS NOTES
Patient placed on bipap for respiratory distress. Patient tolerating well. Suction set up at bedside.

## 2018-01-25 NOTE — H&P
GENERAL GENERIC H&P/CONSULT    Inge Brooks is a 80 y.o. Male with history of A-fib on eliquis, CAD s/p CABG 2008, ICMO EF 55%, PVD/Renal artery stenosis s/p stents, SSS s/p PPM, and HTN who is presents to the ED for complaints of SOB along with CP x3 days ago. Hx is somewhat limited 2/2 BIPAP. Patient just recently discharged on 1/19/18 for presyncope and CP where MI was ruled out. In the interim per EMR patient SOB has gotten much worse since discharge. EMS gave patient asa and nitro which provide some relief to CP and required BIPAP in ED. Patient denies any current CP on my exam and denies any fever, chills, diarrhea, or emesis. Patient however does note non-productive cough. Patient notes to be compliant with all meds. Patient denies any ext pain. No other acute complaints at this time. Past Medical History:   Diagnosis Date    Atrial fibrillation     CHADS score 3  (+CHF, +HTN, +AGE, -DM, -CVA)    CABG     2008   LIMA - LAD,   SVG - RCA    Cardiomyopathy     EF 30-35% (ECHO 6/14)    Coronary artery disease     Dyslipidemia     Hypertension     Hypothyroid     Pacemaker     Peripheral vascular disease     Renal artery stenosis     bilateral stents    Sick sinus syndrome       Past Surgical History:   Procedure Laterality Date    HX CORONARY ARTERY BYPASS GRAFT      2008   LIMA - LAD,   SVG - RCA      Prior to Admission medications    Medication Sig Start Date End Date Taking? Authorizing Provider   isosorbide mononitrate ER (IMDUR) 30 mg tablet Take 1 Tab by mouth daily. 9/15/17   Mane Wakefield MD   amLODIPine (NORVASC) 5 mg tablet Take 5 mg by mouth daily. Historical Provider   nitroglycerin (NITROSTAT) 0.3 mg SL tablet 1 Tab by SubLINGual route every five (5) minutes as needed for Chest Pain (up to 3 total 1 every 5 min).  7/6/17   Mane Wakefield MD   pantoprazole (PROTONIX) 40 mg tablet TK 1 T PO D 20 TO 30 MINUTES BEFORE A MEAL 5/16/17   Historical Provider   metoprolol succinate (TOPROL XL) 50 mg XL tablet Take 50 mg by mouth daily. 1/3/17   Historical Provider   LORazepam (ATIVAN) 0.5 mg tablet TK 1 T PO  BID PRN 2/15/17   Yonny Bourne MD   furosemide (LASIX) 40 mg tablet Take 40 mg by mouth daily. Historical Provider   apixaban (ELIQUIS) 5 mg tablet Take 5 mg by mouth two (2) times a day. Dustin Melo MD   enalapril (VASOTEC) 20 mg tablet Take 20 mg by mouth daily. Dustin Melo MD     Allergies   Allergen Reactions    Beta-Blockers (Beta-Adrenergic Blocking Agts) Drowsiness    Penicillins Swelling    Statins-Hmg-Coa Reductase Inhibitors Drowsiness      Social History   Substance Use Topics    Smoking status: Former Smoker    Smokeless tobacco: Never Used    Alcohol use No      No family history on file. Review of Systems   Unable to perform ROS: Acuity of condition       Objective:    01/25 0701 - 01/25 1900  In: -   Out: 350 [Urine:350]     Patient Vitals for the past 8 hrs:   BP Temp Pulse Resp SpO2   01/25/18 1500 144/69 - 71 19 99 %   01/25/18 1430 135/68 - 70 20 100 %   01/25/18 1400 141/71 - 71 22 -   01/25/18 1330 (!) 131/91 - 70 19 100 %   01/25/18 1300 132/63 - 70 19 99 %   01/25/18 1230 139/69 - 70 19 97 %   01/25/18 1200 (!) 129/99 - 70 18 99 %   01/25/18 1157 - - - - 100 %   01/25/18 1111 196/75 98.3 °F (36.8 °C) 70 30 100 %     Physical Exam   Constitutional: No distress. HENT:   Head: Normocephalic. Eyes: Pupils are equal, round, and reactive to light. Neck: Normal range of motion. Cardiovascular:   Murmur heard. paced   Pulmonary/Chest: No respiratory distress. He has no wheezes. He has rales. He exhibits no tenderness. BIPAP    Left chest wall PPM   Abdominal: Soft. Bowel sounds are normal.   Musculoskeletal: He exhibits edema (trace bilateral lower ext edema). Neurological:   drowsy   Skin: Skin is warm and dry. He is not diaphoretic.         Labs:    Recent Results (from the past 24 hour(s))   EKG, 12 LEAD, INITIAL    Collection Time: 01/25/18 11:04 AM   Result Value Ref Range    Ventricular Rate 77 BPM    Atrial Rate 72 BPM    QRS Duration 176 ms    Q-T Interval 446 ms    QTC Calculation (Bezet) 504 ms    Calculated R Axis -90 degrees    Calculated T Axis 92 degrees    Diagnosis       Electronic ventricular pacemaker  When compared with ECG of 20-JAN-2018 17:19,  Vent. rate has increased BY   4 BPM     CBC WITH AUTOMATED DIFF    Collection Time: 01/25/18 11:10 AM   Result Value Ref Range    WBC 7.3 4.6 - 13.2 K/uL    RBC 5.02 4.70 - 5.50 M/uL    HGB 13.5 13.0 - 16.0 g/dL    HCT 43.7 36.0 - 48.0 %    MCV 87.1 74.0 - 97.0 FL    MCH 26.9 24.0 - 34.0 PG    MCHC 30.9 (L) 31.0 - 37.0 g/dL    RDW 17.9 (H) 11.6 - 14.5 %    PLATELET 026 275 - 011 K/uL    MPV 9.4 9.2 - 11.8 FL    NEUTROPHILS 86 (H) 40 - 73 %    LYMPHOCYTES 8 (L) 21 - 52 %    MONOCYTES 6 3 - 10 %    EOSINOPHILS 0 0 - 5 %    BASOPHILS 0 0 - 2 %    ABS. NEUTROPHILS 6.3 1.8 - 8.0 K/UL    ABS. LYMPHOCYTES 0.6 (L) 0.9 - 3.6 K/UL    ABS. MONOCYTES 0.5 0.05 - 1.2 K/UL    ABS. EOSINOPHILS 0.0 0.0 - 0.4 K/UL    ABS.  BASOPHILS 0.0 0.0 - 0.06 K/UL    DF AUTOMATED     METABOLIC PANEL, BASIC    Collection Time: 01/25/18 11:10 AM   Result Value Ref Range    Sodium 135 (L) 136 - 145 mmol/L    Potassium 4.5 3.5 - 5.5 mmol/L    Chloride 100 100 - 108 mmol/L    CO2 25 21 - 32 mmol/L    Anion gap 10 3.0 - 18 mmol/L    Glucose 120 (H) 74 - 99 mg/dL    BUN 27 (H) 7.0 - 18 MG/DL    Creatinine 1.47 (H) 0.6 - 1.3 MG/DL    BUN/Creatinine ratio 18 12 - 20      GFR est AA 55 (L) >60 ml/min/1.73m2    GFR est non-AA 45 (L) >60 ml/min/1.73m2    Calcium 8.9 8.5 - 10.1 MG/DL   CARDIAC PANEL,(CK, CKMB & TROPONIN)    Collection Time: 01/25/18 11:10 AM   Result Value Ref Range     39 - 308 U/L    CK - MB 2.1 <3.6 ng/ml    CK-MB Index 1.8 0.0 - 4.0 %    Troponin-I, Qt. 0.05 (H) 0.0 - 0.045 NG/ML   NT-PRO BNP    Collection Time: 01/25/18 11:10 AM   Result Value Ref Range    NT pro-BNP 7421 (H) 0 - 1800 PG/ML   POC G3 Collection Time: 01/25/18 11:23 AM   Result Value Ref Range    Device: BIPAP      FIO2 (POC) 0.50 %    pH (POC) 7.415 7.35 - 7.45      pCO2 (POC) 34.8 (L) 35.0 - 45.0 MMHG    pO2 (POC) 169 (H) 80 - 100 MMHG    HCO3 (POC) 22.3 22 - 26 MMOL/L    sO2 (POC) 100 (H) 92 - 97 %    Base deficit (POC) 2 mmol/L    PEEP/CPAP (POC) 7 cmH2O    PIP (POC) 12      Pressure support 5 cmH2O    Allens test (POC) YES      Total resp. rate 29      Site RIGHT RADIAL      Patient temp.  98.7      Specimen type (POC) ARTERIAL      Performed by Cari Johnson      Assessment:  Principal Problem:    SOB (shortness of breath) on exertion (1/25/2018)    Active Problems:    Dyslipidemia ()      Coronary artery disease of native artery of native heart with stable angina pectoris (HCC) ()      Cardiomyopathy ()      Overview: EF 30-35% (ECHO 6/14)      Atrial fibrillation ()      Overview: CHADS score 3  (+CHF, +HTN, +AGE, -DM, -CVA)      Sick sinus syndrome ()      Overview: Developed post op CABG       S/P CABG x 2 (8/25/2015)      Overview: 12/2008, LIMA to LAD, SVG to RCA      Atherosclerotic NAHED (renal artery stenosis), bilateral (HCC) (8/25/2015)      Overview: S/P stenting R and L      Essential hypertension (2/27/2017)        Plan:    SOB 2/2 ICMO/AS  -etiology unclear at this time could be CHF exacerbation 2/2 Mild to moderate Aortic Stenosis  -BNP 7k  -No need for repeat on ECHO as just had one noting   -noting ECHO showing EF 55%  -per ECHO Aortic valve 1cm sq which seems would be closer to severe  -will try with diuretics received in ED  -will try some bumex to see if this helps given worsening Renal function  -Ideally would like nitrates however if worsening AS would avoid as maybe preload dependent  -Cards consult and PCCM consult as patient cannot be off BIPAP at this time   -admit to stepdown  -low suspicion for ACS no active chest pain  -trend troponins  -EKG shows no STEMI on my read  -BB  -recheck ABG  -hold ace for now given renal function  -chest x-ray shows no acute cardiopulmonary abnormalities  -low suspicion for PE as on AC and AA gradient ok  -low suspicion for infection given no purulent cough, afebrile and no leukcoytosis hold on abx  -If this is 2/2 worsening AS then would consider transfer to  for potential BAV/TAVR as seems like fairly good candidate for this.  Defer further to cards  -monitor weight and I&O's    Essential HTN  -stable  -BB   -hold ACE    FRANCIS  -likely worsening  -creat 1.47  -could be cardiorenal  -diuresis for now    Hx of A-fib  -bb  -on elqiuis    Hx of SSS  -s/p PPM    Hx of CAD  -asa, statin, bb  -s/p CABG 2008  -Cath 2016 medical management  -no current active cp    Hx of Athersclerotoic Renal Stenosis  -s/p renal stents    DVT prophylaxis  -scd/teds  -on eliquis    Signed:  Michael Haq NP 1/25/2018

## 2018-01-26 PROBLEM — J90 PLEURAL EFFUSION, RIGHT: Status: ACTIVE | Noted: 2018-01-01

## 2018-01-26 NOTE — PROGRESS NOTES
0720 Bedside and Verbal shift change report given to Kel Perez RN (oncoming nurse) by Ryann Aguilera RN (offgoing nurse). Report included the following information SBAR, Kardex, Procedure Summary, Intake/Output, MAR, Recent Results, Cardiac Rhythm NSR and Alarm Parameters . Pt resting in bed, NAD, pt off BiPap on 3LNC sating 94-96%, bed locked and lowered, side rail up x3. Will continue to monitor. 1100 Pt complaining of SOB after finishing breakfast, O2 sats at 93%, O2 increased to 4LNC, will continue to monitor. 1915 Bedside and Verbal shift change report given to María Hendricks RN (oncoming nurse) by Kel Perez RN (offgoing nurse). Report included the following information SBAR, Kardex, Procedure Summary, Intake/Output, MAR, Recent Results, Cardiac Rhythm V-paced, Afib, mulitfocal PVCs and Alarm Parameters .

## 2018-01-26 NOTE — DIABETES MGMT
NUTRITIONAL ASSESSMENT/ PLAN OF CARE     Maria Isabel Moreland           80 y.o.           1/25/2018                 1. Acute respiratory distress    2. Acute systolic congestive heart failure (HCC)       INTERVENTIONS/PLAN:   Monitor po intake, labs and weights. ASSESSMENT:   Pt is 88 % ideal wt; BMI (calculated): 20.7 kg/m2 (normal weight classification but at nutrition risk with BMI < 23.0 kg/m3in pt >71 years old). Po intake appears to be adequate. No nutrition related problems seen at this time. SUBJECTIVE/OBJECTIVE:   Information obtained from:  Chart review, pt, pt's daughter in law (ICU RN)  Pt reports his weight was stable PTA and that his current weight is his usual weight. He states his appetite is good and that he ate well at breakfast this morning. No known food allergies and pt denies problems with chewing or swallowing. Pt presented to ED with complaints of SOB and CP. PMHx includes CAD/CABG, CHF.      Diet: regular, cardiac    Patient Vitals for the past 100 hrs:   % Diet Eaten   01/25/18 2100 75 %       Medications: [x]                Reviewed     Most Recent POC Glucose: Recent Labs      01/26/18   0400  01/25/18   1110   GLU  103*  120*         Labs:   No results found for: HBA1C, HGBE8, BRS4TYDS  Lab Results   Component Value Date/Time    Sodium 138 01/26/2018 04:00 AM    Potassium 3.5 01/26/2018 04:00 AM    Chloride 101 01/26/2018 04:00 AM    CO2 29 01/26/2018 04:00 AM    Anion gap 8 01/26/2018 04:00 AM    Glucose 103 01/26/2018 04:00 AM    BUN 32 01/26/2018 04:00 AM    Creatinine 1.30 01/26/2018 04:00 AM    Calcium 8.4 01/26/2018 04:00 AM    Magnesium 2.4 07/08/2017 10:16 PM    Phosphorus 3.4 03/01/2011 03:35 AM    Albumin 3.0 01/26/2018 04:00 AM       Anthropometrics: IBW : 72.7 kg (160 lb 4.4 oz), % IBW (Calculated): 87.62 %, BMI (calculated): 20.7  Wt Readings from Last 1 Encounters:   01/26/18 63.7 kg (140 lb 6.9 oz)    Ht Readings from Last 1 Encounters:   01/26/18 5' 9\" (1.753 m)     Last Weight Metrics:  Weight Loss Metrics 1/26/2018 1/21/2018 1/3/2018 11/27/2017 11/6/2017 10/30/2017 9/20/2017   Today's Wt 140 lb 6.9 oz 156 lb 12.8 oz 146 lb 147 lb 145 lb 145 lb 146 lb   BMI 20.74 kg/m2 23.16 kg/m2 21.56 kg/m2 21.71 kg/m2 21.41 kg/m2 21.41 kg/m2 21.56 kg/m2         Estimated Nutrition Needs:  1952 Kcals/day, Protein (g): 82 g Fluid (ml): 2000 ml  Based on:   [x]          Actual BW    []          ABW   []            Adjusted BW        Nutrition Diagnoses:   No nutrition diagnosis at this time. Nutrition Interventions:  None at this time  Goal:   Pt will continue to consume >75% meals by 1/31/18. Weight maintenance (+/- 1-2 kg) by 2/6/18.          Nutrition Monitoring and Evaluation      [x]     Monitor po intake on meal rounds  [x]     Continue inpatient monitoring and intervention  []     Other:      Nutrition Risk:  []   High     [x]  Moderate    []  Minimal/Uncompromised    Naila Najera RD, CDE   Office:  [address] [place] [address] Pager:  [phone]

## 2018-01-26 NOTE — CONSULTS
[facility] - Consult Note    Consultation request by Dr. Addison Jones for advice/opinion related to evaluating shortness of breath    Date of  Admission: 1/25/2018 10:54 AM   Primary Care Physician:  Rika Thomas MD      I saw, evaluated, interviewed and examined the patient personally. I agree with the findings and plan of care as documented below with PA-C note  Presented with some dyspnea and some chest epigastric pain along while he eats. MI ruled out  Known CAD s/p CABG. Last cath in 2015. Also ECHO 01/20/18 with low normal EF and mild AS, ?? Underestimate. Recent stress in 10/18, low risk. Also this could be from severe celiac stenosis causing abdominal angina. Recommend vascular team consult. IF vascular work is unremarkable, consider cardiac work up  [employer], imdur  Start Ranexa  Continue ASA and statin  DW dr. Shria Moon MD         Assessment:     -Cardiomyopathy  -Echo 01/20/2018: EF 55% with no regional wall motion abnormalities, mildly increased LV wall thickness, dilated RV with reduced systolic function, RVSP mildlly to moderately increased, est. 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  -Cardiac cath 09/2015: CAD in native coronaries LAD and RCA as mentioned above. This appears unchanged from prior angiogram. His left internal mammary artery graft is open. His right coronary artery graft appears to be a small caliber vessel, as mentioned above.  However, compared to the angiogram in 2011, there is no significant angiographic change noted. -Hx Atrial fibrillation, on Eliquis and Toprol  -Hx HFpEF, on Lasix  -Hx sick sinus syndrome, s/p pacemaker placement  -Hx HTN, on Norvasc, Enalapril, Toprol as outpatient  -Hx Dyslipidemia  -Hx statin intolerance  -Hx hypothyroidism  -Severe Celiac artery stenosis, duplex of abdominal vasculature 11/10/2017 with > 70% celiac artery stenosis     Plan:     Pt presented to the hospital per his report due to generalized weakness, along with chronic chest tightness and shortness of breath that has been worse over the past few days. He reports chest tightness occurs with eating and has resulted in decreased PO intake. Recent echo showed EF 55% with no regional WMA, recent stress in 09/2017 was unremarkable, ??? Related to severe celiac artery stenosis; would recommend vascular surgery consult. Strict I/O's while on IV Bumex. Would continue Eliquis, Toprol. Reordered outpatient Imdur. Will add Ranexa. No statin due to Hx intolerance. History of Present Illness: This is a 80 y.o. male admitted for SOB (shortness of breath) on exertion. Patient complains of:  Shortness of breath    Pt is an 79 yo M who presented to the ED due to generalized weakness. Pt states that he was discharged from the hospital last week and felt fine the day after discharge but has been having generalized weakness since then. He reports that he has been having chest tightness, especially with eating, intermittently over the past several months, which he feels has been worse over the past few days, causing him to have minimal PO intake over the past few days. He also reports shortness of breath over the past several months that has worsened over the past few days. He states that he has a chronic intermittent dry cough, which he indicates has been worse over the past several days as well.         Cardiac risk factors: dyslipidemia, male gender, hypertension, known Hx CAD, CHF, atrial fibrillation      Review of Symptoms:  Except as stated above include:  Constitutional:  + generalized weakness  Respiratory:  As per HPI  Cardiovascular:  As per HPI  Gastrointestinal: negative  Genitourinary:  negative  Musculoskeletal:  Negative  Neurological:  Negative  Dermatological:  Negative  Endocrinological: Negative  Psychological:  Negative       Past Medical History:     Past Medical History:   Diagnosis Date    Atrial fibrillation     CHADS score 3  (+CHF, +HTN, +AGE, -DM, -CVA)    CABG     2008   LIMA - LAD,   SVG - RCA    Cardiomyopathy     EF 30-35% (ECHO 6/14)    Coronary artery disease     Dyslipidemia     Hypertension     Hypothyroid     Pacemaker     Peripheral vascular disease     Renal artery stenosis     bilateral stents    Sick sinus syndrome          Social History:     Social History     Social History    Marital status:      Spouse name: N/A    Number of children: N/A    Years of education: N/A     Social History Main Topics    Smoking status: Former Smoker    Smokeless tobacco: Never Used    Alcohol use No    Drug use: No    Sexual activity: Not on file     Other Topics Concern    Not on file     Social History Narrative        Family History:   No family history on file. Medications:      Allergies   Allergen Reactions    Beta-Blockers (Beta-Adrenergic Blocking Agts) Drowsiness    Penicillins Swelling    Statins-Hmg-Coa Reductase Inhibitors Drowsiness        Current Facility-Administered Medications   Medication Dose Route Frequency    amLODIPine (NORVASC) tablet 5 mg  5 mg Oral DAILY    apixaban (ELIQUIS) tablet 5 mg  5 mg Oral BID    metoprolol succinate (TOPROL-XL) XL tablet 50 mg  50 mg Oral DAILY    pantoprazole (PROTONIX) tablet 40 mg  40 mg Oral ACB    acetaminophen (TYLENOL) tablet 650 mg  650 mg Oral Q4H PRN    bumetanide (BUMEX) injection 0.5 mg  0.5 mg IntraVENous Q12H    albuterol-ipratropium (DUO-NEB) 2.5 MG-0.5 MG/3 ML  3 mL Nebulization Q4H RT         Physical Exam:     Visit Vitals    /60    Pulse 71    Temp 99.4 °F (37.4 °C)    Resp 23    Wt 140 lb 6.9 oz (63.7 kg)    SpO2 97%    BMI 20.74 kg/m2     BP Readings from Last 3 Encounters:   01/26/18 131/60   01/21/18 161/80   01/03/18 128/80     Pulse Readings from Last 3 Encounters:   01/26/18 71   01/21/18 70   01/03/18 66     Wt Readings from Last 3 Encounters:   01/26/18 140 lb 6.9 oz (63.7 kg)   01/21/18 156 lb 12.8 oz (71.1 kg)   01/03/18 146 lb (66.2 kg)       General:  Thin elderly male in no apparent distress, cooperative  Neck:  No JVD  Lungs:  clear to auscultation bilaterally  Heart:  Regular rate and rhythm  Abdomen:  abdomen is soft without significant tenderness, masses, organomegaly or guarding  Extremities:  no edema  Skin: Warm and dry. Neuro: alert, oriented x3, affect appropriate, no focal neurological deficits, moves all extremities well, no involuntary movements  Psych: non focal     Data Review:     Recent Labs      01/26/18   0400  01/25/18   1110   WBC  5.9  7.3   HGB  12.4*  13.5   HCT  39.9  43.7   PLT  176  180     Recent Labs      01/26/18   0400  01/25/18   1110   NA  138  135*   K  3.5  4.5   CL  101  100   CO2  29  25   GLU  103*  120*   BUN  32*  27*   CREA  1.30  1.47*   CA  8.4*  8.9   ALB  3.0*   --    SGOT  24   --    ALT  17   --        Results for orders placed or performed during the hospital encounter of 01/25/18   EKG, 12 LEAD, INITIAL   Result Value Ref Range    Ventricular Rate 77 BPM    Atrial Rate 72 BPM    QRS Duration 176 ms    Q-T Interval 446 ms    QTC Calculation (Bezet) 504 ms    Calculated R Axis -90 degrees    Calculated T Axis 92 degrees    Diagnosis       Electronic ventricular pacemaker  When compared with ECG of 20-JAN-2018 17:19,  Vent.  rate has increased BY   4 BPM  Confirmed by Shana Allison ([phone]) on 1/26/2018 5:49:49 AM         All Cardiac Markers in the last 24 hours:    Lab Results   Component Value Date/Time    TROIQ 0.06 (H) 01/26/2018 04:00 AM    TROIQ 0.04 01/25/2018 09:03 PM       Last Lipid:    Lab Results   Component Value Date/Time    Cholesterol, total 149 09/09/2015 03:10 AM    HDL Cholesterol 41 09/09/2015 03:10 AM    LDL, calculated 90 09/09/2015 03:10 AM    Triglyceride 90 09/09/2015 03:10 AM    CHOL/HDL Ratio 3.6 09/09/2015 03:10 AM       Signed By: Tiff Araiza PA-C     January 26, 2018

## 2018-01-26 NOTE — PROGRESS NOTES
1930 - Pt arrived on unit. 2000 - Pt hypertensive with a wet cough and wet lungs. Meds given. 2100 - Hospitalist called and orders received for antihypertensives   0708 - Report given to [facility], [place]. Orders, medications, labs, skin, and neuro exam reviewed.

## 2018-01-26 NOTE — PROGRESS NOTES
Neeta   Discharge Planning/ Assessment    Reasons for Intervention: Chart reviewed and pt verified demographics. He has Medicare a and b, and [facility] [employer]. Dr Glenys Hillman is his PCP, last seen a couple months ago. He lives with his wife and states he is independent with ADL. He has not used Home Health in past, but does use a cane. Per notes, he may be transferred to [facility] for a procedure. IF not, when pt is stable, he is agreeable to going to snf in [address] [place] [address]. He states he has been to some previously, [facility] being one. I will put FYI in for in patient PT/OT when better. His plan is snf .     High Risk Criteria  [x] Yes  []No   Physician Referral  [] Yes  []No        Date    Nursing Referral  [] Yes  []No        Date    Patient/Family Request  [] Yes  []No        Date       Resources:    Medicare  [x] Yes  []No   Medicaid  [] Yes  []No   No Resources  [] Yes  []No   Private Insurance  [x] Yes  []No    Name/Phone Number    Other  [] Yes  []No        (i.e. Workman's Comp)         Prior Services:    Prior Services  [] Yes  []No   Home Health  [] Yes  []No   [address]  [] Yes  []No        Number of [address]  [] Yes  []No       Meals on Wheels  [] Yes  []No   Office on Aging  [] Yes  []No   Transportation Services  [] Yes  []No   Nursing Home  [] Yes  []No        Nursing Home Name    [address]  [x] Yes  []No        [address] Name    Other       Information Source:      Information obtained from  [x] Patient  [] Parent   [] Guardian  [x] Child  [] Spouse   [] Significant Other/Partner   [] Friend      [] EMS    [] Nursing Home Chart          [] Other:   Chart Review  [x] Yes  []No     Family/Support System:    Patient lives with  [] Alone    [x] Spouse   [] Significant Other  [] Children  [] Caretaker   [] Parent  [] Sibling     [] Other       Other Support System:    Is the patient responsible for care of others  [] Yes  [x]No Information of person caring for patient on  discharge    Managers financial affairs independently  [x] Yes  []No   If no, explain:      Status Prior to Admission:    Mental Status  [x] Awake  [] Alert  [] Oriented  [] Quiet/Calm [] Lethargic/Sedated   [] Disoriented  [] Restless/Anxious  [] Combative   Personal Care  [] Dependent  [x] [address]  [] Requires Assistance   Meal Preparation Ability  [x] Independent   [] Standby Assistance   [] Minimal Assistance   [] Moderate Assistance  [] Maximum Assistance     [] Total Assistance   Chores  [x] Independent with Chores   [] [address] Resident   [] Requires Assistance   Bowel/Bladder  [x] Continent  [] Catheter  [] Incontinent  [] Ostomy Self-Care    [] Urine Diversion Self-Care  [] Maximum Assistance     [] Total Assistance   Number of Persons needed for assistance    DME at home  [] Margy Schmitt  [] Cheng Robles, Stacia   [] Commode    [] Bathroom/Grab Bars  [] Hospital Bed  [] Nebulizer  [] Oxygen           [] Raised Toilet Seat  [] Shower Chair  [] Side Rails for Bed   [] Tub Transfer Bench   [] Bullock Lis  [] Kristal Ashton, Standard      [] Other:   Vendor      Treatment Presently Receiving:    Current Treatments  [] Chemotherapy  [] Dialysis  [] Insulin  [] IVAB [x] IVF   [x] O2  [] PCA   [] PT   [] RT   [] Tube Feedings   [] Wound Care     Psychosocial Evaluation:    Verbalized Knowledge of Disease Process  [] Patient  []Family   Coping with Disease Process  [] Patient  []Family   Requires Further Counseling Coping with Disease Process  [] Patient  []Family     Identified Projected Needs:    Home Health Aid  [] Yes  []No   Transportation  [] Yes  []No   Education  [] Yes  []No        Specific Education     Financial Counseling  [] Yes  []No   Inability to Care for Self/Will Require 24 hour care  [] Yes  []No   Pain Management  [] Yes  []No   Home Infusion Therapy  [] Yes  []No   Oxygen Therapy  [] Yes  []No   DME  [] Yes  []No   [address] [place] [address] Placement  [] Yes  []No   Rehab  [x] Yes  []No   Physical Therapy  [x] Yes  []No   Needs Anticipated At This Time  [x] Yes  []No     Intra-Hospital Referral:    [facility]  [] Yes  []No     [] Yes  []No   Patient Representative  [] Yes  []No   Staff for Teaching Needs  [] Yes  []No   Specialty Teaching Needs     Diabetic Educator  [] Yes  []No   Referral for Diabetic Educator Needed  [] Yes  []No  If Yes, place order for Nutritionist or Diabetic Consult     Tentative Discharge Plan:    Home with No Services  [] Yes  []No   Home with [address]  [] Yes  []No        If Yes, specify type    Home Care Program  [] Yes  []No        If Yes, specify type    Meals on Wheels  [] Yes  []No   Office of Aging  [] Yes  []No   NHP  [] Yes  []No   Return to the Nursing Home  [] Yes  []No   Rehab Therapy  [] Yes  []No   Acute Rehab  [] Yes  []No   Subacute Rehab  [x] Yes  []No   Private Care  [] Yes  []No   Substance Abuse Referral  [] Yes  []No   Transportation  [] Yes  []No   Chore Service  [] Yes  []No   Inpatient Hospice  [] Yes  []No   OP RT  [] Yes  [] No   OP Hemo  [] Yes  [] No   OP PT  [] Yes  []No   Support Group  [] Yes  []No   Reach to Recovery  [] Yes  []No   OP Oncology Clinic  [] Yes  []No   Clinic Appointment  [] Yes  []No   DME  [] Yes  []No   Comments    Name of D/C Planner or  Given to Patient or Family Ayan Mackenzie   Phone Number         Extension [phone]   Date 01/26/18   Time    If you are discharged home, whom do you designate to participate in your discharge plan and receive any information needed?      Enter name of designee wife        Phone # of designee         Address of designee         Updated         Patient refused to designate any           individual

## 2018-01-26 NOTE — PROGRESS NOTES
Patient with increased work of breathing, frequent/congested cough. Breath sounds diffuse expiratory wheezing throughout with rales bilateral lower lobes. RN notified, MD called and order received for Duoneb treatments and PRN BIPAP. Treatment given with some relief noted. Nasal cannula at 4lpm in use. Will continue to monitor.

## 2018-01-26 NOTE — PROGRESS NOTES
Problem: Falls - Risk of  Goal: *Absence of Falls  Document Agus Fall Risk and appropriate interventions in the flowsheet.    Outcome: Progressing Towards Goal  Fall Risk Interventions:  Mobility Interventions: Bed/chair exit alarm, Patient to call before getting OOB         Medication Interventions: Patient to call before getting OOB    Elimination Interventions: Patient to call for help with toileting needs

## 2018-01-26 NOTE — ACP (ADVANCE CARE PLANNING)
Patient has designated ______wife__________________ to participate in his/her discharge plan and to receive any needed information.      Name: Jaci Rizzo  Address:  Phone number:[phone]

## 2018-01-27 PROBLEM — R93.89 ABNORMAL CHEST CT: Status: ACTIVE | Noted: 2018-01-01

## 2018-01-27 NOTE — PROGRESS NOTES
Progress Note      Patient: Laura Mackenzie               Sex: male          DOA: 1/25/2018       YOB: 1930      Age:  80 y.o.        LOS:  LOS: 2 days             CHIEF COMPLAINT:  COPD exacerbation    Subjective:     Patient has ongoing cough  Respiratory status seems improved overall    Objective:      Visit Vitals    /58    Pulse 72    Temp 97.9 °F (36.6 °C)    Resp 18    Ht 5' 9\" (1.753 m)    Wt 63.9 kg (140 lb 14 oz)    SpO2 100%    BMI 20.8 kg/m2       Physical Exam:  Gen:  Patient is in no distress. No complaint  HEENT and Neck:  PERRL, No cervical tenderness or masses  Lungs:  Clear bilaterally. No rales, no wheeze. Normal effort. Heart:  Regular Rate and Rhythm. No murmur or gallop. No JVD. No edema.   Abdomen:  Soft, non tender, no masses, no Hepatosplenomegally  Extremities:  No digital clubbing, no cyanosis  Neuro:  Alert and oriented, Normal affect, Cranial nerves intact, No sensory deficits        Lab/Data Reviewed:  BMP:   Lab Results   Component Value Date/Time     01/27/2018 03:30 AM    K 3.5 01/27/2018 03:30 AM     01/27/2018 03:30 AM    CO2 28 01/27/2018 03:30 AM    AGAP 10 01/27/2018 03:30 AM    GLU 98 01/27/2018 03:30 AM    BUN 34 (H) 01/27/2018 03:30 AM    CREA 1.33 (H) 01/27/2018 03:30 AM    GFRAA >60 01/27/2018 03:30 AM    GFRNA 51 (L) 01/27/2018 03:30 AM     CBC:   Lab Results   Component Value Date/Time    WBC 6.4 01/27/2018 03:30 AM    HGB 11.9 (L) 01/27/2018 03:30 AM    HCT 38.8 01/27/2018 03:30 AM     01/27/2018 03:30 AM           Assessment/Plan     Principal Problem:    SOB (shortness of breath) on exertion (1/25/2018)    Active Problems:    Dyslipidemia ()      Coronary artery disease of native artery of native heart with stable angina pectoris (HCC) ()      Cardiomyopathy ()      Overview: EF 30-35% (ECHO 6/14)      Atrial fibrillation ()      Overview: CHADS score 3  (+CHF, +HTN, +AGE, -DM, -CVA)      Sick sinus syndrome ()      Overview: Developed post op CABG       S/P CABG x 2 (8/25/2015)      Overview: 12/2008, LIMA to LAD, SVG to RCA      Atherosclerotic NAHED (renal artery stenosis), bilateral (HCC) (8/25/2015)      Overview: S/P stenting R and L      Essential hypertension (2/27/2017)      Pleural effusion, right (1/26/2018)        Plan:  Discussed with Critical Care. Patient likely to need TAVR in the future  Also need for workup of aspiration which could be complicating picture  BP control  Monitor HR and rhythm. Discussed multiple times with Critical Care and with patient.   Total time spent on care:  35 min

## 2018-01-27 NOTE — PROGRESS NOTES
Rec'd pt from 2600 unit (lateral transfer), pt just completed CT of chest.  Arrived in w/c, is able to stand and pivot by himself without distress. Is oriented x 4 , currently with no complaints. Using urinal without difficulty. Placed pt on n/c at 4 liters with 02 sats at 98%. Is taking po fluids well. Currently no distress noted. 2350  Pt HL IV to left interior arm, # 20 is bent, loose and unflushable. Discontinued IV, still has IV heplock to the right arm intact and flushable. Site of removal of IV to left arm has some difficulty to site continuing to bleed. Held pressure to site x about 10 min's. Will add blue top on with lab draws.

## 2018-01-27 NOTE — PROGRESS NOTES
Problem: Falls - Risk of  Goal: *Absence of Falls  Document Agus Fall Risk and appropriate interventions in the flowsheet.    Outcome: Progressing Towards Goal  Fall Risk Interventions:  Mobility Interventions: Patient to call before getting OOB    Mentation Interventions: Door open when patient unattended    Medication Interventions: Patient to call before getting OOB    Elimination Interventions: Call light in reach

## 2018-01-27 NOTE — PROGRESS NOTES
Problem: Falls - Risk of  Goal: *Absence of Falls  Document Agus Fall Risk and appropriate interventions in the flowsheet.    Outcome: Progressing Towards Goal  Fall Risk Interventions:  Mobility Interventions: Patient to call before getting OOB    Mentation Interventions: Door open when patient unattended, More frequent rounding, Toileting rounds    Medication Interventions: Patient to call before getting OOB, Teach patient to arise slowly    Elimination Interventions: Call light in reach, Patient to call for help with toileting needs, Toileting schedule/hourly rounds, Urinal in reach

## 2018-01-27 NOTE — ROUTINE PROCESS
1500 TRANSFER - IN REPORT:    Verbal report received from Epifanio Mercer RN(name) on Laura Mackenzie  being received from 2 Central(unit) for routine progression of care      Report consisted of patients Situation, Background, Assessment and   Recommendations(SBAR). Information from the following report(s) SBAR, Kardex and Cardiac Rhythm Afib- Vpaced was reviewed with the receiving nurse. Opportunity for questions and clarification was provided. Assessment completed upon patients arrival to unit and care assumed. Patient arrived to unit via wheelchair, on 2L nasal cannula, denies pain, placed on tele box #19, confirmed with Alda Patel in tele- Afib, Vpaced, on continuous pulse ox 98%. Pacemaker present to left chest. IV flushed and found to be working well. Educated on unit and room, call bell placed in reach, bed locked in low position.

## 2018-01-27 NOTE — ROUTINE PROCESS
7478 Report received from IMELDA. Patient awake, on 4 L/NC, denies any pain. 3320 Patient assisted to use the urinal.  0940 Patient's med given. Denies any pain. 1035 Patient resting comfortably in the room. 1300 Patient for transfer to Telemetry. 1335 TRANSFER - OUT REPORT:    Verbal report given to [facility] RN(name) on Alma Delia Logan  being transferred to [facility] RN(unit) for routine progression of care       Report consisted of patients Situation, Background, Assessment and   Recommendations(SBAR). Information from the following report(s) SBAR, Intake/Output, MAR, Recent Results and Cardiac Rhythm V paced Atrial Fibrillation was reviewed with the receiving nurse. Lines:   Peripheral IV 01/25/18 Right (Active)   Site Assessment Clean, dry, & intact 1/27/2018  8:36 AM   Phlebitis Assessment 0 1/27/2018  8:36 AM   Infiltration Assessment 0 1/27/2018  8:36 AM   Dressing Status Clean, dry, & intact 1/27/2018  8:36 AM   Dressing Type Transparent;Tape 1/27/2018  8:36 AM   Hub Color/Line Status Green;Flushed;End cap changed 1/27/2018  8:36 AM   Action Taken Open ports on tubing capped 1/27/2018  4:00 AM   Alcohol Cap Used Yes 1/27/2018  8:36 AM        Opportunity for questions and clarification was provided. Patient transported with:   O2 @ 5 liters  Registered Nurse        1400 PVL lower extremities done.

## 2018-01-27 NOTE — PROGRESS NOTES
[facility]   Pulmonary, Critical Care, and Sleep Medicine     Critical Care Progress Note    Name: Loils Lockett   : 11/10/1930   MRN: [id number]   Date: 2018                                 [x]I have reviewed the flowsheet and previous days notes. Events, vitals, medications and notes from last 24 hours reviewed. Care plan discussed on multidisciplinary rounds. [] The patient is unable to give any meaningful history or review of systems because the patient is:  [] Intubated [] Sedated   [] Unresponsive []      []The patient is critically ill on      [] Mechanical ventilation [] Vasoactive agents   [] BiPAP [] Inotropes                 SUBJECTIVE  - This 80 y.o.  male with a remote smoking history was originally seen on 2018 in consultation at the request of Dr. Ashanti Llamas for recommendations on further evaluation and management of severe exertional dyspnea. The patient presented to the ER with complaints of severe exertional dyspnea. He denies anginal chest pain. He is limited in mobility due to dyspnea. He believes that he can negotiate one flight of stairs, but no more. He is unable to walk in a grocery store due to exertional dyspnea. This is a chronic and progressively worsening problem. He has a chronic cough that is invariably non-productive, although he feels as though something needs to come out. He denies pleuritic pain. He denies wheezing. He denies orthopnea or paroxysmal nocturnal dyspnea. He denies significant swelling. No fever or chills. He does endorse dizziness and near syncope with exertion. Also, he notes that he becomes dyspneic while eating. He has never been prescribed long-term oxygen therapy. However, he is currently on 5 LPM via nasal cannula.     He quit smoking over 50 years ago. He was in [place] around the end of the [address] [place] [address] [employer] (did not have to fight in [facility]).  He has never been diagnosed with lung disease.      - Overnight events: no new complaints. Observed to start coughing and become dyspneic while eating breakfast and while talking on the phone. [x] Nutrition: PO diet  [] BM today. ROS: A comprehensive review of systems was negative except for that written in the HPI. Past Medical History:  Past Medical History:   Diagnosis Date    Atrial fibrillation     CHADS score 3  (+CHF, +HTN, +AGE, -DM, -CVA)    CABG        LIMA - LAD,   SVG - RCA    Cardiomyopathy     EF 30-35% (ECHO )    Coronary artery disease     Dyslipidemia     Hypertension     Hypothyroid     Pacemaker     Peripheral vascular disease     Renal artery stenosis     bilateral stents    Sick sinus syndrome       Allergy:  Allergies   Allergen Reactions    Beta-Blockers (Beta-Adrenergic Blocking Agts) Drowsiness    Penicillins Swelling    Statins-Hmg-Coa Reductase Inhibitors Drowsiness        OBJECTIVE  Vital Signs:    Visit Vitals    /58    Pulse 72    Temp 97.9 °F (36.6 °C)    Resp 18    Ht 5' 9\" (1.753 m)    Wt 63.9 kg (140 lb 14 oz)    SpO2 100%    BMI 20.8 kg/m2       O2 Device: Nasal cannula   O2 Flow Rate (L/min): 2 l/min (wean oxygen from 4 lpm)   Temp (24hrs), Av.6 °F (37 °C), Min:97.9 °F (36.6 °C), Max:99.5 °F (37.5 °C)       PHYSICAL EXAM:   General: awake, alert, oriented to time, person and place. No acute distress but easily gets dyspneic  Head: atraumatic, normocephalic  Eye: PERRLA, no scleral icterus, no pallor, no cyanosis  Nose: no sinus tenderness, no erythema, no induration, no discharge  Throat: ETT in situ. no tonsillar enlargement, no erythema, no exudates, no oral thrush  Neck: supple, no thyromegaly, no JVD, no lymphadenopathy. Trachea midline  Lung: Symmetric in development and expansion. Good air entry. No crackles. No wheezes. Heart: Regular S1, S2. I do not appreciate an aortic stenotic murmur; however, 1/6 diastolic murmur at the apex is noted.  Also, 2/6 systolic murmur in the mitral area is noted. No radiation of murmur to carotid is appreciated. Abdomen: soft, flat, bowel sounds present. Nontender, nondistended, no rebound, no guarding. Extremities: no edema, no cyanosis, no clubbing, 2+ peripheral pulses in DP  Lymphatic: no cervical/supraclavicular/axillary lymphadenopathy  Neurologic: cranial nerves II-XII are grossly symmetric and physiologic. No sensory or motor level. No lateralizing signs. DTR 2+ @ R biceps tendon, 2+ @ L biceps tendon, 2+ @ R patellar tendon,2+ @ R patellar tendon. No cerebellar signs. Gait was not assessed. DATA:     Intake/Output:   Last shift:      01/27 0701 - 01/27 1900  In: 240 [P.O.:240]  Out: 550 [Urine:550]    Last 3 shifts: 01/25 1901 - 01/27 0700  In: 1430 [P.O.:1410; I.V.:20]  Out: 2500 [Urine:2500]    Intake/Output Summary (Last 24 hours) at 01/27/18 1242  Last data filed at 01/27/18 1016   Gross per 24 hour   Intake              950 ml   Output             1875 ml   Net             -925 ml     Last 3 Recorded Weights in this Encounter    01/25/18 2106 01/26/18 0400 01/27/18 0643   Weight: 61.2 kg (134 lb 14.7 oz) 63.7 kg (140 lb 6.9 oz) 63.9 kg (140 lb 14 oz)     Lines: All central lines examined by me. No signs of erythema, induration, discharge.      Peripheral Intravenous Line:  Peripheral IV 01/25/18 Right (Active)   Site Assessment Clean, dry, & intact 1/27/2018  8:36 AM   Phlebitis Assessment 0 1/27/2018  8:36 AM   Infiltration Assessment 0 1/27/2018  8:36 AM   Dressing Status Clean, dry, & intact 1/27/2018  8:36 AM   Dressing Type Transparent;Tape 1/27/2018  8:36 AM   Hub Color/Line Status Green;Flushed;End cap changed 1/27/2018  8:36 AM   Action Taken Open ports on tubing capped 1/27/2018  4:00 AM   Alcohol Cap Used Yes 1/27/2018  8:36 AM     Telemetry: [x]Sinus []A-flutter []Paced    []A-fib []Multiple PVCs     Imaging:  [x] I have personally reviewed the patients radiographs  [] Radiographs reviewed with radiologist  [x] No CXR study available for review today  [] No change from prior, tubes and lines are in adequate position  [] Improved   [] Worsening    CXR (1/25): cardiomegaly, small R pleural effusion    CT chest without contrast (1/26): bilateral lower lung predominant tree-in-bud opacities with mediastinal lymphadenopathy. Subpleural blebs. Bibasilar atelectasis. Tiny bilateral pleural effusions. CT Head (1/19): no acute intracranial process. ECHO (1/20): LVEF ~55%; no regional wall motion abnormality. RV dilatation. Estimated peak pressure 35 to 40 mmHg. LA severely dilated. RA severely dilated. Mild to moderate mitral regurgitation. Mild to moderate aortic stenosis, systolic area 1 cm^2. Severe tricuspid regurgitation. Mild to moderate pulmonary regurgitation. Mild aortic root dilatation. IVC dilatation with absence of respirophasic changes. Labs:  Recent Results (from the past 24 hour(s))   METABOLIC PANEL, COMPREHENSIVE    Collection Time: 01/27/18  3:30 AM   Result Value Ref Range    Sodium 139 136 - 145 mmol/L    Potassium 3.5 3.5 - 5.5 mmol/L    Chloride 101 100 - 108 mmol/L    CO2 28 21 - 32 mmol/L    Anion gap 10 3.0 - 18 mmol/L    Glucose 98 74 - 99 mg/dL    BUN 34 (H) 7.0 - 18 MG/DL    Creatinine 1.33 (H) 0.6 - 1.3 MG/DL    BUN/Creatinine ratio 26 (H) 12 - 20      GFR est AA >60 >60 ml/min/1.73m2    GFR est non-AA 51 (L) >60 ml/min/1.73m2    Calcium 8.3 (L) 8.5 - 10.1 MG/DL    Bilirubin, total 0.7 0.2 - 1.0 MG/DL    ALT (SGPT) 16 16 - 61 U/L    AST (SGOT) 27 15 - 37 U/L    Alk.  phosphatase 109 45 - 117 U/L    Protein, total 6.2 (L) 6.4 - 8.2 g/dL    Albumin 3.0 (L) 3.4 - 5.0 g/dL    Globulin 3.2 2.0 - 4.0 g/dL    A-G Ratio 0.9 0.8 - 1.7     CBC WITH AUTOMATED DIFF    Collection Time: 01/27/18  3:30 AM   Result Value Ref Range    WBC 6.4 4.6 - 13.2 K/uL    RBC 4.38 (L) 4.70 - 5.50 M/uL    HGB 11.9 (L) 13.0 - 16.0 g/dL    HCT 38.8 36.0 - 48.0 %    MCV 88.6 74.0 - 97.0 FL    MCH 27.2 24.0 - 34.0 PG    MCHC 30.7 (L) 31.0 - 37.0 g/dL    RDW 18.4 (H) 11.6 - 14.5 %    PLATELET 633 769 - 661 K/uL    MPV 9.7 9.2 - 11.8 FL    NEUTROPHILS 78 (H) 40 - 73 %    LYMPHOCYTES 8 (L) 21 - 52 %    MONOCYTES 12 (H) 3 - 10 %    EOSINOPHILS 1 0 - 5 %    BASOPHILS 1 0 - 2 %    ABS. NEUTROPHILS 5.1 1.8 - 8.0 K/UL    ABS. LYMPHOCYTES 0.5 (L) 0.9 - 3.6 K/UL    ABS. MONOCYTES 0.8 0.05 - 1.2 K/UL    ABS. EOSINOPHILS 0.0 0.0 - 0.4 K/UL    ABS. BASOPHILS 0.0 0.0 - 0.06 K/UL    DF AUTOMATED     PROTHROMBIN TIME + INR    Collection Time: 01/27/18  3:30 AM   Result Value Ref Range    Prothrombin time 19.4 (H) 11.5 - 15.2 sec    INR 1.7 (H) 0.8 - 1.2             Recent Labs      01/25/18   1123   FIO2I  0.50   HCO3I  22.3   PCO2I  34.8*   PHI  7.415   PO2I  169*       Recent Labs      01/27/18   0330  01/26/18   0400  01/25/18   1110   WBC  6.4  5.9  7.3   HGB  11.9*  12.4*  13.5   HCT  38.8  39.9  43.7   PLT  177  176  180     Recent Labs      01/27/18   0330  01/26/18   0400  01/25/18   1110   NA  139  138  135*   K  3.5  3.5  4.5   CL  101  101  100   CO2  28  29  25   GLU  98  103*  120*   BUN  34*  32*  27*   CREA  1.33*  1.30  1.47*   CA  8.3*  8.4*  8.9   ALB  3.0*  3.0*   --    SGOT  27  24   --    ALT  16  17   --    INR  1.7*   --    --      No results for input(s): PH, PCO2, PO2, HCO3, FIO2 in the last 72 hours.     Lab Results   Component Value Date/Time    Color YELLOW 01/25/2017 05:00 PM    Appearance CLEAR 01/25/2017 05:00 PM    Specific gravity 1.015 01/25/2017 05:00 PM    pH (UA) 7.0 01/25/2017 05:00 PM    Protein TRACE 01/25/2017 05:00 PM    Glucose NEGATIVE  01/25/2017 05:00 PM    Ketone NEGATIVE  01/25/2017 05:00 PM    Bilirubin NEGATIVE  01/25/2017 05:00 PM    Urobilinogen 0.2 01/25/2017 05:00 PM    Nitrites NEGATIVE  01/25/2017 05:00 PM    Leukocyte Esterase NEGATIVE  01/25/2017 05:00 PM    Epithelial cells FEW 01/25/2017 05:00 PM    Bacteria NEGATIVE  01/25/2017 05:00 PM    WBC 0 to 1 01/25/2017 05:00 PM    RBC 0 01/25/2017 05:00 PM ASSESSMENT/PLAN:   Principal Problem:    SOB (shortness of breath) on exertion (1/25/2018)    Active Problems:    Dyslipidemia ()      Coronary artery disease of native artery of native heart with stable angina pectoris (HCC) ()      Cardiomyopathy ()      Overview: EF 30-35% (ECHO 6/14)      Atrial fibrillation ()      Overview: CHADS score 3  (+CHF, +HTN, +AGE, -DM, -CVA)      Sick sinus syndrome ()      Overview: Developed post op CABG       S/P CABG x 2 (8/25/2015)      Overview: 12/2008, LIMA to LAD, SVG to RCA      Atherosclerotic NAHED (renal artery stenosis), bilateral (HCC) (8/25/2015)      Overview: S/P stenting R and L      Essential hypertension (2/27/2017)      Pleural effusion, right (1/26/2018)      Abnormal chest CT (1/27/2018)      Overview: Bilateral ground glass opacities and bibasilar atelectasis/infiltrate. Suspicious for aspiration pneumonia. Room air ABG in am. Awaiting U/S B LE. Speech/swallow evaluation advised for further evaluation of chest CT abnormalities. Outpatient pulmonary function testing, when able. Incentive spirometry   PT/OT evaluation and treatment plan    NUTRITION: PO diet/cardiac    PROPHYLAXIS:  DVT prophylaxis: Eliquis  GI prophylaxis: Protonix  HOB 30-degree elevation  Chlorhexidine mouth washes    CODE STATUS: FULL CODE    Further recommendations will be based on the patient's response to recommended treatment and results of the investigation ordered. DISPOSITION: OK for telemetry/monitored floor from pulmonary standpoint.     The patient is: [] acutely ill Risk of deterioration: [] moderate    [] critically ill  [] high     [x]See my orders for details    My assessment, plan of care, findings, medications, side effects etc were discussed with:  [x] Nurse [] PT/OT    [] Respiratory therapy [x] Dr. Chuck Frederick   [] Family [] Patient: answered all questions to satisfaction   [] Pharmacist []      [] Case & management strategies discussed today on multidisciplinary rounds    During this entire length of time I was immediately available to the patient. The reason for providing this level of medical care for this critically ill patient was due a critical illness that impaired one or more vital organ systems such that there was a high probability of imminent or life threatening deterioration in the patients condition. This care involved high complexity decision making to assess, manipulate, and support vital system functions, to treat this degreee vital organ system failure and to prevent further life threatening deterioration of the patients condition    []Total critical care time spent on reviewing the case/medical record/data/notes/EMR/patient examination/documentation/coordinating care with nurse/consultants, exclusive of procedures - minutes with complex decision making performed and > 50% time spent in face to face evaluation.         Sapphire Yin MD   1/27/2018

## 2018-01-27 NOTE — PROGRESS NOTES
Problem: Falls - Risk of  Goal: *Absence of Falls  Document Agus Fall Risk and appropriate interventions in the flowsheet.    Fall Risk Interventions:  Mobility Interventions: Bed/chair exit alarm    Mentation Interventions: Bed/chair exit alarm, More frequent rounding, Familiar objects from home    Medication Interventions: Bed/chair exit alarm    Elimination Interventions: Call light in reach

## 2018-01-27 NOTE — PROCEDURES
[facility]  *** FINAL REPORT ***    Name: Marylen Shown  MRN: [id number]    Inpatient  : 10 Nov 1930  HIS Order #: [id number]  [id number] [facility] Visit #: [id number]  Date: 2018    TYPE OF TEST: Peripheral Venous Testing    REASON FOR TEST  Suspected pulmonary embolism    Right Leg:-  Deep venous thrombosis:           No  Superficial venous thrombosis:    No  Deep venous insufficiency:        Not examined  Superficial venous insufficiency: Not examined    Left Leg:-  Deep venous thrombosis:           No  Superficial venous thrombosis:    No  Deep venous insufficiency:        Not examined  Superficial venous insufficiency: Not examined      INTERPRETATION/FINDINGS  Duplex images were obtained using 2-D gray scale, color flow, and  spectral Doppler analysis. Right leg :  1. Deep vein(s) visualized include the common femoral, deep femoral,  proximal femoral, mid femoral, distal femoral, popliteal(above knee),  popliteal(fossa), popliteal(below knee), posterior tibial and peroneal   veins. 2. No evidence of deep venous thrombosis detected in the veins  visualized. 3. Superficial vein(s) visualized include the great saphenous and  small saphenous veins. 4. No evidence of superficial thrombosis detected. Left leg :  1. Deep vein(s) visualized include the common femoral, deep femoral,  proximal femoral, mid femoral, distal femoral, popliteal(above knee),  popliteal(fossa), popliteal(below knee), posterior tibial and peroneal   veins. 2. No evidence of deep venous thrombosis detected in the veins  visualized. 3. Superficial vein(s) visualized include the great saphenous and  small saphenous veins. 4. No evidence of superficial thrombosis detected. ADDITIONAL COMMENTS    I have personally reviewed the data relevant to the interpretation of  this  study. TECHNOLOGIST: Nuvia Jimenez. Pipe, RTR, RVT  Signed: 2018 03:15 PM    PHYSICIAN: Sergio Wylie MD  Signed: 2018 01:16 PM

## 2018-01-27 NOTE — CONSULTS
[facility]  [address]  [address] [place], [place]  [address] [place] [address] [address] [place] [address], [address] [place] [address]  Dr. Jacki Schaefer, Dr. Sreedhar Hoffman  [phone] FAX# [phone]     Consult    Patient: Sim Waters MRN: [id number]  SSN: [id number]    YOB: 1930  Age: 80 y.o. Sex: male      Subjective:      Sim Waters is a 80 y.o. 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 Patient admitted with generalized weakness. States that he coughs when he swallows and sometimes for several minutes after. States that he feels food is becoming stuck. He does not report pain in abdomen after eating but complains more of cough. Past Medical History:   Diagnosis Date    Atrial fibrillation     CHADS score 3  (+CHF, +HTN, +AGE, -DM, -CVA)    CABG     2008   LIMA - LAD,   SVG - RCA    Cardiomyopathy     EF 30-35% (ECHO 6/14)    Coronary artery disease     Dyslipidemia     Hypertension     Hypothyroid     Pacemaker     Peripheral vascular disease     Renal artery stenosis     bilateral stents    Sick sinus syndrome      Past Surgical History:   Procedure Laterality Date    HX CORONARY ARTERY BYPASS GRAFT      2008   LIMA - LAD,   SVG - RCA      No family history on file.   Social History   Substance Use Topics    Smoking status: Former Smoker    Smokeless tobacco: Never Used    Alcohol use No      Current Facility-Administered Medications   Medication Dose Route Frequency Provider Last Rate Last Dose    isosorbide mononitrate ER (IMDUR) tablet 30 mg  30 mg Oral DAILY Anola Enid, PA-C   30 mg at 01/27/18 6578    ranolazine ER (RANEXA) tablet 500 mg  500 mg Oral BID Anola Enid, PA-C   500 mg at 01/27/18 0958    amLODIPine (NORVASC) tablet 5 mg  5 mg Oral DAILY Richa Varner NP   5 mg at 01/27/18 0942    apixaban (ELIQUIS) tablet 5 mg  5 mg Oral BID Dave Hoffmann, NP   5 mg at 01/27/18 7836    metoprolol succinate (TOPROL-XL) XL tablet 50 mg  50 mg Oral DAILY Chariton Silvano, NP   50 mg at 01/27/18 0815    pantoprazole (PROTONIX) tablet 40 mg  40 mg Oral ACB Dave Silvano, NP   40 mg at 01/27/18 0815    acetaminophen (TYLENOL) tablet 650 mg  650 mg Oral Q4H PRN Dave Schaefer, NP        bumetanide (BUMEX) injection 0.5 mg  0.5 mg IntraVENous Q12H Daverupa Schaefer, NP   0.5 mg at 01/27/18 0820    albuterol-ipratropium (DUO-NEB) 2.5 MG-0.5 MG/3 ML  3 mL Nebulization Q4H RT Lety Denton MD   3 mL at 01/27/18 9770        Allergies   Allergen Reactions    Beta-Blockers (Beta-Adrenergic Blocking Agts) Drowsiness    Penicillins Swelling    Statins-Hmg-Coa Reductase Inhibitors Drowsiness       Review of Systems:  Pertinent items are noted in the History of Present Illness. Objective:     Vitals:    01/27/18 0815 01/27/18 0900 01/27/18 1000 01/27/18 1030   BP:  153/81 165/73    Pulse: 70 71 70 70   Resp: 18 21 24 23   Temp:       SpO2:   (!) 87% 100%   Weight:       Height:            Physical Exam:  GENERAL: alert, cooperative, no distress, appears stated age  THROAT & NECK: normal and no erythema or exudates noted. LUNG: clear to auscultation bilaterally  HEART: regular rate and rhythm, S1, S2 normal, no murmur, click, rub or gallop  ABDOMEN: soft, non-tender.  Bowel sounds normal. No masses,  no organomegaly  EXTREMITIES:  extremities normal, atraumatic, no cyanosis or edema      Assessment:     Hospital Problems  Date Reviewed: 1/26/2018          Codes Class Noted POA    Pleural effusion, right ICD-10-CM: J90  ICD-9-CM: 511.9  1/26/2018 Yes        * (Principal)SOB (shortness of breath) on exertion ICD-10-CM: R06.02  ICD-9-CM: 786.05  1/25/2018 Unknown        Essential hypertension ICD-10-CM: I10  ICD-9-CM: 401.9  2/27/2017 Yes        S/P CABG x 2 ICD-10-CM: Z95.1  ICD-9-CM: V45.81  8/25/2015 Yes    Overview Signed 8/25/2015  2:15 PM by Nahid David MD     12/2008, LIMA to LAD, SVG to RCA             Atherosclerotic NAHED (renal artery stenosis), bilateral ([facility] [address] ICD-10-CM: I70.1  ICD-9-CM: 440.1  8/25/2015 Yes    Overview Signed 8/25/2015  2:16 PM by Nahid David MD     S/P stenting R and L             Dyslipidemia ICD-10-CM: E78.5  ICD-9-CM: 272.4  Unknown Yes        Coronary artery disease of native artery of native heart with stable angina pectoris ([facility] [address] ICD-10-CM: I25.118  ICD-9-CM: 414.01, 413.9  Unknown Yes        Cardiomyopathy ICD-10-CM: I42.9  ICD-9-CM: 425.4  Unknown Yes    Overview Signed 4/29/2015  7:51 PM by Oumar Hollins MD     EF 30-35% (ECHO 6/14)             Atrial fibrillation ICD-10-CM: I48.91  ICD-9-CM: 427.31  Unknown Yes    Overview Signed 4/29/2015  7:51 PM by Oumar Hollins MD     CHADS score 3  (+CHF, +HTN, +AGE, -DM, -CVA)             Sick sinus syndrome ICD-10-CM: I49.5  ICD-9-CM: 427.81  Unknown Yes    Overview Signed 8/25/2015  2:15 PM by Nahid David MD     Developed post op CABG                  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 Complains of cough after swallow and feeling of food becoming stuck. Plan:     Celiac artery stenosis unlikely cause of patients symptoms especially in absence of true post-prandial pain. Could investigate if there is occult other visceral stenosis with CTA abdomen and pelvis but symptomatology does not classically fit mesenteric ischemia. Will be available as needed and follow-up as outpatient as scheduled.      Signed By: Kanika Graham MD     January 27, 2018

## 2018-01-27 NOTE — PROGRESS NOTES
Progress Note      Patient: Lolis Lockett               Sex: male          DOA: 1/25/2018       YOB: 1930      Age:  80 y.o.        LOS:  LOS: 1 day               Subjective:   Discussed with cardiology and with pulmonary. help is greatly appreciated . The pt will be seen by vascular surgery to look at the arteries in the abdomen as the pt has pain and sob on eating . suspect that the dyspnea on eating and on activity is multifactorial the cta of the chest and the 2 d echo results He reports being under a lot of stress lately test last year was negative for ischemia      Objective:      Visit Vitals    /52    Pulse 71    Temp 99.5 °F (37.5 °C)    Resp 23    Ht 5' 9\" (1.753 m)    Wt 63.7 kg (140 lb 6.9 oz)    SpO2 95%    BMI 20.74 kg/m2       Physical Exam:  Pt is awake and is responsive and denies chest pain  Heart reg rate and rhythm   Lungs fair breath sounds heard   Abdomen soft and decreased breath sounds  Neuro nonfocal    Lab/Data Reviewed:  BMP:   Lab Results   Component Value Date/Time     01/26/2018 04:00 AM    K 3.5 01/26/2018 04:00 AM     01/26/2018 04:00 AM    CO2 29 01/26/2018 04:00 AM    AGAP 8 01/26/2018 04:00 AM     (H) 01/26/2018 04:00 AM    BUN 32 (H) 01/26/2018 04:00 AM    CREA 1.30 01/26/2018 04:00 AM    GFRAA >60 01/26/2018 04:00 AM    GFRNA 52 (L) 01/26/2018 04:00 AM     CMP:   Lab Results   Component Value Date/Time     01/26/2018 04:00 AM    K 3.5 01/26/2018 04:00 AM     01/26/2018 04:00 AM    CO2 29 01/26/2018 04:00 AM    AGAP 8 01/26/2018 04:00 AM     (H) 01/26/2018 04:00 AM    BUN 32 (H) 01/26/2018 04:00 AM    CREA 1.30 01/26/2018 04:00 AM    GFRAA >60 01/26/2018 04:00 AM    GFRNA 52 (L) 01/26/2018 04:00 AM    CA 8.4 (L) 01/26/2018 04:00 AM    ALB 3.0 (L) 01/26/2018 04:00 AM    TP 6.1 (L) 01/26/2018 04:00 AM    GLOB 3.1 01/26/2018 04:00 AM    AGRAT 1.0 01/26/2018 04:00 AM    SGOT 24 01/26/2018 04:00 AM    ALT 17 01/26/2018 04:00 AM     CBC:   Lab Results   Component Value Date/Time    WBC 5.9 01/26/2018 04:00 AM    HGB 12.4 (L) 01/26/2018 04:00 AM    HCT 39.9 01/26/2018 04:00 AM     01/26/2018 04:00 AM           Assessment/Plan     Principal Problem:    SOB (shortness of breath) on exertion (1/25/2018)    Active Problems:    Dyslipidemia ()      Coronary artery disease of native artery of native heart with stable angina pectoris (HCC) ()      Cardiomyopathy ()      Overview: EF 30-35% (ECHO 6/14)      Atrial fibrillation ()      Overview: CHADS score 3  (+CHF, +HTN, +AGE, -DM, -CVA)      Sick sinus syndrome ()      Overview: Developed post op CABG       S/P CABG x 2 (8/25/2015)      Overview: 12/2008, LIMA to LAD, SVG to RCA      Atherosclerotic NAHED (renal artery stenosis), bilateral (HCC) (8/25/2015)      Overview: S/P stenting R and L      Essential hypertension (2/27/2017)      Pleural effusion, right (1/26/2018)        Plan: pt will be seen by cardiology pulmonary and vascular surgery . hopefully we can solve the problem of this pt's dyspnea

## 2018-01-27 NOTE — CONSULTS
[facility]  Pulmonary, Critical Care, and Sleep Medicine     Name: Lolis Lockett MRN: [id number]   : 11/10/1930 Hospital: [facility]   Date of Service: 2018        Critical Care Consult          Consult requesting physician: Dr. Ashanti Llamas  Reason for Consult: chest discomfort, dyspnea    HISTORY OF PRESENT ILLNESS:     This 80 y.o.  male with a remote smoking history is seen in consultation at the request of Dr. Ashanti Llamas for recommendations on further evaluation and management of severe exertional dyspnea. The patient presented to the ER with complaints of severe exertional dyspnea. He césar anginal chest pain. He is limited in mobility due to dyspnea. He believes that he can negotiate one flight of stairs, but no more. He is unable to walk in a grocery store due to exertional dyspnea. This is a chronic and progressively worsening problem. He quit smoking over 50 years ago. He was in [employer] around the end of the [address] [place] [address] (did not have to fight in [facility]). He has never been diagnosed with lung disease. He has a chronic cough that is invariably non-productive, although he feels as though something needs to come out. He denies pleuritic pain. He denies wheezing. He denies orthopnea or paroxysmal nocturnal dyspnea. He denies significant swelling. No fever or chills. He does endorse dizziness and near syncope with exertion. Also, he notes that he becomes dyspneic while eating. He has never been prescribed long-term oxygen therapy. However, he is currently on 5 LPM via nasal cannula. Review of Systems:  A comprehensive review of systems was negative except for that written in the HPI.      Allergies   Allergen Reactions    Beta-Blockers (Beta-Adrenergic Blocking Agts) Drowsiness    Penicillins Swelling    Statins-Hmg-Coa Reductase Inhibitors Drowsiness          PAST MEDICAL/SOCIAL/FAMILY HISTORIES     Past Medical History:   Diagnosis Date    Atrial fibrillation     CHADS score 3  (+CHF, +HTN, +AGE, -DM, -CVA)    CABG     2008   LIMA - LAD,   SVG - RCA    Cardiomyopathy     EF 30-35% (ECHO 6/14)    Coronary artery disease     Dyslipidemia     Hypertension     Hypothyroid     Pacemaker     Peripheral vascular disease     Renal artery stenosis     bilateral stents    Sick sinus syndrome       Past Surgical History:   Procedure Laterality Date    HX CORONARY ARTERY BYPASS GRAFT      2008   LIMA - LAD,   SVG - RCA      Social History   Substance Use Topics    Smoking status: Former Smoker    Smokeless tobacco: Never Used    Alcohol use No      No family history on file. Prior to Admission medications    Medication Sig Start Date End Date Taking? Authorizing Provider   isosorbide mononitrate ER (IMDUR) 30 mg tablet Take 1 Tab by mouth daily. 9/15/17   Nata Rivera MD   amLODIPine (NORVASC) 5 mg tablet Take 5 mg by mouth daily. Historical Provider   nitroglycerin (NITROSTAT) 0.3 mg SL tablet 1 Tab by SubLINGual route every five (5) minutes as needed for Chest Pain (up to 3 total 1 every 5 min). 7/6/17   Nata Rivera MD   pantoprazole (PROTONIX) 40 mg tablet TK 1 T PO D 20 TO 30 MINUTES BEFORE A MEAL 5/16/17   Historical Provider   metoprolol succinate (TOPROL XL) 50 mg XL tablet Take 50 mg by mouth daily. 1/3/17   Historical Provider   LORazepam (ATIVAN) 0.5 mg tablet TK 1 T PO  BID PRN 2/15/17   Nata Rivera MD   furosemide (LASIX) 40 mg tablet Take 40 mg by mouth daily. Historical Provider   apixaban (ELIQUIS) 5 mg tablet Take 5 mg by mouth two (2) times a day. Dustin Melo MD   enalapril (VASOTEC) 20 mg tablet Take 20 mg by mouth daily.     Dustin Melo MD     Current Facility-Administered Medications   Medication Dose Route Frequency    [START ON 1/27/2018] isosorbide mononitrate ER (IMDUR) tablet 30 mg  30 mg Oral DAILY    ranolazine ER (RANEXA) tablet 500 mg  500 mg Oral BID    amLODIPine (NORVASC) tablet 5 mg 5 mg Oral DAILY    apixaban (ELIQUIS) tablet 5 mg  5 mg Oral BID    metoprolol succinate (TOPROL-XL) XL tablet 50 mg  50 mg Oral DAILY    pantoprazole (PROTONIX) tablet 40 mg  40 mg Oral ACB    bumetanide (BUMEX) injection 0.5 mg  0.5 mg IntraVENous Q12H    albuterol-ipratropium (DUO-NEB) 2.5 MG-0.5 MG/3 ML  3 mL Nebulization Q4H RT       Objective: Intake/Output:     Last 3 shifts:  0701 -  1900  In: 960 [P.O.:940; I.V.:20]  Out: 2150 [Urine:2150]    Intake/Output Summary (Last 24 hours) at 18 1933  Last data filed at 18 1700   Gross per 24 hour   Intake              960 ml   Output             1400 ml   Net             -440 ml     Last 3 Recorded Weights in this Encounter    18 2106 18 0400   Weight: 61.2 kg (134 lb 14.7 oz) 63.7 kg (140 lb 6.9 oz)     Hemodynamics:   . @MAP     . @CVP       Physical Exam:  Vital Signs:    Visit Vitals    /63    Pulse 72    Temp 99.5 °F (37.5 °C)    Resp 24    Ht 5' 9\" (1.753 m)    Wt 63.7 kg (140 lb 6.9 oz)    SpO2 97%    BMI 20.74 kg/m2       O2 Device: Nasal cannula   O2 Flow Rate (L/min): 5 l/min   Temp (24hrs), Av.9 °F (37.2 °C), Min:98.1 °F (36.7 °C), Max:99.5 °F (37.5 °C)       General: alert, awake and oriented to time, person, place. Cooperative. No acute distress. Head: atraumatic, normocephalic  Eye: PERRLA, EOM intact, no scleral icterus, no pallor, no cyanosis  Nose: Nares are patent. No polyps. No exudate. No sinus tenderness. Throat: No oral thrush. No exudate. Mucous membranes are moist. No tonsillar enlargement. Neck: supple, no thyromegaly, no JVD, no lymphadenopathy. Trachea midline  Lung: Symmetric in development and expansion. Good air entry. BIlateral crackles. No wheezes. Heart: Regular S1, S2. I do not appreciate an aortic stenotic murmur; however, 1/6 diastolic murmur at the apex is noted. Also, 2/6 systolic murmur in the mitral area is noted.  No radiation of murmur to carotid is appreciated. Abdomen: soft, nontender, nondistended. Normoactive bowel sounds. No rebound. No guarding. Extremities: no pedal edema, no cyanosis, no clubbing, 2+ peripheral pulses in DP  Lymphatic: no cervical/axillary/inguinal lymphadenopathy  Neurologic: Cranial nerves II-XII are grossly symmetric and physiologic. Babinski negative. No sensory deficit. No motor deficit. [email or web address], [email or web address]. No cerebellar signs. Gait was not assessed. Skin: No rash or lesion. Data:     Recent Results (from the past 24 hour(s))   TROPONIN I    Collection Time: 01/25/18  9:03 PM   Result Value Ref Range    Troponin-I, Qt. 0.04 0.0 - 0.045 NG/ML   TROPONIN I    Collection Time: 01/26/18  4:00 AM   Result Value Ref Range    Troponin-I, Qt. 0.06 (H) 0.0 - 6.997 NG/ML   METABOLIC PANEL, COMPREHENSIVE    Collection Time: 01/26/18  4:00 AM   Result Value Ref Range    Sodium 138 136 - 145 mmol/L    Potassium 3.5 3.5 - 5.5 mmol/L    Chloride 101 100 - 108 mmol/L    CO2 29 21 - 32 mmol/L    Anion gap 8 3.0 - 18 mmol/L    Glucose 103 (H) 74 - 99 mg/dL    BUN 32 (H) 7.0 - 18 MG/DL    Creatinine 1.30 0.6 - 1.3 MG/DL    BUN/Creatinine ratio 25 (H) 12 - 20      GFR est AA >60 >60 ml/min/1.73m2    GFR est non-AA 52 (L) >60 ml/min/1.73m2    Calcium 8.4 (L) 8.5 - 10.1 MG/DL    Bilirubin, total 0.9 0.2 - 1.0 MG/DL    ALT (SGPT) 17 16 - 61 U/L    AST (SGOT) 24 15 - 37 U/L    Alk.  phosphatase 117 45 - 117 U/L    Protein, total 6.1 (L) 6.4 - 8.2 g/dL    Albumin 3.0 (L) 3.4 - 5.0 g/dL    Globulin 3.1 2.0 - 4.0 g/dL    A-G Ratio 1.0 0.8 - 1.7     CBC WITH AUTOMATED DIFF    Collection Time: 01/26/18  4:00 AM   Result Value Ref Range    WBC 5.9 4.6 - 13.2 K/uL    RBC 4.61 (L) 4.70 - 5.50 M/uL    HGB 12.4 (L) 13.0 - 16.0 g/dL    HCT 39.9 36.0 - 48.0 %    MCV 86.6 74.0 - 97.0 FL    MCH 26.9 24.0 - 34.0 PG    MCHC 31.1 31.0 - 37.0 g/dL    RDW 18.1 (H) 11.6 - 14.5 %    PLATELET 301 278 - 172 K/uL    MPV 9.4 9.2 - 11.8 FL    NEUTROPHILS 84 (H) 40 - 73 %    LYMPHOCYTES 6 (L) 21 - 52 %    MONOCYTES 10 3 - 10 %    EOSINOPHILS 0 0 - 5 %    BASOPHILS 0 0 - 2 %    ABS. NEUTROPHILS 4.9 1.8 - 8.0 K/UL    ABS. LYMPHOCYTES 0.3 (L) 0.9 - 3.6 K/UL    ABS. MONOCYTES 0.6 0.05 - 1.2 K/UL    ABS. EOSINOPHILS 0.0 0.0 - 0.4 K/UL    ABS. BASOPHILS 0.0 0.0 - 0.06 K/UL    DF AUTOMATED             Recent Labs      01/25/18   1123   FIO2I  0.50   HCO3I  22.3   PCO2I  34.8*   PHI  7.415   PO2I  169*       Recent Labs      01/26/18   0400  01/25/18   1110   WBC  5.9  7.3   HGB  12.4*  13.5   HCT  39.9  43.7   PLT  176  180     Recent Labs      01/26/18   0400  01/25/18   1110   NA  138  135*   K  3.5  4.5   CL  101  100   CO2  29  25   GLU  103*  120*   BUN  32*  27*   CREA  1.30  1.47*   CA  8.4*  8.9   ALB  3.0*   --    SGOT  24   --    ALT  17   --      No results for input(s): PH, PCO2, PO2, HCO3, FIO2 in the last 72 hours. Lab Results   Component Value Date/Time    Color YELLOW 01/25/2017 05:00 PM    Appearance CLEAR 01/25/2017 05:00 PM    Specific gravity 1.015 01/25/2017 05:00 PM    pH (UA) 7.0 01/25/2017 05:00 PM    Protein TRACE 01/25/2017 05:00 PM    Glucose NEGATIVE  01/25/2017 05:00 PM    Ketone NEGATIVE  01/25/2017 05:00 PM    Bilirubin NEGATIVE  01/25/2017 05:00 PM    Urobilinogen 0.2 01/25/2017 05:00 PM    Nitrites NEGATIVE  01/25/2017 05:00 PM    Leukocyte Esterase NEGATIVE  01/25/2017 05:00 PM    Epithelial cells FEW 01/25/2017 05:00 PM    Bacteria NEGATIVE  01/25/2017 05:00 PM    WBC 0 to 1 01/25/2017 05:00 PM    RBC 0 01/25/2017 05:00 PM       Telemetry: normal sinus rhythm    Imaging:  [x] I have personally reviewed the patients CXR radiographs  [] Radiographs reviewed with radiologist  [] No CXR study available for review today  [] No change from prior study, tubes and lines are in adequate position  [] Improved   []Worsening    CXR (1/25): small right pleural effusion  CT Head (1/19): no acute intracranial process.   ECHO (1/20): LVEF ~55%; no regional wall motion abnormality. RV dilatation. Estimated peak pressure 35 to 40 mmHg. LA severely dilated. RA severely dilated. Mild to moderate mitral regurgitation. Mild to moderate aortic stenosis, systolic area 1 cm^2. Severe tricuspid regurgitation. Mild to moderate pulmonary regurgitation. Mild aortic root dilatation. IVC dilatation with absence of respirophasic changes. ASSESSMENT:   Principal Problem:    SOB (shortness of breath) on exertion (1/25/2018)    Active Problems:    Dyslipidemia ()      Coronary artery disease of native artery of native heart with stable angina pectoris (HCC) ()      Cardiomyopathy ()      Overview: EF 30-35% (ECHO 6/14)      Atrial fibrillation ()      Overview: CHADS score 3  (+CHF, +HTN, +AGE, -DM, -CVA)      Sick sinus syndrome ()      Overview: Developed post op CABG       S/P CABG x 2 (8/25/2015)      Overview: 12/2008, LIMA to LAD, SVG to RCA      Atherosclerotic NAHED (renal artery stenosis), bilateral (HCC) (8/25/2015)      Overview: S/P stenting R and L      Essential hypertension (2/27/2017)      Pleural effusion, right (1/26/2018)      CODE STATUS: FULL CODE      PLAN/RECOMMENDATIONS:   · Supplemental oxygen titrated to keep SpO2 93+%  · ABG in am  · Chest CTA would be helpful to investigate cause of RV dilatation (survey of lung parenchyma and investigation of possible pulmonary embolism). However, with marginal renal function and potential need for dye exposure (cardiac evaluation), will opt for CT without contrast and will check U/S B LE in lieu of chest CTA. Indeed, the patient is on Eliquis at baseline. .  · Outpatient pulmonary function testing, when able  · Nutrition: cardiac diet  · Glucose stabilizer per ICU protocol  · Replace electrolytes per ICU electrolyte replacement protocol  · HOB >=30 degree  · PT/OT eval and treat  · GI Prophylaxis with Protonix  · DVT Prophylaxis with Eliquis  · Further recommendations will be based on the patient's response to recommended treatment and results of the investigation ordered. The patient is: [x] acutely ill Risk of deterioration: [] moderate    [x] critically ill  [x] high     My assessment, plan of care, findings, medications, side effects, etc were discussed with:  [x] Nurse [] PT/OT    [x] Respiratory therapy [x] Dr. Alfredo Solano   [x] Family [x] Patient: answered all questions to satisfaction   [] Pharmacist []      [x] Case & management strategies discussed today on multidisciplinary rounds    []Total critical care time spent: - minutes, reviewing the case, medical record, data, notes, EMR, patient examination, documentation, coordinating care with nurse/consultants, exclusive of procedures (with complex decision making performed and > 50% time spent in face to face evaluation).       Sapphire Yin MD  1/26/2018

## 2018-01-27 NOTE — PROGRESS NOTES
Rec'd pt on 4 lpm NC - pt awake & alert - no SOB reported by pt or observed  -difficulty with readings on pulse ox  -administer neb    1225-Pt remains on 4 lpm NC - awake & alert, reports no SOB  Wean oxygen to 2 lpm (sats 100% on 4 lpm)  Administer neb

## 2018-01-27 NOTE — PROGRESS NOTES
[facility] - Consult Note    Consultation request by Dr. Oscar Ladd for advice/opinion related to evaluating shortness of breath    Date of  Admission: 1/25/2018 10:54 AM   Primary Care Physician:  Lyubov Wilson MD      Subjective:  No CP  Coughing and some epigastric lower chest when cough and sometimes with eating       Assessment:     -Cardiomyopathy  -Echo 01/20/2018: EF 55% with no regional wall motion abnormalities, mildly increased LV wall thickness, dilated RV with reduced systolic function, RVSP mildlly to moderately increased, est. 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  -Cardiac cath 09/2015: CAD in native coronaries LAD and RCA as mentioned above. This appears unchanged from prior angiogram. His left internal mammary artery graft is open. His right coronary artery graft appears to be a small caliber vessel, as mentioned above. However, compared to the angiogram in 2011, there is no significant angiographic change noted. 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 Last cath in 2015. Recent stress in 10/17, low risk. Also this could be from severe celiac stenosis causing ? ? abdominal angina. Recommend vascular team consult. IF vascular work is unremarkable, consider cardiac work up  Continue BB, imdur  Started Ranexa  Continue ASA and statin  CT chest results pending    Delores Jones MD       Physical Exam:     Visit Vitals    /81    Pulse 71    Temp 97.9 °F (36.6 °C)    Resp 21    Ht 5' 9\" (1.753 m)    Wt 140 lb 14 oz (63.9 kg)    SpO2 99%    BMI 20.8 kg/m2     BP Readings from Last 3 Encounters:   01/27/18 153/81   01/21/18 161/80   01/03/18 128/80     Pulse Readings from Last 3 Encounters:   01/27/18 71   01/21/18 70   01/03/18 66     Wt Readings from Last 3 Encounters:   01/27/18 140 lb 14 oz (63.9 kg)   01/21/18 156 lb 12.8 oz (71.1 kg)   01/03/18 146 lb (66.2 kg)       General:  Thin elderly male in no apparent distress, cooperative  Neck:  No JVD  Lungs:  No significantn rales. Rhonchi +  Heart:  Regular rate and rhythm  Abdomen:  abdomen is soft   Extremities:  no edema  Skin: Warm and dry. Neuro: alert, oriented x3,      Data Review:     Recent Labs      01/27/18   0330  01/26/18   0400  01/25/18   1110   WBC  6.4  5.9  7.3   HGB  11.9*  12.4*  13.5   HCT  38.8  39.9  43.7   PLT  177  176  180     Recent Labs      01/27/18   0330  01/26/18   0400  01/25/18   1110   NA  139  138  135*   K  3.5  3.5  4.5   CL  101  101  100   CO2  28  29  25   GLU  98  103*  120*   BUN  34*  32*  27*   CREA  1.33*  1.30  1.47*   CA  8.3*  8.4*  8.9   ALB  3.0*  3.0*   --    SGOT  27  24   --    ALT  16  17   --    INR  1.7*   --    --        Results for orders placed or performed during the hospital encounter of 01/25/18   EKG, 12 LEAD, INITIAL   Result Value Ref Range    Ventricular Rate 77 BPM    Atrial Rate 72 BPM    QRS Duration 176 ms    Q-T Interval 446 ms    QTC Calculation (Bezet) 504 ms    Calculated R Axis -90 degrees    Calculated T Axis 92 degrees    Diagnosis       Electronic ventricular pacemaker  When compared with ECG of 20-JAN-2018 17:19,  Vent.  rate has increased BY   4 BPM  Confirmed by Sung Lopez ([id number]) on 1/26/2018 5:49:49 AM         All Cardiac Markers in the last 24 hours:    No results found for: CPK, CK, CKMMB, CKMB, RCK3, CKMBT, CKNDX, CKND1, DARNELL, TROPT, TROIQ, MARIA ESTHER, TROPT, TNIPOC, BNP, BNPP    Last Lipid:    Lab Results   Component Value Date/Time    Cholesterol, total 149 09/09/2015 03:10 AM    HDL Cholesterol 41 09/09/2015 03:10 AM    LDL, calculated 90 09/09/2015 03:10 AM    Triglyceride 90 09/09/2015 03:10 AM    CHOL/HDL Ratio 3.6 09/09/2015 03:10 AM       Signed By: Selwyn Lloyd MD     January 27, 2018

## 2018-01-28 PROBLEM — I35.0 AORTIC STENOSIS, MODERATE: Status: ACTIVE | Noted: 2018-01-01

## 2018-01-28 NOTE — ROUTINE PROCESS
Assumed care of patient at 80 report received from Eloy BAEZ RN. Patient walking around the room with nasal cannula at 2L with humidifier. Noted patient with dyspnea on exertion, no cp and denies pain. Call bell within reach. 2031 - Assessment completed. Patient resting watching tv and NAD.  2200 - Pt resting.  0215 - Pt eating and denies pain. 0430 - Pt sleeping, respiration unlabored. 4270 - Bedside and Verbal shift change report given to Eloy BAEZ RN (oncoming nurse) by Pee Rock RN BSN (offgoing nurse). Report given with SBAR, Kardex, Intake/Output, MAR and Recent Results.

## 2018-01-28 NOTE — PROGRESS NOTES
Progress Note      Patient: Alyssia Buck               Sex: male          DOA: 1/25/2018       YOB: 1930      Age:  80 y.o.        LOS:  LOS: 3 days             CHIEF COMPLAINT:  CHF, Aortic Valve leak, potential recurrent aspiration    Subjective:     Patient feels okay. He feels he is breathing a little better today than yesterday.     Objective:      Visit Vitals    /82 (BP 1 Location: Right arm, BP Patient Position: At rest)    Pulse 70    Temp 97.5 °F (36.4 °C)    Resp 20    Ht 5' 9\" (1.753 m)    Wt 63.9 kg (140 lb 12.8 oz)    SpO2 95%    BMI 20.79 kg/m2       Physical Exam:  Gen:  No distress, no complaint  Lungs:  Clear bilaterally, no wheeze or rhonchi  Heart:  Regular rate and rhythm, no murmurs or gallops  Abdomen:  Soft, non-tender, normal bowel sounds        Lab/Data Reviewed:  BMP:   Lab Results   Component Value Date/Time     01/28/2018 05:20 AM    K 3.2 (L) 01/28/2018 05:20 AM    CL 97 (L) 01/28/2018 05:20 AM    CO2 30 01/28/2018 05:20 AM    AGAP 9 01/28/2018 05:20 AM    GLU 84 01/28/2018 05:20 AM    BUN 33 (H) 01/28/2018 05:20 AM    CREA 1.27 01/28/2018 05:20 AM    GFRAA >60 01/28/2018 05:20 AM    GFRNA 54 (L) 01/28/2018 05:20 AM     CBC:   Lab Results   Component Value Date/Time    WBC 5.7 01/28/2018 05:20 AM    HGB 11.2 (L) 01/28/2018 05:20 AM    HCT 36.4 01/28/2018 05:20 AM     01/28/2018 05:20 AM           Assessment/Plan     Principal Problem:    SOB (shortness of breath) on exertion (1/25/2018)    Active Problems:    Dyslipidemia ()      Coronary artery disease of native artery of native heart with stable angina pectoris (HCC) ()      Cardiomyopathy ()      Overview: EF 30-35% (ECHO 6/14)      Atrial fibrillation ()      Overview: CHADS score 3  (+CHF, +HTN, +AGE, -DM, -CVA)      Sick sinus syndrome ()      Overview: Developed post op CABG       S/P CABG x 2 (8/25/2015)      Overview: 12/2008, LIMA to LAD, SVG to RCA      Atherosclerotic NAHED (renal artery stenosis), bilateral (HCC) (8/25/2015)      Overview: S/P stenting R and L      Essential hypertension (2/27/2017)      Pleural effusion, right (1/26/2018)      Abnormal chest CT (1/27/2018)      Overview: Bilateral ground glass opacities and bibasilar atelectasis/infiltrate. Suspicious for aspiration pneumonia. Plan:  Continue with respiratory treatments  Proceed with swallowing eval, he reports that he has had swallowing eval previously and that the helped him with exercises to help strengthen his swallowing muscles to help prevent aspiration previously. Despite this, I think it is still important to proceed with swallowing evaluation currently to assess his current situation based on conversation with Pulmonology  Patient may require future valve replacement.   BP control  Follow renal function  DVT prophylaxis:  SCD's

## 2018-01-28 NOTE — PROGRESS NOTES
[facility]   Pulmonary, Critical Care, and Sleep Medicine     Critical Care Progress Note    Name: Sim Waters   : 11/10/1930   MRN: [id number]   Date: 2018                                 [x]I have reviewed the flowsheet and previous days notes. Events, vitals, medications and notes from last 24 hours reviewed. Care plan discussed on multidisciplinary rounds. [] The patient is unable to give any meaningful history or review of systems because the patient is:  [] Intubated [] Sedated   [] Unresponsive []      []The patient is critically ill on      [] Mechanical ventilation [] Vasoactive agents   [] BiPAP [] Inotropes                 SUBJECTIVE  - This 80 y.o.  male with a remote smoking history was originally seen on 2018 in consultation at the request of Dr. José Antonio Pak for recommendations on further evaluation and management of severe exertional dyspnea. The patient presented to the ER with complaints of severe exertional dyspnea. He denies anginal chest pain. He is limited in mobility due to dyspnea. He believes that he can negotiate one flight of stairs, but no more. He is unable to walk in a grocery store due to exertional dyspnea. This is a chronic and progressively worsening problem. He has a chronic cough that is invariably non-productive, although he feels as though something needs to come out. He denies pleuritic pain. He denies wheezing. He denies orthopnea or paroxysmal nocturnal dyspnea. He denies significant swelling. No fever or chills. He does endorse dizziness and near syncope with exertion. Also, he notes that he becomes dyspneic while eating. He has never been prescribed long-term oxygen therapy. However, he is currently on 5 LPM via nasal cannula.     He quit smoking over 50 years ago. He was in [employer] around the end of the [address] [place] [address] [employer] (did not have to fight in [facility]).  He has never been diagnosed with lung disease.      - Overnight events: transferred to the floor in the interim. No new complaints. Looks better. Feels better. On 2 LPM, SpO2 94%. Strong cough, no sputum production. [x] Nutrition: PO diet  [] BM today. ROS: A comprehensive review of systems was negative except for that written in the HPI. Past Medical History:  Past Medical History:   Diagnosis Date    Atrial fibrillation     CHADS score 3  (+CHF, +HTN, +AGE, -DM, -CVA)    CABG        LIMA - LAD,   SVG - RCA    Cardiomyopathy     EF 30-35% (ECHO )    Coronary artery disease     Dyslipidemia     Hypertension     Hypothyroid     Pacemaker     Peripheral vascular disease     Renal artery stenosis     bilateral stents    Sick sinus syndrome       Allergy:  Allergies   Allergen Reactions    Beta-Blockers (Beta-Adrenergic Blocking Agts) Drowsiness    Penicillins Swelling    Statins-Hmg-Coa Reductase Inhibitors Drowsiness        OBJECTIVE  Vital Signs:    Visit Vitals    /63 (BP 1 Location: Left arm, BP Patient Position: At rest)    Pulse 70    Temp 97.9 °F (36.6 °C)    Resp 20    Ht 5' 9\" (1.753 m)    Wt 63.9 kg (140 lb 12.8 oz)    SpO2 94%    BMI 20.79 kg/m2       O2 Device: Nasal cannula   O2 Flow Rate (L/min): 2 l/min   Temp (24hrs), Av.6 °F (36.4 °C), Min:97.3 °F (36.3 °C), Max:97.9 °F (36.6 °C)       PHYSICAL EXAM:   General: awake, alert, oriented to time, person and place. No acute distress but easily gets dyspneic  Head: atraumatic, normocephalic  Eye: PERRLA, no scleral icterus, no pallor, no cyanosis  Nose: no sinus tenderness, no erythema, no induration, no discharge  Throat: ETT in situ. no tonsillar enlargement, no erythema, no exudates, no oral thrush  Neck: supple, no thyromegaly, no JVD, no lymphadenopathy. Trachea midline  Lung: Symmetric in development and expansion. Good air entry. No crackles. No wheezes.    Heart: Regular S1, S2. I do not appreciate an aortic stenotic murmur; however, 1/6 diastolic murmur at the apex is noted. Also, 2/6 systolic murmur in the mitral area is noted. No radiation of murmur to carotid is appreciated. Abdomen: soft, flat, bowel sounds present. Nontender, nondistended, no rebound, no guarding. Extremities: no edema, no cyanosis, no clubbing, 2+ peripheral pulses in DP  Lymphatic: no cervical/supraclavicular/axillary lymphadenopathy  Neurologic: cranial nerves II-XII are grossly symmetric and physiologic. No sensory or motor level. No lateralizing signs. DTR 2+ @ R biceps tendon, 2+ @ L biceps tendon, 2+ @ R patellar tendon,2+ @ R patellar tendon. No cerebellar signs. Gait was not assessed. DATA:     Intake/Output:   Last shift:      01/28 0701 - 01/28 1900  In: 240 [P.O.:240]  Out: -     Last 3 shifts: 01/26 1901 - 01/28 0700  In: 1290 [P.O.:1290]  Out: 7666 [Urine:2790]    Intake/Output Summary (Last 24 hours) at 01/28/18 1414  Last data filed at 01/28/18 0953   Gross per 24 hour   Intake              920 ml   Output              800 ml   Net              120 ml     Last 3 Recorded Weights in this Encounter    01/27/18 0643 01/27/18 1520 01/28/18 0447   Weight: 63.9 kg (140 lb 14 oz) 62.8 kg (138 lb 7.2 oz) 63.9 kg (140 lb 12.8 oz)     Lines: All central lines examined by me. No signs of erythema, induration, discharge.      Peripheral Intravenous Line:  Peripheral IV 01/25/18 Right (Active)   Site Assessment Clean, dry, & intact 1/27/2018  8:36 AM   Phlebitis Assessment 0 1/27/2018  8:36 AM   Infiltration Assessment 0 1/27/2018  8:36 AM   Dressing Status Clean, dry, & intact 1/27/2018  8:36 AM   Dressing Type Transparent;Tape 1/27/2018  8:36 AM   Hub Color/Line Status Green;Flushed;End cap changed 1/27/2018  8:36 AM   Action Taken Open ports on tubing capped 1/27/2018  4:00 AM   Alcohol Cap Used Yes 1/27/2018  8:36 AM     Telemetry: [x]Sinus []A-flutter []Paced    []A-fib []Multiple PVCs     Imaging:  [x] I have personally reviewed the patients radiographs  [] Radiographs reviewed with radiologist  [x] No CXR study available for review today  [] No change from prior, tubes and lines are in adequate position  [] Improved   [] Worsening    CXR (1/25): cardiomegaly, small R pleural effusion    CT chest without contrast (1/26): bilateral lower lung predominant tree-in-bud opacities with mediastinal lymphadenopathy. Subpleural blebs. Bibasilar atelectasis. Tiny bilateral pleural effusions. CT Head (1/19): no acute intracranial process. ECHO (1/20): LVEF ~55%; no regional wall motion abnormality. RV dilatation. Estimated peak pressure 35 to 40 mmHg. LA severely dilated. RA severely dilated. Mild to moderate mitral regurgitation. Mild to moderate aortic stenosis, systolic area 1 cm^2. Severe tricuspid regurgitation. Mild to moderate pulmonary regurgitation. Mild aortic root dilatation. IVC dilatation with absence of respirophasic changes. U/S B LE (1/28): negative for DVT               Labs:  Recent Results (from the past 24 hour(s))   METABOLIC PANEL, COMPREHENSIVE    Collection Time: 01/28/18  5:20 AM   Result Value Ref Range    Sodium 136 136 - 145 mmol/L    Potassium 3.2 (L) 3.5 - 5.5 mmol/L    Chloride 97 (L) 100 - 108 mmol/L    CO2 30 21 - 32 mmol/L    Anion gap 9 3.0 - 18 mmol/L    Glucose 84 74 - 99 mg/dL    BUN 33 (H) 7.0 - 18 MG/DL    Creatinine 1.27 0.6 - 1.3 MG/DL    BUN/Creatinine ratio 26 (H) 12 - 20      GFR est AA >60 >60 ml/min/1.73m2    GFR est non-AA 54 (L) >60 ml/min/1.73m2    Calcium 8.1 (L) 8.5 - 10.1 MG/DL    Bilirubin, total 0.8 0.2 - 1.0 MG/DL    ALT (SGPT) 15 (L) 16 - 61 U/L    AST (SGOT) 29 15 - 37 U/L    Alk.  phosphatase 98 45 - 117 U/L    Protein, total 6.0 (L) 6.4 - 8.2 g/dL    Albumin 2.9 (L) 3.4 - 5.0 g/dL    Globulin 3.1 2.0 - 4.0 g/dL    A-G Ratio 0.9 0.8 - 1.7     CBC WITH AUTOMATED DIFF    Collection Time: 01/28/18  5:20 AM   Result Value Ref Range    WBC 5.7 4.6 - 13.2 K/uL    RBC 4.20 (L) 4.70 - 5.50 M/uL    HGB 11.2 (L) 13.0 - 16.0 g/dL    HCT 36.4 36.0 - 48.0 %    MCV 86.7 74.0 - 97.0 FL    MCH 26.7 24.0 - 34.0 PG    MCHC 30.8 (L) 31.0 - 37.0 g/dL    RDW 18.3 (H) 11.6 - 14.5 %    PLATELET 236 455 - 846 K/uL    MPV 9.3 9.2 - 11.8 FL    NEUTROPHILS 71 40 - 73 %    LYMPHOCYTES 16 (L) 21 - 52 %    MONOCYTES 11 (H) 3 - 10 %    EOSINOPHILS 1 0 - 5 %    BASOPHILS 1 0 - 2 %    ABS. NEUTROPHILS 4.1 1.8 - 8.0 K/UL    ABS. LYMPHOCYTES 0.9 0.9 - 3.6 K/UL    ABS. MONOCYTES 0.6 0.05 - 1.2 K/UL    ABS. EOSINOPHILS 0.1 0.0 - 0.4 K/UL    ABS. BASOPHILS 0.0 0.0 - 0.06 K/UL    DF AUTOMATED             No results for input(s): FIO2I, IFO2, HCO3I, IHCO3, HCOPOC, PCO2I, PCOPOC, IPHI, PHI, PHPOC, PO2I, PO2POC in the last 72 hours. No lab exists for component: IPOC2    Recent Labs      01/28/18   0520  01/27/18   0330  01/26/18   0400   WBC  5.7  6.4  5.9   HGB  11.2*  11.9*  12.4*   HCT  36.4  38.8  39.9   PLT  169  177  176     Recent Labs      01/28/18   0520  01/27/18   0330  01/26/18   0400   NA  136  139  138   K  3.2*  3.5  3.5   CL  97*  101  101   CO2  30  28  29   GLU  84  98  103*   BUN  33*  34*  32*   CREA  1.27  1.33*  1.30   CA  8.1*  8.3*  8.4*   ALB  2.9*  3.0*  3.0*   SGOT  29  27  24   ALT  15*  16  17   INR   --   1.7*   --      No results for input(s): PH, PCO2, PO2, HCO3, FIO2 in the last 72 hours.     Lab Results   Component Value Date/Time    Color YELLOW 01/25/2017 05:00 PM    Appearance CLEAR 01/25/2017 05:00 PM    Specific gravity 1.015 01/25/2017 05:00 PM    pH (UA) 7.0 01/25/2017 05:00 PM    Protein TRACE 01/25/2017 05:00 PM    Glucose NEGATIVE  01/25/2017 05:00 PM    Ketone NEGATIVE  01/25/2017 05:00 PM    Bilirubin NEGATIVE  01/25/2017 05:00 PM    Urobilinogen 0.2 01/25/2017 05:00 PM    Nitrites NEGATIVE  01/25/2017 05:00 PM    Leukocyte Esterase NEGATIVE  01/25/2017 05:00 PM    Epithelial cells FEW 01/25/2017 05:00 PM    Bacteria NEGATIVE  01/25/2017 05:00 PM    WBC 0 to 1 01/25/2017 05:00 PM    RBC 0 01/25/2017 05:00 PM       ASSESSMENT/PLAN:   Principal Problem:    SOB (shortness of breath) on exertion (1/25/2018)    Active Problems:    Dyslipidemia ()      Coronary artery disease of native artery of native heart with stable angina pectoris (HCC) ()      Cardiomyopathy ()      Overview: EF 30-35% (ECHO 6/14)      Atrial fibrillation ()      Overview: CHADS score 3  (+CHF, +HTN, +AGE, -DM, -CVA)      Sick sinus syndrome ()      Overview: Developed post op CABG       S/P CABG x 2 (8/25/2015)      Overview: 12/2008, LIMA to LAD, SVG to RCA      Atherosclerotic NAHED (renal artery stenosis), bilateral (HCC) (8/25/2015)      Overview: S/P stenting R and L      Essential hypertension (2/27/2017)      Pleural effusion, right (1/26/2018)      Abnormal chest CT (1/27/2018)      Overview: Bilateral ground glass opacities and bibasilar atelectasis/infiltrate. Suspicious for aspiration pneumonia. Aortic stenosis, moderate (1/28/2018)      Room air ABG was not done this am. This should be done prior to discharge. U/S B LE negative for DVT. Awaiting speech/swallow evaluation advised for further evaluation of chest CT abnormalities. Outpatient pulmonary function testing, when able. Incentive spirometry   PT/OT evaluation and treatment plan    NUTRITION: PO diet/cardiac    PROPHYLAXIS:  DVT prophylaxis: Eliquis  GI prophylaxis: Protonix  HOB 30-degree elevation  Chlorhexidine mouth washes    CODE STATUS: FULL CODE    Further recommendations will be based on the patient's response to recommended treatment and results of the investigation ordered. DISPOSITION: OK for telemetry/monitored floor from pulmonary standpoint.     The patient is: [] acutely ill Risk of deterioration: [] moderate    [] critically ill  [] high     [x]See my orders for details    My assessment, plan of care, findings, medications, side effects etc were discussed with:  [] Nurse [] PT/OT    [] Respiratory therapy [x] Dr. George Barnett   [] Family [] Patient: answered all questions to satisfaction   [] Pharmacist []      [] Case & management strategies discussed today on multidisciplinary rounds    During this entire length of time I was immediately available to the patient. The reason for providing this level of medical care for this critically ill patient was due a critical illness that impaired one or more vital organ systems such that there was a high probability of imminent or life threatening deterioration in the patients condition. This care involved high complexity decision making to assess, manipulate, and support vital system functions, to treat this degreee vital organ system failure and to prevent further life threatening deterioration of the patients condition    []Total critical care time spent on reviewing the case/medical record/data/notes/EMR/patient examination/documentation/coordinating care with nurse/consultants, exclusive of procedures - minutes with complex decision making performed and > 50% time spent in face to face evaluation.         Laureen Rinne, MD   1/28/2018

## 2018-01-28 NOTE — ROUTINE PROCESS
0730 Bedside, Verbal and Written shift change report given to KATELIN Elizabeth (oncoming nurse) by Ella Crocker RN (offgoing nurse). Report included the following information SBAR, Kardex and Cardiac Rhythm Vpaced-underlying rhythm afib. Pt resting in bed, call bell in reach, bed locked in low position, aroused easily to verbal stimulation, denies pain or other complaints at this time    0808  Attempted first page to Dr Epifanio Webster to notify of potassium 3.2    0830 2nd attempt to page Dr Epifanio Webster, paged to nurses station    8661 call back from Dr Epifanio Webster- he was notified of potassium 3.2, he stated \"I'll take care of it when I see him\"    [phone] placed patient on 1L O2    1745 O2 sats 100%    1800 placed patient on room air    1830 patient O2 sats 100%    1945 Bedside, Verbal and Written shift change report given to Darrin Harmon RN (oncoming nurse) by KATELIN Elizabeth (offgoing nurse). Report included the following information SBAR and kardex. Patient resting in bed, denies complaints, call bell in reach, bed locked in low position.

## 2018-01-28 NOTE — PROGRESS NOTES
Problem: Falls - Risk of  Goal: *Absence of Falls  Document Agus Fall Risk and appropriate interventions in the flowsheet.    Outcome: Progressing Towards Goal  Fall Risk Interventions:  Mobility Interventions: Assess mobility with egress test    Mentation Interventions: Adequate sleep, hydration, pain control    Medication Interventions: Teach patient to arise slowly    Elimination Interventions: Call light in reach

## 2018-01-28 NOTE — PROGRESS NOTES
[facility] - Progress Note  Admit Date: 1/25/2018    Assessment:     Hospital Problems  Date Reviewed: 1/26/2018          Codes Class Noted POA    Aortic stenosis, moderate with mild AI. Mobile leaflets. Transvalvular velocity only 2 m/s ICD-10-CM: I35.0  ICD-9-CM: 424.1  1/28/2018 Unknown        Abnormal chest CT ICD-10-CM: R93.8  ICD-9-CM: 793.2  1/27/2018 Unknown    Overview Addendum 1/27/2018  1:13 PM by Luz Mcintyre MD     Bilateral ground glass opacities and bibasilar atelectasis/infiltrate. Suspicious for aspiration pneumonia. Pleural effusion, right ICD-10-CM: J90  ICD-9-CM: 511.9  1/26/2018 Yes        * (Principal)SOB (shortness of breath) on exertion ICD-10-CM: R06.02  ICD-9-CM: 786.05  1/25/2018 Unknown        Essential hypertension, HCVD with mild LVH ICD-10-CM: I10  ICD-9-CM: 401.9  2/27/2017 Yes        S/P CABG x 2 ICD-10-CM: Z95.1  ICD-9-CM: V45.81  8/25/2015 Yes    Overview Signed 8/25/2015  2:15 PM by Mekhi Huynh MD     12/2008, LIMA to LAD, SVG to RCA             Atherosclerotic NAHED (renal artery stenosis), bilateral ([facility] [address] ICD-10-CM: I70.1  ICD-9-CM: 440.1  8/25/2015 Yes    Overview Signed 8/25/2015  2:16 PM by Mekhi Huynh MD     S/P stenting R and L             Dyslipidemia ICD-10-CM: E78.5  ICD-9-CM: 272.4  Unknown Yes        Coronary artery disease of native artery of native heart with stable angina pectoris ([facility] [address] stable ICD-10-CM: I25.118  ICD-9-CM: 414.01, 413.9  Unknown Yes        Cardiomyopathy ICD-10-CM: I42.9  ICD-9-CM: 425.4  Unknown Yes    Overview Signed 4/29/2015  7:51 PM by Radha Chu MD     EF 30-35% (ECHO 6/14) Most recent EF 55% by Echo this admission             Atrial fibrillation ICD-10-CM: I48.91  ICD-9-CM: 427.31  Unknown Yes    Overview Signed 4/29/2015  7:51 PM by Radha Chu MD     CHADS score 3  (+CHF, +HTN, +AGE, -DM, -CVA).  Rate controlled             Sick sinus syndrome ICD-10-CM: I49.5  ICD-9-CM: 427.81  Unknown Yes    Overview Signed 8/25/2015  2:15 PM by Mane Wakefield MD     Developed post op CABG                    Plan:   Agree with closer look regarding aspiration potential.  Continue diuretics, watch renal parameters    Subjective:     No new complaints.  Cough better last night after cough suppressant     Objective:      Patient Vitals for the past 8 hrs:   Temp Pulse Resp BP SpO2   01/28/18 0847 97.5 °F (36.4 °C) 70 20 152/82 95 %   01/28/18 0447 97.4 °F (36.3 °C) 75 20 143/67 90 %         Patient Vitals for the past 96 hrs:   Weight   01/28/18 0447 140 lb 12.8 oz (63.9 kg)   01/27/18 1520 138 lb 7.2 oz (62.8 kg)   01/27/18 0643 140 lb 14 oz (63.9 kg)   01/26/18 0400 140 lb 6.9 oz (63.7 kg)   01/25/18 2106 134 lb 14.7 oz (61.2 kg)                    Intake/Output Summary (Last 24 hours) at 01/28/18 1026  Last data filed at 01/28/18 0953   Gross per 24 hour   Intake             1040 ml   Output             1240 ml   Net             -200 ml       Medications  Current Facility-Administered Medications   Medication Dose Route Frequency    chlorpheniramine-HYDROcodone (TUSSIONEX) oral suspension 5 mL  5 mL Oral Q12H PRN    isosorbide mononitrate ER (IMDUR) tablet 30 mg  30 mg Oral DAILY    ranolazine ER (RANEXA) tablet 500 mg  500 mg Oral BID    amLODIPine (NORVASC) tablet 5 mg  5 mg Oral DAILY    apixaban (ELIQUIS) tablet 5 mg  5 mg Oral BID    metoprolol succinate (TOPROL-XL) XL tablet 50 mg  50 mg Oral DAILY    pantoprazole (PROTONIX) tablet 40 mg  40 mg Oral ACB    acetaminophen (TYLENOL) tablet 650 mg  650 mg Oral Q4H PRN    bumetanide (BUMEX) injection 0.5 mg  0.5 mg IntraVENous Q12H    albuterol-ipratropium (DUO-NEB) 2.5 MG-0.5 MG/3 ML  3 mL Nebulization Q4H RT         Physical Exam:  General:  alert, cooperative, no distress  Neck:  no JVD  Lungs:  wheezes scattered  Heart:  irregularly irregular rhythm  Abdomen:  abdomen is soft without significant tenderness, masses, organomegaly or guarding  Extremities:  no edema    Data Review:     Labs: Results:       Chemistry Recent Labs      01/28/18   0520  01/27/18   0330  01/26/18   0400   GLU  84  98  103*   NA  136  139  138   K  3.2*  3.5  3.5   CL  97*  101  101   CO2  30  28  29   BUN  33*  34*  32*   CREA  1.27  1.33*  1.30   CA  8.1*  8.3*  8.4*   AGAP  9  10  8   BUCR  26*  26*  25*   AP  98  109  117   TP  6.0*  6.2*  6.1*   ALB  2.9*  3.0*  3.0*   GLOB  3.1  3.2  3.1   AGRAT  0.9  0.9  1.0      CBC w/Diff Recent Labs      01/28/18   0520 01/27/18   0330  01/26/18   0400   WBC  5.7  6.4  5.9   RBC  4.20*  4.38*  4.61*   HGB  11.2*  11.9*  12.4*   HCT  36.4  38.8  39.9   PLT  169  177  176   GRANS  71  78*  84*   LYMPH  16*  8*  6*   EOS  1  1  0      Cardiac Enzymes No results found for: CPK, CK, CKMMB, CKMB, RCK3, CKMBT, CKNDX, CKND1, DARNELL, TROPT, TROIQ, MARIA ESTHER, TROPT, TNIPOC, BNP, BNPP   Coagulation Recent Labs      01/27/18   0330   PTP  19.4*   INR  1.7*       Lipid Panel Lab Results   Component Value Date/Time    Cholesterol, total 149 09/09/2015 03:10 AM    HDL Cholesterol 41 09/09/2015 03:10 AM    LDL, calculated 90 09/09/2015 03:10 AM    VLDL, calculated 18 09/09/2015 03:10 AM    Triglyceride 90 09/09/2015 03:10 AM    CHOL/HDL Ratio 3.6 09/09/2015 03:10 AM      BNP No results found for: BNP, BNPP, XBNPT   Liver Enzymes Recent Labs      01/28/18   0520   TP  6.0*   ALB  2.9*   AP  98   SGOT  29      Digoxin    Thyroid Studies Lab Results   Component Value Date/Time    T4, Total 7.9 05/06/2010 08:50 AM    TSH 4.55 01/25/2017 03:05 PM          Signed By: Ryan Monterroso MD     January 28, 2018

## 2018-01-28 NOTE — PROGRESS NOTES
Problem: Falls - Risk of  Goal: *Absence of Falls  Document Agus Fall Risk and appropriate interventions in the flowsheet.    Outcome: Progressing Towards Goal  Fall Risk Interventions:  Mobility Interventions: Bed/chair exit alarm, Patient to call before getting OOB, PT Consult for mobility concerns, Strengthening exercises (ROM-active/passive)    Mentation Interventions: Bed/chair exit alarm, Door open when patient unattended, Evaluate medications/consider consulting pharmacy, More frequent rounding, Toileting rounds    Medication Interventions: Bed/chair exit alarm, Evaluate medications/consider consulting pharmacy, Patient to call before getting OOB, Teach patient to arise slowly    Elimination Interventions: Call light in reach, Patient to call for help with toileting needs, Toileting schedule/hourly rounds, Urinal in reach

## 2018-01-29 NOTE — PROGRESS NOTES
Problem: Mobility Impaired (Adult and Pediatric)  Goal: *Acute Goals and Plan of Care (Insert Text)  PHYSICAL THERAPY SHORT TERM GOALS : With SPO2 above 90%  1. Patient will perform/complete all bed mobility with supervision/set-up within 1 week(s). 2.  Patient will perform/complete sit <> stand transfers with supervision/set-up within 1 week(s). 3.  Patient will ambulate 100 ft with supervision/set-up within 1 week(s) to prepare for safe navigation of his home environment. 4.  Patient will ascend/descend 3 stairs with B HR  with supervision/set-up within 1 week(s) for safe exit/entry into his home. 6.  Patient will participate in  lower extremity therapeutic exercise/activities with supervision/set-up for 5 minutes within 1 week(s). Therapist Crystal Kilgore  1/29/2018   Time Calculation: 16 mins  Outcome: Progressing Towards Goal  physical Therapy EVALUATION    Patient: Lenward Kanner (86 y.o. male)  Date: 1/29/2018  Primary Diagnosis: SOB (shortness of breath) on exertion        Precautions:  Fall, SPO2, monitor SPO2   PLOF: I with ADLs and ambulation using straight cane    ASSESSMENT :  Patient presents sitting up in bed, agreeable to participation with therapy. Patient requires  minimal assistance/contact guard assist for bed mobility, transfers and ambulation. Patient on room air. Patient demonstrates B UE and LE strength 4+/5. Patient ambulates ~ 30 ft with CGA and straight cane before feeling fatigued and returning to room. SPO2 at initiation of walk 93%, at completion of walk 94%. Patient denies pain pre and post treatment. Educated on safety with functional mobility, deep breathing for recovery, and plan of care. Patient verbalized and demonstrated understanding. Patient presents with deficits in:   Bed Mobility, Transfers, Gait and Stairs    Patient will benefit from skilled intervention to address the above impairments.   Patients rehabilitation potential is considered to be Fair  Factors which may influence rehabilitation potential include:   []         None noted  []         Mental ability/status  [x]         Medical condition  []         Home/family situation and support systems  []         Safety awareness  []         Pain tolerance/management  []         Other:        PLAN :  Recommendations and Planned Interventions:  [x]           Bed Mobility Training             [x]    Neuromuscular Re-Education  [x]           Transfer Training                   []    Orthotic/Prosthetic Training  [x]           Gait Training                          []    Modalities  [x]           Therapeutic Exercises          []    Edema Management/Control  [x]           Therapeutic Activities            [x]    Patient and Family Training/Education  []           Other (comment):    Frequency/Duration: Patient will be followed by physical therapy 3 - 5 times a week to address goals. Discharge Recommendations: Timothy Soriano  Further Equipment Recommendations for Discharge: straight cane (already has)      SUBJECTIVE:   Patient stated I feel okay.     OBJECTIVE DATA SUMMARY:     Past Medical History:   Diagnosis Date    Atrial fibrillation     CHADS score 3  (+CHF, +HTN, +AGE, -DM, -CVA)    CABG     2008   LIMA - LAD,   SVG - RCA    Cardiomyopathy     EF 30-35% (ECHO 6/14)    Coronary artery disease     Dyslipidemia     Hypertension     Hypothyroid     Pacemaker     Peripheral vascular disease     Renal artery stenosis     bilateral stents    Sick sinus syndrome      Past Surgical History:   Procedure Laterality Date    HX CORONARY ARTERY BYPASS GRAFT      2008   LIMA - LAD,   SVG - RCA     Barriers to Learning/Limitations: yes;  none  Compensate with: visual, verbal, tactile, kinesthetic cues/model    G CODE:Mobility  Current  CJ= 20-39%   Goal  CI= 1-19%.   The severity rating is based on the [employer] Balance Scale 4/5    Eval Complexity: History: MEDIUM  Complexity : 1-2 comorbidities / personal factors will impact the outcome/ POC Exam:MEDIUM Complexity : 3 Standardized tests and measures addressing body structure, function, activity limitation and / or participation in recreation  Presentation: MEDIUM Complexity : Evolving with changing characteristics  Clinical Decision Making:Medium Complexity [facility] Standing Balance Scale 4/5 Overall Complexity:MEDIUM    [address] Standing Balance Scale 4/5  0: Pt performs 25% or less of standing activity (Max assist) CN, 100% impaired. 1: Pt supports self with upper extremities but requires therapist assistance. Pt performs 25-50% of effort (Mod assist) CM, 80% to <100% impaired. 1+: Pt supports self with upper extremities but requires therapist assistance. Pt performs >50% effort. (Min assist). CL, 60% to <80% impaired. 2: Pt supports self independently with both upper extremities (walker, crutches, parallel bars). CL, 60% to <80% impaired. 2+: Pt support self independently with 1 upper extremity (cane, crutch, 1 parallel bar). CK, 40% to <60% impaired. 3: Pt stands without upper extremity support for up to 30 seconds. CK, 40% to <60% impaired. 3+: Pt stands without upper extremity support for 30 seconds or greater. CJ, 20% to <40% impaired. 4: Pt independently moves and returns center of gravity 1-2 inches in one plane. CJ, 20% to <40% impaired. 4+: Pt independently moves and returns center of gravity 1-2 inches in multiple planes. CI, 1% to <20% impaired. 5: Pt independently moves and returns center of gravity in all planes greater than 2 inches. CH, 0% impaired.     Prior Level of Function/Home Situation:   Home Situation  Home Environment: Private residence  # Steps to Enter: 3  Rails to Enter: Yes  Hand Rails : Bilateral  One/Two Story Residence: One story  Living Alone: No  Support Systems: Spouse/Significant Other/Partner  Patient Expects to be Discharged to[de-identified] Skilled nursing facility  Current DME Used/Available at Home: Monia Lundborg, straight, Shower chair, Grab bars  Tub or Shower Type: Tub/Shower combination  Critical Behavior:  Neurologic State: Alert  Orientation Level: Oriented X4  Cognition: Decreased command following; Appropriate safety awareness; Appropriate decision making  Safety/Judgement: Awareness of environment  Psychosocial  Patient Behaviors: Calm; Cooperative  Purposeful Interaction: Yes  Strength:    Strength: Generally decreased, functional (B LE AND UE: 4+/5)  Tone & Sensation:   Tone: Normal  Sensation: Intact  Range Of Motion:  AROM: Within functional limits  Functional Mobility:  Bed Mobility:  Supine to Sit: Contact guard assistance  Sit to Supine: Contact guard assistance  Scooting: Contact guard assistance  Transfers:  Sit to Stand: Contact guard assistance  Stand to Sit: Contact guard assistance  Balance:   Sitting: Intact  Standing: Intact  Standing - Static: Good  Standing - Dynamic : Good  Ambulation/Gait Training:  Distance (ft): 30 Feet (ft)  Assistive Device: Cane, straight  Ambulation - Level of Assistance: Additional time;Contact guard assistance  Gait Description (WDL): Exceptions to WDL  Gait Abnormalities: Altered arm swing (B decreased hip extension )  Right Side Weight Bearing: Full  Left Side Weight Bearing: Full  Base of Support: Center of gravity altered  Speed/Pilar: Slow;Pace decreased (<100 feet/min)  Step Length: Left shortened;Right shortened  Swing Pattern: Right symmetrical;Left symmetrical (Increased B knee flexion with gait)  Pain: None reported  Activity Tolerance:   Fair, fatigue with ~ 30 ft of ambulation  Please refer to the flowsheet for vital signs taken during this treatment.   After treatment:   []         Patient left in no apparent distress sitting up in chair  [x]         Patient left in no apparent distress in bed, sitting up in bed with lunch  [x]         Call bell left within reach  [x]         Nursing notified  []         Caregiver present  [] Bed alarm activated    COMMUNICATION/EDUCATION: Verbalized and demonstrated understanding. [x]         Fall prevention education was provided and the patient/caregiver indicated understanding. [x]         Patient/family have participated as able in goal setting and plan of care. [x]         Patient/family agree to work toward stated goals and plan of care. []         Patient understands intent and goals of therapy, but is neutral about his/her participation. []         Patient is unable to participate in goal setting and plan of care.     Thank you for this referral.  Natan Foley   Time Calculation: 16 mins

## 2018-01-29 NOTE — DISCHARGE SUMMARY
2 [facility]  Hospitalist Division    Discharge Summary      Patient: Li Osorio MRN: [id number]  CSN: [id number]    YOB: 1930  Age: 80 y.o. Sex: male    DOA: 1/25/2018 LOS:  LOS: 4 days   Discharge Date: 01/29/18     PCP:  Ashia Aguilar MD    Chief Complaint:    Chief Complaint   Patient presents with    Shortness of Breath     SOB (shortness of breath) on exertion    Admission Diagnosis:   Hospital Problems as of 1/29/2018  Date Reviewed: 1/26/2018          Codes Class Noted - Resolved POA    Aortic stenosis, moderate ICD-10-CM: I35.0  ICD-9-CM: 424.1  1/28/2018 - Present Unknown        Abnormal chest CT ICD-10-CM: R93.8  ICD-9-CM: 793.2  1/27/2018 - Present Unknown    Overview Addendum 1/27/2018  1:13 PM by Josette Araya MD     Bilateral ground glass opacities and bibasilar atelectasis/infiltrate. Suspicious for aspiration pneumonia.              Pleural effusion, right ICD-10-CM: J90  ICD-9-CM: 511.9  1/26/2018 - Present Yes        * (Principal)SOB (shortness of breath) on exertion ICD-10-CM: R06.02  ICD-9-CM: 786.05  1/25/2018 - Present Unknown        Essential hypertension ICD-10-CM: I10  ICD-9-CM: 401.9  2/27/2017 - Present Yes        S/P CABG x 2 ICD-10-CM: Z95.1  ICD-9-CM: V45.81  8/25/2015 - Present Yes    Overview Signed 8/25/2015  2:15 PM by Ryan Monterroso MD     12/2008, TAMAYO to LAD, SVG to RCA             Atherosclerotic NAHED (renal artery stenosis), bilateral ([facility] [address] ICD-10-CM: I70.1  ICD-9-CM: 440.1  8/25/2015 - Present Yes    Overview Signed 8/25/2015  2:16 PM by Ryan Monterroso MD     S/P stenting R and L             Dyslipidemia ICD-10-CM: E78.5  ICD-9-CM: 272.4  Unknown - Present Yes        Coronary artery disease of native artery of native heart with stable angina pectoris ([address] ICD-10-CM: I25.118  ICD-9-CM: 414.01, 413.9  Unknown - Present Yes        Cardiomyopathy ICD-10-CM: I42.9  ICD-9-CM: 425.4  Unknown - Present Yes    Overview Signed 4/29/2015  7:51 PM by Siena Warren MD     EF 30-35% (ECHO 6/14)             Atrial fibrillation ICD-10-CM: I48.91  ICD-9-CM: 427.31  Unknown - Present Yes    Overview Signed 4/29/2015  7:51 PM by Siena Warren MD     CHADS score 3  (+CHF, +HTN, +AGE, -DM, -CVA)             Sick sinus syndrome ICD-10-CM: I49.5  ICD-9-CM: 427.81  Unknown - Present Yes    Overview Signed 8/25/2015  2:15 PM by Ryan Monterroso MD     Developed post op CABG                    Discharge Diagnoses:    Severe dyspnea on exertion-multifactorial, may need chronic 02  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 Anxiety  Chronic angina - started on ranexa  CAD and Hx of CABG- NST 9/27/2017-no ischemia-normal EF, Cardiac cath 2016 -no results-   ICD/Pacer - [employer] due to  SSS, placed 2008  Cardyomopathy EF30-35%  Afib CHADSVASC=3 - on eliquis  Hx of NAHED    Hospital Course:   80 y.o.  male originally seen on 1/26/2018 for severe exertional dyspnea. PMHX of  of A-fib on eliquis, CAD s/p CABG 2008, ICMO EF 55%, PVD/Renal artery stenosis s/p stents, SSS s/p PPM, and HTN who is presents to the ED 1/25 for complaints of SOB along with Chest pain. He denies anginal chest pain. He believes that he can climb one flight of stairs, but no more. He is unable to walk in a grocery store due to exertional dyspnea. This is a chronic and progressively worsening problem. He has a chronic cough that is non-productive. He denies pleuritic pain. He denies wheezing. He denies orthopnea or paroxysmal nocturnal dyspnea. He denies significant swelling. No fever or chills. He has dizziness and near syncope with exertion. He becomes dyspneic while eating. He has never been prescribed long-term oxygen therapy.  He initially required Bipap however this was quickly weaned off and patient has needed intermittent oxygen, mostly with exertion. Cardiology and pulmonology were consulted and patient underwent a CT of his chest on 1/26 as well as LE PVL 2/28. He just recently had a TTE on 1/20/2018. His dyspnea etiology was complex and multifactorial, and likely involves chronic angina, valvular heart disease, pulmonary hypertension, and anxiety. He needs to stay euvolemic with lasix, he has ranexa to help with the chest pain, ativan to help with anxiety, and oxygen on exertion when being discharged to the [facility] on 1/29. He will follow-up with cardiology. High aspiration risk.       Significant Diagnostic Studies:  CT of his chest on 1/26 as well as   LE PVL 2/28. TTE on 1/20/2018    Consults:  Treatment Team: Attending Provider: Halie Zuleta DO; Consulting Provider: Sybil Martinez MD; Consulting Provider: Nata Rivera MD; Care Manager: Bruno Fournier RN; Utilization Review: José Luis Chaney; Consulting Provider: Dennis Camacho MD; Consulting Provider: Any Rivers MD; Consulting Provider: Chava Hilton MD; Physical Therapist: Douglas Ross, PT   Cardiology  Pulmonology    Operative Procedures:  N/A    Disposition:  Home    Diet:  Dysphagia pureed    Discharge Condition:   Good  Visit Vitals    /65    Pulse 70    Temp 97.8 °F (36.6 °C)    Resp 18    Ht 5' 9\" (1.753 m)    Wt 64.7 kg (142 lb 10.2 oz)    SpO2 93%    BMI 21.06 kg/m2         Discharge Medications:    Current Discharge Medication List      START taking these medications    Details   chlorpheniramine-HYDROcodone (TUSSIONEX) 10-8 mg/5 mL suspension Take 5 mL by mouth every twelve (12) hours as needed for Cough. Max Daily Amount: 10 mL. Indications: COLD SYMPTOMS, Cough  Qty: 115 mL, Refills: 0    Associated Diagnoses: Acute respiratory distress      ranolazine ER (RANEXA) 500 mg SR tablet Take 1 Tab by mouth two (2) times a day. Indications: Chronic Stable Angina Pectoris  Qty: 180 Tab, Refills: 3         CONTINUE these medications which have CHANGED    Details   LORazepam (ATIVAN) 1 mg tablet Take 1 Tab by mouth every six (6) hours as needed for Anxiety. Max Daily Amount: 4 mg. Qty: 30 Tab, Refills: 0    Associated Diagnoses: Anxiety         CONTINUE these medications which have NOT CHANGED    Details   isosorbide mononitrate ER (IMDUR) 30 mg tablet Take 1 Tab by mouth daily. Qty: 30 Tab, Refills: 6      amLODIPine (NORVASC) 5 mg tablet Take 5 mg by mouth daily. nitroglycerin (NITROSTAT) 0.3 mg SL tablet 1 Tab by SubLINGual route every five (5) minutes as needed for Chest Pain (up to 3 total 1 every 5 min). Qty: 1 Bottle, Refills: 2      pantoprazole (PROTONIX) 40 mg tablet TK 1 T PO D 20 TO 30 MINUTES BEFORE A MEAL  Refills: 2      metoprolol succinate (TOPROL XL) 50 mg XL tablet Take 50 mg by mouth daily. furosemide (LASIX) 40 mg tablet Take 40 mg by mouth daily. apixaban (ELIQUIS) 5 mg tablet Take 5 mg by mouth two (2) times a day. enalapril (VASOTEC) 20 mg tablet Take 20 mg by mouth daily. No results found for this or any previous visit (from the past 24 hour(s)). Follow-Up And Discharge Instructions:    Follow-up Information     Follow up With Details Comments UlBlake Montiel MD On 1/30/2018 3pm  [address] [facility]      Yonny Bourne MD Call in 1 month  [address]      Ewelina Ewing MD Call in 1 month pulmonology [address]  [address] [facility] [address] [id number]  [phone]              Wound Care/Supplies:   oxygen      Dr Lissette Gary        Time Spent:  35min    Cc: Brandy Winter MD

## 2018-01-29 NOTE — IP AVS SNAPSHOT
Ingrid Chacon 
 
 
 [address] Patient: Alma Delia Logan MRN: [id number] APE:[id number] About your hospitalization You were admitted on:  January 29, 2018 You last received care in the:  [facility] [address] [place] [address] You were discharged on:  February 7, 2018 Why you were hospitalized Your primary diagnosis was:  Not on File Follow-up Information Follow up With Details Comments Contact Info Graciela Monterroso MD  Follow up on Thurs. 2/15/18 @ 11:00 am Please check in @ 10:45 am  [address] 
[phone] Discharge Orders None A check wilner indicates which time of day the medication should be taken. My Medications START taking these medications Instructions Each Dose to Equal  
 Morning Noon Evening Bedtime  
 guaiFENesin  mg ER tablet Commonly known as:  Abelino & Abelino Your last dose was: Your next dose is: Take 1 Tab by mouth every twelve (12) hours. 600 mg  
    
   
   
   
  
 tiotropium 18 mcg inhalation capsule Commonly known as:  Rajesh Moss Your last dose was: Your next dose is: Take 1 Cap by inhalation every twenty-four (24) hours. 1 Cap CHANGE how you take these medications Instructions Each Dose to Equal  
 Morning Noon Evening Bedtime  
 chlorpheniramine-HYDROcodone 10-8 mg/5 mL suspension Commonly known as:  Jolie Graham What changed:  when to take this Your last dose was: Your next dose is: Take 5 mL by mouth nightly as needed for Cough. Max Daily Amount: 5 mL. 5 mL  
    
   
   
   
  
 furosemide 20 mg tablet Commonly known as:  LASIX Start taking on:  2/8/2018 What changed:   
- medication strength 
- how much to take 
- how to take this - when to take this 
- additional instructions Your last dose was: Your next dose is: One pill once or twice a day  
     
   
   
   
  
 nitroglycerin 0.4 mg SL tablet Commonly known as:  NITROSTAT What changed:   
- medication strength 
- how much to take Your last dose was: Your next dose is:    
   
   
 1 Tab by SubLINGual route every five (5) minutes as needed for Chest Pain (up to 3 total 1 every 5 min). 0.4 mg  
    
   
   
   
  
 pantoprazole 40 mg tablet Commonly known as:  PROTONIX Start taking on:  2/8/2018 What changed:  See the new instructions. Your last dose was: Your next dose is: Take 1 Tab by mouth Daily (before breakfast). 40 mg CONTINUE taking these medications Instructions Each Dose to Equal  
 Morning Noon Evening Bedtime  
 amLODIPine 5 mg tablet Commonly known as:  Saintclair Rickers Start taking on:  2/8/2018 Your last dose was: Your next dose is: Take 1 Tab by mouth daily. 5 mg  
    
   
   
   
  
 apixaban 5 mg tablet Commonly known as:  Vianney Jones Your last dose was: Your next dose is: Take 1 Tab by mouth two (2) times a day. 5 mg  
    
   
   
   
  
 isosorbide mononitrate ER 30 mg tablet Commonly known as:  IMDUR Start taking on:  2/8/2018 Your last dose was: Your next dose is: Take 1 Tab by mouth daily. 30 mg  
    
   
   
   
  
 metoprolol succinate 50 mg XL tablet Commonly known as:  TOPROL XL Start taking on:  2/8/2018 Your last dose was: Your next dose is: Take 1 Tab by mouth daily. 50 mg  
    
   
   
   
  
 ranolazine  mg SR tablet Commonly known as:  RANEXA Your last dose was: Your next dose is: Take 1 Tab by mouth two (2) times a day. Indications: Chronic Stable Angina Pectoris 500 mg ASK your doctor about these medications Instructions Each Dose to Equal  
 Morning Noon Evening Bedtime  
 enalapril 20 mg tablet Commonly known as:  Lonzell Abo Your last dose was: Your next dose is: Take 20 mg by mouth daily. 20 mg LORazepam 1 mg tablet Commonly known as:  ATIVAN Your last dose was: Your next dose is: Take 1 Tab by mouth every six (6) hours as needed for Anxiety. Max Daily Amount: 4 mg.  
 1 mg Where to Get Your Medications Information on where to get these meds will be given to you by the nurse or doctor. ! Ask your nurse or doctor about these medications  
  amLODIPine 5 mg tablet  
 apixaban 5 mg tablet  
 chlorpheniramine-HYDROcodone 10-8 mg/5 mL suspension  
 furosemide 20 mg tablet  
 guaiFENesin  mg ER tablet  
 isosorbide mononitrate ER 30 mg tablet  
 metoprolol succinate 50 mg XL tablet  
 nitroglycerin 0.4 mg SL tablet  
 pantoprazole 40 mg tablet  
 ranolazine  mg SR tablet  
 tiotropium 18 mcg inhalation capsule Discharge Instructions Shortness of Breath: Care Instructions Your Care Instructions Shortness of breath has many causes. Sometimes conditions such as anxiety can lead to shortness of breath. Some people get mild shortness of breath when they exercise. Trouble breathing also can be a symptom of a serious problem, such as asthma, lung disease, emphysema, heart problems, and pneumonia. If your shortness of breath continues, you may need tests and treatment. Watch for any changes in your breathing and other symptoms. Follow-up care is a key part of your treatment and safety. Be sure to make and go to all appointments, and call your doctor if you are having problems. It's also a good idea to know your test results and keep a list of the medicines you take. How can you care for yourself at home? · Do not smoke or allow others to smoke around you.  If you need help quitting, talk to your doctor about stop-smoking programs and medicines. These can increase your chances of quitting for good. · Get plenty of rest and sleep. · Take your medicines exactly as prescribed. Call your doctor if you think you are having a problem with your medicine. · Find healthy ways to deal with stress. ¨ Exercise daily. ¨ Get plenty of sleep. ¨ Eat regularly and well. When should you call for help? Call 911 anytime you think you may need emergency care. For example, call if: 
? · You have severe shortness of breath. ? · You have symptoms of a heart attack. These may include: ¨ Chest pain or pressure, or a strange feeling in the chest. 
¨ Sweating. ¨ Shortness of breath. ¨ Nausea or vomiting. ¨ Pain, pressure, or a strange feeling in the back, neck, jaw, or upper belly or in one or both shoulders or arms. ¨ Lightheadedness or sudden weakness. ¨ A fast or irregular heartbeat. After you call 911, the  may tell you to chew 1 adult-strength or 2 to 4 low-dose aspirin. Wait for an ambulance. Do not try to drive yourself. ?Call your doctor now or seek immediate medical care if: 
? · Your shortness of breath gets worse or you start to wheeze. Wheezing is a high-pitched sound when you breathe. ? · You wake up at night out of breath or have to prop your head up on several pillows to breathe. ? · You are short of breath after only light activity or while at rest. ? Watch closely for changes in your health, and be sure to contact your doctor if: 
? · You do not get better over the next 1 to 2 days. Where can you learn more? Go to http://mitra-shaw.info/. Enter S780 in the search box to learn more about \"Shortness of Breath: Care Instructions. \" Current as of: May 12, 2017 Content Version: 11.4 © 0972-7635 nVoq.  Care instructions adapted under license by [employer] (which disclaims liability or warranty for this information). If you have questions about a medical condition or this instruction, always ask your healthcare professional. [address] any warranty or liability for your use of this information. Introducing [facility]! Dear Nancy Salinas: Thank you for requesting a Intertwine account. Our records indicate that you already have an active Intertwine account. You can access your account anytime at [email or web address] Did you know that you can access your hospital and ER discharge instructions at any time in Intertwine? You can also review all of your test results from your hospital stay or ER visit. Additional Information If you have questions, please visit the Frequently Asked Questions section of the Intertwine website at [email or web address] Remember, Intertwine is NOT to be used for urgent needs. For medical emergencies, dial 911. Now available from your iPhone and Android! Providers Seen During Your Hospitalization Provider Specialty Primary office phone Declan Knight [address] [phone] Your Primary Care Physician (PCP) Primary Care Physician Office Phone Office Fax Jose Ronald [address] [facility] [phone] You are allergic to the following Allergen Reactions Beta-Blockers (Beta-Adrenergic Blocking Agts) Drowsiness Penicillins Swelling Statins-Hmg-Coa Reductase Inhibitors Drowsiness Recent Documentation Height Weight BMI Smoking Status 1.753 m 61 kg 19.85 kg/m2 Former Smoker Emergency Contacts Name Discharge Info Relation Home Work Mobile [address] Parson CAREGIVER [3] Spouse [3] [phone] Lani Beebe  Spouse [2] [phone] Patient Belongings The following personal items are in your possession at time of discharge: Dental Appliances: None  Visual Aid: None      Home Medications: None   Jewelry: Bracelet, Watch  Clothing: Footwear, Pants, Shirt, Socks, Undergarments    Other Valuables: Ann Marquez, [employer] Please provide this summary of care documentation to your next provider. Signatures-by signing, you are acknowledging that this After Visit Summary has been reviewed with you and you have received a copy. Patient Signature:  ____________________________________________________________ Date:  ____________________________________________________________  
  
Blade Can Provider Signature:  ____________________________________________________________ Date:  ____________________________________________________________

## 2018-01-29 NOTE — PROGRESS NOTES
Bedside shift change report given to Yamilex RN (oncoming nurse) by [facility], RN (offgoing nurse). Report included the following information SBAR, Kardex, Intake/Output, MAR and Recent Results.      5302  -- AM medications given, well tolerated, will continue to monitor.      1105 -- Reassessment completed, no change in patient condition, will continue to monitor.      1500 -- Reassessment completed, no change in patient condition, will continue to monitor.      1646 -- Transfer to [facility], report given to Samson Elias [facility].

## 2018-01-29 NOTE — PROGRESS NOTES
Problem: Self Care Deficits Care Plan (Adult)  Goal: *Acute Goals and Plan of Care (Insert Text)  Occupational Therapy Goals  Initiated 1/29/2018 within 7 day(s). 1.  Patient will perform grooming tasks while standing with independence. 2.  Patient will perform lower body dressing with modified independence and < 2 rest breaks. 3.  Patient will perform functional task in standing for 8 minutes with independence and good dynamic standing balance. 4.  Patient will perform toilet transfers with independence. 5.  Patient will perform all aspects of toileting with independence. 6.  Patient will participate in upper extremity therapeutic exercise/activities with independence for 8 minutes to maintain BUE strength for ADLs. 7.  Patient will utilize energy conservation techniques during functional activities with minimal verbal cues. Outcome: Progressing Towards Goal  Occupational Therapy EVALUATION    Patient: Agueda High (89 y.o. male)  Date: 1/29/2018  Primary Diagnosis: SOB (shortness of breath) on exertion        Precautions: Fall  PLOF: Pt reports independence with basic self care tasks and occasionally utilized straight cane for functional mobility PTA. ASSESSMENT :  Based on the objective data described below, the patient presents with impairments with regard to activity tolerance, functional mobility and independence in ADLs. Pt supine on arrival, O2 on 93% on RA at rest. Mod I for bed mobility, intact sitting balance, able to don/doff socks independently (with verbal cues for rest breaks). Functional mobility with supervision for balance from EOB to bathroom in prep for toileting task. Fair activity tolerance, O2 desat to 89% on RA; however pt asymptomatic. Mod I for hand hygiene while standing unsupported at sink. With rest break at EOB, O2 increased to 97% on RA. No complaints of pain or dizziness. Pt left at EOB with needs within reach. Recommend [facility]hab [facility]. [facility] (pending need for O2). Patient will benefit from skilled intervention to address the above impairments. Patients rehabilitation potential is considered to be Good  Factors which may influence rehabilitation potential include:   []             None noted  []             Mental ability/status  [x]             Medical condition  []             Home/family situation and support systems  []             Safety awareness  []             Pain tolerance/management  []             Other:     Recommendations for nursing: up with assist x1 for safety       PLAN :  Recommendations and Planned Interventions:  [x]               Self Care Training                  [x]        Therapeutic Activities  [x]               Functional Mobility Training    []        Cognitive Retraining  [x]               Therapeutic Exercises           [x]        Endurance Activities  [x]               Balance Training                   []        Neuromuscular Re-Education  []               Visual/Perceptual Training     [x]   Home Safety Training  [x]               Patient Education                 [x]        Family Training/Education  []               Other (comment):    Frequency/Duration: Patient will be followed by occupational therapy 3-5 times a week to address goals. Discharge Recommendations: Rehab vs. Home Health  Further Equipment Recommendations for Discharge: Anticipate none     SUBJECTIVE:   Patient stated I usually use my cane when I leave my house. \"    OBJECTIVE DATA SUMMARY:     Past Medical History:   Diagnosis Date    Atrial fibrillation     CHADS score 3  (+CHF, +HTN, +AGE, -DM, -CVA)    CABG     2008   LIMA - LAD,   SVG - RCA    Cardiomyopathy     EF 30-35% (ECHO 6/14)    Coronary artery disease     Dyslipidemia     Hypertension     Hypothyroid     Pacemaker     Peripheral vascular disease     Renal artery stenosis     bilateral stents    Sick sinus syndrome      Past Surgical History:   Procedure Laterality Date    HX CORONARY ARTERY BYPASS GRAFT      2008   LIMA - LAD,   SVG - RCA     Barriers to Learning/Limitations: None  Compensate with: visual, verbal, tactile, kinesthetic cues/model    GCODES:  Self Care  Current  CI= 1-19%   Goal  CH= 0%. The severity rating is based on the Other Functional Assessment, MMT, ROM    Eval Complexity: History: LOW Complexity : Brief history review ; Examination: LOW Complexity : 1-3 performance deficits relating to physical, cognitive , or psychosocial skils that result in activity limitations and / or participation restrictions ; Decision Making:LOW Complexity : No comorbidities that affect functional and no verbal or physical assistance needed to complete eval tasks     Prior Level of Function/Home Situation: Pt reports independence with basic self care tasks and occasionally utilized straight cane for functional mobility PTA. Home Situation  Home Environment: Private residence  # Steps to Enter: 3  One/Two Story Residence: One story  Living Alone: No  Support Systems: Spouse/Significant Other/Partner  Patient Expects to be Discharged to[employer] residence (or Rehab facility)  Current DME Used/Available at Home: Muriel Irisbird, straight, Grab bars, Shower chair (pt does not use chair)  Tub or Shower Type: Tub/Shower combination (has grab bars; does not use shower chair)  [x]  Right hand dominant   []  Left hand dominant    Cognitive/Behavioral Status:  Neurologic State: Alert  Orientation Level: Oriented X4  Cognition: Appropriate decision making; Appropriate for age attention/concentration; Appropriate safety awareness; Follows commands  Safety/Judgement: Awareness of environment; Fall prevention     Skin: Intact (BUEs)  Edema: None noted (BUEs)    Vision/Perceptual:    Acuity: Able to read clock/calendar on wall without difficulty      Coordination:  Coordination: Within functional limits (BUEs)  Fine Motor Skills-Upper: Right Intact; Left Intact    Gross Motor Skills-Upper: Right Intact; Left Intact Balance:  Sitting: Intact  Standing: Impaired  Standing - Static: Good  Standing - Dynamic : Fair     Strength:  Strength: Within functional limits (BUEs: 5/5)    Range of Motion:  AROM: Within functional limits (BUEs: full shoulder/elbow flexion)    Functional Mobility and Transfers for ADLs:  Bed Mobility:  Supine to Sit: Modified independent  Sit to Supine:  (pt left sitting at EOB)    Transfers:  Sit to Stand: Supervision   Toilet Transfer : Supervision     ADL Assessment:  Feeding: Setup;Supervision  Oral Facial Hygiene/Grooming: Setup;Supervision  Bathing: Setup;Supervision; Additional time  Upper Body Dressing: Independent  Lower Body Dressing: Modified independent; Additional time  Toileting: Setup;Supervision    Cognitive Retraining  Safety/Judgement: Awareness of environment; Fall prevention    Therapeutic Activity:  Functional mobility with supervision for balance from EOB to bathroom in prep for toileting task. Fair activity tolerance, O2 desat to 89% on RA; however pt asymptomatic. Mod I for hand hygiene while standing unsupported at sink. Pain:  Pre treatment pain level:  0/10  Post treatment pain level: 0/10  Pain Scale 1: Numeric (0 - 10)  Pain Intensity 1: 0    Activity Tolerance:  Fair  Please refer to the flowsheet for vital signs taken during this treatment. After treatment:   [] Patient left in no apparent distress sitting up in chair  [x] Patient left in no apparent distress in bed  [x] Call bell left within reach  [x] Nursing notified  [] Caregiver present  [] Bed alarm activated    COMMUNICATION/EDUCATION: Pt educated on role of OT, POC, and home safety. He verbalized understanding. [x] Home safety education was provided and the patient/caregiver indicated understanding. [x] Patient/family have participated as able in goal setting and plan of care. [x] Patient/family agree to work toward stated goals and plan of care.   [] Patient understands intent and goals of therapy, but is neutral about his/her participation. [] Patient is unable to participate in goal setting and plan of care.     Thank you for this referral.    Sanchez Palmer MS OTR/L  Time Calculation: 20 mins

## 2018-01-29 NOTE — PROGRESS NOTES
Pulmonary Progress Note    History  80 y.o.  male originally seen on 1/26/2018 for severe exertional dyspnea. He denies anginal chest pain. He believes that he can climb one flight of stairs, but no more. He is unable to walk in a grocery store due to exertional dyspnea. This is a chronic and progressively worsening problem. He has a chronic cough that is non-productive. He denies pleuritic pain. He denies wheezing. He denies orthopnea or paroxysmal nocturnal dyspnea. He denies significant swelling. No fever or chills. He has dizziness and near syncope with exertion. He becomes dyspneic while eating. He has never been prescribed long-term oxygen therapy. However, he is currently on 5 LPM via nasal cannula. He quit smoking over 50 years ago. He has never been diagnosed with lung disease.      Additional diseases include CAD, SSS, a-fib and PVD (moderate)    He complains of mouth dryness with nebs. No real benefit from nebs. No benefit from using walker    Blood pressure 160/74, pulse 76, temperature 97.1 °F (36.2 °C), resp. rate 16, height 5' 9\" (1.753 m), weight 64.7 kg (142 lb 10.2 oz), SpO2 95 %. 93% at rest on RA  Regular rhythm. Most prominent murmur along left sternal border. S4 gallop  Coarse breath sounds at both bases. No wheezing or rales  Soft abdomen  No LE edema, cyanosis or clubbing  Alert, oriented and normal affect  No gross neuro deficit    Labs: All WBCs normal since admission on 1/19/2018. Cr about 1.35. Pro-BNP from 1/25 7,421    ABG 1/25 7.41/35/169    Echo 1/20/2018:  LVSF normal.  RV dilated with peak pressure 35-40 mmHg. RA and LA severely dilated. All valves with some degree of dysfunction. Tricuspid regurgitation, severe. Aortic stenosis, mild to moderate. I personally reviewed the patient's 1/26/2018 chest CT. Both lower lobes have changes more concerning for aspiration than simple atelectasis.   This is further supported by reflux seem on esophagram in 2017 and pooling seen on swallow evaluation 10/24/2017 that does not clear with chin tuck. There is a small right effusion that may be loculated. There are cystic changes subpleural and intraparenchymal bilaterally. 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 Degree difficult to determine, but elevated Cr in an elderly patient is likely worse than calculated GFR  Chronic cough with a probable component of reflux disease. Perhaps his valvular heart disease and pulmonary hypertension become much more prominent with exertion. Plan  Continue diuresis. Continue aspiration precautions    The initial cardiology consultation mentions the possibility of underestimated severity of valvular disease. My independent review reaches a similar conclusion. Pulmonary hypertension is also a likely culprit. Focusing on rate control and euvolemia will prove more helpful overall relative to any aggressive pulmonary therapy. New patient to me care time greater than 40 minutes.

## 2018-01-29 NOTE — PROGRESS NOTES
Speech Therapy Note:    Pt with discharge summary completed and plans for d/c this afternoon to SNF. SLP will d/c pt and POC as indicated. Nicole Boyd [facility] [address] [facility]  Office: [phone]  Pager: [phone]

## 2018-01-29 NOTE — PROGRESS NOTES
Chart reviewed. Pt. Accepted to [facility] for today. Pt made aware & agreeable to transfer. Pt speaking with his wife to let her know. Pt's nurse made aware of above. Available as needed. Rachel Vincent RN,ext. [phone].

## 2018-01-29 NOTE — PROGRESS NOTES
[facility] - Progress Note  Admit Date: 1/25/2018    Assessment:     Hospital Problems  Date Reviewed: 1/26/2018          Codes Class Noted POA    Aortic stenosis, moderate ICD-10-CM: I35.0  ICD-9-CM: 424.1  1/28/2018 Unknown        Abnormal chest CT ICD-10-CM: R93.8  ICD-9-CM: 793.2  1/27/2018 Unknown    Overview Addendum 1/27/2018  1:13 PM by Marbin Dorantes MD     Bilateral ground glass opacities and bibasilar atelectasis/infiltrate. Suspicious for aspiration pneumonia.              Pleural effusion, right ICD-10-CM: J90  ICD-9-CM: 511.9  1/26/2018 Yes        * (Principal)SOB (shortness of breath) on exertion ICD-10-CM: R06.02  ICD-9-CM: 786.05  1/25/2018 Unknown        Essential hypertension ICD-10-CM: I10  ICD-9-CM: 401.9  2/27/2017 Yes        S/P CABG x 2 ICD-10-CM: Z95.1  ICD-9-CM: V45.81  8/25/2015 Yes    Overview Signed 8/25/2015  2:15 PM by Sheldon Augustin MD     12/2008, TAMAYO to LAD, SVG to RCA  Pulmonary consulting           Atherosclerotic NAHED (renal artery stenosis), bilateral ([facility] [address] ICD-10-CM: I70.1  ICD-9-CM: 440.1  8/25/2015 Yes    Overview Signed 8/25/2015  2:16 PM by Sheldon Augsutin MD     S/P stenting R and L             Dyslipidemia ICD-10-CM: E78.5  ICD-9-CM: 272.4  Unknown Yes        Coronary artery disease of native artery of native heart with stable angina pectoris ([facility] [address] ICD-10-CM: I25.118  ICD-9-CM: 414.01, 413.9  Unknown Yes        Cardiomyopathy ICD-10-CM: I42.9  ICD-9-CM: 425.4  Unknown Yes    Overview Signed 4/29/2015  7:51 PM by Michelle Metzger MD     EF 30-35% (ECHO 6/14)             Atrial fibrillation ICD-10-CM: I48.91  ICD-9-CM: 427.31  Unknown Yes    Overview Signed 4/29/2015  7:51 PM by Michelle Metzger MD     CHADS score 3  (+CHF, +HTN, +AGE, -DM, -CVA)             Sick sinus syndrome ICD-10-CM: I49.5  ICD-9-CM: 427.81  Unknown Yes    Overview Signed 8/25/2015  2:15 PM by Sheldon Augustin MD     Developed post op CABG                    Plan:     Continue present cardiac Rx. Subjective:     No new complaints. Deciding about rehab.     Objective:      Patient Vitals for the past 8 hrs:   Temp Pulse Resp BP SpO2   01/29/18 1214 97.8 °F (36.6 °C) 70 18 125/65 93 %   01/29/18 0833 97.1 °F (36.2 °C) 76 16 160/74 95 %   01/29/18 0826 - - - - 93 %         Patient Vitals for the past 96 hrs:   Weight   01/29/18 0037 142 lb 10.2 oz (64.7 kg)   01/28/18 0447 140 lb 12.8 oz (63.9 kg)   01/27/18 1520 138 lb 7.2 oz (62.8 kg)   01/27/18 0643 140 lb 14 oz (63.9 kg)   01/26/18 0400 140 lb 6.9 oz (63.7 kg)   01/25/18 2106 134 lb 14.7 oz (61.2 kg)                    Intake/Output Summary (Last 24 hours) at 01/29/18 1421  Last data filed at 01/28/18 1910   Gross per 24 hour   Intake              120 ml   Output                0 ml   Net              120 ml       Medications  Current Facility-Administered Medications   Medication Dose Route Frequency    chlorpheniramine-HYDROcodone (TUSSIONEX) oral suspension 5 mL  5 mL Oral Q12H PRN    isosorbide mononitrate ER (IMDUR) tablet 30 mg  30 mg Oral DAILY    ranolazine ER (RANEXA) tablet 500 mg  500 mg Oral BID    amLODIPine (NORVASC) tablet 5 mg  5 mg Oral DAILY    apixaban (ELIQUIS) tablet 5 mg  5 mg Oral BID    metoprolol succinate (TOPROL-XL) XL tablet 50 mg  50 mg Oral DAILY    pantoprazole (PROTONIX) tablet 40 mg  40 mg Oral ACB    acetaminophen (TYLENOL) tablet 650 mg  650 mg Oral Q4H PRN    bumetanide (BUMEX) injection 0.5 mg  0.5 mg IntraVENous Q12H    albuterol-ipratropium (DUO-NEB) 2.5 MG-0.5 MG/3 ML  3 mL Nebulization Q4H RT         Physical Exam:  General:  alert, cooperative, no distress  Neck:  no JVD  Lungs:  rhonchi R base  Heart:  regular rate and rhythm  Abdomen:  abdomen is soft without significant tenderness, masses, organomegaly or guarding  Extremities:  extremities normal, atraumatic, no cyanosis or edema    Data Review:     Labs: Results:       Chemistry Recent Labs      01/28/18   0520  01/27/18   1114 GLU  84  98   NA  136  139   K  3.2*  3.5   CL  97*  101   CO2  30  28   BUN  33*  34*   CREA  1.27  1.33*   CA  8.1*  8.3*   AGAP  9  10   BUCR  26*  26*   AP  98  109   TP  6.0*  6.2*   ALB  2.9*  3.0*   GLOB  3.1  3.2   AGRAT  0.9  0.9      CBC w/Diff Recent Labs      01/28/18   0520  01/27/18   0330   WBC  5.7  6.4   RBC  4.20*  4.38*   HGB  11.2*  11.9*   HCT  36.4  38.8   PLT  169  177   GRANS  71  78*   LYMPH  16*  8*   EOS  1  1      Cardiac Enzymes No results found for: CPK, CK, CKMMB, CKMB, RCK3, CKMBT, CKNDX, CKND1, DARNELL, TROPT, TROIQ, MARIA ESTHER, TROPT, TNIPOC, BNP, BNPP   Coagulation Recent Labs      01/27/18   0330   PTP  19.4*   INR  1.7*       Lipid Panel Lab Results   Component Value Date/Time    Cholesterol, total 149 09/09/2015 03:10 AM    HDL Cholesterol 41 09/09/2015 03:10 AM    LDL, calculated 90 09/09/2015 03:10 AM    VLDL, calculated 18 09/09/2015 03:10 AM    Triglyceride 90 09/09/2015 03:10 AM    CHOL/HDL Ratio 3.6 09/09/2015 03:10 AM      BNP No results found for: BNP, BNPP, XBNPT   Liver Enzymes Recent Labs      01/28/18   0520   TP  6.0*   ALB  2.9*   AP  98   SGOT  29      Digoxin    Thyroid Studies Lab Results   Component Value Date/Time    T4, Total 7.9 05/06/2010 08:50 AM    TSH 4.55 01/25/2017 03:05 PM          Signed By: Tiffanie Walker MD     January 29, 2018

## 2018-01-29 NOTE — IP AVS SNAPSHOT
Summary of Care Report The Summary of Care report has been created to help improve care coordination. Users with access to [employer] or [employer] (Web-based application) may access additional patient information including the Discharge Summary. If you are not currently a [address] user and need more information, please call the number listed below in the [place] [address] section and ask to be connected with Medical Records. Facility Information Name Address Phone [facility] [address] [facility] [address] [id number] [phone] Patient Information Patient Name Sex  Zenon Jean ([id number]) Male 11/10/1930 Discharge Information Admitting Provider Service Area Unit Susanna Diallo MD / [facility] Moustapha Thomas 3 Transitional  / [phone] Discharge Provider Discharge Date/Time Discharge Disposition Destination (none) 2018 Midday (Pending) [facility] (none) Patient Language Language ENGLISH [13] Hospital Problems as of 2018  Reviewed: 2018  7:38 PM by Luz Mcintyre MD  
 None Non-Hospital Problems as of 2018  Reviewed: 2018  7:38 PM by Luz Mcintyre MD  
  
  
  
 Class Noted - Resolved Last Modified Active Problems Dyslipidemia  Unknown - Present 2018 by Medhat Soliz NP Entered by Radha Chu MD  
  Coronary artery disease of native artery of native heart with stable angina pectoris [facility])  Unknown - Present 2018 by Medhat Soliz NP Entered by Radha Chu MD  
  Cardiomyopathy  Unknown - Present 2018 by Medhat Soliz NP Entered by Radha Chu MD  
  Overview Signed 2015  7:51 PM by Radha Chu MD  
   EF 30-35% (ECHO ) Atrial fibrillation  Unknown - Present 2018 by Medhat Soliz NP   Entered by Radha Chu MD  
 Overview Signed 4/29/2015  7:51 PM by Kavya Palmer MD  
   CHADS score 3  (+CHF, +HTN, +AGE, -DM, -CVA) Sick sinus syndrome  Unknown - Present 1/25/2018 by Cass Shay NP Entered by Kavya Palmer MD  
  Overview Signed 8/25/2015  2:15 PM by Loni Champion MD  
   Developed post op CABG  
  
  
  S/P CABG x 2  8/25/2015 - Present 1/25/2018 by Cass Shay NP Entered by Loni Champion MD  
  Overview Signed 8/25/2015  2:15 PM by Loni Champion MD  
   12/2008, LIMA to LAD, SVG to RCA Atherosclerotic NAHED (renal artery stenosis), bilateral ([address]  8/25/2015 - Present 1/25/2018 by Cass Shay NP Entered by Loni Champion MD  
  Overview Signed 8/25/2015  2:16 PM by Loni Champion MD  
   S/P stenting R and L Chronic systolic congestive heart failure ([address]  1/25/2017 - Present 2/27/2017 by Loni Champion MD  
  Entered by Sushila Morrison MD  
  Essential hypertension  2/27/2017 - Present 1/25/2018 by Cass Shay NP Entered by Loni Champion MD  
  Anxiety  2/27/2017 - Present 2/27/2017 by Loni Champion MD  
  Entered by Loni Champion MD  
  Statin intolerance  5/3/2017 - Present 5/3/2017 by Loni Champion MD  
  Entered by Loni Champion MD  
  Osteoarthritis of both knees  8/20/2017 - Present 8/20/2017 by Loni Champion MD  
  Entered by Loni Champion MD  
  Overview Signed 8/20/2017  3:19 PM by Loni Champion MD  
   Contemplating knee replacement. Celiac artery stenosis ([address]  1/10/2018 - Present 1/10/2018 by Loni Champion MD  
  Entered by Loni Champion MD  
  Syncope and collapse  1/19/2018 - Present 1/19/2018 by Alyssa Alvarez DO Entered by Alyssa Alvarez DO  
  SOB (shortness of breath) on exertion  1/25/2018 - Present 1/25/2018 by Cass Shay NP Entered by Cass Shay NP   Pleural effusion, right  1/26/2018 - Present 1/26/2018 by Gail Salgado MD  
 Entered by Ana Luisa Shelton MD  
  Abnormal chest CT  1/27/2018 - Present 1/27/2018 by Ana Luisa Shelton MD  
  Entered by Ana Luisa Shelton MD  
  Overview Addendum 1/27/2018  1:13 PM by Ana Luisa Shelton MD  
   Bilateral ground glass opacities and bibasilar atelectasis/infiltrate. Suspicious for aspiration pneumonia. Aortic stenosis, moderate  1/28/2018 - Present 1/28/2018 by Liza Negron MD  
  Entered by Liza Negron MD  
  
You are allergic to the following Allergen Reactions Beta-Blockers (Beta-Adrenergic Blocking Agts) Drowsiness Penicillins Swelling Statins-Hmg-Coa Reductase Inhibitors Drowsiness Current Discharge Medication List  
  
START taking these medications Dose & Instructions Dispensing Information Comments  
 guaiFENesin  mg ER tablet Commonly known as:  Abelino & Abelino Dose:  600 mg Take 1 Tab by mouth every twelve (12) hours. Quantity:  60 Tab Refills:  0  
   
 tiotropium 18 mcg inhalation capsule Commonly known as:  Milagro Ear Dose:  1 Cap Take 1 Cap by inhalation every twenty-four (24) hours. Quantity:  30 Cap Refills:  0 CONTINUE these medications which have CHANGED Dose & Instructions Dispensing Information Comments  
 chlorpheniramine-HYDROcodone 10-8 mg/5 mL suspension Commonly known as:  Matthew Estella What changed:  when to take this Dose:  5 mL Take 5 mL by mouth nightly as needed for Cough. Max Daily Amount: 5 mL. Quantity:  60 mL Refills:  0  
   
 furosemide 20 mg tablet Commonly known as:  LASIX Start taking on:  2/8/2018 What changed:   
- medication strength 
- how much to take 
- how to take this - when to take this 
- additional instructions One pill once or twice a day Quantity:  60 Tab Refills:  0  
   
 nitroglycerin 0.4 mg SL tablet Commonly known as:  NITROSTAT What changed:   
- medication strength 
- how much to take  Dose:  0.4 mg  
 1 Tab by SubLINGual route every five (5) minutes as needed for Chest Pain (up to 3 total 1 every 5 min). Quantity:  60 Tab Refills:  0  
   
 pantoprazole 40 mg tablet Commonly known as:  PROTONIX Start taking on:  2/8/2018 What changed:  See the new instructions. Dose:  40 mg Take 1 Tab by mouth Daily (before breakfast). Quantity:  60 Tab Refills:  0 CONTINUE these medications which have NOT CHANGED Dose & Instructions Dispensing Information Comments  
 amLODIPine 5 mg tablet Commonly known as:  Blue Bond Start taking on:  2/8/2018 Dose:  5 mg Take 1 Tab by mouth daily. Quantity:  60 Tab Refills:  0  
   
 apixaban 5 mg tablet Commonly known as:  Maria Luz Le Dose:  5 mg Take 1 Tab by mouth two (2) times a day. Quantity:  60 Tab Refills:  0  
   
 isosorbide mononitrate ER 30 mg tablet Commonly known as:  IMDUR Start taking on:  2/8/2018 Dose:  30 mg Take 1 Tab by mouth daily. Quantity:  60 Tab Refills:  0  
   
 metoprolol succinate 50 mg XL tablet Commonly known as:  TOPROL XL Start taking on:  2/8/2018 Dose:  50 mg Take 1 Tab by mouth daily. Quantity:  60 Tab Refills:  0  
   
 ranolazine  mg SR tablet Commonly known as:  RANEXA Dose:  500 mg Take 1 Tab by mouth two (2) times a day. Indications: Chronic Stable Angina Pectoris Quantity:  60 Tab Refills:  0 ASK your doctor about these medications Dose & Instructions Dispensing Information Comments  
 enalapril 20 mg tablet Commonly known as:  Jenean Notch Dose:  20 mg Take 20 mg by mouth daily. Refills:  0 LORazepam 1 mg tablet Commonly known as:  ATIVAN Dose:  1 mg Take 1 Tab by mouth every six (6) hours as needed for Anxiety. Max Daily Amount: 4 mg. Quantity:  30 Tab Refills:  0 Current Immunizations Name Date Influenza Vaccine 10/1/2017 Tdap 9/10/2014 Follow-up Information Follow up With Details Comments Contact Info Ngoc Manning MD  Follow up on Thurs. 2/15/18 @ 11:00 am Please check in @ 10:45 am  Ellen 
[address] [place] [address] [address] 
[phone] Discharge Instructions Shortness of Breath: Care Instructions Your Care Instructions Shortness of breath has many causes. Sometimes conditions such as anxiety can lead to shortness of breath. Some people get mild shortness of breath when they exercise. Trouble breathing also can be a symptom of a serious problem, such as asthma, lung disease, emphysema, heart problems, and pneumonia. If your shortness of breath continues, you may need tests and treatment. Watch for any changes in your breathing and other symptoms. Follow-up care is a key part of your treatment and safety. Be sure to make and go to all appointments, and call your doctor if you are having problems. It's also a good idea to know your test results and keep a list of the medicines you take. How can you care for yourself at home? · Do not smoke or allow others to smoke around you. If you need help quitting, talk to your doctor about stop-smoking programs and medicines. These can increase your chances of quitting for good. · Get plenty of rest and sleep. · Take your medicines exactly as prescribed. Call your doctor if you think you are having a problem with your medicine. · Find healthy ways to deal with stress. ¨ Exercise daily. ¨ Get plenty of sleep. ¨ Eat regularly and well. When should you call for help? Call 911 anytime you think you may need emergency care. For example, call if: 
? · You have severe shortness of breath. ? · You have symptoms of a heart attack. These may include: ¨ Chest pain or pressure, or a strange feeling in the chest. 
¨ Sweating. ¨ Shortness of breath. ¨ Nausea or vomiting. ¨ Pain, pressure, or a strange feeling in the back, neck, jaw, or upper belly or in one or both shoulders or arms. ¨ Lightheadedness or sudden weakness. ¨ A fast or irregular heartbeat. After you call 911, the  may tell you to chew 1 adult-strength or 2 to 4 low-dose aspirin. Wait for an ambulance. Do not try to drive yourself. ?Call your doctor now or seek immediate medical care if: 
? · Your shortness of breath gets worse or you start to wheeze. Wheezing is a high-pitched sound when you breathe. ? · You wake up at night out of breath or have to prop your head up on several pillows to breathe. ? · You are short of breath after only light activity or while at rest. ? Watch closely for changes in your health, and be sure to contact your doctor if: 
? · You do not get better over the next 1 to 2 days. Where can you learn more? Go to http://mitra-shaw.info/. Enter S780 in the search box to learn more about \"Shortness of Breath: Care Instructions. \" Current as of: May 12, 2017 Content Version: 11.4 © 4015-3376 [employer]. Care instructions adapted under license by [employer] (which disclaims liability or warranty for this information). If you have questions about a medical condition or this instruction, always ask your healthcare professional. [place] [address] any warranty or liability for your use of this information. Chart Review Routing History Recipient Method Report Sent By Danish Walsh MD  
Fax: [phone] Phone: [phone] Fax IP Auto Routed Tino Nickerson MD [[id number]] 9/10/2015 11:12 AM 09/10/2015 Doug Brewster MD  
Fax: [phone] Phone: [phone] Fax Notes Report Linda Perez [[id number]] 11/14/2017  1:01 PM 10/26/2017 [id number] Parson MD  
Phone: [phone] [facility] Routed [address] [place] [address] [[id number]] 1/22/2018 11:03 AM 01/22/2018 Liza Negron MD  
Phone: [phone]  [facility] [address], [address] 1/22/2018 11:03 AM 01/22/2018

## 2018-01-29 NOTE — CONSULTS
Pulmonary follow up    S: Refusing HHN BDs      O: VSS  Lungs: Bronchovesicular  Heart Reg    Problems/Asst:     SOB (shortness of breath) on exertion (1/25/2018)     Active Problems:    Dyslipidemia ()       Coronary artery disease of native artery of native heart with stable angina pectoris (HCC) ()       Cardiomyopathy ()      Overview: EF 30-35% (ECHO 6/14)       Atrial fibrillation ()      Overview: CHADS score 3  (+CHF, +HTN, +AGE, -DM, -CVA)       Sick sinus syndrome ()      Overview: Developed post op CABG        S/P CABG x 2 (8/25/2015)      Overview: 12/2008, LIMA to LAD, SVG to RCA       Atherosclerotic NAHED (renal artery stenosis), bilateral ([facility]) (8/25/2015)      Overview: S/P stenting R and L       Essential hypertension (2/27/2017)       Pleural effusion, right (1/26/2018)       Abnormal chest CT (1/27/2018)      Overview: Bilateral ground glass opacities and bibasilar atelectasis/infiltrate. Suspicious for aspiration pneumonia.       Aortic stenosis, moderate (1/28/2018)      Plan:    Continue therapy  Refusing HHN BDs at present.

## 2018-01-29 NOTE — PROGRESS NOTES
Problem: Dysphagia (Adult)  Goal: *Speech Goal: (INSERT TEXT)  Recommendations:  Diet: Mech-soft/thin liquids  Meds: one at a time  Aspiration Precautions  Oral Care TID  Other:     Goals:  Patient will:  1. Tolerate PO trials with 0 s/s overt distress in 4/5 trials  2. Utilize compensatory swallow strategies/maneuvers (decrease bite/sip, size/rate, alt. liq/sol) with min cues in 4/5 trials  Outcome: Not Progressing Towards Goal  Speech LAnguage Pathology bedside swallow evaluation  And treatment    Patient: Katherina Ormond (62 y.o. male)  Date: 1/29/2018  Primary Diagnosis: SOB (shortness of breath) on exertion        Precautions: aspiration       ASSESSMENT :  Based on the objective data described below, the patient presents with mild oropharyngeal dysphagia in the setting of respiratory distress. Pt A&Ox4; follows directions. Pt reports increased esophageal sensation when eating solids, which he believes is 2/2 breathing treatments. Oral-motor exam revealed structures grossly intact for mastication and deglutition. Pt self-fed solids, mech-soft, mixed consistency, puree, and thin with no s/s aspiration. Pt demo minimally delayed bolus prep and transport time. Pt continued to report increased \"irritated\" esophageal sensation for all consistencies, but denied globus sensation. At this time, pt is safest for soft solid/thin liquids; meds one at a time. D/w KATELIN Esteban will follow for diet tolerance and advancement as indicated. SLP will follow 1- 2 visits for diet tolerance. Skilled therapy initiated with education on aspiration precautions and importance of compensatory swallow techniques to decrease aspiration risk (decrease rate of intake & sip/bite size, upright @HOB for all po intake and ~30 minutes after po); verbalized comprehension. Patient will benefit from skilled intervention to address the above impairments.   Patients rehabilitation potential is considered to be Good  Factors which may influence rehabilitation potential include:   []            None noted  []            Mental ability/status  [x]            Medical condition  []            Home/family situation and support systems  []            Safety awareness  []            Pain tolerance/management  []            Other:      PLAN :  Recommendations and Planned Interventions:  Mech-soft/thin liquids; meds one at a time  Frequency/Duration: Patient will be followed by speech-language pathology 1- 2 visits to address goals. Discharge Recommendations: Home Health     SUBJECTIVE:   Patient stated my chest feels irritated when the food goes down.     OBJECTIVE:     Past Medical History:   Diagnosis Date    Atrial fibrillation     CHADS score 3  (+CHF, +HTN, +AGE, -DM, -CVA)    CABG     2008   LIMA - LAD,   SVG - RCA    Cardiomyopathy     EF 30-35% (ECHO 6/14)    Coronary artery disease     Dyslipidemia     Hypertension     Hypothyroid     Pacemaker     Peripheral vascular disease     Renal artery stenosis     bilateral stents    Sick sinus syndrome      Past Surgical History:   Procedure Laterality Date    HX CORONARY ARTERY BYPASS GRAFT      2008   LIMA - LAD,   SVG - RCA     Prior Level of Function/Home Situation: independent  Home Situation  Home Environment: Private residence  One/Two Story Residence: One story  Living Alone: No  Support Systems: Family member(s), Friends \ neighbors  Patient Expects to be Discharged to[de-identified] Private residence  Current DME Used/Available at Home: Cane, straight  Diet prior to admission: regular/thin  Current Diet:  mech-soft/thin/meds one at a time   Cognitive and Communication Status:  Neurologic State: Alert, Appropriate for age  Orientation Level: Appropriate for age, Oriented X4  Cognition: Appropriate decision making, Appropriate for age attention/concentration, Appropriate safety awareness, Follows commands  Perception: Appears intact  Perseveration: No perseveration noted  Safety/Judgement: Awareness of environment  Oral Assessment:  Oral Assessment  Labial: No impairment  Dentition: Full;Natural  Oral Hygiene: good  Lingual: No impairment  Velum: No impairment  Mandible: No impairment  Gag Reflex: No impairment  P.O. Trials:  Patient Position: HOB 60*  Vocal quality prior to P.O.: No impairment  Consistency Presented: Thin liquid; Solid;Pudding;Mixed consistency;Mechanical soft  How Presented: Self-fed/presented;Straw;Spoon; Successive swallows     Bolus Acceptance: No impairment  Bolus Formation/Control: No impairment     Propulsion: Delayed (# of seconds)  Oral Residue: None  Initiation of Swallow: Delayed (# of seconds)  Laryngeal Elevation: Functional  Aspiration Signs/Symptoms: None  Pharyngeal Phase Characteristics: No impairment, issues, or problems   Effective Modifications: Small sips and bites  Cues for Modifications: Minimal       Oral Phase Severity: Mild  Pharyngeal Phase Severity : No impairment    GCODESwallowing:  Swallow Current Status CI= 1-19%   Swallow Goal Status CH= 0%    The severity rating is based on the following outcomes:  HEBERT Noms Swallow Level 6    Clinical Judgement    PAIN:  Start of Eval: 0  End of Eval: 0     After treatment:   []            Patient left in no apparent distress sitting up in chair  [x]            Patient left in no apparent distress in bed  [x]            Call bell left within reach  [x]            Nursing notified  []            Family present  []            Caregiver present  []            Bed alarm activated    COMMUNICATION/EDUCATION:   [x]            Aspiration precautions; swallow safety; compensatory techniques. [x]            Patient/family have participated as able in goal setting and plan of care. [x]            Patient/family agree to work toward stated goals and plan of care. []            Patient understands intent and goals of therapy; neutral about participation.   []            Patient unable to participate in goal setting/plan of care; educ ongoing with interdisciplinary staff  []         Posted safety precautions in patient's room.     Thank you for this referral.  Malika Ramos, SLP Intern  Time Calculation: 21 mins   Eval 11 mins  Treat 10 mins

## 2018-01-29 NOTE — PROGRESS NOTES
conducted a Follow up consultation and Spiritual Assessment for Daniel Wisdom, who is a 80 y. o.,male. The  provided the following Interventions:  Continued the relationship of care and support. Listened empathically. Offered prayer and assurance of continued prayer on patients behalf. Chart reviewed. The following outcomes were achieved:  Patient expressed gratitude for pastoral care visit. Assessment:  There are no further spiritual or Episcopal issues which require Spiritual Care Services interventions at this time. Plan:  Chaplains will continue to follow and will provide pastoral care on an as needed/requested basis.  recommends bedside caregivers page  on duty if patient shows signs of acute spiritual or emotional distress.      [address] [facility]   Staff [address] [place] [address]   [phone]

## 2018-01-29 NOTE — PROGRESS NOTES
IDR Summary      Patient: Adams Emerson MRN: [id number]    Age: 80 y.o.  : 11/10/1930     Admit Diagnosis: SOB (shortness of breath) on exertion      Problems pertinent to hospital stay: pt c/o burning with Bumex IV - attending is aware    Consults:P.T, O.T. and Case Management     Testing due for patient today? NO    Nutrition plan:Yes     Mobility needs: Yes      Lines/Tubes:   IV: YES   Needed: YES  Benites: NO  Needed:NO  Central Line: NO Needed: NO      VTE Prophylaxis: Mechanical    Influenza Vaccine received? YES        Care Management:  Discharge plan: rehab   PCP: Trista Pérez MD    : NO  Financial concerns:No   Interventions:       LOS: 4 days     Expected days until discharge: TBD      Signed:      ROMEO Hicks  [facility]  Hospitalist Division  Pager:  [phone]  Office:  [phone]

## 2018-01-29 NOTE — DISCHARGE INSTRUCTIONS
DISCHARGE SUMMARY from Nurse    PATIENT INSTRUCTIONS:    After general anesthesia or intravenous sedation, for 24 hours or while taking prescription Narcotics:  · Limit your activities  · Do not drive and operate hazardous machinery  · Do not make important personal or business decisions  · Do  not drink alcoholic beverages  · If you have not urinated within 8 hours after discharge, please contact your surgeon on call. Report the following to your surgeon:  · Excessive pain, swelling, redness or odor of or around the surgical area  · Temperature over 100.5  · Nausea and vomiting lasting longer than 4 hours or if unable to take medications  · Any signs of decreased circulation or nerve impairment to extremity: change in color, persistent  numbness, tingling, coldness or increase pain  · Any questions    What to do at Home:  Recommended activity: Activity as tolerated. If you experience any of the following symptoms shortness of breath, lightheadedness or chest pains, please follow up with primary care provider. *  Please give a list of your current medications to your Primary Care Provider. *  Please update this list whenever your medications are discontinued, doses are      changed, or new medications (including over-the-counter products) are added. *  Please carry medication information at all times in case of emergency situations. These are general instructions for a healthy lifestyle:    No smoking/ No tobacco products/ Avoid exposure to second hand smoke  Surgeon General's Warning:  Quitting smoking now greatly reduces serious risk to your health.     Obesity, smoking, and sedentary lifestyle greatly increases your risk for illness    A healthy diet, regular physical exercise & weight monitoring are important for maintaining a healthy lifestyle    You may be retaining fluid if you have a history of heart failure or if you experience any of the following symptoms:  Weight gain of 3 pounds or more overnight or 5 pounds in a week, increased swelling in our hands or feet or shortness of breath while lying flat in bed. Please call your doctor as soon as you notice any of these symptoms; do not wait until your next office visit. Recognize signs and symptoms of STROKE:    F-face looks uneven    A-arms unable to move or move unevenly    S-speech slurred or non-existent    T-time-call 911 as soon as signs and symptoms begin-DO NOT go       Back to bed or wait to see if you get better-TIME IS BRAIN. Warning Signs of HEART ATTACK     Call 911 if you have these symptoms:   Chest discomfort. Most heart attacks involve discomfort in the center of the chest that lasts more than a few minutes, or that goes away and comes back. It can feel like uncomfortable pressure, squeezing, fullness, or pain.  Discomfort in other areas of the upper body. Symptoms can include pain or discomfort in one or both arms, the back, neck, jaw, or stomach.  Shortness of breath with or without chest discomfort.  Other signs may include breaking out in a cold sweat, nausea, or lightheadedness. Don't wait more than five minutes to call 911 - MINUTES MATTER! Fast action can save your life. Calling 911 is almost always the fastest way to get lifesaving treatment. Emergency Medical Services staff can begin treatment when they arrive -- up to an hour sooner than if someone gets to the hospital by car. The discharge information has been reviewed with the patient. The patient verbalized understanding. Discharge medications reviewed with the patient and appropriate educational materials and side effects teaching were provided. ___________________________________________________________________________________________________________________________________    MyChart Activation    Thank you for enrolling in [facility] [address] Please follow the instructions below to securely access your online medical record.  MyChart allows you to send messages to your doctor, view your test results, renew your prescriptions, schedule appointments, and more. How Do I Sign Up? 1. In your internet browser, go to [email or web address]. 2. Click on the First Time User? Click Here link in the Sign In box. You will see the New Member Sign Up page. 3. Enter your [employer] Access Code exactly as it appears below. You will not need to use this code after youve completed the sign-up process. If you do not sign up before the expiration date, you must request a new code. [employer] Access Code: Activation code not generated  Current [employer] Status: Active     4. Enter the last four digits of your Social Security Number (xxxx) and Date of Birth (mm/dd/yyyy) as indicated and click Submit. You will be taken to the next sign-up page. 5. Create a [employer] ID. This will be your [employer] login ID and cannot be changed, so think of one that is secure and easy to remember. 6. Create a [employer] password. You can change your password at any time. 7. Enter your Password Reset Question and Answer. This can be used at a later time if you forget your password. 8. Enter your e-mail address. You will receive e-mail notification when new information is available in [address] 9. Click Sign Up. You can now view your medical record. Additional Information    Remember, [employer] is NOT to be used for urgent needs. For medical emergencies, dial 911. Now available from your iPhone and Android!     Patient armband removed and shredded

## 2018-01-30 NOTE — ROUTINE PROCESS
X  Physical Therapy          Functional goals:            ?   Maintain current functional status           X     Improvement            Functional interventions:  -Improve endurance  -Improve balance  -Improve walking  -Improve ability to go over stairs             Frequency: 1-2 times per day, 3-6 days per week      ? Occupational Therapy          Functional goals:             ?  Maintain current functional status             ?  ]   Improvement            Functional interventions:             Frequency:         ?    Speech Therapy          Functional goals:             ?  ] Maintain current functional status             ?      Improvement            Functional interventions:             Frequency:

## 2018-01-30 NOTE — PROGRESS NOTES
INITIAL GOALS    My preferred name is: Magui Santos     My Representative is:Lani Haynes (wife)    My discharge goal is:      X  Discharge to community         Location:         Caregiver:    ?   Discharge to LTC    ? Discharge with Hospice    ? Barriers to discharge/goals :none      Residents life history notes prior to        Admission: used cane at times in the home and used regularly in the community      Residents daily routine and        preferences:    Residents cultural and ethnic        preferences    Outside coordination:    ? Follow/up appointment:             Date/time:      ? Outpatient Procedure :              Date/time:      ?    Transportation needs:       Resident or Caregiver education needs:   /PSYCHOSOCIAL     X    Able to recognize need for           placement in skilled facility     X   Able to make own decisions         Mental Health Needs:   ?    Mental Health History:          ?   Behavior concerns:     ? PASARR Level II Recommendation:     ?    Depression Screening     ? Psychiatric consult needed      Social Service/Psychosocial interventions:      Behavioral interventions:    Community Services Received prior to Kyle Goel needed: [address] and thang as needed      Outside coordination:    ? Follow/up appointment:             Date/time:    ?     Outpatient Procedure :              Date/time:    ?     Transportation needs:

## 2018-01-30 NOTE — PROGRESS NOTES
Patient arrived to [facility] via w/c A&Ox4 able to verbalize needs and concerns, 0 c/o pain or discomfort, able to ambulate with assist.

## 2018-01-30 NOTE — ROUTINE PROCESS
TRANSFER - IN REPORT:    Verbal report received from Ric Singh RN on Jp Zaldivar  being received from [address] [place] [address] for routine progression of care      Report consisted of patients Situation, Background, Assessment and   Recommendations(SBAR). Information from the following report(s) SBAR, Kardex and MAR was reviewed with the receiving nurse. Opportunity for questions and clarification was provided. Assessment completed upon patients arrival to unit via w/c and care assumed at 1715p.

## 2018-01-30 NOTE — PROGRESS NOTES
INITIAL GOALS    My preferred name is: Uziel Castro     My Representative is: My wife Ilsa Lee    My discharge goal is:      ?   Discharge to community         Location:         Caregiver:    ?   Discharge to LTC    ? Discharge with Hospice    ? Barriers to discharge/goals :      Residents life history notes prior to        Admission: Retired      Residents daily routine and        preferences:    Residents cultural and ethnic        preferences    Outside coordination:    ? Follow/up appointment:             Date/time:      ? Outpatient Procedure :              Date/time:      ?    Transportation needs:       Resident or Caregiver education needs:   /PSYCHOSOCIAL     ? Able to recognize need for           placement in skilled facility     ? Able to make own decisions         Mental Health Needs:   ?    Mental Health History:          ?   Behavior concerns:     ? PASARR Level II Recommendation:     ?    Depression Screening     ? Psychiatric consult needed      Social Service/Psychosocial interventions:      Behavioral interventions:    Community Services Received prior to admission:    [employer] needed:      Outside coordination:    ? Follow/up appointment:             Date/time:    ?     Outpatient Procedure :              Date/time:    ?     Transportation needs:

## 2018-01-30 NOTE — PROGRESS NOTES
conducted an initial consultation and Spiritual Assessment for Sarath Snell, who is a 80 y. o.,male. Patients Primary Language is: Barkibu. According to the patients EMR Worship Affiliation is: Other. The reason the Patient came to the hospital is:   Patient Active Problem List    Diagnosis Date Noted    Aortic stenosis, moderate 01/28/2018    Abnormal chest CT 01/27/2018    Pleural effusion, right 01/26/2018    SOB (shortness of breath) on exertion 01/25/2018    Syncope and collapse 01/19/2018    Celiac artery stenosis (HCC) 01/10/2018    Osteoarthritis of both knees 08/20/2017    Statin intolerance 05/03/2017    Essential hypertension 02/27/2017    Anxiety 02/27/2017    Chronic systolic congestive heart failure ([address] 01/25/2017    S/P CABG x 2 08/25/2015    Atherosclerotic NAHED (renal artery stenosis), bilateral (HCC) 08/25/2015    Dyslipidemia     Coronary artery disease of native artery of native heart with stable angina pectoris (HCC)     Cardiomyopathy     Atrial fibrillation     Sick sinus syndrome         The  provided the following Interventions:  Initiated a relationship of care and support. Explored issues of angelina, spirituality and/or Church needs while hospitalized. Listened empathically. Provided chaplaincy education. Provided information about Spiritual Care Services. Offered prayer and assurance of continued prayers on patient's behalf. Chart reviewed. The following outcomes were achieved:  Patient shared some information about their medical narrative and spiritual journey/beliefs. Patient processed feeling about current hospitalization. Patient expressed gratitude for the 's visit. Assessment:  Patient did not indicate any spiritual or Church issues which require Spiritual Care Services interventions at this time.    Patient does not have any Church/cultural needs that will affect patients preferences in health care.    Plan:  Chaplains will continue to follow and will provide pastoral care on an as needed or requested basis.  recommends bedside caregivers page  on duty if patient shows signs of acute spiritual or emotional distress.     [address] [facility]   Staff [address] [place] [address]   [phone]

## 2018-01-30 NOTE — PROGRESS NOTES
Problem: Dysphagia (Adult)  Goal: *Acute Goals and Plan of Care (Insert Text)  Clinical beside swallow eval completed per MD orders. Pt A&Ox4. Functional communication. Intelligibility > 90%. Cognitive-linguistic function appears intact. Familiar to SLP from [address] (10/24/2017) in which no aspiration evident and pt not deemed as aspiration risk. Today, OM examination revealed oral motor structures functional for mastication and deglutition. Presented with thin liquid, puree, and solid trials. Exhibited (+) bolus cohesion, manipulation and A-P transit. Further exhibited (+) swallow timing/reflex and hyolaryngeal excursion. Pt able to manipulate and clear with 0 clinical s/s aspiration and/or oropharyngeal dysphagia. Pt safe for soft solid (per pt preference), thin liquid diet. 0 formal ST needs for dysphagia indicated at this time. SLP educated pt on role of speech therapist in current setting with re: speech/swallow; verbalized comprehension. SLP available for re-evaluation if indicated by MD.     Thank you for this referral.   Nixon Davies M.S., CCC-SLP          Outcome: Resolved/Met Date Met: 01/30/18    [facility] Speech LAnguage Pathology   dysphagia evaluation      Patient: Shay Malik (14 y.o. male)          Start of Care Date: 1/30/2018      Referred by :  Tian Crowe MD       Attending Physician: Tian Crowe MD  Primary Diagnosis: SOB       Treatment Diagnosis  Treatment Diagnosis: r/o dysphagia  Treatment Diagnosis 2: difficulty in walking      Precautions: Fall (Has Pacemaker)   Medical History:   Past Medical History:   Diagnosis Date    Atrial fibrillation     CHADS score 3  (+CHF, +HTN, +AGE, -DM, -CVA)    CABG     2008   [facility] - LAD,   SVG - RCA    Cardiomyopathy     EF 30-35% (ECHO 6/14)    Coronary artery disease     Dyslipidemia     Hypertension     Hypothyroid     Pacemaker     Peripheral vascular disease     Renal artery stenosis     bilateral stents    Sick sinus syndrome      Past Surgical History:   Procedure Laterality Date    HX CORONARY ARTERY BYPASS GRAFT      2008   [facility] - LAD,   SVG - RCA      Treatment Diagnosis: r/o dysphagia                      Onset Date:  1/25/2018     Reason for referral:  This patient has been referred for a speech therapy evaluation secondary to admission to this SNF to assess swallow function. Prior Level of Function/Home Situation/Diet:  Prior to this hospitalization, the patient lives at home; mod I. Endorses soft diet at home. Home Situation  Home Environment: Private residence  # Steps to Enter: 3  Rails to Enter: Yes  Hand Rails : Bilateral (uses R HR due to distance)  One/Two Story Residence: One story  Living Alone: No  Support Systems: Spouse/Significant Other/Partner  Patient Expects to be Discharged to[de-identified] Private residence  Current DME Used/Available at Home: Cane, straight  Tub or Shower Type: Tub/Shower combination. OBJECTIVE DATA SUMMARY:     Current Diet:   Soft/thin    Cognitive and Communication Status:  Mental Status  Neurologic State: Alert  Orientation Level: Oriented X4  Cognition: Follows commands  Perception: Appears intact  Perseveration: No perseveration noted  Safety/Judgement: Awareness of environment  Pain:  Pain Scale 1: Numeric (0 - 10)  Pain Intensity 1: 0     Oral Assessment:  Oral Assessment  Labial: No impairment  Dentition: Natural  Oral Hygiene: good  Lingual: No impairment  Velum: No impairment  Mandible: No impairment  P.O. Trials:  Patient Position: EOB  Vocal quality prior to P.O.:  No impairment  Consistency Presented:  Thin liquid, Solid, Pudding  How Presented: Self-fed/presented, Straw, Successive swallows     Bolus Acceptance: No impairment  Bolus Formation/Control: No impairment     Propulsion: No impairment  Oral Residue: None  Initiation of Swallow: No impairment  Laryngeal Elevation: Functional  Aspiration Signs/Symptoms: None  Pharyngeal Phase Characteristics: No impairment, issues, or problems   Effective Modifications: Small sips and bites  Cues for Modifications: Minimal     Oral Phase Severity: No impairment  Pharyngeal Phase Severity : No impairment    Findings and Recommendations:  Findings and Recommendations  Evaluation Type: Eval O/P Swallow  Assessment Method(s): Observation  Patient Position: EOB  Vocal Quality: No impairment  Consistency Presented: Thin liquid; Solid;Pudding  How Presented: Self-fed/presented;Straw;Successive swallows  Bolus Acceptance: No impairment  Bolus Formation/Control: No impairment  Propulsion: No impairment  Oral Residue: None  Oral Phase Severity: No impairment  Pharyngeal Phase Severity : No impairment  Initiation of Swallow: No impairment  Laryngeal Elevation: Functional  Aspiration Signs/Symptoms: None    TREATMENT PLAN:  Rehab Potential : Good   Skilled speech therapy may include:  [] Skilled meal assessment                                  [] Diet texture analysis                        [] Oral-motor/laryngeal strengthening exercises  [] Compensatory swallow techniques    [] Patient/caregiver education        [x]Other:eval only     Frequency/Duration: N/A - eval only. 0 ST needs ID'd at time of evaluation. Discharge Recommendations: None for speech therapy       COMMUNICATION/EDUCATION:   [x]         Aspiration precautions; compensatory swallow techniques  [x]         Patient/family have participated as able in POC/goal setting  []         Patient/family agree to work toward stated goals and plan of care. []         Patient understands intent/goals of therapy, but is neutral about participation. []         Patient is unable to participate in goal setting and plan of care; will contact family/POA. Evaluation: 45 mins.     Speech-Language Pathologist:     Lemar Cowden, SLP            1/30/2018    Thank you for this referral.

## 2018-01-30 NOTE — PROGRESS NOTES
SW met with pt to complete the social evaluation. Pt lives with his wife Ginny Brady and is expected to return home at discharge. Pt reported that he used a cane periodically in the home or would furniture cruise. He used the cane on a regular basis in the community \" for balance\". [facility] informed pt of the rehab process and MD visits. Pt's goal is \" to get my strength back\". Pt was pleasant and cooperative and seems motivated to regain his independence. [facility] informed pt that [facility] will arrange a time next week for a care plan meeting. [facility] will follow.

## 2018-01-30 NOTE — PROGRESS NOTES
Nutrition initial assessment/Plan of care [facility]     RECOMMENDATIONS:   1. Mech Soft Cardiac Diet  2. Monitor weight and PO intake  3. RD to follow     GOALS:   1. PO intake meets >75% of protein/calorie needs by 2/6  2. Weight Maintenance (+/- 1-2 lb) by  2/6     ASSESSMENT:   Wt status is classified as normal per BMI of 21.6. However, Pt at nutrition risk d/t BMI <23 and age >65 years. Adequate PO intake. Labs noted. Nutrition recommendations listed. RD to follow. Nutrition Diagnoses:   No nutrition diagnosis at this time    Nutrition Risk:  [] High  [] Moderate [x]  Low    SUBJECTIVE/OBJECTIVE:   Pt transferred from  to [facility] on 1/29/2018 after being admitted for SOB and CP. PMHx includes CKD, CAD/CABG, CHF. Denies having any food allergies and wt was stable PTA. -140 lb, but wt fluctuates d/t fluid retention. SLP recommended Mech Soft/thin on (1/29). Reports having a good appetite and consuming >75% of meals. Observed 90% of lunch consumed today during visit. Will monitor.     Information Obtained from:    [x] Chart Review   [x] Patient   [] Family/Caregiver   [x] Nurse/Physician   [] Interdisciplinary Meeting/Rounds      Diet: Mech Soft Cardiac Diet  Medications: [x] Reviewed    Allergies: [x] Reviewed   Patient Active Problem List   Diagnosis Code    Dyslipidemia E78.5    Coronary artery disease of native artery of native heart with stable angina pectoris ([facility] [address] I25.118    Cardiomyopathy I42.9    Atrial fibrillation I48.91    Sick sinus syndrome I49.5    S/P CABG x 2 Z95.1    Atherosclerotic NAHED (renal artery stenosis), bilateral (HCC) I70.1    Chronic systolic congestive heart failure (HCC) I50.22    Essential hypertension I10    Anxiety F41.9    Statin intolerance Z78.9    Osteoarthritis of both knees M17.0    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    Syncope and collapse R55    SOB (shortness of breath) on exertion R06.02    Pleural effusion, right J90    Abnormal chest CT R93.8    Aortic stenosis, moderate I35.0       Past Medical History:   Diagnosis Date    Atrial fibrillation     CHADS score 3  (+CHF, +HTN, +AGE, -DM, -CVA)    CABG     2008   LIMA - LAD,   SVG - RCA    Cardiomyopathy     EF 30-35% (ECHO 6/14)    Coronary artery disease     Dyslipidemia     Hypertension     Hypothyroid     Pacemaker     Peripheral vascular disease     Renal artery stenosis     bilateral stents    Sick sinus syndrome       Labs:    Lab Results   Component Value Date/Time    Sodium 136 01/28/2018 05:20 AM    Potassium 3.2 01/28/2018 05:20 AM    Chloride 97 01/28/2018 05:20 AM    CO2 30 01/28/2018 05:20 AM    Anion gap 9 01/28/2018 05:20 AM    Glucose 84 01/28/2018 05:20 AM    BUN 33 01/28/2018 05:20 AM    Creatinine 1.27 01/28/2018 05:20 AM    Calcium 8.1 01/28/2018 05:20 AM    Magnesium 2.4 07/08/2017 10:16 PM    Phosphorus 3.4 03/01/2011 03:35 AM    Albumin 2.9 01/28/2018 05:20 AM     Anthropometrics: BMI (calculated): 21.6  Last 3 Recorded Weights in this Encounter    01/29/18 1926   Weight: 66.2 kg (146 lb)      Ht Readings from Last 1 Encounters:   01/29/18 5' 9\" (1.753 m)     No data found. IBW: 146 lb %IBW: 91% UBW: 138-140 lb   [] Weight Loss [] Weight Gain [x] Weight Stable    Estimated Nutrition Needs: [x] MSJ RMR: 1329 Kcal  Calories: 1495-6042 kcal Based on:   [x] Actual BW    Protein:   70-80g Based on:   [x] Actual BW    Fluid:       6762-1182 ml Based on:   [x] Actual BW      [x] No Cultural, Restorationism or ethnic dietary need identified.     [] Cultural, Restorationism and ethnic food preferences identified and addressed     Wt Status:  [x] Normal (18.6 - 24.9) [] Underweight (<18.5) [] Overweight (25 - 29.9) [] Mild Obesity (30 - 34.9)  [] Moderate Obesity (35 - 39.9) [] Morbid Obesity (40+)    :     Nutrition Problems Identified:   [] Suboptimal PO intake   [] Food Allergies  [x] Difficulty chewing/swallowing/poor dentition  [] Constipation/Diarrhea   [] Nausea/Vomiting   [] None  [] Other:     Plan:   [x] Therapeutic Diet  []  Obtained/adjusted food preferences/tolerances and/or snacks options   []  Supplements added   [] Occupational therapy following for feeding techniques  []  HS snack added   [x]  Modify diet texture   []  Modify diet for food allergies   []  Educate patient   []  Assist with menu selection   [x]  Monitor PO intake on meal rounds   [x]  Continue inpatient monitoring and intervention   [x]  Participated in discharge planning/Interdisciplinary rounds/Team meetings   []  Other:     Education Needs:   [] Not appropriate for teaching at this time due to:   [x] Identified and addressed    Nutrition Monitoring and Evaluation:  [x] Continue ongoing monitoring and intervention  [] Other    Estela Barrera

## 2018-01-30 NOTE — ROUTINE PROCESS
Bedside shift change report given to [facility] (oncoming nurse) by Mirian Schmidt LPN     (offgoing nurse). Report included the following information Kardex, MAR and Recent Results.

## 2018-01-30 NOTE — ROUTINE PROCESS
?  Physical Therapy          Functional goals:            ?   Maintain current functional status           ? Improvement            Functional interventions:             Frequency:       x? Occupational Therapy          Functional goals:             ?  Maintain current functional status             ?x  ]   Improvement            Functional interventions:        Functional interventions: Improve bathing, dressing, self feeding, toileting and functional transfers during self care tasks. Frequency:   3 to 6 times per week       ? Speech Therapy          Functional goals:             ?  ] Maintain current functional status             ?      Improvement            Functional interventions:             Frequency:

## 2018-01-30 NOTE — PROGRESS NOTES
Problem: Mobility Impaired (Adult and Pediatric)  Goal: *Acute Goals and Plan of Care (Insert Text)  PHYSICAL THERAPY STG GOALS :  Initiated 1/30/2018 and to be accomplished within 1-2 Weeks    1. Patient will move from supine to sit and sit to supine  and roll side to side in bed with modified independence. 2.  Patient will transfer from bed to chair and chair to bed with supervision/set-up using SPC. 3.  Patient will perform sit to stand with supervision/set-up with Good balance and safety awareness. 4.  Patient will ambulate with supervision/set-up for 150 feet with SPC on level surfaces with 2 turns. 5.  Patient will ascend/descend 3 stairs with right handrail(s) with stand by assistance to allow for safe home access/exit. 6.  Patient will improve standardized test score for [place] Standing Balance Scale 3 to reduce fall risk. PHYSICAL THERAPY LTG GOALS :  Initiated 1/30/2018 and to be accomplished within 2-3 Weeks     2. Patient will transfer from bed to chair and chair to bed with modified independence using SPC. 3.  Patient will perform sit to stand with modified independence with Good balance and safety awareness. 4.  Patient will ambulate with modified independence for 300 feet with SPC on level surfaces and be able to maneuver through narrow spaces and obstacles without loss of balance. 5.  Patient will ascend/descend 3 stairs with right handrail(s) with modified independence to allow for safe home access/exit. 6.  Patient will improve standardized test score for [place] Standing Balance Scale 4 to reduce fall risk. Physical Therapist:   Aylin Enciso PT  on 1/30/2018           [address] [place] [address]   physical Therapy EVALUATION    Patient: Sandi Chacon (50 y.o. male)                Start of Care Date: 1/30/2018   Referred by :  Evangelina Flood MD                 Precautions: Fall (Has Pacemaker)     History:  Patient was admitted to [facility] on 1/25/18 with c/o SOB and chest pain. Diagnosed with  SOB on exertion. Upon acute d/c he was unsafe to return home and was transferred to [facility]. History of present problem reveals HIGH Complexity :3+ comorbidities / personal factors will impact the outcome/ POC . Past Medical history includes:   Past Medical History:   Diagnosis Date    Atrial fibrillation     CHADS score 3  (+CHF, +HTN, +AGE, -DM, -CVA)    CABG     2008   LIMA - LAD,   SVG - RCA    Cardiomyopathy     EF 30-35% (ECHO 6/14)    Coronary artery disease     Dyslipidemia     Hypertension     Hypothyroid     Pacemaker     Peripheral vascular disease     Renal artery stenosis     bilateral stents    Sick sinus syndrome      Past Surgical History:   Procedure Laterality Date    HX CORONARY ARTERY BYPASS GRAFT      2008   LIMA - LAD,   SVG - RCA        Cognitive Status:  Mental Status  Neurologic State: Alert; Appropriate for age  Orientation Level: Oriented X4  Cognition: Follows commands  Perception: Appears intact  Perseveration: No perseveration noted  Safety/Judgement: Awareness of environment  Barriers to Learning/Limitations: None  Compensate with: visual, verbal, tactile, kinesthetic cues/model  Prior Level of Function/Home Situation and Assitance recieved:  Home Situation  Home Environment: Private residence  # Steps to Enter: 3  Rails to Enter: Yes  Hand Rails : Bilateral (uses R HR due to distance)  One/Two Story Residence: One story  Living Alone: No  Support Systems: Spouse/Significant Other/Partner  Patient Expects to be Discharged to[de-identified] Private residence  Current DME Used/Available at Home: Cane, straight  Tub or Shower Type: Tub/Shower combination  Level of Assistance received at Home:   Prior to this current hospitalization, the patient required intermittent use of SPC for ADLs and IADLs.    Justification for Evaluation complexity:  Patient is referred to Skilled Physical Therapy services due a functional decline in strength, endurance, mobility, gait, balance and stair negotiation and required an overall HIGH  complexity evaluation examination. Exam:HIGH Complexity : 4+ Standardized tests and measures addressing body structure, function, activity limitation and / or participation in recreation    Clinical Presentation: LOW Complexity : Stable, uncomplicated   Eval Complexity: History: HIGH Complexity :3+ comorbidities / personal factors will impact the outcome/ POC Exam:HIGH Complexity : 4+ Standardized tests and measures addressing body structure, function, activity limitation and / or participation in recreation  Presentation: LOW Complexity : Stable, uncomplicated    OBJECTIVE DATA SUMMARY:     Vital Signs:  Resting BP:  BP: 147/62  HR: Pulse (Heart Rate): 70    Pain:  Pain Screen  Pain Scale 1: Numeric (0 - 10)  Pain Intensity 1: 0  Gross Assessment:  Gross Assessment  AROM: Generally decreased, functional  PROM: Generally decreased, functional  Strength: Generally decreased, functional (B hips 4/5, B knees 5/5)  Coordination: Generally decreased, functional  Tone: Normal   Bed Mobility:  Bed Mobility  Rolling: Supervision  Supine to Sit: Supervision  Scooting: Supervision  Balance:  Balance  Sitting: Intact  Standing: With support  Standing - Static: Fair (+)  Standing - Dynamic : Fair (+)  Transfers:  Sit to Stand: Stand-by asssistance  Gait:  Gait  Base of Support: Center of gravity altered  Speed/Pilar: Slow  Step Length: Left shortened;Right shortened  Gait Abnormalities: Decreased step clearance  Ambulation - Level of Assistance: Contact guard assistance  Distance (ft): 68 Feet (ft)  Assistive Device: Cane, straight  Gait Description (WDL): Exceptions to WDL        With 4 turns.   Wheelchair Management:      Assessment:   Clinical Decision Making:Medium Complexity , using standardized patient assessment instrument and /or measurable assessement of functional outcome of [facility] Standing Balance Scale, score of 2+  0: Pt performs 25% or less of standing activity (Max assist) CN, 100% impaired. 1: Pt supports self with upper extremities but requires therapist assistance. Pt performs 25-50% of effort (Mod assist) CM, 80% to <100% impaired. 1+: Pt supports self with upper extremities but requires therapist assistance. Pt performs >50% effort. (Min assist). CL, 60% to <80% impaired. 2: Pt supports self independently with both upper extremities (walker, crutches, parallel bars). CL, 60% to <80% impaired. 2+: Pt support self independently with 1 upper extremity (cane, crutch, 1 parallel bar). CK, 40% to <60% impaired. 3: Pt stands without upper extremity support for up to 30 seconds. CK, 40% to <60% impaired. 3+: Pt stands without upper extremity support for 30 seconds or greater. CJ, 20% to <40% impaired. 4: Pt independently moves and returns center of gravity 1-2 inches in one plane. CJ, 20% to <40% impaired. 4+: Pt independently moves and returns center of gravity 1-2 inches in multiple planes. CI, 1% to <20% impaired. 5: Pt independently moves and returns center of gravity in all planes greater than 2 inches. CH, 0% impaired. Positioning needs/Additional Comments :  Pt presents in bed, motivated to participate in skilled PT services. Bed mobility with supervision. Sit <> stand with SBA, pt demonstrated slight swaying upon initial stance, self-corrected with cueing. Gait training x 68 ft using SPC with SBA/CGA. Therapeutic exercises to address strength, endurance, mobility and included: heel raises, toe raises, LAQ, marching 10 reps each with minimal verbal and visual cueing for proper techniques. Rest breaks required to avoid fatigue and onset of SOB. HEP administered to include these exercises to address circulation and endurance, pt verbalized understanding. Safety awareness education to reduce fall risk, pt demonstrated understanding with use of call bell.  Recommend skilled physical therapy services to address deficits, restore function, and for safe return home. TREATMENT PLAN: Rehab Potential : Excellent  Skilled Physical Therapy Services may include:   [x]           Bed Mobility Training             [x]    Neuromuscular Re-Education  [x]           Transfer Training                   [x]    Orthotic/Prosthetic Training  [x]           Gait Training                          [x]    Modalities  [x]           Therapeutic Procedures        [x]    Manual Therapy  [x]           Therapeutic Activities            [x]    Patient and Family Training/Education  []           Other (comment):     Frequency/Duration: Patient will be followed by physical therapy for 1-2 times a day for a minimum of 3 days per week for 4 weeks to address goals. Discharge Recommendations: Home Health  Patient's expected Discharge Location:  [x]Private Residence  [] JAMEL/ILF  []LTC  []Other:     COMMUNICATION/EDUCATION:  Patient/Family Agreement with Treatment Plan :     [x]         The PT evaluation/POC has been reviewed with PT assistant. [x]         Fall prevention education was provided and the patient/caregiver indicated understanding. [x]         Patient/family have participated as able in goal setting and plan of care and Patient/family agree to work toward stated goals and plan of care. []         Patient is unable to participate in goal setting and plan of care. Therapist/SW will contact family/POA. Treatment session:  Overall Complexity: LOW  Evaluation:  11 mins./ Treatment:  36 mins.   Physical Therapist:   Chance Joy, PT       1/30/2018   Thank you for this referral.

## 2018-01-30 NOTE — PROGRESS NOTES
Problem: Self Care Deficits Care Plan (Adult)  Goal: *Acute Goals and Plan of Care (Insert Text)  OCCUPATIONAL THERAPY SHORT TERM GOALS    Initiated 1/30/2018 and to be accomplished within 1 Week(s)    1. Patient will perform Upper body ADL's without adaptive equipment with modified independence. 2.  Patient will perform Lower body ADL's without adaptive equipment with modified independence . 3. Patient will perform toileting task with supervision/set-up with Good safety to reduce falls risk. 4.  Patient will perform toilet transfers with straight point cane and supervision/set-up. 5.  Patient will perform standing static/dynamic balance activities for improved ADL/IADL function with supervision/set-up and Good balance and safety awarenes. 6.  Patient will improve Barthel index scores to atleast 65/100 to improve functional mobility. 7. Patient will utilize energy conservation techniques during functional activities with minimal verbal cues. OCCUPATIONAL THERAPY LONG TERM GOALS   Initiated 1/30/2018 and to be accomplished within 2 Week(s)    1. Patient will perform ADL's & IADLs with adaptive equipment as needed with modified independence. 2.  Patient will perform toileting task with modified independence with Good safety to reduce falls risk. 3.  Patient will perform all functional transfers utilizing least restrictive device and durable medical equipment as needed with modified independence and Good balance and safety awareness. 4.  Patient will perform standing static/dynamic activity for improved ADL/IADL function with modified independence an and Good balance and safety awareness. 5.  Patient will improve Barthel index score to 95/100 to improve independence with mobility.       Therapist: Joana Bills    1/30/2018         [address] [place] [address]   Occupational Therapy EVALUATION      Patient: Adams Emerson (75 y.o. male)            Start of Care Date: 1/30/2018 Onset Date:  1/25/18  Referred by : Patel Sanders MD         Primary Diagnosis: SOB       Treatment Diagnosis  Treatment Diagnosis: muscle weakness  Treatment Diagnosis 2: increased need for assist w/ self care     Patient is an 80 y.o. Male admitted to [facility] 1/25/18 2/2 SOB, diagnosed with acute respiratory distress and acute systolic CHF requiring BiPAP. Recent hospitalization and d/c 1/19/18 for presyncope. Patient unsafe to discharge home and transferred to [address] [place] [address],[address] and referred to skilled Occupational therapy in order to reach maximal functional potential for safe discharge home at [facility]. Precautions: Fall, Other (comment) (Pacemaker)  Eval Complexity: History: HIGH Complexity : Extensive review of history including physical, cognitive and psychosocial history . History of present problem reveals HIGH Complexity :3+ comorbidities / personal factors will impact the outcome/ POC . Past Medical history includes:   Past Medical History:   Diagnosis Date    Atrial fibrillation     CHADS score 3  (+CHF, +HTN, +AGE, -DM, -CVA)    CABG     2008   LIMA - LAD,   SVG - RCA    Cardiomyopathy     EF 30-35% (ECHO 6/14)    Coronary artery disease     Dyslipidemia     Hypertension     Hypothyroid     Pacemaker     Peripheral vascular disease     Renal artery stenosis     bilateral stents    Sick sinus syndrome      Past Surgical History:   Procedure Laterality Date    HX CORONARY ARTERY BYPASS GRAFT      2008   LIMA - LAD,   SVG - RCA      Cognitive Status:  Mental Status  Neurologic State: Alert; Appropriate for age  Orientation Level: Oriented X4  Cognition: Follows commands; Appropriate for age attention/concentration  Perception: Appears intact  Perseveration: No perseveration noted  Safety/Judgement: Fall prevention; Awareness of environment  Barriers to Learning/Limitations: None  Compensate with: visual, verbal, tactile, kinesthetic cues/model  Justification for Evaluation complexity:   HIGH Complexity : Patient presents with comorbidities that affect occupational performance. Signifigant modification of tasks or assistance (eg, physical or verbal) with assessment (s) is necessary to enable patient to complete evaluation . Patient is referred to [address] [place] [address] due to a functional decline in strength, balance, coordination, safety awareness and functional activity tolerance, which is inhibiting independence w/ self-care performance skills and functional mobility. Prior Level of Function/Home Situation:   Home Situation  Home Environment: Private residence  # Steps to Enter: 3  Rails to Enter: Yes  Hand Rails : Bilateral (uses R rails 2/2 rails far apart)  One/Two Story Residence: Two story, live on 1st floor  Living Alone: No  Support Systems: Spouse/Significant Other/Partner  Patient Expects to be Discharged to[de-identified] Private residence  Current DME Used/Available at Home: Cane, straight  Tub or Shower Type: Tub/Shower combination (has shower seat & 1 grab bar)  Level of Assistance received at Home: Prior to this current hospitalization, the patient lives with his spouse in [address] [place] [address] and resides on 1st floor while 2nd floor utilized for storage. Patient reports he was independent w/ ADLs, IADLs, cooking, housekeeping and driving, functional mobility: Mod I w/ SPC and occasional furniture walking. OBJECTIVE DATA SUMMARY:     Vital Signs:  Resting BP:  BP: 147/62  HR: Pulse (Heart Rate): 70     Pain:  Pain Screen  Pain Scale 1: Numeric (0 - 10)  Pain Intensity 1: 0  Patient Stated Pain Goal: 0  Auditory:  Auditory  Auditory Impairment: None  Examination:   HIGH Complexity : 5 or more performance deficits relating to physical, cognitive , or psychosocial skils that result in activity limitations and / or participation restrictions;   Tone and Sensation:  Tone: Normal              Sensation: Intact               Visual Perceptual:  Vision  Corrective Lenses: Reading glasses Coordination:  Coordination  Fine Motor Skills-Upper: Left Intact; Right Intact  Gross Motor Skills-Upper: Left Intact; Right Intact  Coordination: Within functional limits  Fine Motor Skills-Upper: Left Intact; Right Intact    Gross Motor Skills-Upper: Left Intact; Right Intact  Bed Mobility:  Bed Mobility  Rolling: Supervision  Supine to Sit: Supervision  Sit to Supine: Supervision  Scooting: Stand-by asssistance  Rolling: Supervision  Transfers:  Functional Transfers  Sit to Stand: Stand-by asssistance  Stand to Sit: Stand-by asssistance  Toilet Transfer : Stand-by asssistance (w/ SPC)  Balance:  Balance  Sitting: Intact  Standing: With support  Standing - Static: Fair ((+))  Standing - Dynamic : Fair ((+))  Gross Assesment:  Gross Assessment  AROM: Within functional limits (BUEs: all planes)  PROM: Within functional limits  Strength: Within functional limits (BUEs: 5/5 deltoids, biceps & triceps)  Coordination: Within functional limits  Tone: Normal  Sensation: Intact  ADL self care:     ADL Intervention:  Upper Body Bathing  Bathing Assistance: Supervision/set-up  Upper Body Dressing Assistance  Dressing Assistance: Supervision/set-up  Lower Body Bathing  Bathing Assistance: Stand-by assistance  Toileting  Toileting Assistance: Stand-by assistance  Grooming  Grooming Assistance: Supervision/set up  Feeding  Feeding Assistance: Independent       Wheelchair Management:    not assessed  Instrumental ADL's: not assessed      ASSESSMENT:   A clinical decision making of HIGH  complexity was conducted, which includes an analysis of the Occupational profile, standardized assessments, data and treatment options.                                             THE BARTHEL INDEX    ACTIVITY   SCORE   FEEDING  0=unable  5=needs help cutting,spreading butter,etc., or modified diet  10= independent   10   BATHING  0=dependent  5=independent (or in shower   0   GROOMING  0=needs help  5=independent face/hair/teeth/shaving (implements provided)   5   DRESSING  0=dependent  5=needs help but can do about half unaided  10=independent(including buttons, zips,laces etc.)   5   BOWELS  0=incontinent  5=occasional accident  10=continent   10     BLADDER  0=incontinent, or catheterized and unable to manage alone  5=occasional accident  10=continent   10   TOILET USE  0=dependent  5=needs some help, but can do something alone  10=independent (on and off, dressing, wiping) 5   TRANSFER (BED TO CHAIR AND BACK)  0=unable, no sitting balance  5=major help(one or two people,physical), can sit  10=minor help(verbal or physical)  15=independent   10   MOBILITY (ON LEVEL SURFACES)  0=immobile or <50 yards  5=wheelchair independent,including corners,>50 yards  10=walkes with help of one person (verbal or physical) >50 yards  15=independent(but may use any aid; for example, stick) >50 yards   10   STAIRS  0=unable  5=needs help (verbal, physical, carrying aid)  10=independent   0             TOTAL:             65/100     Additional comments:  Patient A & O x 4, no c/o pain, O2 on RA monitored and remained WNL > 90%, toilet transfer: SBA w/ SPC, no LOB noted. Patient educated on role of OT and POC; patient verbalized understanding. Pt. Instructed on safety awareness with importance to utilize call bell to request assist with functional transfers to prevent falls, patient verbalized understanding and provided accurate demonstration.     TREATMENT PLAN : Rehab Potential : Excellent  Skilled Occupational Therapy Services may include:   [x] Self Care/Home Mgmt                    []Cognitive Perceptual Re-training                              [] Adaptive Equip Training                 [x]Therapeutic Exercise                  []Sensory Integration                           []Community Work Integration  [x]Therapeutic Activities                     []Other/modalities  [x]Neuromuscular Re-education         [x] Wheelchair Mgmt/propulsion    [x]Patient/Family/Staff Instruction      :  []Orthotics/Prosthetic Fitting & training     Frequency/Duration: Patient will be followed by Occupational therapy for 1-2 times a day for a minimum of 3 days per week for 4 weeks to address goals. .  Discharge Recommendations: Home Health  Patient expected Discharge Location: [x]Private Residence  [] [facility]  []LTC  [] [facility]     COMMUNICATION/EDUCATION:  Patient/Family Agreement with Treatment Plan :     [x]   OT Evaluation/POC has been reviewed with the BHAT. [x]        Fall prevention education was provided and the patient/caregiver indicated understanding  [x]        Patient/family have participated as able in goal setting and plan of care. Patient/family agree to work toward stated goals and plan of care. []        Patient is unable to participate in goal setting and plan of care. Therapist will contact [facility]. Treatment session:   Overall Complexity:HIGH  Evaluation: 30 mins. Treatment :  20 mins.     Occupational Therapist:   Maureen Geller          1/30/2018   Thank You for this referral.

## 2018-01-30 NOTE — ROUTINE PROCESS
Bedside and Verbal shift change report given to andrade leyva rn (oncoming nurse) by corina Lewis lpn (offgoing nurse). Report included the following information SBAR, Kardex and MAR.  Hourly rounds

## 2018-01-30 NOTE — PROGRESS NOTES
ACTIVITIES/INTERESTS      X? List of activities prior to admission:  Reading, outdoor activities, music,  Family, pets       XActivities offered to resident:  Activity calendar, pet therapy, music therapy,  Reading materials, leisure cabinet/ unit activities     X? Activities Calendar     X? Barriers to activities:      Mobility deficits     X?     Interventions:  Transport pt to activities of interest in the DR/ outdoors

## 2018-01-30 NOTE — H&P
[facility], BLAINE LAURENT  MR#: [id number]  : 11/10/1930  ACCOUNT #: [de-identified]   ADMIT DATE: 2018    HISTORY OF PRESENT ILLNESS  The patient is an 80-year-old gentleman with a known history of  1. Coronary artery bypass graft in . 2.  History of congestive heart failure with systolic dysfunction. 3.  Renal artery stenosis. 4.  Atrial fibrillation, on Eliquis. 5.  Hypertension. 6.  Hypothyroidism. 7.  Gastroesophageal reflux with dysphagia. He was now admitted because of rather dramatic dyspnea with exertion. He was treated with high flow oxygen at 5 liters, but we are not given results of oxygen saturations. Echocardiogram was obtained, which showed an ejection fraction at this time estimated at 55% with severely dilated left atrium and right atrium. He does have known dysphagia and a chest x-ray that suggested the possibility of aspiration. He had a CAT scan of his chest performed, which showed patchy consolidation of both bases. He did have centrilobular nodularity right lung, possibly infectious or a result of bronchiolitis. He improved to a great extent and was subsequently sent to [facility] for rehabilitation. It was felt that his pulmonary problems or his dyspnea was less pulmonary and more cardiac in origin. He does have a history of known gastroesophageal reflux disease, which has caused him some problems in the past and apparently he has had some issues with swallowing in the past as well. In , he had a barium swallow, which showed no evidence of aspiration, poor clearing of thoracic esophageal contrast material with tertiary contractions compatible probably with reflux.       TRANSFER MEDICATIONS:  Include Tussionex, which he states helps with his cough in the evening, Ranexa 500 mg slow release 1 tablet 2 times a day, Ativan 1 mg, although we are going to change that to 0.5 mg, he did not receive that in the hospital.  Imdur 30 mg a day, Norvasc 5 mg a day, Nitrostat 0.3, Protonix 40 mg a day, Toprol 50 mg daily, apixaban or Eliquis 5 mg twice a day. SOCIAL HISTORY:  Reveals he lives at home with his wife. REVIEW OF SYSTEMS  CONSTITUTIONAL:  He states his weight is stable. HEAD, EYES, EARS, NOSE, THROAT:  He denies visual or auditory acuity changes. PULMONARY:  At the present time, he does admit to cough in the evening, shortness of breath with slight exertion. CARDIAC:  He denies chest pain or palpitations. GASTROINTESTINAL:  He does admit to dysphagia and reflux symptoms. He denies change in bowel function. EXTREMITIES:  He denies peripheral edema. PHYSICAL EXAMINATION  GENERAL:  Revealed a well-developed male. HEENT:  Head was normocephalic. Eyes were equal and reactive to light. The extraocular movements were intact. Disks were flat. Fundi are benign. Tympanic membranes are intact. NECK:  Supple. CHEST:  Clear. HEART:  Revealed regular rate and rhythm. ABDOMEN:  Soft. Bowel sounds are normally active. NEUROLOGIC:  Deep tendon reflexes were equal.  Babinskis are both downgoing. IMPRESSION   1. Dyspnea with exertion with congestive heart failure, but would be diastolic as he has a normal ejection fraction at the present time. 2.  Pulmonary fibrosis, possibly related to recurrent aspiration. 3.  Gastroesophageal reflux. 4.  Atrial fibrillation.       MD YENI Salas/KYLE  D: 01/30/2018 13:39     T: 01/30/2018 14:01  JOB #: [id number]

## 2018-01-31 NOTE — PROGRESS NOTES
Problem: Mobility Impaired (Adult and Pediatric)  Goal: *Acute Goals and Plan of Care (Insert Text)  PHYSICAL THERAPY STG GOALS :  Initiated 1/30/2018 and to be accomplished within 1-2 Weeks    1. Patient will move from supine to sit and sit to supine  and roll side to side in bed with modified independence. 2.  Patient will transfer from bed to chair and chair to bed with supervision/set-up using SPC. 3.  Patient will perform sit to stand with supervision/set-up with Good balance and safety awareness. 4.  Patient will ambulate with supervision/set-up for 150 feet with SPC on level surfaces with 2 turns. 5.  Patient will ascend/descend 3 stairs with right handrail(s) with stand by assistance to allow for safe home access/exit. 6.  Patient will improve standardized test score for [place] Standing Balance Scale 3 to reduce fall risk. PHYSICAL THERAPY LTG GOALS :  Initiated 1/30/2018 and to be accomplished within 2-3 Weeks     2. Patient will transfer from bed to chair and chair to bed with modified independence using SPC. 3.  Patient will perform sit to stand with modified independence with Good balance and safety awareness. 4.  Patient will ambulate with modified independence for 300 feet with SPC on level surfaces and be able to maneuver through narrow spaces and obstacles without loss of balance. 5.  Patient will ascend/descend 3 stairs with right handrail(s) with modified independence to allow for safe home access/exit. 6.  Patient will improve standardized test score for [place] Standing Balance Scale 4 to reduce fall risk.       Physical Therapist:   Robert Quevedo PT  on 1/30/2018            [facility]   physical Therapy Daily Treatment note      Patient: Jp Zaldivar (6 y.o. male)               Date: 1/31/2018    Physician: Patel Sanders MD  Primary Diagnosis: SOB          Treatment Diagnosis  Treatment Diagnosis: muscle weakness  Treatment Diagnosis 2: increased need for assist w/ self care  Precautions: Fall, Other (comment) (Pacemaker)  Vital Signs  Vital Signs  Temp: 98.5 °F (36.9 °C)  Temp Source: Oral  Pulse (Heart Rate): 69  Heart Rate Source: Monitor  Resp Rate: 19  O2 Sat (%): 95 %  Level of Consciousness: Alert  BP: 150/78  MAP (Calculated): 102  MEWS Score: 1  Cognitive Status:  Mental Status  Neurologic State: Alert  Orientation Level: Oriented X4  Cognition: Appropriate safety awareness  Pain  Pain Screen  Pain Scale 1: Numeric (0 - 10)  Pain Intensity 1: 0  Bed Mobility Training  Bed Mobility Training  Supine to Sit: Supervision  Balance  Sitting: Intact  Standing: Without support  Standing - Static: Good  Standing - Dynamic : Fair (+)  Transfer Training  Transfer Training  Sit to Stand: Supervision  Stand to Sit: Supervision  Sit to Stand: Supervision  Gait Training  Gait  Base of Support: Center of gravity altered  Speed/Pilar: Slow  Step Length: Right shortened;Left shortened  Gait Abnormalities: Decreased step clearance  Ambulation - Level of Assistance: Stand-by asssistance  Distance (ft): 140 Feet (ft) (x2)  Assistive Device: Cane, straight;Gait belt  Rail Use: Right   Stairs - Level of Assistance: Stand-by asssistance  Number of Stairs Trained: 5  Interventions: Safety awareness training  Gait Abnormalities: Decreased step clearance   With 3 turns. Therapeutic Exercise: Pt reported that he has been walking in his room with his cane. Therapist reminded pt that he should call for assistance. Pt stated \"Oh, they know, and I'm ok\". Pt stood up from bed without AD and answered his phone. Pt demonstrated good unsupported balance while standing and talking on phone. Gait training as noted above. Pt required seated rest break due to SOB. SpO2 96%. Pt reported that he knows he must rest frequently. Following seated rest break, pt negotiated 5 steps with R HR and SBA. At end of session, pt sitting EOB with call bell at side.        Patient/Caregiver Education: Pt /Caregiver Education on safety and fall prevention and energy conservation. Pt reported he know's when to rest was provided to maximize functional gains. ASSESSMENT:  Patient continues to benefit from Skilled PT services to improve Gait, stair negotiation, dynamic balance. Progression toward goals:  [x]      Improving appropriately and progressing toward goals  []      Improving slowly and progressing toward goals  []      Not making progress toward goals and plan of care will be adjusted     Treatment session: 27 minutes.   Therapist:   Aaron Earl PTA,          1/31/2018

## 2018-01-31 NOTE — ROUTINE PROCESS
MDS SECTION GG NURSING REPORT      Patient: Familia Danielle (39 y.o. male)                         Date: 1/31/2018    Primary Diagnosis: SOB      Attending Physician: Joni Goode MD   Treatment Diagnosis  Treatment Diagnosis: muscle weakness  Treatment Diagnosis 2: increased need for assist w/ self care    Use the KEY on right side to code Functional Ability           MDS Section GG Data Collection Tool  Key:      01     Dependent      02     Substantial/Maximal               Assistance             ([place] does > 50%)      03     Partial/Moderate             Assistance             ([place] does < 50%)      04     Supervision or             Touching Assistance      05     Set-up or [address]      06     Independent      07     Resident Refused      09      not applicable      88     Not Attempted d/t             Medical or Safety      7-3 3-11 11-7      Performance Code Performance Code Performance Code    Self Care Eating   88     Oral Hygiene   88     Toilet Hygiene   88     Sit to Lying   88     Lying to sitting on EOB   88     Sit to stand   88     Chair/Chair -to- bed Transfer   88     Toilet Transfer   88    Mobility Walk 50 ft. ,   2 turns   88     Walk  150 ft.   88     Wheel 50 ft. ,   2 turns   88     Wheel 150 ft.   88

## 2018-01-31 NOTE — PROGRESS NOTES
Bedside shift change report given to VIKKI Germain LPN (oncoming nurse) by GABRIELLE Hung RN (offgoing nurse). Report included the following information SBAR, Kardex and MAR.

## 2018-01-31 NOTE — PROGRESS NOTES
GIM     Patient: Wilian Low MRN: [id number]  CSN: [id number]    YOB: 1930  Age: 80 y.o. Sex: male    DOA: 1/29/2018 LOS:  LOS: 2 days                    Subjective:     Pt with copd and ? Pul fibrosis. And chf. resp statis better. Reflux also. reglan added for 2 weeks. Will add spiriva and duoneb. Follow with lasix bid    Objective:      Visit Vitals    /66    Pulse 68    Temp 98 °F (36.7 °C)    Resp 18    Ht 5' 9\" (1.753 m)    Wt 66.2 kg (146 lb)    SpO2 95%    BMI 21.56 kg/m2       Physical Exam:  Chest few scattered wheeze  Cor rr  abd sof  tno edema    Intake and Output:  Current Shift:     Last three shifts:       No results found for this or any previous visit (from the past 24 hour(s)).     Current Facility-Administered Medications   Medication Dose Route Frequency    tiotropium (SPIRIVA) inhalation capsule 18 mcg  1 Cap Inhalation Q24H    albuterol-ipratropium (DUO-NEB) 2.5 MG-0.5 MG/3 ML  3 mL Nebulization BID    furosemide (LASIX) tablet 20 mg  20 mg Oral ACB&D    chlorpheniramine-HYDROcodone (TUSSIONEX) oral suspension 5 mL  5 mL Oral QHS PRN    metoclopramide HCl (REGLAN) tablet 5 mg  5 mg Oral AC&HS    amLODIPine (NORVASC) tablet 5 mg  5 mg Oral DAILY    apixaban (ELIQUIS) tablet 5 mg  5 mg Oral BID    isosorbide mononitrate ER (IMDUR) tablet 30 mg  30 mg Oral DAILY    metoprolol succinate (TOPROL-XL) XL tablet 50 mg  50 mg Oral DAILY    ranolazine ER (RANEXA) tablet 500 mg  500 mg Oral BID    pantoprazole (PROTONIX) tablet 40 mg  40 mg Oral ACB    LORazepam (ATIVAN) tablet 0.5 mg  0.5 mg Oral Q6H PRN    nitroglycerin (NITROSTAT) tablet 0.4 mg  0.4 mg SubLINGual Q5MIN PRN    guaiFENesin ER (MUCINEX) tablet 600 mg  600 mg Oral Q12H    acetaminophen (TYLENOL) tablet 650 mg  650 mg Oral Q4H PRN       Lab Results   Component Value Date/Time    Glucose 84 01/28/2018 05:20 AM    Glucose 98 01/27/2018 03:30 AM    Glucose 103 01/26/2018 04:00 AM    Glucose 120 01/25/2018 11:10 AM    Glucose 106 01/21/2018 03:21 AM        Assessment/Plan     Active Problems:    * No active hospital problems.  Odalys Phipps MD  1/31/2018, 10:57 AM

## 2018-01-31 NOTE — PROGRESS NOTES
Problem: Self Care Deficits Care Plan (Adult)  Goal: *Acute Goals and Plan of Care (Insert Text)  OCCUPATIONAL THERAPY SHORT TERM GOALS    Initiated 1/30/2018 and to be accomplished within 1 Week(s)    1. Patient will perform Upper body ADL's without adaptive equipment with modified independence. 2.  Patient will perform Lower body ADL's without adaptive equipment with modified independence . 3. Patient will perform toileting task with supervision/set-up with Good safety to reduce falls risk. 4.  Patient will perform toilet transfers with straight point cane and supervision/set-up. 5.  Patient will perform standing static/dynamic balance activities for improved ADL/IADL function with supervision/set-up and Good balance and safety awarenes. 6.  Patient will improve Barthel index scores to atleast 65/100 to improve functional mobility. 7. Patient will utilize energy conservation techniques during functional activities with minimal verbal cues. OCCUPATIONAL THERAPY LONG TERM GOALS   Initiated 1/30/2018 and to be accomplished within 2 Week(s)    1. Patient will perform ADL's & IADLs with adaptive equipment as needed with modified independence. 2.  Patient will perform toileting task with modified independence with Good safety to reduce falls risk. 3.  Patient will perform all functional transfers utilizing least restrictive device and durable medical equipment as needed with modified independence and Good balance and safety awareness. 4.  Patient will perform standing static/dynamic activity for improved ADL/IADL function with modified independence an and Good balance and safety awareness. 5.  Patient will improve Barthel index score to 95/100 to improve independence with mobility.       Therapist: Pamela Villarreal    1/30/2018          [facility]   Occupational Therapy Daily Treatment note      Patient: Samantha Boyle (72 y.o. male)       Date: 1/31/2018  Attending Physician: Shirley Goode MD  Primary Diagnosis: SOB    Treatment Diagnosis  Treatment Diagnosis: muscle weakness  Treatment Diagnosis 2: increased need for assist w/ self care   Precautions : Precautions at Admission: Fall, Other (comment) (Pacemaker)  Vital Signs:  Vital Signs  Pulse (Heart Rate): 68  Heart Rate Source: Monitor  Resp Rate: 18  O2 Sat (%): 95 %  Level of Consciousness: Alert  BP: 165/66  MAP (Calculated): 99     Cognitive Status:  Mental Status  Neurologic State: Alert  Orientation Level: Oriented X4  Cognition: Appropriate safety awareness  Pain:  Pain Screen  Pain Scale 1: Numeric (0 - 10)  Pain Intensity 1: 0  Pain Scale 1: Numeric (0 - 10)  Gross Assessment:     Coordination:     Bed Mobility:  Bed Mobility  Supine to Sit: Supervision  Transfers:  Functional Transfers  Sit to Stand: Supervision  Toilet Transfer : Supervision  Functional Transfers  Bathroom Mobility: Supervision/set up  Toilet Transfer : Supervision  Balance:  Balance  Sitting: Intact  Standing: With support  Standing - Static: Good  Standing - Dynamic : Fair (+)  ADL Self Care:     ADL Intervention:  Upper Body Bathing  Bathing Assistance: Supervision/set-up  Position Performed: Standing  Upper Body Dressing Assistance  Dressing Assistance: Modified independent  Pullover Shirt: Modified independent  Lower Body Bathing  Bathing Assistance: Supervision/set-up  Perineal  : Supervision/set-up  Position Performed: Standing  Lower Body : Supervision/set-up  Position Performed: Standing;Seated in chair  Toileting  Toileting Assistance: Supervision/set up  Bladder Hygiene: Supervision/set-up  Clothing Management: Supervision/set-up     Lower Body Dressing Assistance  Dressing Assistance: Supervision/set up  Underpants: Supervision/set-up  Pants With Elastic Waist: Supervision/set-up  Socks: Supervision/set-up  Shoes with Cloth Laces: Supervision/set-up  Leg Crossed Method Used: Yes  Position Performed: Bending forward method;Seated in chair Therapeutic Activities:  Shadowed patient with clothing retrieval/transport utilizing [address], and morning ADLs, standing sinkside, in order to assess safety, performance, and independence during routine. See above for levels of A needed. BHAT recommend patient on sitting during LB dressing and bathing vs. Lateral bending for optimal safety and reduce risk for falls. Patient verbalized understanding however stated he likes to stand as much as he can. BHAT also recommend patient to complete all mobility and transfers with supervision for optimal safety. Patient verbalized understanding. Patient/Caregiver Education:    Pt. Radha Pena Education on see above.       ASSESSMENT:  Patient continues to demonstrate the need for skilled Occupational Therapy services to improve dynamic standing balance needed for bathing  Progression toward goals:  [x]      Improving appropriately and progressing toward goals  []      Improving slowly and progressing toward goals  []      Not making progress toward goals and plan of care will be adjusted     Treatment session:   25 minutes    Therapist:    PERLITA Thomas,  1/31/2018

## 2018-01-31 NOTE — PROGRESS NOTES
I have reviewed this patient's current medication list and recent laboratory results. At this time, I do not suggest any drug therapy adjustments or additional laboratory monitoring. Thank you,  Marie CIFUENTES  Ph. M. S.  1/31/2018

## 2018-01-31 NOTE — ROUTINE PROCESS
Bedside and Verbal shift change report given to inocencia Gordon lpn (oncoming nurse) by corina Lewis lpn (offgoing nurse). Report included the following information SBAR, Kardex and MAR.  Hourly rounds

## 2018-01-31 NOTE — PROGRESS NOTES
Baseline Care Plan       Cognition  X   Alert/Oriented x4     ? Alert/Oriented to            ? Self            ? Place            ? Date/Time            ? Situation    ? Forgetfulness    ? Confusion    X Able  to make own decisions         Communication     X   Verbal     ?    Non-verbal     ?   Unclear speech    Preferred Language:  English        Vision   X   Adequate    ? Vision impaired    ? Appliance:         X   Glasses             ? Reading     ? Prescription    ? Other:      X     Vision Risk/Problem: safety, daily activities completion     X Intervention: ensure glasses are clean and within reach    Hearing   X  Adequate    ? Impaired     ? Right       ? Left    ? Aids      ? Right      ? Left Falls       XRisk of falls:     ?    History of fall related injury           Describe:  Alarms and Restraints:     ?      Alarm                  Type:        ?  Restraint              Type:     ?    Medical symptom to justify            use            Alarms/Restraints reduction goals:     ?    Maintain current                      Alarm/restraint as ordered     ? Reduction plan:     ?    Resident/Representative             decline alarm/restraint. X  Safety education provided. Pain regimen     X    Presence of pain       X   Location  abdomen    X      Characteristic:  aching     X    Pharmacological Interventions:  Encourage patient to take pain med prior to activities     X  Non-pharmacological                       interventions:   nursing to offer as needed   Skin Integrity      X Skin intact   ? Admitted with SW   ? Admitted with skin breakdown                          ? Pressure ulcer (s)   ? MASD   ? Venous/Arterial ulcer   ? Rash   ? Wound VAC   ?     Other skin condition/wound:             Skin breakdown risk:  decreased mobility    Skin integrity goal:  maintain integrity,    Skin integrity interventions :  X       Turn and reposition assistance   ? Turn and reposition program   ?       Specialty mattress:   ? Cushions/wedges      Bowel/Bladder    Bowel:    X  Continent    ? Incontinent       Appliance:    Bladder:   X    Continent    ? Incontinent    ? Appliance    ? Incontinence briefs/pads:               Size:   Bowel and bladder risk:    ?    Risk of incontinence    X  Risk of constipation     Bowel and bladder goal:      Interventions:      ?   Scheduled toileting      ? Prompted toileting      ? Other:    provide frequent opportunities for toileting   Special Treatments/Procedures:     ?      Transfusions    ? Radiation    ? Chemotherapy:            ?     IV medication:          ?    IV Access Type          ? Location:          ?    Dressing change   X Oxygen   LPM: 2    ? Bipap      LPM:    ?    Cpap       LPM:    ?     Suction    Type:    ?    Tracheostomy            Cannula size    ? Ventilator  ? Isolation/Quarantine           Type  ? Other treatment/procedures      Medications  XSee current MAR and                   Treatment Orders   X  Current medication list provided    X     Medication list reconciled with                 resident/representative   ? Medications with orders to be left         at bedside:    ? Insulin     Type:          Blood glucose checks:  X  Psychotropic medications:  Ativan       Supportive diagnosis for med:         Dosage reduction:       Adverse effect:        X Anticoagulant  eliquis        ? Lab monitoring        X   Monitor for adverse effects  ? Antibiotic  ?    Opioid medication:  ?   Other medications/indications         for use:  diuretic

## 2018-02-01 NOTE — PROGRESS NOTES
Problem: Mobility Impaired (Adult and Pediatric)  Goal: *Acute Goals and Plan of Care (Insert Text)  PHYSICAL THERAPY STG GOALS :  Initiated 1/30/2018 and to be accomplished within 1-2 Weeks    1. Patient will move from supine to sit and sit to supine  and roll side to side in bed with modified independence. 2.  Patient will transfer from bed to chair and chair to bed with supervision/set-up using SPC. 3.  Patient will perform sit to stand with supervision/set-up with Good balance and safety awareness. 4.  Patient will ambulate with supervision/set-up for 150 feet with SPC on level surfaces with 2 turns. 5.  Patient will ascend/descend 3 stairs with right handrail(s) with stand by assistance to allow for safe home access/exit. 6.  Patient will improve standardized test score for [place] Standing Balance Scale 3 to reduce fall risk. PHYSICAL THERAPY LTG GOALS :  Initiated 1/30/2018 and to be accomplished within 2-3 Weeks     2. Patient will transfer from bed to chair and chair to bed with modified independence using SPC. 3.  Patient will perform sit to stand with modified independence with Good balance and safety awareness. 4.  Patient will ambulate with modified independence for 300 feet with SPC on level surfaces and be able to maneuver through narrow spaces and obstacles without loss of balance. 5.  Patient will ascend/descend 3 stairs with right handrail(s) with modified independence to allow for safe home access/exit. 6.  Patient will improve standardized test score for [place] Standing Balance Scale 4 to reduce fall risk.       Physical Therapist:   June Rao PT  on 1/30/2018            [address] [place] [address]   physical Therapy Daily Treatment note      Patient: Familia Danielle (8 y.o. male)               Date: 2/1/2018    Physician: Joni Goode MD  Primary Diagnosis: SOB          Treatment Diagnosis  Treatment Diagnosis: muscle weakness  Treatment Diagnosis 2: increased need for assist w/ self care  Precautions: Fall, Other (comment) (Pacemaker)  Vital Signs  Cognitive Status:  Mental Status  Neurologic State: Appropriate for age  Pain  Pain Screen  Pain Scale 1: Numeric (0 - 10)  Pain Intensity 1: 0  Bed Mobility Training  Bed Mobility Training  Supine to Sit: Modified independent  Scooting: Supervision  Balance  Sitting: Intact  Standing: Without support  Standing - Static: Good  Standing - Dynamic : Good  Transfer Training  Transfer Training  Sit to Stand: Supervision  Stand to Sit: Supervision  Sit to Stand: Supervision  Gait Training  Gait  Base of Support: Center of gravity altered  Speed/Pilar: Slow  Step Length: Right shortened;Left shortened  Gait Abnormalities: Decreased step clearance  Ambulation - Level of Assistance: Supervision (close (S))  Distance (ft): 140 Feet (ft) (x2)  Assistive Device: Cane, straight;Gait belt  Rail Use: Right   Stairs - Level of Assistance:  (close (S) )  Number of Stairs Trained: 5  Interventions: Safety awareness training  Gait Abnormalities: Decreased step clearance   With 2 turns. Therapeutic Exercise: Pt demonstrated Mod (I) with supine>sit with no cues required and good safety awareness. Standing dynamic balance challenged with reaching forward, down and laterally out of VASU with no UE support and (S) with no noted LOB. Gait training as noted above. Pt negotiated 10 steps with R HR and close (S). Seated B LE LAQ x20. Pt required frequent rest breaks throughout session, but very motivated and willing to participate. Therapist educated pt on energy conservation. Patient/Caregiver Education:   Pt /Caregiver Education on safety and fall prevention, energy conservation. Pt agreeable to resting as needed was provided to maximize gains and reduce risk of falls. ASSESSMENT:  Patient continues to benefit from Skilled PT services to improve dynamic balance, gait, stair negotiation.    Progression toward goals:  [x]      Improving appropriately and progressing toward goals  []      Improving slowly and progressing toward goals  []      Not making progress toward goals and plan of care will be adjusted     Treatment session: 46 minutes.   Therapist:   Odell Lerma PTA,          2/1/2018

## 2018-02-01 NOTE — ROUTINE PROCESS
Bedside and verbal shift report given to VIKKI Lewis LPN (oncoming nurse) by JALEESA Vides LPN (off going nurse). Report included SBAR, MAR and Kardex.

## 2018-02-01 NOTE — PROGRESS NOTES
Problem: Self Care Deficits Care Plan (Adult)  Goal: *Acute Goals and Plan of Care (Insert Text)  OCCUPATIONAL THERAPY SHORT TERM GOALS    Initiated 1/30/2018 and to be accomplished within 1 Week(s)    1. Patient will perform Upper body ADL's without adaptive equipment with modified independence. 2.  Patient will perform Lower body ADL's without adaptive equipment with modified independence . 3. Patient will perform toileting task with supervision/set-up with Good safety to reduce falls risk. 4.  Patient will perform toilet transfers with straight point cane and supervision/set-up. 5.  Patient will perform standing static/dynamic balance activities for improved ADL/IADL function with supervision/set-up and Good balance and safety awarenes. 6.  Patient will improve Barthel index scores to atleast 65/100 to improve functional mobility. 7. Patient will utilize energy conservation techniques during functional activities with minimal verbal cues. OCCUPATIONAL THERAPY LONG TERM GOALS   Initiated 1/30/2018 and to be accomplished within 2 Week(s)    1. Patient will perform ADL's & IADLs with adaptive equipment as needed with modified independence. 2.  Patient will perform toileting task with modified independence with Good safety to reduce falls risk. 3.  Patient will perform all functional transfers utilizing least restrictive device and durable medical equipment as needed with modified independence and Good balance and safety awareness. 4.  Patient will perform standing static/dynamic activity for improved ADL/IADL function with modified independence an and Good balance and safety awareness. 5.  Patient will improve Barthel index score to 95/100 to improve independence with mobility.       Therapist: Vasu Garcia    1/30/2018          [facility]   Occupational Therapy Daily Treatment note      Patient: Myla Quiroz (49 y.o. male)       Date: 2/1/2018  Attending Physician: Kellen Haynes Marifer Martinez MD  Primary Diagnosis: SOB    Treatment Diagnosis  Treatment Diagnosis: muscle weakness  Treatment Diagnosis 2: increased need for assist w/ self care   Precautions : Precautions at Admission: Fall, Other (comment) (Pacemaker)  Vital Signs:        Cognitive Status:  Mental Status  Neurologic State: Appropriate for age  Pain:  Pain Screen  Pain Scale 1: Numeric (0 - 10)  Pain Intensity 1: 0  Pain Scale 1: Numeric (0 - 10)  Gross Assessment:     Coordination:     Bed Mobility:  Bed Mobility  Supine to Sit: Modified independent  Scooting: Supervision  Transfers:  Functional Transfers  Sit to Stand: Supervision  Stand to Sit: Supervision     Balance:  Balance  Sitting: Intact  Standing: Without support  Standing - Static: Good  Standing - Dynamic : Good  Therapeutic Activities:  Functional mobility utilizing SPC to/from patient's room to therapy room in order to increase functional activity tolerance and independence during task. Patient able to complete mobility with SBA/ Supervision with no rest breaks or LOB noted. Clothing retrieval/transport, without AD, at high and low levels in order to challenge balance and stability needed for home management task. Patient able to complete task with Supervision at high and floor levels with Good balance. Patient reports slight fatigue with retrieval at floor level however not much. BHAT discussed with patient energy consveration strategies with home management task upon return home for optimal safety. Therapeutic Exercises:   UB restorator x 10 mins in order to increase functional activity tolerance needed for ADLS. No rest break needed. Patient/Caregiver Education:    PtBlake Castanon Education on see above.       ASSESSMENT:  Patient continues to demonstrate the need for skilled Occupational Therapy services to improve dynamic standing balance needed for simple home management  Progression toward goals:  [x]      Improving appropriately and progressing toward goals  []      Improving slowly and progressing toward goals  []      Not making progress toward goals and plan of care will be adjusted     Treatment session:   48  minutes    Therapist:    PERLITA Morgan,  2/1/2018

## 2018-02-01 NOTE — ROUTINE PROCESS
Patient: Laura Mackenzie (37 y.o. male)                         Date: 2/1/2018    Primary Diagnosis: SOB      Attending Physician: Nidia Lang MD   Treatment Diagnosis  Treatment Diagnosis: muscle weakness  Treatment Diagnosis 2: increased need for assist w/ self care                                                                  Based on admission information from 1/29-31. Mr. Андрей Grace lives with his spouse and dc plan is to   reurn home. Pta he used a cane for mobility and was modified independent with mobility and independent   With ADLs. He is limited by decreased endurance and safety awareness. On eval he requires light touch   To supervision assistance with bed mobility, toilet transfers, ambulation > 50 ft. He requires moderate  Assistance with toilet hygiene and chair/bed transfers.  Dc goal is light touch/supervision assistance for  transfers  MDS Section GG  Data Summary Tool Key:        01   Dependent      02   Substantial/Maximal              Assistance      03    Partial/Moderate            Assistance      04    Supervision or            Touching Assistance      05    Set-up or [address] HERNANDEZ    Resident Refused      09    not applicable      88    Not Attempted d/t            Medical or Safety           Admission  Usual Performance Score Discharge Goal Discharge Performance Score    Eating 6      Oral Hygiene 5      Toilet Hygiene 3      Sit to Lying 4      Lying to sitting on SOB 4      Sit to stand 4      Chair/Chair -to- bed Transfer 3 4     Toilet Transfer 4      Walk 50 ft. , 2 turns 4      Walk  150 ft. 8      Wheel 50 ft. , 2 turns 4      Wheel 150 ft. 9

## 2018-02-02 NOTE — PROGRESS NOTES
Problem: Mobility Impaired (Adult and Pediatric)  Goal: *Acute Goals and Plan of Care (Insert Text)  PHYSICAL THERAPY STG GOALS :  Initiated 1/30/2018 and to be accomplished within 1-2 Weeks    1. Patient will move from supine to sit and sit to supine  and roll side to side in bed with modified independence. 2.  Patient will transfer from bed to chair and chair to bed with supervision/set-up using SPC. 3.  Patient will perform sit to stand with supervision/set-up with Good balance and safety awareness. 4.  Patient will ambulate with supervision/set-up for 150 feet with SPC on level surfaces with 2 turns. 5.  Patient will ascend/descend 3 stairs with right handrail(s) with stand by assistance to allow for safe home access/exit. 6.  Patient will improve standardized test score for [place] Standing Balance Scale 3 to reduce fall risk. PHYSICAL THERAPY LTG GOALS :  Initiated 1/30/2018 and to be accomplished within 2-3 Weeks     2. Patient will transfer from bed to chair and chair to bed with modified independence using SPC. 3.  Patient will perform sit to stand with modified independence with Good balance and safety awareness. 4.  Patient will ambulate with modified independence for 300 feet with SPC on level surfaces and be able to maneuver through narrow spaces and obstacles without loss of balance. 5.  Patient will ascend/descend 3 stairs with right handrail(s) with modified independence to allow for safe home access/exit. 6.  Patient will improve standardized test score for [place] Standing Balance Scale 4 to reduce fall risk.       Physical Therapist:   Sarath Rodarte PT  on 1/30/2018            [address] [place] [address]   physical Therapy Daily Treatment note      Patient: Sim Waters (82 y.o. male)               Date: 2/2/2018    Physician: Heather Sauceda MD  Primary Diagnosis: SOB          Treatment Diagnosis  Treatment Diagnosis: muscle weakness  Treatment Diagnosis 2: increased need for assist w/ self care  Precautions: Fall, Other (comment) (Pacemaker)  Vital Signs  Vital Signs  Pulse (Heart Rate): 67  O2 Sat (%): 97 %  Level of Consciousness: Alert  BP: 152/78  MAP (Calculated): 103  Cognitive Status:  Mental Status  Neurologic State: Alert  Orientation Level: Oriented X4  Pain  Pain Screen  Pain Scale 1: Numeric (0 - 10)  Pain Intensity 1: 0  Patient Stated Pain Goal: 0  Bed Mobility Training  Balance  Sitting: Intact  Standing: Without support  Standing - Static: Good  Standing - Dynamic : Good  Transfer Training  Transfer Training  Sit to Stand: Modified independent  Stand to Sit: Modified independent  Sit to Stand: Modified independent  Gait Training  Gait  Base of Support: Center of gravity altered  Speed/Pilar: Slow  Step Length: Right shortened;Left shortened  Gait Abnormalities: Decreased step clearance  Ambulation - Level of Assistance: Supervision (close (S) )  Distance (ft): 125 Feet (ft) (x2)  Assistive Device: Gait belt;Cane, straight  Rail Use: Right   Stairs - Level of Assistance:  (close (S) )  Number of Stairs Trained: 10  Interventions: Safety awareness training  Gait Abnormalities: Decreased step clearance    With 3 turns. Therapeutic Exercise: Gait training with abovementioned details. Pt ambulated with slightly flexed B knees L>R. Following ambulation, SpO2 100%, HR: 70bpm. Pt negotiated 10 steps with R HR and no cues needed for sequencing or safety. Dynamic balance challenged with ball toss and bounce games with no UE support and (S). Pt had one slight LOB, but was able to self correct with stepping response. Pt able to reach down, overhead and laterally out of VASU with good balance and safety awareness. Seated B LE TE rendered to promote strength and endurance for improved functional mobility: LAQ, hip flexion x20. Patient/Caregiver Education:   Pt /Caregiver Education on safety and fall prevention with dynamic balance activities.  Pt very receptive to safety instructions and demonstrated good balance with reaching activities was provided to reduce risk of falls. ASSESSMENT:  Patient continues to benefit from Skilled PT services to improve gait, stair negotiation, dynamic balance and strength. Progression toward goals:  [x]      Improving appropriately and progressing toward goals  []      Improving slowly and progressing toward goals  []      Not making progress toward goals and plan of care will be adjusted     Treatment session: 48 minutes.   Therapist:   Roger Mason PTA,          2/2/2018

## 2018-02-02 NOTE — ROUTINE PROCESS
MDS SECTION GG NURSING REPORT      Patient: Lolis Lockett (72 y.o. male)                         Date: 2/1/2018    Primary Diagnosis: SOB      Attending Physician: Jose Monk MD   Treatment Diagnosis  Treatment Diagnosis: muscle weakness  Treatment Diagnosis 2: increased need for assist w/ self care    Use the KEY on right side to code Functional Ability           MDS Section GG Data Collection Tool  Key:      01     Dependent      02     Substantial/Maximal               Assistance             ([place] does > 50%)      03     Partial/Moderate             Assistance             ([place] does < 50%)      04     Supervision or             Touching Assistance      05     Set-up or [address]      06     Independent      07     Resident Refused      09      not applicable      88     Not Attempted d/t             Medical or Safety      7-3 3-11 11-7      Performance Code Performance Code Performance Code    Self Care Eating   88     Oral Hygiene   88     Toilet Hygiene   88     Sit to Lying   88     Lying to sitting on EOB   88     Sit to stand   88     Chair/Chair -to- bed Transfer   88     Toilet Transfer   88    Mobility Walk 50 ft. ,   2 turns   88     Walk  150 ft.   88     Wheel 50 ft. ,   2 turns   88     Wheel 150 ft.   88

## 2018-02-02 NOTE — ROUTINE PROCESS
Verbal shift change report given to Tonya Frank 42 (oncoming nurse) by Cindy Perez   (offgoing nurse). Report included the following information SBAR, Karstacey and YESSY Sorto

## 2018-02-03 NOTE — ROUTINE PROCESS
Bedside and Verbal shift change report given to Geraldine Alonso LPN (oncoming nurse) by Eugenio Reid RN (offgoing nurse). Report included the following information SBAR, Kardex and MAR. 24 hour check & hourly rounds.

## 2018-02-03 NOTE — PROGRESS NOTES
Problem: Mobility Impaired (Adult and Pediatric)  Goal: *Acute Goals and Plan of Care (Insert Text)  PHYSICAL THERAPY STG GOALS :  Initiated 1/30/2018 and to be accomplished within 1-2 Weeks    1. Patient will move from supine to sit and sit to supine  and roll side to side in bed with modified independence. 2.  Patient will transfer from bed to chair and chair to bed with supervision/set-up using SPC. 3.  Patient will perform sit to stand with supervision/set-up with Good balance and safety awareness. 4.  Patient will ambulate with supervision/set-up for 150 feet with SPC on level surfaces with 2 turns. 5.  Patient will ascend/descend 3 stairs with right handrail(s) with stand by assistance to allow for safe home access/exit. 6.  Patient will improve standardized test score for [place] Standing Balance Scale 3 to reduce fall risk. PHYSICAL THERAPY LTG GOALS :  Initiated 1/30/2018 and to be accomplished within 2-3 Weeks     2. Patient will transfer from bed to chair and chair to bed with modified independence using SPC. 3.  Patient will perform sit to stand with modified independence with Good balance and safety awareness. 4.  Patient will ambulate with modified independence for 300 feet with SPC on level surfaces and be able to maneuver through narrow spaces and obstacles without loss of balance. 5.  Patient will ascend/descend 3 stairs with right handrail(s) with modified independence to allow for safe home access/exit. 6.  Patient will improve standardized test score for [place] Standing Balance Scale 4 to reduce fall risk.       Physical Therapist:   Humberto Morrison, PT  on 1/30/2018            [address] [place] [address]   physical Therapy Daily Treatment note      Patient: Annalisa Puga (46 y.o. male)               Date: 2/3/2018    Physician: Kristan Dodson MD  Primary Diagnosis: SOB          Treatment Diagnosis  Treatment Diagnosis: muscle weakness  Treatment Diagnosis 2: increased need for assist w/ self care  Precautions: Fall, Other (comment) (Pacemaker)  Vital Signs        Cognitive Status:  Mental Status  Neurologic State: Alert  Orientation Level: Oriented X4  Cognition: Appropriate for age attention/concentration; Appropriate safety awareness  Pain     Bed Mobility Training     Balance  Sitting: Intact  Standing: With support  Standing - Static: Good  Standing - Dynamic : Good  Transfer Training  Transfer Training  Sit to Stand: Modified independent  Stand to Sit: Modified independent  Sit to Stand: Modified independent  Gait Training  Gait  Base of Support: Center of gravity altered;Narrowed  Speed/Pilar: Slow  Step Length: Left shortened;Right shortened  Gait Abnormalities: Decreased step clearance  Ambulation - Level of Assistance: Supervision  Distance (ft): 141 Feet (ft) (x2)  Assistive Device: Gait belt;Cane, straight     Gait Abnormalities: Decreased step clearance            With 4 turns. Therapeutic Exercise:   Pt ambulates as noted above from pt room<>PT gym. TE for LE strengthening and mobility: seated LAQs and marches with 3# ankle weights 2x15 reps of each; standing HTR x20, hip 3-way x15 BLE, step ups on 4\" x15 BLE. Dynamic balance activity x3 minutes with bag toss, pt able to alternate LE forward stepping with bag toss, with CGA. Pt education and reviewed HEP to continue in room and once pt is d/c. Patient/Caregiver Education:   Pt /Caregiver Education on safety and fall prevention,  HEP provided to continue with LE strengthening and mobility. ASSESSMENT:  Patient continues to benefit from Skilled PT services to improve overall strength and mobility, improve gait and balance, improve functional mobility  Progression toward goals:  [x]      Improving appropriately and progressing toward goals  []      Improving slowly and progressing toward goals  []      Not making progress toward goals and plan of care will be adjusted     Treatment session: 46 minutes.   Therapist: Erlin Poster, PTA,          2/3/2018

## 2018-02-03 NOTE — ROUTINE PROCESS
Bedside and Verbal shift change report given to Jorge Luis Nowak RN (oncoming nurse) by Richard Thomas LPN     (offgoing nurse). Report included the following information SBAR, Kardex and Recent Results.

## 2018-02-03 NOTE — PROGRESS NOTES
Problem: Self Care Deficits Care Plan (Adult)  Goal: *Acute Goals and Plan of Care (Insert Text)  OCCUPATIONAL THERAPY SHORT TERM GOALS    Initiated 1/30/2018 and to be accomplished within 1 Week(s)    1. Patient will perform Upper body ADL's without adaptive equipment with modified independence. 2.  Patient will perform Lower body ADL's without adaptive equipment with modified independence . 3. Patient will perform toileting task with supervision/set-up with Good safety to reduce falls risk. 4.  Patient will perform toilet transfers with straight point cane and supervision/set-up. 5.  Patient will perform standing static/dynamic balance activities for improved ADL/IADL function with supervision/set-up and Good balance and safety awarenes. 6.  Patient will improve Barthel index scores to atleast 65/100 to improve functional mobility. 7. Patient will utilize energy conservation techniques during functional activities with minimal verbal cues. OCCUPATIONAL THERAPY LONG TERM GOALS   Initiated 1/30/2018 and to be accomplished within 2 Week(s)    1. Patient will perform ADL's & IADLs with adaptive equipment as needed with modified independence. 2.  Patient will perform toileting task with modified independence with Good safety to reduce falls risk. 3.  Patient will perform all functional transfers utilizing least restrictive device and durable medical equipment as needed with modified independence and Good balance and safety awareness. 4.  Patient will perform standing static/dynamic activity for improved ADL/IADL function with modified independence an and Good balance and safety awareness. 5.  Patient will improve Barthel index score to 95/100 to improve independence with mobility.       Therapist: Lilia Whitten    1/30/2018          [facility]   Occupational Therapy Daily Treatment note      Patient: Lenward Kanner (49 y.o. male)       Date: 2/3/2018  Attending Physician: Juan Manuel Farooq Eitan iTerney MD  Primary Diagnosis: SOB    Treatment Diagnosis  Treatment Diagnosis: muscle weakness  Treatment Diagnosis 2: increased need for assist w/ self care   Precautions : Precautions at Admission: Fall, Other (comment) (Pacemaker)  Vital Signs:        Cognitive Status:  Mental Status  Neurologic State: Alert  Orientation Level: Oriented X4  Cognition: Appropriate decision making  Pain:  Pain Screen  Pain Scale 1: Numeric (0 - 10)  Pain Scale 1: Numeric (0 - 10)  Gross Assessment:     Coordination:     Bed Mobility:   Did not test  Transfers:   Pt performed sit>stand and stand>sit with MI.      Balance:     ADL Self Care:   Did not perform. ADL Intervention:               Did not perform                Instrumental ADL's:   Did not test  Therapeutic Activities:  Pt performed functional ambulation with SPC from room>therapy gym and therapy gym>room with SUP. Pt performed with good safety awareness, however, required extra time due to slow pace. Therapeutic Exercises:   Pt performed UB ther ex with 3# HW for bicep curls 2x15, shld flexion 2x15 with intermittent rest breaks due to drinking coffee (pt concerned about becoming dehydrated if not drinking throughout session). Pt performed standing arm bike with moderate resistance x 6 min. Pt required seated rest break. Pt then stood and performed another 4 min of arm bike in standing. Pt demonstrated good balance during activity. Pt performed there ex to increase activity tolerance and UB strength for independence in homemaking and self care. Patient/Caregiver Education:    Pt. Dhruv Love Education on proper form for ther ex was provided to increase there ex performance for maximum strengthening.       ASSESSMENT:  Patient continues to demonstrate the need for skilled Occupational Therapy services to improve upper body dressing, lower body dressing, toileting, toilet transfer, shower transfer, tub transfer and simple home management  Progression toward goals:  [x]      Improving appropriately and progressing toward goals  []      Improving slowly and progressing toward goals  []      Not making progress toward goals and plan of care will be adjusted     Treatment session:   32 minutes    Therapist:    Gilma Del Rosario OT,  2/3/2018

## 2018-02-04 NOTE — PROGRESS NOTES
Problem: Self Care Deficits Care Plan (Adult)  Goal: *Acute Goals and Plan of Care (Insert Text)  OCCUPATIONAL THERAPY SHORT TERM GOALS    Initiated 1/30/2018 and to be accomplished within 1 Week(s)    1. Patient will perform Upper body ADL's without adaptive equipment with modified independence. 2.  Patient will perform Lower body ADL's without adaptive equipment with modified independence . 3. Patient will perform toileting task with supervision/set-up with Good safety to reduce falls risk. 4.  Patient will perform toilet transfers with straight point cane and supervision/set-up. 5.  Patient will perform standing static/dynamic balance activities for improved ADL/IADL function with supervision/set-up and Good balance and safety awarenes. 6.  Patient will improve Barthel index scores to atleast 65/100 to improve functional mobility. 7. Patient will utilize energy conservation techniques during functional activities with minimal verbal cues. OCCUPATIONAL THERAPY LONG TERM GOALS   Initiated 1/30/2018 and to be accomplished within 2 Week(s)    1. Patient will perform ADL's & IADLs with adaptive equipment as needed with modified independence. 2.  Patient will perform toileting task with modified independence with Good safety to reduce falls risk. 3.  Patient will perform all functional transfers utilizing least restrictive device and durable medical equipment as needed with modified independence and Good balance and safety awareness. 4.  Patient will perform standing static/dynamic activity for improved ADL/IADL function with modified independence an and Good balance and safety awareness. 5.  Patient will improve Barthel index score to 95/100 to improve independence with mobility.       Therapist: Jeanine Amin    1/30/2018          [facility]   Occupational Therapy Daily Treatment note      Patient: Agueda High (89 y.o. male)       Date: 2/4/2018  Attending Physician: Leticia Shaw Ele Costa MD  Primary Diagnosis: SOB    Treatment Diagnosis  Treatment Diagnosis: muscle weakness  Treatment Diagnosis 2: increased need for assist w/ self care   Precautions : Precautions at Admission: Fall, Other (comment) (Pacemaker)  Vital Signs:  Vital Signs  Temp: 97.9 °F (36.6 °C)  Temp Source: Oral  Pulse (Heart Rate): 70  Heart Rate Source: Monitor  Resp Rate: 18  O2 Sat (%): 98 %  Level of Consciousness: Alert  BP: 139/71  MAP (Calculated): 94  BP 1 Method: Automatic  BP 1 Location: Right arm  BP Patient Position: Sitting  MEWS Score: 1     Cognitive Status:     Pain:  Pain Screen  Pain Scale 1: Numeric (0 - 10)  Pain Intensity 1: 0  Patient Stated Pain Goal: 0  Pain Scale 1: Numeric (0 - 10)  Gross Assessment:     Coordination:     Bed Mobility:     Transfers:  Functional Transfers  Sit to Stand: Modified independent  Stand to Sit: Modified independent     Balance:  Balance  Sitting: Intact  Standing: Without support  Standing - Static: Good  Standing - Dynamic : Good  ADL Self Care:     ADL Intervention:                       Instrumental ADL  Homemaking: Modified independent (folding laundry in stance w/ Good dynamic standing balance)  Medication Management: Modified independent (using weekly pill organizer x 5 meds w/ 100% accuracy)    Instrumental ADL's:  Instrumental ADL  Homemaking: Modified independent (folding laundry in stance w/ Good dynamic standing balance)  Medication Management: Modified independent (using weekly pill organizer x 5 meds w/ 100% accuracy)  Therapeutic Activities: Mod I functional mobility w/ SPC to & from rehab gym w/o LOB noted and good endurance.  IADL - simulated med mgmt and housekeeping-folding laundry tasks - see noted above for level assist. Patient able to retrieve laundry basket from floor surface while in stance, followed by carrying laundry basket x 10' and place on floor surface demonstrating Good balance w/ functional activities w/o LOB and good endurance. Patient/Caregiver Education:    Pt. Education on safety technique while performing med mgmt I.e. Turn medicine bottles upside down following organizing in weekly pill organizer for prevention of overdose.       ASSESSMENT:  Patient continues to demonstrate the need for skilled Occupational Therapy services to improve   grooming, bathing, upper body dressing, lower body dressing, toileting, toilet transfer, tub transfer and simple home management  Progression toward goals:  [x]      Improving appropriately and progressing toward goals  []      Improving slowly and progressing toward goals  []      Not making progress toward goals and plan of care will be adjusted     Treatment session:   36 minutes    Therapist:    Kerry Narvaez,  2/4/2018

## 2018-02-04 NOTE — PROGRESS NOTES
Problem: Mobility Impaired (Adult and Pediatric)  Goal: *Acute Goals and Plan of Care (Insert Text)  PHYSICAL THERAPY STG GOALS :  Initiated 1/30/2018 and to be accomplished within 1-2 Weeks    1. Patient will move from supine to sit and sit to supine  and roll side to side in bed with modified independence. 2.  Patient will transfer from bed to chair and chair to bed with supervision/set-up using SPC. 3.  Patient will perform sit to stand with supervision/set-up with Good balance and safety awareness. 4.  Patient will ambulate with supervision/set-up for 150 feet with SPC on level surfaces with 2 turns. 5.  Patient will ascend/descend 3 stairs with right handrail(s) with stand by assistance to allow for safe home access/exit. 6.  Patient will improve standardized test score for [place] Standing Balance Scale 3 to reduce fall risk. PHYSICAL THERAPY LTG GOALS :  Initiated 1/30/2018 and to be accomplished within 2-3 Weeks     2. Patient will transfer from bed to chair and chair to bed with modified independence using SPC. 3.  Patient will perform sit to stand with modified independence with Good balance and safety awareness. 4.  Patient will ambulate with modified independence for 300 feet with SPC on level surfaces and be able to maneuver through narrow spaces and obstacles without loss of balance. 5.  Patient will ascend/descend 3 stairs with right handrail(s) with modified independence to allow for safe home access/exit. 6.  Patient will improve standardized test score for [place] Standing Balance Scale 4 to reduce fall risk.       Physical Therapist:   Jose Bolton PT  on 1/30/2018            Outcome: [facility]   physical Therapy Daily Treatment note      Patient: Atul Ledezma (89 y.o. male)               Date: 2/4/2018    Physician: Keisha Gaviria MD  Primary Diagnosis: SOB          Treatment Diagnosis  Treatment Diagnosis: muscle weakness  Treatment Diagnosis 2: increased need for assist w/ self care  Precautions: Fall, Other (comment) (Pacemaker)  Vital Signs  Vital Signs  Temp: 97.2 °F (36.2 °C)  Temp Source: Oral  Pulse (Heart Rate): 70  Heart Rate Source: Monitor  Resp Rate: 18  O2 Sat (%): 100 %  Level of Consciousness: Alert  BP: 166/74  MAP (Calculated): 105  BP 1 Method: Automatic  BP 1 Location: Right arm  MEWS Score: 1  Cognitive Status:  Pain  Pain Screen  Pain Scale 1: Numeric (0 - 10)  Bed Mobility Training  Balance  Sitting: Intact  Standing: With support  Standing - Static: Good  Standing - Dynamic : Good  Transfer Training  Transfer Training  Sit to Stand: Modified independent  Stand to Sit: Modified independent  Sit to Stand: Modified independent  Gait Training  Gait  Base of Support: Center of gravity altered;Narrowed  Speed/Rubio: Slow  Step Length: Left shortened;Right shortened  Gait Abnormalities: Decreased step clearance  Ambulation - Level of Assistance: Supervision;Stand-by asssistance  Distance (ft): 300 Feet (ft)  Assistive Device: Cane, straight;Gait belt  Rail Use: Right   Stairs - Level of Assistance: Stand-by asssistance  Number of Stairs Trained: 5  Interventions: Safety awareness training;Verbal cues  Gait Abnormalities: Decreased step clearance  Therapeutic Exercise:   Pt continues to demonstrate improvement in overall functional mobility and amb 300ft (S)/SBA using SPC, demonstrating slow rubio and decreased step length but no LOB. Pt completed 5 steps with SBA using R railings safely and without difficulty to prep for entry of home. Pt performed dynamic gait using SPC and progressed to without AD with supervision while negotiating through obstacle course with multiple turns, forward/lateral stepping each direction over cones, and item retrieval from floor to improve dynamic standing balance and minimize risk of falls upon return to home/community re-entry.      Patient/Caregiver Education:   Pt /Caregiver Education on importance activity pacing and energy conservation when performing standing tasks/gait to maximize overall safety and functional mobility. Pt needs minimal reinforcement but verbalized understanding. ASSESSMENT:  Patient continues to benefit from Skilled PT services to improve strength, endurance, balance, and functional (I) with transfers, gait, and stairs. Progression toward goals:  [x]      Improving appropriately and progressing toward goals  []      Improving slowly and progressing toward goals  []      Not making progress toward goals and plan of care will be adjusted     Treatment session: 48 minutes.   Therapist:   Chelsea Snow PTA,          2/4/2018

## 2018-02-04 NOTE — ROUTINE PROCESS
Bedside and Verbal shift change report given to Delano Murillo RN (oncoming nurse) by Roberto Jerez LPN     (offgoing nurse). Report included the following information SBAR, Kardex and Recent Results.

## 2018-02-05 NOTE — PROGRESS NOTES
[facility]     Patient: Lolis Lockett MRN: [id number]  CSN: [id number]    YOB: 1930  Age: 80 y.o. Sex: male    DOA: 1/29/2018 LOS:  LOS: 7 days                    Subjective:     Pt much better and cough and dyspnea improving. No edema and dysphagia improving. Objective:      Visit Vitals    /69    Pulse 70    Temp 98 °F (36.7 °C)    Resp 18    Ht 5' 9\" (1.753 m)    Wt 66.2 kg (146 lb)    SpO2 99%    BMI 21.56 kg/m2       Physical Exam:  Chest clear  Cor rr  abd soft  No edema    Intake and Output:  Current Shift:     Last three shifts:       No results found for this or any previous visit (from the past 24 hour(s)).     Current Facility-Administered Medications   Medication Dose Route Frequency    guaiFENesin ER (MUCINEX) tablet 600 mg  600 mg Oral Q12H    [facility] EMERGENCY/STAT BOX   Other PRN    [facility] ANESTHESIA   Other PRN    tiotropium (SPIRIVA) inhalation capsule 18 mcg  1 Cap Inhalation Q24H    albuterol-ipratropium (DUO-NEB) 2.5 MG-0.5 MG/3 ML  3 mL Nebulization BID    furosemide (LASIX) tablet 20 mg  20 mg Oral ACB&D    chlorpheniramine-HYDROcodone (TUSSIONEX) oral suspension 5 mL  5 mL Oral QHS PRN    metoclopramide HCl (REGLAN) tablet 5 mg  5 mg Oral AC&HS    amLODIPine (NORVASC) tablet 5 mg  5 mg Oral DAILY    apixaban (ELIQUIS) tablet 5 mg  5 mg Oral BID    isosorbide mononitrate ER (IMDUR) tablet 30 mg  30 mg Oral DAILY    metoprolol succinate (TOPROL-XL) XL tablet 50 mg  50 mg Oral DAILY    ranolazine ER (RANEXA) tablet 500 mg  500 mg Oral BID    pantoprazole (PROTONIX) tablet 40 mg  40 mg Oral ACB    LORazepam (ATIVAN) tablet 0.5 mg  0.5 mg Oral Q6H PRN    nitroglycerin (NITROSTAT) tablet 0.4 mg  0.4 mg SubLINGual Q5MIN PRN    acetaminophen (TYLENOL) tablet 650 mg  650 mg Oral Q4H PRN       Lab Results   Component Value Date/Time    Glucose 84 01/28/2018 05:20 AM    Glucose 98 01/27/2018 03:30 AM    Glucose 103 01/26/2018 04:00 AM    Glucose 120 01/25/2018 11:10 AM    Glucose 106 01/21/2018 03:21 AM        Assessment/Plan     Active Problems:    * No active hospital problems.  Anabell Steel MD  2/5/2018, 11:28 AM

## 2018-02-05 NOTE — PROGRESS NOTES
Problem: Self Care Deficits Care Plan (Adult)  Goal: *Acute Goals and Plan of Care (Insert Text)  OCCUPATIONAL THERAPY SHORT TERM GOALS    Initiated 1/30/2018 and to be accomplished within 1 Week(s)    1. Patient will perform Upper body ADL's without adaptive equipment with modified independence. 2.  Patient will perform Lower body ADL's without adaptive equipment with modified independence . 3. Patient will perform toileting task with supervision/set-up with Good safety to reduce falls risk. 4.  Patient will perform toilet transfers with straight point cane and supervision/set-up. 5.  Patient will perform standing static/dynamic balance activities for improved ADL/IADL function with supervision/set-up and Good balance and safety awarenes. 6.  Patient will improve Barthel index scores to atleast 65/100 to improve functional mobility. 7. Patient will utilize energy conservation techniques during functional activities with minimal verbal cues. OCCUPATIONAL THERAPY LONG TERM GOALS   Initiated 1/30/2018 and to be accomplished within 2 Week(s)    1. Patient will perform ADL's & IADLs with adaptive equipment as needed with modified independence. 2.  Patient will perform toileting task with modified independence with Good safety to reduce falls risk. 3.  Patient will perform all functional transfers utilizing least restrictive device and durable medical equipment as needed with modified independence and Good balance and safety awareness. 4.  Patient will perform standing static/dynamic activity for improved ADL/IADL function with modified independence an and Good balance and safety awareness. 5.  Patient will improve Barthel index score to 95/100 to improve independence with mobility.       Therapist: King Mary Alice    1/30/2018          [facility]   Occupational Therapy Daily Treatment note      Patient: Awa Ahuja (38 y.o. male)       Date: 2/5/2018  Attending Physician: Thomas Moreno Silvestre Erazo MD  Primary Diagnosis: SOB    Treatment Diagnosis  Treatment Diagnosis: muscle weakness  Treatment Diagnosis 2: increased need for assist w/ self care   Precautions : Precautions at Admission: Fall, Other (comment) (Pacemaker)  Vital Signs:  Vital Signs  Temp: 98 °F (36.7 °C)  Temp Source: Oral  Pulse (Heart Rate): 70  Heart Rate Source: Monitor  Resp Rate: 18  O2 Sat (%): 99 %  Level of Consciousness: Alert  BP: 158/69  MAP (Calculated): 99  MEWS Score: 1     Cognitive Status:     Pain:  Pain Screen  Pain Scale 1: Numeric (0 - 10)  Pain Intensity 1: 0  Patient Stated Pain Goal: 0  Pain Scale 1: Numeric (0 - 10)  Gross Assessment:     Coordination:     Bed Mobility:  Bed Mobility  Rolling: Modified independent  Supine to Sit: Modified independent  Transfers:  Functional Transfers  Sit to Stand: Modified independent  Stand to Sit: Modified independent     Balance:  Balance  Sitting: Intact  Standing: Without support  Standing - Static: Good  Standing - Dynamic : Good  ADL Self Care:     ADL Intervention:   Simple meal prep Mod I in stance using toaster and buttering following in stance, washing dishware w/ Mod I in stance, patient reports his family does not unplug appliance e.g. Toaster, compliant w/ request to [employer]. Tub/ combo transfer using grab bars for stepping in & out w/ Mod I. Therapeutic Activities:  Functional mobility Mod I w/ SPC to & from rehab gym & OT ADL kitchen, no LOB noted, c/o chronic knee pain-nursing/Lila notified. Patient/Caregiver Education:    Pt. Education on safety technique for tub/shower combo transfer using grab bars e.g. Standing parallel to tub/shower, flexion of L knee to step into tub, patient unable to perform 2/2 L knee pain 3/10 & R knee pain 2/10, in which patient performed tub/shower step in & out used at [facility] w/ Mod I w/o LOB of lifting LLE frontwards to step in/out using grab bars.       ASSESSMENT:  Patient continues to demonstrate the need for skilled Occupational Therapy services to improve   grooming, bathing, upper body dressing, lower body dressing, toileting, toilet transfer, tub transfer and simple home management  Progression toward goals:  [x]      Improving appropriately and progressing toward goals  []      Improving slowly and progressing toward goals  []      Not making progress toward goals and plan of care will be adjusted     Treatment session:  43 minutes    Therapist:    Rosa Sr,  2/5/2018

## 2018-02-05 NOTE — ROUTINE PROCESS
Bedside and Verbal shift change report given to [address] Kelly 50 (oncoming nurse) by Sy Cason RN (offgoing nurse). Report included the following information SBAR, Kardex and MAR.

## 2018-02-05 NOTE — PROGRESS NOTES
Problem: Mobility Impaired (Adult and Pediatric)  Goal: *Acute Goals and Plan of Care (Insert Text)  PHYSICAL THERAPY STG GOALS :  Initiated 1/30/2018 and to be accomplished within 1-2 Weeks (updated 2/5/18)    1. Patient will move from supine to sit and sit to supine  and roll side to side in bed with modified independence. (Achieved)   2. Patient will transfer from bed to chair and chair to bed with supervision/set-up using SPC.  (Achieved)   3. Patient will perform sit to stand with supervision/set-up with Good balance and safety awareness. (Achieved)   4. Patient will ambulate with supervision/set-up for 150 feet with SPC on level surfaces with 2 turns. (Achieved)   5. Patient will ascend/descend 3 stairs with right handrail(s) with stand by assistance to allow for safe home access/exit. (Achieved)   6. Patient will improve standardized test score for [place] Standing Balance Scale 3 to reduce fall risk. (Achieved)      PHYSICAL THERAPY LTG GOALS :  Initiated 1/30/2018 and to be accomplished within 2-3 Weeks (updated 2/5/18)     1. Patient will transfer from bed to chair and chair to bed with modified independence using SPC.  (Achieved)   2. Patient will perform sit to stand with modified independence with Good balance and safety awareness. (Achieved)   3. Patient will ambulate with modified independence for 300 feet with SPC on level surfaces and be able to maneuver through narrow spaces and obstacles without loss of balance. (Achieved)   4. Patient will ascend/descend 3 stairs with right handrail(s) with modified independence to allow for safe home access/exit. (Achieved)   5. Patient will improve standardized test score for [place] Standing Balance Scale 4 to reduce fall risk.     (Achieved)     Physical Therapist:   Gilford Bouche, PT  on 1/30/2018            [facility]   PHYSICAL THERAPY WEEKLY PROGRESS REPORT  Reporting Period:  Date:   1/30/18*  to 2/5/18      Patient: Maria Isabel Moreland (20 y.o. male)                         Date: 2/5/2018    Primary Diagnosis: SOB      Attending Physician: Sowmya Dueñas MD   Treatment Diagnosis  Treatment Diagnosis: muscle weakness  Treatment Diagnosis 2: difficulty in walking  Precautions:  Fall, Other (comment) (Pacemaker)  Rehab Potential : Excellent:    Skill interventions and education provided with clinical rationale (include individualized treatment techniques and standardized tests):   Skilled Physical Therapy services were provided with: TE rendered to address strength, endurance, mobility. TA rendered to address bed mobility, transfers. Gait training using SPC to address gait deficits. Stair training for safe negotiation of stairs for safe entrance into home. Neuromuscular re-education to address balance impairments for reduced fall risk. Using a comparative statement, summarize significant progress toward goals as a result of skilled intervention provided:  Patient has made Good progress towards their Physical Therapy goals in the areas of bed mobility, transfers, ambulation, stair negotiation, balance. Pt has achieved 100% of STGs and LTGs. Identify remaining functional areas, impairments limiting progress and/or barriers to improvement:  Patient has achieved all of STGs and LTGs, discharge date to be determined.      OBJECTIVE DATA SUMMARY:     INITIAL ASSESSMENT WEEKLY ASSESSMENT   COGNITIVE STATUS COGNITIVE STATUS   Neurologic State: Alert, Eyes open spontaneously  Orientation Level: Oriented X4  Cognition: Follows commands, Appropriate decision making, Appropriate for age attention/concentration, Appropriate safety awareness  Perception: Appears intact  Perseveration: No perseveration noted  Safety/Judgement: Awareness of environment Neurologic State: Alert  Orientation Level: Oriented X4  Cognition: Appropriate for age attention/concentration  Perception: Appears intact  Perseveration: No perseveration noted  Safety/Judgement: Fall prevention, Awareness of environment   PAIN PAIN   Pain Scale 1: Numeric (0 - 10)  Pain Intensity 1: 0  Pain Onset 1: cronic  Pain Location 1: Abdomen  Pain Description 1: Aching  Pain Intervention(s) 1: Medication (see MAR)  Patient Stated Pain Goal: 0  Pain Reassessment 1: Yes Pain Scale 1: Numeric (0 - 10)  Pain Intensity 1: 0  Pain Onset 1: cronic  Pain Location 1: Abdomen  Pain Description 1: Aching  Pain Intervention(s) 1: Medication (see MAR)  Patient Stated Pain Goal: 0  Pain Reassessment 1: Yes   GROSS ASSESSMENT GROSS ASSESSMENT   AROM: Generally decreased, functional  PROM: Generally decreased, functional  Strength: Generally decreased, functional (B hips 4/5, B knees 5/5)  Coordination: Generally decreased, functional  Tone: Normal  Sensation: Intact AROM: Within functional limits (BUEs: all planes)  PROM: Within functional limits  Strength:  Within functional limits (BUEs: 5/5 deltoids, biceps & triceps)  Coordination: Within functional limits  Tone: Normal  Sensation: Intact   BED MOBILITY BED MOBILITY   Rolling: Supervision  Supine to Sit: Supervision  Sit to Supine: Supervision  Scooting: Supervision Rolling: Modified independent  Supine to Sit: Modified independent  Sit to Supine: Supervision  Scooting: Supervision   GAIT GAIT   Base of Support: Center of gravity altered  Speed/Pilar: Slow  Step Length: Left shortened, Right shortened  Gait Abnormalities: Decreased step clearance  Ambulation - Level of Assistance: Contact guard assistance  Distance (ft): 68 Feet (ft)  Assistive Device: Cane, straight  Rail Use: Right   Stairs - Level of Assistance: Stand-by asssistance  Number of Stairs Trained: 5  Interventions: Safety awareness training Base of Support: Center of gravity altered, Narrowed  Speed/Pilar: Pace decreased (<100 feet/min)  Step Length: Left shortened, Right shortened  Gait Abnormalities: Decreased step clearance  Ambulation - Level of Assistance: Modified independent  Distance (ft): 391 Feet (ft)  Assistive Device: Cane, straight  Rail Use: Right   Stairs - Level of Assistance: Modified independent  Number of Stairs Trained: 10  Interventions: Safety awareness training, Verbal cues   Exceptions to WDL Exceptions to WDL               TRANSFERS TRANSFERS   Sit to Stand: Stand-by asssistance  Stand to Sit: Stand-by asssistance Sit to Stand: Modified independent  Stand to Sit: Modified independent   BALANCE BALANCE   Sitting: Intact  Standing: With support  Standing - Static: Fair (+)  Standing - Dynamic : Fair (+) Sitting: Intact  Standing: Without support  Standing - Static: Good  Standing - Dynamic : Good   WHEELCHAIR MOBILITY/MGMT WHEELCHAIR MOBILITY/MGMT         Activity Tolerance:  Good Activity Tolerance: Good   Visual/Perceptual   Corrective Lenses: Reading glasses      Visual/Perceptual   Vision  Corrective Lenses: Reading glasses        Auditory:   Auditory Impairment: None    Auditory:   Auditory  Auditory Impairment: None       Steps: right   Clinical Decision makin+ Clinical Decision makin on [place] Standing Balance Scale     Treatment:   Gait training x 391 ft using SPC with mod (I). Stair training x 10 stairs with mod (I) using R HR. Dynamic standing balance activity with patient ambulating and and bouncing medium sized therapy ball facilitation of stepping strategy without any external cueing, facilitation of reaching outside VASU and across midline. Gait training also to include marching x 10 ft x 2 reps and side stepping without assistive device 10 ft x 2 to improve floor clearance and address narrow VASU, occasional swaying noted with increased speeds, verbal cueing to reduce speed. Pt has improved from CGA/SBA level for transfers and ambulation to mod (I) for bed mobility, transfers, ambulation, and stair negotiation. Discharge date to be determined.      Patient's response to treatment rendered:  Good    Patient expected Discharge Location:  [x]Private Residence  [] [facility]/IL[facility]  []LTC  []Other:    Plan: Continue Skilled PT services as established by the Plan of Care for 3-6 times a week. PT and Assistant have had a weekly case conference regarding the above treatment:  [x] Yes     [] No       Treatment session:  48 minutes. Therapist: Erickson High, PT       Date:2/5/2018  Forward to PT for co-signature when completed.

## 2018-02-06 NOTE — PROGRESS NOTES
Problem: Self Care Deficits Care Plan (Adult)  Goal: *Acute Goals and Plan of Care (Insert Text)  OCCUPATIONAL THERAPY SHORT TERM GOALS      Discharge 2/6/18  1. Patient will perform Upper body ADL's without adaptive equipment with modified independence. -CLOF: Mod I bathing & Mod I dressing ad dona  2. Patient will perform Lower body ADL's without adaptive equipment with modified independence. -CLOF: Mod I bathing & Mod I dressing ad dona  3. Patient will perform toileting task with supervision/set-up with Good safety to reduce falls risk. -CLOF: Mod I   4. Patient will perform toilet transfers with straight point cane and supervision/set-up.: CLOF: Mod I w/ SPC  5. Patient will perform standing static/dynamic balance activities for improved ADL/IADL function with supervision/set-up and Good balance and safety awareness. CLOF: Mod I w/ Good balance  6. Patient will improve Barthel index scores to atleast 65/100 to improve functional mobility.: CLOF: 95/100  7. Patient will utilize energy conservation techniques during functional activities with minimal verbal cues. -CLOF: issued visual aide and provided review for energy conservation techniques     Initiated 1/30/2018 and to be accomplished within 1 Week(s)     1. Patient will perform Upper body ADL's without adaptive equipment with modified independence. 2.  Patient will perform Lower body ADL's without adaptive equipment with modified independence . 3. Patient will perform toileting task with supervision/set-up with Good safety to reduce falls risk. 4.  Patient will perform toilet transfers with straight point cane and supervision/set-up. 5.  Patient will perform standing static/dynamic balance activities for improved ADL/IADL function with supervision/set-up and Good balance and safety awarenes. 6.  Patient will improve Barthel index scores to atleast 65/100 to improve functional mobility.   7. Patient will utilize energy conservation techniques during functional activities with minimal verbal cues. OCCUPATIONAL THERAPY LONG TERM GOALS   Initiated 1/30/2018 and to be accomplished within 2 Week(s)    1. Patient will perform ADL's & IADLs with adaptive equipment as needed with modified independence. -CLOF: Mod I bathing & Mod I dressing ad dona, IADLs I.e. Med mgmt Mod I w/ weekly pill organizer   2. Patient will perform toileting task with modified independence with Good safety to reduce falls risk. CLOF: Mod I   3. Patient will perform all functional transfers utilizing least restrictive device and durable medical equipment as needed with modified independence and Good balance and safety awareness. -CLOF: Mod I w/ SPC for toilet transfers and Mod I tub/shower transfer w/ use of grab bars  4. Patient will perform standing static/dynamic activity for improved ADL/IADL function with modified independence an and Good balance and safety awareness. -CLOF: Mod I w/ Good balance  5. Patient will improve Barthel index score to 95/100 to improve independence with mobility. -CLOF: 95/100      Therapist: Lilia Whitten    1/30/2018          [facility]  OCCUPATIONAL THERAPY DISCHARGE SUMMARY      Patient: Lenward Kanner (62 y.o. male)               Date: 2/6/2018    Attending Physician: Vladimir Bull MD  Primary Diagnosis: SOB       Treatment Diagnosis  Treatment Diagnosis: muscle weakness  Treatment Diagnosis 2: increased need for assist w/ self care         Precautions: Fall, Other (comment) (Pacemaker)     Medical History:   Past Medical History:   Diagnosis Date    Atrial fibrillation     CHADS score 3  (+CHF, +HTN, +AGE, -DM, -CVA)    CABG     2008   LIMA - LAD,   SVG - RCA    Cardiomyopathy     EF 30-35% (ECHO 6/14)    Coronary artery disease     Dyslipidemia     Hypertension     Hypothyroid     Pacemaker     Peripheral vascular disease     Renal artery stenosis     bilateral stents    Sick sinus syndrome      Past Surgical History:   Procedure Laterality Date    HX CORONARY ARTERY BYPASS GRAFT         LIMA - LAD,   SVG - RCA       Reason for Discharge: [x]Goals Met   []Met highest potential  []Hospitalized   []  Progress ceased  []   []Other: Patient/family requesting D/C from facility. Discharge Location:  [x]Private Residence  [] J[facility]EL []LTC  [] [facility] [] 24 hr. Supervision for safety    Summarize skilled services provided and significant progress attained since last daily/weekly note (include individualized treatment techniques and standardized tests):   Patient has received skilled OT services from 18 to 18 and has made Good progress towards their OT goals. Improvements noted in:   grooming, bathing, upper body dressing, lower body dressing, toileting, toilet transfer, tub transfer and simple home management    Summary of education/recommendation provided to Patient/Caregivers in the following areas: Remove barriers/rugs, mobility w/ SPC, recommendation for OT [facility] services.      Objective Data:      INITIAL ASSESSMENT DISCHARGE ASSESSMENT   COGNITIVE STATUS: COGNITIVE STATUS:   Neurologic State: Alert, Eyes open spontaneously  Orientation Level: Oriented X4  Cognition: Follows commands, Appropriate decision making, Appropriate for age attention/concentration, Appropriate safety awareness  Perception: Appears intact  Perseveration: No perseveration noted  Safety/Judgement: Awareness of environment Mental Status  Neurologic State: Alert, Appropriate for age  Orientation Level: Oriented X4  Cognition: Appropriate for age attention/concentration  Perception: Appears intact  Perseveration: No perseveration noted  Safety/Judgement: Fall prevention, Awareness of environment   PAIN: PAIN:   Pain Scale 1: Numeric (0 - 10)  Pain Intensity 1: 0  Pain Onset 1: cronic  Pain Location 1: Abdomen  Pain Description 1: Aching  Pain Intervention(s) 1: Medication (see MAR)  Patient Stated Pain Goal: 0  Pain Reassessment 1: Yes Pain Screen  Pain Scale 1: Numeric (0 - 10)  Pain Intensity 1: 0  Pain Onset 1: cronic  Pain Location 1: Abdomen  Pain Description 1: Aching  Pain Intervention(s) 1: Medication (see MAR)  Patient Stated Pain Goal: 0  Pain Reassessment 1: Yes   BED MOBILITY BED MOBILITY   Rolling: Supervision  Supine to Sit: Supervision  Sit to Supine: Supervision  Scooting: Supervision Bed Mobility  Rolling: Modified independent  Supine to Sit: Modified independent  Sit to Supine: Supervision  Scooting: Supervision   ADL SELF CARE ADL SELF CARE         ADL INTERVENTION ADL INTERVENTION   Bathing Assistance: Supervision/set-up  Position Performed: Standing Upper Body Bathing  Bathing Assistance: Supervision/set-up  Position Performed: Standing    Upper Body Dressing Assistance  Dressing Assistance: Modified independent  Pullover Shirt: Modified independent    Lower Body Bathing  Bathing Assistance: Supervision/set-up  Perineal  : Supervision/set-up  Position Performed: Standing  Lower Body : Supervision/set-up  Position Performed: Standing, Seated in chair    Toileting  Toileting Assistance: Supervision/set up  Bladder Hygiene: Supervision/set-up  Clothing Management: Supervision/set-up    Grooming  Grooming Assistance: Supervision/set up    Feeding  Feeding Assistance: Independent   TRANSFERS TRANSFERS   Sit to Stand: Stand-by asssistance  Stand to Sit: Stand-by asssistance  Toilet Transfer : Stand-by asssistance (w/ SPC)  Tub Transfer: Modified independent Functional Transfers  Sit to Stand: Modified independent  Stand to Sit: Modified independent  Toilet Transfer : Supervision  Tub Transfer: Modified independent   BALANCE BALANCE   Sitting: Intact  Standing: With support  Standing - Static: Fair (+)  Standing - Dynamic : Fair (+) Balance  Sitting: Intact  Standing: Without support  Standing - Static: Good  Standing - Dynamic : Good   GROSS ASSESSMENT  GROSS ASSESSMENT   AROM: Generally decreased, functional  PROM: Generally decreased, functional  Strength: Generally decreased, functional (B hips 4/5, B knees 5/5)  Coordination: Generally decreased, functional  Tone: Normal  Sensation: Intact Gross Assessment  AROM: Within functional limits (BUEs: all planes)  PROM: Within functional limits  Strength:  Within functional limits (BUEs: 5/5 deltoids, biceps & triceps)  Coordination: Within functional limits  Tone: Normal  Sensation: Intact   COORDINATION COORDINATION   Fine Motor Skills-Upper: Left Intact, Right Intact  Gross Motor Skills-Upper: Left Intact, Right Intact Coordination  Fine Motor Skills-Upper: Left Intact, Right Intact  Gross Motor Skills-Upper: Left Intact, Right Intact   VISUAL/PERCEPTUAL VISUAL/PERCEPTUAL   Corrective Lenses: Reading glasses   Vision  Corrective Lenses: Reading glasses     AUDITORY: AUDITORY:   Auditory Impairment: None Auditory  Auditory Impairment: None   I ADL'S:  I ADL'S:           THE BARTHEL INDEX    ACTIVITY   SCORE   FEEDING  0=unable  5=needs help cutting,spreading butter,etc., or modified diet  10= independent   10   BATHING  0=dependent  5=independent (or in shower   5   GROOMING  0=needs help  5=independent face/hair/teeth/shaving (implements provided)   5   DRESSING  0=dependent  5=needs help but can do about half unaided  10=independent(including buttons, zips,laces etc.)   10   BOWELS  0=incontinent  5=occasional accident  10=continent   10   BLADDER  0=incontinent, or catheterized and unable to manage alone  5=occasional accident  10=continent   10   TOILET USE  0=dependent  5=needs some help, but can do something alone  10=independent (on and off, dressing, wiping)   10   TRANSFER (BED TO CHAIR AND BACK)  0=unable, no sitting balance  5=major help(one or two people,physical), can sit  10=minor help(verbal or physical)  15=independent   15   MOBILITY (ON LEVEL SURFACES)  0=immobile or <50 yards  5=wheelchair independent,including corners,>50 yards  10=walkes with help of one person (verbal or physical) >50 yards  15=independent(but may use any aid; for example, stick) >50 yards   15   STAIRS  0=unable  5=needs help (verbal, physical, carrying aid)  10=independent   5              TOTAL:               95/100     Treatment :  Patient participated in review of OT plan of care and progress towards goals with completion of OT discharge note. Patient verbalized recommendation for OT [facility] services to assess tub/shower combo w/ recommendation for modification e.g. Use of personal shower chair w/ back. Patient declines handout for [address] [place] [address], states he carries his cell phone majority of the time. Instructed on energy conservation techniques and issued visual handout for continued review for increased carryover for safe discharge home. Discharge Recommendations:  [] Home Exercise Program    [] Elevated toilet seat/3 in 1 commode   [] Shower Chair      []Shower Bench    [] Life Line/alert   [] Splint/orthotic application/schedule  [x] Grab Bars: Location - patient already has grab bars in tub/shower combo  [x] Home Health OT       [x] SPC-patient already owns   [x] shower w/ back-patient already owns, recommend OT [facility] services to assess  [] WC      Treatment session: 34 minutes.     Therapist: Paige Cardoso      Date:2/6/2018

## 2018-02-06 NOTE — PROGRESS NOTES
SW spoke with pt re: request for d/c home. Pt informed that d/c will have to be tomorrow due to MD schedule.

## 2018-02-06 NOTE — PROGRESS NOTES
I have reviewed this patient's current medication list and recent laboratory results. At this time, I do not suggest any drug therapy adjustments or additional laboratory monitoring. Thank you,  Urszula CIFUENTES  Ph. M. S.  2/5/2018

## 2018-02-06 NOTE — PROGRESS NOTES
Problem: Mobility Impaired (Adult and Pediatric)  Goal: *Acute Goals and Plan of Care (Insert Text)  PHYSICAL THERAPY LTG GOALS :  Initiated 1/30/2018 and to be accomplished within 2-3 Weeks (updated 2/6/18)     1. Patient will transfer from bed to chair and chair to bed with modified independence using SPC.  (Achieved)   2. Patient will perform sit to stand with modified independence with Good balance and safety awareness. (Achieved)   3. Patient will ambulate with modified independence for 300 feet with SPC on level surfaces and be able to maneuver through narrow spaces and obstacles without loss of balance. (Achieved)   4. Patient will ascend/descend 3 stairs with right handrail(s) with modified independence to allow for safe home access/exit. (Achieved)   5. Patient will improve standardized test score for [place] Standing Balance Scale 4 to reduce fall risk. (Achieved)     PHYSICAL THERAPY STG GOALS :  Initiated 1/30/2018 and to be accomplished within 1-2 Weeks (updated 2/5/18)    1. Patient will move from supine to sit and sit to supine  and roll side to side in bed with modified independence. (Achieved)   2. Patient will transfer from bed to chair and chair to bed with supervision/set-up using SPC.  (Achieved)   3. Patient will perform sit to stand with supervision/set-up with Good balance and safety awareness. (Achieved)   4. Patient will ambulate with supervision/set-up for 150 feet with SPC on level surfaces with 2 turns. (Achieved)   5. Patient will ascend/descend 3 stairs with right handrail(s) with stand by assistance to allow for safe home access/exit. (Achieved)   6. Patient will improve standardized test score for [place] Standing Balance Scale 3 to reduce fall risk. (Achieved)      PHYSICAL THERAPY LTG GOALS :  Initiated 1/30/2018 and to be accomplished within 2-3 Weeks (updated 2/5/18)     1.   Patient will transfer from bed to chair and chair to bed with modified independence using SPC.  (Achieved)   2. Patient will perform sit to stand with modified independence with Good balance and safety awareness. (Achieved)   3. Patient will ambulate with modified independence for 300 feet with SPC on level surfaces and be able to maneuver through narrow spaces and obstacles without loss of balance. (Achieved)   4. Patient will ascend/descend 3 stairs with right handrail(s) with modified independence to allow for safe home access/exit. (Achieved)   5. Patient will improve standardized test score for [place] Standing Balance Scale 4 to reduce fall risk. (Achieved)     Physical Therapist:   Lesli Araiza PT  on 2018             [phone]  [facility] physical Therapy Discharge Summary      Patient: Daniel Wisdom (33 y.o. male)                  Date: 2018    Attending Physician: Chris Gray MD  Primary Diagnosis: SOB       Treatment Diagnosis  Treatment Diagnosis: muscle weakness  Treatment Diagnosis 2: difficulty in walking         Precautions: Fall, Other (comment) (Pacemaker)   MEDICAL HISTORY:   Past Medical History:   Diagnosis Date    Atrial fibrillation     CHADS score 3  (+CHF, +HTN, +AGE, -DM, -CVA)    CABG        LIMA - LAD,   SVG - RCA    Cardiomyopathy     EF 30-35% (ECHO )    Coronary artery disease     Dyslipidemia     Hypertension     Hypothyroid     Pacemaker     Peripheral vascular disease     Renal artery stenosis     bilateral stents    Sick sinus syndrome      Past Surgical History:   Procedure Laterality Date    HX CORONARY ARTERY BYPASS GRAFT         LIMA - LAD,   SVG - RCA       Reason for Discharge: [x] Goals Met   []Met highest potential  []    [] Progress ceased  []Hospitalized  []Other: Pt/family request for early D/C from facility. Discharge Location:  [x] Private Residence  [] penitentiary  [] LTC  []  [facility] []Other: 24 hr.supervision for safety.      Summarize skilled services provided and significant progress attained since last daily/weekly note (include individualized treatment techniques and standardized tests): This Patient has received skilled PT services from 1/30/18  through 2/6/18 and has made Good progress towards their PT goals. Improvements noted in bed mobility, transfers, ambulation using SPC, stair negotiation, balance. Pt has achieved all of STGs and LTGs. Summary of education/recommendation provided to Patient/Caregivers:    Pt. Education provided to use SPC. HEP handouts administered to maintain gains in strength, endurance, mobility upon return home.        Objective Data:     INITIAL  ASSESSMENT DISCHARGE ASSESSMENT   COGNITIVE STATUS COGNITIVE STATUS   Neurologic State: Alert, Eyes open spontaneously  Orientation Level: Oriented X4  Cognition: Follows commands, Appropriate decision making, Appropriate for age attention/concentration, Appropriate safety awareness  Perception: Appears intact  Perseveration: No perseveration noted  Safety/Judgement: Awareness of environment Neurologic State: Alert, Appropriate for age  Orientation Level: Oriented X4  Cognition: Appropriate for age attention/concentration  Perception: Appears intact  Perseveration: No perseveration noted  Safety/Judgement: Fall prevention, Awareness of environment   PAIN PAIN   Pain Scale 1: Numeric (0 - 10)  Pain Intensity 1: 0  Pain Onset 1: cronic  Pain Location 1: Abdomen  Pain Description 1: Aching  Pain Intervention(s) 1: Medication (see MAR)  Patient Stated Pain Goal: 0  Pain Reassessment 1: Yes Pain Scale 1: Numeric (0 - 10)  Pain Intensity 1: 0  Pain Onset 1: cronic  Pain Location 1: Abdomen  Pain Description 1: Aching  Pain Intervention(s) 1: Medication (see MAR)  Patient Stated Pain Goal: 0  Pain Reassessment 1: Yes   GROSS ASSESSMENT GROSS ASSESSMENT   AROM: Generally decreased, functional  PROM: Generally decreased, functional  Strength: Generally decreased, functional (B hips 4/5, B knees 5/5)  Coordination: Generally decreased, functional  Tone: Normal  Sensation: Intact AROM: Within functional limits (BUEs: all planes)  PROM: Within functional limits  Strength: Within functional limits (BUEs: 5/5 deltoids, biceps & triceps)  Coordination: Within functional limits  Tone: Normal  Sensation: Intact   BED MOBILITY BED MOBILITY   Rolling: Supervision  Supine to Sit: Supervision  Sit to Supine: Supervision  Scooting: Supervision Rolling: Modified independent  Supine to Sit: Modified independent  Sit to Supine: Supervision  Scooting: Supervision   GAIT GAIT   Base of Support: Center of gravity altered  Speed/Pilar: Slow  Step Length: Left shortened, Right shortened  Gait Abnormalities: Decreased step clearance  Ambulation - Level of Assistance: Contact guard assistance  Distance (ft): 68 Feet (ft)  Assistive Device: Cane, straight  Rail Use: Right   Stairs - Level of Assistance: Stand-by asssistance  Number of Stairs Trained: 5  Interventions: Safety awareness training Base of Support: Narrowed  Speed/Pilar: Pace decreased (<100 feet/min)  Step Length: Left shortened, Right shortened  Gait Abnormalities: Decreased step clearance  Ambulation - Level of Assistance: Modified independent  Distance (ft): 391 Feet (ft) (x2)  Assistive Device: Cane, straight  Rail Use: Right   Stairs - Level of Assistance: Modified independent  Number of Stairs Trained: 10  Interventions: Safety awareness training, Verbal cues   Exceptions to WDL Exceptions to WDL                With 6 turns.    TRANSFERS TRANSFERS   Sit to Stand: Stand-by asssistance  Stand to Sit: Stand-by asssistance Sit to Stand: Modified independent  Stand to Sit: Modified independent   BALANCE BALANCE   Sitting: Intact  Standing: With support  Standing - Static: Fair (+)  Standing - Dynamic : Fair (+) Sitting: Intact  Standing: Without support  Standing - Static: Good  Standing - Dynamic : Good   WHEELCHAIR MOBILITY/MGMT WHEELCHAIR MOBILITY/MGMT          N/A Visual/Perceptual      Corrective Lenses: Reading glasses    Auditory:   Auditory  Auditory Impairment: None         Visual/Perceptual      Corrective Lenses: Reading glasses    Auditory:   Auditory  Auditory Impairment: None          Activity Tolerance:  Good                     Treatment:   Gait training of 135 ft x 2 using SPC with mod (I). HEP handouts administered to maintain gains in strength, endurance, mobility. TE rendered to address strength and endurance and ensure proper techniques and include: standing HS curls 5 reps each, mini-squats x 5 reps; no verbal cueing required for proper techniques. Pt is discharged at this time as he has achieved all of established goals. He has progressed from CGA/SBA for transfers and ambulation to mod (I) using SPC. Discharge Recommendations:  [x]  Home Exercise Program     [x]  Outpatient Physical Therapy   []  Remove throw rugs/clear environmental barriers    []  Assistance with:      [x]  Ambulation Device: Single Point Cane   [x]  Steps with right   []  Wheelchair    []  Life Line/alert   []  WC  Ramp       Treatment minutes: 19 minutes.     Therapist :  Candace Mack PT            Date:2/6/2018

## 2018-02-06 NOTE — PROGRESS NOTES
conducted a Follow up consultation and Spiritual Assessment for Brayden Moser, who is a 80 y. o.,male. The  provided the following Interventions:  Continued the relationship of care and support. Listened empathically. Offered prayer and assurance of continued prayer on patients behalf. Chart reviewed. The following outcomes were achieved:  Patient expressed gratitude for 's visit. Assessment:  There are no spiritual or Orthodoxy issues which require intervention at this time. Plan:  Chaplains will continue to follow and will provide pastoral care on an as needed/requested basis.  recommends bedside caregivers page  on duty if patient shows signs of acute spiritual or emotional distress. Cathy Colvin M.Div.   [facility] [address]  [phone]

## 2018-02-06 NOTE — PROGRESS NOTES
Pharmacy Monitoring: Apixaban     Patient has an order for apixaban 5 mg for A. Fib      Apixaban criteria for atrial fibrillation:     1. Age > 80 years: YES   2. Weight < 60 kg:NO   3. SCr > 1.5 mg/dL: YES    Patient meets 2 of 3 criteria and qualifies for lower dose of apixaban. Recommendation:    Patient qualifies for lower dose of apixaban 2.5 mg BID. Please consider lower dose.         Thanks,  Pino Busby, PHARMD

## 2018-02-06 NOTE — PROGRESS NOTES
Nutrition follow-up/Plan of care [facility]     RECOMMENDATIONS:   1. Mech Soft Cardiac Diet  2. Monitor weight, labs and PO intake  3. RD to follow     GOALS:   1. Met/Onging: PO intake meets >75% of protein/calorie needs by 2/13  2. Ongoing: Weight Maintenance (+/- 1-2 lb) by  2/13     ASSESSMENT:   Wt status is classified as normal per BMI of 21.6. However, Pt at nutrition risk d/t BMI <23 and age >65 years. Adequate PO intake. Labs noted. Elevated BUN/Cr; GFR (42). Nutrition recommendations listed. RD to follow. Nutrition Risk:  [] High  [] Moderate [x]  Low    SUBJECTIVE/OBJECTIVE:   Pt transferred from  to [facility] on 1/29/2018 after being admitted for SOB and CP. PMHx includes CKD, CAD/CABG, CHF. Denies having any food allergies and wt was stable PTA. -140 lb, but wt fluctuates d/t fluid retention. SLP recommended Mech Soft/thin on (1/29). Pt has a good appetite and consuming >75% of meals. Observed 100% of lunch consumed today during visit. Will monitor.     Information Obtained from:    [x] Chart Review   [x] Patient   [] Family/Caregiver   [x] Nurse/Physician   [] Interdisciplinary Meeting/Rounds      Diet: Mech Soft Cardiac Diet  Medications: [x] Reviewed    Allergies: [x] Reviewed   Patient Active Problem List   Diagnosis Code    Dyslipidemia E78.5    Coronary artery disease of native artery of native heart with stable angina pectoris ([facility] [address] I25.118    Cardiomyopathy I42.9    Atrial fibrillation I48.91    Sick sinus syndrome I49.5    S/P CABG x 2 Z95.1    Atherosclerotic NAHED (renal artery stenosis), bilateral (HCC) I70.1    Chronic systolic congestive heart failure (HCC) I50.22    Essential hypertension I10    Anxiety F41.9    Statin intolerance Z78.9    Osteoarthritis of both knees M17.0    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    Syncope and collapse R55    SOB (shortness of breath) on exertion R06.02    Pleural effusion, right J90    Abnormal chest CT R93.8    Aortic stenosis, moderate I35.0       Past Medical History:   Diagnosis Date    Atrial fibrillation     CHADS score 3  (+CHF, +HTN, +AGE, -DM, -CVA)    CABG     2008   LIMA - LAD,   SVG - RCA    Cardiomyopathy     EF 30-35% (ECHO 6/14)    Coronary artery disease     Dyslipidemia     Hypertension     Hypothyroid     Pacemaker     Peripheral vascular disease     Renal artery stenosis     bilateral stents    Sick sinus syndrome       Labs:    Lab Results   Component Value Date/Time    Sodium 136 02/05/2018 12:25 PM    Potassium 4.2 02/05/2018 12:25 PM    Chloride 98 02/05/2018 12:25 PM    CO2 30 02/05/2018 12:25 PM    Anion gap 8 02/05/2018 12:25 PM    Glucose 80 02/05/2018 12:25 PM    BUN 32 02/05/2018 12:25 PM    Creatinine 1.56 02/05/2018 12:25 PM    Calcium 9.1 02/05/2018 12:25 PM    Magnesium 2.4 07/08/2017 10:16 PM    Phosphorus 3.4 03/01/2011 03:35 AM    Albumin 2.9 01/28/2018 05:20 AM     Anthropometrics: BMI (calculated): 19.8  Last 3 Recorded Weights in this Encounter    01/29/18 1926 02/06/18 1320   Weight: 66.2 kg (146 lb) 61 kg (134 lb 6.4 oz)      Ht Readings from Last 1 Encounters:   01/29/18 5' 9\" (1.753 m)     No data found.       IBW: 146 lb %IBW: 91% UBW: 138-140 lb     [] Weight Loss  [] Weight Gain  [] Weight Stable  [x] New wt n/a on record    Estimated Nutrition Needs: [x] MSJ RMR: 1329 Kcal  Calories: 7991-0324 kcal Based on:   [x] Actual BW    Protein:   70-80g Based on:   [x] Actual BW    Fluid:       8346-4880 ml Based on:   [x] Actual BW        Nutrition Problems Identified:   [] Suboptimal PO intake   [] Food Allergies  [x] Difficulty chewing/swallowing/poor dentition  [] Constipation/Diarrhea   [] Nausea/Vomiting   [] None  [] Other:     Plan:   [x] Therapeutic Diet  []  Obtained/adjusted food preferences/tolerances and/or snacks options   []  Supplements added   [] Occupational therapy following for feeding techniques  []  HS snack added   [x]  Modify diet texture   []  Modify diet for food allergies   []  Educate patient   []  Assist with menu selection   [x]  Monitor PO intake on meal rounds   [x]  Continue inpatient monitoring and intervention   [x]  Participated in discharge planning/Interdisciplinary rounds/Team meetings   []  Other:     Education Needs:   [] Not appropriate for teaching at this time due to:   [x] Identified and addressed    Nutrition Monitoring and Evaluation:  [x] Continue ongoing monitoring and intervention  [] Other    Essence Hodgkins

## 2018-02-06 NOTE — ROUTINE PROCESS
Bedside and Verbal shift change report given to andrade Sharma Res, rn (oncoming nurse) by Jody Rubin (offgoing nurse). Report included the following information SBAR, Kardex and MAR.  Hourly rounds

## 2018-02-06 NOTE — ROUTINE PROCESS
Bedside shift change report given to Kristi Hinson LPN (oncoming nurse) by Sami Sandra LPN   (offgoing nurse). Report included the following information SBAR, Kardex and Recent Results.

## 2018-02-07 NOTE — ROUTINE PROCESS
Bedside shift change report given to Kiesha Cardona LPN (oncoming nurse) by Dimple Wood LPN   (offgoing nurse). Report included the following information SBAR, Kardex and Recent Results.

## 2018-02-07 NOTE — DISCHARGE INSTRUCTIONS
Shortness of Breath: Care Instructions  Your Care Instructions  Shortness of breath has many causes. Sometimes conditions such as anxiety can lead to shortness of breath. Some people get mild shortness of breath when they exercise. Trouble breathing also can be a symptom of a serious problem, such as asthma, lung disease, emphysema, heart problems, and pneumonia. If your shortness of breath continues, you may need tests and treatment. Watch for any changes in your breathing and other symptoms. Follow-up care is a key part of your treatment and safety. Be sure to make and go to all appointments, and call your doctor if you are having problems. It's also a good idea to know your test results and keep a list of the medicines you take. How can you care for yourself at home? · Do not smoke or allow others to smoke around you. If you need help quitting, talk to your doctor about stop-smoking programs and medicines. These can increase your chances of quitting for good. · Get plenty of rest and sleep. · Take your medicines exactly as prescribed. Call your doctor if you think you are having a problem with your medicine. · Find healthy ways to deal with stress. ¨ Exercise daily. ¨ Get plenty of sleep. ¨ Eat regularly and well. When should you call for help? Call 911 anytime you think you may need emergency care. For example, call if:  ? · You have severe shortness of breath. ? · You have symptoms of a heart attack. These may include:  ¨ Chest pain or pressure, or a strange feeling in the chest.  ¨ Sweating. ¨ Shortness of breath. ¨ Nausea or vomiting. ¨ Pain, pressure, or a strange feeling in the back, neck, jaw, or upper belly or in one or both shoulders or arms. ¨ Lightheadedness or sudden weakness. ¨ A fast or irregular heartbeat. After you call 911, the  may tell you to chew 1 adult-strength or 2 to 4 low-dose aspirin. Wait for an ambulance. Do not try to drive yourself.    ?Call your doctor now or seek immediate medical care if:  ? · Your shortness of breath gets worse or you start to wheeze. Wheezing is a high-pitched sound when you breathe. ? · You wake up at night out of breath or have to prop your head up on several pillows to breathe. ? · You are short of breath after only light activity or while at rest.   ? Watch closely for changes in your health, and be sure to contact your doctor if:  ? · You do not get better over the next 1 to 2 days. Where can you learn more? Go to http://mitra-shaw.info/. Enter S780 in the search box to learn more about \"Shortness of Breath: Care Instructions. \"  Current as of: May 12, 2017  Content Version: 11.4  © 3465-8763 [employer]. Care instructions adapted under license by [employer] (which disclaims liability or warranty for this information). If you have questions about a medical condition or this instruction, always ask your healthcare professional. [address] any warranty or liability for your use of this information.

## 2018-02-07 NOTE — ROUTINE PROCESS
Bedside and Verbal shift change report given to andrade Lares rn (oncoming nurse) by corina Lewis lpn (offgoing nurse). Report included the following information SBAR, Kardex and MAR.  Hourly rounds

## 2018-02-07 NOTE — DISCHARGE SUMMARY
Miguelina Aliceaolucguillermo 96BLAINE  MR#: [id number]  : 11/10/1930  ACCOUNT #: [de-identified]   ADMIT DATE: 2018  DISCHARGE DATE:     DISCHARGE DIAGNOSES:  1. He is an 60-year-old patient being discharged from [address] [place] [address],[address]. He had been admitted here for problems of severe dyspnea with exertion. Echocardiogram showed a preserved ejection fraction, but chest x-ray suggested pulmonary fibrosis. 2.  Dysphagia with aspiration. 3.  Gastroesophageal reflux, originally treated with Reglan, but refused discharge on that. 4.  Atrial fibrillation. HOSPITAL COURSE:  The patient on admission was somewhat dyspneic, but improved significantly with adjustments of his medication. We added Reglan to his regime. We restarted his Lasix that he had been on and continued his anticoagulation. DISCHARGE MEDICATIONS:  Include Norvasc 5 mg a day, Eliquis 5 mg twice a day, Tussionex 5 mL as needed for cough, Lasix 20 mg once or twice a day depending upon the amount of edema that he has, Mucinex 600 mg every 12 hours, Imdur 30 mg once a day, Toprol-XL 50 mg once a day, Nitrostat 0.4 mg p.r.n., Protonix 40 mg a day, Ranexa 500 mg 2 times a day, and Spiriva once at night time. LABORATORY STUDIES:  Showed a hemoglobin of 12.5 grams. Electrolytes showed a creatinine of 1.56 with a BUN of 32. FOLLOWUP:  He is to follow up with his primary care doctor in 10 days.     DISPOSITION:  home      MD YENI Valentine/RN  D: 2018 11:25     T: 2018 14:20  JOB #: [id number]

## 2018-02-13 NOTE — COMMUNICATION BODY
Subjective:      Maxwell Toscano is in the office today for cardiac reevaluation. He is an 60-year-old man that has a history of hypertension, coronary artery disease, dyslipidemia, prior coronary bypass grafting, cardiomyopathy, atrial fibrillation, pacemaker, renal artery stenosis, celiac artery stenosis and congestive heart failure. The patient had prior coronary bypass grafting in 2008. Prior to his coronary bypass surgery, he was experiencing primarily easy fatigability. He has had repeat cardiac catheterizations since the time of his surgery, the last of which was done in 2016. At that time, medical therapy was advised. He had a nuclear stress test on 9/27/2017 that did not show evidence for ischemia and his EF was normal     The patient had recently experienced increasing episodes of chest discomfort. . He has  been complaining of postprandial nausea and pain. He had a recent Barium speech study as well as a UGI/barium swallow with results in chart. A duplex scan of the abdominal vasculature was ordered . 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 Vascular surgery has evaluated and feels intervention not required. He had his PPM interrogated the day prior to this evaluation. Some adjustment was done and was possibly a decrease in activity function and increase in pacing rate. He related that his breathing is not as good in cold weather. He has had some rare chest tightness when he is \"doing things\".             Patient Active Problem List    Diagnosis Date Noted    Celiac artery stenosis ([facility] 01/10/2018    Osteoarthritis of both knees 08/20/2017    Statin intolerance 05/03/2017    Essential hypertension 02/27/2017    Anxiety 02/27/2017    Chronic systolic congestive heart failure ([address] 01/25/2017    S/P CABG x 2 08/25/2015    Atherosclerotic NAHED (renal artery stenosis), bilateral ([address] 08/25/2015    Dyslipidemia     Coronary artery disease of native artery of native heart with stable angina pectoris (HCC)     Cardiomyopathy     Atrial fibrillation     Sick sinus syndrome      Current Outpatient Prescriptions   Medication Sig Dispense Refill    isosorbide mononitrate ER (IMDUR) 30 mg tablet Take 1 Tab by mouth daily. 30 Tab 6    amLODIPine (NORVASC) 5 mg tablet Take 5 mg by mouth daily.  nitroglycerin (NITROSTAT) 0.3 mg SL tablet 1 Tab by SubLINGual route every five (5) minutes as needed for Chest Pain (up to 3 total 1 every 5 min). 1 Bottle 2    pantoprazole (PROTONIX) 40 mg tablet TK 1 T PO D 20 TO 30 MINUTES BEFORE A MEAL  2    metoprolol succinate (TOPROL XL) 50 mg XL tablet Take 50 mg by mouth daily.  LORazepam (ATIVAN) 0.5 mg tablet TK 1 T PO  BID PRN 25 Tab 1    furosemide (LASIX) 40 mg tablet Take 40 mg by mouth daily.  apixaban (ELIQUIS) 5 mg tablet Take 5 mg by mouth two (2) times a day.  enalapril (VASOTEC) 20 mg tablet Take 20 mg by mouth daily. Allergies   Allergen Reactions    Beta-Blockers (Beta-Adrenergic Blocking Agts) Drowsiness    Penicillins Swelling    Statins-Hmg-Coa Reductase Inhibitors Drowsiness     Past Medical History:   Diagnosis Date    Atrial fibrillation     CHADS score 3  (+CHF, +HTN, +AGE, -DM, -CVA)    CABG     2008   LIMA - LAD,   SVG - RCA    Cardiomyopathy     EF 30-35% (ECHO 6/14)    Coronary artery disease     Dyslipidemia     Hypertension     Hypothyroid     Pacemaker     Peripheral vascular disease     Renal artery stenosis     bilateral stents    Sick sinus syndrome      Past Surgical History:   Procedure Laterality Date    HX CORONARY ARTERY BYPASS GRAFT      2008   LIMA - LAD,   SVG - RCA     No family history on file.   History   Smoking Status    Former Smoker   Smokeless Tobacco    Never Used          Review of Systems, additional:  Constitutional: negative  Eyes: negative  Respiratory: negative  Cardiovascular: positive for chest pressure/discomfort, fatigue, dyspnea on exertion  Gastrointestinal: negative  Musculoskeletal:positive for arthralgias  Neurological: negative  Behvioral/Psych: negative  Endocrine: negative  ENT: negative    Objective:     Visit Vitals    /80    Pulse 66    Ht 5' 9\" (1.753 m)    Wt 146 lb (66.2 kg)    SpO2 93%    BMI 21.56 kg/m2     General:  alert, cooperative, no distress   Chest Wall: inspection normal - no chest wall deformities or tenderness, respiratory effort normal   Lung: clear to auscultation bilaterally   Heart:  normal rate and regular rhythm, no murmurs noted   Abdomen: soft, non-tender. Bowel sounds normal. No masses,  no organomegaly   Extremities: extremities normal, atraumatic, no cyanosis or edema Skin: no rashes   Neuro: alert, oriented, normal speech, no focal findings or movement disorder noted         Assessment/Plan:       ICD-10-CM ICD-9-CM    1. Renal artery stenoses  440.1    2. Chronic atrial fibrillation ([facility] on Eliquis for stroke prevention and BB for rate control. Stable. I48.2 427.31    3. Other cardiomyopathy ([facility] EF by nuclear scanning 09/2015 was 60%, EF was 30-35% by Echo 06/2014 I42.8     4. Chronic systolic congestive heart failure (HCC) I50.22 428.22      428.0    5. Coronary artery disease of native artery of native heart with stable angina pectoris ([facility] Lexiscan myoview done 9/25/2017 No convincing evidence for ischemia and EF 65%. I25.118 414.01      413.9    6. Essential hypertension, controlled in office today, complaining of fatigue. I10 401.9    7. Sick sinus syndrome I49.5 427.81    8. Dyslipidemia E78.5 272.4    9. S/P CABG x 2 , 2008, LIMA-LAD, SVG-RCA . RT 6 weeks. .  Z95.1 V45.81    10. Statin intolerance Z78.9 995.27    11. Osteoarthritis of both knees, unspecified osteoarthritis type M17.0 715.96    12     Nausea and pain, occurs after eating, duplex of abdominal vasculature; greater than 70% celiac artery. Vascular surgery has evaluated.

## 2018-02-19 NOTE — MR AVS SNAPSHOT
[address] [facility] 
 
 
 [address] [address] 
[phone] Patient: Annalisa Puga MRN: [id number] Visit Information Date & Time Provider Department Dept. Phone Encounter #  
 2/19/2018  9:45 AM Arturo Martell. Timothy Gupta Specialist at [facility] [phone] Follow-up Instructions Return in about 2 months (around 4/19/2018). Your Appointments 4/30/2018 10:00 AM  
Follow Up with Brenda Badillo MD  
Cardio Specialist [facility] Appt Note: 2 months [address]  
  
   
 [address] Upcoming Health Maintenance Date Due ZOSTER VACCINE AGE 60> 9/10/1990 GLAUCOMA SCREENING Q2Y 11/10/1995 Pneumococcal 65+ Low/Medium Risk (1 of 2 - PCV13) 11/10/1995 MEDICARE YEARLY EXAM 11/10/1995 DTaP/Tdap/Td series (2 - Td) 9/10/2024 Allergies as of 2/19/2018  Review Complete On: 2/19/2018 By: Ari Shannon RN Severity Noted Reaction Type Reactions Beta-blockers (Beta-adrenergic Blocking Agts)  08/24/2015    Drowsiness Penicillins  06/13/2014    Swelling Statins-hmg-coa Reductase Inhibitors  08/24/2015    Drowsiness Current Immunizations  Reviewed on 1/29/2018 Name Date Influenza Vaccine 10/1/2017 Tdap 9/10/2014  9:38 PM  
  
 Not reviewed this visit Vitals BP Pulse Height(growth percentile) Weight(growth percentile) SpO2 BMI  
 149/72 66 5' 9\" (1.753 m) 141 lb (64 kg) 93% 20.82 kg/m2 Smoking Status Former Smoker BMI and BSA Data Body Mass Index Body Surface Area  
 20.82 kg/m 2 1.77 m 2 Preferred Pharmacy Pharmacy Name Phone [place], [address] [place] [address] [phone] Your Updated Medication List  
  
   
This list is accurate as of: 2/19/18 10:14 AM.  Always use your most recent med list. amLODIPine 5 mg tablet Commonly known as:  Kelley Peach Take 1 Tab by mouth daily. apixaban 5 mg tablet Commonly known as:  Thomas Matson Take 1 Tab by mouth two (2) times a day. chlorpheniramine-HYDROcodone 10-8 mg/5 mL suspension Commonly known as:  Humberto Patel Take 5 mL by mouth nightly as needed for Cough. Max Daily Amount: 5 mL. enalapril 20 mg tablet Commonly known as:  Earlene Spencer Take 20 mg by mouth daily. furosemide 20 mg tablet Commonly known as:  LASIX One pill once or twice a day  
  
 guaiFENesin  mg ER tablet Commonly known as:  Abelino & Abelino Take 1 Tab by mouth every twelve (12) hours. isosorbide mononitrate ER 30 mg tablet Commonly known as:  IMDUR Take 1 Tab by mouth daily. LORazepam 1 mg tablet Commonly known as:  ATIVAN Take 1 Tab by mouth every six (6) hours as needed for Anxiety. Max Daily Amount: 4 mg.  
  
 metoprolol succinate 50 mg XL tablet Commonly known as:  TOPROL XL Take 1 Tab by mouth daily. nitroglycerin 0.4 mg SL tablet Commonly known as:  NITROSTAT  
1 Tab by SubLINGual route every five (5) minutes as needed for Chest Pain (up to 3 total 1 every 5 min). pantoprazole 40 mg tablet Commonly known as:  PROTONIX Take 1 Tab by mouth Daily (before breakfast). ranolazine  mg SR tablet Commonly known as:  RANEXA Take 1 Tab by mouth two (2) times a day. Indications: Chronic Stable Angina Pectoris  
  
 tiotropium 18 mcg inhalation capsule Commonly known as:  Anibal Alcocerkeman Take 1 Cap by inhalation every twenty-four (24) hours. Follow-up Instructions Return in about 2 months (around 4/19/2018). Introducing hospitals & HEALTH SERVICES! Dear Cuba Louie: Thank you for requesting a MyChart account.   Our records indicate that you already have an active [employer] account. You can access your account anytime at [email or web address] Did you know that you can access your hospital and ER discharge instructions at any time in [employer]? You can also review all of your test results from your hospital stay or ER visit. Additional Information If you have questions, please visit the Frequently Asked Questions section of the [employer] website at [email or web address] Remember, [employer]olver is NOT to be used for urgent needs. For medical emergencies, dial 911. Now available from your iPhone and Android! Please provide this summary of care documentation to your next provider. Your primary care clinician is listed as Arturo Garcia. If you have any questions after today's visit, please call [phone].

## 2018-02-19 NOTE — PROGRESS NOTES
Subjective:   Mr. Leonela Tamayo is here for hospital follow up.      This is an 49-year-old man that has a history of hypertension, coronary artery disease, dyslipidemia, prior coronary bypass grafting, cardiomyopathy, atrial fibrillation, celiac artery stenosis, pacemaker, renal artery stenosis and congestive heart failure.      The patient had prior coronary bypass grafting in 2008. Pinedo Cunning to his coronary bypass surgery, he was experiencing primarily easy fatigability.  He has had repeat cardiac catheterizations since the time of his surgery, the last of which was done in 2016. At that time, medical therapy was advised. He had a nuclear stress test on 9/27/2017 that did not show evidence for ischemia and his EF was normal. His most recent echo was Jan 2018 with an EF of 55% with mild to moderate aortic stenosis with mean gradient 14 mmHg and GERBER 1cm, also had severe TR and mild-moderate pulmonic insufficiency. He had a stay at [facility] for presyncope and profound weakness and was discharged 01/21/18. Of note during his hospitalization, he was seen by vascular surgery for celiac artery stenosis. They deemed that his symptomology was unlikely related to mesenteric ischemia. Subsequently he went to rehab for a one week stay. He states that he is doing much better since his hospitalization. He ambulates with a cane or walker without falls. He denies chest pain. He had some epigastric pain on and off for quite some time, that has improved since he started Pantoprazole. He denies chest pain, dizziness, lightheadedness, syncope, dyspnea, orthopnea, LE edema or PND. He takes Lasix PRN for swelling, but has not had to take it for quite some time. He sleeps on one pillow at night. Denies any melena or BRPR on Eliquis. Patient's cardiac risk factors are dyslipidemia, male gender, hypertension.         Patient Active Problem List    Diagnosis Date Noted    Aortic stenosis, moderate 01/28/2018    Abnormal chest CT 01/27/2018    Pleural effusion, right 01/26/2018    SOB (shortness of breath) on exertion 01/25/2018    Syncope and collapse 01/19/2018    Celiac artery stenosis (HCC) 01/10/2018    Osteoarthritis of both knees 08/20/2017    Statin intolerance 05/03/2017    Essential hypertension 02/27/2017    Anxiety 02/27/2017    Chronic systolic congestive heart failure ([address] 01/25/2017    S/P CABG x 2 08/25/2015    Atherosclerotic NAHED (renal artery stenosis), bilateral (HCC) 08/25/2015    Dyslipidemia     Coronary artery disease of native artery of native heart with stable angina pectoris (HCC)     Cardiomyopathy     Atrial fibrillation     Sick sinus syndrome      Current Outpatient Prescriptions   Medication Sig Dispense Refill    amLODIPine (NORVASC) 5 mg tablet Take 1 Tab by mouth daily. 60 Tab 0    apixaban (ELIQUIS) 5 mg tablet Take 1 Tab by mouth two (2) times a day. 60 Tab 0    furosemide (LASIX) 20 mg tablet One pill once or twice a day 60 Tab 0    isosorbide mononitrate ER (IMDUR) 30 mg tablet Take 1 Tab by mouth daily. 60 Tab 0    metoprolol succinate (TOPROL XL) 50 mg XL tablet Take 1 Tab by mouth daily. 60 Tab 0    nitroglycerin (NITROSTAT) 0.4 mg SL tablet 1 Tab by SubLINGual route every five (5) minutes as needed for Chest Pain (up to 3 total 1 every 5 min). 60 Tab 0    pantoprazole (PROTONIX) 40 mg tablet Take 1 Tab by mouth Daily (before breakfast). 60 Tab 0    ranolazine ER (RANEXA) 500 mg SR tablet Take 1 Tab by mouth two (2) times a day. Indications: Chronic Stable Angina Pectoris 60 Tab 0    chlorpheniramine-HYDROcodone (TUSSIONEX) 10-8 mg/5 mL suspension Take 5 mL by mouth nightly as needed for Cough. Max Daily Amount: 5 mL. 60 mL 0    tiotropium (SPIRIVA) 18 mcg inhalation capsule Take 1 Cap by inhalation every twenty-four (24) hours. 30 Cap 0    guaiFENesin ER (MUCINEX) 600 mg ER tablet Take 1 Tab by mouth every twelve (12) hours.  60 Tab 0    LORazepam (ATIVAN) 1 mg tablet Take 1 Tab by mouth every six (6) hours as needed for Anxiety. Max Daily Amount: 4 mg. 30 Tab 0    enalapril (VASOTEC) 20 mg tablet Take 20 mg by mouth daily. Allergies   Allergen Reactions    Beta-Blockers (Beta-Adrenergic Blocking Agts) Drowsiness    Penicillins Swelling    Statins-Hmg-Coa Reductase Inhibitors Drowsiness     Past Medical History:   Diagnosis Date    Atrial fibrillation     CHADS score 3  (+CHF, +HTN, +AGE, -DM, -CVA)    CABG     2008   LIMA - LAD,   SVG - RCA    Cardiomyopathy     EF 30-35% (ECHO 6/14)    Coronary artery disease     Dyslipidemia     Hypertension     Hypothyroid     Pacemaker     Peripheral vascular disease     Renal artery stenosis     bilateral stents    Sick sinus syndrome      Past Surgical History:   Procedure Laterality Date    HX CORONARY ARTERY BYPASS GRAFT      2008   LIMA - LAD,   SVG - RCA     No family history on file. History   Smoking Status    Former Smoker   Smokeless Tobacco    Never Used          Review of Systems, additional:  Constitutional: negative  Eyes: negative  Respiratory: negative for dyspnea on exertion  Cardiovascular: per HPI  Gastrointestinal: negative for nausea and vomiting  Musculoskeletal:negative  Neurological: negative  Behvioral/Psych: negative  Endocrine: negative  ENT: negative    Objective:     Visit Vitals    /72    Pulse 66    Ht 5' 9\" (1.753 m)    Wt 141 lb (64 kg)    SpO2 93%    BMI 20.82 kg/m2     General:  alert, cooperative, no distress, appears stated age   Chest Wall: inspection normal - no chest wall deformities or tenderness, respiratory effort normal   Lung: clear to auscultation bilaterally   Heart:  irregularly irregular rhythm with rate 66. Grade II/VI SERENE best heard RSB   Abdomen: soft, non-tender.  Bowel sounds normal. No masses,  no organomegaly   Extremities: extremities normal, atraumatic, no cyanosis or edema Skin: no rashes   Neuro: alert, oriented, normal speech, no focal findings or movement disorder noted         Assessment/Plan:         ICD-10-CM ICD-9-CM    1. Coronary artery disease of native artery of native heart with stable angina pectoris ([facility]- -had CABGx2 in 2008. Nuclear stress in Sept 2017 without evidence of ischemia. No anginal symptoms, continue with Toprol. Not on statin due to intolerance. He is anticoagulated on Eliquis. I25.118 414.01      413.9    2. Ischemic cardiomyopathy--improved with normal EF on echo in January 2018. I25.5 414.8    3. Chronic atrial fibrillation ([facility]- -controlled with rate 66 in office today. Continue Eliquis and Toprol. I48.2 427.31    4. Sick sinus syndrome-- PPM in place I49.5 427.81    5. Chronic systolic congestive heart failure ([facility]-- no evidence of volume overload on exam. Continue with PRN Lasix for swelling and daily weights, CHF education discussed in detail with patient and wife. I50.22 428.22      428.0    6. Essential hypertension-- continue Toprol I10 401.9    7. Celiac artery stenosis ([facility]- -seen by vascular surgery, no plans for OR. I77.4 447.4    8. Aortic stenosis, moderate-- follow up echo in 3-6 months. Stable on exam today.  I35.0 424.1

## 2018-02-19 NOTE — PROGRESS NOTES
1. Have you been to the ER, urgent care clinic since your last visit? Hospitalized since your last visit? No    2. Have you seen or consulted any other health care providers outside of the [address] [place] [address] since your last visit? Include any pap smears or colon screening.  No

## 2018-03-21 NOTE — TELEPHONE ENCOUNTER
Patient Susan Beaver would like to discontinue taking Eliquis and start taking Warfarin, he states cost as a factor for the request. Patient was last seen by TATIANA Geller Fearing on 2/19/18. Please call and advise.

## 2018-03-22 NOTE — TELEPHONE ENCOUNTER
Patient made aware that Dr. Jose Mares was at [facility] yesterday and I will call patient after I talk with Dr. Jose Mares. Pt verbalized understanding at this time.

## 2018-03-23 NOTE — TELEPHONE ENCOUNTER
Verbal order and read back per Trixie Hamman, MD    Start Coumadin 2.5mg daily - needs INR next Wednesday     Patient aware

## 2018-03-28 NOTE — PROGRESS NOTES
Patient decided he does not want to be on Coumadin - requesting to switch back to Eliquis     Verbal order and read back per Dr. Jarred Tran     Stop Coumadin   Start Eliquis 5mg twice per day tomorrow

## 2018-03-28 NOTE — MR AVS SNAPSHOT
[address] [facility] [address] [address] 
[phone] Patient: Brayden Moser MRN: [id number] Visit Information Date & Time Provider Department Dept. Phone Encounter #  
 3/28/2018  9:40 AM [address] Jacquelin Allison Specialist at [place] [address] [id number] Your Appointments 4/26/2018  1:00 PM  
Follow Up with Janine Rutledge MD  
Cardio Specialist at [facility] Appt Note: 2 months; r/s provider out of office [facility] [address] [address] Upcoming Health Maintenance Date Due ZOSTER VACCINE AGE 60> 9/10/1990 GLAUCOMA SCREENING Q2Y 11/10/1995 Pneumococcal 65+ Low/Medium Risk (1 of 2 - PCV13) 11/10/1995 MEDICARE YEARLY EXAM 3/14/2018 DTaP/Tdap/Td series (2 - Td) 9/10/2024 Allergies as of 3/28/2018  Review Complete On: 2/19/2018 By: Mirna Vaca. Saira Escalante PA-C Severity Noted Reaction Type Reactions Beta-blockers (Beta-adrenergic Blocking Agts)  08/24/2015    Drowsiness Penicillins  06/13/2014    Swelling Statins-hmg-coa Reductase Inhibitors  08/24/2015    Drowsiness Current Immunizations  Reviewed on 1/29/2018 Name Date Influenza Vaccine 10/1/2017 Tdap 9/10/2014  9:38 PM  
  
 Not reviewed this visit You Were Diagnosed With   
  
 Codes Comments Chronic atrial fibrillation (HCC)    -  Primary ICD-10-CM: R29.2 ICD-9-CM: 427.31 Vitals Smoking Status Former Smoker Preferred Pharmacy Pharmacy Name Phone [id number] [place], [address] [facility] [phone] Your Updated Medication List  
  
   
This list is accurate as of 3/28/18 10:25 AM.  Always use your most recent med list. amLODIPine 5 mg tablet Commonly known as:  Dennis Javier Take 1 Tab by mouth daily. chlorpheniramine-HYDROcodone 10-8 mg/5 mL suspension Commonly known as:  Ariana Marvin Take 5 mL by mouth nightly as needed for Cough. Max Daily Amount: 5 mL. enalapril 20 mg tablet Commonly known as:  Leonela Mattock Take 20 mg by mouth daily. furosemide 20 mg tablet Commonly known as:  LASIX One pill once or twice a day  
  
 guaiFENesin  mg ER tablet Commonly known as:  Abelino & Abelino Take 1 Tab by mouth every twelve (12) hours. isosorbide mononitrate ER 30 mg tablet Commonly known as:  IMDUR Take 1 Tab by mouth daily. LORazepam 1 mg tablet Commonly known as:  ATIVAN Take 1 Tab by mouth every six (6) hours as needed for Anxiety. Max Daily Amount: 4 mg.  
  
 metoprolol succinate 50 mg XL tablet Commonly known as:  TOPROL XL Take 1 Tab by mouth daily. nitroglycerin 0.4 mg SL tablet Commonly known as:  NITROSTAT  
1 Tab by SubLINGual route every five (5) minutes as needed for Chest Pain (up to 3 total 1 every 5 min). pantoprazole 40 mg tablet Commonly known as:  PROTONIX Take 1 Tab by mouth Daily (before breakfast). ranolazine  mg SR tablet Commonly known as:  RANEXA Take 1 Tab by mouth two (2) times a day. Indications: Chronic Stable Angina Pectoris  
  
 tiotropium 18 mcg inhalation capsule Commonly known as:  Devota Nighat Take 1 Cap by inhalation every twenty-four (24) hours. Description Verbal order and read back per Dr. Jennetta Frankel Stop Coumadin March 2018 Details Sun Mon Tue Wed Thu Fri Sat  
      1  
  
  
  
   2  
  
  
  
   3  
  
  
  
  
  4  
  
  
  
   5  
  
  
  
   6  
  
  
  
   7  
  
  
  
   8  
  
  
  
   9  
  
  
  
   10  
  
  
  
  
  11  
  
  
  
   12  
  
  
  
   13  
  
  
  
   14  
  
  
  
   15  
  
  
  
   16  
  
  
  
   17  
  
  
  
  
  18  
  
  
  
   19  
  
  
  
   20  
  
  
  
   21  
  
  
  
   22  
  
  
  
   23  
  
  
  
 24  
  
  
  
  
  25  
  
  
  
   26  
  
  
  
   27  
  
  
  
   28 2.5 mg  
See details 29  
  
  
  
   30  
  
  
  
   31  
  
  
  
  
 Date Details 03/28 This INR check INR: 1.2! Date of next INR:  3/28/2018 How to take your warfarin dose To take:  2.5 mg Take one of the 2.5 mg tablets. We Performed the Following AMB POC PT/INR [40721 CPT(R)] Introducing [facility] SERVICES! Dear Shahriar Burton: Thank you for requesting a [employer] account. Our records indicate that you already have an active [employer] account. You can access your account anytime at [email or web address] Did you know that you can access your hospital and ER discharge instructions at any time in [employer]? You can also review all of your test results from your hospital stay or ER visit. Additional Information If you have questions, please visit the Frequently Asked Questions section of the [employer] website at [email or web address] Remember, [employer] is NOT to be used for urgent needs. For medical emergencies, dial 911. Now available from your iPhone and Android! Please provide this summary of care documentation to your next provider. Your primary care clinician is listed as Rita River. If you have any questions after today's visit, please call [phone].

## 2018-04-10 NOTE — ED NOTES
I have reviewed discharge instructions with the patient. The patient verbalized understanding. Patient armband removed and given to patient to take home. Patient was informed of the privacy risks if armband lost or stolen. Pt alert, oriented x4 and ambulatory out of ER in [facility] at this time. VSS.

## 2018-04-10 NOTE — ED PROVIDER NOTES
EMERGENCY DEPARTMENT HISTORY AND PHYSICAL EXAM    3:23 PM      Date: 4/10/2018  Patient Name: Rasheed Barth    History of Presenting Illness     Chief Complaint   Patient presents with    Fatigue         History Provided By: Patient and Patient's Wife    Chief Complaint: Fatigue - External distress  Duration: A few Months   Timing:  Worsening  Location: N/A  Quality: N/A  Severity: Moderate  Modifying Factors: Nothing improves or exacerbates  Associated Symptoms: Dizziness, CP, Day time somnolence       Additional History (Context): Rasheed Barth is a 80 y.o. male with hx of Cardiomyopathy, HTN, CAD, CABG, A-Fib, and Dyslipidemia who presents for evaluation of worsening fatigue and external distress that he states has been present for a few months. Patient notes that he feels restless and finds it difficult to concentrate during the day. He also states that he feels tired getting dressed, making the bed, and even eating and has had episodes of falling asleep while eating. He notes that he feels \"foggy, like the wicked witch of the [address]. I'm melting. \" Patient's wife reports that he falls asleep for about 3-4 hours during the day. He notes that he sleeps through the nights generally, so he does not understand why he is feeling restless. Patient reports that he has been feeling more tired with less exertion. He also reports some dizziness and chest pain that is inactive right now. Patient notes that he is not currently experiencing these symptoms, but decided to come to the ER since the symptoms are worsening. PCP: Salvatore Fabry, MD    Current Outpatient Prescriptions   Medication Sig Dispense Refill    apixaban (ELIQUIS) 5 mg tablet Take 1 Tab by mouth two (2) times a day. 60 Tab 5    amLODIPine (NORVASC) 5 mg tablet Take 1 Tab by mouth daily.  60 Tab 0    furosemide (LASIX) 20 mg tablet One pill once or twice a day 60 Tab 0    isosorbide mononitrate ER (IMDUR) 30 mg tablet Take 1 Tab by mouth daily. 60 Tab 0    metoprolol succinate (TOPROL XL) 50 mg XL tablet Take 1 Tab by mouth daily. 60 Tab 0    nitroglycerin (NITROSTAT) 0.4 mg SL tablet 1 Tab by SubLINGual route every five (5) minutes as needed for Chest Pain (up to 3 total 1 every 5 min). 60 Tab 0    pantoprazole (PROTONIX) 40 mg tablet Take 1 Tab by mouth Daily (before breakfast). 60 Tab 0    ranolazine ER (RANEXA) 500 mg SR tablet Take 1 Tab by mouth two (2) times a day. Indications: Chronic Stable Angina Pectoris 60 Tab 0    chlorpheniramine-HYDROcodone (TUSSIONEX) 10-8 mg/5 mL suspension Take 5 mL by mouth nightly as needed for Cough. Max Daily Amount: 5 mL. 60 mL 0    tiotropium (SPIRIVA) 18 mcg inhalation capsule Take 1 Cap by inhalation every twenty-four (24) hours. 30 Cap 0    guaiFENesin ER (MUCINEX) 600 mg ER tablet Take 1 Tab by mouth every twelve (12) hours. 60 Tab 0    LORazepam (ATIVAN) 1 mg tablet Take 1 Tab by mouth every six (6) hours as needed for Anxiety. Max Daily Amount: 4 mg. 30 Tab 0    enalapril (VASOTEC) 20 mg tablet Take 20 mg by mouth daily. Past History     Past Medical History:  Past Medical History:   Diagnosis Date    Atrial fibrillation     CHADS score 3  (+CHF, +HTN, +AGE, -DM, -CVA)    CABG     2008   LIMA - LAD,   SVG - RCA    Cardiomyopathy     EF 30-35% (ECHO 6/14)    Coronary artery disease     Dyslipidemia     Hypertension     Hypothyroid     Pacemaker     Peripheral vascular disease     Renal artery stenosis     bilateral stents    Sick sinus syndrome        Past Surgical History:  Past Surgical History:   Procedure Laterality Date    HX CORONARY ARTERY BYPASS GRAFT      2008   LIMA - LAD,   SVG - RCA       Family History:  History reviewed. No pertinent family history. Social History:  Social History   Substance Use Topics    Smoking status: Former Smoker    Smokeless tobacco: Never Used    Alcohol use No       Allergies:   Allergies   Allergen Reactions    Beta-Blockers (Beta-Adrenergic Blocking Agts) Drowsiness    Penicillins Swelling    Statins-Hmg-Coa Reductase Inhibitors Drowsiness         Review of Systems       Review of Systems   Constitutional: Positive for fatigue. Negative for chills and fever. HENT: Negative for ear pain and sore throat. Eyes: Negative for pain and visual disturbance. Respiratory: Negative for cough and shortness of breath. Cardiovascular: Positive for chest pain. Negative for palpitations. Gastrointestinal: Negative for abdominal pain, diarrhea, nausea and vomiting. Genitourinary: Negative for flank pain. Musculoskeletal: Negative for back pain and neck pain. Neurological: Positive for dizziness. Negative for syncope and headaches. Psychiatric/Behavioral: Negative for agitation. The patient is not nervous/anxious. +Daytime somnolence         Physical Exam     Visit Vitals    /79    Pulse 69    Temp 97.4 °F (36.3 °C)    Resp 18    SpO2 100%         Physical Exam   Constitutional: He is oriented to person, place, and time. He appears well-developed and well-nourished. HENT:   Head: Normocephalic and atraumatic. Mouth/Throat: Oropharynx is clear and moist.   Eyes: Pupils are equal, round, and reactive to light. No scleral icterus. Neck: Neck supple. No tracheal deviation present. Cardiovascular: Regular rhythm. No murmur heard. Pulmonary/Chest: Effort normal and breath sounds normal. No respiratory distress. Faint crackles in the bilateral lung sounds  Clear lungs bilaterally     Abdominal: Soft. There is no tenderness. Musculoskeletal: Normal range of motion. He exhibits no deformity. Neurological: He is alert and oriented to person, place, and time. No gross neuro deficit   Skin: Skin is warm and dry. No rash noted. He is not diaphoretic. Psychiatric: He has a normal mood and affect. Nursing note and vitals reviewed.         Diagnostic Study Results     Labs -  Labs Reviewed CBC WITH AUTOMATED DIFF - Abnormal; Notable for the following:        Result Value    HGB 12.6 (*)     MCHC 30.7 (*)     RDW 18.7 (*)     MPV 8.9 (*)     NEUTROPHILS 74 (*)     LYMPHOCYTES 10 (*)     MONOCYTES 14 (*)     ABS. LYMPHOCYTES 0.5 (*)     All other components within normal limits   METABOLIC PANEL, COMPREHENSIVE - Abnormal; Notable for the following:     BUN 29 (*)     Creatinine 1.35 (*)     BUN/Creatinine ratio 21 (*)     GFR est non-AA 50 (*)     Alk. phosphatase 140 (*)     Globulin 4.5 (*)     All other components within normal limits   URINALYSIS W/ RFLX MICROSCOPIC - Abnormal; Notable for the following:     Protein 300 (*)     All other components within normal limits   CARDIAC PANEL,(CK, CKMB & TROPONIN) - Abnormal; Notable for the following:     CK - MB 4.0 (*)     CK-MB Index 5.6 (*)     All other components within normal limits   NT-PRO BNP - Abnormal; Notable for the following:     NT pro-BNP 5222 (*)     All other components within normal limits   MAGNESIUM   URINE MICROSCOPIC ONLY       Radiologic Studies -   CT HEAD WO CONT   Final Result      XR CHEST PA LAT   Final Result        XR CHEST PALAT  Impression:     S/P CABG with cardiomegaly with signs of diffuse pulmonary vascular congestion  possibly from mild CHF/fluid overload developing since 1/25/2018. Small infiltrate or atelectasis left base also new. Small bilateral pleural effusions are likely chronic      Medical Decision Making   I am the first provider for this patient. I reviewed the vital signs, available nursing notes, past medical history, past surgical history, family history and social history. Vital Signs-Reviewed the patient's vital signs. EKG: Interpreted by the EP.  Time 1348  Ventricular pace rhythm  Left axis deviation, intervals appear normal  Grossly unchanged from prior  No acute ST elevation or depressions    Records Reviewed: Nursing Notes and Old Medical Records (Time of Review: 3:23 PM)    ED Course: Progress Notes, Reevaluation, and Consults:  ED Course   Value Comment By Time    On re-evaluation pt and family told about his non-specific proteinuria and will discuss with PCP. Daughter reports episode of brief speech difficulty last night and wife reports the pt has had a HA for the past 2 days. Pt denies current HA and has had no neuro deficits at all in ED. Agreed to preform CT Head WO, but pt states he does not want to be admitted and wants to follow-up with his PCP and cardiologist which I believe is reasonable. Kristen Valentin, DO 04/10 1646   WBC: 5.5 (Reviewed) Kristen Valentin, DO 04/10 1800       Provider Notes (Medical Decision Making):     DDX: Sleep disorder, depression, age-related changes, occult infection, CHF exacerbation, intracranial abnormality, electrolyte abnormality, metabolic disturbance. 80 y.o. male with noted past medical history who presented with c/o worsening fatigue over the last few months. Vitals were nml. The differential above was considered. Diagnostics notable for baseline BNP elevation of 5222. Will give small dose of IV Lasix due to mild interstitial edema on CXR and patient noncompliance with Lasix at home, baseline creatinine elevation and proteinuria which has been seen on other UA.     6:05 PM No acute findings on CT. No acute process found, nonspecific sxs that have been ongoing for months. Pt will follow closely with PCP for possible sleep testing, vitamin testing, I.e. B12, depression screening etc.      Patient has no new complaints, changes, or physical findings. Diagnostic studies were reviewed with the patient. Pt and/or family's questions and concerns were addressed. Care plan was outlined, including follow-up with PCP/specialist and return precautions were discussed. Patient is felt to be stable for discharge at this time. Diagnosis     Clinical Impression:   1. Daytime somnolence    2. History of CHF (congestive heart failure)    3. Proteinuria, unspecified type        Disposition: Discharge    Follow-up Information     Follow up With Details Comments UlBlake Montiel MD  On Monday as scheduled [address] [address] [phone]      [facility] EMERGENCY DEPT  If symptoms worsen [address] [facility] [address].  [phone]           Patient's Medications   Start Taking    No medications on file   Continue Taking    AMLODIPINE (NORVASC) 5 MG TABLET    Take 1 Tab by mouth daily. APIXABAN (ELIQUIS) 5 MG TABLET    Take 1 Tab by mouth two (2) times a day. CHLORPHENIRAMINE-HYDROCODONE (TUSSIONEX) 10-8 MG/5 ML SUSPENSION    Take 5 mL by mouth nightly as needed for Cough. Max Daily Amount: 5 mL. ENALAPRIL (VASOTEC) 20 MG TABLET    Take 20 mg by mouth daily. FUROSEMIDE (LASIX) 20 MG TABLET    One pill once or twice a day    GUAIFENESIN ER (MUCINEX) 600 MG ER TABLET    Take 1 Tab by mouth every twelve (12) hours. ISOSORBIDE MONONITRATE ER (IMDUR) 30 MG TABLET    Take 1 Tab by mouth daily. LORAZEPAM (ATIVAN) 1 MG TABLET    Take 1 Tab by mouth every six (6) hours as needed for Anxiety. Max Daily Amount: 4 mg. METOPROLOL SUCCINATE (TOPROL XL) 50 MG XL TABLET    Take 1 Tab by mouth daily. NITROGLYCERIN (NITROSTAT) 0.4 MG SL TABLET    1 Tab by SubLINGual route every five (5) minutes as needed for Chest Pain (up to 3 total 1 every 5 min). PANTOPRAZOLE (PROTONIX) 40 MG TABLET    Take 1 Tab by mouth Daily (before breakfast). RANOLAZINE ER (RANEXA) 500 MG SR TABLET    Take 1 Tab by mouth two (2) times a day. Indications: Chronic Stable Angina Pectoris    TIOTROPIUM (SPIRIVA) 18 MCG INHALATION CAPSULE    Take 1 Cap by inhalation every twenty-four (24) hours.    These Medications have changed    No medications on file   Stop Taking    No medications on file     _______________________________  Hannah [id number] acting as a scribe for and in the presence of Loulou Pearl, DO      April 10, 2018 at 3:23 PM       Provider Attestation:      I personally performed the services described in the documentation, reviewed the documentation, as recorded by the scribe in my presence, and it accurately and completely records my words and actions.  April 10, 2018 at 3:23 PM - Breta Vang DO

## 2018-04-10 NOTE — DISCHARGE INSTRUCTIONS
Please follow-up with your doctor regarding your excessive daytime somnolence and other concerns on Monday as scheduled. They can preform other testing including thyroid studies, look for vitamin deficiencies, screen you for depression, and schedule a sleep study if needed. Proteinuria: Care Instructions  Your Care Instructions    Proteinuria means that you have too much protein in your urine. This is usually caused by a kidney problem. Your body's blood passes through your kidneys. Normally, the kidneys remove waste from the blood. The waste then leaves the body in the urine. But they don't let protein leave the body. If your kidneys are not working well, they let too much protein get in your urine. A high level of protein in your urine is a sign that something is harming your kidneys. It may be puzzling to find out that you have a problem with your kidneys, because you probably don't feel different. Your doctor may do more tests to find out what is causing the protein to get into your urine. Possible causes include an infection or a medical condition, such as diabetes or heart disease. You may need regular urine tests in the future. You may be able to reduce the protein in your urine by getting exercise, eating a healthy diet, and taking medicine. Follow-up care is a key part of your treatment and safety. Be sure to make and go to all appointments, and call your doctor if you are having problems. It's also a good idea to know your test results and keep a list of the medicines you take. How can you care for yourself at home? · Take your medicines exactly as prescribed. Call your doctor if you think you are having a problem with your medicine. You will get more details on the specific medicines your doctor prescribes. · Work with your doctor and dietitian to set up a diet that will be healthy for you.  You may need to:  ¨ Eat a heart-healthy diet to keep the fat (cholesterol) in your blood under control. ¨ Eat a low-salt diet to keep your blood pressure at normal levels. ¨ Limit protein in your foods. ¨ Limit the amount of fluids you drink. · If you have diabetes, try to keep your blood sugar at normal or near-normal levels. ¨ Follow your diet. Eat a variety of foods. Spread carbohydrate throughout your meals. ¨ If your doctor recommends it, get more exercise. Walking is a good choice. Bit by bit, increase the amount you walk every day. Try for at least 30 minutes on most days of the week. ¨ Check your blood sugar as often as your doctor recommends. · Do not smoke. Smoking raises your risk of many health problems, including kidney damage. If you need help quitting, talk to your doctor about stop-smoking programs and medicines. These can increase your chances of quitting for good. · Do not take ibuprofen, naproxen, acetaminophen (Tylenol), or similar medicines, unless your doctor tells you to. These medicines may make kidney problems worse. · Check with your doctor before you take any herbal supplements or over-the-counter medicines. When should you call for help? Watch closely for changes in your health, and be sure to contact your doctor if:  ? · You do not get better as expected. Where can you learn more? Go to http://mitra-shaw.info/. Enter W215 in the search box to learn more about \"Proteinuria: Care Instructions. \"  Current as of: May 12, 2017  Content Version: 11.4  © 5221-5727 [employer]. Care instructions adapted under license by [employer] (which disclaims liability or warranty for this information). If you have questions about a medical condition or this instruction, always ask your healthcare professional. [address] any warranty or liability for your use of this information.

## 2018-04-10 NOTE — ED NOTES
I performed a brief evaluation, including history and physical, of the patient here in triage and I have determined that pt will need further treatment and evaluation from the main side ER physician. I have placed initial orders to help in expediting patients care. April 10, 2018 at 1:39 PM - Gordon Gottron, PA        There were no vitals taken for this visit.

## 2018-04-25 NOTE — ED PROVIDER NOTES
EMERGENCY DEPARTMENT HISTORY AND PHYSICAL EXAM    8:43 AM      Date: 4/25/2018  Patient Name: Nga Zuñiga    History of Presenting Illness     Chief Complaint   Patient presents with    Groin Pain     right    Dizziness         History Provided By: Patient    Chief Complaint: Groin pain  Duration: 3 Days  Timing:  Intermittent, worsening  Location: R groin   Quality: N/A  Severity: 8 out of 10  Modifying Factors: none reported  Associated Symptoms: nausea, dysuria, flank pain      Additional History (Context): Nga Zuñiga is a 80 y.o. male with HTN, A-fib, CAD, CABG, cardiomyopathy, pacemaker, dyslipidemia who presents per EMS with intermittent R sided groin pain x3 days. Pt denies feeling masses in the area. Notes nausea, mild dysuria, mild R flank pain, lightheadedness. Last BM 1 day ago, nml. Denies difficulty urinating, abd pain. Pt uses occasional etOH, past hx tobacco use. Hx prescription narcotics for knee pain. PCP: Callie Horn MD    Current Facility-Administered Medications   Medication Dose Route Frequency Provider Last Rate Last Dose    0.9% sodium chloride infusion  150 mL/hr IntraVENous CONTINUOUS Homer Weems MD         Current Outpatient Prescriptions   Medication Sig Dispense Refill    apixaban (ELIQUIS) 5 mg tablet Take 1 Tab by mouth two (2) times a day. 60 Tab 5    amLODIPine (NORVASC) 5 mg tablet Take 1 Tab by mouth daily. 60 Tab 0    furosemide (LASIX) 20 mg tablet One pill once or twice a day 60 Tab 0    isosorbide mononitrate ER (IMDUR) 30 mg tablet Take 1 Tab by mouth daily. 60 Tab 0    metoprolol succinate (TOPROL XL) 50 mg XL tablet Take 1 Tab by mouth daily. 60 Tab 0    nitroglycerin (NITROSTAT) 0.4 mg SL tablet 1 Tab by SubLINGual route every five (5) minutes as needed for Chest Pain (up to 3 total 1 every 5 min). 60 Tab 0    pantoprazole (PROTONIX) 40 mg tablet Take 1 Tab by mouth Daily (before breakfast).  60 Tab 0    ranolazine ER (RANEXA) 500 mg SR tablet Take 1 Tab by mouth two (2) times a day. Indications: Chronic Stable Angina Pectoris 60 Tab 0    chlorpheniramine-HYDROcodone (TUSSIONEX) 10-8 mg/5 mL suspension Take 5 mL by mouth nightly as needed for Cough. Max Daily Amount: 5 mL. 60 mL 0    tiotropium (SPIRIVA) 18 mcg inhalation capsule Take 1 Cap by inhalation every twenty-four (24) hours. 30 Cap 0    guaiFENesin ER (MUCINEX) 600 mg ER tablet Take 1 Tab by mouth every twelve (12) hours. 60 Tab 0    LORazepam (ATIVAN) 1 mg tablet Take 1 Tab by mouth every six (6) hours as needed for Anxiety. Max Daily Amount: 4 mg. 30 Tab 0    enalapril (VASOTEC) 20 mg tablet Take 20 mg by mouth daily. Past History     Past Medical History:  Past Medical History:   Diagnosis Date    Atrial fibrillation     CHADS score 3  (+CHF, +HTN, +AGE, -DM, -CVA)    CABG     2008   LIMA - LAD,   SVG - RCA    Cardiomyopathy     EF 30-35% (ECHO 6/14)    Coronary artery disease     Dyslipidemia     Hypertension     Hypothyroid     Pacemaker     Peripheral vascular disease     Renal artery stenosis     bilateral stents    Sick sinus syndrome        Past Surgical History:  Past Surgical History:   Procedure Laterality Date    HX CORONARY ARTERY BYPASS GRAFT      2008   LIMA - LAD,   SVG - RCA       Family History:  No family history on file. Social History:  Social History   Substance Use Topics    Smoking status: Former Smoker    Smokeless tobacco: Never Used    Alcohol use No       Allergies: Allergies   Allergen Reactions    Beta-Blockers (Beta-Adrenergic Blocking Agts) Drowsiness    Penicillins Swelling    Statins-Hmg-Coa Reductase Inhibitors Drowsiness         Review of Systems       Review of Systems   Constitutional: Positive for appetite change. Respiratory: Negative for cough and shortness of breath. Cardiovascular: Negative for chest pain. Gastrointestinal: Positive for nausea.  Negative for abdominal pain, constipation and vomiting. Positive for R groin pain   Genitourinary: Positive for dysuria (mild) and flank pain (R). Negative for difficulty urinating and testicular pain. Neurological: Positive for light-headedness. Negative for syncope. All other systems reviewed and are negative. Physical Exam     Visit Vitals    /79    Pulse 71    Temp 97.5 °F (36.4 °C)    Resp 15    SpO2 97%         Physical Exam   Constitutional: He is oriented to person, place, and time. He appears well-developed and well-nourished. No distress. HENT:   Head: Normocephalic and atraumatic. Mouth/Throat: Oropharynx is clear and moist.   Eyes: Conjunctivae and EOM are normal. Pupils are equal, round, and reactive to light. No scleral icterus. Neck: Normal range of motion. Neck supple. Cardiovascular: Normal rate, regular rhythm and normal heart sounds. No murmur heard. Pulmonary/Chest: Effort normal and breath sounds normal. No respiratory distress. Abdominal: Soft. Bowel sounds are normal. He exhibits no distension. There is tenderness in the right lower quadrant. There is guarding. Musculoskeletal: He exhibits no edema. Lymphadenopathy:     He has no cervical adenopathy. Neurological: He is alert and oriented to person, place, and time. Coordination normal.   Skin: Skin is warm and dry. No rash noted. Psychiatric: He has a normal mood and affect. His behavior is normal.   Nursing note and vitals reviewed.         Diagnostic Study Results     Labs -  Recent Results (from the past 12 hour(s))   CBC WITH AUTOMATED DIFF    Collection Time: 04/25/18  8:46 AM   Result Value Ref Range    WBC 5.3 4.6 - 13.2 K/uL    RBC 4.99 4.70 - 5.50 M/uL    HGB 13.3 13.0 - 16.0 g/dL    HCT 43.3 36.0 - 48.0 %    MCV 86.8 74.0 - 97.0 FL    MCH 26.7 24.0 - 34.0 PG    MCHC 30.7 (L) 31.0 - 37.0 g/dL    RDW 18.6 (H) 11.6 - 14.5 %    PLATELET 922 516 - 645 K/uL    MPV 9.8 9.2 - 11.8 FL    NEUTROPHILS 75 (H) 40 - 73 %    LYMPHOCYTES 13 (L) 21 - 52 %    MONOCYTES 10 3 - 10 %    EOSINOPHILS 2 0 - 5 %    BASOPHILS 0 0 - 2 %    ABS. NEUTROPHILS 4.0 1.8 - 8.0 K/UL    ABS. LYMPHOCYTES 0.7 (L) 0.9 - 3.6 K/UL    ABS. MONOCYTES 0.5 0.05 - 1.2 K/UL    ABS. EOSINOPHILS 0.1 0.0 - 0.4 K/UL    ABS. BASOPHILS 0.0 0.0 - 0.06 K/UL    DF AUTOMATED     METABOLIC PANEL, COMPREHENSIVE    Collection Time: 04/25/18  8:46 AM   Result Value Ref Range    Sodium 138 136 - 145 mmol/L    Potassium 4.4 3.5 - 5.5 mmol/L    Chloride 105 100 - 108 mmol/L    CO2 27 21 - 32 mmol/L    Anion gap 6 3.0 - 18 mmol/L    Glucose 98 74 - 99 mg/dL    BUN 27 (H) 7.0 - 18 MG/DL    Creatinine 1.38 (H) 0.6 - 1.3 MG/DL    BUN/Creatinine ratio 20 12 - 20      GFR est AA 59 (L) >60 ml/min/1.73m2    GFR est non-AA 49 (L) >60 ml/min/1.73m2    Calcium 9.1 8.5 - 10.1 MG/DL    Bilirubin, total 0.9 0.2 - 1.0 MG/DL    ALT (SGPT) 21 16 - 61 U/L    AST (SGOT) 25 15 - 37 U/L    Alk. phosphatase 138 (H) 45 - 117 U/L    Protein, total 8.6 (H) 6.4 - 8.2 g/dL    Albumin 3.8 3.4 - 5.0 g/dL    Globulin 4.8 (H) 2.0 - 4.0 g/dL    A-G Ratio 0.8 0.8 - 1.7     URINALYSIS W/ RFLX MICROSCOPIC    Collection Time: 04/25/18  8:46 AM   Result Value Ref Range    Color YELLOW      Appearance CLEAR      Specific gravity 1.016 1.005 - 1.030      pH (UA) 6.5 5.0 - 8.0      Protein 300 (A) NEG mg/dL    Glucose NEGATIVE  NEG mg/dL    Ketone NEGATIVE  NEG mg/dL    Bilirubin NEGATIVE  NEG      Blood NEGATIVE  NEG      Urobilinogen 1.0 0.2 - 1.0 EU/dL    Nitrites NEGATIVE  NEG      Leukocyte Esterase NEGATIVE  NEG     URINE MICROSCOPIC ONLY    Collection Time: 04/25/18  8:46 AM   Result Value Ref Range    WBC 0 to 1 0 - 4 /hpf    RBC 0 to 1 0 - 5 /hpf       Radiologic Studies -   CT ABD PELV W CONT   Final Result   As read by RAD:    IMPRESSION:     1.  Cardiomegaly with small right pleural effusion and bibasilar atelectasis. 2. Normal caliber small and large intestine without evidence of bowel  obstruction. Normal appendix.  Colonic diverticulosis without evidence of  diverticulitis. 3. Extensive atherosclerotic vascular disease as described, to include an  infrarenal abdominal aortic aneurysm with chronic appearing segmental  dissection. Visceral arterial branches are patent. 4. Small hiatal hernia           Medical Decision Making   I am the first provider for this patient. I reviewed the vital signs, available nursing notes, past medical history, past surgical history, family history and social history. Vital Signs-Reviewed the patient's vital signs. Pulse Oximetry Analysis -  100% on room air (Interpretation) nonhypoxic     Cardiac Monitor:  Rate: 70      Records Reviewed: Nursing Notes (Time of Review: 8:43 AM)    ED Course: Progress Notes, Reevaluation, and Consults:    10:35 Re-evaluated pt, feels much improved, minimal RLW tenderness. Reviewed findings of CT, including AAA. Provider Notes (Medical Decision Making):   MDM  Number of Diagnoses or Management Options  Abdominal aortic aneurysm (AAA) without rupture [facility]):   Abdominal pain, right lower quadrant:   Diagnosis management comments: R lower quad pain and tenderness since Sunday mild guarding nausea concerning for acute appendicitis, ? Min hernia possible obstruction remainder of abd soft non distended            Amount and/or Complexity of Data Reviewed  Clinical lab tests: ordered and reviewed  Tests in the radiology section of CPT®: ordered and reviewed  Independent visualization of images, tracings, or specimens: yes    Risk of Complications, Morbidity, and/or Mortality  Presenting problems: high  Diagnostic procedures: moderate  Management options: moderate          Diagnosis     Clinical Impression:   1. Abdominal pain, right lower quadrant    2.  Abdominal aortic aneurysm (AAA) without rupture [facility])        Disposition: Discharge     Follow-up Information     Follow up With Details Comments Contact [facility] EMERGENCY DEPT  As needed, If symptoms worsen [facility]    Sowmya Murillo MD Schedule an appointment as soon as possible for a visit for ED follow up appointment  [address] [place] Reinaldo Cano MD  keep appointment as scheduled  [facility] [address]  [facility]  [facility] [phone]  [phone]             Patient's Medications   Start Taking    No medications on file   Continue Taking    AMLODIPINE (NORVASC) 5 MG TABLET    Take 1 Tab by mouth daily. APIXABAN (ELIQUIS) 5 MG TABLET    Take 1 Tab by mouth two (2) times a day. CHLORPHENIRAMINE-HYDROCODONE (TUSSIONEX) 10-8 MG/5 ML SUSPENSION    Take 5 mL by mouth nightly as needed for Cough. Max Daily Amount: 5 mL. ENALAPRIL (VASOTEC) 20 MG TABLET    Take 20 mg by mouth daily. FUROSEMIDE (LASIX) 20 MG TABLET    One pill once or twice a day    GUAIFENESIN ER (MUCINEX) 600 MG ER TABLET    Take 1 Tab by mouth every twelve (12) hours. ISOSORBIDE MONONITRATE ER (IMDUR) 30 MG TABLET    Take 1 Tab by mouth daily. LORAZEPAM (ATIVAN) 1 MG TABLET    Take 1 Tab by mouth every six (6) hours as needed for Anxiety. Max Daily Amount: 4 mg. METOPROLOL SUCCINATE (TOPROL XL) 50 MG XL TABLET    Take 1 Tab by mouth daily. NITROGLYCERIN (NITROSTAT) 0.4 MG SL TABLET    1 Tab by SubLINGual route every five (5) minutes as needed for Chest Pain (up to 3 total 1 every 5 min). PANTOPRAZOLE (PROTONIX) 40 MG TABLET    Take 1 Tab by mouth Daily (before breakfast). RANOLAZINE ER (RANEXA) 500 MG SR TABLET    Take 1 Tab by mouth two (2) times a day. Indications: Chronic Stable Angina Pectoris    TIOTROPIUM (SPIRIVA) 18 MCG INHALATION CAPSULE    Take 1 Cap by inhalation every twenty-four (24) hours.    These Medications have changed    No medications on file   Stop Taking    No medications on file     _______________________________    Attestations:  [facility] [address] acting as a scribe for and in the presence of Crystal Jackson MD      April 25, 2018 at 10:39 AM       Provider Attestation:      I personally performed the services described in the documentation, reviewed the documentation, as recorded by the scribe in my presence, and it accurately and completely records my words and actions.  April 25, 2018 at 10:39 AM - Crystal Jackson MD    _______________________________

## 2018-04-25 NOTE — DISCHARGE INSTRUCTIONS
Abdominal Pain: Care Instructions  Your Care Instructions    Abdominal pain has many possible causes. Some aren't serious and get better on their own in a few days. Others need more testing and treatment. If your pain continues or gets worse, you need to be rechecked and may need more tests to find out what is wrong. You may need surgery to correct the problem. Don't ignore new symptoms, such as fever, nausea and vomiting, urination problems, pain that gets worse, and dizziness. These may be signs of a more serious problem. Your doctor may have recommended a follow-up visit in the next 8 to 12 hours. If you are not getting better, you may need more tests or treatment. The doctor has checked you carefully, but problems can develop later. If you notice any problems or new symptoms, get medical treatment right away. Follow-up care is a key part of your treatment and safety. Be sure to make and go to all appointments, and call your doctor if you are having problems. It's also a good idea to know your test results and keep a list of the medicines you take. How can you care for yourself at home? · Rest until you feel better. · To prevent dehydration, drink plenty of fluids, enough so that your urine is light yellow or clear like water. Choose water and other caffeine-free clear liquids until you feel better. If you have kidney, heart, or liver disease and have to limit fluids, talk with your doctor before you increase the amount of fluids you drink. · If your stomach is upset, eat mild foods, such as rice, dry toast or crackers, bananas, and applesauce. Try eating several small meals instead of two or three large ones. · Wait until 48 hours after all symptoms have gone away before you have spicy foods, alcohol, and drinks that contain caffeine. · Do not eat foods that are high in fat. · Avoid anti-inflammatory medicines such as aspirin, ibuprofen (Advil, Motrin), and naproxen (Aleve).  These can cause stomach upset. Talk to your doctor if you take daily aspirin for another health problem. When should you call for help? Call 911 anytime you think you may need emergency care. For example, call if:  ? · You passed out (lost consciousness). ? · You pass maroon or very bloody stools. ? · You vomit blood or what looks like coffee grounds. ? · You have new, severe belly pain. ?Call your doctor now or seek immediate medical care if:  ? · Your pain gets worse, especially if it becomes focused in one area of your belly. ? · You have a new or higher fever. ? · Your stools are black and look like tar, or they have streaks of blood. ? · You have unexpected vaginal bleeding. ? · You have symptoms of a urinary tract infection. These may include:  ¨ Pain when you urinate. ¨ Urinating more often than usual.  ¨ Blood in your urine. ? · You are dizzy or lightheaded, or you feel like you may faint. ? Watch closely for changes in your health, and be sure to contact your doctor if:  ? · You are not getting better after 1 day (24 hours). Where can you learn more? Go to http://mitra-shaw.info/. Enter V013 in the search box to learn more about \"Abdominal Pain: Care Instructions. \"  Current as of: March 20, 2017  Content Version: 11.4  © 8547-5247 [employer]. Care instructions adapted under license by [employer] (which disclaims liability or warranty for this information). If you have questions about a medical condition or this instruction, always ask your healthcare professional. [place] [address] any warranty or liability for your use of this information.

## 2018-04-25 NOTE — ED NOTES
Patient discharged to home with RX and instructions, patient with no questions at this time, to follow up with PCP next week

## 2018-04-26 NOTE — PROGRESS NOTES
1. Have you been to the ER, urgent care clinic since your last visit? Hospitalized since your last visit? No    2. Have you seen or consulted any other health care providers outside of the [address] [place] [address] since your last visit? Include any pap smears or colon screening.  No

## 2018-04-26 NOTE — LETTER
Patient:  Alysa Thrasher YOB: 1930 Date of Visit: 4/26/2018 Dear MD Ellen Mcnair [phone] VIA Facsimile: [phone] 
 : Thank you for referring Mr. Kylie Meraz to me for evaluation/treatment. Below are the relevant portions of my assessment and plan of care. Subjective:  
Mr. Yina Resendez is here for hospital follow up. 
   
This is an 5-year-old man that has a history of hypertension, coronary artery disease, dyslipidemia, prior coronary bypass grafting, cardiomyopathy, atrial fibrillation, celiac artery stenosis, pacemaker, renal artery stenosis and congestive heart failure.  
  
The patient had prior coronary bypass grafting in 2008. Tillman Plump to his coronary bypass surgery, he was experiencing primarily easy fatigability.  He has had repeat cardiac catheterizations since the time of his surgery, the last of which was done in 2016. At that time, medical therapy was advised. He had a nuclear stress test on 9/27/2017 that did not show evidence for ischemia and his EF was normal. His most recent echo was Jan 2018 with an EF of 55% with mild to moderate aortic stenosis with mean gradient 14 mmHg and GERBER 1cm, also had severe TR and mild-moderate pulmonic insufficiency. He had a stay at [facility] for presyncope and profound weakness and was discharged 01/21/18. Of note during his hospitalization, he was seen by vascular surgery for celiac artery stenosis. They deemed that his symptomology was unlikely related to mesenteric ischemia. Subsequently he went to rehab for a one week stay. He denies chest pain. He had some epigastric pain on and off for quite some time, which seemed to improve when he started Pantoprazole. Most recently , he has not been taking the pantoprazole and has been having more abdominal discomfort. He has been having good days and bad days.  He is quite anxious with all of his medical problems. He cannot do the things that he once enjoyed like yard work. He is no longer driving and was tearful when discussing that in the office. Patient's cardiac risk factors are dyslipidemia, male gender, hypertension. Patient Active Problem List  
 Diagnosis Date Noted  Aortic stenosis, moderate 01/28/2018  Abnormal chest CT 01/27/2018  Pleural effusion, right 01/26/2018  SOB (shortness of breath) on exertion 01/25/2018  Syncope and collapse 01/19/2018  Celiac artery stenosis ([facility] 01/10/2018  Osteoarthritis of both knees 08/20/2017  Statin intolerance 05/03/2017  Essential hypertension 02/27/2017  Anxiety 02/27/2017  Chronic systolic congestive heart failure ([address] 01/25/2017  S/P CABG x 2 08/25/2015  Atherosclerotic NAHED (renal artery stenosis), bilateral ([facility] 08/25/2015  Dyslipidemia  Coronary artery disease of native artery of native heart with stable angina pectoris ([facility] [address] [facility]  Cardiomyopathy  Atrial fibrillation  Sick sinus syndrome Current Outpatient Prescriptions Medication Sig Dispense Refill  escitalopram oxalate (LEXAPRO) 5 mg tablet Take 1 Tab by mouth daily. 30 Tab 1  
 HYDROcodone-acetaminophen (NORCO) 5-325 mg per tablet Take 1 Tab by mouth every four (4) hours as needed for Pain. Max Daily Amount: 6 Tabs. 10 Tab 0  
 apixaban (ELIQUIS) 5 mg tablet Take 1 Tab by mouth two (2) times a day. 60 Tab 5  
 amLODIPine (NORVASC) 5 mg tablet Take 1 Tab by mouth daily. 60 Tab 0  
 furosemide (LASIX) 20 mg tablet One pill once or twice a day 60 Tab 0  
 isosorbide mononitrate ER (IMDUR) 30 mg tablet Take 1 Tab by mouth daily. 60 Tab 0  
 metoprolol succinate (TOPROL XL) 50 mg XL tablet Take 1 Tab by mouth daily. 60 Tab 0  
 nitroglycerin (NITROSTAT) 0.4 mg SL tablet 1 Tab by SubLINGual route every five (5) minutes as needed for Chest Pain (up to 3 total 1 every 5 min).  60 Tab 0  
  pantoprazole (PROTONIX) 40 mg tablet Take 1 Tab by mouth Daily (before breakfast). 60 Tab 0  
 ranolazine ER (RANEXA) 500 mg SR tablet Take 1 Tab by mouth two (2) times a day. Indications: Chronic Stable Angina Pectoris 60 Tab 0  chlorpheniramine-HYDROcodone (TUSSIONEX) 10-8 mg/5 mL suspension Take 5 mL by mouth nightly as needed for Cough. Max Daily Amount: 5 mL. 60 mL 0  
 tiotropium (SPIRIVA) 18 mcg inhalation capsule Take 1 Cap by inhalation every twenty-four (24) hours. 30 Cap 0  
 guaiFENesin ER (MUCINEX) 600 mg ER tablet Take 1 Tab by mouth every twelve (12) hours. 60 Tab 0  
 LORazepam (ATIVAN) 1 mg tablet Take 1 Tab by mouth every six (6) hours as needed for Anxiety. Max Daily Amount: 4 mg. 30 Tab 0  
 enalapril (VASOTEC) 20 mg tablet Take 20 mg by mouth daily.  clindamycin (CLEOCIN) 150 mg capsule Take 3 Caps by mouth three (3) times daily for 3 days. 27 Cap 0 Allergies Allergen Reactions  Beta-Blockers (Beta-Adrenergic Blocking Agts) Drowsiness  Penicillins Swelling  Statins-Hmg-Coa Reductase Inhibitors Drowsiness Past Medical History:  
Diagnosis Date  Atrial fibrillation CHADS score 3  (+CHF, +HTN, +AGE, -DM, -CVA)  CABG   
 2008   LIMA - LAD,   SVG - RCA  Cardiomyopathy EF 30-35% (ECHO 6/14)  Coronary artery disease  Dyslipidemia  Hypertension  Hypothyroid  Pacemaker  Peripheral vascular disease  Renal artery stenosis   
 bilateral stents  Sick sinus syndrome Past Surgical History:  
Procedure Laterality Date  HX CORONARY ARTERY BYPASS GRAFT    
 2008   LIMA - LAD,   SVG - RCA No family history on file. History Smoking Status  Former Smoker Smokeless Tobacco  
 Never Used Review of Systems, additional: 
Constitutional: negative Eyes: negative Respiratory: negative for dyspnea on exertion Cardiovascular: per HPI Gastrointestinal: negative for nausea and vomiting Musculoskeletal:negative Neurological: negative Behvioral/Psych: negative Endocrine: negative ENT: negative Objective:  
 
Visit Vitals  /74  Pulse 70  
 Ht 5' 9\" (1.753 m)  Wt 140 lb (63.5 kg)  SpO2 93%  BMI 20.67 kg/m2 General:  alert, cooperative, no distress, appears stated age Chest Wall: inspection normal - no chest wall deformities or tenderness, respiratory effort normal  
Lung: clear to auscultation bilaterally Heart:  irregularly irregular rhythm with rate 66. Grade II/VI SERENE best heard RSB Abdomen: soft, non-tender. Bowel sounds normal. No masses,  no organomegaly Extremities: extremities normal, atraumatic, no cyanosis or edema Skin: no rashes Neuro: alert, oriented, normal speech, no focal findings or movement disorder noted Assessment/Plan: ICD-10-CM ICD-9-CM 1. Coronary artery disease of native artery of native heart with stable angina pectoris ([address]- -had CABGx2 in 2008. Nuclear stress in Sept 2017 without evidence of ischemia. No anginal symptoms, continue with Toprol. Not on statin due to intolerance. I25.118 414.01   
  413.9 2. Ischemic cardiomyopathy--improved with normal EF on echo in January 2018. I25.5 414.8 3. Chronic atrial fibrillation ([address]- -controlled with rate 66 in office today. Continue Eliquis and Toprol. I48.2 427.31   
4. Sick sinus syndrome-- PPM  I49.5 427.81   
5. 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 Continue with PRN Lasix for swelling and daily weights, CHF education discussed in detail with patient 428.0 6. Essential hypertension-- continue Toprol I10 401.9 7. Celiac artery stenosis ([address]- -seen by vascular surgery, no plans for OR. I77.4 447.4 8. Aortic stenosis, moderate-- follow up echo in 3-6 months. Stable on exam today. I35.0 424.1 9       Anxiety, will add low dose Lexapro 5 mg daily. RT 4 weeks. If you have questions, please do not hesitate to call me. I look forward to following Mr. Joanie Mcneill along with you. Sincerely, Xiomara Oneil MD

## 2018-04-26 NOTE — MR AVS SNAPSHOT
[address] [facility] [address] [place] [address] [id number] 
[phone] Patient: Libby Woods MRN: [id number] EDW:[id number] Visit Information Date & Time Provider Department Dept. Phone Encounter #  
 4/26/2018  1:00 PM Cleo Griffiths MD [address] [place] [address] Specialist at [place] [id number] Follow-up Instructions Return in about 1 month (around 5/26/2018). Your Appointments 6/4/2018  2:15 PM  
Follow Up with Cleo Griffiths MD  
Cardio Specialist at [facility]) Appt Note: 4 weeks [facility] [address] [place] [address] Upcoming Health Maintenance Date Due ZOSTER VACCINE AGE 60> 9/10/1990 GLAUCOMA SCREENING Q2Y 11/10/1995 Pneumococcal 65+ Low/Medium Risk (1 of 2 - PCV13) 11/10/1995 MEDICARE YEARLY EXAM 3/14/2018 DTaP/Tdap/Td series (2 - Td) 9/10/2024 Allergies as of 4/26/2018  Review Complete On: 4/26/2018 By: Ankur Junior RN Severity Noted Reaction Type Reactions Beta-blockers (Beta-adrenergic Blocking Agts)  08/24/2015    Drowsiness Penicillins  06/13/2014    Swelling Statins-hmg-coa Reductase Inhibitors  08/24/2015    Drowsiness Current Immunizations  Reviewed on 1/29/2018 Name Date Influenza Vaccine 10/1/2017 Tdap 9/10/2014  9:38 PM  
  
 Not reviewed this visit Vitals BP Pulse Height(growth percentile) Weight(growth percentile) SpO2 BMI  
 155/74 70 5' 9\" (1.753 m) 140 lb (63.5 kg) 93% 20.67 kg/m2 Smoking Status Former Smoker BMI and BSA Data Body Mass Index Body Surface Area  
 20.67 kg/m 2 1.76 m 2 Preferred Pharmacy Pharmacy Name Phone [id number] [phone], [address] [place] [phone] Your Updated Medication List  
  
   
 This list is accurate as of 4/26/18  1:41 PM.  Always use your most recent med list. amLODIPine 5 mg tablet Commonly known as:  Stefany Gregorio Take 1 Tab by mouth daily. apixaban 5 mg tablet Commonly known as:  Jenaro Rg Take 1 Tab by mouth two (2) times a day. chlorpheniramine-HYDROcodone 10-8 mg/5 mL suspension Commonly known as:  Gorman Hind Take 5 mL by mouth nightly as needed for Cough. Max Daily Amount: 5 mL. enalapril 20 mg tablet Commonly known as:  Era Saupe Take 20 mg by mouth daily. furosemide 20 mg tablet Commonly known as:  LASIX One pill once or twice a day  
  
 guaiFENesin  mg ER tablet Commonly known as:  Abelino & Abelino Take 1 Tab by mouth every twelve (12) hours. HYDROcodone-acetaminophen 5-325 mg per tablet Commonly known as:  1463 Monika Naranjo Take 1 Tab by mouth every four (4) hours as needed for Pain. Max Daily Amount: 6 Tabs. isosorbide mononitrate ER 30 mg tablet Commonly known as:  IMDUR Take 1 Tab by mouth daily. LORazepam 1 mg tablet Commonly known as:  ATIVAN Take 1 Tab by mouth every six (6) hours as needed for Anxiety. Max Daily Amount: 4 mg.  
  
 metoprolol succinate 50 mg XL tablet Commonly known as:  TOPROL XL Take 1 Tab by mouth daily. nitroglycerin 0.4 mg SL tablet Commonly known as:  NITROSTAT  
1 Tab by SubLINGual route every five (5) minutes as needed for Chest Pain (up to 3 total 1 every 5 min). pantoprazole 40 mg tablet Commonly known as:  PROTONIX Take 1 Tab by mouth Daily (before breakfast). ranolazine  mg SR tablet Commonly known as:  RANEXA Take 1 Tab by mouth two (2) times a day. Indications: Chronic Stable Angina Pectoris  
  
 tiotropium 18 mcg inhalation capsule Commonly known as:  Destiny Leavens Take 1 Cap by inhalation every twenty-four (24) hours. Follow-up Instructions Return in about 1 month (around 5/26/2018). Patient Instructions Start Lexapro 5mg Daily Introducing [facility]! Dear Bozena Hearing: Thank you for requesting a Pitzi account. Our records indicate that you already have an active Pitzi account. You can access your account anytime at [email or web address] Did you know that you can access your hospital and ER discharge instructions at any time in Pitzi? You can also review all of your test results from your hospital stay or ER visit. Additional Information If you have questions, please visit the Frequently Asked Questions section of the Pitzi website at [email or web address] Remember, Pitzi is NOT to be used for urgent needs. For medical emergencies, dial 911. Now available from your iPhone and Android! Please provide this summary of care documentation to your next provider. Your primary care clinician is listed as Sarah Lawrence. If you have any questions after today's visit, please call [phone].

## 2018-05-04 NOTE — ED TRIAGE NOTES
Right leg hit on ladder in yard 45 min ago. On eloquis, continues to bleed. Has open wound.  While sitting in triage, became light headed

## 2018-05-04 NOTE — ED PROVIDER NOTES
EMERGENCY DEPARTMENT HISTORY AND PHYSICAL EXAM    12:12 PM      Date: 5/4/2018  Patient Name: Dreama Seip    History of Presenting Illness     Chief Complaint   Patient presents with    Leg Injury         History Provided By: Patient    Chief Complaint: Laceration  Duration:  PTA  Timing:  Acute  Location: Right calf  Quality: N/A  Severity: N/A  Modifying Factors: No identifiable modifying factors   Associated Symptoms: Denies dizziness      Additional History (Context): Dreama Seip is a 80 y.o. male with CAD, CABG, AFib, pacemaker, HTN, dyslipidemia, PVD, and hypothyroid who presents to the ED for evaluation of a painful bleeding wound of the right calf onset PTA. Pt was outside his home and stepped backwards into an aluminum ladder that was against the wall. Pt cut his right calf on the ladder and experienced dizziness in triage however that has since subsided. Pt takes Eliquis. PCP: Chip Magdaleno MD    Current Outpatient Prescriptions   Medication Sig Dispense Refill    clindamycin (CLEOCIN) 150 mg capsule Take 3 Caps by mouth three (3) times daily for 3 days. 27 Cap 0    escitalopram oxalate (LEXAPRO) 5 mg tablet Take 1 Tab by mouth daily. 30 Tab 1    HYDROcodone-acetaminophen (NORCO) 5-325 mg per tablet Take 1 Tab by mouth every four (4) hours as needed for Pain. Max Daily Amount: 6 Tabs. 10 Tab 0    apixaban (ELIQUIS) 5 mg tablet Take 1 Tab by mouth two (2) times a day. 60 Tab 5    amLODIPine (NORVASC) 5 mg tablet Take 1 Tab by mouth daily. 60 Tab 0    furosemide (LASIX) 20 mg tablet One pill once or twice a day 60 Tab 0    isosorbide mononitrate ER (IMDUR) 30 mg tablet Take 1 Tab by mouth daily. 60 Tab 0    metoprolol succinate (TOPROL XL) 50 mg XL tablet Take 1 Tab by mouth daily. 60 Tab 0    nitroglycerin (NITROSTAT) 0.4 mg SL tablet 1 Tab by SubLINGual route every five (5) minutes as needed for Chest Pain (up to 3 total 1 every 5 min).  60 Tab 0    pantoprazole (PROTONIX) 40 mg tablet Take 1 Tab by mouth Daily (before breakfast). 60 Tab 0    ranolazine ER (RANEXA) 500 mg SR tablet Take 1 Tab by mouth two (2) times a day. Indications: Chronic Stable Angina Pectoris 60 Tab 0    chlorpheniramine-HYDROcodone (TUSSIONEX) 10-8 mg/5 mL suspension Take 5 mL by mouth nightly as needed for Cough. Max Daily Amount: 5 mL. 60 mL 0    tiotropium (SPIRIVA) 18 mcg inhalation capsule Take 1 Cap by inhalation every twenty-four (24) hours. 30 Cap 0    guaiFENesin ER (MUCINEX) 600 mg ER tablet Take 1 Tab by mouth every twelve (12) hours. 60 Tab 0    LORazepam (ATIVAN) 1 mg tablet Take 1 Tab by mouth every six (6) hours as needed for Anxiety. Max Daily Amount: 4 mg. 30 Tab 0    enalapril (VASOTEC) 20 mg tablet Take 20 mg by mouth daily. Past History     Past Medical History:  Past Medical History:   Diagnosis Date    Atrial fibrillation     CHADS score 3  (+CHF, +HTN, +AGE, -DM, -CVA)    CABG     2008   LIMA - LAD,   SVG - RCA    Cardiomyopathy     EF 30-35% (ECHO 6/14)    Coronary artery disease     Dyslipidemia     Hypertension     Hypothyroid     Pacemaker     Peripheral vascular disease     Renal artery stenosis     bilateral stents    Sick sinus syndrome        Past Surgical History:  Past Surgical History:   Procedure Laterality Date    HX CORONARY ARTERY BYPASS GRAFT      2008   LIMA - LAD,   SVG - RCA       Family History:  History reviewed. No pertinent family history. Social History:  Social History   Substance Use Topics    Smoking status: Former Smoker    Smokeless tobacco: Never Used    Alcohol use No       Allergies: Allergies   Allergen Reactions    Beta-Blockers (Beta-Adrenergic Blocking Agts) Drowsiness    Penicillins Swelling    Statins-Hmg-Coa Reductase Inhibitors Drowsiness         Review of Systems     Review of Systems   Constitutional: Negative for chills and fever. HENT: Negative for ear pain and sore throat.     Eyes: Negative for pain and visual disturbance. Respiratory: Negative for cough and shortness of breath. Cardiovascular: Negative for chest pain and palpitations. Gastrointestinal: Negative for abdominal pain, diarrhea, nausea and vomiting. Genitourinary: Negative for flank pain. Musculoskeletal: Negative for back pain and neck pain. Skin: Positive for wound (Laceration of right calf). Neurological: Negative for dizziness, syncope and headaches. Psychiatric/Behavioral: Negative for agitation. The patient is not nervous/anxious. Physical Exam     Visit Vitals    /70 (BP 1 Location: Right arm, BP Patient Position: At rest)    Pulse 71    Temp 98 °F (36.7 °C)    Resp 15    Ht 5' 9\" (1.753 m)    Wt 63.5 kg (140 lb)    SpO2 100%    BMI 20.67 kg/m2     Physical Exam   Constitutional: He is oriented to person, place, and time. He appears well-developed and well-nourished. HENT:   Head: Normocephalic and atraumatic. Mouth/Throat: Oropharynx is clear and moist.   Eyes: Pupils are equal, round, and reactive to light. No scleral icterus. Neck: Neck supple. No tracheal deviation present. Cardiovascular: Regular rhythm. No murmur heard. Pulmonary/Chest: Effort normal and breath sounds normal. No respiratory distress. Abdominal: Soft. There is no tenderness. Musculoskeletal: Normal range of motion. He exhibits no deformity. Neurological: He is alert and oriented to person, place, and time. No gross neuro deficit   Skin: Skin is warm and dry. No rash noted. He is not diaphoretic. 1 cm, nonbleeding, semi-lunar laceration to right inferior calf   Psychiatric: He has a normal mood and affect. Nursing note and vitals reviewed.         Diagnostic Study Results     Labs -  Labs Reviewed   CBC WITH AUTOMATED DIFF - Abnormal; Notable for the following:        Result Value    RBC 4.19 (*)     HGB 11.0 (*)     MCHC 30.3 (*)     RDW 18.4 (*)     MPV 9.1 (*)     NEUTROPHILS 80 (*)     LYMPHOCYTES 14 (*)     ABS. LYMPHOCYTES 0.7 (*)     All other components within normal limits   PROTHROMBIN TIME + INR - Abnormal; Notable for the following:     Prothrombin time 21.8 (*)     INR 2.0 (*)     All other components within normal limits   PTT - Abnormal; Notable for the following:     aPTT 40.3 (*)     All other components within normal limits       Radiologic Studies -   No orders to display         Medical Decision Making   I am the first provider for this patient. I reviewed the vital signs, available nursing notes, past medical history, past surgical history, family history and social history. Vital Signs-Reviewed the patient's vital signs. Pulse Oximetry Analysis -  100% on room air    Cardiac Monitor:  Rate: 71  Rhythm:  Normal Sinus Rhythm    Records Reviewed: Nursing Notes and Old Medical Records (Time of Review: 12:12 PM)    ED Course: Progress Notes, Reevaluation, and Consults:  ED Course       Provider Notes (Medical Decision Making):     DDX: puncture wound,     80 y.o. male with noted past medical history who presented with continued bleeding from right calf from a puncture wound from a metal ladder at his home. Pt tetanus shot was updated 3 years ago. Wound was not actively bleeding on my exam, small and due to it being a puncture injury with friable overlying skin it was left open and pt was given 3 days of prophylaxis abx and return precautions. Pt and son stated they were not concerned for foreign body when I offered an XR to rule out foreign body, XR declined, low likelihood of retained foreign body. Pressure dressing applied. Patient has no new complaints, changes, or physical findings. Diagnostic studies were reviewed with the patient. Pt and/or family's questions and concerns were addressed. Care plan was outlined, including follow-up with PCP/specialist and return precautions were discussed. Patient is felt to be stable for discharge at this time.      Diagnosis     Clinical Impression:   1. Puncture wound of right lower leg, initial encounter    2. Pain in right lower leg    3. Anticoagulant long-term use        Disposition: home    Follow-up Information     Follow up With Details Comments Ul. Wendy Montiel MD  As needed [address] [facility] [place]note 2[facility] EMERGENCY DEPT  In 24-48 hours if wound does not heal as expected, or if there is continued bleeding not resolved with a pressure dressing , If symptoms worsen [address] [address] [phone]           Patient's Medications   Start Taking    CLINDAMYCIN (CLEOCIN) 150 MG CAPSULE    Take 3 Caps by mouth three (3) times daily for 3 days. Continue Taking    AMLODIPINE (NORVASC) 5 MG TABLET    Take 1 Tab by mouth daily. APIXABAN (ELIQUIS) 5 MG TABLET    Take 1 Tab by mouth two (2) times a day. CHLORPHENIRAMINE-HYDROCODONE (TUSSIONEX) 10-8 MG/5 ML SUSPENSION    Take 5 mL by mouth nightly as needed for Cough. Max Daily Amount: 5 mL. ENALAPRIL (VASOTEC) 20 MG TABLET    Take 20 mg by mouth daily. ESCITALOPRAM OXALATE (LEXAPRO) 5 MG TABLET    Take 1 Tab by mouth daily. FUROSEMIDE (LASIX) 20 MG TABLET    One pill once or twice a day    GUAIFENESIN ER (MUCINEX) 600 MG ER TABLET    Take 1 Tab by mouth every twelve (12) hours. HYDROCODONE-ACETAMINOPHEN (NORCO) 5-325 MG PER TABLET    Take 1 Tab by mouth every four (4) hours as needed for Pain. Max Daily Amount: 6 Tabs. ISOSORBIDE MONONITRATE ER (IMDUR) 30 MG TABLET    Take 1 Tab by mouth daily. LORAZEPAM (ATIVAN) 1 MG TABLET    Take 1 Tab by mouth every six (6) hours as needed for Anxiety. Max Daily Amount: 4 mg. METOPROLOL SUCCINATE (TOPROL XL) 50 MG XL TABLET    Take 1 Tab by mouth daily. NITROGLYCERIN (NITROSTAT) 0.4 MG SL TABLET    1 Tab by SubLINGual route every five (5) minutes as needed for Chest Pain (up to 3 total 1 every 5 min).     PANTOPRAZOLE (PROTONIX) 40 MG TABLET    Take 1 Tab by mouth Daily (before breakfast). RANOLAZINE ER (RANEXA) 500 MG SR TABLET    Take 1 Tab by mouth two (2) times a day. Indications: Chronic Stable Angina Pectoris    TIOTROPIUM (SPIRIVA) 18 MCG INHALATION CAPSULE    Take 1 Cap by inhalation every twenty-four (24) hours. These Medications have changed    No medications on file   Stop Taking    No medications on file     757 Nadine Thomas acting as a scribe for and in the presence of Isma Interiano DO      May 04, 2018 at 12:12 PM       Provider Attestation:      I personally performed the services described in the documentation, reviewed the documentation, as recorded by the scribe in my presence, and it accurately and completely records my words and actions.  May 04, 2018 at 12:12 PM - Isma Interiano DO     _______________________________

## 2018-05-05 NOTE — PROGRESS NOTES
Subjective:   Mr. Alma Delia Hurd is here for hospital follow up.      This is an 61-year-old man that has a history of hypertension, coronary artery disease, dyslipidemia, prior coronary bypass grafting, cardiomyopathy, atrial fibrillation, celiac artery stenosis, pacemaker, renal artery stenosis and congestive heart failure.      The patient had prior coronary bypass grafting in 2008. [place] to his coronary bypass surgery, he was experiencing primarily easy fatigability.  He has had repeat cardiac catheterizations since the time of his surgery, the last of which was done in 2016. At that time, medical therapy was advised. He had a nuclear stress test on 9/27/2017 that did not show evidence for ischemia and his EF was normal. His most recent echo was Jan 2018 with an EF of 55% with mild to moderate aortic stenosis with mean gradient 14 mmHg and GERBER 1cm, also had severe TR and mild-moderate pulmonic insufficiency. He had a stay at [facility] for presyncope and profound weakness and was discharged 01/21/18. Of note during his hospitalization, he was seen by vascular surgery for celiac artery stenosis. They deemed that his symptomology was unlikely related to mesenteric ischemia. Subsequently he went to rehab for a one week stay. He denies chest pain. He had some epigastric pain on and off for quite some time, which seemed to improve when he started Pantoprazole. Most recently , he has not been taking the pantoprazole and has been having more abdominal discomfort. He has been having good days and bad days. He is quite anxious with all of his medical problems. He cannot do the things that he once enjoyed like yard work. He is no longer driving and was tearful when discussing that in the office. Patient's cardiac risk factors are dyslipidemia, male gender, hypertension.         Patient Active Problem List    Diagnosis Date Noted    Aortic stenosis, moderate 01/28/2018    Abnormal chest CT 01/27/2018  Pleural effusion, right 01/26/2018    SOB (shortness of breath) on exertion 01/25/2018    Syncope and collapse 01/19/2018    Celiac artery stenosis (HCC) 01/10/2018    Osteoarthritis of both knees 08/20/2017    Statin intolerance 05/03/2017    Essential hypertension 02/27/2017    Anxiety 02/27/2017    Chronic systolic congestive heart failure ([facility] 01/25/2017    S/P CABG x 2 08/25/2015    Atherosclerotic NAHED (renal artery stenosis), bilateral (HCC) 08/25/2015    Dyslipidemia     Coronary artery disease of native artery of native heart with stable angina pectoris (HCC)     Cardiomyopathy     Atrial fibrillation     Sick sinus syndrome      Current Outpatient Prescriptions   Medication Sig Dispense Refill    escitalopram oxalate (LEXAPRO) 5 mg tablet Take 1 Tab by mouth daily. 30 Tab 1    HYDROcodone-acetaminophen (NORCO) 5-325 mg per tablet Take 1 Tab by mouth every four (4) hours as needed for Pain. Max Daily Amount: 6 Tabs. 10 Tab 0    apixaban (ELIQUIS) 5 mg tablet Take 1 Tab by mouth two (2) times a day. 60 Tab 5    amLODIPine (NORVASC) 5 mg tablet Take 1 Tab by mouth daily. 60 Tab 0    furosemide (LASIX) 20 mg tablet One pill once or twice a day 60 Tab 0    isosorbide mononitrate ER (IMDUR) 30 mg tablet Take 1 Tab by mouth daily. 60 Tab 0    metoprolol succinate (TOPROL XL) 50 mg XL tablet Take 1 Tab by mouth daily. 60 Tab 0    nitroglycerin (NITROSTAT) 0.4 mg SL tablet 1 Tab by SubLINGual route every five (5) minutes as needed for Chest Pain (up to 3 total 1 every 5 min). 60 Tab 0    pantoprazole (PROTONIX) 40 mg tablet Take 1 Tab by mouth Daily (before breakfast). 60 Tab 0    ranolazine ER (RANEXA) 500 mg SR tablet Take 1 Tab by mouth two (2) times a day. Indications: Chronic Stable Angina Pectoris 60 Tab 0    chlorpheniramine-HYDROcodone (TUSSIONEX) 10-8 mg/5 mL suspension Take 5 mL by mouth nightly as needed for Cough. Max Daily Amount: 5 mL.  60 mL 0    tiotropium (SPIRIVA) 18 mcg inhalation capsule Take 1 Cap by inhalation every twenty-four (24) hours. 30 Cap 0    guaiFENesin ER (MUCINEX) 600 mg ER tablet Take 1 Tab by mouth every twelve (12) hours. 60 Tab 0    LORazepam (ATIVAN) 1 mg tablet Take 1 Tab by mouth every six (6) hours as needed for Anxiety. Max Daily Amount: 4 mg. 30 Tab 0    enalapril (VASOTEC) 20 mg tablet Take 20 mg by mouth daily.  clindamycin (CLEOCIN) 150 mg capsule Take 3 Caps by mouth three (3) times daily for 3 days. 27 Cap 0     Allergies   Allergen Reactions    Beta-Blockers (Beta-Adrenergic Blocking Agts) Drowsiness    Penicillins Swelling    Statins-Hmg-Coa Reductase Inhibitors Drowsiness     Past Medical History:   Diagnosis Date    Atrial fibrillation     CHADS score 3  (+CHF, +HTN, +AGE, -DM, -CVA)    CABG     2008   LIMA - LAD,   SVG - RCA    Cardiomyopathy     EF 30-35% (ECHO 6/14)    Coronary artery disease     Dyslipidemia     Hypertension     Hypothyroid     Pacemaker     Peripheral vascular disease     Renal artery stenosis     bilateral stents    Sick sinus syndrome      Past Surgical History:   Procedure Laterality Date    HX CORONARY ARTERY BYPASS GRAFT      2008   LIMA - LAD,   SVG - RCA     No family history on file.   History   Smoking Status    Former Smoker   Smokeless Tobacco    Never Used          Review of Systems, additional:  Constitutional: negative  Eyes: negative  Respiratory: negative for dyspnea on exertion  Cardiovascular: per HPI  Gastrointestinal: negative for nausea and vomiting  Musculoskeletal:negative  Neurological: negative  Behvioral/Psych: negative  Endocrine: negative  ENT: negative    Objective:     Visit Vitals    /74    Pulse 70    Ht 5' 9\" (1.753 m)    Wt 140 lb (63.5 kg)    SpO2 93%    BMI 20.67 kg/m2     General:  alert, cooperative, no distress, appears stated age   Chest Wall: inspection normal - no chest wall deformities or tenderness, respiratory effort normal   Lung: clear to auscultation bilaterally   Heart:  irregularly irregular rhythm with rate 66. Grade II/VI SERENE best heard RSB   Abdomen: soft, non-tender. Bowel sounds normal. No masses,  no organomegaly   Extremities: extremities normal, atraumatic, no cyanosis or edema Skin: no rashes   Neuro: alert, oriented, normal speech, no focal findings or movement disorder noted         Assessment/Plan:         ICD-10-CM ICD-9-CM    1. Coronary artery disease of native artery of native heart with stable angina pectoris ([facility]- -had CABGx2 in 2008. Nuclear stress in Sept 2017 without evidence of ischemia. No anginal symptoms, continue with Toprol. Not on statin due to intolerance. I25.118 414.01      413.9    2. Ischemic cardiomyopathy--improved with normal EF on echo in January 2018. I25.5 414.8    3. Chronic atrial fibrillation ([facility]- -controlled with rate 66 in office today. Continue Eliquis and Toprol. I48.2 427.31    4. Sick sinus syndrome-- PPM  I49.5 427.81    5. 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 Continue with PRN Lasix for swelling and daily weights, CHF education discussed in detail with patient         428.0    6. Essential hypertension-- continue Toprol I10 401.9    7. Celiac artery stenosis ([facility]- -seen by vascular surgery, no plans for OR. I77.4 447.4    8. Aortic stenosis, moderate-- follow up echo in 3-6 months. Stable on exam today. I35.0 424.1    9       Anxiety, will add low dose Lexapro 5 mg daily. RT 4 weeks.

## 2018-06-05 NOTE — COMMUNICATION BODY
Subjective:   Mr. César Lee is here for hospital follow up.      This is an 63-year-old man that has a history of hypertension, coronary artery disease, dyslipidemia, prior coronary bypass grafting, cardiomyopathy, atrial fibrillation, celiac artery stenosis, pacemaker, renal artery stenosis and congestive heart failure.      The patient had prior coronary bypass grafting in 2008. Noberto Dean to his coronary bypass surgery, he was experiencing primarily easy fatigability.  He has had repeat cardiac catheterizations since the time of his surgery, the last of which was done in 2016. At that time, medical therapy was advised. He had a nuclear stress test on 9/27/2017 that did not show evidence for ischemia and his EF was normal. His most recent echo was Jan 2018 with an EF of 55% with mild to moderate aortic stenosis with mean gradient 14 mmHg and GERBER 1cm, also had severe TR and mild-moderate pulmonic insufficiency. He had a stay at [facility] for presyncope and profound weakness and was discharged 01/21/18. Of note during his hospitalization, he was seen by vascular surgery for celiac artery stenosis. They deemed that his symptomology was unlikely related to mesenteric ischemia. Subsequently he went to rehab for a one week stay. He denies chest pain. He had some epigastric pain on and off for quite some time, which seemed to improve when he started Pantoprazole. Most recently , he has not been taking the pantoprazole and has been having more abdominal discomfort. He has been having good days and bad days. He is quite anxious with all of his medical problems. He cannot do the things that he once enjoyed like yard work. He is no longer driving and was tearful when discussing that in the office. Patient's cardiac risk factors are dyslipidemia, male gender, hypertension.         Patient Active Problem List    Diagnosis Date Noted    Aortic stenosis, moderate 01/28/2018    Abnormal chest CT 01/27/2018  Pleural effusion, right 01/26/2018    SOB (shortness of breath) on exertion 01/25/2018    Syncope and collapse 01/19/2018    Celiac artery stenosis (HCC) 01/10/2018    Osteoarthritis of both knees 08/20/2017    Statin intolerance 05/03/2017    Essential hypertension 02/27/2017    Anxiety 02/27/2017    Chronic systolic congestive heart failure ([facility] 01/25/2017    S/P CABG x 2 08/25/2015    Atherosclerotic NAHED (renal artery stenosis), bilateral (HCC) 08/25/2015    Dyslipidemia     Coronary artery disease of native artery of native heart with stable angina pectoris (HCC)     Cardiomyopathy     Atrial fibrillation     Sick sinus syndrome      Current Outpatient Prescriptions   Medication Sig Dispense Refill    escitalopram oxalate (LEXAPRO) 5 mg tablet Take 1 Tab by mouth daily. 30 Tab 1    HYDROcodone-acetaminophen (NORCO) 5-325 mg per tablet Take 1 Tab by mouth every four (4) hours as needed for Pain. Max Daily Amount: 6 Tabs. 10 Tab 0    apixaban (ELIQUIS) 5 mg tablet Take 1 Tab by mouth two (2) times a day. 60 Tab 5    amLODIPine (NORVASC) 5 mg tablet Take 1 Tab by mouth daily. 60 Tab 0    furosemide (LASIX) 20 mg tablet One pill once or twice a day 60 Tab 0    isosorbide mononitrate ER (IMDUR) 30 mg tablet Take 1 Tab by mouth daily. 60 Tab 0    metoprolol succinate (TOPROL XL) 50 mg XL tablet Take 1 Tab by mouth daily. 60 Tab 0    nitroglycerin (NITROSTAT) 0.4 mg SL tablet 1 Tab by SubLINGual route every five (5) minutes as needed for Chest Pain (up to 3 total 1 every 5 min). 60 Tab 0    pantoprazole (PROTONIX) 40 mg tablet Take 1 Tab by mouth Daily (before breakfast). 60 Tab 0    ranolazine ER (RANEXA) 500 mg SR tablet Take 1 Tab by mouth two (2) times a day. Indications: Chronic Stable Angina Pectoris 60 Tab 0    chlorpheniramine-HYDROcodone (TUSSIONEX) 10-8 mg/5 mL suspension Take 5 mL by mouth nightly as needed for Cough. Max Daily Amount: 5 mL.  60 mL 0    tiotropium (SPIRIVA) 18 mcg inhalation capsule Take 1 Cap by inhalation every twenty-four (24) hours. 30 Cap 0    guaiFENesin ER (MUCINEX) 600 mg ER tablet Take 1 Tab by mouth every twelve (12) hours. 60 Tab 0    LORazepam (ATIVAN) 1 mg tablet Take 1 Tab by mouth every six (6) hours as needed for Anxiety. Max Daily Amount: 4 mg. 30 Tab 0    enalapril (VASOTEC) 20 mg tablet Take 20 mg by mouth daily.  clindamycin (CLEOCIN) 150 mg capsule Take 3 Caps by mouth three (3) times daily for 3 days. 27 Cap 0     Allergies   Allergen Reactions    Beta-Blockers (Beta-Adrenergic Blocking Agts) Drowsiness    Penicillins Swelling    Statins-Hmg-Coa Reductase Inhibitors Drowsiness     Past Medical History:   Diagnosis Date    Atrial fibrillation     CHADS score 3  (+CHF, +HTN, +AGE, -DM, -CVA)    CABG     2008   LIMA - LAD,   SVG - RCA    Cardiomyopathy     EF 30-35% (ECHO 6/14)    Coronary artery disease     Dyslipidemia     Hypertension     Hypothyroid     Pacemaker     Peripheral vascular disease     Renal artery stenosis     bilateral stents    Sick sinus syndrome      Past Surgical History:   Procedure Laterality Date    HX CORONARY ARTERY BYPASS GRAFT      2008   LIMA - LAD,   SVG - RCA     No family history on file.   History   Smoking Status    Former Smoker   Smokeless Tobacco    Never Used          Review of Systems, additional:  Constitutional: negative  Eyes: negative  Respiratory: negative for dyspnea on exertion  Cardiovascular: per HPI  Gastrointestinal: negative for nausea and vomiting  Musculoskeletal:negative  Neurological: negative  Behvioral/Psych: negative  Endocrine: negative  ENT: negative    Objective:     Visit Vitals    /74    Pulse 70    Ht 5' 9\" (1.753 m)    Wt 140 lb (63.5 kg)    SpO2 93%    BMI 20.67 kg/m2     General:  alert, cooperative, no distress, appears stated age   Chest Wall: inspection normal - no chest wall deformities or tenderness, respiratory effort normal   Lung: clear to auscultation bilaterally   Heart:  irregularly irregular rhythm with rate 66. Grade II/VI SERENE best heard RSB   Abdomen: soft, non-tender. Bowel sounds normal. No masses,  no organomegaly   Extremities: extremities normal, atraumatic, no cyanosis or edema Skin: no rashes   Neuro: alert, oriented, normal speech, no focal findings or movement disorder noted         Assessment/Plan:         ICD-10-CM ICD-9-CM    1. Coronary artery disease of native artery of native heart with stable angina pectoris ([facility]- -had CABGx2 in 2008. Nuclear stress in Sept 2017 without evidence of ischemia. No anginal symptoms, continue with Toprol. Not on statin due to intolerance. I25.118 414.01      413.9    2. Ischemic cardiomyopathy--improved with normal EF on echo in January 2018. I25.5 414.8    3. Chronic atrial fibrillation ([facility]- -controlled with rate 66 in office today. Continue Eliquis and Toprol. I48.2 427.31    4. Sick sinus syndrome-- PPM  I49.5 427.81    5. Chronic combined diastolic and systolic congestive heart failure ([facility]-- no evidence of volume overload on exam. Continue with PRN Lasix for swelling and daily weights, CHF education discussed in detail with patient         428.0    6. Essential hypertension-- continue Toprol I10 401.9    7. Celiac artery stenosis ([facility]- -seen by vascular surgery, no plans for OR. I77.4 447.4    8. Aortic stenosis, moderate-- follow up echo in 3-6 months. Stable on exam today. I35.0 424.1    9       Anxiety, will add low dose Lexapro 5 mg daily. RT 4 weeks.

## 2018-06-06 NOTE — PROGRESS NOTES
1. Have you been to the ER, urgent care clinic since your last visit? Hospitalized since your last visit? No    2. Have you seen or consulted any other health care providers outside of the [address] [place] [address] since your last visit? Include any pap smears or colon screening.  No

## 2018-06-06 NOTE — MR AVS SNAPSHOT
[facility] 
 
 
 [address] [id number] 
[phone] Patient: Aimee Johnson MRN: [id number] Visit Information Date & Time Provider Department Dept. Phone Encounter #  
 6/6/2018  9:00 AM Patricia Cano MD [address] [place] [address] Specialist at [facility] [phone] [id number] Follow-up Instructions Return in about 10 weeks (around 8/15/2018). Your Appointments 9/5/2018  9:00 AM  
Follow Up with Patricia Cano MD  
Cardio Specialist at [address]) Appt Note: 10 f/u with Dr Duane Ralph  
 [address] [place] [address] [address] Upcoming Health Maintenance Date Due ZOSTER VACCINE AGE 60> 9/10/1990 GLAUCOMA SCREENING Q2Y 11/10/1995 Pneumococcal 65+ Low/Medium Risk (1 of 2 - PCV13) 11/10/1995 MEDICARE YEARLY EXAM 3/14/2018 Influenza Age 5 to Adult 8/1/2018 DTaP/Tdap/Td series (2 - Td) 9/10/2024 Allergies as of 6/6/2018  Review Complete On: 6/6/2018 By: Yahir Hare Severity Noted Reaction Type Reactions Beta-blockers (Beta-adrenergic Blocking Agts)  08/24/2015    Drowsiness Penicillins  06/13/2014    Swelling Statins-hmg-coa Reductase Inhibitors  08/24/2015    Drowsiness Current Immunizations  Reviewed on 1/29/2018 Name Date Influenza Vaccine 10/1/2017 Tdap 9/10/2014  9:38 PM  
  
 Not reviewed this visit Vitals BP Pulse Height(growth percentile) Weight(growth percentile) SpO2 BMI  
 123/64 71 5' 9\" (1.753 m) 143 lb (64.9 kg) 96% 21.12 kg/m2 Smoking Status Former Smoker Vitals History BMI and BSA Data Body Mass Index Body Surface Area  
 21.12 kg/m 2 1.78 m 2 Preferred Pharmacy Pharmacy Name Phone [id number] [facility], [address] [place] [phone] Your Updated Medication List  
  
   
This list is accurate as of 6/6/18  9:35 AM.  Always use your most recent med list. amLODIPine 5 mg tablet Commonly known as:  Royce Diaz Take 1 Tab by mouth daily. apixaban 5 mg tablet Commonly known as:  Pattricia Boom Take 1 Tab by mouth two (2) times a day. chlorpheniramine-HYDROcodone 10-8 mg/5 mL suspension Commonly known as:  Amedeo Churn Take 5 mL by mouth nightly as needed for Cough. Max Daily Amount: 5 mL. enalapril 20 mg tablet Commonly known as:  Hulan Mad Take 20 mg by mouth daily. escitalopram oxalate 5 mg tablet Commonly known as:  Nola Rye Take 1 Tab by mouth daily. furosemide 20 mg tablet Commonly known as:  LASIX One pill once or twice a day  
  
 guaiFENesin  mg ER tablet Commonly known as:  Abelino & Abelino Take 1 Tab by mouth every twelve (12) hours. HYDROcodone-acetaminophen 5-325 mg per tablet Commonly known as:  Ruffus Homme Take 1 Tab by mouth every four (4) hours as needed for Pain. Max Daily Amount: 6 Tabs. isosorbide mononitrate ER 30 mg tablet Commonly known as:  IMDUR Take 1 Tab by mouth daily. LORazepam 1 mg tablet Commonly known as:  ATIVAN Take 1 Tab by mouth every six (6) hours as needed for Anxiety. Max Daily Amount: 4 mg.  
  
 metoprolol succinate 50 mg XL tablet Commonly known as:  TOPROL XL Take 1 Tab by mouth daily. nitroglycerin 0.4 mg SL tablet Commonly known as:  NITROSTAT  
1 Tab by SubLINGual route every five (5) minutes as needed for Chest Pain (up to 3 total 1 every 5 min). pantoprazole 40 mg tablet Commonly known as:  PROTONIX Take 1 Tab by mouth Daily (before breakfast). ranolazine  mg SR tablet Commonly known as:  RANEXA Take 1 Tab by mouth two (2) times a day. Indications: Chronic Stable Angina Pectoris  
  
 tiotropium 18 mcg inhalation capsule Commonly known as:  Jim Cage Take 1 Cap by inhalation every twenty-four (24) hours. Follow-up Instructions Return in about 10 weeks (around 8/15/2018). Introducing [facility]! Dear Orlando Pope: Thank you for requesting a [employer] account. Our records indicate that you already have an active [employer]OD[employer] account. You can access your account anytime at [email or web address] Did you know that you can access your hospital and ER discharge instructions at any time in [employer]O? You can also review all of your test results from your hospital stay or ER visit. Additional Information If you have questions, please visit the Frequently Asked Questions section of the [employer] website at [email or web address] Remember, [employer]OMODO is NOT to be used for urgent needs. For medical emergencies, dial 911. Now available from your iPhone and Android! Please provide this summary of care documentation to your next provider. Your primary care clinician is listed as Angelica Shay. If you have any questions after today's visit, please call [phone].

## 2018-06-22 NOTE — PROGRESS NOTES
Subjective:   Mr. César Lee is here for hospital follow up.      This is an 22-year-old man that has a history of hypertension, coronary artery disease, dyslipidemia, prior coronary bypass grafting, cardiomyopathy, atrial fibrillation, celiac artery stenosis, pacemaker, renal artery stenosis and congestive heart failure.      The patient had prior coronary bypass grafting in 2008. Noberto Dean to his coronary bypass surgery, he was experiencing primarily easy fatigability.  He has had repeat cardiac catheterizations since the time of his surgery, the last of which was done in 2016. At that time, medical therapy was advised. He had a nuclear stress test on 9/27/2017 that did not show evidence for ischemia and his EF was normal. His most recent echo was Jan 2018 with an EF of 55% with mild to moderate aortic stenosis with mean gradient 14 mmHg and GERBER 1cm, also had severe TR and mild-moderate pulmonic insufficiency. He had a stay at [facility] for presyncope and profound weakness and was discharged 01/21/18. Of note during his hospitalization, he was seen by vascular surgery for celiac artery stenosis. They deemed that his symptomology was unlikely related to mesenteric ischemia. Subsequently he went to rehab for a one week stay. He has some occasional chest discomfort which is unchanged in pattern. If he is more active on a given day, he is much more fatigued the next day. Patient's cardiac risk factors are dyslipidemia, male gender, hypertension.         Patient Active Problem List    Diagnosis Date Noted    Aortic stenosis, moderate 01/28/2018    Abnormal chest CT 01/27/2018    Pleural effusion, right 01/26/2018    SOB (shortness of breath) on exertion 01/25/2018    Syncope and collapse 01/19/2018    Celiac artery stenosis (HCC) 01/10/2018    Osteoarthritis of both knees 08/20/2017    Statin intolerance 05/03/2017    Essential hypertension 02/27/2017    Anxiety 02/27/2017    Chronic systolic congestive heart failure ([facility] 01/25/2017    S/P CABG x 2 08/25/2015    Atherosclerotic NAHED (renal artery stenosis), bilateral ([facility] 08/25/2015    Dyslipidemia     Coronary artery disease of native artery of native heart with stable angina pectoris (HCC)     Cardiomyopathy     Atrial fibrillation     Sick sinus syndrome      Current Outpatient Prescriptions   Medication Sig Dispense Refill    HYDROcodone-acetaminophen (NORCO) 5-325 mg per tablet Take 1 Tab by mouth every four (4) hours as needed for Pain. Max Daily Amount: 6 Tabs. 10 Tab 0    apixaban (ELIQUIS) 5 mg tablet Take 1 Tab by mouth two (2) times a day. 60 Tab 5    amLODIPine (NORVASC) 5 mg tablet Take 1 Tab by mouth daily. 60 Tab 0    furosemide (LASIX) 20 mg tablet One pill once or twice a day 60 Tab 0    isosorbide mononitrate ER (IMDUR) 30 mg tablet Take 1 Tab by mouth daily. 60 Tab 0    metoprolol succinate (TOPROL XL) 50 mg XL tablet Take 1 Tab by mouth daily. 60 Tab 0    nitroglycerin (NITROSTAT) 0.4 mg SL tablet 1 Tab by SubLINGual route every five (5) minutes as needed for Chest Pain (up to 3 total 1 every 5 min). 60 Tab 0    pantoprazole (PROTONIX) 40 mg tablet Take 1 Tab by mouth Daily (before breakfast). 60 Tab 0    ranolazine ER (RANEXA) 500 mg SR tablet Take 1 Tab by mouth two (2) times a day. Indications: Chronic Stable Angina Pectoris 60 Tab 0    chlorpheniramine-HYDROcodone (TUSSIONEX) 10-8 mg/5 mL suspension Take 5 mL by mouth nightly as needed for Cough. Max Daily Amount: 5 mL. 60 mL 0    tiotropium (SPIRIVA) 18 mcg inhalation capsule Take 1 Cap by inhalation every twenty-four (24) hours. 30 Cap 0    guaiFENesin ER (MUCINEX) 600 mg ER tablet Take 1 Tab by mouth every twelve (12) hours. 60 Tab 0    LORazepam (ATIVAN) 1 mg tablet Take 1 Tab by mouth every six (6) hours as needed for Anxiety. Max Daily Amount: 4 mg. 30 Tab 0    enalapril (VASOTEC) 20 mg tablet Take 20 mg by mouth daily.       escitalopram oxalate (LEXAPRO) 5 mg tablet TAKE 1 TABLET BY MOUTH DAILY. 30 Tab 1     Allergies   Allergen Reactions    Beta-Blockers (Beta-Adrenergic Blocking Agts) Drowsiness    Penicillins Swelling    Statins-Hmg-Coa Reductase Inhibitors Drowsiness     Past Medical History:   Diagnosis Date    Atrial fibrillation     CHADS score 3  (+CHF, +HTN, +AGE, -DM, -CVA)    CABG     2008   LIMA - LAD,   SVG - RCA    Cardiomyopathy     EF 30-35% (ECHO 6/14)    Coronary artery disease     Dyslipidemia     Hypertension     Hypothyroid     Pacemaker     Peripheral vascular disease     Renal artery stenosis     bilateral stents    Sick sinus syndrome      Past Surgical History:   Procedure Laterality Date    HX CORONARY ARTERY BYPASS GRAFT      2008   LIMA - LAD,   SVG - RCA     No family history on file. History   Smoking Status    Former Smoker   Smokeless Tobacco    Never Used          Review of Systems, additional:  Constitutional: negative  Eyes: negative  Respiratory: negative for dyspnea on exertion  Cardiovascular: per HPI  Gastrointestinal: negative for nausea and vomiting  Musculoskeletal:negative  Neurological: negative  Behvioral/Psych: negative  Endocrine: negative  ENT: negative    Objective:     Visit Vitals    /64    Pulse 71    Ht 5' 9\" (1.753 m)    Wt 143 lb (64.9 kg)    SpO2 96%    BMI 21.12 kg/m2     General:  alert, cooperative, no distress, appears stated age   Chest Wall: inspection normal - no chest wall deformities or tenderness, respiratory effort normal   Lung: clear to auscultation bilaterally   Heart:  irregularly irregular rhythm with rate 66. Grade II/VI SERENE best heard RSB   Abdomen: soft, non-tender. Bowel sounds normal. No masses,  no organomegaly   Extremities: extremities normal, atraumatic, no cyanosis or edema Skin: no rashes   Neuro: alert, oriented, normal speech, no focal findings or movement disorder noted         Assessment/Plan:         ICD-10-CM ICD-9-CM    1. Coronary artery disease of native artery of native heart with stable angina pectoris ([facility]- -had CABGx2 in 2008. Nuclear stress in Sept 2017 without evidence of ischemia. Occasional  anginal symptoms with exertion, stable in pattern, Continue with Toprol. Not on statin due to intolerance. I25.118 414.01      413.9    2. Ischemic cardiomyopathy--improved with normal EF on echo in January 2018. RT 10 weeks I25.5 414.8    3. Chronic atrial fibrillation ([facility]- -controlled with rate 66 in office today. Continue Eliquis and Toprol. I48.2 427.31    4. Sick sinus syndrome-- PPM  I49.5 427.81    5. 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         428.0    6. Essential hypertension-- continue Toprol I10 401.9    7. [facility]ac [facility] stenosis ([facility]- -seen by vascular surgery, no plans for OR. I77.4 447.4    8. Aortic stenosis, moderate-- follow up echo in 3-6 months. Stable on exam today. I35.0 424.1    9       Anxiety, refused Lexapro.

## 2018-07-11 PROBLEM — R55 SYNCOPE: Status: ACTIVE | Noted: 2018-01-01

## 2018-07-11 NOTE — ED PROVIDER NOTES
HPI Comments: Maged Velazco is a 80 y.o. Male with h/o cad, afib, sick sinus, celiac artery stenosis, with c/o feeling increasingly weak today, and had brief syncopal episode per wife after he stood up. Pt denies any cp, fcs, nvd. Has been having intermittent upper abd pain for last several weeks. Denies blood in stool, melena. Also c/o increased fatigue, sob but no increased leg swelling. Sx exacerbated with activity. The history is provided by the patient, medical records and the EMS personnel. Past Medical History:   Diagnosis Date    Atrial fibrillation     CHADS score 3  (+CHF, +HTN, +AGE, -DM, -CVA)    CABG     2008   LIMA - LAD,   SVG - RCA    Cardiomyopathy     EF 30-35% (ECHO 6/14)    Coronary artery disease     Dyslipidemia     Hypertension     Hypothyroid     Pacemaker     Peripheral vascular disease     Renal artery stenosis     bilateral stents    Sick sinus syndrome        Past Surgical History:   Procedure Laterality Date    HX CORONARY ARTERY BYPASS GRAFT      2008   LIMA - LAD,   SVG - RCA         History reviewed. No pertinent family history. Social History     Social History    Marital status:      Spouse name: N/A    Number of children: N/A    Years of education: N/A     Occupational History    Not on file. Social History Main Topics    Smoking status: Former Smoker    Smokeless tobacco: Never Used    Alcohol use No    Drug use: No    Sexual activity: Not on file     Other Topics Concern    Not on file     Social History Narrative         ALLERGIES: Beta-blockers (beta-adrenergic blocking agts); Penicillins; and Statins-hmg-coa reductase inhibitors    Review of Systems   Constitutional: Positive for appetite change and fatigue. Negative for diaphoresis. HENT: Negative for sore throat and trouble swallowing. Eyes: Negative for visual disturbance. Respiratory: Positive for shortness of breath.     Cardiovascular: Positive for palpitations and leg swelling. Negative for chest pain. Gastrointestinal: Positive for abdominal pain. Endocrine: Negative for polyuria. Genitourinary: Negative for difficulty urinating. Musculoskeletal: Positive for gait problem. Skin: Negative for wound. Allergic/Immunologic: Negative for immunocompromised state. Neurological: Positive for syncope. Negative for seizures. Hematological: Bruises/bleeds easily. Psychiatric/Behavioral: Negative for sleep disturbance. Vitals:    07/11/18 1857 07/11/18 1900 07/11/18 1930 07/11/18 2030   BP:  153/81 145/75 170/80   Pulse: 70 68 69 70   Resp: 18 18 17 23   SpO2: 97% 98% 99% 100%   Weight: 60.8 kg (134 lb)      Height: 5' 9\" (1.753 m)               Physical Exam   Constitutional: He is oriented to person, place, and time. Non-toxic appearance. He does not have a sickly appearance. He appears ill. He appears distressed. HENT:   Head: Normocephalic and atraumatic. Right Ear: External ear normal.   Left Ear: External ear normal.   Nose: Nose normal.   Mouth/Throat: Oropharynx is clear and moist. No oropharyngeal exudate. Eyes: Conjunctivae are normal.   Neck: Normal range of motion. Cardiovascular: Normal rate, regular rhythm and intact distal pulses. Occasional extrasystoles are present. Murmur heard. Systolic murmur is present with a grade of 4/6   Pulmonary/Chest: Effort normal. No respiratory distress. He has rales (bases). Abdominal: Soft. There is no tenderness. Musculoskeletal: Normal range of motion. He exhibits edema (1+ le). Neurological: He is alert and oriented to person, place, and time. Skin: Skin is warm and dry. He is not diaphoretic. Psychiatric: His behavior is normal.   Nursing note and vitals reviewed.        [facility]      ED Course       Procedures  Vitals:  Patient Vitals for the past 12 hrs:   Pulse Resp BP SpO2   07/11/18 2030 70 23 170/80 100 %   07/11/18 1930 69 17 145/75 99 %   07/11/18 1900 68 18 153/81 98 %   07/11/18 1857 70 18 - 97 %   07/11/18 1850 - - 153/82 -         Medications ordered:   Medications   0.9% sodium chloride infusion (50 mL/hr IntraVENous New Bag 7/11/18 1903)   calcium gluconate 1 gram in 0.9% sodium chloride 50 ml infusion (1,000 mg IntraVENous New Bag 7/11/18 2012)   sodium chloride 0.9 % bolus infusion 250 mL (0 mL IntraVENous IV Completed 7/11/18 1905)         Lab findings:  Recent Results (from the past 12 hour(s))   EKG, 12 LEAD, INITIAL    Collection Time: 07/11/18  6:54 PM   Result Value Ref Range    Ventricular Rate 70 BPM    Atrial Rate 340 BPM    QRS Duration 178 ms    Q-T Interval 478 ms    QTC Calculation (Bezet) 516 ms    Calculated R Axis -80 degrees    Calculated T Axis 102 degrees    Diagnosis       Electronic ventricular pacemaker  When compared with ECG of 10-APR-2018 13:48,  No significant change was found     CBC WITH AUTOMATED DIFF    Collection Time: 07/11/18  7:00 PM   Result Value Ref Range    WBC 5.0 4.6 - 13.2 K/uL    RBC 4.33 (L) 4.70 - 5.50 M/uL    HGB 11.1 (L) 13.0 - 16.0 g/dL    HCT 36.9 36.0 - 48.0 %    MCV 85.2 74.0 - 97.0 FL    MCH 25.6 24.0 - 34.0 PG    MCHC 30.1 (L) 31.0 - 37.0 g/dL    RDW 19.7 (H) 11.6 - 14.5 %    PLATELET 501 066 - 348 K/uL    MPV 9.6 9.2 - 11.8 FL    NEUTROPHILS 74 (H) 40 - 73 %    LYMPHOCYTES 14 (L) 21 - 52 %    MONOCYTES 11 (H) 3 - 10 %    EOSINOPHILS 1 0 - 5 %    BASOPHILS 0 0 - 2 %    ABS. NEUTROPHILS 3.7 1.8 - 8.0 K/UL    ABS. LYMPHOCYTES 0.7 (L) 0.9 - 3.6 K/UL    ABS. MONOCYTES 0.5 0.05 - 1.2 K/UL    ABS. EOSINOPHILS 0.1 0.0 - 0.4 K/UL    ABS.  BASOPHILS 0.0 0.0 - 0.1 K/UL    DF AUTOMATED     PROTHROMBIN TIME + INR    Collection Time: 07/11/18  7:00 PM   Result Value Ref Range    Prothrombin time 21.6 (H) 11.5 - 15.2 sec    INR 2.0 (H) 0.8 - 1.2     METABOLIC PANEL, COMPREHENSIVE    Collection Time: 07/11/18  7:00 PM   Result Value Ref Range    Sodium 137 136 - 145 mmol/L    Potassium 4.9 3.5 - 5.5 mmol/L    Chloride 105 100 - 108 mmol/L    CO2 24 21 - 32 mmol/L    Anion gap 8 3.0 - 18 mmol/L    Glucose 98 74 - 99 mg/dL    BUN 36 (H) 7.0 - 18 MG/DL    Creatinine 1.53 (H) 0.6 - 1.3 MG/DL    BUN/Creatinine ratio 24 (H) 12 - 20      GFR est AA 52 (L) >60 ml/min/1.73m2    GFR est non-AA 43 (L) >60 ml/min/1.73m2    Calcium 8.3 (L) 8.5 - 10.1 MG/DL    Bilirubin, total 0.7 0.2 - 1.0 MG/DL    ALT (SGPT) 17 16 - 61 U/L    AST (SGOT) 24 15 - 37 U/L    Alk. phosphatase 126 (H) 45 - 117 U/L    Protein, total 7.3 6.4 - 8.2 g/dL    Albumin 3.4 3.4 - 5.0 g/dL    Globulin 3.9 2.0 - 4.0 g/dL    A-G Ratio 0.9 0.8 - 1.7     NT-PRO BNP    Collection Time: 07/11/18  7:00 PM   Result Value Ref Range    NT pro-BNP 6098 (H) 0 - 1800 PG/ML   MAGNESIUM    Collection Time: 07/11/18  7:00 PM   Result Value Ref Range    Magnesium 2.2 1.6 - 2.6 mg/dL   TROPONIN I    Collection Time: 07/11/18  7:00 PM   Result Value Ref Range    Troponin-I, Qt. 0.03 0.0 - 0.045 NG/ML   TSH 3RD GENERATION    Collection Time: 07/11/18  7:00 PM   Result Value Ref Range    TSH 5.30 (H) 0.36 - 3.74 uIU/mL   POC CHEM8    Collection Time: 07/11/18  7:03 PM   Result Value Ref Range    CO2, POC 24 19 - 24 MMOL/L    Glucose, POC 99 74 - 106 MG/DL    BUN, POC 32 (H) 7 - 18 MG/DL    Creatinine, POC 1.5 (H) 0.6 - 1.3 MG/DL    GFRAA, POC 54 (L) >60 ml/min/1.73m2    GFRNA, POC 44 (L) >60 ml/min/1.73m2    Sodium,  136 - 145 MMOL/L    Potassium, POC 4.8 3.5 - 5.5 MMOL/L    Calcium, ionized (POC) 1.07 (L) 1.12 - 1.32 mmol/L    Chloride,  100 - 108 MMOL/L    Anion gap, POC 16 10 - 20      Hematocrit, POC 37 36 - 49 %    Hemoglobin, POC 12.6 12 - 16 G/DL       EKG interpretation by ED Physician:  Paced rhythm. No acute st tw changes  Rate 70, qrs 178, qtc 516  No sig change from previous    Cardiac monitor: nl rate but occ willard. Reg rhythm.  occ ectopy  Pulse ox: 96% ra    X-Ray, CT or other radiology findings or impressions:  XR CHEST PORT    (Results Pending)   possible mild edema    Progress notes, Consult notes or additional Procedure notes:   Pt with periodic drop in hr with near syncopal sx. No drop in bp or recurrent syncope  D/w dr Edgar Quick on call for cardiology who will arrange consult and also will arrange interrogation of pacemaker  D/w Dr Diaz Rockwell who will admit    Reevaluation of patient:   demi    Disposition:  Diagnosis:   1. Syncope and collapse    2. Aortic valve stenosis, etiology of cardiac valve disease unspecified    3. Symptomatic bradycardia    4. Hypocalcemia        Disposition: admit    Follow-up Information     None            Patient's Medications   Start Taking    No medications on file   Continue Taking    AMLODIPINE (NORVASC) 5 MG TABLET    Take 1 Tab by mouth daily. APIXABAN (ELIQUIS) 5 MG TABLET    Take 1 Tab by mouth two (2) times a day. CHLORPHENIRAMINE-HYDROCODONE (TUSSIONEX) 10-8 MG/5 ML SUSPENSION    Take 5 mL by mouth nightly as needed for Cough. Max Daily Amount: 5 mL. ENALAPRIL (VASOTEC) 20 MG TABLET    Take 20 mg by mouth daily. ESCITALOPRAM OXALATE (LEXAPRO) 5 MG TABLET    TAKE 1 TABLET BY MOUTH DAILY. FUROSEMIDE (LASIX) 20 MG TABLET    One pill once or twice a day    GUAIFENESIN ER (MUCINEX) 600 MG ER TABLET    Take 1 Tab by mouth every twelve (12) hours. HYDROCODONE-ACETAMINOPHEN (NORCO) 5-325 MG PER TABLET    Take 1 Tab by mouth every four (4) hours as needed for Pain. Max Daily Amount: 6 Tabs. ISOSORBIDE MONONITRATE ER (IMDUR) 30 MG TABLET    Take 1 Tab by mouth daily. LORAZEPAM (ATIVAN) 1 MG TABLET    Take 1 Tab by mouth every six (6) hours as needed for Anxiety. Max Daily Amount: 4 mg. METOPROLOL SUCCINATE (TOPROL XL) 50 MG XL TABLET    Take 1 Tab by mouth daily. NITROGLYCERIN (NITROSTAT) 0.4 MG SL TABLET    1 Tab by SubLINGual route every five (5) minutes as needed for Chest Pain (up to 3 total 1 every 5 min). PANTOPRAZOLE (PROTONIX) 40 MG TABLET    Take 1 Tab by mouth Daily (before breakfast).     RANOLAZINE ER (RANEXA) 500 MG SR TABLET Take 1 Tab by mouth two (2) times a day. Indications: Chronic Stable Angina Pectoris    TIOTROPIUM (SPIRIVA) 18 MCG INHALATION CAPSULE    Take 1 Cap by inhalation every twenty-four (24) hours.    These Medications have changed    No medications on file   Stop Taking    No medications on file

## 2018-07-11 NOTE — ED NOTES
Bedside shift change report given to [facility], RN (oncoming nurse) by Paula Mcdermott RN (offgoing nurse). Report included the following information SBAR.

## 2018-07-11 NOTE — ED NOTES
Called to pharmacy to verify calcium gluconate order. Per pharmacy, verifying and mixing medication. Will call ED when medication available.

## 2018-07-11 NOTE — IP AVS SNAPSHOT
Jean Malin 
 
 
 [id number] 
[phone] Patient: Clair Hernández MRN: [id number] A check wilner indicates which time of day the medication should be taken. My Medications CONTINUE taking these medications Instructions Each Dose to Equal  
 Morning Noon Evening Bedtime  
 amLODIPine 5 mg tablet Commonly known as:  Tad Nikki Your last dose was: Your next dose is: Take 1 Tab by mouth daily. 5 mg  
    
   
   
   
  
 apixaban 5 mg tablet Commonly known as:  Chance Kearney Your last dose was: Your next dose is: Take 1 Tab by mouth two (2) times a day. 5 mg  
    
   
   
   
  
 enalapril 20 mg tablet Commonly known as:  Any Liz Your last dose was: Your next dose is: Take 20 mg by mouth daily. 20 mg  
    
   
   
   
  
 escitalopram oxalate 5 mg tablet Commonly known as:  Herron Denluigi Your last dose was: Your next dose is: TAKE 1 TABLET BY MOUTH DAILY. furosemide 20 mg tablet Commonly known as:  LASIX Your last dose was: Your next dose is: One pill once or twice a day  
     
   
   
   
  
 guaiFENesin  mg ER tablet Commonly known as:  Abelino & Abelino Your last dose was: Your next dose is: Take 1 Tab by mouth every twelve (12) hours. 600 mg HYDROcodone-acetaminophen 5-325 mg per tablet Commonly known as:  Joie Rashid Your last dose was: Your next dose is: Take 1 Tab by mouth every four (4) hours as needed for Pain. Max Daily Amount: 6 Tabs. 1 Tab  
    
   
   
   
  
 isosorbide mononitrate ER 30 mg tablet Commonly known as:  IMDUR Your last dose was: Your next dose is: Take 1 Tab by mouth daily.   
 30 mg  
    
   
   
   
  
 LORazepam 1 mg tablet Commonly known as:  ATIVAN Your last dose was: Your next dose is: Take 1 Tab by mouth every six (6) hours as needed for Anxiety. Max Daily Amount: 4 mg.  
 1 mg  
    
   
   
   
  
 metoprolol succinate 50 mg XL tablet Commonly known as:  TOPROL XL Your last dose was: Your next dose is: Take 1 Tab by mouth daily. 50 mg  
    
   
   
   
  
 nitroglycerin 0.4 mg SL tablet Commonly known as:  NITROSTAT Your last dose was: Your next dose is:    
   
   
 1 Tab by SubLINGual route every five (5) minutes as needed for Chest Pain (up to 3 total 1 every 5 min). 0.4 mg  
    
   
   
   
  
 pantoprazole 40 mg tablet Commonly known as:  PROTONIX Your last dose was: Your next dose is: Take 1 Tab by mouth Daily (before breakfast). 40 mg  
    
   
   
   
  
 ranolazine  mg SR tablet Commonly known as:  RANEXA Your last dose was: Your next dose is: Take 1 Tab by mouth two (2) times a day. Indications: Chronic Stable Angina Pectoris 500 mg  
    
   
   
   
  
 tiotropium 18 mcg inhalation capsule Commonly known as:  Jeremiah Jack Your last dose was: Your next dose is: Take 1 Cap by inhalation every twenty-four (24) hours. 1 Cap STOP taking these medications   
 chlorpheniramine-HYDROcodone 10-8 mg/5 mL suspension Commonly known as:  Eric Jerez

## 2018-07-11 NOTE — IP AVS SNAPSHOT
[address] [id number] Shannon Suh  
[phone] Patient: Mari Latif MRN: [id number] About your hospitalization You were admitted on:  July 11, 2018 You last received care in the:  [facility] 3 [address] You were discharged on:  July 13, 2018 Why you were hospitalized Your primary diagnosis was:  Syncope Your diagnoses also included:  Chronic Systolic Congestive Heart Failure (Hcc) Follow-up Information Follow up With Details Comments Contact Info Darlene Yarbrough MD In 1 week Follow up  [address] [place] [address] 
[phone] Quique Gordon MD Schedule an appointment as soon as possible for a visit in 1 week Follow up  [address] [address] [facility] [place] [id number] [phone] Your Scheduled Appointments Wednesday September 05, 2018  9:00 AM EDT Follow Up with Quique Gordon MD  
Cardio Specialist at [facility]/[address] [address]  
[phone] Discharge Orders None A check wilner indicates which time of day the medication should be taken. My Medications CONTINUE taking these medications Instructions Each Dose to Equal  
 Morning Noon Evening Bedtime  
 amLODIPine 5 mg tablet Commonly known as:  Srinivas Jovany Your last dose was: Your next dose is: Take 1 Tab by mouth daily. 5 mg  
    
   
   
   
  
 apixaban 5 mg tablet Commonly known as:  Amanda Gee Your last dose was: Your next dose is: Take 1 Tab by mouth two (2) times a day. 5 mg  
    
   
   
   
  
 enalapril 20 mg tablet Commonly known as:  Jacki Barba Your last dose was: Your next dose is: Take 20 mg by mouth daily. 20 mg  
    
   
   
   
  
 escitalopram oxalate 5 mg tablet Commonly known as:  Shawna Lacey Your last dose was: Your next dose is: TAKE 1 TABLET BY MOUTH DAILY. furosemide 20 mg tablet Commonly known as:  LASIX Your last dose was: Your next dose is: One pill once or twice a day  
     
   
   
   
  
 guaiFENesin  mg ER tablet Commonly known as:  Abelino & Abelino Your last dose was: Your next dose is: Take 1 Tab by mouth every twelve (12) hours. 600 mg HYDROcodone-acetaminophen 5-325 mg per tablet Commonly known as:  Isai Tran Your last dose was: Your next dose is: Take 1 Tab by mouth every four (4) hours as needed for Pain. Max Daily Amount: 6 Tabs. 1 Tab  
    
   
   
   
  
 isosorbide mononitrate ER 30 mg tablet Commonly known as:  IMDUR Your last dose was: Your next dose is: Take 1 Tab by mouth daily. 30 mg LORazepam 1 mg tablet Commonly known as:  ATIVAN Your last dose was: Your next dose is: Take 1 Tab by mouth every six (6) hours as needed for Anxiety. Max Daily Amount: 4 mg.  
 1 mg  
    
   
   
   
  
 metoprolol succinate 50 mg XL tablet Commonly known as:  TOPROL XL Your last dose was: Your next dose is: Take 1 Tab by mouth daily. 50 mg  
    
   
   
   
  
 nitroglycerin 0.4 mg SL tablet Commonly known as:  NITROSTAT Your last dose was: Your next dose is:    
   
   
 1 Tab by SubLINGual route every five (5) minutes as needed for Chest Pain (up to 3 total 1 every 5 min). 0.4 mg  
    
   
   
   
  
 pantoprazole 40 mg tablet Commonly known as:  PROTONIX Your last dose was: Your next dose is: Take 1 Tab by mouth Daily (before breakfast). 40 mg  
    
   
   
   
  
 ranolazine  mg SR tablet Commonly known as:  RANEXA Your last dose was: Your next dose is: Take 1 Tab by mouth two (2) times a day. Indications: Chronic Stable Angina Pectoris 500 mg  
    
   
   
   
  
 tiotropium 18 mcg inhalation capsule Commonly known as:  Noe Snow Your last dose was: Your next dose is: Take 1 Cap by inhalation every twenty-four (24) hours. 1 Cap STOP taking these medications   
 chlorpheniramine-HYDROcodone 10-8 mg/5 mL suspension Commonly known as:  Venkata Miller Opioid Education Prescription Opioids: What You Need to Know: 
 
 
 
What to do at Home: 
Recommended activity: Activity as tolerated. If you experience any of the following symptoms shortness of breath, difficulty breathing, bleeding, temperature greater than 100.5, nausea, vomiting, diarrhea, weight gain of greater than 5 pounds in a week, fainting or lightheadedness please follow up with your primary care physician or call 911. *  Please give a list of your current medications to your Primary Care Provider. *  Please update this list whenever your medications are discontinued, doses are 
    changed, or new medications (including over-the-counter products) are added. *  Please carry medication information at all times in case of emergency situations. These are general instructions for a healthy lifestyle: No smoking/ No tobacco products/ Avoid exposure to second hand smoke Surgeon General's Warning:  Quitting smoking now greatly reduces serious risk to your health. Obesity, smoking, and sedentary lifestyle greatly increases your risk for illness A healthy diet, regular physical exercise & weight monitoring are important for maintaining a healthy lifestyle You may be retaining fluid if you have a history of heart failure or if you experience any of the following symptoms:  Weight gain of 3 pounds or more overnight or 5 pounds in a week, increased swelling in our hands or feet or shortness of breath while lying flat in bed. Please call your doctor as soon as you notice any of these symptoms; do not wait until your next office visit. Recognize signs and symptoms of STROKE: 
 
F-face looks uneven A-arms unable to move or move unevenly S-speech slurred or non-existent T-time-call 911 as soon as signs and symptoms begin-DO NOT go Back to bed or wait to see if you get better-TIME IS BRAIN. Warning Signs of HEART ATTACK Call 911 if you have these symptoms: 
? Chest discomfort. Most heart attacks involve discomfort in the center of the chest that lasts more than a few minutes, or that goes away and comes back. It can feel like uncomfortable pressure, squeezing, fullness, or pain. ? Discomfort in other areas of the upper body. Symptoms can include pain or discomfort in one or both arms, the back, neck, jaw, or stomach. ? Shortness of breath with or without chest discomfort. ? Other signs may include breaking out in a cold sweat, nausea, or lightheadedness. Don't wait more than five minutes to call [address]! Fast action can save your life. Calling 911 is almost always the fastest way to get lifesaving treatment. Emergency Medical Services staff can begin treatment when they arrive  up to an hour sooner than if someone gets to the hospital by car. The discharge information has been reviewed with the patient. The patient verbalized understanding. Discharge medications reviewed with the patient and appropriate educational materials and side effects teaching were provided. ___________________________________________________________________________________________________________________________________ ParaEngineharTraity Announcement We are excited to announce that we are making your provider's discharge notes available to you in Motion Engine. You will see these notes when they are completed and signed by the physician that discharged you from your recent hospital stay. If you have any questions or concerns about any information you see in Motion Engine, please call the Health Information Department where you were seen or reach out to your Primary Care Provider for more information about your plan of care. Introducing [facility]! Dear Luisana Dennis: Thank you for requesting a Motion Engine account. Our records indicate that you already have an active Motion Engine account. You can access your account anytime at [email or web address] Did you know that you can access your hospital and ER discharge instructions at any time in Motion Engine? You can also review all of your test results from your hospital stay or ER visit. Additional Information If you have questions, please visit the Frequently Asked Questions section of the Motion Engine website at [email or web address] Remember, Motion Engine is NOT to be used for urgent needs. For medical emergencies, dial 911. Now available from your iPhone and Android! Introducing Westley Pimentel As a [facility] patient, I wanted to make you aware of our electronic visit tool called Westley Pimentel. [facility] 24/7 allows you to connect within minutes with a medical provider 24 hours a day, seven days a week via a mobile device or tablet or logging into a secure website from your computer. You can access Westley Pimentel from anywhere in the [place].  
 
A virtual visit might be right for you when you have a simple condition and feel like you just dont want to get out of bed, or cant get away from work for an appointment, when your regular [facility] provider is not available (evenings, weekends or holidays), or when youre out of town and need minor care. Electronic visits cost only $49 and if the [facility] 24/7 provider determines a prescription is needed to treat your condition, one can be electronically transmitted to a nearby pharmacy*. Please take a moment to enroll today if you have not already done so. The enrollment process is free and takes just a few minutes. To enroll, please download the [employer] lisandro to your tablet or phone, or visit [email or web address] to enroll on your computer. And, as an [address] [place] [address] patient with a [employer]ingaya account, the results of your visits will be scanned into your electronic medical record and your primary care provider will be able to view the scanned results. We urge you to continue to see your regular [facility] provider for your ongoing medical care. And while your primary care provider may not be the one available when you seek a SecureKey Technologieshayleyfin virtual visit, the peace of mind you get from getting a real diagnosis real time can be priceless. For more information on Branded Online, view our Frequently Asked Questions (FAQs) at [email or web address]. Sincerely, 
 
Mao Sommer MD 
Chief Medical Officer [address] *:  certain medications cannot be prescribed via Branded Online Providers Seen During Your Hospitalization Provider Specialty Primary office phone Chanelle Cortes MD Emergency Medicine [phone] Gracie Reece MD Internal Medicine [phone] Denney Duane, DO Internal Medicine [phone] Your Primary Care Physician (PCP) Primary Care Physician Office Phone Office Fax Jose Mccarthy [address] [phone] You are allergic to the following Allergen Reactions Beta-Blockers (Beta-Adrenergic Blocking Agts) Drowsiness Penicillins Swelling Statins-Hmg-Coa Reductase Inhibitors Drowsiness Recent Documentation Height Weight BMI Smoking Status 1.753 m 62.6 kg 20.38 kg/m2 Former Smoker Emergency Contacts Name Discharge Info Relation Home Work Mobile [address]arson CAREGIVER [3] Spouse [3] [phone] Patient Belongings The following personal items are in your possession at time of discharge: 
  Dental Appliances: None  Visual Aid: Glasses, At bedside, With patient      Home Medications: None   Jewelry: Watch, Bracelet  Clothing: Shirt, Pants, Undergarments, At bedside, Footwear, Socks    Other Valuables: Cane, [employer]aya, Leno Sukhwinder, Other (comment) (knee braces and compression stalking bilateral) Discharge Instructions Attachments/References LIGHTHEADEDNESS OR FAINTNESS (ENGLISH) HEART FAILURE (ENGLISH) HEART FAILURE: AVOIDING TRIGGERS (ENGLISH) HEART FAILURE: LIMITING SODIUM AND FLUIDS (ENGLISH) Patient Handouts Lightheadedness or Faintness: Care Instructions Your Care Instructions Lightheadedness is a feeling that you are about to faint or \"pass out. \" You do not feel as if you or your surroundings are moving. It is different from vertigo, which is the feeling that you or things around you are spinning or tilting. Lightheadedness usually goes away or gets better when you lie down. If lightheadedness gets worse, it can lead to a fainting spell. It is common to feel lightheaded from time to time. Lightheadedness usually is not caused by a serious problem. It often is caused by a short-lasting drop in blood pressure and blood flow to your head that occurs when you get up too quickly from a seated or lying position. Follow-up care is a key part of your treatment and safety.  Be sure to make and go to all appointments, and call your doctor if you are having problems. It's also a good idea to know your test results and keep a list of the medicines you take. How can you care for yourself at home? · Lie down for 1 or 2 minutes when you feel lightheaded. After lying down, sit up slowly and remain sitting for 1 to 2 minutes before slowly standing up. · Avoid movements, positions, or activities that have made you lightheaded in the past. 
· Get plenty of rest, especially if you have a cold or flu, which can cause lightheadedness. · Make sure you drink plenty of fluids, especially if you have a fever or have been sweating. · Do not drive or put yourself and others in danger while you feel lightheaded. When should you call for help? Call 911 anytime you think you may need emergency care. For example, call if: 
  · You have symptoms of a stroke. These may include: 
¨ Sudden numbness, tingling, weakness, or loss of movement in your face, arm, or leg, especially on only one side of your body. ¨ Sudden vision changes. ¨ Sudden trouble speaking. ¨ Sudden confusion or trouble understanding simple statements. ¨ Sudden problems with walking or balance. ¨ A sudden, severe headache that is different from past headaches.  
  · You have symptoms of a heart attack. These may include: ¨ Chest pain or pressure, or a strange feeling in the chest. 
¨ Sweating. ¨ Shortness of breath. ¨ Nausea or vomiting. ¨ Pain, pressure, or a strange feeling in the back, neck, jaw, or upper belly or in one or both shoulders or arms. ¨ Lightheadedness or sudden weakness. ¨ A fast or irregular heartbeat. After you call 911, the  may tell you to chew 1 adult-strength or 2 to 4 low-dose aspirin. Wait for an ambulance. Do not try to drive yourself.  
 Watch closely for changes in your health, and be sure to contact your doctor if: 
  · Your lightheadedness gets worse or does not get better with home care. Where can you learn more? Go to http://mitra-shaw.info/. Enter I461 in the search box to learn more about \"Lightheadedness or Faintness: Care Instructions. \" Current as of: November 20, 2017 Content Version: 11.7 © 9822-4533 [email or web address]. Care instructions adapted under license by [employer] (which disclaims liability or warranty for this information). If you have questions about a medical condition or this instruction, always ask your healthcare professional. [address] any warranty or liability for your use of this information. Heart Failure: Care Instructions Your Care Instructions Heart failure occurs when your heart does not pump as much blood as the body needs. Failure does not mean that the heart has stopped pumping but rather that it is not pumping as well as it should. Over time, this causes fluid buildup in your lungs and other parts of your body. Fluid buildup can cause shortness of breath, fatigue, swollen ankles, and other problems. By taking medicines regularly, reducing sodium (salt) in your diet, checking your weight every day, and making lifestyle changes, you can feel better and live longer. Follow-up care is a key part of your treatment and safety. Be sure to make and go to all appointments, and call your doctor if you are having problems. It's also a good idea to know your test results and keep a list of the medicines you take. How can you care for yourself at home? Medicines 
  · Be safe with medicines. Take your medicines exactly as prescribed. Call your doctor if you think you are having a problem with your medicine.  
  · Do not take any vitamins, over-the-counter medicine, or herbal products without talking to your doctor first. Sherie Scott not take ibuprofen (Advil or Motrin) and naproxen (Aleve) without talking to your doctor first. They could make your heart failure worse.  
  · You may be taking some of the following medicine. ¨ Beta-blockers can slow heart rate, decrease blood pressure, and improve your condition. Taking a beta-blocker may lower your chance of needing to be hospitalized. ¨ Angiotensin-converting enzyme inhibitors (ACEIs) reduce the heart's workload, lower blood pressure, and reduce swelling. Taking an ACEI may lower your chance of needing to be hospitalized again. ¨ Angiotensin II receptor blockers (ARBs) work like ACEIs. Your doctor may prescribe them instead of ACEIs. ¨ Diuretics, also called water pills, reduce swelling. ¨ Potassium supplements replace this important mineral, which is sometimes lost with diuretics. ¨ Aspirin and other blood thinners prevent blood clots, which can cause a stroke or heart attack.  
 You will get more details on the specific medicines your doctor prescribes. Diet 
  · Your doctor may suggest that you limit sodium to 2,000 milligrams (mg) a day or less. That is less than 1 teaspoon of salt a day, including all the salt you eat in cooking or in packaged foods. People get most of their sodium from processed foods. Fast food and restaurant meals also tend to be very high in sodium.  
  · Ask your doctor how much liquid you can drink each day. You may have to limit liquids.  
 Weight 
  · Weigh yourself without clothing at the same time each day. Record your weight. Call your doctor if you have a sudden weight gain, such as more than 2 to 3 pounds in a day or 5 pounds in a week. (Your doctor may suggest a different range of weight gain.) A sudden weight gain may mean that your heart failure is getting worse.  
 Activity level 
  · Start light exercise (if your doctor says it is okay). Even if you can only do a small amount, exercise will help you get stronger, have more energy, and manage your weight and your stress. Walking is an easy way to get exercise. Start out by walking a little more than you did before. Bit by bit, increase the amount you walk.   · When you exercise, watch for signs that your heart is working too hard. You are pushing yourself too hard if you cannot talk while you are exercising. If you become short of breath or dizzy or have chest pain, stop, sit down, and rest.  
  · If you feel \"wiped out\" the day after you exercise, walk slower or for a shorter distance until you can work up to a better pace.  
  · Get enough rest at night. Sleeping with 1 or 2 pillows under your upper body and head may help you breathe easier.  
 Lifestyle changes 
  · Do not smoke. Smoking can make a heart condition worse. If you need help quitting, talk to your doctor about stop-smoking programs and medicines. These can increase your chances of quitting for good. Quitting smoking may be the most important step you can take to protect your heart.  
  · Limit alcohol to 2 drinks a day for men and 1 drink a day for women. Too much alcohol can cause health problems.  
  · Avoid getting sick from colds and the flu. Get a pneumococcal vaccine shot. If you have had one before, ask your doctor whether you need another dose. Get a flu shot each year. If you must be around people with colds or the flu, wash your hands often. When should you call for help? Call 911 if you have symptoms of sudden heart failure such as: 
  · You have severe trouble breathing.  
  · You cough up pink, foamy mucus.  
  · You have a new irregular or rapid heartbeat.  
 Call your doctor now or seek immediate medical care if: 
  · You have new or increased shortness of breath.  
  · You are dizzy or lightheaded, or you feel like you may faint.  
  · You have sudden weight gain, such as more than 2 to 3 pounds in a day or 5 pounds in a week. (Your doctor may suggest a different range of weight gain.)  
  · You have increased swelling in your legs, ankles, or feet.  
  · You are suddenly so tired or weak that you cannot do your usual activities.  Watch closely for changes in your health, and be sure to contact your doctor if you develop new symptoms. Where can you learn more? Go to http://mitra-shaw.info/. Enter W752 in the search box to learn more about \"Heart Failure: Care Instructions. \" Current as of: May 10, 2017 Content Version: 11.7 © 3299-1995 [email or web address]. Care instructions adapted under license by [employer] (which disclaims liability or warranty for this information). If you have questions about a medical condition or this instruction, always ask your healthcare professional. [address] any warranty or liability for your use of this information. Avoiding Triggers With Heart Failure: Care Instructions Your Care Instructions Triggers are anything that make your heart failure flare up. A flare-up is also called \"sudden heart failure\" or \"acute heart failure. \" When you have a flare-up, fluid builds up in your lungs, and you have problems breathing. You might need to go to the hospital. By watching for changes in your condition and avoiding triggers, you can prevent heart failure flare-ups. Follow-up care is a key part of your treatment and safety. Be sure to make and go to all appointments, and call your doctor if you are having problems. It's also a good idea to know your test results and keep a list of the medicines you take. How can you care for yourself at home? Watch for changes in your weight and condition · Weigh yourself without clothing at the same time each day. Record your weight. Call your doctor if you have sudden weight gain, such as more than 2 to 3 pounds in a day or 5 pounds in a week. (Your doctor may suggest a different range of weight gain.) A sudden weight gain may mean that your heart failure is getting worse. · Keep a daily record of your symptoms.  Write down any changes in how you feel, such as new shortness of breath, cough, or problems eating. Also record if your ankles are more swollen than usual and if you feel more tired than usual. Note anything that you ate or did that could have triggered these changes. Limit sodium Sodium causes your body to hold on to extra water. This may cause your heart failure symptoms to get worse. People get most of their sodium from processed foods. Fast food and restaurant meals also tend to be very high in sodium. · Your doctor may suggest that you limit sodium to 2,000 milligrams (mg) a day or less. That is less than 1 teaspoon of salt a day, including all the salt you eat in cooking or in packaged foods. · Read food labels on cans and food packages. They tell you how much sodium you get in one serving. Check the serving size. If you eat more than one serving, you are getting more sodium. · Be aware that sodium can come in forms other than salt, including monosodium glutamate (MSG), sodium citrate, and sodium bicarbonate (baking soda). MSG is often added to Asian food. You can sometimes ask for food without MSG or salt. · Slowly reducing salt will help you adjust to the taste. Take the salt shaker off the table. · Flavor your food with garlic, lemon juice, onion, vinegar, herbs, and spices instead of salt. Do not use soy sauce, steak sauce, onion salt, garlic salt, mustard, or ketchup on your food, unless it is labeled \"low-sodium\" or \"low-salt. \" 
· Make your own salad dressings, sauces, and ketchup without adding salt. · Use fresh or frozen ingredients, instead of canned ones, whenever you can. Choose low-sodium canned goods. · Eat less processed food and food from restaurants, including fast food. Exercise as directed Moderate, regular exercise is very good for your heart. It improves your blood flow and helps control your weight. But too much exercise can stress your heart and cause a heart failure flare-up. · Check with your doctor before you start an exercise program. 
· Walking is an easy way to get exercise. Start out slowly. Gradually increase the length and pace of your walk. Swimming, riding a bike, and using a treadmill are also good forms of exercise. · When you exercise, watch for signs that your heart is working too hard. You are pushing yourself too hard if you cannot talk while you are exercising. If you become short of breath or dizzy or have chest pain, stop, sit down, and rest. 
· Do not exercise when you do not feel well. Take medicines correctly · Take your medicines exactly as prescribed. Call your doctor if you think you are having a problem with your medicine. · Make a list of all the medicines you take. Include those prescribed to you by other doctors and any over-the-counter medicines, vitamins, or supplements you take. Take this list with you when you go to any doctor. · Take your medicines at the same time every day. It may help you to post a list of all the medicines you take every day and what time of day you take them. · Make taking your medicine as simple as you can. Plan times to take your medicines when you are doing other things, such as eating a meal or getting ready for bed. This will make it easier to remember to take your medicines. · Get organized. Use helpful tools, such as daily or weekly pill containers. When should you call for help? Call 911 if you have symptoms of sudden heart failure such as: 
  · You have severe trouble breathing.  
  · You cough up pink, foamy mucus.  
  · You have a new irregular or rapid heartbeat.  
 Call your doctor now or seek immediate medical care if: 
  · You have new or increased shortness of breath.  
  · You are dizzy or lightheaded, or you feel like you may faint.  
  · You have sudden weight gain, such as more than 2 to 3 pounds in a day or 5 pounds in a week. (Your doctor may suggest a different range of weight gain.)   · You have increased swelling in your legs, ankles, or feet.  
  · You are suddenly so tired or weak that you cannot do your usual activities.  
 Watch closely for changes in your health, and be sure to contact your doctor if you develop new symptoms. Where can you learn more? Go to http://mitra-shaw.info/. Enter F570 in the search box to learn more about \"Avoiding Triggers With Heart Failure: Care Instructions. \" Current as of: December 6, 2017 Content Version: 11.7 © 9413-3472 [employer]. Care instructions adapted under license by [employer] (which disclaims liability or warranty for this information). If you have questions about a medical condition or this instruction, always ask your healthcare professional. [address] any warranty or liability for your use of this information. Limiting Sodium and Fluids With Heart Failure: Care Instructions Your Care Instructions Sodium causes your body to hold on to extra water. This may cause your heart failure symptoms to get worse. Limiting sodium may help you feel better and lower your risk of having to go to the hospital. 
People get most of their sodium from processed foods. Fast food and restaurant meals also tend to be very high in sodium. Your doctor may suggest that you limit sodium to 2,000 milligrams (mg) a day or less. That is less than 1 teaspoon of salt a day, including all the salt you eat in cooked or packaged foods. Usually, you have to limit the amount of liquids you drink only if your heart failure is severe. Limiting sodium alone often is enough to help your body get rid of extra fluids. However, your doctor may tell you to limit your fluid intake to a set amount each day. Follow-up care is a key part of your treatment and safety.  Be sure to make and go to all appointments, and call your doctor if you are having problems. It's also a good idea to know your test results and keep a list of the medicines you take. How can you care for yourself at home? Read food labels · Read food labels on cans and food packages. The labels tell you how much sodium is in each serving. Make sure that you look at the serving size. If you eat more than the serving size, you have eaten more sodium than is listed for one serving. · Food labels also tell you the Percent Daily Value. If the Percent Daily Value says 50%, it means that you will get at least 50% of all the sodium you need for the entire day in one serving. Choose products with low Percent Daily Values for sodium. · Be aware that sodium can come in forms other than salt, including monosodium glutamate (MSG), sodium citrate, and sodium bicarbonate (baking soda). MSG is often added to Asian food. You can sometimes ask for food without MSG or salt. Buy low-sodium foods · Buy foods that are labeled \"unsalted\" (no salt added), \"sodium-free\" (less than 5 mg of sodium per serving), or \"low-sodium\" (less than 140 mg of sodium per serving). A food labeled \"light sodium\" has less than half of the full-sodium version of that food. Foods labeled \"reduced-sodium\" may still have too much sodium. · Buy fresh vegetables or plain, frozen vegetables. Buy low-sodium versions of canned vegetables, soups, and other canned goods. Prepare low-sodium meals · Use less salt each day when cooking. Reducing salt in this way will help you adjust to the taste. Do not add salt after cooking. Take the salt shaker off the table. · Flavor your food with garlic, lemon juice, onion, vinegar, herbs, and spices instead of salt. Do not use soy sauce, steak sauce, onion salt, garlic salt, mustard, or ketchup on your food. · Make your own salad dressings, sauces, and ketchup without adding salt. · Use less salt (or none) when recipes call for it.  You can often use half the salt a recipe calls for without losing flavor. Other dishes like rice, pasta, and grains do not need added salt. · Rinse canned vegetables. This removes some-but not all-of the salt. · Avoid water that has a naturally high sodium content or that has been treated with water softeners, which add sodium. Call your local water company to find out the sodium content of your water supply. If you buy bottled water, read the label and choose a sodium-free brand. Avoid high-sodium foods, such as: 
· Smoked, cured, salted, and canned meat, fish, and poultry. · Ham, lopez, hot dogs, and luncheon meats. · Regular, hard, and processed cheese and regular peanut butter. · Crackers with salted tops. · Frozen prepared meals. · Canned and dried soups, broths, and bouillon, unless labeled sodium-free or low-sodium. · Canned vegetables, unless labeled sodium-free or low-sodium. · Salted snack foods such as chips and pretzels. · Western [place] fries, pizza, tacos, and other fast foods. · Pickles, olives, ketchup, and other condiments, especially soy sauce, unless labeled sodium-free or low-sodium. If you cannot cook for yourself · Have family members or friends help you, or have someone cook low-sodium meals. · Check with your local senior nutrition program to find out where meals are served and whether they offer a low-sodium option. You can often find these programs through your local health department or hospital. 
· Have meals delivered to your home. Most [facility] have a [employer] [place]. These programs provide one hot meal a day for older adults, delivered to their homes. Ask whether these meals are low-sodium. Let them know that you are on a low-sodium diet. Limiting fluid intake · Find a method that works for you. You might simply write down how much you drink every time you do. Some people keep a container filled with the amount of fluid allowed for that day.  If they drink from a source other than the container, then they pour out that amount. · Measure your regular drinking glasses to find out how much fluid each one holds. Once you know this, you will not have to measure every time. · Besides water, milk, juices, and other drinks, some foods have a lot of fluid. Count any foods that will melt (such as ice cream or gelatin dessert) or liquid foods (such as soup) as part of your fluid intake for the day. Where can you learn more? Go to http://mitra-shaw.info/. Enter A166 in the search box to learn more about \"Limiting Sodium and Fluids With Heart Failure: Care Instructions. \" Current as of: December 6, 2017 Content Version: 11.7 © 6453-8701 [employer]. Care instructions adapted under license by [employer] (which disclaims liability or warranty for this information). If you have questions about a medical condition or this instruction, always ask your healthcare professional. [place] [address] any warranty or liability for your use of this information. Please provide this summary of care documentation to your next provider. Signatures-by signing, you are acknowledging that this After Visit Summary has been reviewed with you and you have received a copy. Patient Signature:  ____________________________________________________________ Date:  ____________________________________________________________  
  
Alvin Lamb Provider Signature:  ____________________________________________________________ Date:  ____________________________________________________________

## 2018-07-12 NOTE — ED NOTES
TRANSFER - ED to INPATIENT REPORT:    SBAR report made available to receiving floor on this patient being transferred to [address] [place] [address] ([facility])  for routine progression of care       Admitting diagnosis Syncope    Information from the following report(s) SBAR was made available to receiving floor. Lines:   Peripheral IV 07/11/18 Left Antecubital (Active)   Site Assessment Clean, dry, & intact 7/11/2018  6:55 PM   Phlebitis Assessment 0 7/11/2018  6:55 PM   Infiltration Assessment 0 7/11/2018  6:55 PM   Dressing Status Clean, dry, & intact 7/11/2018  6:55 PM   Dressing Type Transparent 7/11/2018  6:55 PM   Hub Color/Line Status Green;Flushed;Patent 7/11/2018  6:55 PM   Action Taken Blood drawn 7/11/2018  6:55 PM        Medication list confirmed with patient    Opportunity for questions and clarification was provided. Patient is oriented to time, place, person and situation   Patient is  continent and non-ambulatory     Valuables transported with patient     Patient transported with:   Monitor, oxygen.

## 2018-07-12 NOTE — PROGRESS NOTES
conducted an initial consultation and Spiritual Assessment for Poppy Perez, who is a 80 y. o.,male. Patients Primary Language is: [place]. According to the patients EMR Buddhism Affiliation is: Other. The reason the Patient came to the hospital is:   Patient Active Problem List    Diagnosis Date Noted    Syncope 07/11/2018    Aortic stenosis, moderate 01/28/2018    Abnormal chest CT 01/27/2018    Pleural effusion, right 01/26/2018    SOB (shortness of breath) on exertion 01/25/2018    Syncope and collapse 01/19/2018    Celiac artery stenosis (HCC) 01/10/2018    Osteoarthritis of both knees 08/20/2017    Statin intolerance 05/03/2017    Essential hypertension 02/27/2017    Anxiety 02/27/2017    Chronic systolic congestive heart failure ([facility] [address] 01/25/2017    S/P CABG x 2 08/25/2015    Atherosclerotic NAHED (renal artery stenosis), bilateral (HCC) 08/25/2015    Dyslipidemia     Coronary artery disease of native artery of native heart with stable angina pectoris (HCC)     Cardiomyopathy     Atrial fibrillation     Sick sinus syndrome         The  provided the following Interventions:  Initiated a relationship of care and support. Explored issues of angelina, spirituality and/or Scientology needs while hospitalized. Listened empathically. Provided chaplaincy education. Provided information about Spiritual Care Services. Offered prayer and assurance of continued prayers on patient's behalf. Chart reviewed. The following outcomes were achieved:  Patient shared some information about their medical narrative and spiritual journey/beliefs. Patient processed feeling about current hospitalization. Patient expressed gratitude for the 's visit. Assessment:  Patient did not indicate any spiritual or Scientology issues which require Spiritual Care Services interventions at this time.    Patient does not have any Scientology/cultural needs that will affect patients preferences in health care. Plan:  Chaplains will continue to follow and will provide pastoral care on an as needed or requested basis.  recommends bedside caregivers page  on duty if patient shows signs of acute spiritual or emotional distress.     [address] [facility]   Staff [address] [facility]   [phone]

## 2018-07-12 NOTE — ED NOTES
Assisted patient with urinal in bed. While patient using urinal, patient HR decreased to mid 30s, and patient c/o increased dizziness and \"feeling foggy\". Pt HR increased to 70s upon finishing using urinal. Informed Dr. Karen Borja and Dr. Charlee Elias of pt status.

## 2018-07-12 NOTE — MANAGEMENT PLAN
Discharge/Transition Planning    Patient and/or next of kin has been given and has signed the [facility] Outpatient Observation  Notification letter and all questions answered. Copy of this notice given to patient and copy placed on chart. Patient and/or next of kin has been given the Outpatient Observation Information and Notification letter and all questions answered. Letter signed. Copy to pt and to chart. Interviewed patient. Verified demographics listed on face sheet with patient; all information correct. Pt has Medicare A&B and [employer] for insurance. Patient stated their PCP is Dr Rayleen Kussmaul and last appt 2 months ago. Patient lives in 2 story home with wife and they do not utilize 2nd floor much anymore. 3 steps to enter home. Patient's NOK is wife. Patient independent with ADLs prior to admission; states slowing down some. Wife does driving now. DME prior to admission: cane when out of home. Discharge plan is Home    Patient has designated __wife______________________ to participate in his/her discharge plan and to receive any needed information. Name: Castillo Lee as pt  Phone [phone] Management Interventions  PCP Verified by CM: Yes (Dr Rayleen Kussmaul)  Last Visit to PCP: 05/12/18  Mode of Transport at Discharge: Other (see comment) (wife)  Transition of Care Consult (CM Consult): Discharge Planning  Current Support Network: Lives with Spouse, Own Home  Confirm Follow Up Transport: Family (wife)  Plan discussed with Pt/Family/Caregiver: Yes  Freedom of Choice Offered: Yes  Discharge Location  Discharge Placement: Home     Reason for Admission:   SYNCOPE                  RRAT Score:     19             Do you (patient/family) have any concerns for transition/discharge? NONE              Plan for utilizing home health:     NONE    Likelihood of readmission?    LOW            Transition of Care Plan:   HOME  Juliette Lemos RN BSN  Outcomes Manager  Pager # [phone]

## 2018-07-12 NOTE — CDMP QUERY
The diagnosis of Chf has triggered a query for specificity and acuity of the Chf, this pt has a history of chronic systolic hf, last documented in h/p by cardiology in June 2018, please document in your progress if you feel that this pt meets criteria for     =>Acute on chronic systolic Chf      Or  =>Chronic Systolic Chf ,     =>Unable to Determine (no explanation of clinical findings)    The medical record reflects the following:    Risk:  hx of systolic chf, afib, htn    Clinical Indicators:  bnp 6098,     Treatment:  lasix ordered p.o. daily 20 mg     Please clarify and document your clinical opinion in the progress notes and discharge summary including the definitive and/or presumptive diagnosis, (suspected or probable), related to the above clinical findings. Please include clinical findings supporting your diagnosis. If you DECLINE this query or would like to communicate with [employer], please utilize the \"[employer] message box\" at the TOP of the Progress Note on the right.       Thank you,  Hemant Hardy RN/CCDS  [phone]

## 2018-07-12 NOTE — CONSULTS
[facility] - Consult Note    Consultation request by Dr. Jessi Sevilla for advice/opinion related to evaluating AICD    Date of  Admission: 7/11/2018  6:48 PM   Primary Care Physician:  Chase Jensen MD  Patient seen and examined independently. Syncope or near syncope presenting complaint. Discussed pacer check findings. Echo demonstrates no unexpected findings. Will see in RT In office in 1 to 2 weeks and have pacer rechecked. Alma Badillo MD   Assessment:     -Presented s/p syncopal episode, \"for a few seconds\" according to wife  -Cardiomyopathy  -Echo 01/20/2018: EF 55% with no regional wall motion abnormalities, mildly increased LV wall thickness, dilated RV with reduced systolic function, RVSP mildlly to moderately increased, est. 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  -Cardiac cath 09/2015: CAD in native coronaries LAD and RCA as mentioned above. This appears unchanged from prior angiogram. His left internal mammary artery graft is open. His right coronary artery graft appears to be a small caliber vessel, as mentioned above. However, compared to the angiogram in 2011, there is no significant angiographic change noted.   -Hx Atrial fibrillation, on Eliquis and Toprol  -Hx HFpEF, on Lasix  -Hx sick sinus syndrome, s/p pacemaker placement  -Hx HTN, on Norvasc, Enalapril, Toprol as outpatient  -Hx Dyslipidemia  -Hx statin intolerance  -Hx hypothyroidism  -Severe Celiac artery stenosis, duplex of abdominal vasculature 11/10/2017 with > 70% celiac artery stenosis     Plan:     Pt presented after syncopal episode. Device interrogation revealed intermittent capture and increased threshold. Will obtain echocardiogram.  Would ambulate prior to discharge. History of Present Illness: This is a 80 y.o. male admitted for Syncope  Syncope. Patient complains of:  Syncope    Pt is an 80 y.o. M with PMHx as noted above, who presented to the hospital s/p syncopal episode. Pt remembers feeling \"woozy,\" like he was going to pass out, and wife stated that he actually did pass out for a few seconds after standing. He has also had slightly more fatigue and shortness of breath, increased with activity.           Cardiac risk factors: dyslipidemia, hypertension, known Hx CAD s/p CABG, atrial fibrillation, cardiomyopathy      Review of Symptoms:  Except as stated above include:  Constitutional:  + fatigue  Respiratory:  + shortness of breath  Cardiovascular:  No chest pain  Gastrointestinal: negative  Genitourinary:  negative  Musculoskeletal:  Negative  Neurological:  + syncope  Dermatological:  Negative  Endocrinological: Negative  Psychological:  Negative       Past Medical History:     Past Medical History:   Diagnosis Date    Atrial fibrillation     CHADS score 3  (+CHF, +HTN, +AGE, -DM, -CVA)    CABG     2008   LIMA - LAD,   SVG - RCA    Cardiomyopathy     EF 30-35% (ECHO 6/14)    Coronary artery disease     Dyslipidemia     Hypertension     Hypothyroid     Pacemaker     Peripheral vascular disease     Renal artery stenosis     bilateral stents    Sick sinus syndrome          Social History:     Social History     Social History    Marital status:      Spouse name: N/A    Number of children: N/A    Years of education: N/A     Social History Main Topics    Smoking status: Former Smoker    Smokeless tobacco: Never Used    Alcohol use No    Drug use: No    Sexual activity: Not Asked     Other Topics Concern    None Social History Narrative        Family History:   History reviewed. No pertinent family history. Medications:      Allergies   Allergen Reactions    Beta-Blockers (Beta-Adrenergic Blocking Agts) Drowsiness    Penicillins Swelling    Statins-Hmg-Coa Reductase Inhibitors Drowsiness        Current Facility-Administered Medications   Medication Dose Route Frequency    amLODIPine (NORVASC) tablet 5 mg  5 mg Oral DAILY    enalapril (VASOTEC) tablet 20 mg  20 mg Oral DAILY    furosemide (LASIX) tablet 20 mg  20 mg Oral DAILY    HYDROcodone-acetaminophen (NORCO) 5-325 mg per tablet 1 Tab  1 Tab Oral Q6H PRN    nitroglycerin (NITROSTAT) tablet 0.4 mg  0.4 mg SubLINGual Q5MIN PRN    pantoprazole (PROTONIX) tablet 40 mg  40 mg Oral ACB    acetaminophen (TYLENOL) tablet 650 mg  650 mg Oral Q6H PRN    ondansetron (ZOFRAN) injection 4 mg  4 mg IntraVENous Q6H PRN    sodium chloride (NS) flush 5-10 mL  5-10 mL IntraVENous Q8H    sodium chloride (NS) flush 5-10 mL  5-10 mL IntraVENous PRN    apixaban (ELIQUIS) tablet 2.5 mg  2.5 mg Oral BID    LORazepam (ATIVAN) tablet 0.5 mg  0.5 mg Oral Q4H PRN    0.9% sodium chloride infusion  50 mL/hr IntraVENous CONTINUOUS         Physical Exam:     Visit Vitals    /78    Pulse 70    Temp 97.6 °F (36.4 °C)    Resp 18    Ht 5' 9\" (1.753 m)    Wt 138 lb (62.6 kg)    SpO2 98%    BMI 20.38 kg/m2     BP Readings from Last 3 Encounters:   07/12/18 151/78   06/06/18 123/64   05/04/18 138/70     Pulse Readings from Last 3 Encounters:   07/12/18 70   06/06/18 71   05/04/18 71     Wt Readings from Last 3 Encounters:   07/12/18 138 lb (62.6 kg)   06/06/18 143 lb (64.9 kg)   05/04/18 140 lb (63.5 kg)       General:  alert, cooperative, no distress, appears stated age  Neck:  No JVD  Lungs:  clear to auscultation bilaterally  Heart:  Slightly irregular rhythm  Abdomen:  abdomen is soft without significant tenderness, masses, organomegaly or guarding  Extremities:  no edema  Skin: Warm and dry.    Neuro: alert, oriented x3, affect appropriate, no focal neurological deficits, moves all extremities well, no involuntary movements  Psych: non focal     Data Review:     Recent Labs      07/12/18   0540  07/11/18   1900   WBC  5.4  5.0   HGB  10.5*  11.1*   HCT  35.2*  36.9   PLT  177  179     Recent Labs      07/12/18   0540  07/11/18   1900   NA  140  137   K  4.7  4.9   CL  108  105   CO2  23  24   GLU  124*  98   BUN  32*  36*   CREA  1.30  1.53*   CA  8.2*  8.3*   MG   --   2.2   ALB  3.0*  3.4   SGOT  21  24   ALT  16  17   INR   --   2.0*       Results for orders placed or performed during the hospital encounter of 07/11/18   EKG, 12 LEAD, INITIAL   Result Value Ref Range    Ventricular Rate 70 BPM    Atrial Rate 340 BPM    QRS Duration 178 ms    Q-T Interval 478 ms    QTC Calculation (Bezet) 516 ms    Calculated R Axis -80 degrees    Calculated T Axis 102 degrees    Diagnosis       Electronic ventricular pacemaker  When compared with ECG of 10-APR-2018 13:48,  No significant change was found  Confirmed by Yulissa Fuller ([id number]) on 7/12/2018 10:44:02 AM         All Cardiac Markers in the last 24 hours:    Lab Results   Component Value Date/Time    TROIQ 0.03 07/11/2018 07:00 PM       Last Lipid:    Lab Results   Component Value Date/Time    Cholesterol, total 110 07/12/2018 05:40 AM    HDL Cholesterol 33 (L) 07/12/2018 05:40 AM    LDL, calculated 55.6 07/12/2018 05:40 AM    Triglyceride 107 07/12/2018 05:40 AM    CHOL/HDL Ratio 3.3 07/12/2018 05:40 AM       Signed By: Shira Overton PA-C     July 12, 2018

## 2018-07-12 NOTE — DISCHARGE SUMMARY
[facility]    Discharge Summary    Patient: March Kennard MRN: [id number]  CSN: [id number]    YOB: 1930  Age: 80 y.o. Sex: male    DOA: 7/11/2018 LOS:  LOS: 1 day   Discharge Date:      Admission Diagnoses: Syncope  Syncope    Discharge Diagnoses:    Problem List as of 7/12/2018  Date Reviewed: 6/22/2018          Codes Class Noted - Resolved    * (Principal)Syncope ICD-10-CM: R55  ICD-9-CM: 780.2  7/11/2018 - Present        Aortic stenosis, moderate ICD-10-CM: I35.0  ICD-9-CM: 424.1  1/28/2018 - Present        Abnormal chest CT ICD-10-CM: R93.8  ICD-9-CM: 793.2  1/27/2018 - Present    Overview Addendum 1/27/2018  1:13 PM by Chantal Vaca MD     Bilateral ground glass opacities and bibasilar atelectasis/infiltrate. Suspicious for aspiration pneumonia. Pleural effusion, right ICD-10-CM: J90  ICD-9-CM: 511.9  1/26/2018 - Present        SOB (shortness of breath) on exertion ICD-10-CM: R06.02  ICD-9-CM: 786.05  1/25/2018 - Present        Syncope and collapse ICD-10-CM: R55  ICD-9-CM: 780.2  1/19/2018 - Present        Celiac artery stenosis (HCC) ICD-10-CM: I77.4  ICD-9-CM: 447.4  1/10/2018 - Present        Osteoarthritis of both knees ICD-10-CM: M17.0  ICD-9-CM: 715.96  8/20/2017 - Present    Overview Signed 8/20/2017  3:19 PM by Dalton Solano MD     Contemplating knee replacement.              Statin intolerance ICD-10-CM: Z78.9  ICD-9-CM: 995.27  5/3/2017 - Present        Essential hypertension ICD-10-CM: I10  ICD-9-CM: 401.9  2/27/2017 - Present        Anxiety ICD-10-CM: F41.9  ICD-9-CM: 300.00  2/27/2017 - Present        Chronic systolic congestive heart failure (HCC) ICD-10-CM: I50.22  ICD-9-CM: 428.22, 428.0  1/25/2017 - Present        S/P CABG x 2 ICD-10-CM: Z95.1  ICD-9-CM: V45.81  8/25/2015 - Present    Overview Signed 8/25/2015  2:15 PM by Dalton Solano MD     12/2008, LIMA to LAD, SVG to RCA             Atherosclerotic NAHED (renal artery stenosis), bilateral ([address] ICD-10-CM: I70.1  ICD-9-CM: 440.1  8/25/2015 - Present    Overview Signed 8/25/2015  2:16 PM by Nadeem Vargas MD     S/P stenting R and L             Dyslipidemia ICD-10-CM: E78.5  ICD-9-CM: 272.4  Unknown - Present        Coronary artery disease of native artery of native heart with stable angina pectoris ([address] ICD-10-CM: I25.118  ICD-9-CM: 414.01, 413.9  Unknown - Present        Cardiomyopathy ICD-10-CM: I42.9  ICD-9-CM: 425.4  Unknown - Present    Overview Signed 4/29/2015  7:51 PM by Eloisa Burch     EF 30-35% (ECHO 6/14)             Atrial fibrillation ICD-10-CM: I48.91  ICD-9-CM: 427.31  Unknown - Present    Overview Signed 4/29/2015  7:51 PM by Eloisa Burch     CHADS score 3  (+CHF, +HTN, +AGE, -DM, -CVA)             Sick sinus syndrome ICD-10-CM: I49.5  ICD-9-CM: 427.81  Unknown - Present    Overview Signed 8/25/2015  2:15 PM by Nadeem Vargas MD     Developed post op CABG              RESOLVED: Sepsis [facility]) ICD-10-CM: A41.9  ICD-9-CM: 038.9, 995.91  1/6/2017 - 2/27/2017        RESOLVED: Community acquired bacterial pneumonia ICD-10-CM: J15.9  ICD-9-CM: 482.9  1/6/2017 - 2/27/2017        RESOLVED: CAP (community acquired pneumonia) ICD-10-CM: J18.9  ICD-9-CM: 286  1/6/2017 - 2/27/2017              Discharge Condition: Stable    Discharge To: Home    Consults: Cardiology and [facility] Course: 81 y/o  male with hx of CAD, afib, SSS s/p pacemaker, Celiac Artery Stenosis presented to the ED via EMS on 7/11/2018 after syncopal episode. Wife states pt passed out, pt doesn't remember much but said he was on the floor. EMS called, HR dropped in the 30s and he nearly passed out again. Pt stated he's felt increasingly weak, has SOB worse with activity and intermittent upper abdominal pain for the last several weeks. While in the ED, pt's HR dropped in the 30s multiple times, pt mentions that pacemaker was initially set at 60 bpm but increased to 70 bpm in January 2018.   [employer] rep evaluated pt in the ED, mentioned intermittent RV capture. Work up in the ED with troponin 0.03, BNP 6098, creatinine 1.53 - -> 1.3, CXR showed mildly elevated left hemidiaphragm, no active disease, V-paced on EKG. He was admitted under observation to telemetry for further management of care. Cardiology consulted. He had an RRT early am on 7/12/2018 c/o chest tightness- labs and vital signs stable, EKG unchanged. Suspect symptoms related to anxiety. Echo repeated on 7/12, official read pending. No further c/o chest pain, SOB. Labs and vital signs WNL. He is appropriate for discharge home, to follow up with PCP within one week and with cardiology within 1-2 weeks. Physical Exam:  General appearance: alert, cooperative, no distress, appears stated age  Head: Normocephalic, without obvious abnormality, atraumatic  Lungs: clear to auscultation bilaterally  Heart: regular rate and rhythm, grade 3/6 systolic murmur, pacemaker left chest wall  Abdomen: soft, non-tender.  Bowel sounds normal. No masses,  no organomegaly  Extremities: extremities normal, atraumatic, no cyanosis or edema  Skin: Skin color, texture, turgor normal. No rashes or lesions  Neurologic: Grossly normal  PSY: Mood and affect normal, appropriately behaved    Significant Diagnostic Studies: labs:   Recent Results (from the past 24 hour(s))   EKG, 12 LEAD, INITIAL    Collection Time: 07/11/18  6:54 PM   Result Value Ref Range    Ventricular Rate 70 BPM    Atrial Rate 340 BPM    QRS Duration 178 ms    Q-T Interval 478 ms    QTC Calculation (Bezet) 516 ms    Calculated R Axis -80 degrees    Calculated T Axis 102 degrees    Diagnosis       Electronic ventricular pacemaker  When compared with ECG of 10-APR-2018 13:48,  No significant change was found  Confirmed by Debra Chakraborty ([id number]) on 7/12/2018 10:44:02 AM     CBC WITH AUTOMATED DIFF    Collection Time: 07/11/18  7:00 PM   Result Value Ref Range    WBC 5.0 4.6 - 13.2 K/uL    RBC 4.33 (L) 4.70 - 5.50 M/uL    HGB 11.1 (L) 13.0 - 16.0 g/dL    HCT 36.9 36.0 - 48.0 %    MCV 85.2 74.0 - 97.0 FL    MCH 25.6 24.0 - 34.0 PG    MCHC 30.1 (L) 31.0 - 37.0 g/dL    RDW 19.7 (H) 11.6 - 14.5 %    PLATELET 173 492 - 866 K/uL    MPV 9.6 9.2 - 11.8 FL    NEUTROPHILS 74 (H) 40 - 73 %    LYMPHOCYTES 14 (L) 21 - 52 %    MONOCYTES 11 (H) 3 - 10 %    EOSINOPHILS 1 0 - 5 %    BASOPHILS 0 0 - 2 %    ABS. NEUTROPHILS 3.7 1.8 - 8.0 K/UL    ABS. LYMPHOCYTES 0.7 (L) 0.9 - 3.6 K/UL    ABS. MONOCYTES 0.5 0.05 - 1.2 K/UL    ABS. EOSINOPHILS 0.1 0.0 - 0.4 K/UL    ABS. BASOPHILS 0.0 0.0 - 0.1 K/UL    DF AUTOMATED     PROTHROMBIN TIME + INR    Collection Time: 07/11/18  7:00 PM   Result Value Ref Range    Prothrombin time 21.6 (H) 11.5 - 15.2 sec    INR 2.0 (H) 0.8 - 1.2     METABOLIC PANEL, COMPREHENSIVE    Collection Time: 07/11/18  7:00 PM   Result Value Ref Range    Sodium 137 136 - 145 mmol/L    Potassium 4.9 3.5 - 5.5 mmol/L    Chloride 105 100 - 108 mmol/L    CO2 24 21 - 32 mmol/L    Anion gap 8 3.0 - 18 mmol/L    Glucose 98 74 - 99 mg/dL    BUN 36 (H) 7.0 - 18 MG/DL    Creatinine 1.53 (H) 0.6 - 1.3 MG/DL    BUN/Creatinine ratio 24 (H) 12 - 20      GFR est AA 52 (L) >60 ml/min/1.73m2    GFR est non-AA 43 (L) >60 ml/min/1.73m2    Calcium 8.3 (L) 8.5 - 10.1 MG/DL    Bilirubin, total 0.7 0.2 - 1.0 MG/DL    ALT (SGPT) 17 16 - 61 U/L    AST (SGOT) 24 15 - 37 U/L    Alk.  phosphatase 126 (H) 45 - 117 U/L    Protein, total 7.3 6.4 - 8.2 g/dL    Albumin 3.4 3.4 - 5.0 g/dL    Globulin 3.9 2.0 - 4.0 g/dL    A-G Ratio 0.9 0.8 - 1.7     NT-PRO BNP    Collection Time: 07/11/18  7:00 PM   Result Value Ref Range    NT pro-BNP 6098 (H) 0 - 1800 PG/ML   MAGNESIUM    Collection Time: 07/11/18  7:00 PM   Result Value Ref Range    Magnesium 2.2 1.6 - 2.6 mg/dL   TROPONIN I    Collection Time: 07/11/18  7:00 PM   Result Value Ref Range    Troponin-I, Qt. 0.03 0.0 - 0.045 NG/ML   TSH 3RD GENERATION    Collection Time: 07/11/18  7:00 PM Result Value Ref Range    TSH 5.30 (H) 0.36 - 3.74 uIU/mL   POC CHEM8    Collection Time: 07/11/18  7:03 PM   Result Value Ref Range    CO2, POC 24 19 - 24 MMOL/L    Glucose, POC 99 74 - 106 MG/DL    BUN, POC 32 (H) 7 - 18 MG/DL    Creatinine, POC 1.5 (H) 0.6 - 1.3 MG/DL    GFRAA, POC 54 (L) >60 ml/min/1.73m2    GFRNA, POC 44 (L) >60 ml/min/1.73m2    Sodium,  136 - 145 MMOL/L    Potassium, POC 4.8 3.5 - 5.5 MMOL/L    Calcium, ionized (POC) 1.07 (L) 1.12 - 1.32 mmol/L    Chloride,  100 - 108 MMOL/L    Anion gap, POC 16 10 - 20      Hematocrit, POC 37 36 - 49 %    Hemoglobin, POC 12.6 12 - 16 G/DL   METABOLIC PANEL, COMPREHENSIVE    Collection Time: 07/12/18  5:40 AM   Result Value Ref Range    Sodium 140 136 - 145 mmol/L    Potassium 4.7 3.5 - 5.5 mmol/L    Chloride 108 100 - 108 mmol/L    CO2 23 21 - 32 mmol/L    Anion gap 9 3.0 - 18 mmol/L    Glucose 124 (H) 74 - 99 mg/dL    BUN 32 (H) 7.0 - 18 MG/DL    Creatinine 1.30 0.6 - 1.3 MG/DL    BUN/Creatinine ratio 25 (H) 12 - 20      GFR est AA >60 >60 ml/min/1.73m2    GFR est non-AA 52 (L) >60 ml/min/1.73m2    Calcium 8.2 (L) 8.5 - 10.1 MG/DL    Bilirubin, total 0.5 0.2 - 1.0 MG/DL    ALT (SGPT) 16 16 - 61 U/L    AST (SGOT) 21 15 - 37 U/L    Alk.  phosphatase 117 45 - 117 U/L    Protein, total 6.6 6.4 - 8.2 g/dL    Albumin 3.0 (L) 3.4 - 5.0 g/dL    Globulin 3.6 2.0 - 4.0 g/dL    A-G Ratio 0.8 0.8 - 1.7     LIPID PANEL    Collection Time: 07/12/18  5:40 AM   Result Value Ref Range    LIPID PROFILE          Cholesterol, total 110 <200 MG/DL    Triglyceride 107 <150 MG/DL    HDL Cholesterol 33 (L) 40 - 60 MG/DL    LDL, calculated 55.6 0 - 100 MG/DL    VLDL, calculated 21.4 MG/DL    CHOL/HDL Ratio 3.3 0 - 5.0     CBC W/O DIFF    Collection Time: 07/12/18  5:40 AM   Result Value Ref Range    WBC 5.4 4.6 - 13.2 K/uL    RBC 4.13 (L) 4.70 - 5.50 M/uL    HGB 10.5 (L) 13.0 - 16.0 g/dL    HCT 35.2 (L) 36.0 - 48.0 %    MCV 85.2 74.0 - 97.0 FL    MCH 25.4 24.0 - 34.0 PG    MCHC 29.8 (L) 31.0 - 37.0 g/dL    RDW 19.7 (H) 11.6 - 14.5 %    PLATELET 200 488 - 474 K/uL    MPV 9.4 9.2 - 11.8 FL   PTT    Collection Time: 07/12/18  5:40 AM   Result Value Ref Range    aPTT 39.1 (H) 23.0 - 36.4 SEC   EKG, 12 LEAD, SUBSEQUENT    Collection Time: 07/12/18  8:15 AM   Result Value Ref Range    Ventricular Rate 72 BPM    Atrial Rate 66 BPM    QRS Duration 170 ms    Q-T Interval 454 ms    QTC Calculation (Bezet) 497 ms    Calculated R Axis -81 degrees    Calculated T Axis 105 degrees    Diagnosis       Electronic ventricular pacemaker  When compared with ECG of 11-JUL-2018 18:54,  Vent. rate has increased BY   2 BPM           Discharge Medications:     Current Discharge Medication List      CONTINUE these medications which have NOT CHANGED    Details   apixaban (ELIQUIS) 5 mg tablet Take 1 Tab by mouth two (2) times a day. Qty: 60 Tab, Refills: 5      amLODIPine (NORVASC) 5 mg tablet Take 1 Tab by mouth daily. Qty: 60 Tab, Refills: 0      furosemide (LASIX) 20 mg tablet One pill once or twice a day  Qty: 60 Tab, Refills: 0      metoprolol succinate (TOPROL XL) 50 mg XL tablet Take 1 Tab by mouth daily. Qty: 60 Tab, Refills: 0      nitroglycerin (NITROSTAT) 0.4 mg SL tablet 1 Tab by SubLINGual route every five (5) minutes as needed for Chest Pain (up to 3 total 1 every 5 min). Qty: 60 Tab, Refills: 0      pantoprazole (PROTONIX) 40 mg tablet Take 1 Tab by mouth Daily (before breakfast). Qty: 60 Tab, Refills: 0      LORazepam (ATIVAN) 1 mg tablet Take 1 Tab by mouth every six (6) hours as needed for Anxiety. Max Daily Amount: 4 mg. Qty: 30 Tab, Refills: 0    Associated Diagnoses: Anxiety      enalapril (VASOTEC) 20 mg tablet Take 20 mg by mouth daily. escitalopram oxalate (LEXAPRO) 5 mg tablet TAKE 1 TABLET BY MOUTH DAILY. Qty: 30 Tab, Refills: 1      HYDROcodone-acetaminophen (NORCO) 5-325 mg per tablet Take 1 Tab by mouth every four (4) hours as needed for Pain.  Max Daily Amount: 6 Tabs.  Qty: 10 Tab, Refills: 0    Associated Diagnoses: Abdominal pain, right lower quadrant      isosorbide mononitrate ER (IMDUR) 30 mg tablet Take 1 Tab by mouth daily. Qty: 60 Tab, Refills: 0      ranolazine ER (RANEXA) 500 mg SR tablet Take 1 Tab by mouth two (2) times a day. Indications: Chronic Stable Angina Pectoris  Qty: 60 Tab, Refills: 0      tiotropium (SPIRIVA) 18 mcg inhalation capsule Take 1 Cap by inhalation every twenty-four (24) hours. Qty: 30 Cap, Refills: 0      guaiFENesin ER (MUCINEX) 600 mg ER tablet Take 1 Tab by mouth every twelve (12) hours. Qty: 60 Tab, Refills: 0         STOP taking these medications       chlorpheniramine-HYDROcodone (TUSSIONEX) 10-8 mg/5 mL suspension Comments:   Reason for Stopping:               Activity: Activity as tolerated    Diet: Cardiac Diet    Wound Care: None needed    Follow-up: Follow up with PCP within one week. Follow up with cardiology within 1-2 weeks.     Discharge time: > 35 mins  Delmus Burkitt, NP  7/12/2018, 1:11 PM

## 2018-07-12 NOTE — PROGRESS NOTES
Ambulated pt in hallway, pt complained of chest tightness during last half of walk, shortness of breath with resp at 33, O2 sat 88 at the lowest, mostly in the mid to upper 90s on room air. norma notified, she will update dr Aquiles Warren    [phone] Discharge on hold until pt has CT chest, pt informed. 1800 pt to be hopefully discharged tomorrow after observing tonight. 1915 Bedside and Verbal shift change report given to Karla marcano (oncoming nurse) by Adrian Stiles RN   (offgoing nurse). Report included the following information Kardex, MAR and Recent Results.

## 2018-07-12 NOTE — PROGRESS NOTES
Patient has been changed from inpatient to observation and a Condition 44 has been completed based on Medicare criteria. The patient / or next of kin will be made aware of this change in status and given a copy of  the [facility] Outpatient Observation Information and Notification letter.

## 2018-07-12 NOTE — PROGRESS NOTES
Physical Exam  
Skin: Skin is dry. Puncture to left calf. Healed. Patient admitted for Pacemaker issues. They interrogated downstairs and patient states he feels a lot better. No other concerns on this shift.

## 2018-07-12 NOTE — H&P
[address] [place], [place] [address] History & Physical 
 
Patient: Vee Cardenas MRN: [id number]  CSN: [id number] YOB: 1930  Age: 80 y.o. Sex: male DOA: 7/11/2018 LOS:  LOS: 1 day DOA: 7/11/2018 Assessment/Plan Active Problems: 
  Syncope (7/11/2018) Plan: 1. Syncope / near syncope - likely from Pacemaker not working right - [address] [place] [address] saw pt in the ER - says the pacemaker is working but having intermittent RV capture - Consult Cardiology in AM , Echo in AM  
2. HTN - resume home meds, monitor BP , hold metoprolol given low HR problems 3. CAD s/p CABG - resume home meds, monitor 4. Severe AS  
5. CHF - continue lasix - has some dependent edema in LE  
6. Recent Gen weakness & fatigue - mentioned he has been getting SOB easily -? likely related to fluid overload DVT Px - Eliquis FC  
 
 
 
 
 
 
 
HPI:  
 
Vee Cardenas is a 80 y.o. male who is being admitted for syncope / near syncope - he says his wife mentioned that he passed out -- he doesn't remember much but says he was on the floor She called EMS & while in the ambulance his HR dropped int he 30's & he nearly passed out again He has a PPM in place --while in the ER his HR dropped again in the 30's multiple times -- pt mentions that his PPM was set at 70 -- he mentions it was initially set at 60 but in jan 2018 - rate increased to 70 - there is a note by Dr Darci Swan stating the same on 1/3/2018 ER eval - pt seen in the ER by the [facility] scientific rep - mentioned intermittent RV capture - consult Cardiology in AM  
Will admit pt for further eval  
 
Past Medical History:  
Diagnosis Date  Atrial fibrillation CHADS score 3  (+CHF, +HTN, +AGE, -DM, -CVA)  CABG   
 2008   LIMA - LAD,   SVG - RCA  Cardiomyopathy EF 30-35% (ECHO 6/14)  Coronary artery disease  Dyslipidemia  Hypertension  Hypothyroid  Pacemaker  Peripheral vascular disease  Renal artery stenosis   
 bilateral stents  Sick sinus syndrome Past Surgical History:  
Procedure Laterality Date  HX CORONARY ARTERY BYPASS GRAFT    
 2008   LIMA - LAD,   SVG - RCA History reviewed. No pertinent family history. Social History Social History  Marital status:  Spouse name: N/A  
 Number of children: N/A  
 Years of education: N/A Social History Main Topics  Smoking status: Former Smoker  Smokeless tobacco: Never Used  Alcohol use No  
 Drug use: No  
 Sexual activity: Not Asked Other Topics Concern  None Social History Narrative Prior to Admission medications Medication Sig Start Date End Date Taking? Authorizing Provider  
apixaban (ELIQUIS) 5 mg tablet Take 1 Tab by mouth two (2) times a day. 3/28/18  Yes Michelle Longoria MD  
amLODIPine (NORVASC) 5 mg tablet Take 1 Tab by mouth daily. 2/8/18  Yes Marlene Lewis MD  
furosemide (LASIX) 20 mg tablet One pill once or twice a day 2/8/18  Yes Marlene Lewis MD  
metoprolol succinate (TOPROL XL) 50 mg XL tablet Take 1 Tab by mouth daily. 2/8/18  Yes Marlene Lewis MD  
nitroglycerin (NITROSTAT) 0.4 mg SL tablet 1 Tab by SubLINGual route every five (5) minutes as needed for Chest Pain (up to 3 total 1 every 5 min). 2/7/18  Yes Marlene Lewis MD  
pantoprazole (PROTONIX) 40 mg tablet Take 1 Tab by mouth Daily (before breakfast). 2/8/18  Yes Marlene Lewis MD  
LORazepam (ATIVAN) 1 mg tablet Take 1 Tab by mouth every six (6) hours as needed for Anxiety. Max Daily Amount: 4 mg. 1/29/18  Yes Jovon Arauz DO  
enalapril (VASOTEC) 20 mg tablet Take 20 mg by mouth daily. Yes Dustin Melo MD  
escitalopram oxalate (LEXAPRO) 5 mg tablet TAKE 1 TABLET BY MOUTH DAILY. 6/20/18   Cheryl Butler MD  
HYDROcodone-acetaminophen (NORCO) 5-325 mg per tablet Take 1 Tab by mouth every four (4) hours as needed for Pain.  Max Daily Amount: 6 Tabs. 4/25/18   Eliane Real MD  
isosorbide mononitrate ER (IMDUR) 30 mg tablet Take 1 Tab by mouth daily. 2/8/18   Shasha Torres MD  
ranolazine ER (RANEXA) 500 mg SR tablet Take 1 Tab by mouth two (2) times a day. Indications: Chronic Stable Angina Pectoris 2/7/18   Shasha Torres MD  
chlorpheniramine-HYDROcodone (TUSSIONEX) 10-8 mg/5 mL suspension Take 5 mL by mouth nightly as needed for Cough. Max Daily Amount: 5 mL. 2/7/18   Shasha Torres MD  
tiotropium (SPIRIVA) 18 mcg inhalation capsule Take 1 Cap by inhalation every twenty-four (24) hours. 2/7/18   Shasha Torres MD  
guaiFENesin ER (MUCINEX) 600 mg ER tablet Take 1 Tab by mouth every twelve (12) hours. 2/7/18   Shasha Torres MD  
 
 
Allergies Allergen Reactions  Beta-Blockers (Beta-Adrenergic Blocking Agts) Drowsiness  Penicillins Swelling  Statins-Hmg-Coa Reductase Inhibitors Drowsiness Review of Systems Pertinent items are noted in the History of Present Illness. Physical Exam:  
  
Visit Vitals  BP (!) 170/96 (BP 1 Location: Left arm, BP Patient Position: At rest)  Pulse 70  Temp 97.6 °F (36.4 °C)  Resp 18  Ht 5' 9\" (1.753 m)  Wt 62.6 kg (138 lb)  SpO2 100%  BMI 20.38 kg/m2 Physical Exam: 
 
Gen: In general, this is a thinly built male in no acute distress HEENT: Sclerae nonicteric. Oral mucous membranes moist. Dentition poor Neck: Supple with midline trachea. CV: RRR - with 3/6 systolic murmur from severe AS Resp:Respirations are unlabored without use of accessory muscles. Lung fields bilaterally without wheezes or rhonchi. Abd: Soft, nontender, nondistended. Extrem: Extremities are warm, without cyanosis or clubbing. 1+ chronic pitting pretibial edema Skin: Warm, no visible rashes. Neuro: Patient is alert, oriented, and cooperative. No obvious focal defects. Moves all 4 extremities.  
 
Labs Reviewed: 
 
Recent Results (from the past 24 hour(s)) EKG, 12 LEAD, INITIAL Collection Time: 07/11/18  6:54 PM  
Result Value Ref Range Ventricular Rate 70 BPM  
 Atrial Rate 340 BPM  
 QRS Duration 178 ms Q-T Interval 478 ms QTC Calculation (Bezet) 516 ms Calculated R Axis -80 degrees Calculated T Axis 102 degrees Diagnosis Electronic ventricular pacemaker When compared with ECG of 10-APR-2018 13:48, No significant change was found CBC WITH AUTOMATED DIFF Collection Time: 07/11/18  7:00 PM  
Result Value Ref Range WBC 5.0 4.6 - 13.2 K/uL  
 RBC 4.33 (L) 4.70 - 5.50 M/uL  
 HGB 11.1 (L) 13.0 - 16.0 g/dL HCT 36.9 36.0 - 48.0 % MCV 85.2 74.0 - 97.0 FL  
 MCH 25.6 24.0 - 34.0 PG  
 MCHC 30.1 (L) 31.0 - 37.0 g/dL  
 RDW 19.7 (H) 11.6 - 14.5 % PLATELET 200 916 - 651 K/uL MPV 9.6 9.2 - 11.8 FL  
 NEUTROPHILS 74 (H) 40 - 73 % LYMPHOCYTES 14 (L) 21 - 52 % MONOCYTES 11 (H) 3 - 10 % EOSINOPHILS 1 0 - 5 % BASOPHILS 0 0 - 2 %  
 ABS. NEUTROPHILS 3.7 1.8 - 8.0 K/UL  
 ABS. LYMPHOCYTES 0.7 (L) 0.9 - 3.6 K/UL  
 ABS. MONOCYTES 0.5 0.05 - 1.2 K/UL  
 ABS. EOSINOPHILS 0.1 0.0 - 0.4 K/UL  
 ABS. BASOPHILS 0.0 0.0 - 0.1 K/UL  
 DF AUTOMATED PROTHROMBIN TIME + INR Collection Time: 07/11/18  7:00 PM  
Result Value Ref Range Prothrombin time 21.6 (H) 11.5 - 15.2 sec INR 2.0 (H) 0.8 - 1.2 METABOLIC PANEL, COMPREHENSIVE Collection Time: 07/11/18  7:00 PM  
Result Value Ref Range Sodium 137 136 - 145 mmol/L Potassium 4.9 3.5 - 5.5 mmol/L Chloride 105 100 - 108 mmol/L  
 CO2 24 21 - 32 mmol/L Anion gap 8 3.0 - 18 mmol/L Glucose 98 74 - 99 mg/dL BUN 36 (H) 7.0 - 18 MG/DL Creatinine 1.53 (H) 0.6 - 1.3 MG/DL  
 BUN/Creatinine ratio 24 (H) 12 - 20 GFR est AA 52 (L) >60 ml/min/1.73m2 GFR est non-AA 43 (L) >60 ml/min/1.73m2 Calcium 8.3 (L) 8.5 - 10.1 MG/DL Bilirubin, total 0.7 0.2 - 1.0 MG/DL  
 ALT (SGPT) 17 16 - 61 U/L  
 AST (SGOT) 24 15 - 37 U/L Alk.  phosphatase 126 (H) 45 - 117 U/L Protein, total 7.3 6.4 - 8.2 g/dL Albumin 3.4 3.4 - 5.0 g/dL Globulin 3.9 2.0 - 4.0 g/dL A-G Ratio 0.9 0.8 - 1.7 NT-PRO BNP Collection Time: 07/11/18  7:00 PM  
Result Value Ref Range NT pro-BNP 6098 (H) 0 - 1800 PG/ML  
MAGNESIUM Collection Time: 07/11/18  7:00 PM  
Result Value Ref Range Magnesium 2.2 1.6 - 2.6 mg/dL TROPONIN I Collection Time: 07/11/18  7:00 PM  
Result Value Ref Range Troponin-I, Qt. 0.03 0.0 - 0.045 NG/ML  
TSH 3RD GENERATION Collection Time: 07/11/18  7:00 PM  
Result Value Ref Range TSH 5.30 (H) 0.36 - 3.74 uIU/mL  
POC CHEM8 Collection Time: 07/11/18  7:03 PM  
Result Value Ref Range CO2, POC 24 19 - 24 MMOL/L Glucose, POC 99 74 - 106 MG/DL  
 BUN, POC 32 (H) 7 - 18 MG/DL Creatinine, POC 1.5 (H) 0.6 - 1.3 MG/DL  
 GFRAA, POC 54 (L) >60 ml/min/1.73m2 GFRNA, POC 44 (L) >60 ml/min/1.73m2 Sodium,  136 - 145 MMOL/L Potassium, POC 4.8 3.5 - 5.5 MMOL/L Calcium, ionized (POC) 1.07 (L) 1.12 - 1.32 mmol/L Chloride,  100 - 108 MMOL/L Anion gap, POC 16 10 - 20 Hematocrit, POC 37 36 - 49 % Hemoglobin, POC 12.6 12 - 16 G/DL Imaging Reviewed: PCXR - final report pending Clementine Sicard, MD 
7/12/2018, 9:27 PM

## 2018-07-12 NOTE — PROGRESS NOTES
Problem: Falls - Risk of  Goal: *Absence of Falls  Document Agus Fall Risk and appropriate interventions in the flowsheet.    Outcome: Progressing Towards Goal  Fall Risk Interventions:  Mobility Interventions: Communicate number of staff needed for ambulation/transfer         Medication Interventions: Patient to call before getting OOB, Teach patient to arise slowly         History of Falls Interventions: Room close to nurse's station, Evaluate medications/consider consulting pharmacy, Door open when patient unattended

## 2018-07-13 NOTE — PROGRESS NOTES
Portable 02 deliveredto the room from [address] [place] [address]. Offered the pt FOC for home care, he chose [facility]. Referral sent to intake via [facility] and called to their intake rep,  Shilo Doshi. Care Management Interventions  PCP Verified by CM: Yes (Dr Hylton Second)  Last Visit to PCP: 05/12/18  Mode of Transport at Discharge:  Other (see comment) (wife)  Transition of Care Consult (CM Consult): [address]: Yes  Current Support Network: Lives with Spouse, Own Home  Confirm Follow Up Transport: Family (wife)  Plan discussed with Pt/Family/Caregiver: Yes  Freedom of Choice Offered: Yes  Discharge Location  Discharge Placement: Home with home health

## 2018-07-13 NOTE — DISCHARGE SUMMARY
[facility]    Discharge Summary    Patient: Dayna Carmen MRN: [id number]  CSN: [id number]    YOB: 1930  Age: 80 y.o. Sex: male    DOA: 7/11/2018 LOS:  LOS: 1 day   Discharge Date:      Admission Diagnoses: Syncope  Syncope    Discharge Diagnoses:    Problem List as of 7/13/2018  Date Reviewed: 6/22/2018          Codes Class Noted - Resolved    * (Principal)Syncope ICD-10-CM: R55  ICD-9-CM: 780.2  7/11/2018 - Present        Aortic stenosis, moderate ICD-10-CM: I35.0  ICD-9-CM: 424.1  1/28/2018 - Present        Abnormal chest CT ICD-10-CM: R93.8  ICD-9-CM: 793.2  1/27/2018 - Present    Overview Addendum 1/27/2018  1:13 PM by Kiley Solomon MD     Bilateral ground glass opacities and bibasilar atelectasis/infiltrate. Suspicious for aspiration pneumonia. Pleural effusion, right ICD-10-CM: J90  ICD-9-CM: 511.9  1/26/2018 - Present        SOB (shortness of breath) on exertion ICD-10-CM: R06.02  ICD-9-CM: 786.05  1/25/2018 - Present        Syncope and collapse ICD-10-CM: R55  ICD-9-CM: 780.2  1/19/2018 - Present        Celiac artery stenosis (HCC) ICD-10-CM: I77.4  ICD-9-CM: 447.4  1/10/2018 - Present        Osteoarthritis of both knees ICD-10-CM: M17.0  ICD-9-CM: 715.96  8/20/2017 - Present    Overview Signed 8/20/2017  3:19 PM by Helio Echeverria MD     Contemplating knee replacement.              Statin intolerance ICD-10-CM: Z78.9  ICD-9-CM: 995.27  5/3/2017 - Present        Essential hypertension ICD-10-CM: I10  ICD-9-CM: 401.9  2/27/2017 - Present        Anxiety ICD-10-CM: F41.9  ICD-9-CM: 300.00  2/27/2017 - Present        Chronic systolic congestive heart failure (HCC) ICD-10-CM: I50.22  ICD-9-CM: 428.22, 428.0  1/25/2017 - Present        S/P CABG x 2 ICD-10-CM: Z95.1  ICD-9-CM: V45.81  8/25/2015 - Present    Overview Signed 8/25/2015  2:15 PM by Helio Echeverria MD     12/2008, LIMA to LAD, SVG to RCA             Atherosclerotic NAHED (renal artery stenosis), bilateral ([address] ICD-10-CM: I70.1  ICD-9-CM: 440.1  8/25/2015 - Present    Overview Signed 8/25/2015  2:16 PM by Sherlyn Michele MD     S/P stenting R and L             Dyslipidemia ICD-10-CM: E78.5  ICD-9-CM: 272.4  Unknown - Present        Coronary artery disease of native artery of native heart with stable angina pectoris ([facility] [address] ICD-10-CM: I25.118  ICD-9-CM: 414.01, 413.9  Unknown - Present        Cardiomyopathy ICD-10-CM: I42.9  ICD-9-CM: 425.4  Unknown - Present    Overview Signed 4/29/2015  7:51 PM by Milo Anguiano     EF 30-35% (ECHO 6/14)             Atrial fibrillation ICD-10-CM: I48.91  ICD-9-CM: 427.31  Unknown - Present    Overview Signed 4/29/2015  7:51 PM by Milo Anguiano     CHADS score 3  (+CHF, +HTN, +AGE, -DM, -CVA)             Sick sinus syndrome ICD-10-CM: I49.5  ICD-9-CM: 427.81  Unknown - Present    Overview Signed 8/25/2015  2:15 PM by Sherlyn Michele MD     Developed post op CABG              RESOLVED: Sepsis [facility]) ICD-10-CM: A41.9  ICD-9-CM: 038.9, 995.91  1/6/2017 - 2/27/2017        RESOLVED: Community acquired bacterial pneumonia ICD-10-CM: J15.9  ICD-9-CM: 482.9  1/6/2017 - 2/27/2017        RESOLVED: CAP (community acquired pneumonia) ICD-10-CM: J18.9  ICD-9-CM: 445  1/6/2017 - 2/27/2017              Discharge Condition: Stable     Discharge To: Home     Consults: Cardiology and Hospitalist     Hospital Course: 81 y/o  male with hx of CAD, afib, SSS s/p pacemaker, Celiac Artery Stenosis presented to the ED via EMS on 7/11/2018 after syncopal episode. Wife states pt passed out, pt doesn't remember much but said he was on the floor. EMS called, HR dropped in the 30s and he nearly passed out again. Pt stated he's felt increasingly weak, has SOB worse with activity and intermittent upper abdominal pain for the last several weeks. While in the ED, pt's HR dropped in the 30s multiple times, pt mentions that pacemaker was initially set at 60 bpm but increased to 70 bpm in January 2018. [employer] rep evaluated pt in the ED, mentioned intermittent RV capture. Work up in the ED with troponin 0.03, BNP 6098, creatinine 1.53 - -> 1.3, CXR showed mildly elevated left hemidiaphragm, no active disease, V-paced on EKG. He was admitted under observation to telemetry for further management of care. Cardiology consulted. He had an RRT early am on 7/12/2018 c/o chest tightness- labs and vital signs stable, EKG unchanged. Suspect symptoms related to anxiety. Echo repeated on 7/12, EF 50%, pacing wire present and appears normal, moderate aortic valve stenosis, mild to moderate aortic valve regurgitation, mild to moderate mitral valve regurgitation, severe tricuspid valve regurgitation, pulmonary arterial systolic pressure 62 mmHg, severe pulmonary hypertension, mild to moderate pulmonic valve heart valve regurgitation present. No further c/o chest pain, SOB. Had pt ambulate in [place] w/ nurse yesterday, he c/o chest tightness with RR in 30s, and oxygen sats down to 88% on RA. Discharge held, stat CTA chest obtained which was negative for PE, biventricular cardiomegaly, findings of pulmonary arterial hypertension w/ small right and tiny left sided pleural effusions, underlying emphysema and bilateral atelectasis, no consolidation. He had a walk test this morning, oxygen sats down to 88% with ambulation on RA. Order placed for home oxygen 2L [place]. Labs and vital signs WNL. He is appropriate for discharge home (once oxygen delivered), to follow up with PCP within one week and with cardiology within 1-2 weeks.     Physical Exam:  General appearance: alert, cooperative, no distress, appears stated age  Head: Normocephalic, without obvious abnormality, atraumatic  Lungs: clear to auscultation bilaterally  Heart: regular rate and rhythm, grade 3/6 systolic murmur, pacemaker left chest wall  Abdomen: soft, non-tender.  Bowel sounds normal. No masses,  no organomegaly  Extremities: extremities normal, atraumatic, no cyanosis or edema  Skin: Skin color, texture, turgor normal. No rashes or lesions  Neurologic: Grossly normal  PSY: Mood and affect normal, appropriately behaved     Significant Diagnostic Studies: labs:   Recent Results (from the past 24 hour(s))   EKG, 12 LEAD, SUBSEQUENT    Collection Time: 07/12/18  8:15 AM   Result Value Ref Range    Ventricular Rate 72 BPM    Atrial Rate 66 BPM    QRS Duration 170 ms    Q-T Interval 454 ms    QTC Calculation (Bezet) 497 ms    Calculated R Axis -81 degrees    Calculated T Axis 105 degrees    Diagnosis       Electronic ventricular pacemaker  When compared with ECG of 11-JUL-2018 18:54,  Vent.  rate has increased BY   2 BPM  Confirmed by Darío Negrete ([id number]) on 7/12/2018 3:34:47 PM     ECHO ADULT COMPLETE    Collection Time: 07/12/18 12:18 PM   Result Value Ref Range    Aortic Valve Systolic Peak Velocity 8.76 cm/s    AoV VTI 41.58 cm    Aortic Valve Area by Continuity of VTI 2.3 cm2    AoV PG 0.0 mmHg    LVIDd 3.39 (A) 4.2 - 5.9 cm    LVPWd 1.26 (A) 0.6 - 1.0 cm    LVIDs 3.05 cm    IVSd 1.63 (A) 0.6 - 1.0 cm    LV ED Vol A2C 37.9 mL    LV ES Vol A4C 45.7 mL    LVOT d 1.98 cm    LVOT Peak Velocity 186.78 cm/s    LVOT Peak Gradient 14.0 mmHg    LVOT VTI 30.86 cm    Left Ventricle Isovolumic Relaxation Time 61.0 ms    MVA (PHT) 4.8 cm2    MV A Luis 2.32 cm/s    MV E Luis 0.92 cm/s    MV E/A 39.7     Aortic Valve Systolic Mean Gradient 24.1 mmHg    LV Ejection Fraction MOD 4C 55 %    LA Vol 4C 90.52 (A) 18 - 58 mL    LA Vol 2C 93.34 (A) 18 - 58 mL    LV Mass .9 88 - 224 g    LV Mass AL Index 115.6 g/m2    RVSP 21.3 mmHg    LV ED Vol A4C 101.5 mL    Mitral Valve E Wave Deceleration Time 156.9 ms    Mitral Valve Pressure Half-time 45.5 ms    Triscuspid Valve Regurgitation Peak Gradient 6.3 mmHg    Aortic Regurgitant Pressure Half-time 421.2 cm    TR Max Velocity -125.92 cm/s    PASP 21.3 mmHg    LA Vol Index 52.91 ml/m2    LA Vol Index 51.31 ml/m2    LVED Vol Index A4C 57.5 mL/m2    LVED Vol Index A2C 21.5 mL/m2    LVES Vol Index A4C 25.9 mL/m2    GERBER/BSA VTI 1.3 cm2/m2    AR Max Luis 481.95 cm/s    Est. RA Pressure 33.1 mmHg   METABOLIC PANEL, BASIC    Collection Time: 07/13/18  5:58 AM   Result Value Ref Range    Sodium 139 136 - 145 mmol/L    Potassium 3.9 3.5 - 5.5 mmol/L    Chloride 106 100 - 108 mmol/L    CO2 25 21 - 32 mmol/L    Anion gap 8 3.0 - 18 mmol/L    Glucose 91 74 - 99 mg/dL    BUN 26 (H) 7.0 - 18 MG/DL    Creatinine 1.10 0.6 - 1.3 MG/DL    BUN/Creatinine ratio 24 (H) 12 - 20      GFR est AA >60 >60 ml/min/1.73m2    GFR est non-AA >60 >60 ml/min/1.73m2    Calcium 8.4 (L) 8.5 - 10.1 MG/DL         Discharge Medications:     Current Discharge Medication List      CONTINUE these medications which have NOT CHANGED    Details   apixaban (ELIQUIS) 5 mg tablet Take 1 Tab by mouth two (2) times a day. Qty: 60 Tab, Refills: 5      amLODIPine (NORVASC) 5 mg tablet Take 1 Tab by mouth daily. Qty: 60 Tab, Refills: 0      furosemide (LASIX) 20 mg tablet One pill once or twice a day  Qty: 60 Tab, Refills: 0      metoprolol succinate (TOPROL XL) 50 mg XL tablet Take 1 Tab by mouth daily. Qty: 60 Tab, Refills: 0      nitroglycerin (NITROSTAT) 0.4 mg SL tablet 1 Tab by SubLINGual route every five (5) minutes as needed for Chest Pain (up to 3 total 1 every 5 min). Qty: 60 Tab, Refills: 0      pantoprazole (PROTONIX) 40 mg tablet Take 1 Tab by mouth Daily (before breakfast). Qty: 60 Tab, Refills: 0      LORazepam (ATIVAN) 1 mg tablet Take 1 Tab by mouth every six (6) hours as needed for Anxiety. Max Daily Amount: 4 mg. Qty: 30 Tab, Refills: 0    Associated Diagnoses: Anxiety      enalapril (VASOTEC) 20 mg tablet Take 20 mg by mouth daily. escitalopram oxalate (LEXAPRO) 5 mg tablet TAKE 1 TABLET BY MOUTH DAILY. Qty: 30 Tab, Refills: 1      HYDROcodone-acetaminophen (NORCO) 5-325 mg per tablet Take 1 Tab by mouth every four (4) hours as needed for Pain.  Max Daily Amount: 6 Tabs. Qty: 10 Tab, Refills: 0    Associated Diagnoses: Abdominal pain, right lower quadrant      isosorbide mononitrate ER (IMDUR) 30 mg tablet Take 1 Tab by mouth daily. Qty: 60 Tab, Refills: 0      ranolazine ER (RANEXA) 500 mg SR tablet Take 1 Tab by mouth two (2) times a day. Indications: Chronic Stable Angina Pectoris  Qty: 60 Tab, Refills: 0      tiotropium (SPIRIVA) 18 mcg inhalation capsule Take 1 Cap by inhalation every twenty-four (24) hours. Qty: 30 Cap, Refills: 0      guaiFENesin ER (MUCINEX) 600 mg ER tablet Take 1 Tab by mouth every twelve (12) hours. Qty: 60 Tab, Refills: 0         STOP taking these medications       chlorpheniramine-HYDROcodone (TUSSIONEX) 10-8 mg/5 mL suspension Comments:   Reason for Stopping:               Activity: Activity as tolerated     Diet: Cardiac Diet     Wound Care: None needed     Follow-up: Follow up with PCP within one week.   Follow up with cardiology within 1-2 weeks.     Discharge time: > 35 mins  Lizzie Hastings NP  7/13/2018, 7:59 AM

## 2018-07-13 NOTE — ROUTINE PROCESS
1940  Bedside and Verbal shift change report given to Anila Monk (oncoming nurse) by Juan Wiley (offgoing nurse). Report included the following information SBAR, Kardex, Intake/Output and MAR. Pt awake and alert at this time with no complaints, no CP, no tightness. 2200  Shift assessment complete. Pt still has no complaints of pain or chest tightness, but admits to feeling anxious. PRN ativan given. 2300  Pt reports some relief from anxiety    0300  Reassessment complete. No change in pt condition    0520  Pt requesting ativan    0720  Bedside and Verbal shift change report given to [facility] (oncoming nurse) by Anila Monk (offgoing nurse). Report included the following information SBAR, Kardex, Intake/Output and MAR.

## 2018-07-13 NOTE — ROUTINE PROCESS
Bedside and Verbal shift change report given to [facility] (oncoming nurse) by Donna Haynes RN (offgoing nurse). Report included the following information SBAR, Kardex, Intake/Output, MAR, Recent Results and Cardiac Rhythm V Paced. 0945 Pt resting in bed with no complaints of pain and no change to condition. Will continue to monitor. 1110 O2 walk test completed. (See note)    1320 Pt sleeping in bed. Will continue to monitor. 1600 Pt discharged home with oxygen tank and family. Discharge instructions reviewed and questions answered. Will continue to monitor.

## 2018-07-13 NOTE — PROGRESS NOTES
Problem: Falls - Risk of  Goal: *Absence of Falls  Document Agus Fall Risk and appropriate interventions in the flowsheet.    Outcome: Progressing Towards Goal  Fall Risk Interventions:  Mobility Interventions: Patient to call before getting OOB, Utilize walker, cane, or other assistive device         Medication Interventions: Patient to call before getting OOB, Teach patient to arise slowly         History of Falls Interventions: Room close to nurse's station

## 2018-07-13 NOTE — PROGRESS NOTES
0815 Arrived at RRT - Pt has C/O of chest tightness & some SOB - Also states he feels light headed - 12 lead EKG obtained w/o changes - Pt pacing at rate of  70 - BP up 697 systolic range -Ntg given sublinq - O2 applied -Ativan ordered for anxiety - Lasix to be given - pts  systolic after NTG & pts Chest pain easing off - pt will stay on 3S & closely monitor - Dr. Gresham Reading aware of chest pain - Echo ordered

## 2018-07-13 NOTE — DISCHARGE INSTRUCTIONS
Patient armband removed and shredded. DISCHARGE SUMMARY from Nurse    PATIENT INSTRUCTIONS:      What to do at Home:  Recommended activity: Activity as tolerated. If you experience any of the following symptoms shortness of breath, difficulty breathing, bleeding, temperature greater than 100.5, nausea, vomiting, diarrhea, weight gain of greater than 5 pounds in a week, fainting or lightheadedness please follow up with your primary care physician or call 911. *  Please give a list of your current medications to your Primary Care Provider. *  Please update this list whenever your medications are discontinued, doses are      changed, or new medications (including over-the-counter products) are added. *  Please carry medication information at all times in case of emergency situations. These are general instructions for a healthy lifestyle:    No smoking/ No tobacco products/ Avoid exposure to second hand smoke  Surgeon General's Warning:  Quitting smoking now greatly reduces serious risk to your health. Obesity, smoking, and sedentary lifestyle greatly increases your risk for illness    A healthy diet, regular physical exercise & weight monitoring are important for maintaining a healthy lifestyle    You may be retaining fluid if you have a history of heart failure or if you experience any of the following symptoms:  Weight gain of 3 pounds or more overnight or 5 pounds in a week, increased swelling in our hands or feet or shortness of breath while lying flat in bed. Please call your doctor as soon as you notice any of these symptoms; do not wait until your next office visit. Recognize signs and symptoms of STROKE:    F-face looks uneven    A-arms unable to move or move unevenly    S-speech slurred or non-existent    T-time-call 911 as soon as signs and symptoms begin-DO NOT go       Back to bed or wait to see if you get better-TIME IS BRAIN.     Warning Signs of HEART ATTACK     Call 911 if you have these symptoms:   Chest discomfort. Most heart attacks involve discomfort in the center of the chest that lasts more than a few minutes, or that goes away and comes back. It can feel like uncomfortable pressure, squeezing, fullness, or pain.  Discomfort in other areas of the upper body. Symptoms can include pain or discomfort in one or both arms, the back, neck, jaw, or stomach.  Shortness of breath with or without chest discomfort.  Other signs may include breaking out in a cold sweat, nausea, or lightheadedness. Don't wait more than five minutes to call 911 - MINUTES MATTER! Fast action can save your life. Calling 911 is almost always the fastest way to get lifesaving treatment. Emergency Medical Services staff can begin treatment when they arrive -- up to an hour sooner than if someone gets to the hospital by car. The discharge information has been reviewed with the patient. The patient verbalized understanding. Discharge medications reviewed with the patient and appropriate educational materials and side effects teaching were provided.   ___________________________________________________________________________________________________________________________________

## 2018-08-15 NOTE — TELEPHONE ENCOUNTER
Last appt: 6/6/2018  Future Appointments  Date Time Provider Rk Catherine   9/5/2018 9:00 AM Joan Toussaint  [facility]   9/5/2018 9:00 AM Pacer Norf Csi [address] [facility]       Requested Prescriptions     Pending Prescriptions Disp Refills    escitalopram oxalate (LEXAPRO) 5 mg tablet 30 Tab 1     Sig: TAKE 1 TABLET BY MOUTH DAILY. Other Comments: Started 04/26/2018 for anxiety was refused at 06/06/2018 OV. Now requesting refill of meds via fax from pharmacy.

## 2018-09-05 NOTE — MR AVS SNAPSHOT
[facility] 
 
 
 [address] [address] [phone] Patient: Verlie Barthel MRN: [id number] [place] Visit Information Date & Time Provider Department Dept. Phone Encounter #  
 2018  9:00 AM Jean Oneil MD [facility] at [facility] [address] [phone] [id number] Follow-up Instructions Return in about 8 weeks (around 10/31/2018). Your Appointments 10/2/2018  9:00 AM  
Follow Up with Jean Oneil MD  
Cardio Specialist at [facility] [address] [facility] Appt Note: f/u with Dr Leah Aguilar in 3 weeks [address] [address] [address]  
  
   
 [address] [facility] [address] Upcoming Health Maintenance Date Due ZOSTER VACCINE AGE 60> 9/10/1990 GLAUCOMA SCREENING Q2Y 11/10/1995 Pneumococcal 65+ Low/Medium Risk (1 of 2 - PCV13) 11/10/1995 MEDICARE YEARLY EXAM 3/14/2018 Influenza Age 5 to Adult 2018 DTaP/Tdap/Td series (2 - Td) 9/10/2024 Allergies as of 2018  Review Complete On: 2018 By: Angelina Hauser Severity Noted Reaction Type Reactions Beta-blockers (Beta-adrenergic Blocking Agts)  2015    Drowsiness Penicillins  2014    Swelling Statins-hmg-coa Reductase Inhibitors  2015    Drowsiness Current Immunizations  Reviewed on 2018 Name Date Influenza Vaccine 10/1/2017 Tdap 9/10/2014  9:38 PM  
  
 Not reviewed this visit Vitals BP Pulse Height(growth percentile) Weight(growth percentile) SpO2 BMI  
 154/77 70 5' 9\" (1.753 m) 142 lb (64.4 kg) 98% 20.97 kg/m2 Smoking Status Former Smoker BMI and BSA Data Body Mass Index Body Surface Area  
 20.97 kg/m 2 1.77 m 2 Preferred Pharmacy Pharmacy Name Phone [id number], [address] [facility] [phone] Your Updated Medication List  
  
   
This list is accurate as of 9/5/18 10:01 AM.  Always use your most recent med list. amLODIPine 5 mg tablet Commonly known as:  Ezekiel Emmy Take 1 Tab by mouth daily. apixaban 5 mg tablet Commonly known as:  David Rutland Take 1 Tab by mouth two (2) times a day. enalapril 20 mg tablet Commonly known as:  Ashley Foot Take 20 mg by mouth daily. furosemide 20 mg tablet Commonly known as:  LASIX One pill once or twice a day  
  
 guaiFENesin  mg ER tablet Commonly known as:  Abelino & Abelino Take 1 Tab by mouth every twelve (12) hours. HYDROcodone-acetaminophen 5-325 mg per tablet Commonly known as:  Becky Mura Take 1 Tab by mouth every four (4) hours as needed for Pain. Max Daily Amount: 6 Tabs. LORazepam 1 mg tablet Commonly known as:  ATIVAN Take 1 Tab by mouth every six (6) hours as needed for Anxiety. Max Daily Amount: 4 mg.  
  
 metoprolol succinate 50 mg XL tablet Commonly known as:  TOPROL XL Take 1 Tab by mouth daily. nitroglycerin 0.4 mg SL tablet Commonly known as:  NITROSTAT  
1 Tab by SubLINGual route every five (5) minutes as needed for Chest Pain (up to 3 total 1 every 5 min). OXYGEN-AIR DELIVERY SYSTEMS  
by Does Not Apply route. pantoprazole 40 mg tablet Commonly known as:  PROTONIX Take 1 Tab by mouth Daily (before breakfast). tiotropium 18 mcg inhalation capsule Commonly known as:  Di Crow Take 1 Cap by inhalation every twenty-four (24) hours. Follow-up Instructions Return in about 8 weeks (around 10/31/2018). Patient Instructions F/U in 8 weeks Introducing [facility]! Dear Maura New: Thank you for requesting a [employer] account. Our records indicate that you already have an active [employer] account. You can access your account anytime at [email or web address] Did you know that you can access your hospital and ER discharge instructions at any time in [employer]? You can also review all of your test results from your hospital stay or ER visit. Additional Information If you have questions, please visit the Frequently Asked Questions section of the [employer] website at [email or web address] Remember, [employer] is NOT to be used for urgent needs. For medical emergencies, dial 911. Now available from your iPhone and Android! Please provide this summary of care documentation to your next provider. Your primary care clinician is listed as Sierra Waldrop. If you have any questions after today's visit, please call [phone].

## 2018-09-05 NOTE — TELEPHONE ENCOUNTER
Pt in office today. Two patient Identifiers confirmed. Pt requested a dme for lightweight rollator with seat. Advised I would forward to Dr Radha Merchant for approval and send to [address] [place] [address],[facility] [employer] [facility] if approved for issuing. Pt verbalized understanding.

## 2018-09-11 NOTE — TELEPHONE ENCOUNTER
Verbal order and read back per Quique Gordon MD  [facility] to order.

## 2018-09-16 PROBLEM — Z95.0 CARDIAC PACEMAKER IN SITU: Status: ACTIVE | Noted: 2018-01-01

## 2018-09-16 NOTE — PROGRESS NOTES
Subjective:   Mr. July Watson is here for hospital follow up.      This is an 41-year-old man that has a history of hypertension, coronary artery disease, dyslipidemia, prior coronary bypass grafting, cardiomyopathy, atrial fibrillation, celiac artery stenosis, pacemaker, renal artery stenosis and congestive heart failure.      The patient had prior coronary bypass grafting in 2008. Danney Slider to his coronary bypass surgery, he was experiencing primarily easy fatigability.  He has had repeat cardiac catheterizations since the time of his surgery, the last of which was done in 2016. At that time, medical therapy was advised. He had a nuclear stress test on 9/27/2017 that did not show evidence for ischemia and his EF was normal. His most recent echo was Jan 2018 with an EF of 55% with mild to moderate aortic stenosis with mean gradient 14 mmHg and GERBER 1cm, also had severe TR and mild-moderate pulmonic insufficiency. He had a stay at [facility] for presyncope and profound weakness and was discharged 01/21/18. Of note during his hospitalization, he was seen by vascular surgery for celiac artery stenosis. They deemed that his symptomology was unlikely related to mesenteric ischemia. Subsequently he went to rehab for a one week stay. He has some occasional chest discomfort which is unchanged in pattern. If he is more active on a given day, he is much more fatigued the next day. In the office today, he reports feeling better. He said his breathing is good at times. He has some occasional chest tightness which is essentially unchanged. Pacemaker check was supposed to be today but will need to be rescheduled. Patient's cardiac risk factors are dyslipidemia, male gender, hypertension.         Patient Active Problem List    Diagnosis Date Noted    Cardiac pacemaker in situ 09/16/2018    Syncope 07/11/2018    Aortic stenosis, moderate 01/28/2018    Abnormal chest CT 01/27/2018    Pleural effusion, right 01/26/2018    SOB (shortness of breath) on exertion 01/25/2018    Syncope and collapse 01/19/2018    Celiac artery stenosis (HCC) 01/10/2018    Osteoarthritis of both knees 08/20/2017    Statin intolerance 05/03/2017    Essential hypertension 02/27/2017    Anxiety 02/27/2017    Chronic systolic congestive heart failure ([facility] [address] 01/25/2017    S/P CABG x 2 08/25/2015    Atherosclerotic NAHED (renal artery stenosis), bilateral (HCC) 08/25/2015    Dyslipidemia     Coronary artery disease of native artery of native heart with stable angina pectoris (HCC)     Cardiomyopathy     Atrial fibrillation     Sick sinus syndrome      Current Outpatient Prescriptions   Medication Sig Dispense Refill    OXYGEN-AIR DELIVERY SYSTEMS by Does Not Apply route.  apixaban (ELIQUIS) 5 mg tablet Take 1 Tab by mouth two (2) times a day. 60 Tab 5    amLODIPine (NORVASC) 5 mg tablet Take 1 Tab by mouth daily. 60 Tab 0    furosemide (LASIX) 20 mg tablet One pill once or twice a day (Patient taking differently: as needed. One pill once or twice a day) 60 Tab 0    metoprolol succinate (TOPROL XL) 50 mg XL tablet Take 1 Tab by mouth daily. 60 Tab 0    nitroglycerin (NITROSTAT) 0.4 mg SL tablet 1 Tab by SubLINGual route every five (5) minutes as needed for Chest Pain (up to 3 total 1 every 5 min). 60 Tab 0    pantoprazole (PROTONIX) 40 mg tablet Take 1 Tab by mouth Daily (before breakfast). 60 Tab 0    tiotropium (SPIRIVA) 18 mcg inhalation capsule Take 1 Cap by inhalation every twenty-four (24) hours. 30 Cap 0    LORazepam (ATIVAN) 1 mg tablet Take 1 Tab by mouth every six (6) hours as needed for Anxiety. Max Daily Amount: 4 mg. 30 Tab 0    enalapril (VASOTEC) 20 mg tablet Take 20 mg by mouth daily.  HYDROcodone-acetaminophen (NORCO) 5-325 mg per tablet Take 1 Tab by mouth every four (4) hours as needed for Pain. Max Daily Amount: 6 Tabs.  10 Tab 0    guaiFENesin ER (MUCINEX) 600 mg ER tablet Take 1 Tab by mouth every twelve (12) hours. 60 Tab 0     Allergies   Allergen Reactions    Beta-Blockers (Beta-Adrenergic Blocking Agts) Drowsiness    Penicillins Swelling    Statins-Hmg-Coa Reductase Inhibitors Drowsiness     Past Medical History:   Diagnosis Date    Atrial fibrillation     CHADS score 3  (+CHF, +HTN, +AGE, -DM, -CVA)    CABG     2008   LIMA - LAD,   SVG - RCA    Cardiomyopathy     EF 30-35% (ECHO 6/14)    Coronary artery disease     Dyslipidemia     Hypertension     Hypothyroid     Pacemaker     Peripheral vascular disease     Renal artery stenosis     bilateral stents    Sick sinus syndrome      Past Surgical History:   Procedure Laterality Date    HX CORONARY ARTERY BYPASS GRAFT      2008   LIMA - LAD,   SVG - RCA     No family history on file. History   Smoking Status    Former Smoker   Smokeless Tobacco    Never Used          Review of Systems, additional:  Constitutional: negative  Eyes: negative  Respiratory: negative for dyspnea on exertion  Cardiovascular: per HPI  Gastrointestinal: negative for nausea and vomiting  Musculoskeletal:negative  Neurological: negative  Behvioral/Psych: negative  Endocrine: negative  ENT: negative    Objective:     Visit Vitals    /77    Pulse 70    Ht 5' 9\" (1.753 m)    Wt 142 lb (64.4 kg)    SpO2 98%    BMI 20.97 kg/m2     General:  alert, cooperative, no distress, appears stated age   Chest Wall: inspection normal - no chest wall deformities or tenderness, respiratory effort normal   Lung: clear to auscultation bilaterally   Heart:  irregularly irregular rhythm with rate 66. Grade II/VI SERENE best heard RSB   Abdomen: soft, non-tender. Bowel sounds normal. No masses,  no organomegaly   Extremities: extremities normal, atraumatic, no cyanosis. There is 2+ ankle swelling. Skin: no rashes   Neuro: alert, oriented, normal speech, no focal findings or movement disorder noted         Assessment/Plan:         ICD-10-CM ICD-9-CM    1.  Coronary artery disease of native artery of native heart with stable angina pectoris ([facility] [address]- -had CABGx2 in 2008. Nuclear stress in Sept 2017 without evidence of ischemia. Occasional  anginal symptoms with exertion, stable in pattern, Continue with Toprol. Not on statin due to intolerance. I25.118 414.01      413.9    2. Ischemic cardiomyopathy Echo EF 50% , 7/12/2018. RT 8 weeks I25.5 414.8    3. Chronic atrial fibrillation ([facility] [address]- -controlled with rate 66 in office today. Continue Eliquis and Toprol. I48.2 427.31    4. Sick sinus syndrome-- PPM  I49.5 427.81    5. 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 [address]-- no evidence of volume overload on exam.         428.0    6. Essential hypertension-- continue Toprol I10 401.9    7. Celiac artery stenosis ([facility] [address]- -seen by vascular surgery, no plans for OR. I77.4 447.4    8. Aortic stenosis, moderate by recent Echo 7/12/2018  I35.0 424.1    9       Anxiety, refused Lexapro. 10     Pacemaker in situ, rescheduling pacer check in the office today as representatives unavailable.

## 2018-09-24 NOTE — TELEPHONE ENCOUNTER
Incoming from pt. Two patient Identifiers confirmed. Pt stated he had not heard anything regarding his rollator. Advised I would contact [facility]. Pt verbalized understanding.

## 2018-09-24 NOTE — TELEPHONE ENCOUNTER
[facility] at [facility] number. Two patient Identifiers confirmed. Advised pt per notes below. Pt verbalized understanding.

## 2018-09-24 NOTE — TELEPHONE ENCOUNTER
Contacted [facility] and spoke with Cyndi Beckford. Two patient Identifiers confirmed. Advised that pt had not received  a call regarding rollator. She stated they had been trying ot contact pt and hd not heard from him. She stated that dme was ready for . Inquired if they delivered. She advised not have delivery. Will advise pt.      [employer] Address  [address] [place] [address]

## 2018-09-26 PROBLEM — I50.32 DIASTOLIC CHF, CHRONIC (HCC): Status: ACTIVE | Noted: 2018-01-01

## 2018-09-26 NOTE — Clinical Note
Sheath #1: Dressed using 4 X 4 and tape dressing. Site: clean, dry, & intact, no bleeding and no hematoma.

## 2018-09-26 NOTE — Clinical Note
The pocket was closed in 2 layer(s) using interrupted 2-0 vicryl. Applied dressing(s): steri strips.

## 2018-09-26 NOTE — IP AVS SNAPSHOT
[address] [address] 
[phone] Patient: Eloise Dunne MRN: [id number] About your hospitalization You were admitted on:  September 26, 2018 You last received care in the:  [address] [place] [address] You were discharged on:  October 3, 2018 Why you were hospitalized Your primary diagnosis was:  Cardiac Syncope Your diagnoses also included:  Dyslipidemia, Coronary Artery Disease Of Native Artery Of Native Heart With Stable Angina Pectoris (Hcc), Atrial Fibrillation (Hcc), Sss (Sick Sinus Syndrome) ([facility]), S/P Cabg X 2, Essential Hypertension, Aortic Stenosis, Moderate, Cardiac Pacemaker In Situ, Diastolic Chf, Chronic (Hcc), Syncope Follow-up Information Follow up With Details Comments Contact Info Daniel Roberts MD On 10/10/2018 10:20am [address] [place] [address] 
[phone] Sheryle Loosen, MD Schedule an appointment as soon as possible for a visit in 1 week follow up for pacemaker placement. [address] [facility] [address] [facility] [address] [phone] Your Scheduled Appointments Thursday October 04, 2018 To Be Determined [address] with ROBERT Chávez [address] [facility] CARE SCHEDULING/INTAKE ([facility]) [address] [place] [address] SCHEDULING/INTAKE ( HOME HEALTH/ HOSPICE) Monday November 05, 2018  9:45 AM EST Follow Up with Sheryle Loosen, MD  
Cardio Specialist at [facility] [facility]) [address] [facility] [address] [facility] [id number]  
[phone] Discharge Orders None A check wilner indicates which time of day the medication should be taken. My Medications START taking these medications Instructions Each Dose to Equal  
 Morning Noon Evening Bedtime  
 oxyCODONE-acetaminophen 5-325 mg per tablet Commonly known as:  PERCOCET Take 1 Tab by mouth every four (4) hours as needed. Max Daily Amount: 6 Tabs. 1 Tab CHANGE how you take these medications Instructions Each Dose to Equal  
 Morning Noon Evening Bedtime  
 furosemide 20 mg tablet Commonly known as:  LASIX What changed:   
- when to take this 
- reasons to take this 
- additional instructions One pill once or twice a day CONTINUE taking these medications Instructions Each Dose to Equal  
 Morning Noon Evening Bedtime  
 amLODIPine 5 mg tablet Commonly known as:  Venita Nguyen Your next dose is:  10/4/18 @ 0900 AM  
   
 Take 1 Tab by mouth daily. 5 mg  
    
  
   
   
   
  
 enalapril 20 mg tablet Commonly known as:  Ely Stacks Your next dose is:  10/4/18 @ 0900 AM  
   
 Take 20 mg by mouth daily. 20 mg  
    
   
   
   
  
 guaiFENesin  mg ER tablet Commonly known as:  Abelino & Abelino Take 1 Tab by mouth every twelve (12) hours. 600 mg LORazepam 1 mg tablet Commonly known as:  ATIVAN Take 1 Tab by mouth every six (6) hours as needed for Anxiety. Max Daily Amount: 4 mg.  
 1 mg  
    
   
   
   
  
 metoprolol succinate 50 mg XL tablet Commonly known as:  TOPROL XL Take 1 Tab by mouth daily. 50 mg  
    
  
   
   
   
  
 nitroglycerin 0.4 mg SL tablet Commonly known as:  NITROSTAT  
   
 1 Tab by SubLINGual route every five (5) minutes as needed for Chest Pain (up to 3 total 1 every 5 min). 0.4 mg OXYGEN-AIR DELIVERY SYSTEMS  
   
 by Does Not Apply route. pantoprazole 40 mg tablet Commonly known as:  PROTONIX Take 1 Tab by mouth Daily (before breakfast). 40 mg  
    
   
   
   
  
 PriLOSEC 40 mg capsule Generic drug:  omeprazole Your next dose is:  10/4/18 @ 0900 AM  
   
 Take 40 mg by mouth daily.   
 40 mg  
    
  
   
   
   
  
 tiotropium 18 mcg inhalation capsule Commonly known as:  Vida Rendonhaley Your next dose is:  10/4/18 m@ 0900 AM  
   
 Take 1 Cap by inhalation every twenty-four (24) hours. 1 Cap STOP taking these medications   
 apixaban 5 mg tablet Commonly known as:  Robert Guillen HYDROcodone-acetaminophen 5-325 mg per tablet Commonly known as:  NORCO  
   
  
 traMADol 50 mg tablet Commonly known as:  ULTRAM  
   
  
  
  
Where to Get Your Medications Information on where to get these meds will be given to you by the nurse or doctor. ! Ask your nurse or doctor about these medications  
  oxyCODONE-acetaminophen 5-325 mg per tablet Opioid Education Prescription Opioids: What You Need to Know: 
 
 
 
F-face looks uneven A-arms unable to move or move unevenly S-speech slurred or non-existent T-time-call 911 as soon as signs and symptoms begin-DO NOT go Back to bed or wait to see if you get better-TIME IS BRAIN. Warning Signs of HEART ATTACK Call 911 if you have these symptoms: 
? Chest discomfort.  Most heart attacks involve discomfort in the center of the chest that lasts more than a few minutes, or that goes away and comes back. It can feel like uncomfortable pressure, squeezing, fullness, or pain. ? Discomfort in other areas of the upper body. Symptoms can include pain or discomfort in one or both arms, the back, neck, jaw, or stomach. ? Shortness of breath with or without chest discomfort. ? Other signs may include breaking out in a cold sweat, nausea, or lightheadedness. Don't wait more than five minutes to call [address]! Fast action can save your life. Calling 911 is almost always the fastest way to get lifesaving treatment. Emergency Medical Services staff can begin treatment when they arrive  up to an hour sooner than if someone gets to the hospital by car. The discharge information has been reviewed with the patient. The patient verbalized understanding. Discharge medications reviewed with the patient and appropriate educational materials and side effects teaching were provided. ___________________________________________________________________________________________________________________________________ Disposition: 
Will need follow-up with device/wound check in 7-10 days in my office. Please contact office at [phone] to confirm appointment. Main Office:   
[address], [address] [facility] 27 Restrictions: For affected arm:  No lifting greater than 10 lbs or lifting elbow above shoulder for 4 weeks. Keep incision clean and dry for a total of 72 hours after procedure. Remove dressing in 24 hours if not already removed. Please remove the steristrips (small white adhesive strips over wound) after 7 days if they have not already fallen off. No hot tubs or pools for 2 weeks. OK to shower with \"pat\" dry incision after 72 hours. No driving ideally for 2 weeks due to concern for airbag. Other instructions: 
-Hold eliquis for one week,starting 10/2/2018 Fundability Announcement We are excited to announce that we are making your provider's discharge notes available to you in FitLin[employer]. You will see these notes when they are completed and signed by the physician that discharged you from your recent hospital stay. If you have any questions or concerns about any information you see in FitLinxx, please call the Health Information Department where you were seen or reach out to your Primary Care Provider for more information about your plan of care. Introducing [facility]! Dear Dany Navarro: Thank you for requesting a FitLin[employer] account. Our records indicate that you already have an active FitLin[employer] account. You can access your account anytime at [email or web address] Did you know that you can access your hospital and ER discharge instructions at any time in FitLinxx? You can also review all of your test results from your hospital stay or ER visit. Additional Information If you have questions, please visit the Frequently Asked Questions section of the FitLinxx website at [email or web address] Remember, FitLinxx is NOT to be used for urgent needs. For medical emergencies, dial 911. Now available from your iPhone and Android! Introducing Westley Pimentel As a [employer] patient, I wanted to make you aware of our electronic visit tool called Westley Jameshayleyuvaldo. [employer] 24/7 allows you to connect within minutes with a medical provider 24 hours a day, seven days a week via a mobile device or tablet or logging into a secure website from your computer. You can access Westley Pimentel from anywhere in the [place].  
 
A virtual visit might be right for you when you have a simple condition and feel like you just dont want to get out of bed, or cant get away from work for an appointment, when your regular [employer] provider is not available (evenings, weekends or holidays), or when youre out of town and need minor care. Electronic visits cost only $49 and if the [employer] 24/7 provider determines a prescription is needed to treat your condition, one can be electronically transmitted to a nearby pharmacy*. Please take a moment to enroll today if you have not already done so. The enrollment process is free and takes just a few minutes. To enroll, please download the [employer] lisandro to your tablet or phone, or visit [email or web address] to enroll on your computer. And, as an [address] [place] [address] patient with a [employer] account, the results of your visits will be scanned into your electronic medical record and your primary care provider will be able to view the scanned results. We urge you to continue to see your regular [facility] provider for your ongoing medical care. And while your primary care provider may not be the one available when you seek a [employer] virtual visit, the peace of mind you get from getting a real diagnosis real time can be priceless. For more information on [employer], view our Frequently Asked Questions (FAQs) at [email or web address]. Sincerely, 
 
Deo Sosa MD 
Chief Medical Officer [facility] *:  certain medications cannot be prescribed via [employer] Providers Seen During Your Hospitalization Provider Specialty Primary office phone Julián Zaldivar MD Emergency Medicine [phone] Bijal Stone DO Hospitalist [phone] Capri Davey MD Family Practice [phone] Maine Morgan MD Internal Medicine [phone] Immunizations Administered for This Admission Name Date Influenza Vaccine (Quad) PF 9/28/2018 Your Primary Care Physician (PCP) Primary Care Physician Office Phone Office Fax [address] [phone] You are allergic to the following Allergen Reactions Beta-Blockers (Beta-Adrenergic Blocking Agts) Drowsiness Penicillins Swelling Statins-Hmg-Coa Reductase Inhibitors Drowsiness Recent Documentation Height Weight BMI Smoking Status 1.753 m 62.5 kg 20.35 kg/m2 Former Smoker Emergency Contacts Name Discharge Info Relation Home Work Mobile [phone] CAREGIVER [3] Spouse [3] [phone] Patient Belongings The following personal items are in your possession at time of discharge: 
  Dental Appliances: None  Visual Aid: At home, Glasses, With patient      Home Medications: None   Jewelry: Bracelet, Ring, Watch  Clothing: At bedside, Pants, Socks, Footwear, [employer], With patient, Undergarments, Shirt    Other Valuables: Cell Phone, [place], Other (comment) (Home O2) Please provide this summary of care documentation to your next provider. Signatures-by signing, you are acknowledging that this After Visit Summary has been reviewed with you and you have received a copy. Patient Signature:  ____________________________________________________________ Date:  ____________________________________________________________  
  
Nai Junior Provider Signature:  ____________________________________________________________ Date:  ____________________________________________________________

## 2018-09-26 NOTE — ED TRIAGE NOTES
Pt presents via EMS for c/o gen weakness/fatigue x1 wk. Pt feels like he's \"floating\". H/o CHF-2L O2, BG-108. Pacemaker checked about 2w ago by Juarez Martinez

## 2018-09-26 NOTE — ED PROVIDER NOTES
HPI Comments: Elsie Castanon is a 80 y.o. Male with h/o chf, pacemaker with c/o not feeling well for last 2-3 days with increased rowe, fatigue, near syncope and had syncopal episode today. No fever, cough, nvd, increased leg edema, diff urinating, blood in stool, melena. No headache, but head feels funny. No hemoptysis. Sx better at rest, worse with exertion, standing The history is provided by the patient and medical records. Past Medical History:  
Diagnosis Date  Atrial fibrillation CHADS score 3  (+CHF, +HTN, +AGE, -DM, -CVA)  CABG   
 2008   LIMA - LAD,   SVG - RCA  Cardiomyopathy EF 30-35% (ECHO 6/14)  Coronary artery disease  Dyslipidemia  Hypertension  Hypothyroid  Pacemaker  Peripheral vascular disease  Renal artery stenosis   
 bilateral stents  Sick sinus syndrome Past Surgical History:  
Procedure Laterality Date  HX CORONARY ARTERY BYPASS GRAFT    
 2008   LIMA - LAD,   SVG - RCA History reviewed. No pertinent family history. Social History Social History  Marital status:  Spouse name: N/A  
 Number of children: N/A  
 Years of education: N/A Occupational History  Not on file. Social History Main Topics  Smoking status: Former Smoker  Smokeless tobacco: Never Used  Alcohol use No  
 Drug use: No  
 Sexual activity: Not on file Other Topics Concern  Not on file Social History Narrative ALLERGIES: Beta-blockers (beta-adrenergic blocking agts); Penicillins; and Statins-hmg-coa reductase inhibitors Review of Systems Constitutional: Positive for fatigue. Negative for fever. HENT: Negative for sore throat and trouble swallowing. Eyes: Negative for visual disturbance. Respiratory: Positive for shortness of breath. Negative for cough. Cardiovascular: Positive for leg swelling. Negative for chest pain. Gastrointestinal: Negative for abdominal pain. Endocrine: Negative for polyuria. Genitourinary: Positive for frequency. Negative for difficulty urinating and hematuria. Musculoskeletal: Negative for gait problem. Skin: Negative for rash. Allergic/Immunologic: Negative for immunocompromised state. Neurological: Positive for light-headedness. Negative for syncope, speech difficulty and headaches. Psychiatric/Behavioral: Positive for sleep disturbance. Vitals:  
 09/26/18 1853 09/26/18 1900 09/26/18 1930 09/26/18 2000 BP: 147/66 136/60 (!) 125/104 161/76 Pulse: 70 70 70 69 Resp: 16 18 18 Temp: 97.7 °F (36.5 °C) SpO2: 100% 100% 99% 93% Physical Exam  
Constitutional: He is oriented to person, place, and time. Non-toxic appearance. He does not appear ill. No distress. HENT:  
Head: Normocephalic and atraumatic. Right Ear: External ear normal.  
Left Ear: External ear normal.  
Nose: Nose normal.  
Mouth/Throat: Oropharynx is clear and moist. No oropharyngeal exudate. Eyes: Conjunctivae are normal.  
Neck: Normal range of motion. Cardiovascular: Normal rate, regular rhythm and intact distal pulses. Murmur heard. Pulmonary/Chest: Effort normal and breath sounds normal. No respiratory distress. Abdominal: Soft. There is no tenderness. Musculoskeletal: Normal range of motion. He exhibits edema (2+ le). Neurological: He is alert and oriented to person, place, and time. Skin: Skin is warm and dry. He is not diaphoretic. Psychiatric: His behavior is normal.  
Nursing note and vitals reviewed. [facility] 
 
 
ED Course Procedures Vitals: 
Patient Vitals for the past 12 hrs: 
 Temp Pulse Resp BP SpO2  
09/26/18 2000 - 69 18 161/76 93 % 09/26/18 1930 - 70 18 (!) 125/104 99 % 09/26/18 1900 - 70 - 136/60 100 % 09/26/18 1853 97.7 °F (36.5 °C) 70 16 147/66 100 % Medications ordered:  
Medications  
furosemide (LASIX) injection 20 mg (not administered) sodium chloride 0.9 % bolus infusion 500 mL (0 mL IntraVENous IV Completed 9/26/18 2006) iopamidol (ISOVUE-370) 76 % injection 50 mL (50 mL IntraVENous Given 9/26/18 2107)  
0.9% sodium chloride infusion 94 mL (0 mL IntraVENous IV Completed 9/26/18 2108) Lab findings: 
Recent Results (from the past 12 hour(s)) URINALYSIS W/ RFLX MICROSCOPIC Collection Time: 09/26/18  7:31 PM  
Result Value Ref Range Color YELLOW Appearance CLEAR Specific gravity 1.010 1.005 - 1.030    
 pH (UA) 7.0 5.0 - 8.0 Protein 30 (A) NEG mg/dL Glucose NEGATIVE  NEG mg/dL Ketone NEGATIVE  NEG mg/dL Bilirubin NEGATIVE  NEG Blood NEGATIVE  NEG Urobilinogen 1.0 0.2 - 1.0 EU/dL Nitrites NEGATIVE  NEG Leukocyte Esterase NEGATIVE  NEG    
URINE MICROSCOPIC ONLY Collection Time: 09/26/18  7:31 PM  
Result Value Ref Range WBC 0 to 1 0 - 4 /hpf  
 RBC 0 0 - 5 /hpf Epithelial cells FEW 0 - 5 /lpf Bacteria NEGATIVE  NEG /hpf  
CBC WITH AUTOMATED DIFF Collection Time: 09/26/18  7:35 PM  
Result Value Ref Range WBC 4.5 (L) 4.6 - 13.2 K/uL  
 RBC 4.26 (L) 4.70 - 5.50 M/uL  
 HGB 10.8 (L) 13.0 - 16.0 g/dL HCT 35.7 (L) 36.0 - 48.0 % MCV 83.8 74.0 - 97.0 FL  
 MCH 25.4 24.0 - 34.0 PG  
 MCHC 30.3 (L) 31.0 - 37.0 g/dL  
 RDW 19.9 (H) 11.6 - 14.5 % PLATELET 977 233 - 096 K/uL MPV 9.6 9.2 - 11.8 FL  
 NEUTROPHILS 68 40 - 73 % LYMPHOCYTES 17 (L) 21 - 52 % MONOCYTES 11 (H) 3 - 10 % EOSINOPHILS 3 0 - 5 % BASOPHILS 1 0 - 2 %  
 ABS. NEUTROPHILS 3.1 1.8 - 8.0 K/UL  
 ABS. LYMPHOCYTES 0.8 (L) 0.9 - 3.6 K/UL  
 ABS. MONOCYTES 0.5 0.05 - 1.2 K/UL  
 ABS. EOSINOPHILS 0.1 0.0 - 0.4 K/UL  
 ABS. BASOPHILS 0.0 0.0 - 0.1 K/UL  
 DF AUTOMATED PROTHROMBIN TIME + INR Collection Time: 09/26/18  7:35 PM  
Result Value Ref Range Prothrombin time 22.1 (H) 11.5 - 15.2 sec INR 1.9 (H) 0.8 - 1.2 METABOLIC PANEL, COMPREHENSIVE  Collection Time: 09/26/18  7:35 PM  
 Result Value Ref Range Sodium 139 136 - 145 mmol/L Potassium 4.6 3.5 - 5.5 mmol/L Chloride 104 100 - 108 mmol/L  
 CO2 28 21 - 32 mmol/L Anion gap 7 3.0 - 18 mmol/L Glucose 78 74 - 99 mg/dL BUN 34 (H) 7.0 - 18 MG/DL Creatinine 1.61 (H) 0.6 - 1.3 MG/DL  
 BUN/Creatinine ratio 21 (H) 12 - 20 GFR est AA 49 (L) >60 ml/min/1.73m2 GFR est non-AA 41 (L) >60 ml/min/1.73m2 Calcium 8.6 8.5 - 10.1 MG/DL Bilirubin, total 0.8 0.2 - 1.0 MG/DL  
 ALT (SGPT) 19 16 - 61 U/L  
 AST (SGOT) 42 (H) 15 - 37 U/L Alk. phosphatase 128 (H) 45 - 117 U/L Protein, total 7.4 6.4 - 8.2 g/dL Albumin 3.7 3.4 - 5.0 g/dL Globulin 3.7 2.0 - 4.0 g/dL A-G Ratio 1.0 0.8 - 1.7 NT-PRO BNP Collection Time: 09/26/18  7:35 PM  
Result Value Ref Range NT pro-BNP 4942 (H) 0 - 1800 PG/ML  
ETHYL ALCOHOL Collection Time: 09/26/18  7:35 PM  
Result Value Ref Range ALCOHOL(ETHYL),SERUM <3 0 - 3 MG/DL MAGNESIUM Collection Time: 09/26/18  7:35 PM  
Result Value Ref Range Magnesium 2.1 1.6 - 2.6 mg/dL TSH 3RD GENERATION Collection Time: 09/26/18  7:35 PM  
Result Value Ref Range TSH 4.40 (H) 0.36 - 3.74 uIU/mL CARDIAC PANEL,(CK, CKMB & TROPONIN) Collection Time: 09/26/18  7:35 PM  
Result Value Ref Range  39 - 308 U/L  
 CK - MB 3.2 <3.6 ng/ml CK-MB Index 2.8 0.0 - 4.0 % Troponin-I, Qt. 0.02 0.0 - 0.045 NG/ML  
D DIMER Collection Time: 09/26/18  7:41 PM  
Result Value Ref Range D DIMER 2.52 (H) <0.46 ug/ml(FEU) EKG, 12 LEAD, INITIAL Collection Time: 09/26/18  7:47 PM  
Result Value Ref Range Ventricular Rate 70 BPM  
 Atrial Rate 72 BPM  
 QRS Duration 186 ms  
 Q-T Interval 480 ms QTC Calculation (Bezet) 518 ms Calculated R Axis -69 degrees Calculated T Axis 107 degrees Diagnosis Electronic ventricular pacemaker When compared with ECG of 12-JUL-2018 08:15, 
Vent.  rate has decreased BY   2 BPM 
  
 
 
 EKG interpretation by ED Physician: 
Paced rhythm. No acute st tw changes Rate 70, qrs 186, qtc 518 No sig changes Cardiac monitor: nl rate, reg rhythm, paced. No ectopy Pulse ox: 100% 2l nc which he is on chronically X-Ray, CT or other radiology findings or impressions: 
cta with no pe; chronic effusions Progress notes, Consult notes or additional Procedure notes:  
Pt stable. Given syncope, mult comorbid disease will need admission for further work up' D/w pt 
D/w dr Latrell Pickens who will admit Reevaluation of patient:  
stable Disposition: 
Diagnosis: 1. Syncope and collapse 2. Acute on chronic congestive heart failure, unspecified heart failure type ([facility] [address] Disposition: admit Follow-up Information None Patient's Medications Start Taking No medications on file Continue Taking AMLODIPINE (NORVASC) 5 MG TABLET    Take 1 Tab by mouth daily. APIXABAN (ELIQUIS) 5 MG TABLET    Take 1 Tab by mouth two (2) times a day. ENALAPRIL (VASOTEC) 20 MG TABLET    Take 20 mg by mouth daily. FUROSEMIDE (LASIX) 20 MG TABLET    One pill once or twice a day GUAIFENESIN ER (MUCINEX) 600 MG ER TABLET    Take 1 Tab by mouth every twelve (12) hours. HYDROCODONE-ACETAMINOPHEN (NORCO) 5-325 MG PER TABLET    Take 1 Tab by mouth every four (4) hours as needed for Pain. Max Daily Amount: 6 Tabs. LORAZEPAM (ATIVAN) 1 MG TABLET    Take 1 Tab by mouth every six (6) hours as needed for Anxiety. Max Daily Amount: 4 mg. METOPROLOL SUCCINATE (TOPROL XL) 50 MG XL TABLET    Take 1 Tab by mouth daily. NITROGLYCERIN (NITROSTAT) 0.4 MG SL TABLET    1 Tab by SubLINGual route every five (5) minutes as needed for Chest Pain (up to 3 total 1 every 5 min). OXYGEN-AIR DELIVERY SYSTEMS    by Does Not Apply route. PANTOPRAZOLE (PROTONIX) 40 MG TABLET    Take 1 Tab by mouth Daily (before breakfast).   
 TIOTROPIUM (SPIRIVA) 18 MCG INHALATION CAPSULE    Take 1 Cap by inhalation every twenty-four (24) hours. These Medications have changed No medications on file Stop Taking No medications on file

## 2018-09-26 NOTE — Clinical Note
The pocket was closed in 2 layer(s) using interrupted 2-0 vicryl. Needle, sponge, and instrument counts were verified correctly.

## 2018-09-27 NOTE — ROUTINE PROCESS
1305 Received report from Cib[place]. Patient alert and oriented. 0930 Patient was off the floor for Vascular Test.  
 
1100 Orthostatic done. No significant change on BP. 
 
 
1915 Bedside and Verbal shift change report given to Cibando (oncoming nurse) by Ariana Hinson RN (offgoing nurse). Report included the following information SBAR, Kardex, Intake/Output, MAR, Recent Results and Cardiac Rhythm paced.

## 2018-09-27 NOTE — ED NOTES
TRANSFER - ED to INPATIENT REPORT: 
 
SBAR report made available to receiving floor on this patient being transferred to [address] [place] [address] ([facility])  for routine progression of care Admitting diagnosis Syncope and collapse Information from the following report(s) SBAR was made available to receiving floor. Lines:  
Peripheral IV 09/26/18 Left Forearm (Active) Site Assessment Clean, dry, & intact 9/26/2018  6:55 PM  
Phlebitis Assessment 0 9/26/2018  6:55 PM  
Infiltration Assessment 0 9/26/2018  6:55 PM  
Dressing Status Clean, dry, & intact 9/26/2018  6:55 PM  
Action Taken Blood drawn 9/26/2018  6:55 PM  
   
Peripheral IV 09/26/18 Right Antecubital (Active) Medication list confirmed with patient Opportunity for questions and clarification was provided. Patient is oriented to time, place, person and situation Patient is  continent and ambulatory with assist 
  
Valuables transported with patient Patient transported with: 
 Monitor

## 2018-09-27 NOTE — PROGRESS NOTES
Patient and/or next of kin has been given and has signed the [facility] Outpatient Observation  Notification letter and all questions answered. Copy of this notice given to patient and copy placed on chart. Patient and/or next of kin has been given the Outpatient Observation Information and Notification letter and all questions answered. Rachel Vincent RN,ext [phone].

## 2018-09-27 NOTE — PROGRESS NOTES
Problem: Mobility Impaired (Adult and Pediatric) Goal: *Acute Goals and Plan of Care (Insert Text) Outcome: Resolved/Met Date Met: 09/27/18 PHYSICAL THERAPY: Initial Assessment/Discharge OBSERVATION: Medicare: Hospital Day: 2 Patient: Miguel Fregoso (41 y.o. male)    Date: 9/27/2018 Primary Diagnosis: Syncope and collapse Precautions: Fall PLOF: mod I 
 
ASSESSMENT AND RECOMMENDATIONS: 
Based on the objective data described below, the patient presents with mobility level appropriate for discharge to home. Mod I for bed mobility and tranfers. Completed alternating toe taps to 6 inch box with mod I and BUE support to simulate stairs. Amb 150ft mod I with ww. Maintain on 2.5L O2 during session as he uses this at home. Returned to supine in bed. All needs in reach. KATELIN Jones aware. Denies further mobility concerns for return home. Skilled physical therapy is not indicated at this time. EDUCATION:  
Education:  Patient was educated on the following topics: Bed mobility, transfers, ADLs, balance, amb, safety, exercise, role of PT, plan of care, cognition, skin integrity, vitals Barriers to Learning/Limitations: yes;  none Compensate with: visual, verbal, tactile, kinesthetic cues/mode Discharge Recommendations: None Further Equipment Recommendations for Discharge: rolling walker; has SUBJECTIVE:  
Patient stated I didn't know you were coming.  OBJECTIVE DATA SUMMARY:  
 
Past Medical History:  
Diagnosis Date  Atrial fibrillation CHADS score 3  (+CHF, +HTN, +AGE, -DM, -CVA)  CABG   
 2008   LIMA - LAD,   SVG - RCA  Cardiomyopathy EF 30-35% (ECHO 6/14)  Coronary artery disease  Dyslipidemia  Hypertension  Hypothyroid  Pacemaker  Peripheral vascular disease  Renal artery stenosis   
 bilateral stents  Sick sinus syndrome Past Surgical History:  
Procedure Laterality Date  HX CORONARY ARTERY BYPASS GRAFT 2008   LIMA - LAD,   SVG - RCA Eval Complexity: History: MEDIUM  Complexity : 1-2 comorbidities / personal factors will impact the outcome/ POC Exam:MEDIUM Complexity : 3 Standardized tests and measures addressing body structure, function, activity limitation and / or participation in recreation  Presentation: MEDIUM Complexity : Evolving with changing characteristics  Clinical Decision Making:Medium Complexity clinical judgement; ROM, MMT, functional mobility Overall Complexity:MEDIUM 
 
G CODE:  Mobility  Current  CI= 1-19%  D/C  CI= 1-19%. The severity rating is based on the Other clinical judgement; ROM, MMT, functional mobility [address] Standing Balance Scale 
0: Pt performs 25% or less of standing activity (Max assist) CN, 100% impaired. 1: Pt supports self with upper extremities but requires therapist assistance. Pt performs 25-50% of effort (Mod assist) CM, 80% to <100% impaired. 1+: Pt supports self with upper extremities but requires therapist assistance. Pt performs >50% effort. (Min assist). CL, 60% to <80% impaired. 2: Pt supports self independently with both upper extremities (walker, crutches, parallel bars). CL, 60% to <80% impaired. 2+: Pt support self independently with 1 upper extremity (cane, crutch, 1 parallel bar). CK, 40% to <60% impaired. 3: Pt stands without upper extremity support for up to 30 seconds. CK, 40% to <60% impaired. 3+: Pt stands without upper extremity support for 30 seconds or greater. CJ, 20% to <40% impaired. 4: Pt independently moves and returns center of gravity 1-2 inches in one plane. CJ, 20% to <40% impaired. 4+: Pt independently moves and returns center of gravity 1-2 inches in multiple planes. CI, 1% to <20% impaired. 5: Pt independently moves and returns center of gravity in all planes greater than 2 inches. CH, 0% impaired. Prior Level of Function/Home Situation:  
Home Situation Home Environment: Private residence # Steps to Enter: 3 One/Two Story Residence: Two story, live on 1st floor # of Interior Steps: 15 Interior Rails: Left Living Alone: No 
Support Systems: Family member(s) Patient Expects to be Discharged to[de-identified] Private residence Current DME Used/Available at Home: Oxygen, portable, [address] Critical Behavior: 
Neurologic State: Alert Orientation Level: Oriented X4 Cognition: Follows commands Safety/Judgement: Fall prevention Psychosocial 
Patient Behaviors: Cooperative Manual Muscle Testing (LE) 
       R     L Hip Flexion:   5/5 5/5 Knee EXT:   5/5 5/5 Knee FLEX:   5/5 5/5 Ankle DF:   5/5 5/5 
_________________________________________________ Tone : BLE normal 
Sensation: Intact to light touch Range Of Motion: BLE AROM BLE Functional Mobility: 
 
 
Functional Status Indep (I) Mod I Super-vision Min A Mod A Max A Total A Assist x2 Verbal cues Additional time Not tested Comments Rolling []  []  [] []    []    []  []  [] [] [] [x] Supine to sit []  [x]  [] []  []  []  []  [] [] [] [] Sit to supine []  [x]  [] []  []  []  []  [] [] [] [] Sit to stand []  [x]  [] []  []  []  []  [] [] [] []   
Stand to sit []  [x]  [] []  []  []  []  [] [] [] [] Bed to chair transfers []  []  [] []  []  []  []  [] [] [] [x] Balance Good [address] Poor Unable Not tested Comments Sitting static [x]  []  []  []  [] Sitting dynamic [x]  []  []  []  []   
Standing static [x]  []  []  []  []   
Standing dynamic [x]  []  []  []  [] Mobility/Gait:  
Level of Assistance: Modified independent Assistive Device: rolling walker Distance Ambulated: 150 feet Stairs: Step ups to simulate steps Level of Assistance: Modified independent Assistive Device: portable oxygen Rail Use: both Number of Stairs: 10 Therapeutic Exercises:  
Sit to stand Pain:  
Pre treatment pain: 0 Post treatment pain: 0 Pain Scale 1: Visual 
Pain Intensity 1: 0 
 
 Vital Signs Temp: 97.5 °F (36.4 °C) Pulse (Heart Rate): 69    
BP: 146/72 Resp Rate: 20    
O2 Sat (%): 91 % Activity Tolerance:   
Good Please refer to the flowsheet for vital signs taken during this treatment. After treatment:  
[]         Patient left in no apparent distress sitting up in chair 
[x]         Patient left in no apparent distress in bed 
[x]         Call bell left within reach [x]         Nursing notified 
[]         Caregiver present 
[]         Bed alarm activated COMMUNICATION/EDUCATION:  
[x]         Fall prevention education was provided and the patient/caregiver indicated understanding. [x]         Patient/family have participated as able in goal setting and plan of care. [x]         Patient/family agree to work toward stated goals and plan of care. []         Patient understands intent and goals of therapy, but is neutral about his/her participation. []         Patient is unable to participate in goal setting and plan of care. Thank you for this referral. 
Roque Gaspar, PT Time Calculation: 24 mins

## 2018-09-27 NOTE — H&P
Medicine History and Physical 
 
Patient: Chelsea Hollis   Age:  80 y.o. CODE Status:  Full Assessment/Plan: In summary, 87M PMHx CAD/CABG/AF/HFpEF, HTN, HLD, CKD 3 presented to ED with weakness, fatigue, syncope. Admit for OBS workup of syncope. Principal Problem: 
  Syncope and collapse (1/19/2018): U/A unremarkable. D-dimer 2.52. Trop 0.02, proBNP 4942. CTA chest no PE, bilateral pleural effusion. ECHO 7/2018 with LVEF 50%, LVH, mild to mod MR, mod AS / AR, mild to mod OR, severe pulm HTN. DDx: cardiac, orthostatic, auonomic. 
- Telemetry - Ortho vitals - Pacer interrogation, ?cardiology consult for this - PVL carotids - No focal deficits, no indication for further neuro imaging at this time Active Problems: 
  Acute on CKD, stage 3:  B/L Cr 1.3-1.5. Currently, BUN/Cr 34/1.61. 
- Hold lasix and ACEi - resume in morning if renal function stable - Renal dose medications - Avoid nephrotoxic agents - Follow BMP 
- Renal ultrasound if Cr not improved Normocytic anemia:   
- No indication for transfusion Dyslipidemia ():  Stable. Not acute issue. Chronic hypoxic resp failure:  On 2.5L o2 via n/c AAT. Coronary artery disease of native artery of native heart with stable angina pectoris (HCC) () / Atrial fibrillation () /   Cardiac pacemaker in situ (9/16/2018) due to Sick sinus syndrome () / sp CABG x 2 (4/80/3456) / Diastolic CHF, chronic ([facility] [address] (9/26/2018) / Aortic stenosis, moderate (1/28/2018):  H/o CABG x2 2008 with resultant SSS requiring PPM.  On Eliquis 5mg BID, lasix 20mg BID, vasotec 20mg Qday, toprol xl 50mg Qday. 
- PT/INR 22.1/1.9 
- Eliquis 5mg BID 
- Hold lasix / ACEi given FRANCIS - resume in morning if renal function stable Essential hypertension (2/27/2017):  Norvasc 5mg Qday, Vasotec 20mg Qday. Fluids: 500cc NS over 5 hours E-lytes: BMP without elyte abnormalities Nutrition: Cardiac diet Prophylaxis: DVT: Hep subq tid  GI: PPI Bowel: Senna-s bid prn constipation DISPO: 
Patient to be admitted  at this time for reasons addressed above, continued hospitalization for ongoing assessment and treatment indicated Anticipated Date of Discharge: 9/27/18 Anticipated Disposition (home, SNF) : Home Chief Complaint:  
Chief Complaint Patient presents with  Fatigue HPI:  
 
Fortino Cloud is a 80y.o. year old male who presents with  Generalized weakness and fatigue with a feeling of 'floating.'  Per ED attending report, patient with syncope prior to presentation. Per patient report, had been feeling weaker over the last few days. More weak than normal.  Earlier today, completely passed out and hit the floor, but only instantaneous. When this happened, he was just standing / relaxing. Denies feeling dizzy or lightheaded prior to this happening. However, ever since the weekend, has had feeling of wooziness when moving head left and right. Review of Systems: 
History obtained from chart review and the patient General ROS: negative Respiratory ROS: mild inc sob over last few days; on 2.5L O2 AAT, mild nonproductive cough Cardiovascular ROS: no chest pain or dyspnea on exertion Gastrointestinal ROS: no abdominal pain, change in bowel habits, or black or bloody stools Genito-Urinary ROS: no dysuria, trouble voiding, or hematuria Neurological ROS: no TIA or stroke symptoms 
positive for - dizziness Past Medical History: 
Past Medical History:  
Diagnosis Date  Atrial fibrillation CHADS score 3  (+CHF, +HTN, +AGE, -DM, -CVA)  CABG   
 2008   LIMA - LAD,   SVG - RCA  Cardiomyopathy EF 30-35% (ECHO 6/14)  Coronary artery disease  Dyslipidemia  Hypertension  Hypothyroid  Pacemaker  Peripheral vascular disease  Renal artery stenosis   
 bilateral stents  Sick sinus syndrome Past Surgical History: 
Past Surgical History:  
Procedure Laterality Date  HX CORONARY ARTERY BYPASS GRAFT 2008   LIMA - LAD,   SVG - RCA Family History: 
History reviewed. No pertinent family history. Social History: 
Social History Social History  Marital status:  Spouse name: N/A  
 Number of children: N/A  
 Years of education: N/A Social History Main Topics  Smoking status: Former Smoker  Smokeless tobacco: Never Used  Alcohol use No  
 Drug use: No  
 Sexual activity: Not Asked Other Topics Concern  None Social History Narrative Home Medications: 
Prior to Admission medications Medication Sig Start Date End Date Taking? Authorizing Provider OXYGEN-AIR DELIVERY SYSTEMS by Does Not Apply route. Historical Provider HYDROcodone-acetaminophen (NORCO) 5-325 mg per tablet Take 1 Tab by mouth every four (4) hours as needed for Pain. Max Daily Amount: 6 Tabs. 4/25/18   Angel Keen MD  
apixaban (ELIQUIS) 5 mg tablet Take 1 Tab by mouth two (2) times a day. 3/28/18   Gracy Mccabe MD  
amLODIPine (NORVASC) 5 mg tablet Take 1 Tab by mouth daily. 2/8/18   Benita New MD  
furosemide (LASIX) 20 mg tablet One pill once or twice a day Patient taking differently: as needed. One pill once or twice a day 2/8/18   Benita New MD  
metoprolol succinate (TOPROL XL) 50 mg XL tablet Take 1 Tab by mouth daily. 2/8/18   Benita New MD  
nitroglycerin (NITROSTAT) 0.4 mg SL tablet 1 Tab by SubLINGual route every five (5) minutes as needed for Chest Pain (up to 3 total 1 every 5 min). 2/7/18   Benita New MD  
pantoprazole (PROTONIX) 40 mg tablet Take 1 Tab by mouth Daily (before breakfast). 2/8/18   Benita New MD  
tiotropium (SPIRIVA) 18 mcg inhalation capsule Take 1 Cap by inhalation every twenty-four (24) hours. 2/7/18   Benita New MD  
guaiFENesin ER (MUCINEX) 600 mg ER tablet Take 1 Tab by mouth every twelve (12) hours.  2/7/18   Benita New MD  
 LORazepam (ATIVAN) 1 mg tablet Take 1 Tab by mouth every six (6) hours as needed for Anxiety. Max Daily Amount: 4 mg. 1/29/18   Rodriguez Martines DO  
enalapril (VASOTEC) 20 mg tablet Take 20 mg by mouth daily. Dustin Melo MD  
 
 
Allergies: Allergies Allergen Reactions  Beta-Blockers (Beta-Adrenergic Blocking Agts) Drowsiness  Penicillins Swelling  Statins-Hmg-Coa Reductase Inhibitors Drowsiness Physical Exam:  
 
Visit Vitals  /76  Pulse 69  Temp 97.7 °F (36.5 °C)  Resp 18  SpO2 93% Physical Exam: 
General appearance: alert, cooperative, no distress, appears stated age Head: Normocephalic, without obvious abnormality, atraumatic Eyes: negative Lungs: clear to auscultation bilaterally Heart: regular rate and rhythm, S1, S2 normal, systolic ejection murmur c/w AS Abdomen: soft, non-tender. Bowel sounds normal. No masses,  no organomegaly Pulses: 2+ and symmetric Skin: Skin color, texture, turgor normal. No rashes or lesions Lymph nodes: Cervical, supraclavicular, and axillary nodes normal. 
Vascular: 1+ bilateral LE pitting edema Neurologic: Alert and oriented X 3, normal strength and tone. Normal symmetric reflexes. Normal coordination and gait Intake and Output: 
Current Shift:    
Last three shifts:    
 
Lab/Data Reviewed: All lab results for the last 24 hours reviewed. Chest X-Ray is obtained; CXR reviewed independently -N/A 
 
 
EKG: tracing reviewed  independently -V-paced rhythm. Leslie Rowland DO September 26, 2018

## 2018-09-27 NOTE — PROGRESS NOTES
Physical Exam  
Skin: Skin is warm, dry and intact. Bruising and ecchymosis noted. There is cyanosis.   
 
  
Dual skin assessment completed with Nichol Burris RN

## 2018-09-27 NOTE — PROGRESS NOTES
Progress Note Patient: Rebekah Renee               Sex: male          DOA: 9/26/2018 YOB: 1930      Age:  80 y.o.        LOS:  LOS: 0 days Subjective / Interval Hx Deonte Guevara is a 80 y.o. male  With hx SSS on pacemaker, chf, Afib on eliquis , cabg, hypertension , home o2 2.5L NC, HLD, Hypothyroid who presents with syncope. Per report patient passed out for a brief period. He has no recall. His wife called EMS. He also c/o recent lightheadedness more with motion  and chronic fatigue . No chest pain , cough, fever. CTA chest in ED was negative for PE, small bilateral pleural effusion , mild edema , no focal consolidation. Duplex bilateral  carotid US shows mild stenosis bilateral  ICA (< 50%). CXR shows cardiomegaly with bilateral pleural effusion. Objective:  
  
Visit Vitals  /67  Pulse 87  Temp 97.4 °F (36.3 °C)  Resp 18  Ht 5' 9\" (1.753 m)  Wt 61.7 kg (136 lb 1.6 oz)  SpO2 97%  BMI 20.1 kg/m2 Physical Exam  
Constitutional: He is oriented to person, place, and time. He appears well-developed and well-nourished. No distress. HENT:  
Head: Normocephalic and atraumatic. Eyes: Conjunctivae are normal. Pupils are equal, round, and reactive to light. No scleral icterus. Neck: Neck supple. Cardiovascular: Normal rate, regular rhythm and normal heart sounds. No murmur heard. Pulmonary/Chest: Effort normal and breath sounds normal. No respiratory distress. He has no wheezes. He has no rales. Abdominal: Soft. Bowel sounds are normal. He exhibits no distension. There is no tenderness. There is no rebound. Musculoskeletal: He exhibits edema (trace ble ). Neurological: He is alert and oriented to person, place, and time. No cranial nerve deficit. Skin: Skin is warm. He is not diaphoretic. Psychiatric: He has a normal mood and affect.   
 
 
 
Intake and Output: 
Current Shift:    
 Last three shifts:  09/25 1901 - 09/27 0700 In: 1200 [P.O.:550; I.V.:650] Out: 1125 [[facility]:0547] Recent Results (from the past 48 hour(s)) URINALYSIS W/ RFLX MICROSCOPIC Collection Time: 09/26/18  7:31 PM  
Result Value Ref Range Color YELLOW Appearance CLEAR Specific gravity 1.010 1.005 - 1.030    
 pH (UA) 7.0 5.0 - 8.0 Protein 30 (A) NEG mg/dL Glucose NEGATIVE  NEG mg/dL Ketone NEGATIVE  NEG mg/dL Bilirubin NEGATIVE  NEG Blood NEGATIVE  NEG Urobilinogen 1.0 0.2 - 1.0 EU/dL Nitrites NEGATIVE  NEG Leukocyte Esterase NEGATIVE  NEG    
URINE MICROSCOPIC ONLY Collection Time: 09/26/18  7:31 PM  
Result Value Ref Range WBC 0 to 1 0 - 4 /hpf  
 RBC 0 0 - 5 /hpf Epithelial cells FEW 0 - 5 /lpf Bacteria NEGATIVE  NEG /hpf  
CBC WITH AUTOMATED DIFF Collection Time: 09/26/18  7:35 PM  
Result Value Ref Range WBC 4.5 (L) 4.6 - 13.2 K/uL  
 RBC 4.26 (L) 4.70 - 5.50 M/uL  
 HGB 10.8 (L) 13.0 - 16.0 g/dL HCT 35.7 (L) 36.0 - 48.0 % MCV 83.8 74.0 - 97.0 FL  
 MCH 25.4 24.0 - 34.0 PG  
 MCHC 30.3 (L) 31.0 - 37.0 g/dL  
 RDW 19.9 (H) 11.6 - 14.5 % PLATELET 308 780 - 855 K/uL MPV 9.6 9.2 - 11.8 FL  
 NEUTROPHILS 68 40 - 73 % LYMPHOCYTES 17 (L) 21 - 52 % MONOCYTES 11 (H) 3 - 10 % EOSINOPHILS 3 0 - 5 % BASOPHILS 1 0 - 2 %  
 ABS. NEUTROPHILS 3.1 1.8 - 8.0 K/UL  
 ABS. LYMPHOCYTES 0.8 (L) 0.9 - 3.6 K/UL  
 ABS. MONOCYTES 0.5 0.05 - 1.2 K/UL  
 ABS. EOSINOPHILS 0.1 0.0 - 0.4 K/UL  
 ABS. BASOPHILS 0.0 0.0 - 0.1 K/UL  
 DF AUTOMATED PROTHROMBIN TIME + INR Collection Time: 09/26/18  7:35 PM  
Result Value Ref Range Prothrombin time 22.1 (H) 11.5 - 15.2 sec INR 1.9 (H) 0.8 - 1.2 METABOLIC PANEL, COMPREHENSIVE Collection Time: 09/26/18  7:35 PM  
Result Value Ref Range Sodium 139 136 - 145 mmol/L Potassium 4.6 3.5 - 5.5 mmol/L Chloride 104 100 - 108 mmol/L  
 CO2 28 21 - 32 mmol/L  Anion gap 7 3.0 - 18 mmol/L  
 Glucose 78 74 - 99 mg/dL BUN 34 (H) 7.0 - 18 MG/DL Creatinine 1.61 (H) 0.6 - 1.3 MG/DL  
 BUN/Creatinine ratio 21 (H) 12 - 20 GFR est AA 49 (L) >60 ml/min/1.73m2 GFR est non-AA 41 (L) >60 ml/min/1.73m2 Calcium 8.6 8.5 - 10.1 MG/DL Bilirubin, total 0.8 0.2 - 1.0 MG/DL  
 ALT (SGPT) 19 16 - 61 U/L  
 AST (SGOT) 42 (H) 15 - 37 U/L Alk. phosphatase 128 (H) 45 - 117 U/L Protein, total 7.4 6.4 - 8.2 g/dL Albumin 3.7 3.4 - 5.0 g/dL Globulin 3.7 2.0 - 4.0 g/dL A-G Ratio 1.0 0.8 - 1.7 NT-PRO BNP Collection Time: 09/26/18  7:35 PM  
Result Value Ref Range NT pro-BNP 4942 (H) 0 - 1800 PG/ML  
ETHYL ALCOHOL Collection Time: 09/26/18  7:35 PM  
Result Value Ref Range ALCOHOL(ETHYL),SERUM <3 0 - 3 MG/DL MAGNESIUM Collection Time: 09/26/18  7:35 PM  
Result Value Ref Range Magnesium 2.1 1.6 - 2.6 mg/dL TSH 3RD GENERATION Collection Time: 09/26/18  7:35 PM  
Result Value Ref Range TSH 4.40 (H) 0.36 - 3.74 uIU/mL CARDIAC PANEL,(CK, CKMB & TROPONIN) Collection Time: 09/26/18  7:35 PM  
Result Value Ref Range  39 - 308 U/L  
 CK - MB 3.2 <3.6 ng/ml CK-MB Index 2.8 0.0 - 4.0 % Troponin-I, Qt. 0.02 0.0 - 0.045 NG/ML  
D DIMER Collection Time: 09/26/18  7:41 PM  
Result Value Ref Range D DIMER 2.52 (H) <0.46 ug/ml(FEU) EKG, 12 LEAD, INITIAL Collection Time: 09/26/18  7:47 PM  
Result Value Ref Range Ventricular Rate 70 BPM  
 Atrial Rate 72 BPM  
 QRS Duration 186 ms  
 Q-T Interval 480 ms QTC Calculation (Bezet) 518 ms Calculated R Axis -69 degrees Calculated T Axis 107 degrees Diagnosis Electronic ventricular pacemaker When compared with ECG of 12-JUL-2018 08:15, 
Vent. rate has decreased BY   2 BPM 
  
POC G3 Collection Time: 09/27/18  1:52 AM  
Result Value Ref Range Device: NASAL CANNULA Flow rate (POC) 4.0 L/M  
 FIO2 (POC) 36 %  pH (POC) 7.469 (H) 7.35 - 7.45    
 pCO2 (POC) 33.5 (L) 35.0 - 45.0 MMHG  
 pO2 (POC) 93 80 - 100 MMHG  
 HCO3 (POC) 24.4 22 - 26 MMOL/L  
 sO2 (POC) 98 (H) 92 - 97 % Base excess (POC) 1 mmol/L Allens test (POC) YES Site RIGHT RADIAL Patient temp. 97.9 Specimen type (POC) ARTERIAL Performed by Avi Ignacio METABOLIC PANEL, BASIC Collection Time: 09/27/18  3:59 AM  
Result Value Ref Range Sodium 140 136 - 145 mmol/L Potassium 4.3 3.5 - 5.5 mmol/L Chloride 103 100 - 108 mmol/L  
 CO2 27 21 - 32 mmol/L Anion gap 10 3.0 - 18 mmol/L Glucose 115 (H) 74 - 99 mg/dL BUN 31 (H) 7.0 - 18 MG/DL Creatinine 1.54 (H) 0.6 - 1.3 MG/DL  
 BUN/Creatinine ratio 20 12 - 20 GFR est AA 52 (L) >60 ml/min/1.73m2 GFR est non-AA 43 (L) >60 ml/min/1.73m2 Calcium 8.5 8.5 - 10.1 MG/DL  
DUPLEX CAROTID BILATERAL Collection Time: 09/27/18  9:31 AM  
Result Value Ref Range Left CCA dist sys 50.45 cm/s Left CCA dist ellis 0.00 cm/s LEFT COMMON CAROTID ARTERY MID S 63.53 cm/s LEFT COMMON CAROTID ARTERY MID D 0.00 cm/s Left CCA prox sys 65.30 cm/s Left CCA prox ellis 0.00 cm/s Left ECA sys 92.49 cm/s LEFT EXTERNAL CAROTID ARTERY D 0.00 cm/s Left ICA dist sys 55.78 cm/s Left ICA dist ellis 11.16 cm/s Left ICA mid sys 102.83 cm/s Left ICA mid ellis 12.93 cm/s Left ICA prox sys 98.74 cm/s Left ICA prox ellis 19.11 cm/s Left subclavian sys 92.37 cm/s LEFT SUBCLAVIAN ARTERY D 0.00 cm/s Left vertebral sys 36.75 cm/s LEFT VERTEBRAL ARTERY D 9.34 cm/s Left ICA/CCA sys 1.57 Right cca dist sys 59.33 cm/s Right CCA dist ellis 9.16 cm/s RIGHT COMMON CAROTID ARTERY MID S 75.25 cm/s RIGHT COMMON CAROTID ARTERY MID D 7.56 cm/s Right CCA prox sys 87.99 cm/s Right CCA prox ellis 0.00 cm/s Right eca sys 111.09 cm/s RIGHT EXTERNAL CAROTID ARTERY D 18.71 cm/s Right ICA dist sys 96.75 cm/s Right ICA dist ellis 19.51 cm/s Right ICA mid sys 100.73 cm/s Right ICA mid ellis 22.69 cm/s Right ICA prox sys 90.38 cm/s Right ICA prox ellis 17.12 cm/s Right subclavian sys 61.32 cm/s RIGHT SUBCLAVIAN ARTERY D 0.00 cm/s Right vertebral sys 47.18 cm/s RIGHT VERTEBRAL ARTERY D 9.62 cm/s Right ICA/CCA sys 1.14 Lab/Data Reviewed: All lab results for the last 24 hours reviewed. XRays were reviewed in past 24 hours Medications Reviewed Assessment/Plan Principal Problem: 
  Syncope and collapse (1/19/2018) Active Problems: 
  Dyslipidemia () Coronary artery disease of native artery of native heart with stable angina pectoris (HCC) () Atrial fibrillation () Overview: CHADS score 3  (+CHF, +HTN, +AGE, -DM, -CVA) Sick sinus syndrome () Overview: Developed post op CABG  
 
  S/P CABG x 2 (8/25/2015) Overview: 12/2008, LIMA to LAD, SVG to RCA Essential hypertension (2/27/2017) Aortic stenosis, moderate (1/28/2018) Cardiac pacemaker in situ (9/16/2018) Diastolic CHF, chronic ([facility] [address] (9/26/2018) Care Plan Syncope - possible cardiogenic vs vagal  
- will recommend Pacemaker interrogation again however he reports it was done at cardiology appt 2 weeks ago and was ok  
- ECHO 7/18 reviewed - Duplex bilateral Carotid US < 50 % bilateral ICA stenosis  
- EKG unremarkable - Consult Cardiology for any recommendations Hx afib  
- stable - On Eliquis - Lopressor HTN  
- controlled - Lopressor - Norvasc  
- Hold Enalapril until renal fxn improves - Lasix CHF Ex 
- EF 50% - ProBNP 4942 
- lasix 40 mg IV once then resume po - Lopressor - Monitor input and out put  
- Cardiology consult SSS 
- Pacemaker 
- recommend interrogation Hypothyroid  
- TSH 4.4  
- Check Free T4 ordered 
- do not see any record of hypothyroid medication DVT prophylaxis - Eliquis Full code Disposition: TBD Katelynn Bonner MD 
September 27, 2018

## 2018-09-27 NOTE — PROGRESS NOTES
Reason for Admission:   Syncope & Collapse RRAT Score:  17 Do you (patient/family) have any concerns for transition/discharge? None @ this time Plan for utilizing home health:   [facility] visit Likelihood of readmission? Mod/Yellow Transition of Care Plan:    Home with his spouse, [facility] visit & out-pt follow up when medically stable. Chart reviewed. Met with pt., verified all demographics. States has MCR/BC ins. States Dr. Alejo Castro is his PCP, last seen approx 2 months ago. NOK: Sergio Kira, spouse, with whom he lives with. Has walker & home O2(with [employer]). States independent with his ADL's prior to admit. Will cont to follow for any needs. Rachel VincentRN,ext [phone]. Patient has designated __his spouse______________________ to participate in his/her discharge plan and to receive any needed information. Name: Sergio Kira Address:  [facility] [place] [address] Phone number:  [phone] Care Management Interventions PCP Verified by CM: Yes (last seen approx 2 months ago) Palliative Care Criteria Met (RRAT>21 & CHF Dx)?: No 
Mode of Transport at Discharge: Other (see comment) (family) Transition of Care Consult (CM Consult): Discharge Planning Discharge Durable Medical Equipment: No 
Physical Therapy Consult: Yes Occupational Therapy Consult: Yes Speech Therapy Consult: No 
Current Support Network: Lives with Spouse Confirm Follow Up Transport: Family Plan discussed with Pt/Family/Caregiver: Yes Discharge Location Discharge Placement: Home with home health

## 2018-09-27 NOTE — CONSULTS
[facility] - Consult Note Date of  Admission: 9/26/2018  6:44 PM  
Primary Care Physician:  Artur Mcdonald MD  
 
 
I saw, evaluated, interviewed and examined the patient personally. I agree with the findings and plan of care as documented below with PALOPEZ note Admitted after Fuzzy feeling in head and ?? Presyncopal episode Chronic stable dyspnea Recent echo in 07/18 with moderate AS and Low normal EF No fluid overload on exam 
Not orthostatic on exam 
BP is stable Pacer interrogation Unclear at this time the etiology behind this. Will follow. Frida Luna MD 
 
 
 
 
Assessment: - Pre-Syncopal episode prior to admission  
- Echo 07/12/18: E 50%, moderate LV hypertrophy, moderate aortic stenosis, mild to moderate aortic regurgitation, severe TR,  
- Hx CAD, s/p CABG in 2008 (LIMA-LAD, SVG-RCA) 
- Nuclear stress 09/2017: There was no convincing evidence of significant reversible defect or even fixed defect noted to suggest ongoing major ischemia or prior infarct. - Cardiac cath 09/2015: CAD in native coronaries LAD and RCA as mentioned above. This appears unchanged from prior angiogram. His left internal mammary artery graft is open. His right coronary artery graft appears to be a small caliber vessel, as mentioned above. However, compared to the angiogram in 2011, there is no significant angiographic change noted. - Hx Atrial fibrillation, on Eliquis and Toprol - Hx HFpEF 
- Hx sick sinus syndrome, s/p pacemaker placement ([address]) - Hx HTN, on Norvasc, Enalapril, Toprol as outpatient - Hx Dyslipidemia 
- Hx statin intolerance - Hx hypothyroidism - Severe Celiac artery stenosis, duplex of abdominal vasculature 11/10/2017 with > 70% celiac artery stenosis - Mild bilateral ICA stenosis (<50%) of carotid arteries 9/27/18 Plan - Will arrange for [facility] Scientific interrogation this admission to assess for arrhythmia - Check orthostatics, RN please record in vitals section (ordered in [address]. Notes) Patient is on Lasix as needed at home which may contribute to orthostatic hypotension - Further recommendations pending hospital course, no hemodynamically significant carotid artery stenosis on Doppler studies to explain syncopal episode History of Present Illness: This is a 80 y.o. male admitted for Syncope and collapse. Patient complains of:  \"foginess\" This is an 80year old male with a history of CAD, Afib, SSS s/p PPM placement, CABG, hypertension, hyperlipidemia, celiac artery stenosis and hypothyroidism that cardiology is seeing in consult due to syncope. He explains that yesterday, he passed out and felt very weak, foggy. He states that he did not have much of a warning prior to his episode. He has been having this feeling for several weeks. He has had some shortness of breath on exertion, had another episode of \"fogginess\" when he was up walking to the bathroom today. No chest tightness prior to admission. No changes in vision. He takes Lasix on an as needed basis. He has some occasional LE edema and took Lasix about 3-4 times this week. Cardiac risk factors: dyslipidemia, male gender, hypertension Review of Symptoms:  Except as stated above include: 
Constitutional:  +syncope Respiratory:  negative Cardiovascular:  Per HPI Gastrointestinal: negative Genitourinary:  negative Musculoskeletal:  Negative Neurological:  Negative Dermatological:  Negative Endocrinological: Negative Psychological:  Negative A comprehensive review of systems was negative except for that written in the HPI. Past Medical History:  
 
Past Medical History:  
Diagnosis Date  Atrial fibrillation CHADS score 3  (+CHF, +HTN, +AGE, -DM, -CVA)  CABG   
 2008   LIMA - LAD,   SVG - RCA  Cardiomyopathy EF 30-35% (ECHO 6/14)  Coronary artery disease  Dyslipidemia  Hypertension  Hypothyroid  Pacemaker  Peripheral vascular disease  Renal artery stenosis   
 bilateral stents  Sick sinus syndrome Social History:  
 
Social History Social History  Marital status:  Spouse name: N/A  
 Number of children: N/A  
 Years of education: N/A Social History Main Topics  Smoking status: Former Smoker  Smokeless tobacco: Never Used  Alcohol use No  
 Drug use: No  
 Sexual activity: Not Asked Other Topics Concern  None Social History Narrative Family History:  
History reviewed. No pertinent family history. Medications: Allergies Allergen Reactions  Beta-Blockers (Beta-Adrenergic Blocking Agts) Drowsiness  Penicillins Swelling  Statins-Hmg-Coa Reductase Inhibitors Drowsiness Current Facility-Administered Medications Medication Dose Route Frequency  amLODIPine (NORVASC) tablet 5 mg  5 mg Oral DAILY  LORazepam (ATIVAN) tablet 1 mg  1 mg Oral Q6H PRN  
 metoprolol succinate (TOPROL-XL) XL tablet 50 mg  50 mg Oral DAILY  pantoprazole (PROTONIX) tablet 40 mg  40 mg Oral ACB  tiotropium (SPIRIVA) inhalation capsule 18 mcg  1 Cap Inhalation QAM RT  
 senna-docusate (PERICOLACE) 8.6-50 mg per tablet 1 Tab  1 Tab Oral BID PRN  
 melatonin tablet 3 mg  3 mg Oral QHS PRN  
 acetaminophen (TYLENOL) tablet 650 mg  650 mg Oral Q4H PRN  
 ondansetron (ZOFRAN) injection 4 mg  4 mg IntraVENous Q4H PRN  
 influenza vaccine 2018-19 (6 mos+)(PF) (FLUARIX QUAD/FLULAVAL QUAD) injection 0.5 mL  0.5 mL IntraMUSCular PRIOR TO DISCHARGE  apixaban (ELIQUIS) tablet 2.5 mg  2.5 mg Oral BID Physical Exam:  
 
Visit Vitals  /72 (BP Patient Position: Standing)  Pulse 69  Temp 97.5 °F (36.4 °C)  Resp 20  
 Ht 5' 9\" (1.753 m)  Wt 136 lb 1.6 oz (61.7 kg)  SpO2 91%  BMI 20.1 kg/m2 BP Readings from Last 3 Encounters:  
09/27/18 146/72  
09/05/18 154/77  
07/13/18 146/79 Pulse Readings from Last 3 Encounters:  
09/27/18 69  
09/05/18 70  
07/13/18 80 Wt Readings from Last 3 Encounters:  
09/27/18 136 lb 1.6 oz (61.7 kg) 09/05/18 142 lb (64.4 kg) 07/12/18 138 lb (62.6 kg) General:  alert, cooperative, no distress, appears stated age Neck:  nontender, no JVD Lungs:  clear to auscultation bilaterally Heart:  regular rate and rhythm, S1, S2 normal, no murmur, click, rub or gallop Abdomen:  abdomen is soft without significant tenderness, masses, organomegaly or guarding Extremities:  extremities normal, atraumatic, no cyanosis or edema Skin: Warm and dry. no hyperpigmentation, vitiligo, or suspicious lesions Neuro: alert, oriented x3, affect appropriate Psych: non focal 
 
 Data Review:  
 
Recent Labs  
   09/26/18 1935 WBC  4.5* HGB  10.8* HCT  35.7* PLT  214 Recent Labs  
   09/27/18 
 0359  09/26/18 1935 NA  140  139  
K  4.3  4.6 CL  103  104 CO2  27  28 GLU  115*  78 BUN  31*  34* CREA  1.54*  1.61* CA  8.5  8.6 MG   --   2.1 ALB   --   3.7 SGOT   --   42* ALT   --   19 INR   --   1.9* Results for orders placed or performed during the hospital encounter of 09/26/18 EKG, 12 LEAD, INITIAL Result Value Ref Range Ventricular Rate 70 BPM  
 Atrial Rate 72 BPM  
 QRS Duration 186 ms  
 Q-T Interval 480 ms QTC Calculation (Bezet) 518 ms Calculated R Axis -69 degrees Calculated T Axis 107 degrees Diagnosis Electronic ventricular pacemaker When compared with ECG of 12-JUL-2018 08:15, 
Vent. rate has decreased BY   2 BPM 
  
 
 
All Cardiac Markers in the last 24 hours:   
Lab Results Component Value Date/Time  09/26/2018 07:35 PM  
 CKMB 3.2 09/26/2018 07:35 PM  
 CKND1 2.8 09/26/2018 07:35 PM  
 TROIQ 0.02 09/26/2018 07:35 PM  
 
 
Last Lipid:   
Lab Results Component Value Date/Time  Cholesterol, total 110 07/12/2018 05:40 AM  
 HDL Cholesterol 33 (L) 07/12/2018 05:40 AM  
 LDL, calculated 55.6 07/12/2018 05:40 AM  
 Triglyceride 107 07/12/2018 05:40 AM  
 CHOL/HDL Ratio 3.3 07/12/2018 05:40 AM  
 
 
Signed By: Reny Drake. Jeni Landry PA-C September 27, 2018

## 2018-09-27 NOTE — PROGRESS NOTES
conducted an initial consultation and Spiritual Assessment for Olivier Delarosa, who is a 80 y. o.,male. Patients Primary Language is: [place]. According to the patients EMR Alevism Affiliation is: Other. The reason the Patient came to the hospital is:  
Patient Active Problem List  
 Diagnosis Date Noted  Diastolic CHF, chronic ([address] 09/26/2018  Cardiac pacemaker in situ 09/16/2018  Syncope 07/11/2018  Aortic stenosis, moderate 01/28/2018  Abnormal chest CT 01/27/2018  Pleural effusion, right 01/26/2018  SOB (shortness of breath) on exertion 01/25/2018  Syncope and collapse 01/19/2018  Celiac artery stenosis ([facility] [address] [address] 01/10/2018  Osteoarthritis of both knees 08/20/2017  Statin intolerance 05/03/2017  Essential hypertension 02/27/2017  Anxiety 02/27/2017  Chronic systolic congestive heart failure ([address] 01/25/2017  S/P CABG x 2 08/25/2015  Atherosclerotic NAHED (renal artery stenosis), bilateral ([address] 08/25/2015  Dyslipidemia  Coronary artery disease of native artery of native heart with stable angina pectoris ([address]  Cardiomyopathy  Atrial fibrillation  Sick sinus syndrome The  provided the following Interventions: 
Initiated a relationship of care and support. Explored issues of angelina, spirituality and/or Latter day needs while hospitalized. Listened empathically. Provided chaplaincy education. Provided information about Spiritual Care Services. Offered prayer and assurance of continued prayers on patient's behalf. Chart reviewed. The following outcomes were achieved: 
Patient shared some information about their medical narrative and spiritual journey/beliefs. Patient processed feeling about current hospitalization. Patient expressed gratitude for the 's visit. Assessment: 
Patient did not indicate any spiritual or Latter day issues which require Spiritual Care Services interventions at this time. Patient does not have any Evangelical/cultural needs that will affect patients preferences in health care. Plan: 
Chaplains will continue to follow and will provide pastoral care on an as needed or requested basis.  recommends bedside caregivers page  on duty if patient shows signs of acute spiritual or emotional distress. [facility] Staff  Spiritual Care  
[phone]

## 2018-09-27 NOTE — PROGRESS NOTES
0029  Pt arrived on floor from ER by stretcher and ambulated to bed. Pt AOx4. Pt oriented to room, pt verbalizes understanding. Orthostatic vitals completed. Call light in reach, bed in low position. 0440  Pt voiding without difficulty. Pt denies syncope or dizziness. Pt denies pain. Purposeful hourly rounds completed. Bedside and Verbal shift change report given to [address] [place] [address] by Claudene Johnson RN. Report included the following information SBAR, Kardex, Intake/Output and MAR.

## 2018-09-27 NOTE — PROGRESS NOTES
Problem: Falls - Risk of 
Goal: *Absence of Falls Document Tyra Bernabe Fall Risk and appropriate interventions in the flowsheet. Outcome: Progressing Towards Goal 
Fall Risk Interventions: 
Mobility Interventions: Communicate number of staff needed for ambulation/transfer, Patient to call before getting OOB, Utilize walker, cane, or other assistive device Medication Interventions: Patient to call before getting OOB, Teach patient to arise slowly, Bed/chair exit alarm History of Falls Interventions: Bed/chair exit alarm, Consult care management for discharge planning, Room close to nurse's station

## 2018-09-28 NOTE — PROGRESS NOTES
[facility] referral sent to [facility] and called to intake nurse,  Saundra Alexandra, as a pending dc. Care Management Interventions PCP Verified by CM: Yes (last seen approx 2 months ago) Palliative Care Criteria Met (RRAT>21 & CHF Dx)?: No 
Mode of Transport at Discharge: Other (see comment) (family) Transition of Care Consult (CM Consult): Home Health [facility]) [address]: Yes Discharge Durable Medical Equipment: No 
Physical Therapy Consult: Yes Occupational Therapy Consult: Yes Speech Therapy Consult: No 
Current Support Network: Lives with Spouse Confirm Follow Up Transport: Family Plan discussed with Pt/Family/Caregiver: Yes Freedom of Choice Offered: Yes Discharge Location Discharge Placement: Home with home health

## 2018-09-28 NOTE — PROGRESS NOTES
Chart reviewed. Transition plan remains home with [facility] visit when medically stable. Will cont to follow for further needs. Rachel Vincent RN,ext [phone].

## 2018-09-28 NOTE — INTERDISCIPLINARY ROUNDS
IDR Summary Patient: Miko Cardenas MRN: [id number]    Age: 80 y.o.  : 11/10/1930 Admit Diagnosis: Syncope and collapse Syncope DIET status: cardiac Lines/Tubes:  
IV: YES   Needed: YES Benites: NO  Needed:NO Central Line: NO Needed: NO 
 
 
VTE Prophylaxis: Chemical 
 
Mobility needs: Yes PT ordered:  YES PT eval on chart: YES   
OT ordered:  YES OT eval on chart: YES Disposition/Care Management: 
Discharge plan: home Barriers to discharge:  
Financial concerns:No  
PCP: Juancarlos Callahan MD   
: NO Interventions:  
CT scan Pacer wire inervention per cards LOS: 0 days Expected days until discharge:  TBD Signed:  
 
Jesus Amaral NP-C [address] [place], [place] [address] Hospitalist Division Pager:  [phone] Office:  [phone]

## 2018-09-28 NOTE — ROUTINE PROCESS
Bedside shift change report given to Asim Alvarado (oncoming nurse) by Karen Dorantes RN (offgoing nurse). Report included the following information SBAR, Kardex, Intake/Output, MAR, Recent Results and Cardiac Rhythm v paced.

## 2018-09-28 NOTE — PROGRESS NOTES
Dr. Rosanne Watts returned call, declined to order gabapentin for pt at this time-pt resting comfortably after Ativan administration.

## 2018-09-28 NOTE — ROUTINE PROCESS
Bedside and Verbal shift change report given to [address] [place] [address] by Nasim Martell RN. Report included the following information SBAR, Kardex, Intake/Output and MAR.

## 2018-09-28 NOTE — PROGRESS NOTES
[facility] - Date of  Admission: 9/26/2018  6:44 PM  
Primary Care Physician:  Dayton Meléndez MD  
 
 
Subjective: No CP Feels funny in head No presyncope or syncope No Chest pain Assessment: - Pre-Syncopal episode prior to admission  
- Echo 07/12/18: E 50%, moderate LV hypertrophy, moderate aortic stenosis, mild to moderate aortic regurgitation, severe TR,  
- Hx CAD, s/p CABG in 2008 (LIMA-LAD, SVG-RCA) 
- Nuclear stress 09/2017: There was no convincing evidence of significant reversible defect or even fixed defect noted to suggest ongoing major ischemia or prior infarct. - Cardiac cath 09/2015: CAD in native coronaries LAD and RCA as mentioned above. This appears unchanged from prior angiogram. His left internal mammary artery graft is open. His right coronary artery graft appears to be a small caliber vessel, as mentioned above. However, compared to the angiogram in 2011, there is no significant angiographic change noted. - Hx Atrial fibrillation, on Eliquis and Toprol - Hx HFpEF 
- Hx sick sinus syndrome, s/p pacemaker placement ([address]) - Hx HTN, on Norvasc, Enalapril, Toprol as outpatient - Hx Dyslipidemia 
- Hx statin intolerance - Hx hypothyroidism - Severe Celiac artery stenosis, duplex of abdominal vasculature 11/10/2017 with > 70% celiac artery stenosis - Mild bilateral ICA stenosis (<50%) of carotid arteries 9/27/18 Plan Not orthostatic Not hypotensive Pacemaker interrogation done today. On tele monitor there are often non-captured beats and udnerlying a.fib. Concern that there may be lead malfunction. Lead is 8year old. Pacemaker representative has discussed with  the concern about lead and output and sensetivity has been changed according to recommendation from  I believe that because of his syncope and possible lead issue, best thing would be change of lead. Discussed with patient in detail about it and risk, benefit alternatives and complications of procedure discussed. He knows  will be doing that on Monday as he is on apixaban which will need to be hold for two days. Will stop Apixaban for two days prior to procedure  mg daily meanwhile Continue rest of medications same DW. Primary team about the plan. History of Present Illness: This is a 80 y.o. male admitted for Syncope and collapse. Patient complains of:  \"foginess\" This is an 80year old male with a history of CAD, Afib, SSS s/p PPM placement, CABG, hypertension, hyperlipidemia, celiac artery stenosis and hypothyroidism that cardiology is seeing in consult due to syncope. He explains that yesterday, he passed out and felt very weak, foggy. He states that he did not have much of a warning prior to his episode. He has been having this feeling for several weeks. He has had some shortness of breath on exertion, had another episode of \"fogginess\" when he was up walking to the bathroom today. No chest tightness prior to admission. No changes in vision. He takes Lasix on an as needed basis. He has some occasional LE edema and took Lasix about 3-4 times this week. Cardiac risk factors: dyslipidemia, male gender, hypertension Review of Symptoms:  Except as stated above include: 
Constitutional:  +syncope Respiratory:  negative Cardiovascular:  Per HPI Gastrointestinal: negative Genitourinary:  negative Musculoskeletal:  Negative Neurological:  Negative Dermatological:  Negative Endocrinological: Negative Psychological:  Negative A comprehensive review of systems was negative except for that written in the HPI. Past Medical History:  
 
Past Medical History:  
Diagnosis Date  Atrial fibrillation CHADS score 3  (+CHF, +HTN, +AGE, -DM, -CVA)  CABG   
 2008   LIMA - LAD,   SVG - RCA  Cardiomyopathy EF 30-35% (ECHO 6/14)  Coronary artery disease  Dyslipidemia  Hypertension  Hypothyroid  Pacemaker  Peripheral vascular disease  Renal artery stenosis   
 bilateral stents  Sick sinus syndrome Social History:  
 
Social History Social History  Marital status:  Spouse name: N/A  
 Number of children: N/A  
 Years of education: N/A Social History Main Topics  Smoking status: Former Smoker  Smokeless tobacco: Never Used  Alcohol use No  
 Drug use: No  
 Sexual activity: Not Asked Other Topics Concern  None Social History Narrative Family History:  
History reviewed. No pertinent family history. Medications: Allergies Allergen Reactions  Beta-Blockers (Beta-Adrenergic Blocking Agts) Drowsiness  Penicillins Swelling  Statins-Hmg-Coa Reductase Inhibitors Drowsiness Current Facility-Administered Medications Medication Dose Route Frequency  [START ON 9/29/2018] aspirin (ASPIRIN) tablet 325 mg  325 mg Oral DAILY  amLODIPine (NORVASC) tablet 5 mg  5 mg Oral DAILY  LORazepam (ATIVAN) tablet 1 mg  1 mg Oral Q6H PRN  
 metoprolol succinate (TOPROL-XL) XL tablet 50 mg  50 mg Oral DAILY  pantoprazole (PROTONIX) tablet 40 mg  40 mg Oral ACB  tiotropium (SPIRIVA) inhalation capsule 18 mcg  1 Cap Inhalation QAM RT  
 senna-docusate (PERICOLACE) 8.6-50 mg per tablet 1 Tab  1 Tab Oral BID PRN  
 melatonin tablet 3 mg  3 mg Oral QHS PRN  
 acetaminophen (TYLENOL) tablet 650 mg  650 mg Oral Q4H PRN  
 ondansetron (ZOFRAN) injection 4 mg  4 mg IntraVENous Q4H PRN  
 apixaban (ELIQUIS) tablet 2.5 mg  2.5 mg Oral BID Physical Exam:  
 
Visit Vitals  /80  Pulse 69  Temp 97.4 °F (36.3 °C)  Resp 18  Ht 5' 9\" (1.753 m)  Wt 136 lb 1.6 oz (61.7 kg)  SpO2 97%  BMI 20.1 kg/m2 BP Readings from Last 3 Encounters:  
09/28/18 159/80  
09/05/18 154/77  
07/13/18 146/79 Pulse Readings from Last 3 Encounters:  
09/28/18 69  
09/05/18 70  
07/13/18 80 Wt Readings from Last 3 Encounters:  
09/27/18 136 lb 1.6 oz (61.7 kg) 09/05/18 142 lb (64.4 kg) 07/12/18 138 lb (62.6 kg) General:  alert, cooperative, no distress, appears stated age Neck:  nontender, no JVD Lungs:  clear to auscultation bilaterally Heart:  regular rate and rhythm, S1, S2 normal, no murmur, click, rub or gallop Abdomen:  abdomen is soft Extremities:  extremities normal, atraumatic, no cyanosis or edema Data Review:  
 
Recent Labs  
   09/28/18 
 0405  09/26/18 
 1935 WBC  4.4*  4.5* HGB  10.7*  10.8* HCT  35.7*  35.7* PLT  204  214 Recent Labs  
   09/28/18 
 0405  09/27/18 
 0359  09/26/18 
 1935 NA  139  140  139  
K  4.4  4.3  4.6 CL  101  103  104 CO2  26 27  28 GLU  120*  115*  78 BUN  31*  31*  34* CREA  1.45*  1.54*  1.61* CA  8.3*  8.5  8.6 MG   --    --   2.1 ALB   --    --   3.7 SGOT   --    --   42* ALT   --    --   19 INR   --    --   1.9* Results for orders placed or performed during the hospital encounter of 09/26/18 EKG, 12 LEAD, INITIAL Result Value Ref Range Ventricular Rate 70 BPM  
 Atrial Rate 72 BPM  
 QRS Duration 186 ms  
 Q-T Interval 480 ms QTC Calculation (Bezet) 518 ms Calculated R Axis -69 degrees Calculated T Axis 107 degrees Diagnosis Electronic ventricular pacemaker When compared with ECG of 12-JUL-2018 08:15, 
Vent. rate has decreased BY   2 BPM 
Confirmed by Allison Reza ([id number]) on 9/27/2018 3:57:39 PM 
  
 
 
All Cardiac Markers in the last 24 hours:   
No results found for: CPK, CK, CKMMB, CKMB, RCK3, CKMBT, CKNDX, CKND1, DARNELL, TROPT, TROIQ, MARIA ESTHER, TROPT, TNIPOC, BNP, BNPP Last Lipid:   
Lab Results Component Value Date/Time  Cholesterol, total 110 07/12/2018 05:40 AM  
 HDL Cholesterol 33 (L) 07/12/2018 05:40 AM  
 LDL, calculated 55.6 07/12/2018 05:40 AM  
 Triglyceride 107 07/12/2018 05:40 AM  
 CHOL/HDL Ratio 3.3 07/12/2018 05:40 AM  
 
 
Signed By: Evelyn Jerome MD   
 September 28, 2018

## 2018-09-28 NOTE — PROGRESS NOTES
Problem: Falls - Risk of 
Goal: *Absence of Falls Document Clif Furth Fall Risk and appropriate interventions in the flowsheet. Outcome: Progressing Towards Goal 
Fall Risk Interventions: 
Mobility Interventions: Communicate number of staff needed for ambulation/transfer, OT consult for ADLs, Patient to call before getting OOB, PT Consult for mobility concerns, PT Consult for assist device competence, Utilize walker, cane, or other assistive device Medication Interventions: Patient to call before getting OOB, Teach patient to arise slowly History of Falls Interventions: Consult care management for discharge planning, Door open when patient unattended, Investigate reason for fall

## 2018-09-28 NOTE — PROGRESS NOTES
Progress Note Patient: Clarke Kelley               Sex: male          DOA: 9/26/2018 YOB: 1930      Age:  80 y.o.        LOS:  LOS: 0 days Subjective / Interval Hx Shital Araiza is a 80 y.o. male  With hx SSS on pacemaker, chf, Afib on eliquis , cabg, hypertension , home o2 2.5L NC, HLD, Hypothyroid who presents with syncope. Per report patient passed out for a brief period. He has no recall. His wife called EMS. He also c/o recent lightheadedness more with motion  and chronic fatigue . No chest pain , cough, fever. CTA chest in ED was negative for PE, small bilateral pleural effusion , mild edema , no focal consolidation. Duplex bilateral  carotid US shows mild stenosis bilateral  ICA (< 50%). CXR shows cardiomegaly with bilateral pleural effusion. 9/28 : Patient pacemaker interrogated yesterday. Recommend replacement of one lead on Monday. Orthostatic vitals wnl. Patient continues to c/o head filling fuzzy. No headache , no change in vision. No focal change Objective:  
  
Visit Vitals  /80  Pulse 69  Temp 97.4 °F (36.3 °C)  Resp 18  Ht 5' 9\" (1.753 m)  Wt 61.7 kg (136 lb 1.6 oz)  SpO2 97%  BMI 20.1 kg/m2 Physical Exam  
Constitutional: He is oriented to person, place, and time. He appears well-developed and well-nourished. No distress. HENT:  
Head: Normocephalic and atraumatic. Eyes: Conjunctivae are normal. Pupils are equal, round, and reactive to light. No scleral icterus. Neck: Neck supple. Cardiovascular: Normal rate, regular rhythm and normal heart sounds. No murmur heard. Pulmonary/Chest: Effort normal and breath sounds normal. No respiratory distress. He has no wheezes. He has no rales. Abdominal: Soft. Bowel sounds are normal. He exhibits no distension. There is no tenderness. There is no rebound. Musculoskeletal: He exhibits edema (trace ble ). Neurological: He is alert and oriented to person, place, and time. No cranial nerve deficit. Skin: Skin is warm. He is not diaphoretic. Psychiatric: He has a normal mood and affect. Intake and Output: 
Current Shift:  09/28 0701 - 09/28 1900 In: -  
Out: 100 [Urine:100] Last three shifts:  09/26 1901 - 09/28 0700 In: 2950 [P.O.:2300; I.V.:650] Out: 3180 [CLXDO:1288] Recent Results (from the past 48 hour(s)) URINALYSIS W/ RFLX MICROSCOPIC Collection Time: 09/26/18  7:31 PM  
Result Value Ref Range Color YELLOW Appearance CLEAR Specific gravity 1.010 1.005 - 1.030    
 pH (UA) 7.0 5.0 - 8.0 Protein 30 (A) NEG mg/dL Glucose NEGATIVE  NEG mg/dL Ketone NEGATIVE  NEG mg/dL Bilirubin NEGATIVE  NEG Blood NEGATIVE  NEG Urobilinogen 1.0 0.2 - 1.0 EU/dL Nitrites NEGATIVE  NEG Leukocyte Esterase NEGATIVE  NEG    
URINE MICROSCOPIC ONLY Collection Time: 09/26/18  7:31 PM  
Result Value Ref Range WBC 0 to 1 0 - 4 /hpf  
 RBC 0 0 - 5 /hpf Epithelial cells FEW 0 - 5 /lpf Bacteria NEGATIVE  NEG /hpf  
CBC WITH AUTOMATED DIFF Collection Time: 09/26/18  7:35 PM  
Result Value Ref Range WBC 4.5 (L) 4.6 - 13.2 K/uL  
 RBC 4.26 (L) 4.70 - 5.50 M/uL  
 HGB 10.8 (L) 13.0 - 16.0 g/dL HCT 35.7 (L) 36.0 - 48.0 % MCV 83.8 74.0 - 97.0 FL  
 MCH 25.4 24.0 - 34.0 PG  
 MCHC 30.3 (L) 31.0 - 37.0 g/dL  
 RDW 19.9 (H) 11.6 - 14.5 % PLATELET 391 470 - 887 K/uL MPV 9.6 9.2 - 11.8 FL  
 NEUTROPHILS 68 40 - 73 % LYMPHOCYTES 17 (L) 21 - 52 % MONOCYTES 11 (H) 3 - 10 % EOSINOPHILS 3 0 - 5 % BASOPHILS 1 0 - 2 %  
 ABS. NEUTROPHILS 3.1 1.8 - 8.0 K/UL  
 ABS. LYMPHOCYTES 0.8 (L) 0.9 - 3.6 K/UL  
 ABS. MONOCYTES 0.5 0.05 - 1.2 K/UL  
 ABS. EOSINOPHILS 0.1 0.0 - 0.4 K/UL  
 ABS. BASOPHILS 0.0 0.0 - 0.1 K/UL  
 DF AUTOMATED PROTHROMBIN TIME + INR Collection Time: 09/26/18  7:35 PM  
Result Value Ref Range Prothrombin time 22.1 (H) 11.5 - 15.2 sec INR 1.9 (H) 0.8 - 1.2 METABOLIC PANEL, COMPREHENSIVE Collection Time: 09/26/18  7:35 PM  
Result Value Ref Range Sodium 139 136 - 145 mmol/L Potassium 4.6 3.5 - 5.5 mmol/L Chloride 104 100 - 108 mmol/L  
 CO2 28 21 - 32 mmol/L Anion gap 7 3.0 - 18 mmol/L Glucose 78 74 - 99 mg/dL BUN 34 (H) 7.0 - 18 MG/DL Creatinine 1.61 (H) 0.6 - 1.3 MG/DL  
 BUN/Creatinine ratio 21 (H) 12 - 20 GFR est AA 49 (L) >60 ml/min/1.73m2 GFR est non-AA 41 (L) >60 ml/min/1.73m2 Calcium 8.6 8.5 - 10.1 MG/DL Bilirubin, total 0.8 0.2 - 1.0 MG/DL  
 ALT (SGPT) 19 16 - 61 U/L  
 AST (SGOT) 42 (H) 15 - 37 U/L Alk. phosphatase 128 (H) 45 - 117 U/L Protein, total 7.4 6.4 - 8.2 g/dL Albumin 3.7 3.4 - 5.0 g/dL Globulin 3.7 2.0 - 4.0 g/dL A-G Ratio 1.0 0.8 - 1.7 NT-PRO BNP Collection Time: 09/26/18  7:35 PM  
Result Value Ref Range NT pro-BNP 4942 (H) 0 - 1800 PG/ML  
ETHYL ALCOHOL Collection Time: 09/26/18  7:35 PM  
Result Value Ref Range ALCOHOL(ETHYL),SERUM <3 0 - 3 MG/DL MAGNESIUM Collection Time: 09/26/18  7:35 PM  
Result Value Ref Range Magnesium 2.1 1.6 - 2.6 mg/dL TSH 3RD GENERATION Collection Time: 09/26/18  7:35 PM  
Result Value Ref Range TSH 4.40 (H) 0.36 - 3.74 uIU/mL CARDIAC PANEL,(CK, CKMB & TROPONIN) Collection Time: 09/26/18  7:35 PM  
Result Value Ref Range  39 - 308 U/L  
 CK - MB 3.2 <3.6 ng/ml CK-MB Index 2.8 0.0 - 4.0 % Troponin-I, Qt. 0.02 0.0 - 0.045 NG/ML  
D DIMER Collection Time: 09/26/18  7:41 PM  
Result Value Ref Range D DIMER 2.52 (H) <0.46 ug/ml(FEU) EKG, 12 LEAD, INITIAL Collection Time: 09/26/18  7:47 PM  
Result Value Ref Range Ventricular Rate 70 BPM  
 Atrial Rate 72 BPM  
 QRS Duration 186 ms  
 Q-T Interval 480 ms QTC Calculation (Bezet) 518 ms Calculated R Axis -69 degrees Calculated T Axis 107 degrees Diagnosis Electronic ventricular pacemaker When compared with ECG of 12-JUL-2018 08:15, 
Vent. rate has decreased BY   2 BPM 
Confirmed by Rosmery Cano ([id number]) on 9/27/2018 3:57:39 PM 
  
POC G3 Collection Time: 09/27/18  1:52 AM  
Result Value Ref Range Device: NASAL CANNULA Flow rate (POC) 4.0 L/M  
 FIO2 (POC) 36 % pH (POC) 7.469 (H) 7.35 - 7.45    
 pCO2 (POC) 33.5 (L) 35.0 - 45.0 MMHG  
 pO2 (POC) 93 80 - 100 MMHG  
 HCO3 (POC) 24.4 22 - 26 MMOL/L  
 sO2 (POC) 98 (H) 92 - 97 % Base excess (POC) 1 mmol/L Allens test (POC) YES Site RIGHT RADIAL Patient temp. 97.9 Specimen type (POC) ARTERIAL Performed by Irma Bravo METABOLIC PANEL, BASIC Collection Time: 09/27/18  3:59 AM  
Result Value Ref Range Sodium 140 136 - 145 mmol/L Potassium 4.3 3.5 - 5.5 mmol/L Chloride 103 100 - 108 mmol/L  
 CO2 27 21 - 32 mmol/L Anion gap 10 3.0 - 18 mmol/L Glucose 115 (H) 74 - 99 mg/dL BUN 31 (H) 7.0 - 18 MG/DL Creatinine 1.54 (H) 0.6 - 1.3 MG/DL  
 BUN/Creatinine ratio 20 12 - 20 GFR est AA 52 (L) >60 ml/min/1.73m2 GFR est non-AA 43 (L) >60 ml/min/1.73m2 Calcium 8.5 8.5 - 10.1 MG/DL  
DUPLEX CAROTID BILATERAL Collection Time: 09/27/18  9:31 AM  
Result Value Ref Range Left CCA dist sys 50.45 cm/s Left CCA dist ellis 0.00 cm/s LEFT COMMON CAROTID ARTERY MID S 63.53 cm/s LEFT COMMON CAROTID ARTERY MID D 0.00 cm/s Left CCA prox sys 65.30 cm/s Left CCA prox ellis 0.00 cm/s Left ECA sys 92.49 cm/s LEFT EXTERNAL CAROTID ARTERY D 0.00 cm/s Left ICA dist sys 55.78 cm/s Left ICA dist ellis 11.16 cm/s Left ICA mid sys 102.83 cm/s Left ICA mid ellis 12.93 cm/s Left ICA prox sys 98.74 cm/s Left ICA prox ellis 19.11 cm/s Left subclavian sys 92.37 cm/s LEFT SUBCLAVIAN ARTERY D 0.00 cm/s Left vertebral sys 36.75 cm/s LEFT VERTEBRAL ARTERY D 9.34 cm/s Left ICA/CCA sys 1.57 Right cca dist sys 59.33 cm/s Right CCA dist ellis 9.16 cm/s RIGHT COMMON CAROTID ARTERY MID S 75.25 cm/s RIGHT COMMON CAROTID ARTERY MID D 7.56 cm/s Right CCA prox sys 87.99 cm/s Right CCA prox ellis 0.00 cm/s Right eca sys 111.09 cm/s RIGHT EXTERNAL CAROTID ARTERY D 18.71 cm/s Right ICA dist sys 96.75 cm/s Right ICA dist ellis 19.51 cm/s Right ICA mid sys 100.73 cm/s Right ICA mid ellis 22.69 cm/s Right ICA prox sys 90.38 cm/s Right ICA prox ellis 17.12 cm/s Right subclavian sys 61.32 cm/s RIGHT SUBCLAVIAN ARTERY D 0.00 cm/s Right vertebral sys 47.18 cm/s RIGHT VERTEBRAL ARTERY D 9.62 cm/s Right ICA/CCA sys 1.14   
T4, FREE Collection Time: 09/27/18 10:30 AM  
Result Value Ref Range T4, Free 1.0 0.7 - 1.5 NG/DL  
CBC W/O DIFF Collection Time: 09/28/18  4:05 AM  
Result Value Ref Range WBC 4.4 (L) 4.6 - 13.2 K/uL  
 RBC 4.21 (L) 4.70 - 5.50 M/uL  
 HGB 10.7 (L) 13.0 - 16.0 g/dL HCT 35.7 (L) 36.0 - 48.0 % MCV 84.8 74.0 - 97.0 FL  
 MCH 25.4 24.0 - 34.0 PG  
 MCHC 30.0 (L) 31.0 - 37.0 g/dL  
 RDW 19.9 (H) 11.6 - 14.5 % PLATELET 556 804 - 257 K/uL MPV 9.5 9.2 - 42.2 FL  
METABOLIC PANEL, BASIC Collection Time: 09/28/18  4:05 AM  
Result Value Ref Range Sodium 139 136 - 145 mmol/L Potassium 4.4 3.5 - 5.5 mmol/L Chloride 101 100 - 108 mmol/L  
 CO2 26 21 - 32 mmol/L Anion gap 12 3.0 - 18 mmol/L Glucose 120 (H) 74 - 99 mg/dL BUN 31 (H) 7.0 - 18 MG/DL Creatinine 1.45 (H) 0.6 - 1.3 MG/DL  
 BUN/Creatinine ratio 21 (H) 12 - 20 GFR est AA 56 (L) >60 ml/min/1.73m2 GFR est non-AA 46 (L) >60 ml/min/1.73m2 Calcium 8.3 (L) 8.5 - 10.1 MG/DL Lab/Data Reviewed: All lab results for the last 24 hours reviewed. XRays were reviewed in past 24 hours Medications Reviewed Assessment/Plan Principal Problem: 
  Syncope and collapse (1/19/2018) Active Problems: 
  Dyslipidemia () Coronary artery disease of native artery of native heart with stable angina pectoris (HCC) () Atrial fibrillation () Overview: CHADS score 3  (+CHF, +HTN, +AGE, -DM, -CVA) Sick sinus syndrome () Overview: Developed post op CABG  
 
  S/P CABG x 2 (8/25/2015) Overview: 12/2008, LIMA to LAD, SVG to RCA Essential hypertension (2/27/2017) Aortic stenosis, moderate (1/28/2018) Cardiac pacemaker in situ (9/16/2018) Diastolic CHF, chronic ([address] (9/26/2018) Care Plan Syncope - possible cardiogenic vs vagal  
- will recommend Pacemaker interrogation again however he reports it was done at cardiology appt 2 weeks ago and was ok  
- ECHO 7/18 reviewed - Duplex bilateral Carotid US < 50 % bilateral ICA stenosis  
- EKG unremarkable - Cardiology following and plan to replace his pacemaker lead on Monday will hold Eliquis for 2 days starting saturday Hx afib  
- stable - On Eliquis will hold per cardiology - Lopressor HTN  
- controlled - Lopressor - Norvasc  
- Hold Enalapril until renal fxn improves - Lasix CHF Ex 
- EF 50% - ProBNP 4942 
- lasix 40 mg IV once then resume po - Lopressor - Monitor input and out put  
- Cardiology consult SSS 
- Pacemaker 
- recommend interrogation Hypothyroid  
- TSH 4.4  
- Check Free T4 ordered 
- do not see any record of hypothyroid medication DVT prophylaxis - Eliquis hold  tomorrow for procedure on Monday Full code Disposition: KLAUS Kern MD 
September 28, 2018

## 2018-09-29 NOTE — PROGRESS NOTES
Progress Note Patient: Deisi Rosas               Sex: male          DOA: 9/26/2018 YOB: 1930      Age:  80 y.o.        LOS:  LOS: 1 day Subjective / Interval Hx Marcelo Gallagher is a 80 y.o. male  With hx SSS on pacemaker, chf, Afib on eliquis , cabg, hypertension , home o2 2.5L NC, HLD, Hypothyroid who presents with syncope. Per report patient passed out for a brief period. He has no recall. His wife called EMS. He also c/o recent lightheadedness more with motion  and chronic fatigue . No chest pain , cough, fever. CTA chest in ED was negative for PE, small bilateral pleural effusion , mild edema , no focal consolidation. Duplex bilateral  carotid US shows mild stenosis bilateral  ICA (< 50%). CXR shows cardiomegaly with bilateral pleural effusion. 9/28 : Patient pacemaker interrogated yesterday. Recommend replacement of one lead on Monday. Orthostatic vitals wnl. Patient continues to c/o head filling fuzzy. No headache , no change in vision. No focal change 9/29: CT head yesterday was negative for acute abnormality. Still feels fuzzy occasionally but better than yesterday. No fever, no headache , no visual change . Objective:  
  
Visit Vitals  /69  Pulse 70  Temp 97.6 °F (36.4 °C)  Resp 18  Ht 5' 9\" (1.753 m)  Wt 63 kg (138 lb 12.8 oz)  SpO2 92%  BMI 20.5 kg/m2 Physical Exam  
Constitutional: He is oriented to person, place, and time. He appears well-developed and well-nourished. No distress. HENT:  
Head: Normocephalic and atraumatic. Eyes: Conjunctivae are normal. Pupils are equal, round, and reactive to light. No scleral icterus. Neck: Neck supple. Cardiovascular: Normal rate, regular rhythm and normal heart sounds. No murmur heard. Pulmonary/Chest: Effort normal and breath sounds normal. No respiratory distress. He has no wheezes. He has no rales. Abdominal: Soft. Bowel sounds are normal. He exhibits no distension. There is no tenderness. There is no rebound. Musculoskeletal: He exhibits edema (trace ble ). Neurological: He is alert and oriented to person, place, and time. No cranial nerve deficit. Skin: Skin is warm. He is not diaphoretic. Psychiatric: He has a normal mood and affect. Intake and Output: 
Current Shift:    
Last three shifts:  09/27 1901 - 09/29 0700 In: 1930 [P.O.:1930] Out: 4226 [[facility]:5067] Recent Results (from the past 48 hour(s)) CBC W/O DIFF Collection Time: 09/28/18  4:05 AM  
Result Value Ref Range WBC 4.4 (L) 4.6 - 13.2 K/uL  
 RBC 4.21 (L) 4.70 - 5.50 M/uL  
 HGB 10.7 (L) 13.0 - 16.0 g/dL HCT 35.7 (L) 36.0 - 48.0 % MCV 84.8 74.0 - 97.0 FL  
 MCH 25.4 24.0 - 34.0 PG  
 MCHC 30.0 (L) 31.0 - 37.0 g/dL  
 RDW 19.9 (H) 11.6 - 14.5 % PLATELET 766 449 - 196 K/uL MPV 9.5 9.2 - 82.4 FL  
METABOLIC PANEL, BASIC Collection Time: 09/28/18  4:05 AM  
Result Value Ref Range Sodium 139 136 - 145 mmol/L Potassium 4.4 3.5 - 5.5 mmol/L Chloride 101 100 - 108 mmol/L  
 CO2 26 21 - 32 mmol/L Anion gap 12 3.0 - 18 mmol/L Glucose 120 (H) 74 - 99 mg/dL BUN 31 (H) 7.0 - 18 MG/DL Creatinine 1.45 (H) 0.6 - 1.3 MG/DL  
 BUN/Creatinine ratio 21 (H) 12 - 20 GFR est AA 56 (L) >60 ml/min/1.73m2 GFR est non-AA 46 (L) >60 ml/min/1.73m2 Calcium 8.3 (L) 8.5 - 10.1 MG/DL  
CBC WITH AUTOMATED DIFF Collection Time: 09/29/18  4:05 AM  
Result Value Ref Range WBC 4.1 (L) 4.6 - 13.2 K/uL  
 RBC 3.96 (L) 4.70 - 5.50 M/uL  
 HGB 10.0 (L) 13.0 - 16.0 g/dL HCT 32.9 (L) 36.0 - 48.0 % MCV 83.1 74.0 - 97.0 FL  
 MCH 25.3 24.0 - 34.0 PG  
 MCHC 30.4 (L) 31.0 - 37.0 g/dL  
 RDW 19.5 (H) 11.6 - 14.5 % PLATELET 514 068 - 453 K/uL MPV 9.5 9.2 - 11.8 FL  
 NEUTROPHILS 73 40 - 73 % LYMPHOCYTES 10 (L) 21 - 52 % MONOCYTES 13 (H) 3 - 10 % EOSINOPHILS 3 0 - 5 % BASOPHILS 1 0 - 2 % ABS. NEUTROPHILS 3.0 1.8 - 8.0 K/UL  
 ABS. LYMPHOCYTES 0.4 (L) 0.9 - 3.6 K/UL  
 ABS. MONOCYTES 0.5 0.05 - 1.2 K/UL  
 ABS. EOSINOPHILS 0.1 0.0 - 0.4 K/UL  
 ABS. BASOPHILS 0.0 0.0 - 0.1 K/UL  
 DF AUTOMATED METABOLIC PANEL, BASIC Collection Time: 09/29/18  4:05 AM  
Result Value Ref Range Sodium 138 136 - 145 mmol/L Potassium 4.3 3.5 - 5.5 mmol/L Chloride 103 100 - 108 mmol/L  
 CO2 25 21 - 32 mmol/L Anion gap 10 3.0 - 18 mmol/L Glucose 77 74 - 99 mg/dL BUN 32 (H) 7.0 - 18 MG/DL Creatinine 1.32 (H) 0.6 - 1.3 MG/DL  
 BUN/Creatinine ratio 24 (H) 12 - 20 GFR est AA >60 >60 ml/min/1.73m2 GFR est non-AA 51 (L) >60 ml/min/1.73m2 Calcium 8.4 (L) 8.5 - 10.1 MG/DL Lab/Data Reviewed: All lab results for the last 24 hours reviewed. XRays were reviewed in past 24 hours Medications Reviewed Assessment/Plan Principal Problem: 
  Syncope and collapse (1/19/2018) Active Problems: 
  Dyslipidemia () Coronary artery disease of native artery of native heart with stable angina pectoris (HCC) () Atrial fibrillation () Overview: CHADS score 3  (+CHF, +HTN, +AGE, -DM, -CVA) Sick sinus syndrome () Overview: Developed post op CABG  
 
  S/P CABG x 2 (8/25/2015) Overview: 12/2008, LIMA to LAD, SVG to RCA Essential hypertension (2/27/2017) Aortic stenosis, moderate (1/28/2018) Syncope (7/11/2018) Cardiac pacemaker in situ (9/16/2018) Diastolic CHF, chronic ([address] (9/26/2018) Care Plan Syncope - possible cardiogenic vs vagal  
- will recommend Pacemaker interrogation again however he reports it was done at cardiology appt 2 weeks ago and was ok  
- ECHO 7/18 reviewed - Duplex bilateral Carotid US < 50 % bilateral ICA stenosis  
- EKG unremarkable - Cardiology following and plan to replace his pacemaker lead on Monday will hold Eliquis for 2 days starting saturday Hx afib  
- stable - On Eliquis will hold per cardiology - Lopressor HTN  
- controlled - Lopressor - Norvasc  
- Hold Enalapril until renal fxn improves - Lasix CHF Ex 
- EF 50% - ProBNP 4942 
- lasix 40 mg IV once then resume po - Lopressor - Monitor input and out put  
- Cardiology consult SSS 
- Pacemaker 
- recommend interrogation Hypothyroid  
- TSH 4.4  
- Check Free T4 ordered 
- do not see any record of hypothyroid medication DVT prophylaxis - Eliquis hold  tomorrow for procedure on Monday Full code Disposition: TBELMER Umanzor MD 
September 29, 2018

## 2018-09-29 NOTE — PROGRESS NOTES
[facility] - Date of  Admission: 9/26/2018  6:44 PM  
Primary Care Physician:  Josué Sutton MD  
 
 
Subjective: No CP Feels funny in head but getting better No presyncope or syncope No Chest pain Assessment: - Pre-Syncopal episode prior to admission  
- Echo 07/12/18: E 50%, moderate LV hypertrophy, moderate aortic stenosis, mild to moderate aortic regurgitation, severe TR,  
- Hx CAD, s/p CABG in 2008 (LIMA-LAD, SVG-RCA) 
- Nuclear stress 09/2017: There was no convincing evidence of significant reversible defect or even fixed defect noted to suggest ongoing major ischemia or prior infarct. - Cardiac cath 09/2015: CAD in native coronaries LAD and RCA as mentioned above. This appears unchanged from prior angiogram. His left internal mammary artery graft is open. His right coronary artery graft appears to be a small caliber vessel, as mentioned above. However, compared to the angiogram in 2011, there is no significant angiographic change noted. - Hx Atrial fibrillation, on Eliquis and Toprol - Hx HFpEF 
- Hx sick sinus syndrome, s/p pacemaker placement ([address]) - Hx HTN, on Norvasc, Enalapril, Toprol as outpatient - Hx Dyslipidemia 
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 On tele monitor there are often non-captured beats and udnerlying a.fib. Concern that there may be lead malfunction. Lead is 8year old. Pacemaker representative has discussed with  the concern about lead and output and sensetivity has been changed according to recommendation from  I believe that because of his syncope and possible lead issue, best thing would be change of lead. No non-captured beat overnight. Apixaban discontinued  mg daily meanwhile Continue rest of medications same DW. Primary team about the plan. History of Present Illness: This is a 80 y.o. male admitted for Syncope and collapse;Syncope. Patient complains of:  \"foginess\" This is an 80year old male with a history of CAD, Afib, SSS s/p PPM placement, CABG, hypertension, hyperlipidemia, celiac artery stenosis and hypothyroidism that cardiology is seeing in consult due to syncope. He explains that yesterday, he passed out and felt very weak, foggy. He states that he did not have much of a warning prior to his episode. He has been having this feeling for several weeks. He has had some shortness of breath on exertion, had another episode of \"fogginess\" when he was up walking to the bathroom today. No chest tightness prior to admission. No changes in vision. He takes Lasix on an as needed basis. He has some occasional LE edema and took Lasix about 3-4 times this week. Cardiac risk factors: dyslipidemia, male gender, hypertension Review of Symptoms:  Except as stated above include: 
Constitutional:  +syncope Respiratory:  negative Cardiovascular:  Per HPI Gastrointestinal: negative Genitourinary:  negative Musculoskeletal:  Negative Neurological:  Negative Dermatological:  Negative Endocrinological: Negative Psychological:  Negative A comprehensive review of systems was negative except for that written in the HPI. Past Medical History:  
 
Past Medical History:  
Diagnosis Date  Atrial fibrillation CHADS score 3  (+CHF, +HTN, +AGE, -DM, -CVA)  CABG   
 2008   LIMA - LAD,   SVG - RCA  Cardiomyopathy EF 30-35% (ECHO 6/14)  Coronary artery disease  Dyslipidemia  Hypertension  Hypothyroid  Pacemaker  Peripheral vascular disease  Renal artery stenosis   
 bilateral stents  Sick sinus syndrome Social History:  
 
Social History Social History  Marital status:  Spouse name: N/A  
 Number of children: N/A  
 Years of education: N/A Social History Main Topics  Smoking status: Former Smoker  Smokeless tobacco: Never Used  Alcohol use No  
 Drug use: No  
 Sexual activity: Not Asked Other Topics Concern  None Social History Narrative Family History:  
History reviewed. No pertinent family history. Medications: Allergies Allergen Reactions  Beta-Blockers (Beta-Adrenergic Blocking Agts) Drowsiness  Penicillins Swelling  Statins-Hmg-Coa Reductase Inhibitors Drowsiness Current Facility-Administered Medications Medication Dose Route Frequency  aspirin (ASPIRIN) tablet 325 mg  325 mg Oral DAILY  apixaban (Eliquis)- hold after 9.28.18 for procedure on 10. 1.18  1 Each Other DAILY  amLODIPine (NORVASC) tablet 5 mg  5 mg Oral DAILY  LORazepam (ATIVAN) tablet 1 mg  1 mg Oral Q6H PRN  
 metoprolol succinate (TOPROL-XL) XL tablet 50 mg  50 mg Oral DAILY  pantoprazole (PROTONIX) tablet 40 mg  40 mg Oral ACB  tiotropium (SPIRIVA) inhalation capsule 18 mcg  1 Cap Inhalation QAM RT  
 senna-docusate (PERICOLACE) 8.6-50 mg per tablet 1 Tab  1 Tab Oral BID PRN  
 melatonin tablet 3 mg  3 mg Oral QHS PRN  
 acetaminophen (TYLENOL) tablet 650 mg  650 mg Oral Q4H PRN  
 ondansetron (ZOFRAN) injection 4 mg  4 mg IntraVENous Q4H PRN Physical Exam:  
 
Visit Vitals  /69  Pulse 70  Temp 97.6 °F (36.4 °C)  Resp 18  Ht 5' 9\" (1.753 m)  Wt 138 lb 12.8 oz (63 kg)  SpO2 92%  BMI 20.5 kg/m2 BP Readings from Last 3 Encounters:  
09/29/18 124/69  
09/05/18 154/77  
07/13/18 146/79 Pulse Readings from Last 3 Encounters:  
09/29/18 70  
09/05/18 70  
07/13/18 80 Wt Readings from Last 3 Encounters:  
09/29/18 138 lb 12.8 oz (63 kg) 09/05/18 142 lb (64.4 kg) 07/12/18 138 lb (62.6 kg) General:  alert, cooperative, no distress, appears stated age Neck:  nontender, no JVD Lungs:  clear to auscultation bilaterally Heart:  regular rate and rhythm, S1, S2 normal, no murmur, click, rub or gallop Abdomen:  abdomen is soft Extremities:  extremities normal, atraumatic, no cyanosis or edema Data Review:  
 
Recent Labs  
   09/29/18 0405  09/28/18 
 0405  09/26/18 1935 WBC  4.1*  4.4*  4.5* HGB  10.0*  10.7*  10.8* HCT  32.9*  35.7*  35.7* PLT  204  204  214 Recent Labs  
   09/29/18 
 0405  09/28/18 
 0405  09/27/18 
 0359  09/26/18 1935 NA  138  139  140  139  
K  4.3  4.4  4.3  4.6 CL  103  101  103  104 CO2  25  26  27  28 GLU  77  120*  115*  78 BUN  32*  31*  31*  34* CREA  1.32*  1.45*  1.54*  1.61* CA  8.4*  8.3*  8.5  8.6 MG   --    --    --   2.1 ALB   --    --    --   3.7 SGOT   --    --    --   42* ALT   --    --    --   19 INR   --    --    --   1.9* Results for orders placed or performed during the hospital encounter of 09/26/18 EKG, 12 LEAD, INITIAL Result Value Ref Range Ventricular Rate 70 BPM  
 Atrial Rate 72 BPM  
 QRS Duration 186 ms  
 Q-T Interval 480 ms QTC Calculation (Bezet) 518 ms Calculated R Axis -69 degrees Calculated T Axis 107 degrees Diagnosis Electronic ventricular pacemaker When compared with ECG of 12-JUL-2018 08:15, 
Vent. rate has decreased BY   2 BPM 
Confirmed by Nichol Decker ([id number]) on 9/27/2018 3:57:39 PM 
  
 
 
All Cardiac Markers in the last 24 hours:   
No results found for: CPK, CK, CKMMB, CKMB, RCK3, CKMBT, CKNDX, CKND1, DARNELL, TROPT, TROIQ, MARIA ESTHER, TROPT, TNIPOC, BNP, BNPP Last Lipid:   
Lab Results Component Value Date/Time  Cholesterol, total 110 07/12/2018 05:40 AM  
 HDL Cholesterol 33 (L) 07/12/2018 05:40 AM  
 LDL, calculated 55.6 07/12/2018 05:40 AM  
 Triglyceride 107 07/12/2018 05:40 AM  
 CHOL/HDL Ratio 3.3 07/12/2018 05:40 AM  
 
 
 Signed By: Selwyn Lloyd MD   
 September 29, 2018

## 2018-09-29 NOTE — PROGRESS NOTES
Problem: Falls - Risk of 
Goal: *Absence of Falls Document [place] Kappa Fall Risk and appropriate interventions in the flowsheet. Outcome: Progressing Towards Goal 
Fall Risk Interventions: 
Mobility Interventions: Patient to call before getting OOB Medication Interventions: Patient to call before getting OOB History of Falls Interventions: Consult care management for discharge planning

## 2018-09-29 NOTE — PROGRESS NOTES
Received OT eval and treat orders. Screened patient to identify level of assistance needed for ADLs and functional mobility/transfers. Patient presents to be at baseline and is modified independent in performing ADLs and functional mobility/transfers. Pt is not appropriate for skilled OT interventions at this time. Current OT orders weill be discontinued.  
 
Thank you for this referral. 
 
Ralf Davis OTR/L

## 2018-09-29 NOTE — PROGRESS NOTES
2000-no signs of distress. ordered meds given throughout shift. Unremarkable night. Bedside shift change report given to RN GONZALO (oncoming nurse) by Edy Fowler (offgoing nurse). Report included the following information SBAR.

## 2018-09-29 NOTE — PROGRESS NOTES
Bedside and Verbal shift change report given to GONZALO (oncoming nurse) by Greg Quinones (offgoing nurse). Report included the following information SBAR, Kardex and MAR. Pt is currently sleeping. He is on 2L/min here and at home. He is scheduled to repair of his pacemaker Monday therefore his Eliquis will be held today and tomorrow for that procedure. Pt is on remote telemetry with and underlying rhythm of A-fib. His pacer has failure to capture moments. Pt uses a urinal at the bedside and is up with assistance x1. Pt has no needs at this time.

## 2018-09-30 NOTE — PROGRESS NOTES
2000-no signs of distress. Ordered meds given throughout shift. Unremarkable night. Bedside shift change report given to RN GONZALO (oncoming nurse) by Maddy Brothers (offgoing nurse). Report included the following information SBAR.

## 2018-09-30 NOTE — PROGRESS NOTES
Bedside and Verbal shift change report given to GONZALO (oncoming nurse) by Tiff Thmoas (offgoing nurse). Report included the following information SBAR, Kardex and MAR. Pt is currently sleeping on 2L/min NC with no S/S of SOB. We are holding his Eliquis for a procedure Monday to fix his pacer which is having failure to fire. Pt has no needs at this time.

## 2018-09-30 NOTE — PROGRESS NOTES
1900 -- Bedside, Verbal and Written shift change report given to [address] (oncoming nurse) by [employer] nurse). Report included the following information SBAR, Kardex, Intake/Output, MAR and Recent Results. Pt states to RN and CNA YESIKA he feels very anxious,unable to read book and feels shaky,PRN anxiety medication administered.  
   
2114 -- PM medications administered, pt tolerated with ease, will continue to monitor. 
  
0030 -- Shift reassessment, pt condition unchanged, will continue to monitor. 
   
0500 --  Shift reassessment, pt condition unchanged, will continue to monitor.   
   
 0700 -- Bedside, Verbal and Written shift change report given to [facility] nurse) by JAIMEE (offgoing nurse). Report included the following information SBAR, Kardex, Intake/Output, MAR and Recent Results. Skin assessment completed.

## 2018-09-30 NOTE — PROGRESS NOTES
[facility] - Date of  Admission: 9/26/2018  6:44 PM  
Primary Care Physician:  Jeanine Tomlinson MD  
 
 
Subjective: No CP Feel back to normal 
No presyncope or syncope No Chest pain Assessment: - Pre-Syncopal episode prior to admission  
- Echo 07/12/18: E 50%, moderate LV hypertrophy, moderate aortic stenosis, mild to moderate aortic regurgitation, severe TR,  
- Hx CAD, s/p CABG in 2008 (LIMA-LAD, SVG-RCA) 
- Nuclear stress 09/2017: There was no convincing evidence of significant reversible defect or even fixed defect noted to suggest ongoing major ischemia or prior infarct. - Cardiac cath 09/2015: CAD in native coronaries LAD and RCA as mentioned above. This appears unchanged from prior angiogram. His left internal mammary artery graft is open. His right coronary artery graft appears to be a small caliber vessel, as mentioned above. However, compared to the angiogram in 2011, there is no significant angiographic change noted. - Hx Atrial fibrillation, on Eliquis and Toprol - Hx HFpEF 
- Hx sick sinus syndrome, s/p pacemaker placement ([address]) - Hx HTN, on Norvasc, Enalapril, Toprol as outpatient - Hx Dyslipidemia 
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  mg daily meanwhile Continue rest of medications same NPO after midnight for lead change tomorrow DW. Primary team about the plan. A comprehensive review of systems was negative except for that written in the HPI. Past Medical History:  
 
Past Medical History:  
Diagnosis Date  Atrial fibrillation CHADS score 3  (+CHF, +HTN, +AGE, -DM, -CVA)  CABG   
 2008   LIMA - LAD,   SVG - RCA  Cardiomyopathy EF 30-35% (ECHO 6/14)  Coronary artery disease  Dyslipidemia  Hypertension  Hypothyroid  Pacemaker  Peripheral vascular disease  Renal artery stenosis   
 bilateral stents  Sick sinus syndrome Social History:  
 
Social History Social History  Marital status:  Spouse name: N/A  
 Number of children: N/A  
 Years of education: N/A Social History Main Topics  Smoking status: Former Smoker  Smokeless tobacco: Never Used  Alcohol use No  
 Drug use: No  
 Sexual activity: Not Asked Other Topics Concern  None Social History Narrative Family History:  
History reviewed. No pertinent family history. Medications: Allergies Allergen Reactions  Beta-Blockers (Beta-Adrenergic Blocking Agts) Drowsiness  Penicillins Swelling  Statins-Hmg-Coa Reductase Inhibitors Drowsiness Current Facility-Administered Medications Medication Dose Route Frequency  aspirin (ASPIRIN) tablet 325 mg  325 mg Oral DAILY  apixaban (Eliquis)- hold after 9.28.18 for procedure on 10. 1.18  1 Each Other DAILY  amLODIPine (NORVASC) tablet 5 mg  5 mg Oral DAILY  LORazepam (ATIVAN) tablet 1 mg  1 mg Oral Q6H PRN  
 metoprolol succinate (TOPROL-XL) XL tablet 50 mg  50 mg Oral DAILY  pantoprazole (PROTONIX) tablet 40 mg  40 mg Oral ACB  tiotropium (SPIRIVA) inhalation capsule 18 mcg  1 Cap Inhalation QAM RT  
 senna-docusate (PERICOLACE) 8.6-50 mg per tablet 1 Tab  1 Tab Oral BID PRN  
 melatonin tablet 3 mg  3 mg Oral QHS PRN  
 acetaminophen (TYLENOL) tablet 650 mg  650 mg Oral Q4H PRN  
 ondansetron (ZOFRAN) injection 4 mg  4 mg IntraVENous Q4H PRN Physical Exam:  
 
Visit Vitals  /78  Pulse 71  Temp 97.5 °F (36.4 °C)  Resp 16  
 Ht 5' 9\" (1.753 m)  Wt 138 lb 12.8 oz (63 kg)  SpO2 97%  BMI 20.5 kg/m2 BP Readings from Last 3 Encounters:  
09/30/18 166/78  
09/05/18 154/77  
07/13/18 146/79 Pulse Readings from Last 3 Encounters:  
09/30/18 71  
09/05/18 70  
07/13/18 80 Wt Readings from Last 3 Encounters:  
09/29/18 138 lb 12.8 oz (63 kg) 09/05/18 142 lb (64.4 kg) 07/12/18 138 lb (62.6 kg) General:  alert, cooperative, no distress, appears stated age Neck:  nontender, no JVD Lungs:  clear to auscultation bilaterally Heart:  regular rate and rhythm, S1, S2 normal, no murmur, click, rub or gallop Abdomen:  abdomen is soft Extremities:  extremities normal, atraumatic, no cyanosis or edema Data Review:  
 
Recent Labs  
   09/29/18 
 0405  09/28/18 
 0405 WBC  4.1*  4.4* HGB  10.0*  10.7* HCT  32.9*  35.7* PLT  204  204 Recent Labs  
   09/29/18 
 0405  09/28/18 
 0405 NA  138  139  
K  4.3  4.4  
CL  103  101 CO2  25  26 GLU  77  120* BUN  32*  31* CREA  1.32*  1.45* CA  8.4*  8.3* Results for orders placed or performed during the hospital encounter of 09/26/18 EKG, 12 LEAD, INITIAL Result Value Ref Range Ventricular Rate 70 BPM  
 Atrial Rate 72 BPM  
 QRS Duration 186 ms  
 Q-T Interval 480 ms QTC Calculation (Bezet) 518 ms Calculated R Axis -69 degrees Calculated T Axis 107 degrees Diagnosis Electronic ventricular pacemaker When compared with ECG of 12-JUL-2018 08:15, 
Vent. rate has decreased BY   2 BPM 
Confirmed by Kristine Pruitt ([id number]) on 9/27/2018 3:57:39 PM 
  
 
 
All Cardiac Markers in the last 24 hours:   
No results found for: CPK, CK, CKMMB, CKMB, RCK3, CKMBT, CKNDX, CKND1, DARNELL, TROPT, TROIQ, MARIA ESTHER, TROPT, TNIPOC, BNP, BNPP Last Lipid:   
Lab Results Component Value Date/Time Cholesterol, total 110 07/12/2018 05:40 AM  
 HDL Cholesterol 33 (L) 07/12/2018 05:40 AM  
 LDL, calculated 55.6 07/12/2018 05:40 AM  
 Triglyceride 107 07/12/2018 05:40 AM  
 CHOL/HDL Ratio 3.3 07/12/2018 05:40 AM  
 
 
Signed By: Jordin Maciel MD   
 September 30, 2018

## 2018-09-30 NOTE — PROGRESS NOTES
Problem: Falls - Risk of 
Goal: *Absence of Falls Document Rohit Olivo Fall Risk and appropriate interventions in the flowsheet. Outcome: Progressing Towards Goal 
Fall Risk Interventions: 
Mobility Interventions: Patient to call before getting OOB Medication Interventions: Patient to call before getting OOB History of Falls Interventions: Door open when patient unattended

## 2018-09-30 NOTE — PROGRESS NOTES
Progress Note Patient: Miryam Lyon               Sex: male          DOA: 9/26/2018 YOB: 1930      Age:  80 y.o.        LOS:  LOS: 2 days Subjective / Interval Hx Amita Garland is a 80 y.o. male  With hx SSS on pacemaker, chf, Afib on eliquis , cabg, hypertension , home o2 2.5L NC, HLD, Hypothyroid who presents with syncope. Per report patient passed out for a brief period. He has no recall. His wife called EMS. He also c/o recent lightheadedness more with motion  and chronic fatigue . No chest pain , cough, fever. CTA chest in ED was negative for PE, small bilateral pleural effusion , mild edema , no focal consolidation. Duplex bilateral  carotid US shows mild stenosis bilateral  ICA (< 50%). CXR shows cardiomegaly with bilateral pleural effusion. 9/28 : Patient pacemaker interrogated yesterday. Recommend replacement of one lead on Monday. Orthostatic vitals wnl. Patient continues to c/o head filling fuzzy. No headache , no change in vision. No focal change 9/29: CT head yesterday was negative for acute abnormality. Still feels fuzzy occasionally but better than yesterday. No fever, no headache , no visual change . Objective:  
  
Visit Vitals  /78  Pulse 71  Temp 97.5 °F (36.4 °C)  Resp 16  
 Ht 5' 9\" (1.753 m)  Wt 63 kg (138 lb 12.8 oz)  SpO2 97%  BMI 20.5 kg/m2 Physical Exam  
Constitutional: He is oriented to person, place, and time. He appears well-developed and well-nourished. No distress. HENT:  
Head: Normocephalic and atraumatic. Eyes: Conjunctivae are normal. Pupils are equal, round, and reactive to light. No scleral icterus. Neck: Neck supple. Cardiovascular: Normal rate, regular rhythm and normal heart sounds. No murmur heard. Pulmonary/Chest: Effort normal and breath sounds normal. No respiratory distress. He has no wheezes. He has no rales. Abdominal: Soft. Bowel sounds are normal. He exhibits no distension. There is no tenderness. There is no rebound. Musculoskeletal: He exhibits edema (trace ble ). Neurological: He is alert and oriented to person, place, and time. No cranial nerve deficit. Skin: Skin is warm. He is not diaphoretic. Psychiatric: He has a normal mood and affect. Intake and Output: 
Current Shift:    
Last three shifts:  09/28 1901 - 09/30 0700 In: -  
Out: 700 [Urine:700] Recent Results (from the past 48 hour(s)) CBC WITH AUTOMATED DIFF Collection Time: 09/29/18  4:05 AM  
Result Value Ref Range WBC 4.1 (L) 4.6 - 13.2 K/uL  
 RBC 3.96 (L) 4.70 - 5.50 M/uL  
 HGB 10.0 (L) 13.0 - 16.0 g/dL HCT 32.9 (L) 36.0 - 48.0 % MCV 83.1 74.0 - 97.0 FL  
 MCH 25.3 24.0 - 34.0 PG  
 MCHC 30.4 (L) 31.0 - 37.0 g/dL  
 RDW 19.5 (H) 11.6 - 14.5 % PLATELET 076 641 - 833 K/uL MPV 9.5 9.2 - 11.8 FL  
 NEUTROPHILS 73 40 - 73 % LYMPHOCYTES 10 (L) 21 - 52 % MONOCYTES 13 (H) 3 - 10 % EOSINOPHILS 3 0 - 5 % BASOPHILS 1 0 - 2 %  
 ABS. NEUTROPHILS 3.0 1.8 - 8.0 K/UL  
 ABS. LYMPHOCYTES 0.4 (L) 0.9 - 3.6 K/UL  
 ABS. MONOCYTES 0.5 0.05 - 1.2 K/UL  
 ABS. EOSINOPHILS 0.1 0.0 - 0.4 K/UL  
 ABS. BASOPHILS 0.0 0.0 - 0.1 K/UL  
 DF AUTOMATED METABOLIC PANEL, BASIC Collection Time: 09/29/18  4:05 AM  
Result Value Ref Range Sodium 138 136 - 145 mmol/L Potassium 4.3 3.5 - 5.5 mmol/L Chloride 103 100 - 108 mmol/L  
 CO2 25 21 - 32 mmol/L Anion gap 10 3.0 - 18 mmol/L Glucose 77 74 - 99 mg/dL BUN 32 (H) 7.0 - 18 MG/DL Creatinine 1.32 (H) 0.6 - 1.3 MG/DL  
 BUN/Creatinine ratio 24 (H) 12 - 20 GFR est AA >60 >60 ml/min/1.73m2 GFR est non-AA 51 (L) >60 ml/min/1.73m2 Calcium 8.4 (L) 8.5 - 10.1 MG/DL Lab/Data Reviewed: All lab results for the last 24 hours reviewed. XRays were reviewed in past 24 hours Medications Reviewed Assessment/Plan Principal Problem: Syncope and collapse (1/19/2018) Active Problems: 
  Dyslipidemia () Coronary artery disease of native artery of native heart with stable angina pectoris (HCC) () Atrial fibrillation () Overview: CHADS score 3  (+CHF, +HTN, +AGE, -DM, -CVA) Sick sinus syndrome () Overview: Developed post op CABG  
 
  S/P CABG x 2 (8/25/2015) Overview: 12/2008, LIMA to LAD, SVG to RCA Essential hypertension (2/27/2017) Aortic stenosis, moderate (1/28/2018) Syncope (7/11/2018) Cardiac pacemaker in situ (9/16/2018) Diastolic CHF, chronic ([address] (9/26/2018) Care Plan Syncope - possible cardiogenic vs vagal  
- will recommend Pacemaker interrogation again however he reports it was done at cardiology appt 2 weeks ago and was ok  
- ECHO 7/18 reviewed - Duplex bilateral Carotid US < 50 % bilateral ICA stenosis  
- EKG unremarkable - Cardiology following and plan to replace his pacemaker lead on Monday will hold Eliquis for 2 days starting saturday Hx afib  
- stable - On Eliquis will hold per cardiology - Lopressor HTN  
- controlled - Lopressor - Norvasc  
- Hold Enalapril until renal fxn improves - Lasix CHF Ex 
- No acute decompensation - EF 50% - ProBNP 4942 
- lasix 40 mg IV once then resume po - Lopressor - Monitor input and out put  
- Cardiology following SSS 
- Pacemaker - pacemaker  Interrogated and find that 1 of the leads will need to be replaced. Cardiology has scheduled the procedure for  Monday Hypothyroid  
- subclinical  
- TSH 4.4 ,Free T4 1.0 
 
 
DVT prophylaxis - Eliquis on hold for procedure  on Monday Full code Disposition: TBD Leigh Boyle MD 
September 30, 2018

## 2018-10-01 NOTE — PROGRESS NOTES
Pt had a run of 4 seconds V-tach followed by a paced HR of 70bpm again. Pt reported extreme anxiety with tremors. He was given Ativan prn and coached on relaxation techniques. 1930: Pt is resting and reports feeling \"better\".  His HR is paced at 70bpm.

## 2018-10-01 NOTE — CONSULTS
Cardiac Electrophysiology Consult Note Consultation request by Dr. Genaro Sethi for pacemaker lead malfunction Date of  Admission: 9/26/2018  6:44 PM  
Primary Care Physician:  Alona Rosado MD 
 
 Assessment:  
 
-Admitted with dizziness, syncope -Sensation of \"funny in head\" continuous, not clearly related to pacemaker/rhythm 
[employer] RV lead malfunction, intermittent non-capture and device dependent. 
-[employer] pacemaker place 2008, dual chamber 
-Chronic atrial fibrillation on eliquis and Toprol 
-Moderate aortic stenosis by echo 7/12/18 with EF 50% 
-Severe TR by echo 7/2018 
-h/o CABG x 2, last cath 9/2015 with patent LIMA to LAD 
-Chronic diastolic heart failure 
-Thyroid disorder 
-h/o HTN 
-Dyslipidemia w/statin intolerance 
-PAD:  Severe celiac artery stenosis by U/S 11/2017, mild bilateral ICA < 50% by U/S 9/2018 Primary cardiologist Dr. Rudene Babinski Plan: I discussed with patient, given device dependence, will plan new RV lead and generator placement today. All risks, benefits, alternatives discussed. Plan moderate sedation if anesthesia not available. Risks included but not limited to pain, infection, bleeding, deep venous thrombosis with chronic swelling of arm, anesthesia reactions, pneumothorax, hemothorax, emergent open heart surgery, and death. All questions answered. If not MRI compatible device, I discussed inability to have future MRI scans but CT scans are acceptable. History of Present Illness: This is a 80 y.o. male admitted for Syncope and collapse;Syncope. Patient complains of:   
Admitted 9/28 with generalized weakness and \"floating sensation\". He had syncope earlier in the day, no chest pain. Found to have intermittent noncapture by telemetry. Asked to see for RV lead evaluation, increased threshold. Suspected intermittent lead malfunction and device dependent. I discussed with Dr. Mary Longo on Friday and since patient on Eliquis, held and planned revision/new lead today. Cardiac risk factors: Known CAD. Review of Symptoms:  Except as stated above include: 
Constitutional:  Syncope, general weakness Ears, nose, throat:  Negative Respiratory:  negative Cardiovascular:  negative Gastrointestinal: negative Genitourinary:  negative Musculoskeletal:  Negative Neurological:  \"floating sensation\" in head Dermatological:  Negative Hematological: Negative Endocrinological: Negative Allergy: Negative Psychological:  Negative Past Medical History:  
 
Past Medical History:  
Diagnosis Date  Atrial fibrillation CHADS score 3  (+CHF, +HTN, +AGE, -DM, -CVA)  CABG   
 2008   LIMA - LAD,   SVG - RCA  Cardiomyopathy EF 30-35% (ECHO 6/14)  Coronary artery disease  Dyslipidemia  Hypertension  Hypothyroid  Pacemaker  Peripheral vascular disease  Renal artery stenosis   
 bilateral stents  Sick sinus syndrome Social History:  
 
Social History Social History  Marital status:  Spouse name: N/A  
 Number of children: N/A  
 Years of education: N/A Social History Main Topics  Smoking status: Former Smoker  Smokeless tobacco: Never Used  Alcohol use No  
 Drug use: No  
 Sexual activity: Not Asked Other Topics Concern  None Social History Narrative Family History:  
History reviewed. No pertinent family history. Medications: Allergies Allergen Reactions  Beta-Blockers (Beta-Adrenergic Blocking Agts) Drowsiness  Penicillins Swelling  Statins-Hmg-Coa Reductase Inhibitors Drowsiness Current Facility-Administered Medications Medication Dose Route Frequency  aspirin (ASPIRIN) tablet 325 mg  325 mg Oral DAILY  apixaban (Eliquis)- hold after 9.28.18 for procedure on 10. 1.18  1 Each Other DAILY  amLODIPine (NORVASC) tablet 5 mg  5 mg Oral DAILY  LORazepam (ATIVAN) tablet 1 mg  1 mg Oral Q6H PRN  
 metoprolol succinate (TOPROL-XL) XL tablet 50 mg  50 mg Oral DAILY  pantoprazole (PROTONIX) tablet 40 mg  40 mg Oral ACB  tiotropium (SPIRIVA) inhalation capsule 18 mcg  1 Cap Inhalation QAM RT  
 senna-docusate (PERICOLACE) 8.6-50 mg per tablet 1 Tab  1 Tab Oral BID PRN  
 melatonin tablet 3 mg  3 mg Oral QHS PRN  
 acetaminophen (TYLENOL) tablet 650 mg  650 mg Oral Q4H PRN  
 ondansetron (ZOFRAN) injection 4 mg  4 mg IntraVENous Q4H PRN Physical Exam:  
 
Visit Vitals  /74  Pulse 69  Temp 97.8 °F (36.6 °C)  Resp 16  
 Ht 5' 9\" (1.753 m)  Wt 62.6 kg (138 lb)  SpO2 97%  BMI 20.38 kg/m2 BP Readings from Last 3 Encounters:  
10/01/18 142/74  
09/05/18 154/77  
07/13/18 146/79 Pulse Readings from Last 3 Encounters:  
10/01/18 69  
09/05/18 70  
07/13/18 80 Wt Readings from Last 3 Encounters:  
10/01/18 62.6 kg (138 lb) 09/05/18 64.4 kg (142 lb)  
07/12/18 62.6 kg (138 lb) General:  alert, cooperative, no distress, appears stated age Neck:  nontender Lungs:  clear to auscultation bilaterally Heart:  regular rate and rhythm, SERENE, pacer pocket intact Abdomen:  abdomen is soft without significant tenderness, masses, organomegaly or guarding Extremities:  extremities normal, atraumatic, no cyanosis or edema Skin: Warm and dry. no hyperpigmentation, vitiligo, or suspicious lesions Neuro: alert, oriented x3, affect appropriate, no focal neurological deficits, moves all extremities well, no involuntary movements, reflexes at knee and ankle intact Psych: non focal 
 
 Data Review:  
 
Recent Labs  
   09/29/18 
 0405 WBC  4.1* HGB  10.0* HCT  32.9*  
PLT  204 Recent Labs  
   09/29/18 
 0405 NA  138  
K  4.3 CL  103 CO2  25 GLU  77 BUN  32* CREA  1.32* CA  8.4*  
 
 
 Results for orders placed or performed during the hospital encounter of 09/26/18 EKG, 12 LEAD, INITIAL Result Value Ref Range Ventricular Rate 70 BPM  
 Atrial Rate 72 BPM  
 QRS Duration 186 ms  
 Q-T Interval 480 ms QTC Calculation (Bezet) 518 ms Calculated R Axis -69 degrees Calculated T Axis 107 degrees Diagnosis Electronic ventricular pacemaker When compared with ECG of 12-JUL-2018 08:15, 
Vent. rate has decreased BY   2 BPM 
Confirmed by Josh Patel ([id number]) on 9/27/2018 3:57:39 PM 
  
 
 
All Cardiac Markers in the last 24 hours:  No results found for: CPK, CK, CKMMB, CKMB, RCK3, CKMBT, CKNDX, CKND1, DARNELL, TROPT, TROIQ, MARIA ESTHER, TROPT, TNIPOC, BNP, BNPP Last Lipid:   
Lab Results Component Value Date/Time Cholesterol, total 110 07/12/2018 05:40 AM  
 HDL Cholesterol 33 (L) 07/12/2018 05:40 AM  
 LDL, calculated 55.6 07/12/2018 05:40 AM  
 Triglyceride 107 07/12/2018 05:40 AM  
 CHOL/HDL Ratio 3.3 07/12/2018 05:40 AM  
 
 
Signed By: Darien Jj MD   
 October 1, 2018

## 2018-10-01 NOTE — CDMP QUERY
Please clarify if this patient is/was treated for Acute Exac of chf per PN 9/28, if you agree, please document in your progress notes the type as  
e  
=>Acute diastolic chf, improved  with IV  Lasix =>Acute Systolic chf , improved with IV Lasix =>Unable to Determine (no explanation of clinical findings) The medical record reflects the following: 
 
Risk: hx of chronic  diastolic hf 
 
Clinical Indicators: bnp 4942, ef 50% , rales Treatment:  Lasix 20 mg IV, Lasix  40 mg  IV Please clarify and document your clinical opinion in the progress notes and discharge summary including the definitive and/or presumptive diagnosis, (suspected or probable), related to the above clinical findings. Please include clinical findings supporting your diagnosis. If you DECLINE this query or would like to communicate with [facility], please utilize the \"TxtFeedback message box\" at the TOP of the Progress Note on the right. Thank you, 
Deniece Hodgkins RN/CCDS 
[phone],

## 2018-10-01 NOTE — ROUTINE PROCESS
0730 Bedside, Verbal and Written shift change report given to [facility] (oncoming nurse) by Radhika Lazo RN (offgoing nurse). Report included the following information SBAR and Kardex. Patient currently resting in bed, alert and oriented to all spheres, denies pain or shortness of breath, call bell in reach, 5 Ps and hourly rounding completed, bed locked in low position 0940 unsure when pts procedure for lead revision is, order from Dr Joaquim Roberts states make sure PIV is working for lead revision on 10/2; note from Dr Darian Navarro from 9/30 states lead revision is tomorrow- will send page to Dr Joaquim Roebrts to call back- was told Dr Kaylynn Mena was on call- page was sent to call back [phone].  
 
1000 Dr Kaylynn Mena on the unit, stated patient is going for surgery at [address] Bedside, Verbal and Written shift change report given to [address] (oncoming nurse) by Eloy RN (offgoing nurse). Report included the following information SBAR and Kardex. Patient currently resting in bed, alert and oriented to all spheres, denies pain or shortness of breath, call bell in reach, 5 Ps and hourly rounding completed, bed locked in low position

## 2018-10-01 NOTE — PROGRESS NOTES
Hospitalist Progress Note Delano Verduzco MD 
Internal medicine/ Hospitalist 
 
Daily Progress Note: 10/1/2018 8:00 AM 
   
Interval history / Subjective:  
Aroldo Matthew is a 80 y.o. male  With hx SSS on pacemaker, chf, Afib on eliquis , cabg, hypertension , home o2 2.5L NC, HLD, Hypothyroid who presents with syncope. Per report patient passed out for a brief period. He has no recall. His wife called EMS. He also c/o recent lightheadedness more with motion  and chronic fatigue . No chest pain , cough, fever. CTA chest in ED was negative for PE, small bilateral pleural effusion , mild edema , no focal consolidation. Duplex bilateral  carotid US shows mild stenosis bilateral  ICA (< 50%). CXR shows cardiomegaly with bilateral pleural effusion. 9/28 : Patient pacemaker interrogated yesterday. Recommend replacement of one lead on Monday. Orthostatic vitals wnl. Patient continues to c/o head filling fuzzy. No headache , no change in vision. No focal change 9/29: CT head yesterday was negative for acute abnormality. Still feels fuzzy occasionally but better than yesterday. No fever, no headache , no visual change . Current Facility-Administered Medications Medication Dose Route Frequency  aspirin (ASPIRIN) tablet 325 mg  325 mg Oral DAILY  apixaban (Eliquis)- hold after 9.28.18 for procedure on 10. 1.18  1 Each Other DAILY  amLODIPine (NORVASC) tablet 5 mg  5 mg Oral DAILY  LORazepam (ATIVAN) tablet 1 mg  1 mg Oral Q6H PRN  
 metoprolol succinate (TOPROL-XL) XL tablet 50 mg  50 mg Oral DAILY  pantoprazole (PROTONIX) tablet 40 mg  40 mg Oral ACB  tiotropium (SPIRIVA) inhalation capsule 18 mcg  1 Cap Inhalation QAM RT  
 senna-docusate (PERICOLACE) 8.6-50 mg per tablet 1 Tab  1 Tab Oral BID PRN  
 melatonin tablet 3 mg  3 mg Oral QHS PRN  
 acetaminophen (TYLENOL) tablet 650 mg  650 mg Oral Q4H PRN  
 ondansetron (ZOFRAN) injection 4 mg  4 mg IntraVENous Q4H PRN  
  
 
 Review of Systems Feeling fine - s/p pacemaker lead replacement Objective:  
 
Visit Vitals  /74  Pulse 69  Temp 97.8 °F (36.6 °C)  Resp 16  
 Ht 5' 9\" (1.753 m)  Wt 62.6 kg (138 lb)  SpO2 97%  BMI 20.38 kg/m2 O2 Flow Rate (L/min): 2 l/min O2 Device: Nasal cannula Temp (24hrs), Av.7 °F (36.5 °C), Min:97.4 °F (36.3 °C), Max:97.8 °F (36.6 °C) 
 
 
  
1901 - 10/01 0700 In: 4048 [P.O.:1040] Out: 700 [Urine:700] Physical Exam  
Constitutional: He is oriented to person, place, and time. He appears well-developed and well-nourished. No distress. HENT:  
Head: Normocephalic and atraumatic. Eyes: Conjunctivae are normal. Pupils are equal, round, and reactive to light. No scleral icterus. Neck: Neck supple. Cardiovascular: Normal rate, regular rhythm and normal heart sounds. No murmur heard. Pulmonary/Chest: Effort normal and breath sounds normal. No respiratory distress. He has no wheezes. He has no rales. Abdominal: Soft. Bowel sounds are normal. He exhibits no distension. There is no tenderness. There is no rebound. Musculoskeletal: He exhibits trace edema Neurological: He is alert and oriented to person, place, and time. No cranial nerve deficit. Skin: Skin is warm. He is not diaphoretic. Psychiatric: He has a normal mood and affect. Data Review No results found for this or any previous visit (from the past 12 hour(s)). Assessment/Plan:  
 
Principal Problem: 
  Syncope and collapse (2018) Active Problems: 
  Dyslipidemia () Coronary artery disease of native artery of native heart with stable angina pectoris (HCC) () Atrial fibrillation () Overview: CHADS score 3  (+CHF, +HTN, +AGE, -DM, -CVA) Sick sinus syndrome () Overview: Developed post op CABG  
 
  S/P CABG x 2 (2015) Overview: 2008, LIMA to LAD, SVG to RCA Essential hypertension (2017) Aortic stenosis, moderate (1/28/2018) Syncope (7/11/2018) Cardiac pacemaker in situ (9/16/2018) Diastolic CHF, chronic ([facility] [address] (9/26/2018) Care Plan Syncope - possible cardiogenic vs vagal  
- will recommend Pacemaker interrogation again however he reports it was done at cardiology appt 2 weeks ago and was ok  
- ECHO 7/18 reviewed - Duplex bilateral Carotid US < 50 % bilateral ICA stenosis  
- EKG unremarkable - Cardiology following. Pacemaker lead replaced today. Clinically stable 
  
Hx afib  
- stable - On Eliquis will hold per cardiology - Lopressor  
  
HTN  
- controlled - Lopressor - Norvasc  
- Hold Enalapril until renal fxn improves - Lasix  
  
CHF Ex 
- No acute decompensation - EF 50% - ProBNP 4942 
- lasix 40 mg IV once then resume po - Lopressor - Monitor input and out put  
- Cardiology following  
  
SSS 
- Pacemaker - pacemaker  Interrogated and find that 1 of the leads will need to be replaced.  Cardiology has scheduled the procedure for  Monday  
  
Hypothyroid  
- subclinical  
- TSH 4.4 ,Free T4 1.0 
  
  
DVT prophylaxis - Eliquis on hold for procedure  on Monday  
  
Full code  
  
Disposition: TBD

## 2018-10-01 NOTE — PROGRESS NOTES
1900  -- Bedside, Verbal and Written shift change report given to [facility] (oncoming nurse) by Earnest Yao nurse). Report included the following information SBAR, Kardex, Intake/Output, MAR and Recent Results. 0030 -- PRN pain medications administered, pt tolerated with ease, will continue to monitor 
   
0100 -- Shift reassessment, pt condition unchanged, will continue to monitor. 
   
0400 --  Shift reassessment, pt condition unchanged, will continue to monitor.   
 
0550 -- PRN pain medications administered, pt tolerated with ease, will continue to monitor Pt has removed sling, pt educated to keep sling in place. Pt states understanding, sling reapplied by CCL SANJUANITA.   
0700  -- Bedside, Verbal and Written shift change report given to [address]) by JAIMEE (offgoing nurse). Report included the following information SBAR, Kardex, Intake/Output, MAR and Recent Results. Skin assessment completed.

## 2018-10-01 NOTE — PROGRESS NOTES
conducted a Follow up consultation and Spiritual Assessment for Florentin Cheng, who is a 80 y. o.,male. The  provided the following Interventions: 
Continued the relationship of care and support. Listened empathically. Offered prayer and assurance of continued prayer on patients behalf. Chart reviewed. The following outcomes were achieved: 
Patient expressed gratitude for pastoral care visit. Assessment: 
There are no further spiritual or Anglican issues which require Spiritual Care Services interventions at this time. Plan: 
Chaplains will continue to follow and will provide pastoral care on an as needed/requested basis.  recommends bedside caregivers page  on duty if patient shows signs of acute spiritual or emotional distress. [facility] Staff  Spiritual Care  
[phone]

## 2018-10-01 NOTE — PROGRESS NOTES
Problem: Falls - Risk of 
Goal: *Absence of Falls Document Tyra Bernabe Fall Risk and appropriate interventions in the flowsheet. Outcome: Progressing Towards Goal 
Fall Risk Interventions: 
Mobility Interventions: Patient to call before getting OOB Medication Interventions: Patient to call before getting OOB History of Falls Interventions: Bed/chair exit alarm, Door open when patient unattended, Investigate reason for fall, Room close to nurse's station

## 2018-10-01 NOTE — DISCHARGE INSTRUCTIONS
Patient armband removed and shredded      DISCHARGE SUMMARY from Nurse    PATIENT INSTRUCTIONS:    After general anesthesia or intravenous sedation, for 24 hours or while taking prescription Narcotics:  · Limit your activities  · Do not drive and operate hazardous machinery  · Do not make important personal or business decisions  · Do  not drink alcoholic beverages  · If you have not urinated within 8 hours after discharge, please contact your surgeon on call. Report the following to your surgeon:  · Excessive pain, swelling, redness or odor of or around the surgical area  · Temperature over 100.5  · Nausea and vomiting lasting longer than 4 hours or if unable to take medications  · Any signs of decreased circulation or nerve impairment to extremity: change in color, persistent  numbness, tingling, coldness or increase pain  · Any questions    What to do at Home:  Recommended activity: Activity as tolerated,     If you experience any of the following symptoms :  Dizziness/lightheadiness, feel as if \"passing out\" or shortness of breath, please follow up with your primary care physician. If appears to be persistent please return to the nearest emergency department    *  Please give a list of your current medications to your Primary Care Provider. *  Please update this list whenever your medications are discontinued, doses are      changed, or new medications (including over-the-counter products) are added. *  Please carry medication information at all times in case of emergency situations. These are general instructions for a healthy lifestyle:    No smoking/ No tobacco products/ Avoid exposure to second hand smoke  Surgeon General's Warning:  Quitting smoking now greatly reduces serious risk to your health.     Obesity, smoking, and sedentary lifestyle greatly increases your risk for illness    A healthy diet, regular physical exercise & weight monitoring are important for maintaining a healthy lifestyle    You may be retaining fluid if you have a history of heart failure or if you experience any of the following symptoms:  Weight gain of 3 pounds or more overnight or 5 pounds in a week, increased swelling in our hands or feet or shortness of breath while lying flat in bed. Please call your doctor as soon as you notice any of these symptoms; do not wait until your next office visit. Recognize signs and symptoms of STROKE:    F-face looks uneven    A-arms unable to move or move unevenly    S-speech slurred or non-existent    T-time-call 911 as soon as signs and symptoms begin-DO NOT go       Back to bed or wait to see if you get better-TIME IS BRAIN. Warning Signs of HEART ATTACK     Call 911 if you have these symptoms:   Chest discomfort. Most heart attacks involve discomfort in the center of the chest that lasts more than a few minutes, or that goes away and comes back. It can feel like uncomfortable pressure, squeezing, fullness, or pain.  Discomfort in other areas of the upper body. Symptoms can include pain or discomfort in one or both arms, the back, neck, jaw, or stomach.  Shortness of breath with or without chest discomfort.  Other signs may include breaking out in a cold sweat, nausea, or lightheadedness. Don't wait more than five minutes to call 911 - MINUTES MATTER! Fast action can save your life. Calling 911 is almost always the fastest way to get lifesaving treatment. Emergency Medical Services staff can begin treatment when they arrive -- up to an hour sooner than if someone gets to the hospital by car. The discharge information has been reviewed with the patient. The patient verbalized understanding.   Discharge medications reviewed with the patient and appropriate educational materials and side effects teaching were provided. ___________________________________________________________________________________________________________________________________        Disposition:  Will need follow-up with device/wound check in 7-10 days in my office. Please contact office at [phone] to confirm appointment. Main Office:    [address], [address]    Restrictions: For affected arm:  No lifting greater than 10 lbs or lifting elbow above shoulder for 4 weeks. Keep incision clean and dry for a total of 72 hours after procedure. Remove dressing in 24 hours if not already removed. Please remove the steristrips (small white adhesive strips over wound) after 7 days if they have not already fallen off. No hot tubs or pools for 2 weeks. OK to shower with \"pat\" dry incision after 72 hours. No driving ideally for 2 weeks due to concern for airbag.     Other instructions:  -Hold eliquis for one week,starting 10/2/2018

## 2018-10-02 NOTE — PROGRESS NOTES
Problem: Falls - Risk of 
Goal: *Absence of Falls Document Wander Pastorh Fall Risk and appropriate interventions in the flowsheet. Outcome: Progressing Towards Goal 
Fall Risk Interventions: 
Mobility Interventions: Patient to call before getting OOB Medication Interventions: Teach patient to arise slowly, Patient to call before getting OOB History of Falls Interventions: Door open when patient unattended, Consult care management for discharge planning

## 2018-10-02 NOTE — PROGRESS NOTES
Hospitalist Progress Note Dao Dorantes MD 
Internal medicine/ Hospitalist 
 
Daily Progress Note: 10/2/2018 8:00 AM 
   
Interval history / Subjective:  
Rebekah Reene is a 80 y.o. male  With hx SSS on pacemaker, chf, Afib on eliquis , cabg, hypertension , home o2 2.5L NC, HLD, Hypothyroid who presented with syncope. Per report patient passed out for a brief period. He had no recall. His wife called EMS. He also c/o recent lightheadedness more with motion  and chronic fatigue . No chest pain , cough, fever. CTA chest in ED was negative for PE, small bilateral pleural effusion , mild edema , no focal consolidation. Duplex bilateral  carotid US shows mild stenosis bilateral  ICA (< 50%). CXR showed cardiomegaly with bilateral pleural effusion. Patient's pacemaker interrogated ans suspected lead malfunction. It was recommended replacement of one lead and this was done on 10/1/18. On 10/2/18,patient reporting feeling dizzy,some headache,short of breath in the morning. Cardiology has assessed and determined that the pacemaker is functioning properly. The transient dizziness patient had reported likely happened when the PPM was interrogated today morning. The pacemaker's site was evaluated for some redness and it was decided to hold eliquis for a week. As for his short,patient received lasix 20 mg iv x1 since he did not take in almost a week. Current Facility-Administered Medications Medication Dose Route Frequency  oxyCODONE-acetaminophen (PERCOCET) 5-325 mg per tablet 1 Tab  1 Tab Oral Q4H PRN  
 aspirin (ASPIRIN) tablet 325 mg  325 mg Oral DAILY  amLODIPine (NORVASC) tablet 5 mg  5 mg Oral DAILY  LORazepam (ATIVAN) tablet 1 mg  1 mg Oral Q6H PRN  
 metoprolol succinate (TOPROL-XL) XL tablet 50 mg  50 mg Oral DAILY  pantoprazole (PROTONIX) tablet 40 mg  40 mg Oral ACB  tiotropium (SPIRIVA) inhalation capsule 18 mcg  1 Cap Inhalation QAM RT  
  senna-docusate (PERICOLACE) 8.6-50 mg per tablet 1 Tab  1 Tab Oral BID PRN  
 melatonin tablet 3 mg  3 mg Oral QHS PRN  
 acetaminophen (TYLENOL) tablet 650 mg  650 mg Oral Q4H PRN  
 ondansetron (ZOFRAN) injection 4 mg  4 mg IntraVENous Q4H PRN Review of Systems Reported dizziness,sob in the morning Objective:  
 
Visit Vitals  /68  Pulse 80  Temp 97.9 °F (36.6 °C)  Resp 18  Ht 5' 9\" (1.753 m)  Wt 63.5 kg (139 lb 15.9 oz)  SpO2 94%  BMI 20.67 kg/m2 O2 Flow Rate (L/min): 2 l/min O2 Device: Nasal cannula Temp (24hrs), Av.8 °F (36.6 °C), Min:97.2 °F (36.2 °C), Max:98.7 °F (37.1 °C) 
 
 
10/02 0701 - 10/02 190 In: 1000 [P.O.:1000] Out: -  
1901 - 10/02 0700 In: 014 [P.L.:682] Out: 700 [Urine:700] Physical Exam  
Constitutional: He is oriented to person, place, and time. He appears well-developed and well-nourished. No distress. HENT:  
Head: Normocephalic and atraumatic. Eyes: Conjunctivae are normal. Pupils are equal, round, and reactive to light. No scleral icterus. Neck: Neck supple. Cardiovascular: Normal rate, regular rhythm and normal heart sounds. No murmur heard. Pulmonary/Chest: Effort normal and breath sounds normal. No respiratory distress. He has no wheezes. He has no rales. Abdominal: Soft. Bowel sounds are normal. He exhibits no distension. There is no tenderness. There is no rebound. Musculoskeletal: He exhibits trace edema Neurological: He is alert and oriented to person, place, and time. No cranial nerve deficit. Skin: Skin is warm. He is not diaphoretic. Psychiatric: He has a normal mood and affect. Data Review No results found for this or any previous visit (from the past 12 hour(s)). Assessment/Plan:  
 
Principal Problem: 
  Syncope and collapse (2018) Active Problems: 
  Dyslipidemia () Coronary artery disease of native artery of native heart with stable angina pectoris (HCC) () Atrial fibrillation () Overview: CHADS score 3  (+CHF, +HTN, +AGE, -DM, -CVA) Sick sinus syndrome () Overview: Developed post op CABG  
 
  S/P CABG x 2 (8/25/2015) Overview: 12/2008, LIMA to LAD, SVG to RCA Essential hypertension (2/27/2017) Aortic stenosis, moderate (1/28/2018) Syncope (7/11/2018) Cardiac pacemaker in situ (9/16/2018) Diastolic CHF, chronic ([address] (9/26/2018) Care Plan Syncope - possible cardiogenic vs vagal  
- ECHO 7/18 reviewed - Duplex bilateral Carotid US < 50 % bilateral ICA stenosis  
- EKG unremarkable - Cardiology following. Pacemaker lead replaced 10/1. Clinically stable - Pt reported dizziness today morning which likely during pacemaker interrogation as per cardiology. 
  
Hx afib  
- stable - Eliquis on hold for one week per cardiology - Lopressor  
  
HTN  
- controlled - Lopressor - Norvasc  
- Hold Enalapril until renal fxn improves - Lasix  
  
CHF Ex 
- No acute decompensation - EF 50% - ProBNP 4942 
- lasix 40 mg IV once then resume po - Lopressor - Monitor input and out put  
- Cardiology following  
  
SSS 
- Pacemaker - pacemaker  Interrogated and find that 1 of the leads will need to be replaced. Lead replaced on 10/1 
  
Hypothyroid  
- subclinical  
- TSH 4.4 ,Free T4 1.0 
  
  
DVT prophylaxis - Eliquis on hold for procedure Full code Disposition: TBD

## 2018-10-02 NOTE — ROUTINE PROCESS
7920 Received report from [employer]. Patient alert and oriented. Right arm sling. 4356 Patient off the floor to Radiology for Chest X ray. 0945 Patient back in the room. 1330 Patient resting comfortably in the bed. 
1935 Bedside and Verbal shift change report given to Shannon Cabrera (oncoming nurse) by Ki José RN (offgoing nurse). Report included the following information SBAR, Kardex, Intake/Output, MAR, Recent Results and Cardiac Rhythm paced.

## 2018-10-02 NOTE — PROGRESS NOTES
1900 -- Bedside, Verbal and Written shift change report given to [address] (oncoming nurse) by Ifeanyi Onofre nurse). Pt expressed to RN's, he feels shaky inside,will administer PRN medication. Report included the following information SBAR, Kardex, Intake/Output, MAR and Recent Results. 1933 -- PRN anxiety medications administered, pt tolerated with ease, will continue to monitor. 
  
0100 -- Shift reassessment, pt condition unchanged, will continue to monitor. 
   
0430 --  Shift reassessment, pt condition unchanged, will continue to monitor.   
   
 0700 -- Bedside, Verbal and Written shift change report given to [address] [place] [address] nurse) by JAIMEE (offgoing nurse). Report included the following information SBAR, Kardex, Intake/Output, MAR and Recent Results. Skin assessment completed.

## 2018-10-02 NOTE — PROGRESS NOTES
Problem: Falls - Risk of 
Goal: *Absence of Falls Document Marlene Heart Fall Risk and appropriate interventions in the flowsheet. Outcome: Progressing Towards Goal 
Fall Risk Interventions: 
Mobility Interventions: Patient to call before getting OOB, Communicate number of staff needed for ambulation/transfer, Utilize walker, cane, or other assistive device Medication Interventions: Bed/chair exit alarm, Evaluate medications/consider consulting pharmacy, Patient to call before getting OOB, Teach patient to arise slowly History of Falls Interventions: Bed/chair exit alarm, Door open when patient unattended, Evaluate medications/consider consulting pharmacy, Investigate reason for fall, Room close to nurse's station

## 2018-10-02 NOTE — PROGRESS NOTES
I saw, evaluated, interviewed and examined the patient personally. I agree with the findings and plan of care as documented below with PA-C note Left sided pacer site raised but no obvious active bleed, not warm or red. Discussed with  about pacer sites. Plan to hold eliquis for a week Today's feeling about Fuzziness in head after PPM interogation is not clear why. Pacer function is fine Will give lasix 20 mg IV once as c/o SOB and has not received lasix in almost a week DW patient Jagdish Louis MD 
 
 
 
Assessment:  
  
- S/p right PPM implantation (single chamber) with RV lead revision 10/01/18. Left innominate vein obstruction.  
-Admitted with dizziness, syncope -Sensation of \"funny in head\" continuous, not clearly related to pacemaker/rhythm 
[employer] RV lead malfunction, intermittent non-capture and device dependent. 
-[employer] pacemaker place 2008, dual chamber 
-Chronic atrial fibrillation on eliquis and Toprol 
-Moderate aortic stenosis by echo 7/12/18 with EF 50% 
-Severe TR by echo 7/2018 
-h/o CABG x 2, last cath 9/2015 with patent LIMA to LAD 
-Chronic diastolic heart failure 
-Thyroid disorder 
-h/o HTN 
-Dyslipidemia w/statin intolerance 
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 
   
Primary cardiologist Dr. Mars Chuck:  
  
- Holding Eliquis until 10/08/18 per Dr. Liyah Parr recommendation, due to left chest wall pacer site being slightly elevated. - IV Lasix given x1 for complaints of shortness of breath, left > right pleural effusions on CXR.  No evidence of PTX x2. 
- Device functioning within normal limits per interrogation today 
- Dizziness/\"foggy\": normal BP, CT head 09/28 without acute abnormalities, etiology unclear at this point as he now has normally functioning PPM, had period of willard on telemetry this AM, but this was transient when pacemaker was being interrogated. 
  
 Subjective:  
  
Dizziness, \"foggy\" sensation in his head, frontal headache and shortness of breath. 
  
Objective:  
   
Patient Vitals for the past 8 hrs: 
  Temp Pulse Resp BP SpO2  
10/02/18 0710 97.6 °F (36.4 °C) 80 18 141/78 96 % 10/02/18 0316 97.4 °F (36.3 °C) 80 18 147/67 93 %  
   
  
Patient Vitals for the past 96 hrs: 
  Weight  
10/02/18 0316 139 lb 15.9 oz (63.5 kg) 10/01/18 0414 138 lb (62.6 kg) 09/29/18 0345 138 lb 12.8 oz (63 kg)   
  
  
  
Intake/Output Summary (Last 24 hours) at 10/02/18 1030 Last data filed at 10/02/18 2598 
  Gross per 24 hour Intake             1094 ml Output              300 ml Net              794 ml  
  
  
Physical Exam: 
General:  alert, cooperative, no distress Neck:  nontender, no JVD Lungs:  clear to auscultation bilaterally Heart:  regular rate and rhythm, S1, S2 normal, no murmur, click, rub or gallop-- left chest wall site is raised, non-erythematous, wound is clean, dry and intact. Right chest wall wound site is flat, without swelling, and wound site is C/D/I. Please see scanned media for images. Abdomen:  abdomen is soft without significant tenderness, masses, organomegaly or guarding Extremities:  extremities normal, atraumatic, no cyanosis or edema 
  
Data Review:  
  
Labs: Results:  
     
Chemistry No results for input(s): GLU, NA, K, CL, CO2, BUN, CREA, CA, MG, PHOS, AGAP, BUCR, TBIL, GPT, AP, TP, ALB, GLOB, AGRAT in the last 72 hours. CBC w/Diff No results for input(s): WBC, RBC, HGB, HCT, PLT, GRANS, LYMPH, EOS, HGBEXT, HCTEXT, PLTEXT in the last 72 hours. Cardiac Enzymes No results found for: CPK, CK, CKMMB, CKMB, RCK3, CKMBT, CKNDX, CKND1, DARNELL, TROPT, TROIQ, MARIA ESTHER, TROPT, TNIPOC, BNP, BNPP Coagulation No results for input(s): PTP, INR, APTT in the last 72 hours. 
  
No lab exists for component: INREXT Lipid Panel Lab Results Component Value Date/Time  
  Cholesterol, total 110 07/12/2018 05:40 AM  
   HDL Cholesterol 33 (L) 07/12/2018 05:40 AM  
  LDL, calculated 55.6 07/12/2018 05:40 AM  
  VLDL, calculated 21.4 07/12/2018 05:40 AM  
  Triglyceride 107 07/12/2018 05:40 AM  
  CHOL/HDL Ratio 3.3 07/12/2018 05:40 AM  
   
BNP No results found for: BNP, BNPP, XBNPT Liver Enzymes No results for input(s): TP, ALB, TBIL, AP, SGOT, GPT in the last 72 hours. 
  
No lab exists for component: DBIL Digoxin    
Thyroid Studies Lab Results Component Value Date/Time  
  T4, Total 7.9 05/06/2010 08:50 AM  
  TSH 4.40 (H) 09/26/2018 07:35 PM

## 2018-10-03 NOTE — PROGRESS NOTES
Chart reviewed. Transition plan remains home with [facility] visit & out-pt follow up when medically stable. Will cont to follow for needs. Rachel Vincent RN,ext [phone].

## 2018-10-03 NOTE — PROGRESS NOTES
[facility] - Progress Note Admit Date: 9/26/2018 Patient seen and examined independently. Feels he is falling asleep inappropriately. Telemetry unrevealing. Patient could have outpatient neurology evaluation if necessary. No further cardiac evaluation planned. Abdon George MD 
Assessment: - S/p right PPM implantation (single chamber) with RV lead revision 10/01/18. Left innominate vein obstruction.  
-Admitted with dizziness, syncope -Sensation of \"funny in head\" continuous, not clearly related to pacemaker/rhythm 
[employer] RV lead malfunction, intermittent non-capture and device dependent. 
-[employer] pacemaker place 2008, dual chamber 
-Chronic atrial fibrillation on eliquis and Toprol 
-Moderate aortic stenosis by echo 7/12/18 with EF 50% 
-Severe TR by echo 7/2018 
-h/o CABG x 2, last cath 9/2015 with patent LIMA to LAD 
-Chronic diastolic heart failure 
-Thyroid disorder 
-h/o HTN 
-Dyslipidemia w/statin intolerance 
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 
[facility] 
Primary cardiologist Dr. Hollis Thornton Plan:  
 
- Patient stable for discharge - Patient to start Eliquis Monday 10/8/18, primary team, please address this in discharge summary.  
- Continue with rest of cardiac regimen - Follow up as outpatient within 2-3 weeks Subjective:  
 
Feeling sleepy. Objective:  
  
Patient Vitals for the past 8 hrs: 
 Temp Pulse Resp BP SpO2  
10/03/18 1105 97.5 °F (36.4 °C) 81 18 163/78 100 % 10/03/18 0705 97.9 °F (36.6 °C) 81 18 133/74 98 % Patient Vitals for the past 96 hrs: 
 Weight 10/03/18 0426 137 lb 12.6 oz (62.5 kg) 10/02/18 0316 139 lb 15.9 oz (63.5 kg) 10/01/18 0414 138 lb (62.6 kg) Intake/Output Summary (Last 24 hours) at 10/03/18 1318 Last data filed at 10/03/18 0192 Gross per 24 hour Intake              480 ml Output             1200 ml Net             -720 ml  
 
 
 Physical Exam: 
General:  alert, cooperative, no distress Neck:  nontender, no JVD Lungs:  clear to auscultation bilaterally Heart:  regular rate and rhythm, S1, S2 normal, no murmur, click, rub or gallop, left chest wall raised, non-tender, non-erythematous. Bilateral pacer wound sites are clean, dry and intact. Abdomen:  abdomen is soft without significant tenderness, masses, organomegaly or guarding Extremities:  extremities normal, atraumatic, no cyanosis or edema Data Review:  
 
Labs: Results:  
   
Chemistry No results for input(s): GLU, NA, K, CL, CO2, BUN, CREA, CA, MG, PHOS, AGAP, BUCR, TBIL, GPT, AP, TP, ALB, GLOB, AGRAT in the last 72 hours. CBC w/Diff No results for input(s): WBC, RBC, HGB, HCT, PLT, GRANS, LYMPH, EOS, HGBEXT, HCTEXT, PLTEXT in the last 72 hours. Cardiac Enzymes No results found for: CPK, CK, CKMMB, CKMB, RCK3, CKMBT, CKNDX, CKND1, DARNELL, TROPT, TROIQ, MARIA ESTHER, TROPT, TNIPOC, BNP, BNPP Coagulation No results for input(s): PTP, INR, APTT in the last 72 hours. No lab exists for component: INREXT Lipid Panel Lab Results Component Value Date/Time Cholesterol, total 110 07/12/2018 05:40 AM  
 HDL Cholesterol 33 (L) 07/12/2018 05:40 AM  
 LDL, calculated 55.6 07/12/2018 05:40 AM  
 VLDL, calculated 21.4 07/12/2018 05:40 AM  
 Triglyceride 107 07/12/2018 05:40 AM  
 CHOL/HDL Ratio 3.3 07/12/2018 05:40 AM  
  
BNP No results found for: BNP, BNPP, XBNPT Liver Enzymes No results for input(s): TP, ALB, TBIL, AP, SGOT, GPT in the last 72 hours. No lab exists for component: DBIL Digoxin Thyroid Studies Lab Results Component Value Date/Time T4, Total 7.9 05/06/2010 08:50 AM  
 TSH 4.40 (H) 09/26/2018 07:35 PM  
    
 
Signed By: Mile Villatoro. Val Cannon PA-C October 3, 2018

## 2018-10-03 NOTE — PROGRESS NOTES
Notified [facility] intake nurse,  Sylwia Titus, the pt is dc'd today. Care Management Interventions PCP Verified by CM: Yes (last seen approx 2 months ago) Palliative Care Criteria Met (RRAT>21 & CHF Dx)?: No 
Mode of Transport at Discharge: Other (see comment) (family) Transition of Care Consult (CM Consult): Home Health [facility]) [facility] - INPATIENT: Yes Discharge Durable Medical Equipment: No 
Physical Therapy Consult: Yes Occupational Therapy Consult: Yes Speech Therapy Consult: No 
Current Support Network: Lives with Spouse Confirm Follow Up Transport: Family Plan discussed with Pt/Family/Caregiver: Yes Freedom of Choice Offered: Yes Discharge Location Discharge Placement: Home with home health

## 2018-10-03 NOTE — DISCHARGE SUMMARY
Discharge Summary Patient: Miguel Fregoso               Sex: male          DOA: 9/26/2018 YOB: 1930      Age:  80 y.o.        LOS:  LOS: 5 days Admit Date: 9/26/2018 Discharge Date: 10/3/2018 Admission Diagnoses: A-fib ([address] [I48.91] Discharge Diagnoses:   
Syncope S/p right PPM implantation (single chamber) with RV lead revision 10/01/18. PAD. Dyslipidemia Problem List as of 10/3/2018  Date Reviewed: 9/26/2018 Codes Class Noted - Resolved Diastolic CHF, chronic (HCC) ICD-10-CM: I50.32 
ICD-9-CM: 428.32, 428.0  9/26/2018 - Present Cardiac pacemaker in situ ICD-10-CM: Z95.0 ICD-9-CM: V45.01  9/16/2018 - Present Syncope ICD-10-CM: R55 
ICD-9-CM: 780.2  7/11/2018 - Present Aortic stenosis, moderate ICD-10-CM: I35.0 ICD-9-CM: 424.1  1/28/2018 - Present Abnormal chest CT ICD-10-CM: R93.89 ICD-9-CM: 793.2  1/27/2018 - Present Overview Addendum 1/27/2018  1:13 PM by Elyssa Burris MD  
  Bilateral ground glass opacities and bibasilar atelectasis/infiltrate. Suspicious for aspiration pneumonia. Pleural effusion, right ICD-10-CM: J90 ICD-9-CM: 511.9  1/26/2018 - Present SOB (shortness of breath) on exertion ICD-10-CM: R06.02 
ICD-9-CM: 786.05  1/25/2018 - Present * (Principal)Syncope and collapse ICD-10-CM: R55 
ICD-9-CM: 780.2  1/19/2018 - Present Celiac artery stenosis (HCC) ICD-10-CM: I77.4 ICD-9-CM: 447.4  1/10/2018 - Present Osteoarthritis of both knees ICD-10-CM: M17.0 ICD-9-CM: 715.96  8/20/2017 - Present Overview Signed 8/20/2017  3:19 PM by Victoria Quan MD  
  Contemplating knee replacement. Statin intolerance ICD-10-CM: Z78.9 ICD-9-CM: 995.27  5/3/2017 - Present Essential hypertension ICD-10-CM: I10 
ICD-9-CM: 401.9  2/27/2017 - Present Anxiety ICD-10-CM: F41.9 ICD-9-CM: 300.00  2/27/2017 - Present Chronic systolic congestive heart failure (HCC) ICD-10-CM: I50.22 ICD-9-CM: 428.22, 428.0  1/25/2017 - Present S/P CABG x 2 ICD-10-CM: Z95.1 ICD-9-CM: V45.81  8/25/2015 - Present Overview Signed 8/25/2015  2:15 PM by Joni Ruiz MD  
  12/2008, LIMA to LAD, SVG to RCA Atherosclerotic NAHED (renal artery stenosis), bilateral (HCC) ICD-10-CM: I70.1 ICD-9-CM: 440.1  8/25/2015 - Present Overview Signed 8/25/2015  2:16 PM by Joni Ruiz MD  
  S/P stenting R and L Dyslipidemia ICD-10-CM: E78.5 ICD-9-CM: 272.4  Unknown - Present Coronary artery disease of native artery of native heart with stable angina pectoris ([facility] [address] ICD-10-CM: I25.118 
ICD-9-CM: 414.01, 413.9  Unknown - Present Cardiomyopathy ICD-10-CM: I42.9 ICD-9-CM: 425.4  Unknown - Present Overview Signed 4/29/2015  7:51 PM by Leonela Barrett EF 30-35% (ECHO 6/14) Atrial fibrillation ICD-10-CM: I48.91 
ICD-9-CM: 427.31  Unknown - Present Overview Signed 4/29/2015  7:51 PM by Leonela Barrett CHADS score 3  (+CHF, +HTN, +AGE, -DM, -CVA) Sick sinus syndrome ICD-10-CM: I49.5 ICD-9-CM: 427.81  Unknown - Present Overview Signed 8/25/2015  2:15 PM by Joni Ruiz MD  
  Developed post op CABG RESOLVED: Sepsis ([facility] [address] ICD-10-CM: A41.9 ICD-9-CM: 038.9, 995.91  1/6/2017 - 2/27/2017 RESOLVED: Community acquired bacterial pneumonia ICD-10-CM: J15.9 ICD-9-CM: 482.9  1/6/2017 - 2/27/2017 RESOLVED: CAP (community acquired pneumonia) ICD-10-CM: J18.9 ICD-9-CM: 193  1/6/2017 - 2/27/2017 Discharge Medications:    
Current Discharge Medication List  
  
START taking these medications Details  
oxyCODONE-acetaminophen (PERCOCET) 5-325 mg per tablet Take 1 Tab by mouth every four (4) hours as needed. Max Daily Amount: 6 Tabs. Qty: 16 Tab, Refills: 0  Associated Diagnoses: Osteoarthritis of both knees, unspecified osteoarthritis type CONTINUE these medications which have NOT CHANGED Details  
omeprazole (PRILOSEC) 40 mg capsule Take 40 mg by mouth daily. OXYGEN-AIR DELIVERY SYSTEMS by Does Not Apply route. amLODIPine (NORVASC) 5 mg tablet Take 1 Tab by mouth daily. Qty: 60 Tab, Refills: 0  
  
furosemide (LASIX) 20 mg tablet One pill once or twice a day 
Qty: 60 Tab, Refills: 0  
  
metoprolol succinate (TOPROL XL) 50 mg XL tablet Take 1 Tab by mouth daily. Qty: 60 Tab, Refills: 0  
  
nitroglycerin (NITROSTAT) 0.4 mg SL tablet 1 Tab by SubLINGual route every five (5) minutes as needed for Chest Pain (up to 3 total 1 every 5 min). Qty: 60 Tab, Refills: 0 LORazepam (ATIVAN) 1 mg tablet Take 1 Tab by mouth every six (6) hours as needed for Anxiety. Max Daily Amount: 4 mg. Qty: 30 Tab, Refills: 0 Associated Diagnoses: Anxiety  
  
enalapril (VASOTEC) 20 mg tablet Take 20 mg by mouth daily. pantoprazole (PROTONIX) 40 mg tablet Take 1 Tab by mouth Daily (before breakfast). Qty: 60 Tab, Refills: 0  
  
tiotropium (SPIRIVA) 18 mcg inhalation capsule Take 1 Cap by inhalation every twenty-four (24) hours. Qty: 30 Cap, Refills: 0  
  
guaiFENesin ER (MUCINEX) 600 mg ER tablet Take 1 Tab by mouth every twelve (12) hours. Qty: 60 Tab, Refills: 0 STOP taking these medications  
  
 traMADol (ULTRAM) 50 mg tablet Comments:  
Reason for Stopping:   
   
 apixaban (ELIQUIS) 5 mg tablet Comments:  
Reason for Stopping:   
   
 HYDROcodone-acetaminophen (NORCO) 5-325 mg per tablet Comments:  
Reason for Stopping: Follow-up:pcp,cardiology Discharge Condition: Good Activity: As per cardiology instructions Diet: Cardiac Diet Wound Care: As directed Labs: 
Labs: Results:  
   
Chemistry No results for input(s): GLU, NA, K, CL, CO2, BUN, CREA, CA, AGAP, BUCR, TBIL, GPT, AP, TP, ALB, GLOB, AGRAT in the last 72 hours. CBC w/Diff No results for input(s): WBC, RBC, HGB, HCT, PLT, GRANS, LYMPH, EOS, HGBEXT, HCTEXT, PLTEXT in the last 72 hours. Cardiac Enzymes No results for input(s): CPK, CKND1, DARNELL in the last 72 hours. No lab exists for component: Myrtis Pluck Coagulation No results for input(s): PTP, INR, APTT in the last 72 hours. No lab exists for component: INREXT Lipid Panel Lab Results Component Value Date/Time Cholesterol, total 110 07/12/2018 05:40 AM  
 HDL Cholesterol 33 (L) 07/12/2018 05:40 AM  
 LDL, calculated 55.6 07/12/2018 05:40 AM  
 VLDL, calculated 21.4 07/12/2018 05:40 AM  
 Triglyceride 107 07/12/2018 05:40 AM  
 CHOL/HDL Ratio 3.3 07/12/2018 05:40 AM  
  
BNP No results for input(s): BNPP in the last 72 hours. Liver Enzymes No results for input(s): TP, ALB, TBIL, AP, SGOT, GPT in the last 72 hours. No lab exists for component: DBIL Thyroid Studies Lab Results Component Value Date/Time T4, Total 7.9 05/06/2010 08:50 AM  
 TSH 4.40 (H) 09/26/2018 07:35 PM  
    
 
Imaging: CT head on 9/28: No acute intracranial abnormalities Consults:-Cardiology 
                 -Neurology on outpatient (office called and made arrangement,spoke with Annel Espino who will contact patient) Treatment Team: Treatment Team: Attending Provider: Colt Nicole MD; Consulting Provider: Paula Howard DO; Utilization Review: Jenn Foley RN; Care Manager: Zelda Donis; Physical Therapist: Marcella York PT; Consulting Provider: Nichol Humphreys MD; Care Manager: Brenda Steward RN; Occupational Therapist: Bibiana Saldivar OT; Consulting Provider: Danny Victoria MD 
 
Significant Diagnostic Studies: labs: see recent results Carotid duplex · There is mild stenosis in the right ICA (<50%). · There is mild stenosis in the left ICA (<50%). · Hospital Course: 
Aaliyah Ontiveros a 80 y. o. male  With hx SSS on pacemaker, chf, Afib on eliquis , cabg, hypertension , home o2 2.5L NC, HLD, Hypothyroid who presented with syncope. Per report patient passed out for a brief period. He had no recall. His wife called EMS. He also c/o recent lightheadedness more with motion  and chronic fatigue . No chest pain , cough, fever. CTA chest in ED was negative for PE, small bilateral pleural effusion , mild edema , no focal consolidation.  Duplex bilateral  carotid US showed mild stenosis bilateral  ICA (< 50%). CXR showed cardiomegaly with bilateral pleural effusion. Patient's pacemaker interrogated and suspected lead malfunction. It was recommended replacement of one lead and this was done on 10/1/18. On 10/2/18,patient reporting feeling dizzy,some headache,short of breath in the morning. Cardiology has assessed and determined that the pacemaker was functioning properly. The transient dizziness patient had reported likely happened when the PPM was interrogated on 10/2/18 morning. The pacemaker's site was evaluated for some redness and it was decided to hold eliquis for a week. As for his short of breath,patient received lasix 20 mg iv x1 since he did not take in almost a week. Patient's symptoms improved afterwards. Today patient feeling much better,no sob,no dizziness. Cardiology has cleared patient for discharge. He strongly advised to follow up with cardiology and resume the eliquis after one week. Neurology was consulted to see patient but since patient is stable with no recurrence of symptoms,I called neurology office for outpatient visit. [facility] ([phone]) will contact patient for the appointment. She was provided all the informations regarding patient. Discharge plan discussed with patient,cardiology,nurse. Time for discharge:40 minutes. Anahy Robert MD 
October 3, 2018

## 2018-10-03 NOTE — ANCILLARY DISCHARGE INSTRUCTIONS
Patient and/or next of kin has been given the [facility] Important Message From Medicare About Your Rights\" letter and all questions were answered.

## 2018-10-03 NOTE — PROGRESS NOTES
0730:  Bedside and Verbal shift change report given to Arash Proctor RN (oncoming nurse) by Radhika Lazo RN (offgoing nurse). Report included the following information SBAR, Kardex, MAR and Recent Results. Patient awake, AOX4, sitting up in bed while watching tv. On telemetry, IV saline locked, voiced no concerns/complaints at this time. Bed in lowest/locked position, call bell within reach. 
 
7738:  Administered morning medications, patient tolerated well. 
 
1100:  Patient resting comfortably, easily awakened. 1300:  Patient sitting up in bed, watching tv while eating lunch. 1630:  IV's removed, assisted patient with gathering and bagging belongings. 1650:  Educated patient along with family member on discharge instructions. Both voiced understanding.

## 2018-10-04 NOTE — TELEPHONE ENCOUNTER
----- Message from Atul patten. Lisbet Linares PA-C sent at 10/3/2018  1:40 PM EDT -----  Patient needs a nurse visit for wound check on Monday 10/08/18, he actually has bilateral pacer wound sites. Active pacer is located on right.

## 2018-10-08 NOTE — PROGRESS NOTES
Patient was in the office today for a wound check after implantation of device. Patient has no complaints or concerns. Wound site clean and dry. No redness, drainage, or swelling noted. Patient advised to call our office if symptoms of infection appear. Patient verbalized understanding.

## 2018-11-07 NOTE — TELEPHONE ENCOUNTER
Per Dr. Caty Martínez, called patient and left message to call office. Patient is advised to make nurse visit in 2 weeks on a day when Dr. Caty Martínez is in office to recheck pacemaker site. He is advising to recheck every 2 weeks in office when Dr. Caty Martínez is also in office.       Verbal order and read back per Charlie Cote MD

## 2018-11-18 NOTE — PROGRESS NOTES
Subjective:  
Mr. Kelly Edmonds is here for hospital follow up. 
   
This is an 51-year-old man that has a history of hypertension, coronary artery disease, dyslipidemia, prior coronary bypass grafting, cardiomyopathy, atrial fibrillation, celiac artery stenosis, pacemaker, renal artery stenosis and congestive heart failure.  
  
The patient had prior coronary bypass grafting in 2008. Sung Balder to his coronary bypass surgery, he was experiencing primarily easy fatigability.  He has had repeat cardiac catheterizations since the time of his surgery, the last of which was done in 2016. At that time, medical therapy was advised. He had a nuclear stress test on 9/27/2017 that did not show evidence for ischemia and his EF was normal. His most recent echo was Jan 2018 with an EF of 55% with mild to moderate aortic stenosis with mean gradient 14 mmHg and GERBER 1cm, also had severe TR and mild-moderate pulmonic insufficiency. He had a stay at [facility] for presyncope and profound weakness and was discharged 01/21/18. Of note during his hospitalization, he was seen by vascular surgery for celiac artery stenosis. They deemed that his symptomology was unlikely related to mesenteric ischemia. Subsequently he went to rehab for a one week stay. He has some occasional chest discomfort which is unchanged in pattern. If he is more active on a given day, he is much more fatigued the next day. His breathing has been good. He has not been wearing his supplemental oxygen  at night as often. In the office today, he is following up from a recent generator replacement. During the procedure, it appeared that it would be difficult to explant the original pacemaker so it was left in place. He has developed a hematoma at the site of the original pacemaker. It has not felt warm. We will plan to follow-up on a every 2 weekly basis. He has been evaluated by Dr. Maura Sommer of neurology.   He was left with the impression that nothing further would be done regarding his occasional \"spells\". Patient's cardiac risk factors are dyslipidemia, male gender, hypertension. Patient Active Problem List  
 Diagnosis Date Noted  Diastolic CHF, chronic ([address] 09/26/2018  Cardiac pacemaker in situ 09/16/2018  Syncope 07/11/2018  Aortic stenosis, moderate 01/28/2018  Abnormal chest CT 01/27/2018  Pleural effusion, right 01/26/2018  SOB (shortness of breath) on exertion 01/25/2018  Cardiac syncope 01/19/2018  Celiac artery stenosis ([facility] 01/10/2018  Osteoarthritis of both knees 08/20/2017  Statin intolerance 05/03/2017  Essential hypertension 02/27/2017  Anxiety 02/27/2017  Chronic systolic congestive heart failure ([address] 01/25/2017  S/P CABG x 2 08/25/2015  Atherosclerotic NAHED (renal artery stenosis), bilateral ([address] 08/25/2015  Dyslipidemia  Coronary artery disease of native artery of native heart with stable angina pectoris ([facility]  Cardiomyopathy  Atrial fibrillation  Sick sinus syndrome Current Outpatient Medications Medication Sig Dispense Refill  furosemide (LASIX) 40 mg tablet 40 mg.    
 oxyCODONE-acetaminophen (PERCOCET) 5-325 mg per tablet Take 1 Tab by mouth every four (4) hours as needed. Max Daily Amount: 6 Tabs. 16 Tab 0  
 omeprazole (PRILOSEC) 40 mg capsule Take 40 mg by mouth daily.  OXYGEN-AIR DELIVERY SYSTEMS by Does Not Apply route.  amLODIPine (NORVASC) 5 mg tablet Take 1 Tab by mouth daily. 60 Tab 0  
 metoprolol succinate (TOPROL XL) 50 mg XL tablet Take 1 Tab by mouth daily. 60 Tab 0  
 nitroglycerin (NITROSTAT) 0.4 mg SL tablet 1 Tab by SubLINGual route every five (5) minutes as needed for Chest Pain (up to 3 total 1 every 5 min). 60 Tab 0  
 pantoprazole (PROTONIX) 40 mg tablet Take 1 Tab by mouth Daily (before breakfast).  60 Tab 0  
 tiotropium (SPIRIVA) 18 mcg inhalation capsule Take 1 Cap by inhalation every twenty-four (24) hours. 30 Cap 0  
 guaiFENesin ER (MUCINEX) 600 mg ER tablet Take 1 Tab by mouth every twelve (12) hours. 60 Tab 0  
 LORazepam (ATIVAN) 1 mg tablet Take 1 Tab by mouth every six (6) hours as needed for Anxiety. Max Daily Amount: 4 mg. 30 Tab 0  
 enalapril (VASOTEC) 20 mg tablet Take 20 mg by mouth daily. Allergies Allergen Reactions  Beta-Blockers (Beta-Adrenergic Blocking Agts) Drowsiness  Penicillins Swelling  Statins-Hmg-Coa Reductase Inhibitors Drowsiness Past Medical History:  
Diagnosis Date  Atrial fibrillation CHADS score 3  (+CHF, +HTN, +AGE, -DM, -CVA)  CABG   
 2008   LIMA - LAD,   SVG - RCA  Cardiomyopathy EF 30-35% (ECHO 6/14)  Coronary artery disease  Dyslipidemia  Hypertension  Hypothyroid  Pacemaker  Peripheral vascular disease  Renal artery stenosis   
 bilateral stents  Sick sinus syndrome Past Surgical History:  
Procedure Laterality Date  HX CORONARY ARTERY BYPASS GRAFT    
 2008   LIMA - LAD,   SVG - RCA No family history on file. Social History Tobacco Use Smoking Status Former Smoker Smokeless Tobacco Never Used Review of Systems, additional: 
Constitutional: negative Eyes: negative Respiratory: negative for dyspnea on exertion Cardiovascular: per HPI Gastrointestinal: negative for nausea and vomiting Musculoskeletal:negative Neurological: negative Behvioral/Psych: negative Endocrine: negative ENT: negative Objective:  
 
Visit Vitals /72 Pulse 81 Ht 5' 9\" (1.753 m) Wt 144 lb (65.3 kg) SpO2 97% BMI 21.27 kg/m² General:  alert, cooperative, no distress, appears stated age Chest Wall: inspection normal - no chest wall deformities or tenderness, respiratory effort normal  
Lung: clear to auscultation bilaterally Heart:  irregularly irregular rhythm with rate 66. Grade II/VI SERENE best heard RSB Abdomen: soft, non-tender. Bowel sounds normal. No masses,  no organomegaly Extremities: extremities normal, atraumatic, no cyanosis. There is 2+ ankle swelling. Skin: no rashes Neuro: alert, oriented, normal speech, no focal findings or movement disorder noted Assessment/Plan: ICD-10-CM ICD-9-CM 1. Coronary artery disease of native artery of native heart with stable angina pectoris ([facility]- -had CABGx2 in 2008. Nuclear stress in Sept 2017 without evidence of ischemia. Occasional  anginal symptoms with exertion, stable in pattern, Continue with Toprol. Not on statin due to intolerance. I25.118 414.01   
  413.9 2. Ischemic cardiomyopathy Echo EF 50% , 7/12/2018. RT 8 weeks I25.5 414.8 3. Chronic atrial fibrillation ([address]- -controlled with rate 66 in office today. Continue Eliquis and Toprol. I48.2 427.31   
4. Sick sinus syndrome-- PPM.  Recent generator replacement. Hematoma at site of prior pacemaker. Return to the office for wound check q. 2 weeks. I49.5 427.81   
5. 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  Follows weight daily. 428.0 6. Essential hypertension-- continue Toprol I10 401.9 7. Celiac artery stenosis ([address]- -seen by vascular surgery, no plans for OR. I77.4 447.4 8. Aortic stenosis, moderate by recent Echo 7/12/2018  I35.0 424.1 9       Anxiety, refused Lexapro. 10     Pacemaker in situ, rescheduling pacer check in the office today as representatives unavailable.

## 2018-11-28 NOTE — PROGRESS NOTES
Olivier Delarosa is a 80 y.o. male that is here for a blood pressure check. His current medications are listed below. Current Outpatient Medications   Medication Sig    furosemide (LASIX) 40 mg tablet 40 mg.    oxyCODONE-acetaminophen (PERCOCET) 5-325 mg per tablet Take 1 Tab by mouth every four (4) hours as needed. Max Daily Amount: 6 Tabs.  omeprazole (PRILOSEC) 40 mg capsule Take 40 mg by mouth daily.  OXYGEN-AIR DELIVERY SYSTEMS by Does Not Apply route.  amLODIPine (NORVASC) 5 mg tablet Take 1 Tab by mouth daily.  metoprolol succinate (TOPROL XL) 50 mg XL tablet Take 1 Tab by mouth daily.  nitroglycerin (NITROSTAT) 0.4 mg SL tablet 1 Tab by SubLINGual route every five (5) minutes as needed for Chest Pain (up to 3 total 1 every 5 min).  pantoprazole (PROTONIX) 40 mg tablet Take 1 Tab by mouth Daily (before breakfast).  tiotropium (SPIRIVA) 18 mcg inhalation capsule Take 1 Cap by inhalation every twenty-four (24) hours.  guaiFENesin ER (MUCINEX) 600 mg ER tablet Take 1 Tab by mouth every twelve (12) hours.  LORazepam (ATIVAN) 1 mg tablet Take 1 Tab by mouth every six (6) hours as needed for Anxiety. Max Daily Amount: 4 mg.  enalapril (VASOTEC) 20 mg tablet Take 20 mg by mouth daily. No current facility-administered medications for this visit.                 His   Visit Vitals  /63 (BP 1 Location: Left arm, BP Patient Position: Sitting)       BP Recheck -115/59

## 2018-12-05 NOTE — PROGRESS NOTES
1. Have you been to the ER, urgent care clinic since your last visit? Hospitalized since your last visit? No  
 
2. Have you seen or consulted any other health care providers outside of the [address] [place] [address] since your last visit? Include any pap smears or colon screening.  No

## 2018-12-14 NOTE — PROGRESS NOTES
Subjective:   Mr. Adenike Etienne is here for hospital follow up.      This is an 55-year-old man that has a history of hypertension, coronary artery disease, dyslipidemia, prior coronary bypass grafting, cardiomyopathy, atrial fibrillation, celiac artery stenosis, pacemaker, renal artery stenosis and congestive heart failure.      The patient had prior coronary bypass grafting in 2008. Westleycarmen Reyeser to his coronary bypass surgery, he was experiencing primarily easy fatigability.  He has had repeat cardiac catheterizations since the time of his surgery, the last of which was done in 2016. At that time, medical therapy was advised. He had a nuclear stress test on 9/27/2017 that did not show evidence for ischemia and his EF was normal. His most recent echo was Jan 2018 with an EF of 55% with mild to moderate aortic stenosis with mean gradient 14 mmHg and GERBER 1cm. He also had severe TR and mild-moderate pulmonic insufficiency. He had a admission at [facility] for presyncope and profound weakness and was discharged 01/21/18. Of note during his hospitalization, he was seen by vascular surgery for celiac artery stenosis. They deemed that his symptomology was unlikely related to mesenteric ischemia. Subsequently he went to rehab for a one week stay. He has some occasional chest discomfort which is unchanged in pattern. If he is more active on a given day, he is much more fatigued the next day. His breathing has been good. He has not been wearing his supplemental oxygen  at night as often. I the office today says that he is feeling \"pretty good \". He has some chest discomfort especially in the cold weather. His breathing has been good. He checks his oxygen saturation at home. On the day prior to this evaluation he reports that his O2 sat was 96%. Patient's cardiac risk factors are dyslipidemia, male gender, hypertension.         Patient Active Problem List    Diagnosis Date Noted    Diastolic CHF, chronic ([facility] 09/26/2018    Cardiac pacemaker in situ 09/16/2018    Syncope 07/11/2018    Aortic stenosis, moderate 01/28/2018    Abnormal chest CT 01/27/2018    Pleural effusion, right 01/26/2018    SOB (shortness of breath) on exertion 01/25/2018    Cardiac syncope 01/19/2018    Celiac artery stenosis (HCC) 01/10/2018    Osteoarthritis of both knees 08/20/2017    Statin intolerance 05/03/2017    Essential hypertension 02/27/2017    Anxiety 02/27/2017    Chronic systolic congestive heart failure ([facility] 01/25/2017    S/P CABG x 2 08/25/2015    Atherosclerotic NAHED (renal artery stenosis), bilateral (HCC) 08/25/2015    Dyslipidemia     Coronary artery disease of native artery of native heart with stable angina pectoris (HCC)     Cardiomyopathy     Atrial fibrillation     Sick sinus syndrome      Current Outpatient Medications   Medication Sig Dispense Refill    ELIQUIS 5 mg tablet TAKE 1 TABLET BY MOUTH TWICE A DAY  3    traMADol (ULTRAM) 50 mg tablet TAKE 1 TABLET EVERY 6 HOURS AS NEEDED  0    furosemide (LASIX) 40 mg tablet 40 mg.      omeprazole (PRILOSEC) 40 mg capsule Take 40 mg by mouth daily.  OXYGEN-AIR DELIVERY SYSTEMS by Does Not Apply route.  amLODIPine (NORVASC) 5 mg tablet Take 1 Tab by mouth daily. 60 Tab 0    metoprolol succinate (TOPROL XL) 50 mg XL tablet Take 1 Tab by mouth daily. 60 Tab 0    nitroglycerin (NITROSTAT) 0.4 mg SL tablet 1 Tab by SubLINGual route every five (5) minutes as needed for Chest Pain (up to 3 total 1 every 5 min). 60 Tab 0    LORazepam (ATIVAN) 1 mg tablet Take 1 Tab by mouth every six (6) hours as needed for Anxiety. Max Daily Amount: 4 mg. 30 Tab 0    enalapril (VASOTEC) 20 mg tablet Take 20 mg by mouth daily.        Allergies   Allergen Reactions    Beta-Blockers (Beta-Adrenergic Blocking Agts) Drowsiness    Penicillins Swelling    Statins-Hmg-Coa Reductase Inhibitors Drowsiness     Past Medical History:   Diagnosis Date    Atrial fibrillation     CHADS score 3  (+CHF, +HTN, +AGE, -DM, -CVA)    CABG     2008   LIMA - LAD,   SVG - RCA    Cardiomyopathy     EF 30-35% (ECHO 6/14)    Coronary artery disease     Dyslipidemia     Hypertension     Hypothyroid     Pacemaker     Peripheral vascular disease     Renal artery stenosis     bilateral stents    Sick sinus syndrome      Past Surgical History:   Procedure Laterality Date    HX CORONARY ARTERY BYPASS GRAFT      2008   LIMA - LAD,   SVG - RCA    NH INSJ 1 TRANSVNS ELTRD PERM PACEMAKER/IMPLTBL DFB N/A 10/1/2018    Insert Or Replace Single Lead performed by Dora Miller MD at [address] [facility]     No family history on file. Social History     Tobacco Use   Smoking Status Former Smoker   Smokeless Tobacco Never Used          Review of Systems, additional:  Constitutional: negative  Eyes: negative  Respiratory: negative for dyspnea on exertion  Cardiovascular: per HPI  Gastrointestinal: negative for nausea and vomiting  Musculoskeletal:negative  Neurological: negative  Behvioral/Psych: negative  Endocrine: negative  ENT: negative    Objective:     Visit Vitals  /68   Pulse 79   Wt 146 lb (66.2 kg)   BMI 21.56 kg/m²     General:  alert, cooperative, no distress, appears stated age   Chest Wall: inspection normal - no chest wall deformities or tenderness, respiratory effort normal   Lung: clear to auscultation bilaterally   Heart:  irregularly irregular rhythm with rate 66. Grade II/VI SERENE best heard RSB   Abdomen: soft, non-tender. Bowel sounds normal. No masses,  no organomegaly   Extremities: extremities normal, atraumatic, no cyanosis. There is 2+ ankle swelling. Skin: no rashes   Neuro: alert, oriented, normal speech, no focal findings or movement disorder noted         Assessment/Plan:         ICD-10-CM ICD-9-CM    1. Coronary artery disease of native artery of native heart with stable angina pectoris ([facility]- -had CABGx2 in 2008.  Nuclear stress in Sept 2017 without evidence of ischemia. Occasional  anginal symptoms with exertion, stable in pattern, Continue with Toprol. Not on statin due to intolerance. I25.118 414.01      413.9    2. Ischemic cardiomyopathy Echo EF 50% , 7/12/2018. RT 10 weeks I25.5 414.8    3. Chronic atrial fibrillation ([facility]- -controlled with rate 66 in office today. Continue Eliquis and Toprol. I48.2 427.31    4. Sick sinus syndrome-- PPM.  Recent generator replacement. Hematoma at site of prior pacemaker. I49.5 427.81    5. 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  Follows weight daily. Stable        428.0    6. Essential hypertension--controlled in the office today I10 401.9    7. Celiac artery stenosis ([facility]- -seen by vascular surgery, no plans for OR. I77.4 447.4    8. Aortic stenosis, moderate by recent Echo 7/12/2018  I35.0 424.1    9       Anxiety, refused Lexapro. 10     Pacemaker in situ, rescheduling pacer check in the office today as representatives unavailable.

## 2018-12-18 NOTE — ED NOTES
Patient states he has had swelling in bilat lower legs x 2 days. Has taken lasix as instructed by cardiologist. States \"feels like it has spread, my whole body feels like my lower legs. It feels like there is fire in my blood. It burns from my feet to my head. \" Reports pressure behind his eyes which feels worse when he moves. Deniers chest pain, denies SOB.

## 2018-12-18 NOTE — ED PROVIDER NOTES
Elsie Castanon is a 80 y.o. Male with h/o chf with c/o burniing all over from feet to head tonight with lightheadedness, funny sensation in head. No syncope, cp, sob, abd  Pain. Has had increased swelling in legs recently as well. No fall, other trauma. Nothing taken for sx. The history is provided by the patient, the spouse and medical records. Past Medical History:   Diagnosis Date    Atrial fibrillation     CHADS score 3  (+CHF, +HTN, +AGE, -DM, -CVA)    CABG     2008   LIMA - LAD,   SVG - RCA    Cardiomyopathy     EF 30-35% (ECHO 6/14)    Coronary artery disease     Dyslipidemia     Hypertension     Hypothyroid     Pacemaker     Peripheral vascular disease     Renal artery stenosis     bilateral stents    Sick sinus syndrome        Past Surgical History:   Procedure Laterality Date    HX CORONARY ARTERY BYPASS GRAFT      2008   LIMA - LAD,   SVG - RCA    NH INSJ 1 TRANSVNS ELTRD PERM PACEMAKER/IMPLTBL DFB N/A 10/1/2018    Insert Or Replace Single Lead performed by Darien Jj MD at [address] [place] [address] CATH LAB         History reviewed. No pertinent family history.     Social History     Socioeconomic History    Marital status:      Spouse name: Not on file    Number of children: Not on file    Years of education: Not on file    Highest education level: Not on file   Social Needs    Financial resource strain: Not on file    Food insecurity - worry: Not on file    Food insecurity - inability: Not on file    Transportation needs - medical: Not on file   [employer] needs - non-medical: Not on file   Occupational History    Not on file   Tobacco Use    Smoking status: Former Smoker    Smokeless tobacco: Never Used   Substance and Sexual Activity    Alcohol use: No    Drug use: No    Sexual activity: Not on file   Other Topics Concern    Not on file   Social History Narrative    Not on file         ALLERGIES: Beta-blockers (beta-adrenergic blocking agts); Penicillins; and Statins-hmg-coa reductase inhibitors    Review of Systems   Constitutional: Negative for fever. HENT: Negative for sore throat and trouble swallowing. Eyes: Negative for visual disturbance. Respiratory: Negative for shortness of breath. Cardiovascular: Positive for leg swelling. Negative for chest pain. Gastrointestinal: Negative for abdominal pain. Endocrine: Negative for polyuria. Genitourinary: Negative for difficulty urinating. Musculoskeletal: Positive for gait problem. Skin: Negative for rash. Allergic/Immunologic: Negative for immunocompromised state. Neurological: Positive for dizziness and light-headedness. Negative for syncope, speech difficulty, weakness and headaches. Psychiatric/Behavioral: Positive for sleep disturbance. Vitals:    12/18/18 0445 12/18/18 0500 12/18/18 0515 12/18/18 0530   BP: 131/65 131/61 110/70 121/55   Pulse: 80 80 80 81   Resp: 20 21 19 25   Temp:       SpO2: 99% 100% 100% 98%   Weight:       Height:                Physical Exam   Constitutional: He is oriented to person, place, and time. Non-toxic appearance. He does not appear ill. No distress. HENT:   Head: Normocephalic and atraumatic. Right Ear: External ear normal.   Left Ear: External ear normal.   Nose: Nose normal.   Mouth/Throat: Oropharynx is clear and moist. No oropharyngeal exudate. Eyes: Conjunctivae are normal.   Neck: Normal range of motion. Cardiovascular: Normal rate, regular rhythm and intact distal pulses. Murmur heard. Pulmonary/Chest: Effort normal and breath sounds normal. No respiratory distress. Abdominal: Soft. There is no tenderness. Musculoskeletal: Normal range of motion. He exhibits edema (2+ le; no erythema. not cool). Neurological: He is alert and oriented to person, place, and time. No cranial nerve deficit (3-12). Skin: Skin is warm and dry. Capillary refill takes less than 2 seconds. He is not diaphoretic.    Psychiatric: His behavior is normal.   Nursing note and vitals reviewed. MDM       Procedures    Vitals:  Patient Vitals for the past 12 hrs:   Temp Pulse Resp BP SpO2   12/18/18 0530  81 25 121/55 98 %   12/18/18 0515  80 19 110/70 100 %   12/18/18 0500  80 21 131/61 100 %   12/18/18 0445  80 20 131/65 99 %   12/18/18 0400  80 19 146/71 100 %   12/18/18 0345  80 19 137/65 100 %   12/18/18 0323 97.8 °F (36.6 °C)       12/18/18 0300  80 25 124/59 94 %         Medications ordered:   Medications   sodium chloride 0.9 % bolus infusion 500 mL (not administered)   acetaminophen (TYLENOL) tablet 1,000 mg (1,000 mg Oral Given 12/18/18 0456)   diazePAM (VALIUM) tablet 5 mg (5 mg Oral Given 12/18/18 0457)         Lab findings:  Recent Results (from the past 12 hour(s))   CBC WITH AUTOMATED DIFF    Collection Time: 12/18/18  4:20 AM   Result Value Ref Range    WBC 4.9 4.6 - 13.2 K/uL    RBC 4.25 (L) 4.70 - 5.50 M/uL    HGB 10.1 (L) 13.0 - 16.0 g/dL    HCT 34.8 (L) 36.0 - 48.0 %    MCV 81.9 74.0 - 97.0 FL    MCH 23.8 (L) 24.0 - 34.0 PG    MCHC 29.0 (L) 31.0 - 37.0 g/dL    RDW 20.1 (H) 11.6 - 14.5 %    PLATELET 862 928 - 434 K/uL    MPV 9.6 9.2 - 11.8 FL    NEUTROPHILS 71 40 - 73 %    LYMPHOCYTES 13 (L) 21 - 52 %    MONOCYTES 12 (H) 3 - 10 %    EOSINOPHILS 3 0 - 5 %    BASOPHILS 1 0 - 2 %    ABS. NEUTROPHILS 3.5 1.8 - 8.0 K/UL    ABS. LYMPHOCYTES 0.6 (L) 0.9 - 3.6 K/UL    ABS. MONOCYTES 0.6 0.05 - 1.2 K/UL    ABS. EOSINOPHILS 0.1 0.0 - 0.4 K/UL    ABS.  BASOPHILS 0.0 0.0 - 0.1 K/UL    DF AUTOMATED     PROTHROMBIN TIME + INR    Collection Time: 12/18/18  4:20 AM   Result Value Ref Range    Prothrombin time 18.7 (H) 11.5 - 15.2 sec    INR 1.6 (H) 0.8 - 1.2     METABOLIC PANEL, COMPREHENSIVE    Collection Time: 12/18/18  4:20 AM   Result Value Ref Range    Sodium 138 136 - 145 mmol/L    Potassium 3.7 3.5 - 5.5 mmol/L    Chloride 104 100 - 108 mmol/L    CO2 25 21 - 32 mmol/L    Anion gap 9 3.0 - 18 mmol/L    Glucose 91 74 - 99 mg/dL BUN 41 (H) 7.0 - 18 MG/DL    Creatinine 1.71 (H) 0.6 - 1.3 MG/DL    BUN/Creatinine ratio 24 (H) 12 - 20      GFR est AA 46 (L) >60 ml/min/1.73m2    GFR est non-AA 38 (L) >60 ml/min/1.73m2    Calcium 8.9 8.5 - 10.1 MG/DL    Bilirubin, total 1.0 0.2 - 1.0 MG/DL    ALT (SGPT) 25 16 - 61 U/L    AST (SGOT) 34 15 - 37 U/L    Alk. phosphatase 162 (H) 45 - 117 U/L    Protein, total 7.9 6.4 - 8.2 g/dL    Albumin 3.4 3.4 - 5.0 g/dL    Globulin 4.5 (H) 2.0 - 4.0 g/dL    A-G Ratio 0.8 0.8 - 1.7     NT-PRO BNP    Collection Time: 12/18/18  4:20 AM   Result Value Ref Range    NT pro-BNP 3,012 (H) 0 - 1,800 PG/ML   TROPONIN I    Collection Time: 12/18/18  4:20 AM   Result Value Ref Range    Troponin-I, Qt. 0.03 0.0 - 0.045 NG/ML   POC LACTIC ACID    Collection Time: 12/18/18  4:24 AM   Result Value Ref Range    Lactic Acid (POC) 1.08 0.40 - 2.00 mmol/L   EKG, 12 LEAD, INITIAL    Collection Time: 12/18/18  4:49 AM   Result Value Ref Range    Ventricular Rate 81 BPM    Atrial Rate 67 BPM    QRS Duration 166 ms    Q-T Interval 448 ms    QTC Calculation (Bezet) 520 ms    Calculated R Axis -82 degrees    Calculated T Axis 102 degrees    Diagnosis       Electronic ventricular pacemaker  When compared with ECG of 26-SEP-2018 19:47,  Vent. rate has increased BY  11 BPM         EKG interpretation by ED Physician:  Elizabeth Rucker with no acute st tw changes  Rate 81, qrs 166, qtc 520  Not sig changed    Cardiac monitor: nl rate, reg rhythm no ectopy  Pulse ox: 98% on 2l    X-Ray, CT or other radiology findings or impressions:  CT HEAD WO CONT    (Results Pending)   nothing acute per prelim report    Progress notes, Consult notes or additional Procedure notes:   No sig lab abnl, unclear etiology to sx.  Doubt need for admission, further work up  I have discussed with patient and/or family/sig other the results, interpretation of any imaging if performed, suspected diagnosis and treatment plan to include instructions regarding the diagnoses listed to which understanding was expressed with all questions answered      Reevaluation of patient:   stable    Disposition:  Diagnosis:   1. Burning sensation    2. Lightheadedness    3. Chronic congestive heart failure, unspecified heart failure type ([address]        Disposition: home      Follow-up Information     Follow up With Specialties Details Why Contact Info    Casi Lopez MD Internal Medicine Schedule an appointment as soon as possible for a visit this week for recheck [address] [facility] EMERGENCY DEPT Emergency Medicine  If symptoms worsen [address]  [phone]               Medication List      ASK your doctor about these medications    amLODIPine 5 mg tablet  Commonly known as:  NORVASC  Take 1 Tab by mouth daily. ELIQUIS 5 mg tablet  Generic drug:  apixaban     enalapril 20 mg tablet  Commonly known as:  VASOTEC     furosemide 40 mg tablet  Commonly known as:  LASIX     metoprolol succinate 50 mg XL tablet  Commonly known as:  TOPROL XL  Take 1 Tab by mouth daily. nitroglycerin 0.4 mg SL tablet  Commonly known as:  NITROSTAT  1 Tab by SubLINGual route every five (5) minutes as needed for Chest Pain (up to 3 total 1 every 5 min).      OXYGEN-AIR DELIVERY SYSTEMS     PriLOSEC 40 mg capsule  Generic drug:  omeprazole     traMADol 50 mg tablet  Commonly known as:  Gladys Camp

## 2018-12-18 NOTE — ED TRIAGE NOTES
Pt brought to ED via EMS c/o \"burning all over from head to toe, like there's fire in my veins instead of blood! \" X2 days

## 2018-12-31 PROBLEM — K92.2 LGI BLEED: Status: ACTIVE | Noted: 2018-01-01

## 2018-12-31 NOTE — ED PROVIDER NOTES
EMERGENCY DEPARTMENT HISTORY AND PHYSICAL EXAM 
 
5:04 PM 
 
 
Date: 12/31/2018 Patient Name: Lavinia Johns History of Presenting Illness Chief Complaint Patient presents with  Anal Bleeding History Provided By: Patient Chief Complaint: Rectal Bleeding Duration:  Today Timing:  Acute Location: N/A Quality: Copious amount of bright red Severity: N/A Modifying Factors: The patient is currently on Eliquis. Associated Symptoms: Mild abdominal pain he describes as a soreness. Additional History (Context): Lavinia Johns is a 80 y.o. male who presents with acute rectal bleeding today. The patient states was passing copious amounts of bright red blood into the toilet without stool. The patient is currently on Eliquis and states he has had some episodes of scant blood from his rectum in the past, but never this severe. The patient reports light-headedness and mild abdominal pain he describes as a soreness. The patient was discharged from [facility] ED earlier this morning as his testicles were swollen and could not urinate. The patient states his testicles have only improved some since bring discharged. The patient states he has been compliant with his Lasix. PCP: Sol Viveros MD 
 
Current Outpatient Medications Medication Sig Dispense Refill  ALPRAZolam (XANAX) 0.25 mg tablet Take 1 Tab by mouth two (2) times daily as needed for Anxiety. Max Daily Amount: 0.5 mg. 30 Tab 1  
 ELIQUIS 5 mg tablet TAKE 1 TABLET BY MOUTH TWICE A DAY  3  
 traMADol (ULTRAM) 50 mg tablet TAKE 1 TABLET EVERY 6 HOURS AS NEEDED  0  
 furosemide (LASIX) 40 mg tablet 40 mg.    
 omeprazole (PRILOSEC) 40 mg capsule Take 40 mg by mouth daily.  OXYGEN-AIR DELIVERY SYSTEMS by Does Not Apply route.  amLODIPine (NORVASC) 5 mg tablet Take 1 Tab by mouth daily. 60 Tab 0  
 metoprolol succinate (TOPROL XL) 50 mg XL tablet Take 1 Tab by mouth daily.  60 Tab 0  
  nitroglycerin (NITROSTAT) 0.4 mg SL tablet 1 Tab by SubLINGual route every five (5) minutes as needed for Chest Pain (up to 3 total 1 every 5 min). 60 Tab 0  
 enalapril (VASOTEC) 20 mg tablet Take 20 mg by mouth daily. Past History Past Medical History: 
Past Medical History:  
Diagnosis Date  Atrial fibrillation CHADS score 3  (+CHF, +HTN, +AGE, -DM, -CVA)  CABG   
 2008   LIMA - LAD,   SVG - RCA  Cardiomyopathy EF 30-35% (ECHO 6/14)  Coronary artery disease  Dyslipidemia  Hypertension  Hypothyroid  Pacemaker  Peripheral vascular disease  Renal artery stenosis   
 bilateral stents  Sick sinus syndrome Past Surgical History: 
Past Surgical History:  
Procedure Laterality Date  HX CORONARY ARTERY BYPASS GRAFT    
 2008   LIMA - LAD,   SVG - RCA  AR INSJ 1 TRANSVNS ELTRD PERM PACEMAKER/IMPLTBL DFB N/A 10/1/2018 Insert Or Replace Single Lead performed by Luis E Cano MD at [address] [facility] LAB Family History: 
History reviewed. No pertinent family history. Social History: 
Social History Tobacco Use  Smoking status: Former Smoker  Smokeless tobacco: Never Used Substance Use Topics  Alcohol use: No  
 Drug use: No  
 
 
Allergies: Allergies Allergen Reactions  Beta-Blockers (Beta-Adrenergic Blocking Agts) Drowsiness  Penicillins Swelling  Statins-Hmg-Coa Reductase Inhibitors Drowsiness Review of Systems Review of Systems Constitutional: Negative for chills and fever. HENT: Negative for ear pain and sore throat. Eyes: Negative for pain and visual disturbance. Respiratory: Negative for cough and shortness of breath. Cardiovascular: Negative for chest pain and palpitations. Gastrointestinal: Positive for abdominal pain and anal bleeding. Negative for diarrhea, nausea and vomiting. Genitourinary: Negative for flank pain. Musculoskeletal: Negative for back pain and neck pain. Neurological: Positive for light-headedness. Negative for syncope and headaches. Psychiatric/Behavioral: Negative for agitation. The patient is not nervous/anxious. All other systems reviewed and are negative. Physical Exam  
 
Visit Vitals /59 Pulse 79 Temp (!) 94.6 °F (34.8 °C) Comment: md made aware Resp 16 Ht 5' 9\" (1.753 m) Wt 77.1 kg (170 lb) SpO2 98% BMI 25.10 kg/m² Physical Exam  
Constitutional: He is oriented to person, place, and time. He appears well-developed and well-nourished. No distress. HENT:  
Head: Normocephalic and atraumatic. Mouth/Throat: Oropharynx is clear and moist.  
Eyes: Pupils are equal, round, and reactive to light. No scleral icterus. Conjunctival pallor. Neck: Neck supple. No tracheal deviation present. Cardiovascular: Regular rhythm. No murmur heard. Pulmonary/Chest: Effort normal and breath sounds normal. No respiratory distress. Abdominal: Soft. There is no tenderness. Genitourinary:  
Genitourinary Comments: Dried bright red blood around rectum. Enlarged prostate palpated. One large thrombosed external hemorrhoid at 10 o'clock. Swollen testicles. Musculoskeletal: Normal range of motion. He exhibits no deformity. Bilateral lower extremity edema up to lower abdomen. Neurological: He is alert and oriented to person, place, and time. No gross neuro deficit Skin: Skin is warm and dry. No rash noted. He is not diaphoretic. No erythema. Psychiatric: He has a normal mood and affect. Nursing note and vitals reviewed. Diagnostic Study Results Labs - Labs Reviewed CBC WITH AUTOMATED DIFF - Abnormal; Notable for the following components:  
    Result Value WBC 4.0 (*)   
 RBC 3.85 (*) HGB 8.9 (*) HCT 30.7 (*) MCH 23.1 (*) MCHC 29.0 (*)   
 RDW 19.9 (*) NEUTROPHILS 79 (*) LYMPHOCYTES 8 (*) MONOCYTES 11 (*) ABS. LYMPHOCYTES 0.3 (*) All other components within normal limits PROTHROMBIN TIME + INR - Abnormal; Notable for the following components:  
 Prothrombin time 23.8 (*) INR 2.1 (*) All other components within normal limits METABOLIC PANEL, BASIC - Abnormal; Notable for the following components:  
 Glucose 125 (*) BUN 55 (*) Creatinine 1.95 (*)   
 BUN/Creatinine ratio 28 (*)   
 GFR est AA 40 (*)   
 GFR est non-AA 33 (*) All other components within normal limits TYPE & SCREEN Radiologic Studies -  
NM ACUTE GI BLEED SCAN    (Results Pending) Medical Decision Making I am the first provider for this patient. I reviewed the vital signs, available nursing notes, past medical history, past surgical history, family history and social history. Vital Signs-Reviewed the patient's vital signs. Pulse Oximetry Analysis -  98% on room air (Interpretation) WNL Records Reviewed: Nursing Notes and Old Medical Records (Time of Review: 5:04 PM) MDM, Progress Notes, Reevaluation, and Consults: 
 
 
ED Course as of Dec 31 1838 Mon Dec 31, 2018 1717 88M on eliquis for afib presents with BRBPR x 2 large episodes PTA, pt with HR 80, /52 with conjuntival pallor and blood spattered all over his shoes, bright red blood dried around rectum and hemoccult positive concerned for most likely lower GIB, may be brisk upper bleed but less likely. [KG] 1751 Baseline hgb slight lower than baseline. Creatinine near baseline, INR 2.1  [KG] ED Course User Index [KG] Yue Young DO Discussed case with on Call GI physician who recommend bleeding scan to help determine origin of bleed and admission. Pt remained hemodynamically stable in the ED. The patient was given: 
Medications - No data to display For Hospitalized Patients: 
 
1. Hospitalization Decision Time: The decision to hospitalize the patient was made by Yue Young DO at 5:57 PM on 12/31/2018 2. Aspirin: Aspirin was not given because the patient did not present with a stroke at the time of their Emergency Department evaluation Diagnosis Clinical Impression:  
1. Gastrointestinal hemorrhage, unspecified gastrointestinal hemorrhage type 2. Anemia, unspecified type 3. Chronic anticoagulation Disposition: Admit, turned over to Dr. Kavya Rodney pending admission to night hospitalist.  
 
Romelmalini Jeremy None Medication List  
  
ASK your doctor about these medications ALPRAZolam 0.25 mg tablet Commonly known as:  Phoebe Lambert Take 1 Tab by mouth two (2) times daily as needed for Anxiety. Max Daily Amount: 0.5 mg. 
  
amLODIPine 5 mg tablet Commonly known as:  Remonia Grates Take 1 Tab by mouth daily. ELIQUIS 5 mg tablet Generic drug:  apixaban 
  
enalapril 20 mg tablet Commonly known as:  VASOTEC 
  
furosemide 40 mg tablet Commonly known as:  LASIX 
  
metoprolol succinate 50 mg XL tablet Commonly known as:  TOPROL XL Take 1 Tab by mouth daily. nitroglycerin 0.4 mg SL tablet Commonly known as:  NITROSTAT 
1 Tab by SubLINGual route every five (5) minutes as needed for Chest Pain (up to 3 total 1 every 5 min). OXYGEN-AIR DELIVERY SYSTEMS PriLOSEC 40 mg capsule Generic drug:  omeprazole 
  
traMADol 50 mg tablet Commonly known as:  ULTRAM 
  
  
 
_______________________________ Scribe Attestation Isai Diop acting as a scribe for and in the presence of Erick Fenton DO     
December 31, 2018 at 6:38 PM 
    
Provider Attestation:     
I personally performed the services described in the documentation, reviewed the documentation, as recorded by the scribe in my presence, and it accurately and completely records my words and actions.  December 31, 2018 at 6:38 PM - Erick Fenton DO

## 2018-12-31 NOTE — ED TRIAGE NOTES
Pt core temp rectal is 93.1. Dr Owens Bio aware, warm blankets applied. No heather hugger ordered at this time. Pt straight cathd for urine. 200ml output noted

## 2018-12-31 NOTE — ED TRIAGE NOTES
Pt arrived to ed via ems with c/o bloody stools. States was here in depaul ed last night/early this am because of urinary retention and swollen scrotum

## 2018-12-31 NOTE — ED TRIAGE NOTES
Presents via medic c/o scrotum swelling, ble edema and inability to urinate worsening over the last 2 days. Pt has hx of chf and is currently on 40mg lasix. Pt states he has \"missed a couple doses\"  Alert and in NAD on arrival.  Pt core temp rectal is 93.1. Dr Venkat Doshi aware, warm blankets applied. No heather hugger ordered at this time. Pt straight cathd for urine. 200ml output noted

## 2018-12-31 NOTE — DISCHARGE INSTRUCTIONS
Your kidney function was slightly elevated tonight (creatinine 2.12). Continue your Lasix and follow-up with your Primary Care Physician and Cardiologist.     Leg and Ankle Edema: Care Instructions  Your Care Instructions  Swelling in the legs, ankles, and feet is called edema. It is common after you sit or stand for a while. Long plane flights or car rides often cause swelling in the legs and feet. You may also have swelling if you have to stand for long periods of time at your job. Problems with the veins in the legs (varicose veins) and changes in hormones can also cause swelling. Sometimes the swelling in the ankles and feet is caused by a more serious problem, such as heart failure, infection, blood clots, or liver or kidney disease. Follow-up care is a key part of your treatment and safety. Be sure to make and go to all appointments, and call your doctor if you are having problems. It's also a good idea to know your test results and keep a list of the medicines you take. How can you care for yourself at home? · If your doctor gave you medicine, take it as prescribed. Call your doctor if you think you are having a problem with your medicine. · Whenever you are resting, raise your legs up. Try to keep the swollen area higher than the level of your heart. · Take breaks from standing or sitting in one position. ? Walk around to increase the blood flow in your lower legs. ? Move your feet and ankles often while you stand, or tighten and relax your leg muscles. · Wear support stockings. Put them on in the morning, before swelling gets worse. · Eat a balanced diet. Lose weight if you need to. · Limit the amount of salt (sodium) in your diet. Salt holds fluid in the body and may increase swelling. When should you call for help? Call 911 anytime you think you may need emergency care. For example, call if:    · You have symptoms of a blood clot in your lung (called a pulmonary embolism).  These may include:  ? Sudden chest pain. ? Trouble breathing. ? Coughing up blood.    Call your doctor now or seek immediate medical care if:    · You have signs of a blood clot, such as:  ? Pain in your calf, back of the knee, thigh, or groin. ? Redness and swelling in your leg or groin.     · You have symptoms of infection, such as:  ? Increased pain, swelling, warmth, or redness. ? Red streaks or pus. ? A fever.    Watch closely for changes in your health, and be sure to contact your doctor if:    · Your swelling is getting worse.     · You have new or worsening pain in your legs.     · You do not get better as expected. Where can you learn more? Go to http://mitra-shaw.info/. Enter U768 in the search box to learn more about \"Leg and Ankle Edema: Care Instructions. \"  Current as of: November 20, 2017  Content Version: 11.8  © 2117-4456 [employer]. Care instructions adapted under license by [employer] (which disclaims liability or warranty for this information). If you have questions about a medical condition or this instruction, always ask your healthcare professional. [place] [address] any warranty or liability for your use of this information.

## 2018-12-31 NOTE — ED PROVIDER NOTES
Emergency Department Treatment Report Patient: Nadine Anton Age: 80 y.o. Sex: male YOB: 1930 Admit Date: 12/31/2018 PCP: Alonso Matias MD  
MRN: [id number]  CSN: [id number] Room: [place] [address] Time Dictated: 1:24 AM   
 
Chief Complaint Chief Complaint Patient presents with  Testicle Swelling  Dysuria History of Present Illness 80 y.o. male, PMH of hypertension, CAD, CHF, Atrial Fibrillation, pacemaker, presents with worsening, moderate, testicular swelling onset 2 days ago. He reports associated difficulty urinating, dysuria, and bilateral leg swelling. He states he normally takes 40 mg lasix, but reports he has \"missed doses\" over the past several days. No other concerns or symptoms at this time. Review of Systems Constitutional: No fever, chills, or weight loss Eyes: No visual symptoms. ENT: No sore throat, runny nose or ear pain. Respiratory: No cough, dyspnea or wheezing. Cardiovascular: No chest pain, pressure, palpitations, tightness or heaviness. Positive for bilateral leg swelling. Gastrointestinal: No vomiting, diarrhea or abdominal pain. Genitourinary: No frequency or urgency. Positive for testicular swelling, difficulty urinating, and dysuria. Musculoskeletal: No joint pain or swelling. Integumentary: No rashes. Neurological: No headaches, sensory or motor symptoms. Denies complaints in all other systems. Past Medical/Surgical History Past Medical History:  
Diagnosis Date  Atrial fibrillation CHADS score 3  (+CHF, +HTN, +AGE, -DM, -CVA)  CABG   
 2008   LIMA - LAD,   SVG - RCA  Cardiomyopathy EF 30-35% (ECHO 6/14)  Coronary artery disease  Dyslipidemia  Hypertension  Hypothyroid  Pacemaker  Peripheral vascular disease  Renal artery stenosis   
 bilateral stents  Sick sinus syndrome Past Surgical History:  
Procedure Laterality Date  HX CORONARY ARTERY BYPASS GRAFT 2008   LIMA - LAD,   SVG - RCA  [place] INSJ 1 TRANSVNS ELTRD PERM PACEMAKER/IMPLTBL DFB N/A 10/1/2018 Insert Or Replace Single Lead performed by Alayna Storey MD at [address] [facility] Social History Social History Socioeconomic History  Marital status:  Spouse name: Not on file  Number of children: Not on file  Years of education: Not on file  Highest education level: Not on file Tobacco Use  Smoking status: Former Smoker  Smokeless tobacco: Never Used Substance and Sexual Activity  Alcohol use: No  
 Drug use: No  
 
 
Family History History reviewed. No pertinent family history. Home Medications Prior to Admission Medications Prescriptions Last Dose Informant Patient Reported? Taking? ALPRAZolam (XANAX) 0.25 mg tablet   No No  
Sig: Take 1 Tab by mouth two (2) times daily as needed for Anxiety. Max Daily Amount: 0.5 mg.  
ELIQUIS 5 mg tablet   Yes No  
Sig: TAKE 1 TABLET BY MOUTH TWICE A DAY OXYGEN-AIR DELIVERY SYSTEMS   Yes No  
Sig: by Does Not Apply route. amLODIPine (NORVASC) 5 mg tablet   No No  
Sig: Take 1 Tab by mouth daily. enalapril (VASOTEC) 20 mg tablet   Yes No  
Sig: Take 20 mg by mouth daily. furosemide (LASIX) 40 mg tablet   Yes No  
Si mg.  
metoprolol succinate (TOPROL XL) 50 mg XL tablet   No No  
Sig: Take 1 Tab by mouth daily. nitroglycerin (NITROSTAT) 0.4 mg SL tablet   No No  
Si Tab by SubLINGual route every five (5) minutes as needed for Chest Pain (up to 3 total 1 every 5 min). omeprazole (PRILOSEC) 40 mg capsule   Yes No  
Sig: Take 40 mg by mouth daily. traMADol (ULTRAM) 50 mg tablet   Yes No  
Sig: TAKE 1 TABLET EVERY 6 HOURS AS NEEDED Facility-Administered Medications: None Allergies Allergies Allergen Reactions  Beta-Blockers (Beta-Adrenergic Blocking Agts) Drowsiness  Penicillins Swelling  Statins-Hmg-Coa Reductase Inhibitors Drowsiness Physical Exam  
 
 ED Triage Vitals [12/31/18 5136] ED Encounter Vitals Group BP 94/56 Pulse (Heart Rate) 81 Resp Rate 22 Temp Temp src SpO2 Weight Height Constitutional: Patient appears well developed and well nourished. Appearance and behavior are age and situation appropriate. HEENT: Conjunctiva clear. PERRLA. Mucous membranes moist, non-erythematous. Surface of the pharynx, palate, and tongue are pink, moist and without lesions. Neck: supple, non tender, symmetrical, no masses or JVD. Respiratory: lungs clear to auscultation, nonlabored respirations. No tachypnea or accessory muscle use. No respiratory distress or rales on exam. 
Cardiovascular: heart rate irregular. Distal pulses 2+ and equal bilaterally. Gastrointestinal:  Abdomen soft, nontender without complaint of pain to palpation. Swollen but non-tender scrotum, no redness or crepitus. Musculoskeletal: Nail beds pink with prompt capillary refill. 2+ bilateral pitting edema up to the knees. Integumentary: warm and dry without rashes or lesions Neurologic: alert and oriented, Sensation intact, motor strength equal and symmetric. No facial asymmetry or dysarthria. Diagnostic Studies Lab:  
Recent Results (from the past 12 hour(s)) CBC WITH AUTOMATED DIFF Collection Time: 12/31/18  1:30 AM  
Result Value Ref Range WBC 4.4 (L) 4.6 - 13.2 K/uL  
 RBC 3.94 (L) 4.70 - 5.50 M/uL HGB 9.1 (L) 13.0 - 16.0 g/dL HCT 31.7 (L) 36.0 - 48.0 % MCV 80.5 74.0 - 97.0 FL  
 MCH 23.1 (L) 24.0 - 34.0 PG  
 MCHC 28.7 (L) 31.0 - 37.0 g/dL  
 RDW 19.9 (H) 11.6 - 14.5 % PLATELET 468 150 - 078 K/uL MPV 9.8 9.2 - 11.8 FL  
 NEUTROPHILS 77 (H) 40 - 73 % LYMPHOCYTES 12 (L) 21 - 52 % MONOCYTES 9 3 - 10 % EOSINOPHILS 1 0 - 5 % BASOPHILS 1 0 - 2 %  
 ABS. NEUTROPHILS 3.3 1.8 - 8.0 K/UL  
 ABS. LYMPHOCYTES 0.5 (L) 0.9 - 3.6 K/UL  
 ABS. MONOCYTES 0.4 0.05 - 1.2 K/UL  
 ABS. EOSINOPHILS 0.1 0.0 - 0.4 K/UL ABS. BASOPHILS 0.0 0.0 - 0.1 K/UL  
 DF AUTOMATED METABOLIC PANEL, COMPREHENSIVE Collection Time: 12/31/18  1:30 AM  
Result Value Ref Range Sodium 138 136 - 145 mmol/L Potassium 4.0 3.5 - 5.5 mmol/L Chloride 106 100 - 108 mmol/L  
 CO2 22 21 - 32 mmol/L Anion gap 10 3.0 - 18 mmol/L Glucose 118 (H) 74 - 99 mg/dL BUN 53 (H) 7.0 - 18 MG/DL Creatinine 2.12 (H) 0.6 - 1.3 MG/DL  
 BUN/Creatinine ratio 25 (H) 12 - 20 GFR est AA 36 (L) >60 ml/min/1.73m2 GFR est non-AA 30 (L) >60 ml/min/1.73m2 Calcium 8.6 8.5 - 10.1 MG/DL Bilirubin, total 0.7 0.2 - 1.0 MG/DL  
 ALT (SGPT) 25 16 - 61 U/L  
 AST (SGOT) 29 15 - 37 U/L Alk. phosphatase 170 (H) 45 - 117 U/L Protein, total 7.7 6.4 - 8.2 g/dL Albumin 3.0 (L) 3.4 - 5.0 g/dL Globulin 4.7 (H) 2.0 - 4.0 g/dL A-G Ratio 0.6 (L) 0.8 - 1.7 NT-PRO BNP Collection Time: 12/31/18  1:30 AM  
Result Value Ref Range NT pro-BNP 2,698 (H) 0 - 1,800 PG/ML  
URINALYSIS W/ RFLX MICROSCOPIC Collection Time: 12/31/18  1:30 AM  
Result Value Ref Range Color YELLOW Appearance CLEAR Specific gravity 1.015 1.005 - 1.030    
 pH (UA) 5.0 5.0 - 8.0 Protein 30 (A) NEG mg/dL Glucose NEGATIVE  NEG mg/dL Ketone NEGATIVE  NEG mg/dL Bilirubin NEGATIVE  NEG Blood TRACE (A) NEG Urobilinogen 0.2 0.2 - 1.0 EU/dL Nitrites NEGATIVE  NEG Leukocyte Esterase NEGATIVE  NEG    
URINE MICROSCOPIC ONLY Collection Time: 12/31/18  1:30 AM  
Result Value Ref Range WBC 0 to 3 0 - 4 /hpf  
 RBC 0 to 3 0 - 5 /hpf Epithelial cells FEW 0 - 5 /lpf Bacteria FEW (A) NEG /hpf Amorphous Crystals FEW (A) NEG Imaging: No results found. Pietro 
 
ED Course/Medical Decision Making Patient in no respiratory distress and no signs of infection. He missed a few Lasix doses. He was given IV Lasix and was able to urinate a large amount. He feels better.  He now has Lasix at home and will take as directed. Slight elevation in creatinine from baseline and he will have it rechecked by his PMD this week. Given strict return precautions and he understands. Medications  
furosemide (LASIX) injection 40 mg (40 mg IntraVENous Given 12/31/18 0235) Final Diagnosis ICD-10-CM ICD-9-CM 1. Mild peripheral edema R60.9 782.3 2. Anasarca R60.1 782. 3 Disposition Patient is discharged home in stable condition. Advised to follow with their primary care physician. Patient advised to return to the ER for any new or worsening symptoms. My Medications CONTINUE taking these medications Instructions Each Dose to Equal Morning Noon Evening Bedtime ALPRAZolam 0.25 mg tablet Commonly known as:  Dorothea Angelica Your last dose was: Your next dose is: Take 1 Tab by mouth two (2) times daily as needed for Anxiety. Max Daily Amount: 0.5 mg. 
 0.25 mg 
  
  
  
  
  
amLODIPine 5 mg tablet Commonly known as:  Kellen Hilario Your last dose was: Your next dose is: Take 1 Tab by mouth daily. 5 mg ELIQUIS 5 mg tablet Generic drug:  apixaban Your last dose was: Your next dose is: TAKE 1 TABLET BY MOUTH TWICE A DAY 
   
  
  
  
  
enalapril 20 mg tablet Commonly known as:  Jobie Nga Your last dose was: Your next dose is: Take 20 mg by mouth daily. 20 mg 
  
  
  
  
  
furosemide 40 mg tablet Commonly known as:  LASIX Your last dose was: Your next dose is:   
 
  
 40 mg. 
 40 mg 
  
  
  
  
  
metoprolol succinate 50 mg XL tablet Commonly known as:  TOPROL XL Your last dose was: Your next dose is: Take 1 Tab by mouth daily. 50 mg 
  
  
  
  
  
nitroglycerin 0.4 mg SL tablet Commonly known as:  NITROSTAT Your last dose was: Your next dose is:   
 
  
 1 Tab by SubLINGual route every five (5) minutes as needed for Chest Pain (up to 3 total 1 every 5 min).  
 0.4 mg 
  
  
  
  
  
 OXYGEN-AIR DELIVERY SYSTEMS Your last dose was: Your next dose is:   
 
  
 by Does Not Apply route. PriLOSEC 40 mg capsule Generic drug:  omeprazole Your last dose was: Your next dose is: Take 40 mg by mouth daily. 40 mg 
  
  
  
  
  
traMADol 50 mg tablet Commonly known as:  ULTRAM 
 
Your last dose was: Your next dose is: TAKE 1 TABLET EVERY 6 HOURS AS NEEDED Davide Leong MD 
December 31, 2018 My signature above authenticates this document and my orders, the final   
diagnosis (es), discharge prescription (s), and instructions in the Epic   
record. If you have any questions please contact [phone]. 
  
Nursing notes have been reviewed by the physician/ advanced practice   
Clinician. Scribe Attestation [place] acting as a scribe for and in the presence of Aníbal Eagle MD     
December 31, 2018 at 1:31 AM 
    
Provider Attestation:     
I personally performed the services described in the documentation, reviewed the documentation, as recorded by the scribe in my presence, and it accurately and completely records my words and actions.  December 31, 2018 at 1:31 AM - Aníbal Eagle MD

## 2019-01-01 ENCOUNTER — APPOINTMENT (OUTPATIENT)
Dept: GENERAL RADIOLOGY | Age: 84
DRG: 871 | End: 2019-01-01
Attending: NURSE PRACTITIONER
Payer: MEDICARE

## 2019-01-01 ENCOUNTER — APPOINTMENT (OUTPATIENT)
Dept: GENERAL RADIOLOGY | Age: 84
DRG: 871 | End: 2019-01-01
Attending: PHYSICIAN ASSISTANT
Payer: MEDICARE

## 2019-01-01 ENCOUNTER — APPOINTMENT (OUTPATIENT)
Dept: NON INVASIVE DIAGNOSTICS | Age: 84
DRG: 871 | End: 2019-01-01
Attending: INTERNAL MEDICINE
Payer: MEDICARE

## 2019-01-01 ENCOUNTER — APPOINTMENT (OUTPATIENT)
Dept: GENERAL RADIOLOGY | Age: 84
DRG: 871 | End: 2019-01-01
Attending: INTERNAL MEDICINE
Payer: MEDICARE

## 2019-01-01 ENCOUNTER — HOSPITAL ENCOUNTER (INPATIENT)
Age: 84
LOS: 4 days | DRG: 871 | End: 2019-01-12
Attending: EMERGENCY MEDICINE | Admitting: HOSPITALIST
Payer: MEDICARE

## 2019-01-01 ENCOUNTER — HOSPICE ADMISSION (OUTPATIENT)
Dept: HOSPICE | Facility: HOSPICE | Age: 84
End: 2019-01-01

## 2019-01-01 ENCOUNTER — APPOINTMENT (OUTPATIENT)
Dept: CT IMAGING | Age: 84
DRG: 871 | End: 2019-01-01
Attending: INTERNAL MEDICINE
Payer: MEDICARE

## 2019-01-01 ENCOUNTER — APPOINTMENT (OUTPATIENT)
Dept: CT IMAGING | Age: 84
DRG: 871 | End: 2019-01-01
Attending: PHYSICIAN ASSISTANT
Payer: MEDICARE

## 2019-01-01 VITALS
DIASTOLIC BLOOD PRESSURE: 76 MMHG | SYSTOLIC BLOOD PRESSURE: 115 MMHG | OXYGEN SATURATION: 96 % | WEIGHT: 160.27 LBS | RESPIRATION RATE: 18 BRPM | TEMPERATURE: 94.8 F | BODY MASS INDEX: 23.74 KG/M2 | HEIGHT: 69 IN | HEART RATE: 80 BPM

## 2019-01-01 VITALS
OXYGEN SATURATION: 94 % | TEMPERATURE: 96 F | DIASTOLIC BLOOD PRESSURE: 45 MMHG | RESPIRATION RATE: 7 BRPM | HEART RATE: 80 BPM | HEIGHT: 69 IN | WEIGHT: 167.55 LBS | SYSTOLIC BLOOD PRESSURE: 89 MMHG | BODY MASS INDEX: 24.82 KG/M2

## 2019-01-01 DIAGNOSIS — D64.9 ANEMIA, UNSPECIFIED TYPE: ICD-10-CM

## 2019-01-01 DIAGNOSIS — N17.9 ACUTE RENAL FAILURE SUPERIMPOSED ON CHRONIC KIDNEY DISEASE, UNSPECIFIED CKD STAGE, UNSPECIFIED ACUTE RENAL FAILURE TYPE (HCC): ICD-10-CM

## 2019-01-01 DIAGNOSIS — I95.9 HYPOTENSION, UNSPECIFIED HYPOTENSION TYPE: ICD-10-CM

## 2019-01-01 DIAGNOSIS — R65.21 SEPTIC SHOCK (HCC): Primary | ICD-10-CM

## 2019-01-01 DIAGNOSIS — R07.9 CHEST PAIN, UNSPECIFIED TYPE: ICD-10-CM

## 2019-01-01 DIAGNOSIS — R06.02 SOB (SHORTNESS OF BREATH): ICD-10-CM

## 2019-01-01 DIAGNOSIS — A41.9 SEPTIC SHOCK (HCC): Primary | ICD-10-CM

## 2019-01-01 DIAGNOSIS — N18.9 ACUTE RENAL FAILURE SUPERIMPOSED ON CHRONIC KIDNEY DISEASE, UNSPECIFIED CKD STAGE, UNSPECIFIED ACUTE RENAL FAILURE TYPE (HCC): ICD-10-CM

## 2019-01-01 DIAGNOSIS — R79.89 ELEVATED TSH: ICD-10-CM

## 2019-01-01 DIAGNOSIS — K62.5 RECTAL BLEEDING: ICD-10-CM

## 2019-01-01 PROBLEM — I36.1 NON-RHEUMATIC TRICUSPID VALVE INSUFFICIENCY: Status: ACTIVE | Noted: 2019-01-01

## 2019-01-01 LAB
ABO + RH BLD: NORMAL
ALBUMIN SERPL-MCNC: 2.8 G/DL (ref 3.4–5)
ALBUMIN SERPL-MCNC: 2.8 G/DL (ref 3.4–5)
ALBUMIN/GLOB SERPL: 0.7 {RATIO} (ref 0.8–1.7)
ALBUMIN/GLOB SERPL: 0.8 {RATIO} (ref 0.8–1.7)
ALP SERPL-CCNC: 171 U/L (ref 45–117)
ALP SERPL-CCNC: 179 U/L (ref 45–117)
ALT SERPL-CCNC: 34 U/L (ref 16–61)
ALT SERPL-CCNC: 44 U/L (ref 16–61)
AMORPH CRY URNS QL MICRO: ABNORMAL
ANION GAP SERPL CALC-SCNC: 10 MMOL/L (ref 3–18)
ANION GAP SERPL CALC-SCNC: 13 MMOL/L (ref 3–18)
ANION GAP SERPL CALC-SCNC: 15 MMOL/L (ref 3–18)
ANION GAP SERPL CALC-SCNC: 9 MMOL/L (ref 3–18)
APPEARANCE UR: ABNORMAL
APTT PPP: 41.9 SEC (ref 23–36.4)
APTT PPP: 47.7 SEC (ref 23–36.4)
AST SERPL-CCNC: 53 U/L (ref 15–37)
AST SERPL-CCNC: 81 U/L (ref 15–37)
BACTERIA SPEC CULT: ABNORMAL
BACTERIA URNS QL MICRO: ABNORMAL /HPF
BASOPHILS # BLD: 0 K/UL (ref 0–0.1)
BASOPHILS NFR BLD: 0 % (ref 0–2)
BILIRUB SERPL-MCNC: 1.1 MG/DL (ref 0.2–1)
BILIRUB SERPL-MCNC: 1.2 MG/DL (ref 0.2–1)
BILIRUB UR QL: NEGATIVE
BLOOD GROUP ANTIBODIES SERPL: NORMAL
BNP SERPL-MCNC: 4394 PG/ML (ref 0–1800)
BUN SERPL-MCNC: 54 MG/DL (ref 7–18)
BUN SERPL-MCNC: 56 MG/DL (ref 7–18)
BUN SERPL-MCNC: 68 MG/DL (ref 7–18)
BUN SERPL-MCNC: 72 MG/DL (ref 7–18)
BUN/CREAT SERPL: 23 (ref 12–20)
BUN/CREAT SERPL: 26 (ref 12–20)
BUN/CREAT SERPL: 31 (ref 12–20)
BUN/CREAT SERPL: 33 (ref 12–20)
CALCIUM SERPL-MCNC: 8.4 MG/DL (ref 8.5–10.1)
CALCIUM SERPL-MCNC: 8.4 MG/DL (ref 8.5–10.1)
CALCIUM SERPL-MCNC: 8.5 MG/DL (ref 8.5–10.1)
CALCIUM SERPL-MCNC: 9.1 MG/DL (ref 8.5–10.1)
CHLORIDE SERPL-SCNC: 102 MMOL/L (ref 100–108)
CHLORIDE SERPL-SCNC: 103 MMOL/L (ref 100–108)
CHLORIDE SERPL-SCNC: 106 MMOL/L (ref 100–108)
CHLORIDE SERPL-SCNC: 107 MMOL/L (ref 100–108)
CHOLEST SERPL-MCNC: 97 MG/DL
CK MB CFR SERPL CALC: 14.7 % (ref 0–4)
CK MB CFR SERPL CALC: 18.1 % (ref 0–4)
CK MB SERPL-MCNC: 19.6 NG/ML (ref 5–25)
CK MB SERPL-MCNC: 21 NG/ML (ref 5–25)
CK SERPL-CCNC: 108 U/L (ref 39–308)
CK SERPL-CCNC: 143 U/L (ref 39–308)
CO2 SERPL-SCNC: 20 MMOL/L (ref 21–32)
CO2 SERPL-SCNC: 22 MMOL/L (ref 21–32)
CO2 SERPL-SCNC: 22 MMOL/L (ref 21–32)
CO2 SERPL-SCNC: 24 MMOL/L (ref 21–32)
COLOR UR: ABNORMAL
CREAT SERPL-MCNC: 2.16 MG/DL (ref 0.6–1.3)
CREAT SERPL-MCNC: 2.16 MG/DL (ref 0.6–1.3)
CREAT SERPL-MCNC: 2.2 MG/DL (ref 0.6–1.3)
CREAT SERPL-MCNC: 2.36 MG/DL (ref 0.6–1.3)
DATE LAST DOSE: NORMAL
DIFFERENTIAL METHOD BLD: ABNORMAL
ECHO AO ROOT DIAM: 3.68 CM
ECHO AV MEAN GRADIENT: 10.6 MMHG
ECHO AV PEAK GRADIENT: 0 MMHG
ECHO AV PEAK VELOCITY: 0 CM/S
ECHO AV REGURGITANT PHT: 531.8 CM
ECHO AV VTI: 35.5 CM
ECHO EST RA PRESSURE: 15 MMHG
ECHO LV INTERNAL DIMENSION DIASTOLIC: 4.28 CM (ref 4.2–5.9)
ECHO LV INTERNAL DIMENSION SYSTOLIC: 3.79 CM
ECHO LV IVSD: 0.98 CM (ref 0.6–1)
ECHO LV MASS 2D: 162.1 G (ref 88–224)
ECHO LV MASS INDEX 2D: 88.7 G/M2 (ref 49–115)
ECHO LV POSTERIOR WALL DIASTOLIC: 1.02 CM (ref 0.6–1)
ECHO LVOT DIAM: 2.09 CM
ECHO PULMONARY ARTERY SYSTOLIC PRESSURE (PASP): 45 MMHG
ECHO RIGHT VENTRICULAR SYSTOLIC PRESSURE (RVSP): 15.9 MMHG
ECHO TV REGURGITANT MAX VELOCITY: -47.55 CM/S
ECHO TV REGURGITANT PEAK GRADIENT: 0.9 MMHG
EOSINOPHIL # BLD: 0 K/UL (ref 0–0.4)
EOSINOPHIL NFR BLD: 0 % (ref 0–5)
EPITH CASTS URNS QL MICRO: ABNORMAL /LPF (ref 0–5)
ERYTHROCYTE [DISTWIDTH] IN BLOOD BY AUTOMATED COUNT: 19.6 % (ref 11.6–14.5)
ERYTHROCYTE [DISTWIDTH] IN BLOOD BY AUTOMATED COUNT: 19.7 % (ref 11.6–14.5)
ERYTHROCYTE [DISTWIDTH] IN BLOOD BY AUTOMATED COUNT: 19.8 % (ref 11.6–14.5)
ERYTHROCYTE [DISTWIDTH] IN BLOOD BY AUTOMATED COUNT: 19.8 % (ref 11.6–14.5)
ERYTHROCYTE [DISTWIDTH] IN BLOOD BY AUTOMATED COUNT: 19.9 % (ref 11.6–14.5)
ERYTHROCYTE [DISTWIDTH] IN BLOOD BY AUTOMATED COUNT: 20 % (ref 11.6–14.5)
ERYTHROCYTE [DISTWIDTH] IN BLOOD BY AUTOMATED COUNT: 20.3 % (ref 11.6–14.5)
GLOBULIN SER CALC-MCNC: 3.6 G/DL (ref 2–4)
GLOBULIN SER CALC-MCNC: 4.2 G/DL (ref 2–4)
GLUCOSE BLD STRIP.AUTO-MCNC: 77 MG/DL (ref 70–110)
GLUCOSE SERPL-MCNC: 100 MG/DL (ref 74–99)
GLUCOSE SERPL-MCNC: 170 MG/DL (ref 74–99)
GLUCOSE SERPL-MCNC: 80 MG/DL (ref 74–99)
GLUCOSE SERPL-MCNC: 88 MG/DL (ref 74–99)
GLUCOSE UR STRIP.AUTO-MCNC: NEGATIVE MG/DL
GRAM STN SPEC: ABNORMAL
HCT VFR BLD AUTO: 29.4 % (ref 36–48)
HCT VFR BLD AUTO: 30.4 % (ref 36–48)
HCT VFR BLD AUTO: 30.5 % (ref 36–48)
HCT VFR BLD AUTO: 30.8 % (ref 36–48)
HCT VFR BLD AUTO: 30.9 % (ref 36–48)
HCT VFR BLD AUTO: 31.5 % (ref 36–48)
HCT VFR BLD AUTO: 31.6 % (ref 36–48)
HCT VFR BLD AUTO: 32.1 % (ref 36–48)
HCT VFR BLD AUTO: 32.5 % (ref 36–48)
HDLC SERPL-MCNC: 47 MG/DL (ref 40–60)
HDLC SERPL: 2.1 {RATIO} (ref 0–5)
HGB BLD-MCNC: 8.7 G/DL (ref 13–16)
HGB BLD-MCNC: 8.8 G/DL (ref 13–16)
HGB BLD-MCNC: 8.9 G/DL (ref 13–16)
HGB BLD-MCNC: 9.1 G/DL (ref 13–16)
HGB BLD-MCNC: 9.1 G/DL (ref 13–16)
HGB BLD-MCNC: 9.2 G/DL (ref 13–16)
HGB BLD-MCNC: 9.3 G/DL (ref 13–16)
HGB BLD-MCNC: 9.5 G/DL (ref 13–16)
HGB BLD-MCNC: 9.5 G/DL (ref 13–16)
HGB UR QL STRIP: ABNORMAL
INR PPP: 2.1 (ref 0.8–1.2)
KETONES UR QL STRIP.AUTO: NEGATIVE MG/DL
LACTATE BLD-SCNC: 4.47 MMOL/L (ref 0.4–2)
LACTATE BLD-SCNC: 4.78 MMOL/L (ref 0.4–2)
LACTATE BLD-SCNC: 7.13 MMOL/L (ref 0.4–2)
LACTATE SERPL-SCNC: 1.4 MMOL/L (ref 0.4–2)
LACTATE SERPL-SCNC: 1.5 MMOL/L (ref 0.4–2)
LACTATE SERPL-SCNC: 1.9 MMOL/L (ref 0.4–2)
LACTATE SERPL-SCNC: 2.2 MMOL/L (ref 0.4–2)
LACTATE SERPL-SCNC: 2.7 MMOL/L (ref 0.4–2)
LACTATE SERPL-SCNC: 3.5 MMOL/L (ref 0.4–2)
LACTATE SERPL-SCNC: 4 MMOL/L (ref 0.4–2)
LDLC SERPL CALC-MCNC: 38.4 MG/DL (ref 0–100)
LEUKOCYTE ESTERASE UR QL STRIP.AUTO: ABNORMAL
LIPID PROFILE,FLP: NORMAL
LYMPHOCYTES # BLD: 0.2 K/UL (ref 0.9–3.6)
LYMPHOCYTES # BLD: 0.3 K/UL (ref 0.9–3.6)
LYMPHOCYTES # BLD: 0.5 K/UL (ref 0.9–3.6)
LYMPHOCYTES NFR BLD: 1 % (ref 21–52)
LYMPHOCYTES NFR BLD: 2 % (ref 21–52)
LYMPHOCYTES NFR BLD: 3 % (ref 21–52)
MCH RBC QN AUTO: 22.6 PG (ref 24–34)
MCH RBC QN AUTO: 22.7 PG (ref 24–34)
MCH RBC QN AUTO: 22.9 PG (ref 24–34)
MCH RBC QN AUTO: 23 PG (ref 24–34)
MCH RBC QN AUTO: 23 PG (ref 24–34)
MCH RBC QN AUTO: 23.1 PG (ref 24–34)
MCH RBC QN AUTO: 23.2 PG (ref 24–34)
MCH RBC QN AUTO: 23.2 PG (ref 24–34)
MCH RBC QN AUTO: 23.4 PG (ref 24–34)
MCHC RBC AUTO-ENTMCNC: 28.6 G/DL (ref 31–37)
MCHC RBC AUTO-ENTMCNC: 29.1 G/DL (ref 31–37)
MCHC RBC AUTO-ENTMCNC: 29.2 G/DL (ref 31–37)
MCHC RBC AUTO-ENTMCNC: 29.2 G/DL (ref 31–37)
MCHC RBC AUTO-ENTMCNC: 29.4 G/DL (ref 31–37)
MCHC RBC AUTO-ENTMCNC: 29.5 G/DL (ref 31–37)
MCHC RBC AUTO-ENTMCNC: 29.6 G/DL (ref 31–37)
MCHC RBC AUTO-ENTMCNC: 29.6 G/DL (ref 31–37)
MCHC RBC AUTO-ENTMCNC: 29.9 G/DL (ref 31–37)
MCV RBC AUTO: 77.2 FL (ref 74–97)
MCV RBC AUTO: 77.4 FL (ref 74–97)
MCV RBC AUTO: 77.6 FL (ref 74–97)
MCV RBC AUTO: 78.5 FL (ref 74–97)
MCV RBC AUTO: 78.6 FL (ref 74–97)
MCV RBC AUTO: 78.7 FL (ref 74–97)
MCV RBC AUTO: 78.8 FL (ref 74–97)
MCV RBC AUTO: 79.1 FL (ref 74–97)
MCV RBC AUTO: 79.6 FL (ref 74–97)
MONOCYTES # BLD: 0.4 K/UL (ref 0.05–1.2)
MONOCYTES # BLD: 0.7 K/UL (ref 0.05–1.2)
MONOCYTES # BLD: 0.8 K/UL (ref 0.05–1.2)
MONOCYTES NFR BLD: 3 % (ref 3–10)
MONOCYTES NFR BLD: 5 % (ref 3–10)
MONOCYTES NFR BLD: 5 % (ref 3–10)
NEUTS SEG # BLD: 13.9 K/UL (ref 1.8–8)
NEUTS SEG # BLD: 14 K/UL (ref 1.8–8)
NEUTS SEG # BLD: 14.5 K/UL (ref 1.8–8)
NEUTS SEG NFR BLD: 93 % (ref 40–73)
NEUTS SEG NFR BLD: 94 % (ref 40–73)
NEUTS SEG NFR BLD: 94 % (ref 40–73)
NITRITE UR QL STRIP.AUTO: NEGATIVE
PH UR STRIP: 5 [PH] (ref 5–8)
PISA AR MAX VEL: 365.5 CM/S
PLATELET # BLD AUTO: 100 K/UL (ref 135–420)
PLATELET # BLD AUTO: 107 K/UL (ref 135–420)
PLATELET # BLD AUTO: 115 K/UL (ref 135–420)
PLATELET # BLD AUTO: 144 K/UL (ref 135–420)
PLATELET # BLD AUTO: 151 K/UL (ref 135–420)
PLATELET # BLD AUTO: 169 K/UL (ref 135–420)
PLATELET # BLD AUTO: 90 K/UL (ref 135–420)
PLATELET COMMENTS,PCOM: ABNORMAL
PMV BLD AUTO: 10 FL (ref 9.2–11.8)
PMV BLD AUTO: 10.1 FL (ref 9.2–11.8)
PMV BLD AUTO: 9.1 FL (ref 9.2–11.8)
PMV BLD AUTO: 9.2 FL (ref 9.2–11.8)
PMV BLD AUTO: 9.8 FL (ref 9.2–11.8)
POTASSIUM SERPL-SCNC: 3.7 MMOL/L (ref 3.5–5.5)
POTASSIUM SERPL-SCNC: 3.8 MMOL/L (ref 3.5–5.5)
POTASSIUM SERPL-SCNC: 4.4 MMOL/L (ref 3.5–5.5)
POTASSIUM SERPL-SCNC: 4.6 MMOL/L (ref 3.5–5.5)
PROT SERPL-MCNC: 6.4 G/DL (ref 6.4–8.2)
PROT SERPL-MCNC: 7 G/DL (ref 6.4–8.2)
PROT UR STRIP-MCNC: 30 MG/DL
PROTHROMBIN TIME: 23.3 SEC (ref 11.5–15.2)
RBC # BLD AUTO: 3.8 M/UL (ref 4.7–5.5)
RBC # BLD AUTO: 3.87 M/UL (ref 4.7–5.5)
RBC # BLD AUTO: 3.87 M/UL (ref 4.7–5.5)
RBC # BLD AUTO: 3.93 M/UL (ref 4.7–5.5)
RBC # BLD AUTO: 3.94 M/UL (ref 4.7–5.5)
RBC # BLD AUTO: 3.98 M/UL (ref 4.7–5.5)
RBC # BLD AUTO: 4.07 M/UL (ref 4.7–5.5)
RBC # BLD AUTO: 4.09 M/UL (ref 4.7–5.5)
RBC # BLD AUTO: 4.13 M/UL (ref 4.7–5.5)
RBC #/AREA URNS HPF: ABNORMAL /HPF (ref 0–5)
RBC MORPH BLD: ABNORMAL
REPORTED DOSE,DOSE: NORMAL UNITS
REPORTED DOSE/TIME,TMG: 1900
SERVICE CMNT-IMP: ABNORMAL
SODIUM SERPL-SCNC: 137 MMOL/L (ref 136–145)
SODIUM SERPL-SCNC: 138 MMOL/L (ref 136–145)
SODIUM SERPL-SCNC: 138 MMOL/L (ref 136–145)
SODIUM SERPL-SCNC: 140 MMOL/L (ref 136–145)
SP GR UR REFRACTOMETRY: 1.02 (ref 1–1.03)
SPECIMEN EXP DATE BLD: NORMAL
T3FREE SERPL-MCNC: 1.1 PG/ML (ref 2.18–3.98)
T4 FREE SERPL-MCNC: 0.9 NG/DL (ref 0.7–1.5)
TRIGL SERPL-MCNC: 58 MG/DL (ref ?–150)
TROPONIN I SERPL-MCNC: 0.02 NG/ML (ref 0–0.04)
TROPONIN I SERPL-MCNC: <0.02 NG/ML (ref 0–0.04)
TSH SERPL DL<=0.05 MIU/L-ACNC: 9.9 UIU/ML (ref 0.36–3.74)
UROBILINOGEN UR QL STRIP.AUTO: 1 EU/DL (ref 0.2–1)
VANCOMYCIN TROUGH SERPL-MCNC: 10.9 UG/ML (ref 10–20)
VLDLC SERPL CALC-MCNC: 11.6 MG/DL
WBC # BLD AUTO: 14.9 K/UL (ref 4.6–13.2)
WBC # BLD AUTO: 15 K/UL (ref 4.6–13.2)
WBC # BLD AUTO: 15.4 K/UL (ref 4.6–13.2)
WBC # BLD AUTO: 16.1 K/UL (ref 4.6–13.2)
WBC # BLD AUTO: 3.7 K/UL (ref 4.6–13.2)
WBC # BLD AUTO: 3.8 K/UL (ref 4.6–13.2)
WBC # BLD AUTO: 3.9 K/UL (ref 4.6–13.2)
WBC # BLD AUTO: 4.1 K/UL (ref 4.6–13.2)
WBC # BLD AUTO: 4.2 K/UL (ref 4.6–13.2)
WBC URNS QL MICRO: ABNORMAL /HPF (ref 0–4)

## 2019-01-01 PROCEDURE — 71250 CT THORAX DX C-: CPT

## 2019-01-01 PROCEDURE — 74011250637 HC RX REV CODE- 250/637: Performed by: FAMILY MEDICINE

## 2019-01-01 PROCEDURE — 74011250636 HC RX REV CODE- 250/636: Performed by: NURSE PRACTITIONER

## 2019-01-01 PROCEDURE — 96365 THER/PROPH/DIAG IV INF INIT: CPT

## 2019-01-01 PROCEDURE — 74011000258 HC RX REV CODE- 258: Performed by: EMERGENCY MEDICINE

## 2019-01-01 PROCEDURE — 65270000029 HC RM PRIVATE

## 2019-01-01 PROCEDURE — 97535 SELF CARE MNGMENT TRAINING: CPT

## 2019-01-01 PROCEDURE — 83605 ASSAY OF LACTIC ACID: CPT

## 2019-01-01 PROCEDURE — 94660 CPAP INITIATION&MGMT: CPT

## 2019-01-01 PROCEDURE — 87186 SC STD MICRODIL/AGAR DIL: CPT

## 2019-01-01 PROCEDURE — 80053 COMPREHEN METABOLIC PANEL: CPT

## 2019-01-01 PROCEDURE — 87086 URINE CULTURE/COLONY COUNT: CPT

## 2019-01-01 PROCEDURE — 74011250636 HC RX REV CODE- 250/636: Performed by: INTERNAL MEDICINE

## 2019-01-01 PROCEDURE — 65610000006 HC RM INTENSIVE CARE

## 2019-01-01 PROCEDURE — 80061 LIPID PANEL: CPT

## 2019-01-01 PROCEDURE — 74011250636 HC RX REV CODE- 250/636: Performed by: HOSPITALIST

## 2019-01-01 PROCEDURE — 36415 COLL VENOUS BLD VENIPUNCTURE: CPT

## 2019-01-01 PROCEDURE — 92526 ORAL FUNCTION THERAPY: CPT

## 2019-01-01 PROCEDURE — 81001 URINALYSIS AUTO W/SCOPE: CPT

## 2019-01-01 PROCEDURE — 85730 THROMBOPLASTIN TIME PARTIAL: CPT

## 2019-01-01 PROCEDURE — 92610 EVALUATE SWALLOWING FUNCTION: CPT

## 2019-01-01 PROCEDURE — 80202 ASSAY OF VANCOMYCIN: CPT

## 2019-01-01 PROCEDURE — 74011250636 HC RX REV CODE- 250/636: Performed by: EMERGENCY MEDICINE

## 2019-01-01 PROCEDURE — 96368 THER/DIAG CONCURRENT INF: CPT

## 2019-01-01 PROCEDURE — 85027 COMPLETE CBC AUTOMATED: CPT

## 2019-01-01 PROCEDURE — 77030032490 HC SLV COMPR SCD KNE COVD -B

## 2019-01-01 PROCEDURE — 77010033678 HC OXYGEN DAILY

## 2019-01-01 PROCEDURE — 80048 BASIC METABOLIC PNL TOTAL CA: CPT

## 2019-01-01 PROCEDURE — C9113 INJ PANTOPRAZOLE SODIUM, VIA: HCPCS | Performed by: HOSPITALIST

## 2019-01-01 PROCEDURE — 97165 OT EVAL LOW COMPLEX 30 MIN: CPT

## 2019-01-01 PROCEDURE — 87077 CULTURE AEROBIC IDENTIFY: CPT

## 2019-01-01 PROCEDURE — 94762 N-INVAS EAR/PLS OXIMTRY CONT: CPT

## 2019-01-01 PROCEDURE — 97116 GAIT TRAINING THERAPY: CPT

## 2019-01-01 PROCEDURE — 77030020186 HC BOOT HL PROTCT SAGE -B

## 2019-01-01 PROCEDURE — 85025 COMPLETE CBC W/AUTO DIFF WBC: CPT

## 2019-01-01 PROCEDURE — 71045 X-RAY EXAM CHEST 1 VIEW: CPT

## 2019-01-01 PROCEDURE — 77030037878 HC DRSG MEPILEX >48IN BORD MOLN -B

## 2019-01-01 PROCEDURE — 93306 TTE W/DOPPLER COMPLETE: CPT

## 2019-01-01 PROCEDURE — 77030035694 HC MSK BIPAP FLL FAC PERFMAX PHIL -B

## 2019-01-01 PROCEDURE — 97162 PT EVAL MOD COMPLEX 30 MIN: CPT

## 2019-01-01 PROCEDURE — 74011250637 HC RX REV CODE- 250/637: Performed by: NURSE PRACTITIONER

## 2019-01-01 PROCEDURE — 77030019938 HC TBNG IV PCA ICUM -A

## 2019-01-01 PROCEDURE — 83880 ASSAY OF NATRIURETIC PEPTIDE: CPT

## 2019-01-01 PROCEDURE — 74011000250 HC RX REV CODE- 250: Performed by: EMERGENCY MEDICINE

## 2019-01-01 PROCEDURE — 70450 CT HEAD/BRAIN W/O DYE: CPT

## 2019-01-01 PROCEDURE — 74011250637 HC RX REV CODE- 250/637: Performed by: HOSPITALIST

## 2019-01-01 PROCEDURE — 84443 ASSAY THYROID STIM HORMONE: CPT

## 2019-01-01 PROCEDURE — 51702 INSERT TEMP BLADDER CATH: CPT

## 2019-01-01 PROCEDURE — 99285 EMERGENCY DEPT VISIT HI MDM: CPT

## 2019-01-01 PROCEDURE — 82550 ASSAY OF CK (CPK): CPT

## 2019-01-01 PROCEDURE — 85610 PROTHROMBIN TIME: CPT

## 2019-01-01 PROCEDURE — 86900 BLOOD TYPING SEROLOGIC ABO: CPT

## 2019-01-01 PROCEDURE — 96366 THER/PROPH/DIAG IV INF ADDON: CPT

## 2019-01-01 PROCEDURE — 74011000250 HC RX REV CODE- 250

## 2019-01-01 PROCEDURE — 77030005534 HC CATH URETH FOL TEMP SENS SIMS -B

## 2019-01-01 PROCEDURE — 84481 FREE ASSAY (FT-3): CPT

## 2019-01-01 PROCEDURE — 97530 THERAPEUTIC ACTIVITIES: CPT

## 2019-01-01 PROCEDURE — 82962 GLUCOSE BLOOD TEST: CPT

## 2019-01-01 PROCEDURE — 87040 BLOOD CULTURE FOR BACTERIA: CPT

## 2019-01-01 PROCEDURE — 84439 ASSAY OF FREE THYROXINE: CPT

## 2019-01-01 RX ORDER — SCOLOPAMINE TRANSDERMAL SYSTEM 1 MG/1
1 PATCH, EXTENDED RELEASE TRANSDERMAL
Status: DISCONTINUED | OUTPATIENT
Start: 2019-01-01 | End: 2019-01-01 | Stop reason: HOSPADM

## 2019-01-01 RX ORDER — NOREPINEPHRINE BIT/0.9 % NACL 8 MG/250ML
2-16 INFUSION BOTTLE (ML) INTRAVENOUS
Status: DISCONTINUED | OUTPATIENT
Start: 2019-01-01 | End: 2019-01-01

## 2019-01-01 RX ORDER — ALBUTEROL SULFATE 0.83 MG/ML
2.5 SOLUTION RESPIRATORY (INHALATION)
Status: DISCONTINUED | OUTPATIENT
Start: 2019-01-01 | End: 2019-01-01 | Stop reason: HOSPADM

## 2019-01-01 RX ORDER — ONDANSETRON 2 MG/ML
4 INJECTION INTRAMUSCULAR; INTRAVENOUS
Status: DISCONTINUED | OUTPATIENT
Start: 2019-01-01 | End: 2019-01-01 | Stop reason: HOSPADM

## 2019-01-01 RX ORDER — PANTOPRAZOLE SODIUM 40 MG/1
40 TABLET, DELAYED RELEASE ORAL DAILY
Status: DISCONTINUED | OUTPATIENT
Start: 2019-01-01 | End: 2019-01-01 | Stop reason: SDUPTHER

## 2019-01-01 RX ORDER — VANCOMYCIN/0.9 % SOD CHLORIDE 1.5G/250ML
1500 PLASTIC BAG, INJECTION (ML) INTRAVENOUS ONCE
Status: COMPLETED | OUTPATIENT
Start: 2019-01-01 | End: 2019-01-01

## 2019-01-01 RX ORDER — SODIUM CHLORIDE 0.9 % (FLUSH) 0.9 %
5-10 SYRINGE (ML) INJECTION AS NEEDED
Status: DISCONTINUED | OUTPATIENT
Start: 2019-01-01 | End: 2019-01-01

## 2019-01-01 RX ORDER — FENTANYL CITRATE 50 UG/ML
25 INJECTION, SOLUTION INTRAMUSCULAR; INTRAVENOUS
Status: DISCONTINUED | OUTPATIENT
Start: 2019-01-01 | End: 2019-01-01

## 2019-01-01 RX ORDER — LIDOCAINE HYDROCHLORIDE 20 MG/ML
JELLY TOPICAL ONCE
Status: DISPENSED | OUTPATIENT
Start: 2019-01-01 | End: 2019-01-01

## 2019-01-01 RX ORDER — LEVOFLOXACIN 5 MG/ML
750 INJECTION, SOLUTION INTRAVENOUS EVERY 24 HOURS
Status: DISCONTINUED | OUTPATIENT
Start: 2019-01-01 | End: 2019-01-01 | Stop reason: DRUGHIGH

## 2019-01-01 RX ORDER — PANTOPRAZOLE SODIUM 40 MG/10ML
40 INJECTION, POWDER, LYOPHILIZED, FOR SOLUTION INTRAVENOUS DAILY
Status: DISCONTINUED | OUTPATIENT
Start: 2019-01-01 | End: 2019-01-01

## 2019-01-01 RX ORDER — ACETAMINOPHEN 650 MG/1
650 SUPPOSITORY RECTAL
Status: DISCONTINUED | OUTPATIENT
Start: 2019-01-01 | End: 2019-01-01 | Stop reason: HOSPADM

## 2019-01-01 RX ORDER — NOREPINEPHRINE BIT/0.9 % NACL 8 MG/250ML
INFUSION BOTTLE (ML) INTRAVENOUS
Status: COMPLETED
Start: 2019-01-01 | End: 2019-01-01

## 2019-01-01 RX ORDER — NOREPINEPHRINE BITARTRATE/D5W 8 MG/250ML
2-16 PLASTIC BAG, INJECTION (ML) INTRAVENOUS
Status: DISCONTINUED | OUTPATIENT
Start: 2019-01-01 | End: 2019-01-01 | Stop reason: CLARIF

## 2019-01-01 RX ORDER — LEVOFLOXACIN 5 MG/ML
750 INJECTION, SOLUTION INTRAVENOUS
Status: DISCONTINUED | OUTPATIENT
Start: 2019-01-01 | End: 2019-01-01

## 2019-01-01 RX ORDER — LORAZEPAM 2 MG/ML
0.5 INJECTION INTRAMUSCULAR
Status: DISCONTINUED | OUTPATIENT
Start: 2019-01-01 | End: 2019-01-01

## 2019-01-01 RX ORDER — SODIUM CHLORIDE 9 MG/ML
75 INJECTION, SOLUTION INTRAVENOUS CONTINUOUS
Status: DISCONTINUED | OUTPATIENT
Start: 2019-01-01 | End: 2019-01-01

## 2019-01-01 RX ORDER — SODIUM CHLORIDE 9 MG/ML
50 INJECTION, SOLUTION INTRAVENOUS CONTINUOUS
Status: DISCONTINUED | OUTPATIENT
Start: 2019-01-01 | End: 2019-01-01

## 2019-01-01 RX ORDER — LORAZEPAM 2 MG/ML
1 INJECTION INTRAMUSCULAR
Status: DISCONTINUED | OUTPATIENT
Start: 2019-01-01 | End: 2019-01-01 | Stop reason: HOSPADM

## 2019-01-01 RX ORDER — HYDROCORTISONE SODIUM SUCCINATE 100 MG/2ML
100 INJECTION, POWDER, FOR SOLUTION INTRAMUSCULAR; INTRAVENOUS EVERY 12 HOURS
Status: DISCONTINUED | OUTPATIENT
Start: 2019-01-01 | End: 2019-01-01

## 2019-01-01 RX ORDER — FENTANYL CITRATE 50 UG/ML
25 INJECTION, SOLUTION INTRAMUSCULAR; INTRAVENOUS ONCE
Status: COMPLETED | OUTPATIENT
Start: 2019-01-01 | End: 2019-01-01

## 2019-01-01 RX ORDER — SODIUM CHLORIDE 9 MG/ML
25 INJECTION, SOLUTION INTRAVENOUS CONTINUOUS
Status: DISCONTINUED | OUTPATIENT
Start: 2019-01-01 | End: 2019-01-01 | Stop reason: HOSPADM

## 2019-01-01 RX ORDER — ACETAMINOPHEN 325 MG/1
650 TABLET ORAL
Status: DISCONTINUED | OUTPATIENT
Start: 2019-01-01 | End: 2019-01-01 | Stop reason: HOSPADM

## 2019-01-01 RX ORDER — ACETAMINOPHEN 325 MG/1
650 TABLET ORAL
Status: DISCONTINUED | OUTPATIENT
Start: 2019-01-01 | End: 2019-01-01

## 2019-01-01 RX ORDER — FACIAL-BODY WIPES
10 EACH TOPICAL DAILY PRN
Status: DISCONTINUED | OUTPATIENT
Start: 2019-01-01 | End: 2019-01-01 | Stop reason: HOSPADM

## 2019-01-01 RX ORDER — FENTANYL CITRATE 50 UG/ML
25 INJECTION, SOLUTION INTRAMUSCULAR; INTRAVENOUS
Status: DISCONTINUED | OUTPATIENT
Start: 2019-01-01 | End: 2019-01-01 | Stop reason: HOSPADM

## 2019-01-01 RX ADMIN — LEVOTHYROXINE SODIUM ANHYDROUS 50 MCG: 100 INJECTION, POWDER, LYOPHILIZED, FOR SOLUTION INTRAVENOUS at 09:35

## 2019-01-01 RX ADMIN — SODIUM CHLORIDE 75 ML/HR: 900 INJECTION, SOLUTION INTRAVENOUS at 18:15

## 2019-01-01 RX ADMIN — SODIUM CHLORIDE, SODIUM LACTATE, POTASSIUM CHLORIDE, AND CALCIUM CHLORIDE 500 ML: 600; 310; 30; 20 INJECTION, SOLUTION INTRAVENOUS at 08:41

## 2019-01-01 RX ADMIN — SODIUM CHLORIDE 50 ML/HR: 900 INJECTION, SOLUTION INTRAVENOUS at 23:45

## 2019-01-01 RX ADMIN — FUROSEMIDE 40 MG: 40 TABLET ORAL at 17:28

## 2019-01-01 RX ADMIN — NOREPINEPHRINE BITARTRATE 11 MCG/MIN: 1 INJECTION INTRAVENOUS at 13:36

## 2019-01-01 RX ADMIN — FENTANYL CITRATE 25 MCG: 50 INJECTION, SOLUTION INTRAMUSCULAR; INTRAVENOUS at 10:46

## 2019-01-01 RX ADMIN — AZTREONAM 1 G: 1 INJECTION, POWDER, LYOPHILIZED, FOR SOLUTION INTRAMUSCULAR; INTRAVENOUS at 20:21

## 2019-01-01 RX ADMIN — SODIUM CHLORIDE 50 ML/HR: 900 INJECTION, SOLUTION INTRAVENOUS at 02:23

## 2019-01-01 RX ADMIN — AMLODIPINE BESYLATE 5 MG: 5 TABLET ORAL at 10:21

## 2019-01-01 RX ADMIN — Medication 1 SPRAY: at 23:15

## 2019-01-01 RX ADMIN — ENALAPRIL MALEATE 20 MG: 10 TABLET ORAL at 11:47

## 2019-01-01 RX ADMIN — VANCOMYCIN HYDROCHLORIDE 1500 MG: 10 INJECTION, POWDER, LYOPHILIZED, FOR SOLUTION INTRAVENOUS at 19:18

## 2019-01-01 RX ADMIN — LORAZEPAM 1 MG: 2 INJECTION, SOLUTION INTRAMUSCULAR; INTRAVENOUS at 11:11

## 2019-01-01 RX ADMIN — AMLODIPINE BESYLATE 5 MG: 5 TABLET ORAL at 11:47

## 2019-01-01 RX ADMIN — FENTANYL CITRATE 25 MCG: 50 INJECTION, SOLUTION INTRAMUSCULAR; INTRAVENOUS at 16:28

## 2019-01-01 RX ADMIN — PANTOPRAZOLE SODIUM 40 MG: 40 TABLET, DELAYED RELEASE ORAL at 10:22

## 2019-01-01 RX ADMIN — Medication 1 SPRAY: at 22:00

## 2019-01-01 RX ADMIN — Medication 1 SPRAY: at 06:49

## 2019-01-01 RX ADMIN — FENTANYL CITRATE 25 MCG: 50 INJECTION, SOLUTION INTRAMUSCULAR; INTRAVENOUS at 11:11

## 2019-01-01 RX ADMIN — HYDROMORPHONE HYDROCHLORIDE: 10 INJECTION, SOLUTION INTRAMUSCULAR; INTRAVENOUS; SUBCUTANEOUS at 13:15

## 2019-01-01 RX ADMIN — AZTREONAM 1 G: 1 INJECTION, POWDER, LYOPHILIZED, FOR SOLUTION INTRAMUSCULAR; INTRAVENOUS at 04:14

## 2019-01-01 RX ADMIN — SODIUM CHLORIDE 1000 MG: 900 INJECTION, SOLUTION INTRAVENOUS at 06:41

## 2019-01-01 RX ADMIN — PANTOPRAZOLE SODIUM 40 MG: 40 INJECTION, POWDER, FOR SOLUTION INTRAVENOUS at 09:17

## 2019-01-01 RX ADMIN — LORAZEPAM 1 MG: 2 INJECTION, SOLUTION INTRAMUSCULAR; INTRAVENOUS at 10:45

## 2019-01-01 RX ADMIN — NOREPINEPHRINE BITARTRATE 8 MCG/MIN: 1 INJECTION INTRAVENOUS at 04:00

## 2019-01-01 RX ADMIN — LEVOFLOXACIN 750 MG: 5 INJECTION, SOLUTION INTRAVENOUS at 19:18

## 2019-01-01 RX ADMIN — SODIUM CHLORIDE 500 ML: 900 INJECTION, SOLUTION INTRAVENOUS at 19:18

## 2019-01-01 RX ADMIN — AZTREONAM 1 G: 1 INJECTION, POWDER, LYOPHILIZED, FOR SOLUTION INTRAMUSCULAR; INTRAVENOUS at 13:18

## 2019-01-01 RX ADMIN — PANTOPRAZOLE SODIUM 40 MG: 40 INJECTION, POWDER, FOR SOLUTION INTRAVENOUS at 09:16

## 2019-01-01 RX ADMIN — ACETAMINOPHEN 650 MG: 325 TABLET, FILM COATED ORAL at 07:17

## 2019-01-01 RX ADMIN — ENALAPRIL MALEATE 20 MG: 10 TABLET ORAL at 10:18

## 2019-01-01 RX ADMIN — LORAZEPAM 1 MG: 2 INJECTION, SOLUTION INTRAMUSCULAR; INTRAVENOUS at 00:20

## 2019-01-01 RX ADMIN — FUROSEMIDE 40 MG: 40 TABLET ORAL at 10:19

## 2019-01-01 RX ADMIN — LEVOFLOXACIN 750 MG: 5 INJECTION, SOLUTION INTRAVENOUS at 18:10

## 2019-01-01 RX ADMIN — METOPROLOL SUCCINATE 50 MG: 50 TABLET, EXTENDED RELEASE ORAL at 10:20

## 2019-01-01 RX ADMIN — FENTANYL CITRATE 25 MCG: 50 INJECTION, SOLUTION INTRAMUSCULAR; INTRAVENOUS at 21:08

## 2019-01-01 RX ADMIN — HYDROCORTISONE SODIUM SUCCINATE 100 MG: 100 INJECTION, POWDER, FOR SOLUTION INTRAMUSCULAR; INTRAVENOUS at 20:42

## 2019-01-01 RX ADMIN — AZTREONAM 1 G: 1 INJECTION, POWDER, LYOPHILIZED, FOR SOLUTION INTRAMUSCULAR; INTRAVENOUS at 05:15

## 2019-01-01 RX ADMIN — AZTREONAM 1 G: 1 INJECTION, POWDER, LYOPHILIZED, FOR SOLUTION INTRAMUSCULAR; INTRAVENOUS at 13:38

## 2019-01-01 RX ADMIN — PANTOPRAZOLE SODIUM 40 MG: 40 TABLET, DELAYED RELEASE ORAL at 09:44

## 2019-01-01 RX ADMIN — SODIUM CHLORIDE 1000 ML: 900 INJECTION, SOLUTION INTRAVENOUS at 20:58

## 2019-01-01 RX ADMIN — FENTANYL CITRATE 25 MCG: 50 INJECTION, SOLUTION INTRAMUSCULAR; INTRAVENOUS at 05:10

## 2019-01-01 RX ADMIN — FUROSEMIDE 40 MG: 40 TABLET ORAL at 11:47

## 2019-01-01 RX ADMIN — PANTOPRAZOLE SODIUM 40 MG: 40 TABLET, DELAYED RELEASE ORAL at 11:47

## 2019-01-01 RX ADMIN — PANTOPRAZOLE SODIUM 40 MG: 40 INJECTION, POWDER, FOR SOLUTION INTRAVENOUS at 09:35

## 2019-01-01 RX ADMIN — AZTREONAM 1 G: 1 INJECTION, POWDER, LYOPHILIZED, FOR SOLUTION INTRAMUSCULAR; INTRAVENOUS at 05:12

## 2019-01-01 RX ADMIN — HYDROCORTISONE SODIUM SUCCINATE 100 MG: 100 INJECTION, POWDER, FOR SOLUTION INTRAMUSCULAR; INTRAVENOUS at 20:21

## 2019-01-01 RX ADMIN — FENTANYL CITRATE 25 MCG: 50 INJECTION, SOLUTION INTRAMUSCULAR; INTRAVENOUS at 00:07

## 2019-01-01 RX ADMIN — METOPROLOL SUCCINATE 50 MG: 50 TABLET, EXTENDED RELEASE ORAL at 11:47

## 2019-01-01 RX ADMIN — AZTREONAM 2 G: 2 INJECTION, POWDER, LYOPHILIZED, FOR SOLUTION INTRAMUSCULAR; INTRAVENOUS at 19:18

## 2019-01-01 RX ADMIN — SODIUM CHLORIDE 25 ML/HR: 900 INJECTION, SOLUTION INTRAVENOUS at 14:30

## 2019-01-01 RX ADMIN — FENTANYL CITRATE 25 MCG: 50 INJECTION, SOLUTION INTRAMUSCULAR; INTRAVENOUS at 09:14

## 2019-01-01 RX ADMIN — HYDROCORTISONE SODIUM SUCCINATE 100 MG: 100 INJECTION, POWDER, FOR SOLUTION INTRAMUSCULAR; INTRAVENOUS at 09:16

## 2019-01-01 RX ADMIN — AZTREONAM 1 G: 1 INJECTION, POWDER, LYOPHILIZED, FOR SOLUTION INTRAMUSCULAR; INTRAVENOUS at 20:41

## 2019-01-01 NOTE — ROUTINE PROCESS
1030-Assumed care from [facility] and received report. Pt had GI MD  visit and reported to the Dr yesterday explosive devyn blood stool and on eliquis which is now being held. The bleeding scan yesterday showed bleeding in sigmoid colon. Has IV #20 L ac and R #18 above ac and #22 in R arm . All hep lock. 1300-/64 taken due to prior 88/ and he was instructed to drink his liquid diet tray which he did and his daughter in law who works in [employer] was visiting and her  and they were joking with him too and saw the improved bp.

## 2019-01-01 NOTE — PROGRESS NOTES
Problem: Discharge Planning Goal: *Discharge to safe environment Outcome: Resolved/Met Date Met: 01/01/19 
home

## 2019-01-01 NOTE — PROGRESS NOTES
Pt states he has an advance directive and DNR order. Unable to find in  or chart, will instruct pt to haver family bring in copy.

## 2019-01-01 NOTE — PROGRESS NOTES
Progress Note Patient: Lesli Purdy               Sex: male          DOA: 12/31/2018 YOB: 1930      Age:  80 y.o.        LOS:  LOS: 1 day CHIEF COMPLAINT:  Rectal bleeding in the setting of anticoagulation. Subjective:  
 
Patient has not seen more bleeding Objective:  
  
Visit Vitals /64 (BP 1 Location: Right arm, BP Patient Position: At rest) Pulse 79 Temp 97 °F (36.1 °C) Resp 18 Ht 5' 9\" (1.753 m) Wt 69.3 kg (152 lb 12.5 oz) SpO2 96% BMI 22.56 kg/m² Physical Exam: 
Gen:  Patient is in no distress. No complaint HEENT and Neck:  PERRL, No cervical tenderness or masses Lungs:  Clear bilaterally. No rales, no wheeze. Normal effort. Heart:  Regular Rate and Rhythm. No murmur or gallop. No JVD. No edema. Abdomen:  Soft, non tender, no masses, no Hepatosplenomegally Extremities:  No digital clubbing, no cyanosis Neuro:  Alert and oriented, Normal affect, Cranial nerves intact, No sensory deficits Lab/Data Reviewed: 
BMP:  
Lab Results Component Value Date/Time  12/31/2018 05:05 PM  
 K 3.7 12/31/2018 05:05 PM  
  12/31/2018 05:05 PM  
 CO2 24 12/31/2018 05:05 PM  
 AGAP 9 12/31/2018 05:05 PM  
  (H) 12/31/2018 05:05 PM  
 BUN 55 (H) 12/31/2018 05:05 PM  
 CREA 1.95 (H) 12/31/2018 05:05 PM  
 GFRAA 40 (L) 12/31/2018 05:05 PM  
 GFRNA 33 (L) 12/31/2018 05:05 PM  
 
CBC:  
Lab Results Component Value Date/Time WBC 4.2 (L) 01/01/2019 12:56 PM  
 HGB 9.5 (L) 01/01/2019 12:56 PM  
 HCT 32.1 (L) 01/01/2019 12:56 PM  
  01/01/2019 12:56 PM  
 
 
 
 
Assessment/Plan Principal Problem: LGI bleed (12/31/2018) Overview: Admit for further monitor GI consult Serial H&H Consider blood transfusion if persistent bleeding and patient becomes  
    symptomatic Active Problems: 
  Atrial fibrillation () Overview: CHADS score 3  (+CHF, +HTN, +AGE, -DM, -CVA) Diastolic CHF, chronic ([address] (9/26/2018) S/P CABG x 2 (8/25/2015) Overview: 12/2008, LIMA to LAD, SVG to RCA Essential hypertension (2/27/2017) Aortic stenosis, moderate (1/28/2018) Dyslipidemia () Non-rheumatic tricuspid valve insufficiency (1/1/2019) Atherosclerotic NAHED (renal artery stenosis), bilateral ([address] (8/25/2015) Overview: S/P stenting R and L Cardiac pacemaker in situ (9/16/2018) Plan: 
Following H/H Reviewed Dr Padmini Negrete notes from September. Patient has A fib as well as tricuspid regurgitation. Will consult Cardiology for guidance regarding anticoagulation Further GI workup after seen by cardiology BP control

## 2019-01-01 NOTE — PROGRESS NOTES
1550 -- Assumed patient from [place], [address] [place] [address]. 
 
3013 -- Shift assessment completed, A/O x4, IV flushed and capped, bed in low position, rails x3 up, patient states little pain, but not enough for pain med at this time, call bell in reach, will continue to monitor. 1728 -- Meds given. Bedside shift change report given to [facility] (oncoming nurse) by Eyal Parmar RN (offgoing nurse). Report included the following information SBAR, Kardex, Intake/Output, MAR and Recent Results.

## 2019-01-01 NOTE — H&P
Internal Medicine History and Physical 
   
 
 
Subjective HPI: Silvia Christian is a 80 y.o. male who presented to the ED withacute rectal bleeding today. The patient states was passing copious amounts of bright red blood into the toilet without stool. The patient is currently on Eliquis and states he has had some episodes of scant blood from his rectum in the past, but never this severe. The patient reports light-headedness and mild abdominal pain he describes as a soreness. The patient was discharged from [facility] ED earlier this morning as his testicles were swollen and could not urinate. The patient states his testicles have only improved some since bring discharged. The patient states he has been compliant with his Lasix. ED evaluation revealed a hemorrhoid. Hgb lower than previous values but not significantly. GI consulted. NM bleeding scan done and it showed a possible rectosigmoid source. Will admit for further evaluation and treatment PMHx: 
Past Medical History:  
Diagnosis Date  Atrial fibrillation CHADS score 3  (+CHF, +HTN, +AGE, -DM, -CVA)  CABG   
 2008   LIMA - LAD,   SVG - RCA  Cardiomyopathy EF 30-35% (ECHO 6/14)  Coronary artery disease  Dyslipidemia  Hypertension  Hypothyroid  Pacemaker  Peripheral vascular disease  Renal artery stenosis   
 bilateral stents  Sick sinus syndrome PSurgHx: 
Past Surgical History:  
Procedure Laterality Date  HX CORONARY ARTERY BYPASS GRAFT    
 2008   LIMA - LAD,   SVG - RCA  [place] INSJ 1 TRANSVNS ELTRD PERM PACEMAKER/IMPLTBL DFB N/A 10/1/2018 Insert Or Replace Single Lead performed by Anette Orantes MD at [address] LAB SocialHx: 
Social History Socioeconomic History  Marital status:  Spouse name: Not on file  Number of children: Not on file  Years of education: Not on file  Highest education level: Not on file Social Needs  Financial resource strain: Not on file  Food insecurity - worry: Not on file  Food insecurity - inability: Not on file  Transportation needs - medical: Not on file  Transportation needs - non-medical: Not on file Occupational History  Not on file Tobacco Use  Smoking status: Former Smoker  Smokeless tobacco: Never Used Substance and Sexual Activity  Alcohol use: No  
 Drug use: No  
 Sexual activity: Not on file Other Topics Concern  Not on file Social History Narrative  Not on file Allergies: Allergies Allergen Reactions  Beta-Blockers (Beta-Adrenergic Blocking Agts) Drowsiness  Penicillins Swelling  Statins-Hmg-Coa Reductase Inhibitors Drowsiness FamilyHx: 
History reviewed. No pertinent family history. Prior to Admission Medications Prescriptions Last Dose Informant Patient Reported? Taking? ALPRAZolam (XANAX) 0.25 mg tablet   No No  
Sig: Take 1 Tab by mouth two (2) times daily as needed for Anxiety. Max Daily Amount: 0.5 mg.  
ELIQUIS 5 mg tablet   Yes No  
Sig: TAKE 1 TABLET BY MOUTH TWICE A DAY OXYGEN-AIR DELIVERY SYSTEMS   Yes No  
Sig: by Does Not Apply route. amLODIPine (NORVASC) 5 mg tablet   No No  
Sig: Take 1 Tab by mouth daily. enalapril (VASOTEC) 20 mg tablet   Yes No  
Sig: Take 20 mg by mouth daily. furosemide (LASIX) 40 mg tablet   Yes No  
Si mg.  
metoprolol succinate (TOPROL XL) 50 mg XL tablet   No No  
Sig: Take 1 Tab by mouth daily. nitroglycerin (NITROSTAT) 0.4 mg SL tablet   No No  
Si Tab by SubLINGual route every five (5) minutes as needed for Chest Pain (up to 3 total 1 every 5 min). omeprazole (PRILOSEC) 40 mg capsule   Yes No  
Sig: Take 40 mg by mouth daily. traMADol (ULTRAM) 50 mg tablet   Yes No  
Sig: TAKE 1 TABLET EVERY 6 HOURS AS NEEDED Facility-Administered Medications: None Review of Systems: 
CONST: fever(-),   chills(-),   fatigue(-),   malaise(-) SKIN: itching(-),   rash(-) EYES: vision - no change from baseline ENT: ear pain(-),   nasal discharge(-),   sore throat(-),   voice change(-) RESP: SOB(-),   cough(-),   hemoptysis(-) CV: chest pain(-),   PND(-),   orthopnea(-),   exertional dyspnea(-),   palpitations(-), syncope(-) 
GI: abd pain(-),   nausea(-),   vomiting(-),   diarrhea(-),   melena(-),   hematemesis(-), hematochesia(+) : dysuria(-),   frequency(-),   urgency(-),   hematuria(-) 
MS: arthralgias(-),   myalgias(-) NEURO: speech w/o change,   tremors(-),   seizures(-),   numbness(-),   dizziness(-) Objective Visit Vitals /67 Pulse 80 Temp 96.2 °F (35.7 °C) Resp 14 Ht 5' 9\" (1.753 m) Wt 77.1 kg (170 lb) SpO2 98% BMI 25.10 kg/m² Physical Exam: 
CONST: NAD,   VSS reviewed SKIN: rashes(-),   ulcers(-) EYES: PERRL,   EOMI,   sclerae w/o jaundice HENT: HEENT,   normal appearing nose and ears,   normal nasal mucosa and turbinates NECK: supple,   no LA,   trachea is midline,   thyroid w/o goiter or palpable nodules RESP: normal respiratory effort,   wheezes(-),   rales(-),   rhochi(-),   no consolidation on percussion CHEST: normal axillae,   retractions(-),   use of accessory muscles(-) CV: JVD(-),   carotid bruits(-),   RRR,   gallops(-),   murmurs(-),   edema LE(+),   abd bruits(-),   peripheral pulses normal 
ABD: soft, tender(-),   distended(-),   HSM(-),   BS(+) in all quadrants,   peritoneal signs(-) 
MS: NROM in all extremities,  clubbing(-),   peripheral cyanosis(-) NEURO: speech normal, tremors(-),   CN II-XII(-),   lateralizing signs(-) PSYCH: AOC x 3,   appropriate affect,   non-suicidal 
 
 
Laboratory Studies: All lab results for the last 24 hours reviewed. Imaging Reviewed: 
[facility] Result Date: 12/31/2018 EXAM: XR CHEST PORT CLINICAL INDICATION/HISTORY: SOB -Additional: Peripheral edema COMPARISON: Two-view chest 10/2/2018 TECHNIQUE: Portable frontal view of the chest _______________ FINDINGS: SUPPORT DEVICES: Bilateral pacemakers are present via the left and right subclavian approach. These are stable in appearance. HEART AND MEDIASTINUM: Heart remains significantly enlarged. There is mild cephalization of blood flow similar to prior exam. LUNGS AND PLEURAL SPACES: Stable small bilateral pleural effusions with possible underlying left lower lobe atelectasis. No pneumothorax. BONY THORAX AND SOFT TISSUES: Unremarkable. _______________ IMPRESSION: 1. Stable chest with cardiomegaly and mild CHF with small bilateral pleural effusions. Assessment/Plan Active Hospital Problems Diagnosis Date Noted  LGI bleed 12/31/2018 Admit for further monitor GI consult Serial H&H Consider blood transfusion if persistent bleeding and patient becomes symptomatic 
  
 
 
- Cont acceptable home medications for chronic conditions  
- DVT protocol and GI prophylaxis I have personally reviewed all pertinent labs, films and EKGs that have officially resulted. I reviewed available electronic documentation outlining the initial presentation as well as the emergency room physician's encounter. Total time spent with patient and chart review, orders and documentation - 45min out of which more than 50% spent face-to-face with patient. Femi Hightower MD 
12/31/2018 11:07 PM 
 
 
[facility]

## 2019-01-01 NOTE — PROGRESS NOTES
Reason for Admission:   Gi bleed RRAT Score:     27 Resources/supports as identified by patient/family:   mcr/bc Top Challenges facing patient (as identified by patient/family and CM): Finances/Medication cost?       
           
Transportation?  family Support system or lack thereof? Living arrangements? Lives with spouse Self-care/ADLs/Cognition?  independent Current Advanced Directive/Advance Care Plan:  Med kylea elvis still on file Plan for utilizing home health:    possible Likelihood of readmission: mod Transition of Care Plan:     Spoke with pt, lives with spouse, med poa on file, naming spouse. States he is independent with adls, amb with walker, has a cane also. Has home O2 provided by [employer]. Son will transport home. pcp dr Pedro Arnold, saw few mo ago. Demographics correct. Plan home, he currently does not think he will need hh. Cm to follow. Patient has designated _______spouse_________________ to participate in his/her discharge plan and to receive any needed information. Name: Arturo Reaves Address: 
Phone number: C# [phone] # [phone]

## 2019-01-01 NOTE — ED NOTES
TRANSFER - ED to INPATIENT REPORT: 
 
SBAR report made available to receiving floor on this patient being transferred to [address] [place] [address] (TELE)  for routine progression of care Admitting diagnosis LGI bleed Information from the following report(s) SBAR was made available to receiving floor. Lines:    
 
Medication list updated today Opportunity for questions and clarification was provided. Patient is oriented to time, place, person and situation N/A Patient is  continent and ambulatory with assist 
  
Valuables transported with patient Patient transported with: 
 Registered Nurse Vitals w/ MEWS Score (last day) Date/Time MEWS Score Pulse Resp Temp BP Level of Consciousness SpO2  
 12/31/18 2300    80      101/62    98 % 12/31/18 2245    80  13    110/58    99 % 12/31/18 2230    80  14    115/67    98 % 12/31/18 2216          117/75      
 12/31/18 1919        96.2 °F (35.7 °C)        
 12/31/18 1900    80  14    109/68    97 % 12/31/18 1845    80  19    103/59    97 % 12/31/18 1714        94.6 °F (34.8 °C)  (Abnormal)         
 12/31/18 16:47:36              98 % 12/31/18 1641    79  16    109/59    98 %

## 2019-01-01 NOTE — PROGRESS NOTES
Problem: Falls - Risk of 
Goal: *Absence of Falls Document Orvel Buffy Fall Risk and appropriate interventions in the flowsheet. Outcome: Progressing Towards Goal 
Fall Risk Interventions: 
Mobility Interventions: Communicate number of staff needed for ambulation/transfer Medication Interventions: Patient to call before getting OOB, Teach patient to arise slowly Elimination Interventions: Call light in reach, Urinal in reach History of Falls Interventions: Bed/chair exit alarm

## 2019-01-01 NOTE — CONSULTS
Consult Note Patient: Carlos Alberto Hawkins               Sex: male          DOA: 12/31/2018 YOB: 1930      Age:  80 y.o.        LOS:  LOS: 1 day HPI:  
 
Carlos Alberto Hawkins is a 80 y.o. male admitted last night with a hx of two episodes of \"explosive\" hematochezia at home. There was no associated abdominal pain, nausea, vomiting, chest pain, dizziness. Jyoti Antoine Wife called 911 and the patient was brought to the ER. He has had no further bleeding and no bowel movements since. The patient was maintained on Eliquis for a fib. He took a dose yesterday morning. It has been held since. VSS remained stable. The patient had a NM bleeding scan last night that was reportedly positive in the sigmoid colon. In terms of his past GI hx. He has gastroesophageal reflux and a chronic peptic stricture dilated over the years. He has been under the care of Dr Jackie Ty. His last colonoscopy was performed in June 2012 and showed left-sided diverticulosis and internal hemorrhoids. He had an EGD in 2014 that showed a small hiatus hernia and gastritis. The patient has a hx of CAD, S/P CABG, congestive cardiomyopathy with an EF of about 30%. He has a hx of SSS, S/P pacemaker insertion. He gets dyspnea on exertion intermittently. He has oxygen at home that he uses intermittently when he gets short of breath. He is a former smoker, quit 40-50 years ago. There is no prior hx of CVA. He has a hx of chronic renal insufficiency, renal artery stenosis, and peripheral vascular disease. Past Medical History:  
Diagnosis Date  Atrial fibrillation CHADS score 3  (+CHF, +HTN, +AGE, -DM, -CVA)  CABG   
 2008   LIMA - LAD,   SVG - RCA  Cardiomyopathy EF 30-35% (ECHO 6/14)  Coronary artery disease  Dyslipidemia  Hypertension  Hypothyroid  Pacemaker  Peripheral vascular disease  Renal artery stenosis   
 bilateral stents  Sick sinus syndrome Past Surgical History:  
Procedure Laterality Date  HX CORONARY ARTERY BYPASS GRAFT    
 2008   LIMA - LAD,   SVG - RCA  CO INSJ 1 TRANSVNS ELTRD PERM PACEMAKER/IMPLTBL DFB N/A 10/1/2018 Insert Or Replace Single Lead performed by Ilsa Arrington MD at [address] [place] [address] LAB History reviewed. No pertinent family history. Social History Socioeconomic History  Marital status:  Spouse name: Not on file  Number of children: Not on file  Years of education: Not on file  Highest education level: Not on file Tobacco Use  Smoking status: Former Smoker  Smokeless tobacco: Never Used Substance and Sexual Activity  Alcohol use: No  
 Drug use: No  
 
 
Prior to Admission medications Medication Sig Start Date End Date Taking? Authorizing Provider ALPRAZolam (XANAX) 0.25 mg tablet Take 1 Tab by mouth two (2) times daily as needed for Anxiety. Max Daily Amount: 0.5 mg. 12/18/18  Yes Marilou Pardo MD  
ELIQUIS 5 mg tablet TAKE 1 TABLET BY MOUTH TWICE A DAY 11/7/18  Yes Provider, Historical  
furosemide (LASIX) 40 mg tablet 40 mg. 12/26/17  Yes Provider, Historical  
omeprazole (PRILOSEC) 40 mg capsule Take 40 mg by mouth daily. Yes Provider, Historical  
OXYGEN-AIR DELIVERY SYSTEMS by Does Not Apply route. Yes Provider, Historical  
amLODIPine (NORVASC) 5 mg tablet Take 1 Tab by mouth daily. 2/8/18  Yes Carolina Sanon MD  
metoprolol succinate (TOPROL XL) 50 mg XL tablet Take 1 Tab by mouth daily. 2/8/18  Yes Carolina Sanon MD  
nitroglycerin (NITROSTAT) 0.4 mg SL tablet 1 Tab by SubLINGual route every five (5) minutes as needed for Chest Pain (up to 3 total 1 every 5 min). 2/7/18  Yes Carolina Sanon MD  
enalapril (VASOTEC) 20 mg tablet Take 20 mg by mouth daily. Yes Other, MD Dustin  
traMADol (ULTRAM) 50 mg tablet TAKE 1 TABLET EVERY 6 HOURS AS NEEDED 9/27/18   Provider, Historical  
 
 
Allergies Allergen Reactions  Beta-Blockers (Beta-Adrenergic Blocking Agts) Drowsiness  Penicillins Swelling  Statins-Hmg-Coa Reductase Inhibitors Drowsiness Review of Systems A comprehensive review of systems was negative except for that written in the History of Present Illness. Physical Exam:  
  
Visit Vitals /61 Pulse 79 Temp (!) 94.3 °F (34.6 °C) Resp 16 Ht 5' 9\" (1.753 m) Wt 69.3 kg (152 lb 12.5 oz) SpO2 95% BMI 22.56 kg/m² Physical Exam: 
Constitutional: negative Eyes: negative Ears, nose, mouth, throat, and face: negative Respiratory: negative Cardiovascular: negative Gastrointestinal: soft, non-tender, no hepatosplenomegaly or masses, normal bowel sounds. Genitourinary:negative Integument/breast: negative Hematologic/lymphatic: negative Musculoskeletal:negative Neurological: negative Labs Reviewed: 
BMP:  
Lab Results Component Value Date/Time  12/31/2018 05:05 PM  
 K 3.7 12/31/2018 05:05 PM  
  12/31/2018 05:05 PM  
 CO2 24 12/31/2018 05:05 PM  
 AGAP 9 12/31/2018 05:05 PM  
  (H) 12/31/2018 05:05 PM  
 BUN 55 (H) 12/31/2018 05:05 PM  
 CREA 1.95 (H) 12/31/2018 05:05 PM  
 GFRAA 40 (L) 12/31/2018 05:05 PM  
 GFRNA 33 (L) 12/31/2018 05:05 PM  
 
CMP:  
Lab Results Component Value Date/Time  12/31/2018 05:05 PM  
 K 3.7 12/31/2018 05:05 PM  
  12/31/2018 05:05 PM  
 CO2 24 12/31/2018 05:05 PM  
 AGAP 9 12/31/2018 05:05 PM  
  (H) 12/31/2018 05:05 PM  
 BUN 55 (H) 12/31/2018 05:05 PM  
 CREA 1.95 (H) 12/31/2018 05:05 PM  
 GFRAA 40 (L) 12/31/2018 05:05 PM  
 GFRNA 33 (L) 12/31/2018 05:05 PM  
 CA 8.8 12/31/2018 05:05 PM  
 
CBC:  
Lab Results Component Value Date/Time WBC 3.9 (L) 01/01/2019 06:50 AM  
 HGB 9.3 (L) 01/01/2019 06:50 AM  
 HCT 31.5 (L) 01/01/2019 06:50 AM  
  01/01/2019 06:50 AM  
 
 
Imaging: 
CT scan (4/25/18): 
 
 1.  Cardiomegaly with small right pleural effusion and bibasilar atelectasis. 2. Normal caliber small and large intestine without evidence of bowel 
obstruction. Normal appendix. Colonic diverticulosis without evidence of 
diverticulitis. 3. Extensive atherosclerotic vascular disease as described, to include an 
infrarenal abdominal aortic aneurysm with chronic appearing segmental 
dissection. Visceral arterial branches are patent. 4. Small hiatal hernia. Assessment/Plan Hayley Dennis is an 79 yo man with multiple medical problems including a hx of atrial fibrillation on Eliquis, congestive cardiomyopathy, chronic renal insufficiency. ..admitted with painless hematochezia x two episodes yesterday. No further bleeding since. Hg has been stable at around 9 gm/dl. His baseline Hg hovers around 10 gm/dl. Colonoscopy in June 2012 by Dr Kwaku Peraza showed diverticulosis of the left colon and internal hemorrhoids. NM bleeding scan in the ER last night showed a possible source of bleeding the rectosigmoid. The cause of the bleeding is probably diverticular. The bleeding is inactive. Plan: 
 
Need to make a decision, on a risk-benefit basis,  if the patient will need to resume anticoagulation. Inputs from cardiology and the patient would be helpful in this regard. If a decision is made to put the patient back on anticoagulation, we would consider performing a colonoscopy prior to that, in hope of finding and treating the source of bleeding, to prevent recurrence. In the meantime would continue clear liquids and monitor Hg.   
 
Irving Rivers MD.

## 2019-01-01 NOTE — ED NOTES
7:00 PM :Pt care assumed from Dr. Kamilla Aguilar , ED provider. Pt complaint(s), current treatment plan, progression and available diagnostic results have been discussed thoroughly. Rounding occurred: yes Intended Disposition: Admit Pending diagnostic reports and/or labs (please list): Hospitalist consult 
 
9:23 PM Consult: I discussed care with Dr. Radu Acevedo (hospitalist). It was a standard discussion including patient history, chief complaint, available diagnostic results, and predicted treatment course. Dr. Radu Acevedo accepts patient for admission. 10:50 PM: Call from Radiologist: Bleeding scan shows bleeding in the sigmoid colon/rectum. Dr. Radu Acevedo aware. Patient's BP remaining 110's/70's while in the ER waiting an Inpatient bed. Scribe Attestation Yahaira Tran acting as a scribe for and in the presence of Rosa Isela Madison MD     
December 31, 2018 at 9:24 PM 
    
Provider Attestation:     
I personally performed the services described in the documentation, reviewed the documentation, as recorded by the scribe in my presence, and it accurately and completely records my words and actions.  December 31, 2018 at 9:24 PM - Rosa Isela Madison MD

## 2019-01-02 NOTE — PROGRESS NOTES
Gastrointestinal Progress Note Patient Name: Alonso Paul Today's Date: 1/2/2019 Admit Date: 12/31/2018 Subjective:  
 
Alonso Paul is a 80 y.o. Male who we are seeing b/o self limited LGI bleed presenting as red blood per rectum. . Patient reports normal brown BM this AM that was seen by nurses. He reports being breathless tonight with some chest pressure---he has informed the nurse who he says is contacting the hospitalist MD.  The patient denies abdominal pain, N/V. He is tolerating liquids butreports that drinking the liquids make him more breathless but do not cause abdominal pain. He has been seen by cardiology earlier today and understands that Colonoscopy has been requested since restarting anticoagulation is planned. The patient does not believe that his breathlessness will allow him to tolerate colon prep or procedure at this time however. Current Facility-Administered Medications Medication Dose Route Frequency  0.9% sodium chloride infusion  75 mL/hr IntraVENous CONTINUOUS  
 enalapril (VASOTEC) tablet 20 mg  20 mg Oral DAILY  amLODIPine (NORVASC) tablet 5 mg  5 mg Oral DAILY  metoprolol succinate (TOPROL-XL) XL tablet 50 mg  50 mg Oral DAILY  nitroglycerin (NITROSTAT) tablet 0.4 mg  0.4 mg SubLINGual Q5MIN PRN  pantoprazole (PROTONIX) tablet 40 mg  40 mg Oral DAILY  furosemide (LASIX) tablet 40 mg  40 mg Oral BID  traMADol (ULTRAM) tablet 50 mg  50 mg Oral Q6H PRN  
 ALPRAZolam (XANAX) tablet 0.25 mg  0.25 mg Oral BID PRN  
 ondansetron (ZOFRAN) injection 4 mg  4 mg IntraVENous Q4H PRN Objective:  
 
Visit Vitals /74 (BP 1 Location: Left arm, BP Patient Position: At rest) Pulse 77 Temp 94.9 °F (34.9 °C) Resp 18 Ht 5' 9\" (1.753 m) Wt 72.7 kg (160 lb 4.4 oz) SpO2 98% BMI 23.67 kg/m² General appearance: alert, cooperative, in slight respiratory distress, appears stated age Abdomen: soft, non-tender. Bowel sounds normal. No masses,  no organomegaly Data Review: 
 
 
Labs: Results:  
   
Chemistry Recent Labs 12/31/18 
1705 12/31/18 
0130 * 118*  138  
K 3.7 4.0  
 106 CO2 24 22 BUN 55* 53* CREA 1.95* 2.12* CA 8.8 8.6 AGAP 9 10 BUCR 28* 25* CBC w/Diff Recent Labs 01/02/19 
0030 01/01/19 
1839 01/01/19 
1256  12/31/18 
1705 12/31/18 
0130 WBC 4.1* 3.8* 4.2*   < > 4.0* 4.4*  
RBC 4.07* 4.13* 4.09*   < > 3.85* 3.94* HGB 9.2* 9.5* 9.5*   < > 8.9* 9.1*  
HCT 31.6* 32.5* 32.1*   < > 30.7* 31.7*  151 144   < > 183 175 GRANS  --   --   --   --  79* 77* LYMPH  --   --   --   --  8* 12* EOS  --   --   --   --  1 1  
 < > = values in this interval not displayed. Coagulation Recent Labs 12/31/18 
1705 PTP 23.8* INR 2.1* Liver Enzymes Recent Labs 12/31/18 
0130 TP 7.7 ALB 3.0* TBILI 0.7 * SGOT 29 ALT 25 Lipase No results for input(s): LPSE in the last 72 hours. Assessment: 1. Acute self limited LGI bleed with nuclear bleeding scan positive at rectosigmoid junction 2. Personal history of polyps requiring every 5 year colonoscopy by Dr Emma Angela at [facility]. Last colonoscopy was 6 or so years ago per patient Recollection--I do not have the primary data for my review 3. Breathlessness and chest pressure---??? CHF or ischemia------Hospitalist is being called to evaluate per patient. Patient not feeling well enough for colon prep tonight Or colonoscopy procedure tomorrow Recommendation: 1. Hospitalist to assess patient for CHF/cardiac Ischemia as cause of breathlessness tonight and chest pressure 2. I will postpone colonoscopy prep and procedure until cardiac status is more stable 3. Monitor for rebleeding 4. Avoid anticoagulation until after colonoscopy if possible Sajan Lopez MD, Ana Paula Adlre, [facility] January 2, 2019 G

## 2019-01-02 NOTE — PROGRESS NOTES
Bedside shift change report given to [facility] RN (oncoming nurse) by Buck Stiles RN (offgoing nurse). Report included the following information SBAR, Kardex, Intake/Output, MAR and Cardiac Rhythm ventricular pacing. Shift uneventful. Pt continues to have low temperatures, per pt this is his baseline and he has been \"cold natured\" for several years. Per his report, previous medical providers have had difficulty obtaining SpO2 and temperature values when taking his vitals. Bedside and Verbal shift change report given to Yamilex RN (oncoming nurse) by [facility] RN (offgoing nurse). Report included the following information SBAR, Kardex, Intake/Output, MAR and Recent Results.

## 2019-01-02 NOTE — PROGRESS NOTES
Bedside shift change report given to KATELIN Kaminski (oncoming nurse) by Virgie Veloz RN (offgoing nurse). Report included the following information SBAR, Kardex, Intake/Output, MAR and Recent Results. A/O x4, no pain noted, IV flushed and capped, spoke to patient about code status, will have wife bring in paper work, call bell in reach, will continue to monitor. 
   
0944 -- AM protonix medication given, well tolerated, all other meds held per cardiologist, will continue to monitor. 
   
1112 -- Reassessment completed, no change in patient condition, will continue to monitor. 1450 -- Large brown formed soft BM, no signs of blood. 
   
1501 -- Reassessment completed, no change in patient condition, will continue to monitor. 1805 -- Dr. Rossi Winters paged, patient is lightheaded and dizzy, further orders start NS 75ml. 1859 -- Small brown formed soft BM, no signs of blood. 2006 -- Spoke with patient's wife Wanda Knapp), colonoscopy cancelled.  
   
Bedside shift change report given to Nancy Gifford RN (oncoming nurse) by Cassie Menon RN (offgoing nurse). Report included the following information SBAR, Kardex, Intake/Output, MAR and Recent Results.

## 2019-01-02 NOTE — PROGRESS NOTES
Problem: Falls - Risk of 
Goal: *Absence of Falls Document Essex Jeniffer Fall Risk and appropriate interventions in the flowsheet. Outcome: Progressing Towards Goal 
Fall Risk Interventions: 
Mobility Interventions: Communicate number of staff needed for ambulation/transfer, Patient to call before getting OOB, PT Consult for mobility concerns Medication Interventions: Evaluate medications/consider consulting pharmacy Elimination Interventions: Call light in reach History of Falls Interventions: Bed/chair exit alarm Problem: Pressure Injury - Risk of 
Goal: *Prevention of pressure injury Document Alec Scale and appropriate interventions in the flowsheet. Outcome: Progressing Towards Goal 
Pressure Injury Interventions: Activity Interventions: Increase time out of bed, Pressure redistribution bed/mattress(bed type), PT/OT evaluation Mobility Interventions: Pressure redistribution bed/mattress (bed type), PT/OT evaluation, HOB 30 degrees or less Nutrition Interventions: Document food/fluid/supplement intake Friction and Shear Interventions: HOB 30 degrees or less

## 2019-01-02 NOTE — PROGRESS NOTES
Speech Therapy Note: SLP acknowledging eval & tx orders. Per discussion with MD, pt with hx of esophogeal stenosis, EGD pending. Will hold SLP evaluation until completion of EGD. Thank you, Lilo Becerra M.S. CF-SLP

## 2019-01-02 NOTE — PROGRESS NOTES
PO protonix administered and tolerated well on behalf of primary nurse Connor Meanspool. Current BP 96/57. Per Dr. Pete Brown, other morning medications to be held for now.

## 2019-01-02 NOTE — PROGRESS NOTES
Progress Note Patient: Re Mckeon               Sex: male          DOA: 12/31/2018 YOB: 1930      Age:  80 y.o.        LOS:  LOS: 2 days CHIEF COMPLAINT:  Rectal bleeding in the setting of anticoagulation. Subjective:  
 
Complains of being weak and a lot of coughing with and without food Objective:  
  
Visit Vitals BP 98/62 (BP 1 Location: Left arm, BP Patient Position: At rest) Pulse 80 Temp 95.2 °F (35.1 °C) Resp 18 Ht 5' 9\" (1.753 m) Wt 72.7 kg (160 lb 4.4 oz) SpO2 96% BMI 23.67 kg/m² Physical Exam: 
Gen:  Patient is in no distress. No complaint HEENT and Neck:  PERRL, No cervical tenderness or masses Lungs:  Clear bilaterally. No rales, no wheeze. Normal effort. Heart:  Regular Rate and Rhythm. No murmur or gallop. No JVD. No edema. Abdomen:  Soft, non tender, no masses, no Hepatosplenomegally Extremities:  No digital clubbing, no cyanosis Neuro:  Alert and oriented, Normal affect, Cranial nerves intact, No sensory deficits Lab/Data Reviewed: 
BMP:  
No results found for: NA, K, CL, CO2, AGAP, GLU, BUN, CREA, GFRAA, GFRNA 
CBC:  
Lab Results Component Value Date/Time WBC 4.1 (L) 01/02/2019 12:30 AM  
 HGB 9.2 (L) 01/02/2019 12:30 AM  
 HCT 31.6 (L) 01/02/2019 12:30 AM  
  01/02/2019 12:30 AM  
 
 
 
 
Assessment/Plan Principal Problem: LGI bleed (12/31/2018) Overview: Admit for further monitor GI consult Serial H&H Consider blood transfusion if persistent bleeding and patient becomes  
    symptomatic Active Problems: 
  Dyslipidemia () Atrial fibrillation () Overview: CHADS score 3  (+CHF, +HTN, +AGE, -DM, -CVA) S/P CABG x 2 (8/25/2015) Overview: 12/2008, LIMA to LAD, SVG to RCA Atherosclerotic NAHED (renal artery stenosis), bilateral ([address] (8/25/2015) Overview: S/P stenting R and L Essential hypertension (2/27/2017) Aortic stenosis, moderate (1/28/2018) Cardiac pacemaker in situ (9/16/2018) Diastolic CHF, chronic ([facility] [address] (9/26/2018) Non-rheumatic tricuspid valve insufficiency (1/1/2019) Plan: 
Per Cardiology , ok for scope Has history of dialation >3 yrs ago Will get PT/OT Shannan Rosenbreg

## 2019-01-02 NOTE — PROGRESS NOTES
Problem: Self Care Deficits Care Plan (Adult) Goal: *Acute Goals and Plan of Care (Insert Text) Occupational Therapy Goals Initiated 1/2/2019 within 7 day(s). 1.  Patient will perform grooming tasks while standing with independence. 2.  Patient will perform lower body dressing with modified independence and fair+ dynamic sitting balance. 3.  Patient will perform functional task in standing for  8 minutes with modified independence and fair dynamic standing balance. 4.  Patient will perform toilet transfers with modified independence. 5.  Patient will perform all aspects of toileting with modified independence. 6.  Patient will participate in upper extremity therapeutic exercise/activities with modified independence for 8 minutes to maintain/increase BUE strength for functional transfers and ADLs. 7.  Patient will utilize energy conservation techniques during functional activities with minimal verbal cues. Outcome: [address] Occupational THERAPY: Initial Assessment INPATIENT: Medicare: Hospital Day: 3 Patient: Alonso Paul (75 y.o. male)    Date: 1/2/2019 Primary Diagnosis: LGI bleed  
,  ,  
Precautions: Falls PLOF: Pt was independent with basic self care tasks and functional mobility PTA. ASSESSMENT:  
Based on the objective data described below, the patient presents with SBA for bed mobility, CGA for functional transfers and supervision for ADLs. Pt supine on arrival, agreeable to therapy. SBA for supine-->sit. Fair+ static sitting balance. CGA to stand and maneuver to bathroom in prep for ADLs. Fair/fair- dynamic standing balance. Supervision for oral care, facial hygiene & toileting task. Skilled instruction on pacing and positioning. Pt left up in chair with needs within reach. Recommend HH upon d/c. KATELIN Kaminski aware of session. Recommendations for the next treatment session: ADLs Mr. Ferraro Began will benefit from skilled intervention to address the above impairments. His rehabilitation potential is considered to be Good. EDUCATION Education:  Patient was educated on the following topics: activity pacing, role of OT and POC Barriers to Learning/Limitations: None Compensate with: visual, verbal, tactile, kinesthetic cues/model PLAN OF CARE:  
Problems:  Decreased ROM, Decreased strength affecting function, Decreased ADL/functioning of activities and Decreased transfer abilities Recommendations and Planned Interventions: 
[x]                  Self Care Training                   [x]         Therapeutic Activities [x]                  Functional Mobility Training    []          Cognitive Retraining 
[x]                  Therapeutic Exercises            [x]          Endurance Activities [x]                  Balance Training                     []          Neuromuscular Re-ed []                  Visual/Perceptual Training      [x]       Home Safety Training 
[x]                  Patient Education                    [x]          Family Training/Education []                  Other (comment): Frequency/Duration: Patient will be followed by occupational therapy 3-5 times a week to address goals. Discharge Recommendations: Home Health Further Equipment Recommendations for Discharge: anticipate none SUBJECTIVE:  
Patient stated: \"I've been on liquids and haven't been out of bed in a few days. \" OBJECTIVE/TREATMENT:  
 
Past Medical History:  
Diagnosis Date  Atrial fibrillation CHADS score 3  (+CHF, +HTN, +AGE, -DM, -CVA)  CABG   
 2008   LIMA - LAD,   SVG - RCA  Cardiomyopathy EF 30-35% (ECHO 6/14)  Coronary artery disease  Dyslipidemia  Hypertension  Hypothyroid  Pacemaker  Peripheral vascular disease  Renal artery stenosis   
 bilateral stents  Sick sinus syndrome Past Surgical History: Procedure Laterality Date  HX CORONARY ARTERY BYPASS GRAFT    
 2008   LIM[place] - LAD,   SVG - RCA  [place] INSJ 1 TRANSVNS ELTRD PERM PACEMAKER/IMPLTBL DFB N/A 10/1/2018 Insert Or Replace Single Lead performed by Rosalio Cordon MD at [address] LAB Eval Complexity: History: MEDIUM Complexity : Expanded review of history including physical, cognitive and psychosocial  history ; Examination: MEDIUM Complexity : 3-5 performance deficits relating to physical, cognitive , or psychosocial skils that result in activity limitations and / or participation restrictions; Decision Making:LOW Complexity : No comorbidities that affect functional and no verbal or physical assistance needed to complete eval tasks G CODES: Self Care  Current  CJ= 20-39%  Goal  CI= 1-19%. The severity rating is based on the Other Functional Assessment, MMT, ROM Prior Level of Function/Home Situation: Restricted in physically strenuous activity but ambulatory and able to carry out work of a light or sedentary nature (e.g., light house work, office work). Lives with Family Fatemeh Simper, Shower chair, Grab bars Cognitive/Behavioral Status:  
Neurologic State: alert Orientation: oriented to time, place, person and situation Cognition:   appropriate decision making, appropriate for age attention/concentration, appropriate safety awareness and following commands  follows multi-step simple commands/direction Safety/Judgement: Awareness of environment and Fall prevention ROM: within normal limits (BUEs) MMT: 4/5 (BUEs) Coordination: WFL (BUEs) Hand dominance:Right Skin: Intact (BUEs) Edema: None noted (BUEs) Sensation: Intact (BUEs) Vision/Perceptual: normal 
 
 
Functional Status Indep Mod I  
Sup. / 
Set- Up SBA CGA Min Assist  
Mod Assist  
Max assist  
Total Assist  
Assist x2 Additional Time NT Comments Rolling []  []  []  [x]  []    []    []    []  []  []  []  [] Supine to sit []  []  []  [x]  []  []  []  []  []  []  []  [] Sit to supine []  []  []  []  []  []  []  []  []  []  []  [x] Sit to stand []  []  []  []  [x]  []  []  []  []  []  []  [] Toilet Transfer []  []  [x]  [x]  []  []  []  []  []  []  []  [] Feeding [x]  []  []  []  []  []  []  []  []  []  []  []    
Grooming []  []  [x]  []  []  []  []  []  []  []  []  [] Bathing  []  []  [x]  []  []  []  []  []  []  []  []  []    
UB Dressing  []  [x]  []  []  []  []  []  []  []  []  []  []    
LB Dressing  []  []  [x]  []  []  []  []  []  []  []  []  [] Toileting []  [x]  [x]  []  []  []  []  []  []  []  []  [] Balance Good Beuford Bustard Poor Unable Comments Sitting static [x]  []  []  [] Sitting dynamic []  [x]  []  []    
Standing static []  [x]  []  []    
Standing dynamic []  [x]  []  []  Fair/Fair-  
ADL Intervention:  
Supervision for oral care & facial hygiene while standing at sink. Fair/Fair- dynamic standing balance. Supervision for toilet transfer w/ use of grab bar; mod I for hygiene. Pain:  
Pre treatment:  0/10 Post treatment: 0/10 Scale: numeric Activity tolerance:  fair COMMUNICATION/EDUCATION: Pt educated on role of OT, POC and home safety. He verbalized understanding. [x]         Fall prevention education was provided and the patient/caregiver indicated understanding. [x]         Patient/family have participated as able in goal setting and plan of care. [x]         Patient/family agree to work toward stated goals and plan of care. []         Patient understands intent and goals of therapy, but is neutral about his/her participation The patients plan of care was also discussed with: Physical Therapist and Registered Nurse. · After treatment position/precautions:  
o Up in chair 
o Call light within reach 
o RN notified Recommendations for nursing: up with assist x1 & RW/cane Written on communication board: yes Verbally communicated to: Don Cason Thank you for this referral. 
Zana Gonzales MS OTR/L Time Calculation: 25 mins

## 2019-01-02 NOTE — PROGRESS NOTES
conducted an initial consultation and Spiritual Assessment for Lynnette Prescott, who is a 80 y. o.,male. Patients Primary Language is: [place]. According to the patients EMR Confucianism Affiliation is: Other. The reason the Patient came to the hospital is:  
Patient Active Problem List  
 Diagnosis Date Noted  Non-rheumatic tricuspid valve insufficiency 01/01/2019  LGI bleed 12/31/2018  Diastolic CHF, chronic ([address] 09/26/2018  Cardiac pacemaker in situ 09/16/2018  Syncope 07/11/2018  Aortic stenosis, moderate 01/28/2018  Abnormal chest CT 01/27/2018  Pleural effusion, right 01/26/2018  SOB (shortness of breath) on exertion 01/25/2018  Cardiac syncope 01/19/2018  Celiac artery stenosis ([address] 01/10/2018  Osteoarthritis of both knees 08/20/2017  Statin intolerance 05/03/2017  Essential hypertension 02/27/2017  Anxiety 02/27/2017  Chronic systolic congestive heart failure ([address] 01/25/2017  S/P CABG x 2 08/25/2015  Atherosclerotic NAHED (renal artery stenosis), bilateral ([address] 08/25/2015  Dyslipidemia  Coronary artery disease of native artery of native heart with stable angina pectoris ([address]  Cardiomyopathy  Atrial fibrillation  Sick sinus syndrome The  provided the following Interventions: 
Initiated a relationship of care and support. Explored issues of angelina, spirituality and/or Baptist needs while hospitalized. Listened empathically. Provided chaplaincy education. Provided information about Spiritual Care Services. Offered prayer and assurance of continued prayers on patient's behalf. Chart reviewed. The following outcomes were achieved: 
Patient shared some information about their medical narrative and spiritual journey/beliefs. Patient processed feeling about current hospitalization. Patient expressed gratitude for the 's visit. Assessment: Patient did not indicate any spiritual or Voodoo issues which require Spiritual Care Services interventions at this time. Patient does not have any Voodoo/cultural needs that will affect patients preferences in health care. Plan: 
Chaplains will continue to follow and will provide pastoral care on an as needed or requested basis.  recommends bedside caregivers page  on duty if patient shows signs of acute spiritual or emotional distress. [facility] Staff  Spiritual Care  
[phone]

## 2019-01-02 NOTE — PROGRESS NOTES
Problem: Mobility Impaired (Adult and Pediatric) Goal: *Acute Goals and Plan of Care (Insert Text) Physical Therapy Goals Initiated 1/2/2019 and to be accomplished within 7 days. 1.  Patient will complete all bed mobility with modified independence in order to prepare for EOB/OOB activity. 2.  Patient will perform sit <> stand with modified independence in order to prepare for OOB/gait activity. 3.  Patient will perform bed to chair transfers with modified independence in order to promote mobility and encourage seated activity to progress towards their prior level of function. 4.  Patient will ambulate 100 feet with modified independence using LRAD in order to prepare for safe negotiation of their environment. Outcome: Progressing Towards Goal 
PHYSICAL THERAPY: Initial Assessment INPATIENT: Medicare: Hospital Day: 3 Patient: Lavinia Johns (81 y.o. male)    Date: 1/2/2019 Primary Diagnosis: LGI bleed  
,    
Precautions:    
 
 
PLOF: Independent ASSESSMENT : 
Patient sitting in chair, agreeable to participation with PT. Supervision for supine <> sit. Fair standing balance. CGA for ambulation x 50 ft with rolling walker. Patient ambulates with minor path deviations, gait steady overall. Patient returned to chair with c/o SOB, SPO2 reading 82%. Rn Yamilex notified. Upon return to room SPO2 % on RA. Patient left sitting in chair with all needs within reach. No c/o pain. Recommend d/c home with home health. Patient presents with deficits in: 
Bed Mobility, Transfers, Gait and Strength Patient will benefit from skilled intervention to address the above impairments. Patients rehabilitation potential is considered to be Fair Factors which may influence rehabilitation potential include:  
[]         None noted 
[]         Mental ability/status [x]         Medical condition 
[]         Home/family situation and support systems 
[]         Safety awareness []         Pain tolerance/management 
[]         Other: PLAN : 
Recommendations and Planned Interventions: 
[x]           Bed Mobility Training             [x]    Neuromuscular Re-Education 
[x]           Transfer Training                   []    Orthotic/Prosthetic Training 
[x]           Gait Training                          []    Modalities [x]           Therapeutic Exercises          []    Edema Management/Control 
[x]           Therapeutic Activities            [x]    Patient and Family Training/Education 
[]           Other (comment): EDUCATION:  
Education:  Patient was educated on the following topics: Purpose of PT, PT POC, safety with mobility. Verbalizes understanding. Barriers to Learning/Limitations: None Compensate with: visual, verbal, tactile, kinesthetic cues/model Recommendations for the next treatment session: Gait Frequency/Duration: Patient will be followed by physical therapy 3 - 5 times a week to address goals. Discharge Recommendations: Home Health Further Equipment Recommendations for Discharge: rolling walker Factors which may impact discharge planning: N/A  
 
SUBJECTIVE:  
Patient stated I've been dizzy for about a month.  OBJECTIVE DATA SUMMARY:  
 
Past Medical History:  
Diagnosis Date  Atrial fibrillation CHADS score 3  (+CHF, +HTN, +AGE, -DM, -CVA)  CABG   
 2008   LIMA - LAD,   SVG - RCA  Cardiomyopathy EF 30-35% (ECHO 6/14)  Coronary artery disease  Dyslipidemia  Hypertension  Hypothyroid  Pacemaker  Peripheral vascular disease  Renal artery stenosis   
 bilateral stents  Sick sinus syndrome Past Surgical History:  
Procedure Laterality Date  HX CORONARY ARTERY BYPASS GRAFT    
 2008   LIMA - LAD,   SVG - RCA  [place] INSJ 1 TRANSVNS ELTRD PERM PACEMAKER/IMPLTBL DFB N/A 10/1/2018  Insert Or Replace Single Lead performed by Luis E Cano MD at [address] [facility]  
 
 
 Eval Complexity: History: MEDIUM  Complexity : 1-2 comorbidities / personal factors will impact the outcome/ POC Exam:MEDIUM Complexity : 3 Standardized tests and measures addressing body structure, function, activity limitation and / or participation in recreation  Presentation: MEDIUM Complexity : Evolving with changing characteristics  Clinical Decision Making:Medium Complexity Supervision - CGA for mobility Overall Complexity:MEDIUM 
 
G CODES:Mobility  Current  CJ= 20-39%   Goal  CI= 1-19%. The severity rating is based on the Other Clinical Judgement: CGA - Supervision for bed mobility, ambulation, transfers. Fair balance Prior Level of Function/Home Situation:  
Home Situation Home Environment: Private residence One/Two Story Residence: Two story, live on 1st floor Living Alone: No 
Support Systems: Family member(s), Child(jay) Patient Expects to be Discharged to[de-identified] Private residence Current DME Used/Available at Home: [place], rollingCricb Behavior: 
Neurologic State: Alert Orientation Level: Oriented X4 Cognition: Follows commands Psychosocial 
Purposeful Interaction: Yes Functional Mobility: 
 
 
Functional Status Indep (I) Mod I Super-vision Min A Mod A Max A Total A Assist x2 Verbal cues Additional time Not tested Comments Rolling []  []  [] []    []    []  []  [] [] [] [x] Supine to sit []  []  [] []  []  []  []  [] [] [] [x] Sit to supine []  []  [] []  []  []  []  [] [] [] [x] Sit to stand []  []  [x] []  []  []  []  [] [] [] []   
Stand to sit []  []  [x] []  []  []  []  [] [] [] [x] Bed to chair transfers []  []  [] []  []  []  []  [] [] [] [x] Balance Good Judythe Lars Poor Unable Not tested Comments Sitting static [x]  []  []  []  [] Sitting dynamic [x]  []  []  []  []   
Standing static []  [x]  []  []  []   
Standing dynamic []  [x]  []  []  [] Mobility/Gait:  
 Level of Assistance: Contact guard assistance Assistive Device: rolling walker Distance Ambulated: 50 feet Left Lower Extremity: FWB Right Lower Extremity: FWB Base of Support: center of gravity altered Speed/Pilar: pace decreased (<100 feet/min) Step Length: [facility] Swing Pattern: [facility] Stance: [facility] Gait Abnormalities: path deviations Pain: 
None Vital Signs Temp: 95.2 °F (35.1 °C) Pulse (Heart Rate): 80    
BP: 98/62 Resp Rate: 18    
O2 Sat (%): 96 % Activity Tolerance:  
Fair Please refer to the flowsheet for vital signs taken during this treatment. After treatment:  
[]         Patient left in no apparent distress sitting up in chair 
[x]         Patient left in no apparent distress in bed 
[x]         Call bell left within reach [x]         Nursing notified 
[]         Caregiver present 
[]         Bed alarm activated COMMUNICATION/EDUCATION:  
[x]         Fall prevention education was provided and the patient/caregiver indicated understanding. [x]         Patient/family have participated as able in goal setting and plan of care. [x]         Patient/family agree to work toward stated goals and plan of care. []         Patient understands intent and goals of therapy, but is neutral about his/her participation. []         Patient is unable to participate in goal setting and plan of care. Thank you for this referral. 
Anitha Cleveland Time Calculation: 44 mins

## 2019-01-02 NOTE — CDMP QUERY
Please review the following regarding the cause of GI bleed The medical record reflects the following: 
 
Risk: a. fib on Eliquis Clinical Indicators: Patient presented to the ER with significant hematochezia. The patient is noted to be on Eliquis for A. fib. H&P indicates a diagnosis of LGI bleed and orders to hold eliquis NM bleeding scan: Findings suggest active bleeding in the rectosigmoid region GI consult: NM bleeding scan in the ER last night showed a possible source of bleeding the rectosigmoid. The cause of the bleeding is probably diverticular. The bleeding is inactive. Need to make a decision, on a risk-benefit basis,  if the patient will need to resume anticoagulation. 1/1 Progress note: CHIEF COMPLAINT:  Rectal bleeding in the setting of anticoagulation. Treatment:  Hold Eliquis, GI consult, Cardiology consult, monitor H/H Please clarify if this patient is being treated/managed for: 
 
=> GI bleed due to Eliquis anticoagulation therapy =>Other Explanation of clinical findings =>Unable to Determine (no explanation of clinical findings) Please clarify and document your clinical opinion in the progress notes and discharge summary including the definitive and/or presumptive diagnosis, (suspected or probable), related to the above clinical findings. Please include clinical findings supporting your diagnosis. If you DECLINE this query or would like to communicate with [facility], please utilize the \"[facility] message box\" at the TOP of the Progress Note on the right. Thank Yoanna Hutson RN, [phone]

## 2019-01-02 NOTE — PROGRESS NOTES
Problem: Falls - Risk of 
Goal: *Absence of Falls Document Terri Sujit Fall Risk and appropriate interventions in the flowsheet. Outcome: Progressing Towards Goal 
Fall Risk Interventions: 
Mobility Interventions: Communicate number of staff needed for ambulation/transfer, Patient to call before getting OOB, PT Consult for mobility concerns Medication Interventions: Evaluate medications/consider consulting pharmacy Elimination Interventions: Call light in reach History of Falls Interventions: Bed/chair exit alarm Problem: Pressure Injury - Risk of 
Goal: *Prevention of pressure injury Document Alec Scale and appropriate interventions in the flowsheet. Outcome: Progressing Towards Goal 
Pressure Injury Interventions: Activity Interventions: Increase time out of bed, Pressure redistribution bed/mattress(bed type), PT/OT evaluation Mobility Interventions: Pressure redistribution bed/mattress (bed type), PT/OT evaluation, HOB 30 degrees or less Nutrition Interventions: Document food/fluid/supplement intake Friction and Shear Interventions: HOB 30 degrees or less

## 2019-01-02 NOTE — CONSULTS
[facility] - Consult Note Date of  Admission: 12/31/2018  4:40 PM  
Primary Care Physician:  Pauly Stephens MD 
Patient seen and examined independently. Events noted. AF with CHADS2-VASc score of 4. No hx of known CVA, TIA, etc. Would favor colonoscopy since patient has significant risk for an embolic event without anticoagulants and has tolerated anticoagulation since at least 2015. Agree with assessment and plan as noted below. Severo Drake Assessment: - Presented with lower GI Bleed with Nuclear Medicine Scan 12/31/18 suggesting active bleeding in the rectosigmoid region - Hx PPM implantation (single chamber) with RV lead revision 10/01/18. Left innominate vein obstruction.  
-Chronic atrial fibrillation on eliquis and Toprol, CHADS2-Vasc score 4 (CHF, HTN, age) -Moderate aortic stenosis by echo 7/12/18 with EF 50% 
-Severe TR by echo 7/2018 
-h/o CABG x 2, last cath 9/2015 with patent LIMA to LAD 
-Chronic diastolic heart failure 
-Thyroid disorder 
-h/o HTN 
-Dyslipidemia w/statin intolerance 
- Nuclear stress test 09/25/17 with no major reversible defect 
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 
  
Plan:  
  
- Patient presenting with GI bleed, has been on Eliquis which is now on hold. Patient with elevated CHADS score which makes him high stroke risk, therefore would recommend to pursue colonoscopy for further workup to assess if anticoagulation could be resumed. He is at least moderate risk for conscious sedation with comorbidities, but the risk cannot be further minimized. - Would hold antihypertensives with marginal BP readings, not on beta blocker or statin as it is documented as allergy History of Present Illness: This is a 80 y.o. male admitted for LGI bleed. Patient complains of:  Bloody stool This is an 80year old male with a history as outlined above that cardiology is seeing in consult due to anticoagulation decision. He initially presented early morning 12/31 due to volume overload, testicular swelling, was given IV Lasix and discharged from ED. He went home and had a large bright red bloody bowel movement, denies pain, nausea or vomiting. Has not had GI bleed in the past but had some scant amount of rectal bleeding stating due to hemorrhoids. States his last colonoscopy was in 2012. Denies any chest pain or dyspnea. Denies syncope. Cardiac risk factors: male gender, hypertension Review of Symptoms:  Except as stated above include: 
Constitutional:  negative Respiratory:  negative Cardiovascular: per HPI Gastrointestinal: negative Genitourinary:  negative Musculoskeletal:  Negative Neurological:  Negative Dermatological:  Negative Endocrinological: Negative Psychological:  Negative A comprehensive review of systems was negative except for that written in the HPI. Past Medical History:  
 
Past Medical History:  
Diagnosis Date  Atrial fibrillation CHADS score 3  (+CHF, +HTN, +AGE, -DM, -CVA)  CABG   
 2008   LIMA - LAD,   SVG - RCA  Cardiomyopathy EF 30-35% (ECHO 6/14)  Coronary artery disease  Dyslipidemia  Hypertension  Hypothyroid  Pacemaker  Peripheral vascular disease  Renal artery stenosis   
 bilateral stents  Sick sinus syndrome Social History:  
 
Social History Socioeconomic History  Marital status:  Spouse name: Not on file  Number of children: Not on file  Years of education: Not on file  Highest education level: Not on file Tobacco Use  Smoking status: Former Smoker  Smokeless tobacco: Never Used Substance and Sexual Activity  Alcohol use: No  
 Drug use: No  
 
 
 Family History:  
History reviewed. No pertinent family history. Medications: Allergies Allergen Reactions  Beta-Blockers (Beta-Adrenergic Blocking Agts) Drowsiness  Penicillins Swelling  Statins-Hmg-Coa Reductase Inhibitors Drowsiness Current Facility-Administered Medications Medication Dose Route Frequency  enalapril (VASOTEC) tablet 20 mg  20 mg Oral DAILY  amLODIPine (NORVASC) tablet 5 mg  5 mg Oral DAILY  metoprolol succinate (TOPROL-XL) XL tablet 50 mg  50 mg Oral DAILY  nitroglycerin (NITROSTAT) tablet 0.4 mg  0.4 mg SubLINGual Q5MIN PRN  pantoprazole (PROTONIX) tablet 40 mg  40 mg Oral DAILY  furosemide (LASIX) tablet 40 mg  40 mg Oral BID  traMADol (ULTRAM) tablet 50 mg  50 mg Oral Q6H PRN  
 ALPRAZolam (XANAX) tablet 0.25 mg  0.25 mg Oral BID PRN  
 ondansetron (ZOFRAN) injection 4 mg  4 mg IntraVENous Q4H PRN Physical Exam:  
 
Visit Vitals BP 96/57 Pulse 82 Temp (!) 94.6 °F (34.8 °C) Resp 18 Ht 5' 9\" (1.753 m) Wt 160 lb 4.4 oz (72.7 kg) SpO2 96% BMI 23.67 kg/m² BP Readings from Last 3 Encounters:  
01/02/19 96/57  
12/31/18 129/73  
12/18/18 102/59 Pulse Readings from Last 3 Encounters:  
01/02/19 82  
12/31/18 80  
12/18/18 80 Wt Readings from Last 3 Encounters:  
01/02/19 160 lb 4.4 oz (72.7 kg) 12/31/18 170 lb (77.1 kg) 12/18/18 149 lb (67.6 kg) General:  alert, cooperative, no distress Neck:  nontender, no JVD Lungs:  clear to auscultation bilaterally Heart:  regular rate and rhythm, S1, S2 normal, no murmur, click, rub or gallop Abdomen:  abdomen is soft without significant tenderness, masses, organomegaly or guarding Extremities:  extremities normal, atraumatic, no cyanosis or edema Skin: Warm and dry. no hyperpigmentation, vitiligo, or suspicious lesions Neuro: alert, oriented x3, affect appropriate Psych: non focal 
 
 Data Review:  
 
Recent Labs 01/02/19 
0030 01/01/19 
1839 01/01/19 
1256 WBC 4.1* 3.8* 4.2* HGB 9.2* 9.5* 9.5* HCT 31.6* 32.5* 32.1*  
 151 144 Recent Labs 12/31/18 [phone] 12/31/18 
0130  138  
K 3.7 4.0  
 106 CO2 24 22 * 118* BUN 55* 53* CREA 1.95* 2.12* CA 8.8 8.6 ALB  --  3.0*  
SGOT  --  29 ALT  --  25 INR 2.1*  --   
 
 
Results for orders placed or performed during the hospital encounter of 12/18/18 EKG, 12 LEAD, INITIAL Result Value Ref Range Ventricular Rate 81 BPM  
 Atrial Rate 67 BPM  
 QRS Duration 166 ms  
 Q-T Interval 448 ms QTC Calculation (Bezet) 520 ms Calculated R Axis -82 degrees Calculated T Axis 102 degrees Diagnosis Electronic ventricular pacemaker When compared with ECG of 26-SEP-2018 19:47, 
Vent. rate has increased BY  11 BPM 
Confirmed by Anton Choi ([id number]) on 12/18/2018 3:07:55 PM 
  
 
 
All Cardiac Markers in the last 24 hours:  No results found for: CPK, CK, CKMMB, CKMB, RCK3, CKMBT, CKNDX, CKND1, DARNELL, TROPT, TROIQ, MARIA ESTHER, TROPT, TNIPOC, BNP, BNPP Last Lipid:   
Lab Results Component Value Date/Time Cholesterol, total 110 07/12/2018 05:40 AM  
 HDL Cholesterol 33 (L) 07/12/2018 05:40 AM  
 LDL, calculated 55.6 07/12/2018 05:40 AM  
 Triglyceride 107 07/12/2018 05:40 AM  
 CHOL/HDL Ratio 3.3 07/12/2018 05:40 AM  
 
 
Signed By: Gissell Cruz PA-C January 2, 2019

## 2019-01-03 NOTE — DISCHARGE INSTRUCTIONS
Patient armband removed and shredded    DISCHARGE SUMMARY from Nurse    PATIENT INSTRUCTIONS:    After general anesthesia or intravenous sedation, for 24 hours or while taking prescription Narcotics:  · Limit your activities  · Do not drive and operate hazardous machinery  · Do not make important personal or business decisions  · Do  not drink alcoholic beverages  · If you have not urinated within 8 hours after discharge, please contact your surgeon on call. Report the following to your surgeon:  · Excessive pain, swelling, redness or odor of or around the surgical area  · Temperature over 100.5  · Nausea and vomiting lasting longer than 4 hours or if unable to take medications  · Any signs of decreased circulation or nerve impairment to extremity: change in color, persistent  numbness, tingling, coldness or increase pain  · Any questions    What to do at Home:  Recommended activity: Activity as tolerated. If you experience any of the following symptoms blood in the stool, on toilet paper, or in the toilet bowl. Blood may appear dark maroon to bright red, please follow up with primary care provider/ED. *  Please give a list of your current medications to your Primary Care Provider. *  Please update this list whenever your medications are discontinued, doses are      changed, or new medications (including over-the-counter products) are added. *  Please carry medication information at all times in case of emergency situations. These are general instructions for a healthy lifestyle:    No smoking/ No tobacco products/ Avoid exposure to second hand smoke  Surgeon General's Warning:  Quitting smoking now greatly reduces serious risk to your health.     Obesity, smoking, and sedentary lifestyle greatly increases your risk for illness    A healthy diet, regular physical exercise & weight monitoring are important for maintaining a healthy lifestyle    You may be retaining fluid if you have a history of heart failure or if you experience any of the following symptoms:  Weight gain of 3 pounds or more overnight or 5 pounds in a week, increased swelling in our hands or feet or shortness of breath while lying flat in bed. Please call your doctor as soon as you notice any of these symptoms; do not wait until your next office visit. Recognize signs and symptoms of STROKE:    F-face looks uneven    A-arms unable to move or move unevenly    S-speech slurred or non-existent    T-time-call 911 as soon as signs and symptoms begin-DO NOT go       Back to bed or wait to see if you get better-TIME IS BRAIN. Warning Signs of HEART ATTACK     Call 911 if you have these symptoms:   Chest discomfort. Most heart attacks involve discomfort in the center of the chest that lasts more than a few minutes, or that goes away and comes back. It can feel like uncomfortable pressure, squeezing, fullness, or pain.  Discomfort in other areas of the upper body. Symptoms can include pain or discomfort in one or both arms, the back, neck, jaw, or stomach.  Shortness of breath with or without chest discomfort.  Other signs may include breaking out in a cold sweat, nausea, or lightheadedness. Don't wait more than five minutes to call 911 - MINUTES MATTER! Fast action can save your life. Calling 911 is almost always the fastest way to get lifesaving treatment. Emergency Medical Services staff can begin treatment when they arrive -- up to an hour sooner than if someone gets to the hospital by car. The discharge information has been reviewed with the patient. The patient verbalized understanding. Discharge medications reviewed with the patient and appropriate educational materials and side effects teaching were provided.   ___________________________________________________________________________________________________________________________________    Patient Education        Anemia: Care Instructions  Your Care Instructions    Anemia is a low level of red blood cells, which carry oxygen throughout your body. Many things can cause anemia. Lack of iron is one of the most common causes. Your body needs iron to make hemoglobin, a substance in red blood cells that carries oxygen from the lungs to your body's cells. Without enough iron, the body produces fewer and smaller red blood cells. As a result, your body's cells do not get enough oxygen, and you feel tired and weak. And you may have trouble concentrating. Bleeding is the most common cause of a lack of iron. You may have heavy menstrual bleeding or bleeding caused by conditions such as ulcers, hemorrhoids, or cancer. Regular use of aspirin or other anti-inflammatory medicines (such as ibuprofen) also can cause bleeding in some people. A lack of iron in your diet also can cause anemia, especially at times when the body needs more iron, such as during pregnancy, infancy, and the teen years. Your doctor may have prescribed iron pills. It may take several months of treatment for your iron levels to return to normal. Your doctor also may suggest that you eat foods that are rich in iron, such as meat and beans. There are many other causes of anemia. It is not always due to a lack of iron. Finding the specific cause of your anemia will help your doctor find the right treatment for you. Follow-up care is a key part of your treatment and safety. Be sure to make and go to all appointments, and call your doctor if you are having problems. It's also a good idea to know your test results and keep a list of the medicines you take. How can you care for yourself at home? · Take your medicines exactly as prescribed. Call your doctor if you think you are having a problem with your medicine. · If your doctor recommends iron pills, take them as directed:  ? Try to take the pills on an empty stomach about 1 hour before or 2 hours after meals.  But you may need to take iron with food to avoid an upset stomach. ? Do not take antacids or drink milk or caffeine drinks (such as coffee, tea, or cola) at the same time or within 2 hours of the time that you take your iron. They can make it hard for your body to absorb the iron. ? Vitamin C (from food or supplements) helps your body absorb iron. Try taking iron pills with a glass of orange juice or some other food that is high in vitamin C, such as citrus fruits. ? Iron pills may cause stomach problems, such as heartburn, nausea, diarrhea, constipation, and cramps. Be sure to drink plenty of fluids, and include fruits, vegetables, and fiber in your diet each day. Iron pills often make your bowel movements dark or green. ? If you forget to take an iron pill, do not take a double dose of iron the next time you take a pill. ? Keep iron pills out of the reach of small children. An overdose of iron can be very dangerous. · Follow your doctor's advice about eating iron-rich foods. These include red meat, shellfish, poultry, eggs, beans, raisins, whole-grain bread, and leafy green vegetables. · Steam vegetables to help them keep their iron content. When should you call for help? Call 911 anytime you think you may need emergency care. For example, call if:    · You have symptoms of a heart attack. These may include:  ? Chest pain or pressure, or a strange feeling in the chest.  ? Sweating. ? Shortness of breath. ? Nausea or vomiting. ? Pain, pressure, or a strange feeling in the back, neck, jaw, or upper belly or in one or both shoulders or arms. ? Lightheadedness or sudden weakness. ? A fast or irregular heartbeat. After you call 911, the  may tell you to chew 1 adult-strength or 2 to 4 low-dose aspirin. Wait for an ambulance.  Do not try to drive yourself.     · You passed out (lost consciousness).    Call your doctor now or seek immediate medical care if:    · You have new or increased shortness of breath.     · You are dizzy or lightheaded, or you feel like you may faint.     · Your fatigue and weakness continue or get worse.     · You have any abnormal bleeding, such as:  ? Nosebleeds. ? Vaginal bleeding that is different (heavier, more frequent, at a different time of the month) than what you are used to.  ? Bloody or black stools, or rectal bleeding. ? Bloody or pink urine.    Watch closely for changes in your health, and be sure to contact your doctor if:    · You do not get better as expected. Where can you learn more? Go to http://mitra-shaw.info/. Enter R301 in the search box to learn more about \"Anemia: Care Instructions. \"  Current as of: May 7, 2018  Content Version: 11.8  © 7518-3381 [employer]. Care instructions adapted under license by [employer] (which disclaims liability or warranty for this information). If you have questions about a medical condition or this instruction, always ask your healthcare professional. [place] [address] any warranty or liability for your use of this information. Patient Education        Learning About a Pacemaker for Heart Failure  What is a pacemaker for heart failure? A pacemaker is a small device that is placed under the skin of your chest. It is powered by batteries. It has thin wires, called leads, that pass through a vein into your heart. A pacemaker for heart failure is a biventricular pacemaker (say \"serina-bobby-TRICK-zara-maría\"). Treatment that uses this type of pacemaker is called cardiac resynchronization therapy (CRT). When you have heart failure, the lower chambers of your heart may not pump at the same time. The pacemaker sends painless electrical signals to your heart. These signals make the chambers pump at the same time. This can help your heart pump blood better and help you feel better. Your pacemaker may be combined with an ICD, or implantable cardioverter-defibrillator.  It can control abnormal heart rhythms. This can prevent sudden death. You may feel worried about having a pacemaker. This is common. It can help if you learn about how the pacemaker helps your heart. Talk to your doctor about your concerns. How is a pacemaker put in place? You will get medicine before the procedure. This helps you relax and helps prevent pain. The doctor makes a cut in the skin just below your collarbone. The cut may be on either side of your chest. The doctor will put the pacemaker leads through the cut. The leads go into a large blood vessel in the upper chest. Then the doctor will guide the leads through the blood vessel into different chambers of the heart. The doctor will place the pacemaker under the skin of your chest. He or she will attach the leads to the pacemaker. Then the cut will be closed with stitches. The procedure usually takes 2 to 3 hours. You may need to spend the night in the hospital.  What can you expect when you have a pacemaker? A pacemaker can help your heart pump blood better. It may help you feel better so you can be more active. It also may help keep you out of the hospital and help you live longer. You can live a normal, active life with a pacemaker. But you need to avoid strong magnetic and electrical fields. Your doctor or the maker of your pacemaker can give you a full list of things to avoid. But most everyday appliances are safe. Your doctor will check your pacemaker regularly to make sure it's working right. Pacemaker batteries usually last 5 to 15 years before they need to be replaced. Follow-up care is a key part of your treatment and safety. Be sure to make and go to all appointments, and call your doctor if you are having problems. It's also a good idea to know your test results and keep a list of the medicines you take. Where can you learn more? Go to http://mitra-shaw.info/.   Enter Y169 in the search box to learn more about \"Learning About a Pacemaker for Heart Failure. \"  Current as of: December 6, 2017  Content Version: 11.8  © 9965-3032 [email or web address]. Care instructions adapted under license by [employer] (which disclaims liability or warranty for this information). If you have questions about a medical condition or this instruction, always ask your healthcare professional. [address] any warranty or liability for your use of this information. Patient Education        Colonoscopy: Before Your Procedure  What is a colonoscopy? A colonoscopy is a test that lets a doctor look inside your colon. The doctor uses a thin, lighted tube called a colonoscope to look for problems. These include small growths called polyps, cancer, or bleeding. During the test, the doctor can take samples of tissue that can be checked for cancer or other problems. This is called a biopsy. The doctor can also take out polyps. Before the test, you will need to stop eating solid foods. You also will drink a liquid or take a tablet that cleans out your colon. This helps your doctor be able to see inside your colon during the test.  Follow-up care is a key part of your treatment and safety. Be sure to make and go to all appointments, and call your doctor if you are having problems. It's also a good idea to know your test results and keep a list of the medicines you take. What happens before the procedure?   Preparing for the procedure    · Understand exactly what procedure is planned, along with the risks, benefits, and other options. · Tell your doctors ALL the medicines, vitamins, supplements, and herbal remedies you take. Some of these can increase the risk of bleeding or interact with anesthesia.     · If you take blood thinners, such as warfarin (Coumadin), clopidogrel (Plavix), or aspirin, be sure to talk to your doctor. He or she will tell you if you should stop taking these medicines before your procedure.  Make sure that you understand exactly what your doctor wants you to do.     · Your doctor will tell you which medicines to take or stop before your procedure. You may need to stop taking certain medicines a week or more before the procedure. So talk to your doctor as soon as you can.     · If you have an advance directive, let your doctor know. It may include a living will and a durable power of  for health care. Bring a copy to the hospital. If you don't have one, you may want to prepare one. It lets your doctor and loved ones know your health care wishes. Doctors advise that everyone prepare these papers before any type of surgery or procedure.    Before the procedure    · Follow your doctor's directions about when to stop eating solid foods and drink only clear liquids. You can drink water, clear juices, clear broths, flavored ice pops, and gelatin (such as Jell-O). Do not eat or drink anything red or purple. This includes grape juice and grape-flavored ice pops. It also includes fruit punch and cherry gelatin.     · Drink the \"colon prep\" liquid as your doctor tells you. You will want to stay home, because the liquid will make you go to the bathroom a lot. Your stools will be loose and watery. It is very important to drink all of the liquid. If you have problems drinking it, call your doctor. Some doctors may have you take a tablet rather than drink a liquid.     · Do not eat any solid foods after you drink the colon prep.     · Stop drinking clear liquids 6 to 8 hours before the test.   Procedures can be stressful. This information will help you understand what you can expect. And it will help you safely prepare for your procedure. What happens on the day of the procedure? · Follow the instructions exactly about when to stop eating and drinking. If you don't, your procedure may be canceled.  If your doctor told you to take your medicines on the day of the procedure, take them with only a sip of water.     · Take a bath or shower before you come in for your procedure. Do not apply lotions, perfumes, deodorants, or nail polish.     · Take off all jewelry and piercings. And take out contact lenses, if you wear them.    At the doctor's office or hospital   · Bring a picture ID.     · You will be kept comfortable and safe by your anesthesia provider. The anesthesia may make you sleep.     · You will lie on your back or your side with your knees drawn up toward your belly. The doctor will gently put a gloved finger into your anus. Then the doctor puts the scope in and moves it into your colon. The scope goes in easily because it is lubricated.     · The doctor may also use small tools to take tissue samples for a biopsy or to remove polyps. This does not hurt.     · The test usually takes 30 to 45 minutes. But it may take longer. It depends on what is found and what is done. Going home   · Be sure you have someone to drive you home. Anesthesia and pain medicine make it unsafe for you to drive.     · You will be given more specific instructions about recovering from your procedure. When should you call your doctor? · You have questions or concerns.     · You don't understand how to prepare for your procedure.     · You are having trouble with the bowel prep.     · You become ill before the procedure (such as fever, flu, or a cold).     · You need to reschedule or have changed your mind about having the procedure. Where can you learn more? Go to http://mitra-shaw.info/. Enter C315 in the search box to learn more about \"Colonoscopy: Before Your Procedure. \"  Current as of: March 28, 2018  Content Version: 11.8  © 8458-2240 [employer]. Care instructions adapted under license by [employer] (which disclaims liability or warranty for this information).  If you have questions about a medical condition or this instruction, always ask your healthcare professional. Jasmina Harris disclaims any warranty or liability for your use of this information. Patient Education        DASH Diet: Care Instructions  Your Care Instructions    The DASH diet is an eating plan that can help lower your blood pressure. DASH stands for Dietary Approaches to Stop Hypertension. Hypertension is high blood pressure. The DASH diet focuses on eating foods that are high in calcium, potassium, and magnesium. These nutrients can lower blood pressure. The foods that are highest in these nutrients are fruits, vegetables, low-fat dairy products, nuts, seeds, and legumes. But taking calcium, potassium, and magnesium supplements instead of eating foods that are high in those nutrients does not have the same effect. The DASH diet also includes whole grains, fish, and poultry. The DASH diet is one of several lifestyle changes your doctor may recommend to lower your high blood pressure. Your doctor may also want you to decrease the amount of sodium in your diet. Lowering sodium while following the DASH diet can lower blood pressure even further than just the DASH diet alone. Follow-up care is a key part of your treatment and safety. Be sure to make and go to all appointments, and call your doctor if you are having problems. It's also a good idea to know your test results and keep a list of the medicines you take. How can you care for yourself at home? Following the DASH diet  · Eat 4 to 5 servings of fruit each day. A serving is 1 medium-sized piece of fruit, ½ cup chopped or canned fruit, 1/4 cup dried fruit, or 4 ounces (½ cup) of fruit juice. Choose fruit more often than fruit juice. · Eat 4 to 5 servings of vegetables each day. A serving is 1 cup of lettuce or raw leafy vegetables, ½ cup of chopped or cooked vegetables, or 4 ounces (½ cup) of vegetable juice. Choose vegetables more often than vegetable juice. · Get 2 to 3 servings of low-fat and fat-free dairy each day.  A serving is 8 ounces of milk, 1 cup of yogurt, or 1 ½ ounces of cheese. · Eat 6 to 8 servings of grains each day. A serving is 1 slice of bread, 1 ounce of dry cereal, or ½ cup of cooked rice, pasta, or cooked cereal. Try to choose whole-grain products as much as possible. · Limit lean meat, poultry, and fish to 2 servings each day. A serving is 3 ounces, about the size of a deck of cards. · Eat 4 to 5 servings of nuts, seeds, and legumes (cooked dried beans, lentils, and split peas) each week. A serving is 1/3 cup of nuts, 2 tablespoons of seeds, or ½ cup of cooked beans or peas. · Limit fats and oils to 2 to 3 servings each day. A serving is 1 teaspoon of vegetable oil or 2 tablespoons of salad dressing. · Limit sweets and added sugars to 5 servings or less a week. A serving is 1 tablespoon jelly or jam, ½ cup sorbet, or 1 cup of lemonade. · Eat less than 2,300 milligrams (mg) of sodium a day. If you limit your sodium to 1,500 mg a day, you can lower your blood pressure even more. Tips for success  · Start small. Do not try to make dramatic changes to your diet all at once. You might feel that you are missing out on your favorite foods and then be more likely to not follow the plan. Make small changes, and stick with them. Once those changes become habit, add a few more changes. · Try some of the following:  ? Make it a goal to eat a fruit or vegetable at every meal and at snacks. This will make it easy to get the recommended amount of fruits and vegetables each day. ? Try yogurt topped with fruit and nuts for a snack or healthy dessert. ? Add lettuce, tomato, cucumber, and onion to sandwiches. ? Combine a ready-made pizza crust with low-fat mozzarella cheese and lots of vegetable toppings. Try using tomatoes, squash, spinach, broccoli, carrots, cauliflower, and onions. ? Have a variety of cut-up vegetables with a low-fat dip as an appetizer instead of chips and dip. ? Sprinkle sunflower seeds or chopped almonds over salads.  Or try adding chopped walnuts or almonds to cooked vegetables. ? Try some vegetarian meals using beans and peas. Add garbanzo or kidney beans to salads. Make burritos and tacos with mashed wang beans or black beans. Where can you learn more? Go to http://mitra-shaw.info/. Enter S629 in the search box to learn more about \"DASH Diet: Care Instructions. \"  Current as of: December 6, 2017  Content Version: 11.8  © 4775-8036 [employer]. Care instructions adapted under license by [employer] (which disclaims liability or warranty for this information). If you have questions about a medical condition or this instruction, always ask your healthcare professional. [address] any warranty or liability for your use of this information. Patient Education        Gastrointestinal Bleeding: Care Instructions  Your Care Instructions    The digestive or gastrointestinal tract goes from the mouth to the anus. It is often called the GI tract. Bleeding can happen anywhere in the GI tract. It may be caused by an ulcer, an infection, or cancer. It may also be caused by medicines such as aspirin or ibuprofen. Light bleeding may not cause any symptoms at first. But if you continue to bleed for a while, you may feel very weak or tired. Sudden, heavy bleeding means you need to see a doctor right away. This kind of bleeding can be very dangerous. But it can usually be cured or controlled. The doctor may do some tests to find the cause of your bleeding. Follow-up care is a key part of your treatment and safety. Be sure to make and go to all appointments, and call your doctor if you are having problems. It's also a good idea to know your test results and keep a list of the medicines you take. How can you care for yourself at home? · Be safe with medicines. Take your medicines exactly as prescribed. Call your doctor if you think you are having a problem with your medicine. You will get more details on the specific medicines your doctor prescribes. · Do not take aspirin or other anti-inflammatory medicines, such as naproxen (Aleve) or ibuprofen (Advil, Motrin), without talking to your doctor first. Ask your doctor if it is okay to use acetaminophen (Tylenol). · Do not drink alcohol. · The bleeding may make you lose iron. So it's important to eat foods that have a lot of iron. These include red meat, shellfish, poultry, and eggs. They also include beans, raisins, whole-grain breads, and leafy green vegetables. If you want help planning meals, you can make an appointment with a dietitian. When should you call for help? Call 911 anytime you think you may need emergency care. For example, call if:    · You have sudden, severe belly pain.     · You vomit blood or what looks like coffee grounds.     · You passed out (lost consciousness).     · Your stools are maroon or very bloody.    Call your doctor now or seek immediate medical care if:    · You are dizzy or lightheaded, or you feel like you may faint.     · Your stools are black and look like tar, or they have streaks of blood.     · You have belly pain.     · You vomit or have nausea.     · You have trouble swallowing, or it hurts when you swallow.    Watch closely for changes in your health, and be sure to contact your doctor if:    · You do not get better as expected. Where can you learn more? Go to http://mitra-shaw.info/. Enter O685 in the search box to learn more about \"Gastrointestinal Bleeding: Care Instructions. \"  Current as of: November 20, 2017  Content Version: 11.8  © 9688-7102 [employer]. Care instructions adapted under license by [employer] (which disclaims liability or warranty for this information).  If you have questions about a medical condition or this instruction, always ask your healthcare professional. [employer] disclaims any warranty or liability for your use of this information.

## 2019-01-03 NOTE — DISCHARGE SUMMARY
[facility]. 
Discharge Summary Patient: Evgeny Gramajo MRN: [id number]  CSN: [id number] YOB: 1930  Age: 80 y.o. Sex: male DOA: 12/31/2018 LOS:  LOS: 3 days   Discharge Date:1/3/2019 Admission Diagnoses: LGI bleed Discharge Diagnoses:   
Problem List as of 1/3/2019 Date Reviewed: 1/3/2019 Codes Class Noted - Resolved Non-rheumatic tricuspid valve insufficiency ICD-10-CM: I36.1 ICD-9-CM: 424.2  1/1/2019 - Present * (Principal) LGI bleed ICD-10-CM: K92.2 ICD-9-CM: 578.9  12/31/2018 - Present Overview Signed 12/31/2018 10:54 PM by Angeli Oswald MD  
  Admit for further monitor GI consult Serial H&H Consider blood transfusion if persistent bleeding and patient becomes symptomatic Diastolic CHF, chronic (HCC) ICD-10-CM: I50.32 
ICD-9-CM: 428.32, 428.0  9/26/2018 - Present Cardiac pacemaker in situ ICD-10-CM: Z95.0 ICD-9-CM: V45.01  9/16/2018 - Present Syncope ICD-10-CM: R55 
ICD-9-CM: 780.2  7/11/2018 - Present Aortic stenosis, moderate ICD-10-CM: I35.0 ICD-9-CM: 424.1  1/28/2018 - Present Abnormal chest CT ICD-10-CM: R93.89 ICD-9-CM: 793.2  1/27/2018 - Present Overview Addendum 1/27/2018  1:13 PM by Caroline Miranda MD  
  Bilateral ground glass opacities and bibasilar atelectasis/infiltrate. Suspicious for aspiration pneumonia. Pleural effusion, right ICD-10-CM: J90 ICD-9-CM: 511.9  1/26/2018 - Present SOB (shortness of breath) on exertion ICD-10-CM: R06.02 
ICD-9-CM: 786.05  1/25/2018 - Present Cardiac syncope ICD-10-CM: R55 
ICD-9-CM: 780.2  1/19/2018 - Present Celiac artery stenosis (HCC) ICD-10-CM: I77.4 ICD-9-CM: 447.4  1/10/2018 - Present Osteoarthritis of both knees ICD-10-CM: M17.0 ICD-9-CM: 715.96  8/20/2017 - Present Overview Signed 8/20/2017  3:19 PM by Faye Araiza MD  
  Contemplating knee replacement. Statin intolerance ICD-10-CM: Z78.9 ICD-9-CM: 995.27  5/3/2017 - Present Essential hypertension ICD-10-CM: I10 
ICD-9-CM: 401.9  2/27/2017 - Present Anxiety ICD-10-CM: F41.9 ICD-9-CM: 300.00  2/27/2017 - Present Chronic systolic congestive heart failure (HCC) ICD-10-CM: I50.22 ICD-9-CM: 428.22, 428.0  1/25/2017 - Present S/P CABG x 2 ICD-10-CM: Z95.1 ICD-9-CM: V45.81  8/25/2015 - Present Overview Signed 8/25/2015  2:15 PM by Bertha Burris MD  
  12/2008, LIMA to LAD, SVG to RCA Atherosclerotic NAHED (renal artery stenosis), bilateral (HCC) ICD-10-CM: I70.1 ICD-9-CM: 440.1  8/25/2015 - Present Overview Signed 8/25/2015  2:16 PM by Bertha Burris MD  
  S/P stenting R and L Dyslipidemia ICD-10-CM: E78.5 ICD-9-CM: 272.4  Unknown - Present Coronary artery disease of native artery of native heart with stable angina pectoris ([facility] [address] ICD-10-CM: I25.118 
ICD-9-CM: 414.01, 413.9  Unknown - Present Cardiomyopathy ICD-10-CM: I42.9 ICD-9-CM: 425.4  Unknown - Present Overview Signed 4/29/2015  7:51 PM by Haley Fall  
  EF 30-35% (ECHO 6/14) Atrial fibrillation ICD-10-CM: I48.91 
ICD-9-CM: 427.31  Unknown - Present Overview Signed 4/29/2015  7:51 PM by Haley Fall CHADS score 3  (+CHF, +HTN, +AGE, -DM, -CVA) Sick sinus syndrome ICD-10-CM: I49.5 ICD-9-CM: 427.81  Unknown - Present Overview Signed 8/25/2015  2:15 PM by Bertha Burris MD  
  Developed post op CABG RESOLVED: Sepsis ([facility] [address] ICD-10-CM: A41.9 ICD-9-CM: 038.9, 995.91  1/6/2017 - 2/27/2017 RESOLVED: Community acquired bacterial pneumonia ICD-10-CM: J15.9 ICD-9-CM: 482.9  1/6/2017 - 2/27/2017 RESOLVED: CAP (community acquired pneumonia) ICD-10-CM: J18.9 ICD-9-CM: 534  1/6/2017 - 2/27/2017 Discharge Condition: Stable Discharge To: Home Consults: Cardiology, Gastroenterology and Hospitalist 
 
 Hospital Course: 81 y/o male with hx of afib, CABG (2008), cardiomyopathy, CAD, SSS s/p pacemaker, PVD, renal artery stenosis s/p bilateral stents presented to the ED on 12/31 with acute rectal bleeding. Pt stated he was passing copious amounts of BRB in the toilet w/o BM. He is on Eliquis, stated he's had some episodes of scant blood from his rectum in the past but never this severe. Associated symptoms include lightheadedness and mild abdominal soreness. Pt was discharged from [facility] ED earlier the same day as his testicles were edematous and he could not urinate, reports compliance w/ Lasix and note mild improvement in edema. ED eval note hemorrhoid, Hgb lower than previous values but not significantly. Bleeding scan completed and showed possible rectosigmoid source. Pt admitted. GI consulted. His last colonoscopy was in 2012 note left sided diverticulosis and internal hemorrhoids, EGD in 2014 showed small hiatus hernia and gastritis. Cardiology consulted- recommend colonoscopy as pt has significant risk for embolic event w/o anticoagulants and has tolerated anticoagulation since at least 2015. Pt c/o shortness of breath when swallowing- upon further discussion reports cough after swallowing- pt was not given colonoscopy prep- stated he was told they thought he wouldn't be able to handle it. Plan was to proceed with EGD but this was canceled also. PT/OT eval recommend home health. Speech eval recommends MBS but able to have diet. No further episodes of bleeding, H/H stable. Per GI, pt ok to d/c and follow up outpatient for EGD/colonoscopy. Will resume Eliquis as pt high risk for significant embolic event - per cardiology note. Pt is to follow up with PCP within one week and with GI within one week. Physical Exam: 
General appearance: alert, cooperative, no distress, appears stated age Head: Normocephalic, without obvious abnormality, atraumatic Lungs: clear to auscultation bilaterally Heart: regular rate and rhythm, S1, S2 normal, no murmur, click, rub or gallop, pacemaker left chest wall Abdomen: soft, non-tender. Bowel sounds normal.  
Extremities: extremities normal, atraumatic, no cyanosis or edema Skin: Skin color, texture, turgor normal. No rashes or lesions Neurologic: Grossly normal 
PSY: Mood and affect normal, appropriately behaved Significant Diagnostic Studies: labs:  
Results for Opal July (MRN [id number]) as of 1/3/2019 14:34 Ref. Range 12/31/2018 17:05 Sodium Latest Ref Range: 136 - 145 mmol/L 139 Potassium Latest Ref Range: 3.5 - 5.5 mmol/L 3.7 Chloride Latest Ref Range: 100 - 108 mmol/L 106 CO2 Latest Ref Range: 21 - 32 mmol/L 24 Anion gap Latest Ref Range: 3.0 - 18 mmol/L 9 Glucose Latest Ref Range: 74 - 99 mg/dL 125 (H) BUN Latest Ref Range: 7.0 - 18 MG/DL 55 (H) Creatinine Latest Ref Range: 0.6 - 1.3 MG/DL 1.95 (H) BUN/Creatinine ratio Latest Ref Range: 12 - 20   28 (H) Calcium Latest Ref Range: 8.5 - 10.1 MG/DL 8.8  
GFR est non-AA Latest Ref Range: >60 ml/min/1.73m2 33 (L)  
GFR est AA Latest Ref Range: >60 ml/min/1.73m2 40 (L) Results for Opal July (MRN [id number]) as of 1/3/2019 14:34 Ref. Range 1/2/2019 00:30 WBC Latest Ref Range: 4.6 - 13.2 K/uL 4.1 (L) RBC Latest Ref Range: 4.70 - 5.50 M/uL 4.07 (L) HGB Latest Ref Range: 13.0 - 16.0 g/dL 9.2 (L) HCT Latest Ref Range: 36.0 - 48.0 % 31.6 (L) MCV Latest Ref Range: 74.0 - 97.0 FL 77.6 MCH Latest Ref Range: 24.0 - 34.0 PG 22.6 (L) MCHC Latest Ref Range: 31.0 - 37.0 g/dL 29.1 (L) RDW Latest Ref Range: 11.6 - 14.5 % 19.9 (H) PLATELET Latest Ref Range: 135 - 420 K/uL 169 MPV Latest Ref Range: 9.2 - 11.8 FL 10.1 Discharge Medications:    
Current Discharge Medication List  
  
CONTINUE these medications which have NOT CHANGED Details ALPRAZolam (XANAX) 0.25 mg tablet Take 1 Tab by mouth two (2) times daily as needed for Anxiety. Max Daily Amount: 0.5 mg. 
Qty: 30 Tab, Refills: 1 Associated Diagnoses: Anxiety ELIQUIS 5 mg tablet TAKE 1 TABLET BY MOUTH TWICE A DAY Refills: 3  
  
furosemide (LASIX) 40 mg tablet 40 mg.  
  
omeprazole (PRILOSEC) 40 mg capsule Take 40 mg by mouth daily. OXYGEN-AIR DELIVERY SYSTEMS by Does Not Apply route. amLODIPine (NORVASC) 5 mg tablet Take 1 Tab by mouth daily. Qty: 60 Tab, Refills: 0  
  
metoprolol succinate (TOPROL XL) 50 mg XL tablet Take 1 Tab by mouth daily. Qty: 60 Tab, Refills: 0  
  
nitroglycerin (NITROSTAT) 0.4 mg SL tablet 1 Tab by SubLINGual route every five (5) minutes as needed for Chest Pain (up to 3 total 1 every 5 min). Qty: 60 Tab, Refills: 0  
  
enalapril (VASOTEC) 20 mg tablet Take 20 mg by mouth daily. traMADol (ULTRAM) 50 mg tablet TAKE 1 TABLET EVERY 6 HOURS AS NEEDED Refills: 0 Activity: Activity as tolerated Diet: Cardiac Diet Wound Care: None needed Follow-up: Follow up with PCP within one week- speech recommends modified barium sweallow. Follow up with GI within one week to schedule outpatient EGD/colonoscopy. Discharge time: > 35 mins Audrey Gallo NP 
1/3/2019, 2:40 PM

## 2019-01-03 NOTE — PROGRESS NOTES
[facility] - Progress Note Admit Date: 12/31/2018 Patient seen and examined independently. Note plans for outpatient reassessment /colonoscopy which seems reasonable as patient remains hemodynamically stable or at baseline. If O2 sats remain stable , OK for DC from cardiac standpoint. He has been very compliant in regard to watching weights and BP, etc. We can see in the office next week. Agree with assessment and plan as noted below. Alexandru Rosas MD 
Assessment:  
 
Hospital Problems  Date Reviewed: 12/13/2018 Codes Class Noted POA Non-rheumatic tricuspid valve insufficiency ICD-10-CM: I36.1 ICD-9-CM: 424.2  1/1/2019 Unknown * (Principal) LGI bleed ICD-10-CM: K92.2 ICD-9-CM: 578.9  12/31/2018 Yes Overview Signed 12/31/2018 10:54 PM by Jaleesa Lobo MD  
  Admit for further monitor GI consult Serial H&H Consider blood transfusion if persistent bleeding and patient becomes symptomatic Diastolic CHF, chronic (HCC) ICD-10-CM: I50.32 
ICD-9-CM: 428.32, 428.0  9/26/2018 Yes Cardiac pacemaker in situ ICD-10-CM: Z95.0 ICD-9-CM: V45.01  9/16/2018 Yes Aortic stenosis, moderate ICD-10-CM: I35.0 ICD-9-CM: 424.1  1/28/2018 Yes Essential hypertension ICD-10-CM: I10 
ICD-9-CM: 401.9  2/27/2017 Yes S/P CABG x 2 ICD-10-CM: Z95.1 ICD-9-CM: V45.81  8/25/2015 Yes Overview Signed 8/25/2015  2:15 PM by Paola Nino MD  
  12/2008, LIMA to LAD, SVG to RCA Atherosclerotic NAHED (renal artery stenosis), bilateral (HCC) ICD-10-CM: I70.1 ICD-9-CM: 440.1  8/25/2015 Yes Overview Signed 8/25/2015  2:16 PM by Paola Nino MD  
  S/P stenting R and L Dyslipidemia ICD-10-CM: E78.5 ICD-9-CM: 272.4  Unknown Yes Atrial fibrillation ICD-10-CM: I48.91 
ICD-9-CM: 427.31  Unknown Yes Overview Signed 4/29/2015  7:51 PM by Trevor Little CHADS score 3  (+CHF, +HTN, +AGE, -DM, -CVA) - Presented with lower GI Bleed with Nuclear Medicine Scan 12/31/18 suggesting active bleeding in the rectosigmoid region - Hx PPM implantation (single chamber) with RV lead revision 10/01/18. Left innominate vein obstruction.  
-Chronic atrial fibrillation on eliquis and Toprol, CHADS2-Vasc score 4 (CHF, HTN, age) -Moderate aortic stenosis by echo 7/12/18 with EF 50% 
-Severe TR by echo 7/2018 
-h/o CABG x 2, last cath 9/2015 with patent LIMA to LAD 
-Chronic diastolic heart failure 
-Thyroid disorder 
-h/o HTN 
-Dyslipidemia w/statin intolerance 
- Nuclear stress test 09/25/17 with no major reversible defect 
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  
 
- GI input reviewed. Patient complained of shortness of breath and he was hesitant to proceed with prep and colonoscopy. Currently eating solids so he would not be able to pursue colonoscopy tomorrow. - Continue with PO diuretics 
- Can hold on anticoagulation but will be high risk for CVA, discussed with patient. Colonoscopy deferred at this time, can pursue again as an outpatient. Will need outpatient follow up with our clinic 2-3 weeks as well. Subjective: No new complaints. Objective:  
  
Patient Vitals for the past 8 hrs: 
 Temp Pulse Resp BP SpO2  
01/03/19 1136 (!) 94.8 °F (34.9 °C) 80 18 115/76 96 % 01/03/19 0739 (!) 94.6 °F (34.8 °C) 83 18 130/75 94 % 01/03/19 0555 (!) 94.4 °F (34.7 °C) 80 18 99/54 100 % Patient Vitals for the past 96 hrs: 
 Weight 01/02/19 0142 160 lb 4.4 oz (72.7 kg) 01/01/19 0110 152 lb 12.5 oz (69.3 kg) 12/31/18 1641 170 lb (77.1 kg) Intake/Output Summary (Last 24 hours) at 1/3/2019 1331 Last data filed at 1/3/2019 6281 Gross per 24 hour Intake 925 ml Output 875 ml Net 50 ml Physical Exam: 
General:  alert, cooperative, no distress Neck:  nontender, no JVD Lungs:  Slight rales at bases Heart:  Irregularly irregular Abdomen:  abdomen is soft without significant tenderness, masses, organomegaly or guarding Extremities:  extremities normal, atraumatic, no cyanosis or edema Data Review:  
 
Labs: Results:  
   
Chemistry Recent Labs 12/31/18 
1705 *   
K 3.7  CO2 24 BUN 55* CREA 1.95* CA 8.8 AGAP 9  
BUCR 28* CBC w/Diff Recent Labs 01/02/19 
0030 01/01/19 
1839 01/01/19 
1256  12/31/18 
1705 WBC 4.1* 3.8* 4.2*   < > 4.0*  
RBC 4.07* 4.13* 4.09*   < > 3.85* HGB 9.2* 9.5* 9.5*   < > 8.9* HCT 31.6* 32.5* 32.1*   < > 30.7*  151 144   < > 183 GRANS  --   --   --   --  79* LYMPH  --   --   --   --  8*  
EOS  --   --   --   --  1  
 < > = values in this interval not displayed. Cardiac Enzymes No results found for: CPK, CK, CKMMB, CKMB, RCK3, CKMBT, CKNDX, CKND1, DARNELL, TROPT, TROIQ, MARIA ESTHER, TROPT, TNIPOC, BNP, BNPP Coagulation Recent Labs 12/31/18 
1705 PTP 23.8* INR 2.1* Lipid Panel Lab Results Component Value Date/Time Cholesterol, total 110 07/12/2018 05:40 AM  
 HDL Cholesterol 33 (L) 07/12/2018 05:40 AM  
 LDL, calculated 55.6 07/12/2018 05:40 AM  
 VLDL, calculated 21.4 07/12/2018 05:40 AM  
 Triglyceride 107 07/12/2018 05:40 AM  
 CHOL/HDL Ratio 3.3 07/12/2018 05:40 AM  
  
BNP No results found for: BNP, BNPP, XBNPT Liver Enzymes No results for input(s): TP, ALB, TBIL, AP, SGOT, GPT in the last 72 hours. No lab exists for component: DBIL Digoxin Thyroid Studies Lab Results Component Value Date/Time T4, Total 7.9 05/06/2010 08:50 AM  
 TSH 4.40 (H) 09/26/2018 07:35 PM  
    
 
Signed By: Hill Hernadez. Jacquie Grullon PA-C January 3, 2019

## 2019-01-03 NOTE — PROGRESS NOTES
Problem: Mobility Impaired (Adult and Pediatric) Goal: *Acute Goals and Plan of Care (Insert Text) Physical Therapy Goals Initiated 1/2/2019 and to be accomplished within 7 days. 1.  Patient will complete all bed mobility with modified independence in order to prepare for EOB/OOB activity. 2.  Patient will perform sit <> stand with modified independence in order to prepare for OOB/gait activity. 3.  Patient will perform bed to chair transfers with modified independence in order to promote mobility and encourage seated activity to progress towards their prior level of function. 4.  Patient will ambulate 100 feet with modified independence using LRAD in order to prepare for safe negotiation of their environment. Outcome: Progressing Towards Goal 
 
PHYSICAL THERAPY: Daily TREATMENT Note INPATIENT: Medicare: Hospital Day: 4 Patient: Tata Holder (46 y.o. male)    Date: 1/3/2019 Primary Diagnosis: LGI bleed Procedure(s) (LRB): 
COLONOSCOPY (N/A),  ,  
Precautions:   
 
 
Chart, physical therapy assessment, plan of care and goals were reviewed. PLOF:independent ASSESSMENT: 
Pt pleasant, motivated to participate in therapy today. Pt supervision for sit<>stand transfers; supervision for gait training X 120 ft with RW, mod I for bed mobility. Pt not reporting any SOB with activity; some fatigue however able to maintain conversation throughout activity without breathlessness. Progression toward goals: 
      Improving appropriately and progressing toward goals PLAN: 
Patient continues to benefit from skilled intervention to address the above impairments. Continue treatment per established plan of care. EDUCATION:  
Education:  Patient was educated on the following topics: activity pacing Barriers to Learning/Limitations: None Compensate with: visual, verbal, tactile, kinesthetic cues/model Discharge Recommendations:  Home Health Further Equipment Recommendations for Discharge:  N/A Factors which may impact discharge planning: none SUBJECTIVE:  
Patient stated It feels good to get up and walk.  OBJECTIVE DATA SUMMARY:  
Critical Behavior: 
Neurologic State: Alert Orientation Level: Oriented X4 Cognition: Follows commands Safety/Judgement: Awareness of environment G CODE:Mobility [id number] Current  CJ= 20-39%   Goal  CI= 1-19%. The severity rating is based on the Other [facility] [address] Standing Balance Scale 
0: Pt performs 25% or less of standing activity (Max assist) CN, 100% impaired. 1: Pt supports self with upper extremities but requires therapist assistance. Pt performs 25-50% of effort (Mod assist) CM, 80% to <100% impaired. 1+: Pt supports self with upper extremities but requires therapist assistance. Pt performs >50% effort. (Min assist). CL, 60% to <80% impaired. 2: Pt supports self independently with both upper extremities (walker, crutches, parallel bars). CL, 60% to <80% impaired. 2+: Pt support self independently with 1 upper extremity (cane, crutch, 1 parallel bar). CK, 40% to <60% impaired. 3: Pt stands without upper extremity support for up to 30 seconds. CK, 40% to <60% impaired. 3+: Pt stands without upper extremity support for 30 seconds or greater. CJ, 20% to <40% impaired. 4: Pt independently moves and returns center of gravity 1-2 inches in one plane. CJ, 20% to <40% impaired. 4+: Pt independently moves and returns center of gravity 1-2 inches in multiple planes. CI, 1% to <20% impaired. 5: Pt independently moves and returns center of gravity in all planes greater than 2 inches. CH, 0% impaired. Functional Mobility: 
 
 
Functional Status Indep (I) Mod I Super-vision Min A Mod A Max A Total A Assist x2 Verbal cues Additional time Not tested Comments Rolling []  [x]  [] []    []    []  []  [x] [x] [] [] Supine to sit []  []  [] []  []  []  []  [] [] [] [x] Sit to supine []  [x]  [] []  []  []  []  [x] [x] [] [] Sit to stand []  []  [x] []  []  []  []  [x] [x] [] []   
Stand to sit []  []  [x] []  []  []  []  [x] [x] [] [] Bed to chair transfers []  []  [x] []  []  []  []  [x] [x] [] [] Balance Good [address] Poor Unable Not tested Comments Sitting static [x]  []  []  []  [] Sitting dynamic [x]  []  []  []  []   
Standing static [x]  []  []  []  []   
Standing dynamic [x]  []  []  []  [] Mobility/Gait:  
Level of Assistance: Supervision Assistive Device: rolling walker Distance Ambulated: 120 feet Base of Support: [employer] Speed/Pilar: [facility] Microtask [facility] Step Length: [employer] [facility] Microtask [place] Swing Pattern: [facility] Microtask [place] Stance: [place] [facility] Gait Abnormalities: [employer] [facility] Stairs:  
Level of Assistance: unable at this time Vital Signs Temp: (!) 94.8 °F (34.9 °C) Pulse (Heart Rate): 80    
BP: 115/76 Resp Rate: 18    
O2 Sat (%): 96 %Pain:Pre treatment pain level:0 Post treatment pain level:0Pain Scale 1: Numeric (0 - 10) Pain Intensity 1: 0 Activity Tolerance:  
Good After treatment:  
Patient left in no apparent distress in bed Call bell left within reach Caregiver present Siri Holter, PTA Time Calculation: 29 mins

## 2019-01-03 NOTE — PROGRESS NOTES
Gastrointestinal Progress Note Patient Name: Silvia Christian Today's Date: 1/3/2019 Admit Date: 12/31/2018 Assessment-Recommendation: 1. Reported dysphagia - Patient was eating a solid meal during my visit. Denied dysphagia. 2. GIB - No BRBPR or melena. H&H is stable. If H&H remains stable would be ok with discharge home with outpatient follow up for colonoscopy. Likely diverticular bleeding. Patient is currently eating a solid meal. Preparation will likely not be adequate for colonoscopy tomorrow. Subjective:  
 
Found patient eating a solid meal.  
 
Current Facility-Administered Medications Medication Dose Route Frequency  enalapril (VASOTEC) tablet 20 mg  20 mg Oral DAILY  amLODIPine (NORVASC) tablet 5 mg  5 mg Oral DAILY  metoprolol succinate (TOPROL-XL) XL tablet 50 mg  50 mg Oral DAILY  nitroglycerin (NITROSTAT) tablet 0.4 mg  0.4 mg SubLINGual Q5MIN PRN  pantoprazole (PROTONIX) tablet 40 mg  40 mg Oral DAILY  furosemide (LASIX) tablet 40 mg  40 mg Oral BID  traMADol (ULTRAM) tablet 50 mg  50 mg Oral Q6H PRN  
 ALPRAZolam (XANAX) tablet 0.25 mg  0.25 mg Oral BID PRN  
 ondansetron (ZOFRAN) injection 4 mg  4 mg IntraVENous Q4H PRN Objective:  
 
Physical Exam: 
 
NAD Found patient eating solid meal 
Family at bedside Data Review: 
 
Labs: Results:  
   
Chemistry Recent Labs 12/31/18 
1705 *   
K 3.7  CO2 24 BUN 55* CREA 1.95* CA 8.8 AGAP 9  
BUCR 28* CBC w/Diff Recent Labs 01/02/19 
0030 01/01/19 
1839 01/01/19 
1256  12/31/18 
1705 WBC 4.1* 3.8* 4.2*   < > 4.0*  
RBC 4.07* 4.13* 4.09*   < > 3.85* HGB 9.2* 9.5* 9.5*   < > 8.9* HCT 31.6* 32.5* 32.1*   < > 30.7*  151 144   < > 183 GRANS  --   --   --   --  79* LYMPH  --   --   --   --  8*  
EOS  --   --   --   --  1  
 < > = values in this interval not displayed. Coagulation Recent Labs 12/31/18 
1705 PTP 23.8*  
 INR 2.1* Liver Enzymes No results for input(s): TP, ALB, TBIL, AP, SGOT, ALT in the last 72 hours. No lab exists for component: OSKAR Osorio MD 
January 3, 2019

## 2019-01-03 NOTE — PROGRESS NOTES
Bedside shift change report given to KATELIN Kaminski (oncoming nurse) by Mauricio Snyder RN (offgoing nurse). Report included the following information SBAR, Kardex, Intake/Output, MAR and Recent Results. A/O x4, no pain noted, IV flushed and capped, call bell in reach. 200 -- Spoke with Jm Level (ENDO), informed that the patient has been NPO, but could not tolerate the prep for the colonoscopy per GI, colonoscopy has been cancelled, will be sending for patient for EGD. 1136 -- Reassessment completed, no change in patient condition, will continue to monitor. 
   
1147 -- AM medications given, well tolerated, will continue to monitor. No pain noted, Large brown soft BM, no blood present. 
   
1520 -- Reassessment completed, no change in patient condition, will continue to monitor. 
   
1715 -- Patient discharged.

## 2019-01-03 NOTE — PROGRESS NOTES
Problem: Dysphagia (Adult) Goal: *Acute Goals and Plan of Care (Insert Text) Recommendations: 
Diet: soft solids / thin liquids; NO STRAWS; single sips Meds: one at a time Aspiration Precautions Oral Care TID Goals:  Patient will: 1. Tolerate PO trials with 0 s/s overt distress in 4/5 trials 2. Utilize compensatory swallow strategies/maneuvers (decrease bite/sip, size/rate, alt. liq/sol) with min cues in 4/5 trials 3. Perform oral-motor/laryngeal exercises to increase oropharyngeal swallow function with min cues 4. Complete an objective swallow study (i.e., MBSS) to assess swallow integrity, r/o aspiration, and determine of safest LRD, min A Outcome: Progressing Towards Goal 
 
Speech LAnguage Pathology bedside swallow  
evaluation & TREATMENT Patient: Antony Arevalo (49 y.o. male) Date: 1/3/2019 Primary Diagnosis: LGI bleed Procedure(s) (LRB): 
COLONOSCOPY (N/A) Precautions: Aspiration ASSESSMENT : 
Based on the objective data described below, the patient presents with pharyngeal dysphagia in the setting of respiratory distress. Pt A&Ox4; cognitive-linguistic function appears intact. Oral-motor exam revealed structures grossly intact for mastication and deglutition. Pt with hx of dysphagia; MBS on 10/24/2017 revealed consistent mod-VF penetration of thin liquids via straw, resolved utilizing chin tuck and cup sips, mild vallecular/pyriform residue. Pt with hx of esophageal stenosis and dilation. Observed pt self-feeding thin liquids via single cups sips with adequate A-P transit, timely swallow, and weak hyolaryngeal elevation to palpation. Delayed throat clear observed in 50% of thin liquid trials. Puree and regular solid adequate prep/transit; pt able to clear with 0 clinical s/sx aspiration. Pt safest at this time for soft solids, thin liquids via single cup sips only (NO STRAWS); meds one at a time.  Pt may benefit from MBS after GI interventions (EGD and colonoscopy) to further assess pharyngeal structure/fxn and r/o aspiration. SLP will follow for diet tolerance and pt education. Skilled therapy initiated with min verbal cues for intermittent throat clears followed by effortful swallows to clear suspected pharyngeal residue, as well as use of single cup sips with effortful swallow to facilitate swallow and decrease aspiration risk; (+) response. D/w RN, Yamilex. 
 
Patient will benefit from skilled intervention to address the above impairments. Patients rehabilitation potential is considered to be Good Factors which may influence rehabilitation potential include:  
[]            None noted []            Mental ability/status [x]            Medical condition []            Home/family situation and support systems 
[]            Safety awareness 
[]            Pain tolerance/management []            Other: PLAN : 
Recommendations and Planned Interventions: 
Soft solid / thin liquids via cup sip only (NO STRAWS); meds one at a time Frequency/Duration: Patient will be followed by speech-language pathology 1-2 times per day/4-7 days per week to address goals. Discharge Recommendations: To Be Determined SUBJECTIVE:  
Patient stated I'm hungry. OBJECTIVE:  
 
Past Medical History:  
Diagnosis Date  Atrial fibrillation CHADS score 3  (+CHF, +HTN, +AGE, -DM, -CVA)  CABG   
 2008   [facility] - LAD,   [facility] - RCA  Cardiomyopathy EF 30-35% (ECHO 6/14)  Coronary artery disease  Dyslipidemia  Hypertension  Hypothyroid  Pacemaker  Peripheral vascular disease  Renal artery stenosis   
 bilateral stents  Sick sinus syndrome Past Surgical History:  
Procedure Laterality Date  HX CORONARY ARTERY BYPASS GRAFT    
 2008   [facility] - LAD,   [facility] - RCA  MS INSJ 1 TRANSVNS ELTRD PERM PACEMAKER/IMPLTBL DFB N/A 10/1/2018 Insert Or Replace Single Lead performed by Ismael Spain MD at [address] LAB Prior Level of Function/Home Situation: Per below Home Situation Home Environment: Private residence One/Two Story Residence: Two story, live on 1st floor Living Alone: No 
Support Systems: Family member(s), Child(jay) Patient Expects to be Discharged to[de-identified] Private residence Current DME Used/Available at Home: Walker, rolling Diet prior to admission: soft solids / thin liquids Current Diet:  Soft solids / thin liquids Cognitive and Communication Status: 
Neurologic State: Alert Orientation Level: Oriented X4 Cognition: Follows commands Perception: Appears intact Perseveration: No perseveration noted Safety/Judgement: Awareness of environment Oral Assessment: 
Oral Assessment Labial: No impairment Dentition: Intact Oral Hygiene: good Lingual: No impairment Velum: No impairment Mandible: No impairment P.O. Trials: 
Patient Position: [facility] Vocal quality prior to P.O.: No impairment Consistency Presented: Thin liquid; Solid;Puree How Presented: Self-fed/presented;Spoon; Successive swallows;Cup/sip Bolus Acceptance: No impairment Bolus Formation/Control: No impairment Propulsion: No impairment Oral Residue: None Initiation of Swallow: No impairment Laryngeal Elevation: Weak Aspiration Signs/Symptoms: Clear throat Pharyngeal Phase Characteristics: Feeling of discomfort; Suspected pharyngeal residue Effective Modifications: Chin tuck; Small sips and bites;Cup/sip Cues for Modifications: Minimal 
  
Oral Phase Severity: No impairment Pharyngeal Phase Severity : Mild GCODESwallowing:  Swallow Current Status CI= 1-19%  Swallow Goal Status CH= 0% The severity rating is based on the following outcomes: HEBERT Noms Swallow Level 6 Clinical Judgement PAIN: 
Start of Eval/Tx: 0 End of Eval/Tx: 0 After treatment: []            Patient left in no apparent distress sitting up in chair 
[x]            Patient left in no apparent distress in bed 
[x]            Call bell left within reach [x]            Nursing notified []            Family present 
[]            Caregiver present 
[]            Bed alarm activated COMMUNICATION/EDUCATION:  
[x]            Aspiration precautions; swallow safety; compensatory techniques. [x]            Patient/family have participated as able in goal setting and plan of care. [x]            Patient/family agree to work toward stated goals and plan of care. []            Patient understands intent and goals of therapy; neutral about participation. []            Patient unable to participate in goal setting/plan of care; educ ongoing with interdisciplinary staff 
[]         Posted safety precautions in patient's room. Thank you for this referral. 
YANA Geronimo Time Calculation: 30 mins Evaluation Time: 20 minutes Treatment Time: 10 minutes

## 2019-01-03 NOTE — ANCILLARY DISCHARGE INSTRUCTIONS
Core Measures, RRAT Score 27, Patient has transitional care follow up with Dr. Modesta Guajardo on 1/09/19 at 1:00 pm.

## 2019-01-03 NOTE — PROGRESS NOTES
1/2/2019 20:00- Assessment complete- see flowsheet. Call light in reach- reminded patient to use call light when needing to go to the bathroom. Hourly rounding. 00:10-  Patient reported that he has felt \"tired\" and like he is \"fading away\"  O2@ 2 lpm nasal cannula applied. Patient reports of using oxygen at home. LS- Right base fine crackles. Urinal in reach. Continue to monitor. Hourly rounding. 00:54- Mews score=3  Due to temp 94.4  Previous temps reviewed and it is noted that temps readings have been most all low since he was admitted 12/31/2018  Continue to monitor. 06:00- Dr Anupama Robertson notified of patients recent history of fluid overload and presently with right base fine crackles- order to discontinue IV fluids.

## 2019-01-03 NOTE — PROGRESS NOTES
Problem: Self Care Deficits Care Plan (Adult) Goal: *Acute Goals and Plan of Care (Insert Text) Occupational Therapy Goals Initiated 1/2/2019 within 7 day(s). 1.  Patient will perform grooming tasks while standing with independence. 2.  Patient will perform lower body dressing with modified independence and fair+ dynamic sitting balance. 3.  Patient will perform functional task in standing for  8 minutes with modified independence and fair dynamic standing balance. 4.  Patient will perform toilet transfers with modified independence. 5.  Patient will perform all aspects of toileting with modified independence. 6.  Patient will participate in upper extremity therapeutic exercise/activities with modified independence for 8 minutes to maintain/increase BUE strength for functional transfers and ADLs. 7.  Patient will utilize energy conservation techniques during functional activities with minimal verbal cues. Outcome: Progressing Towards Goal 
Occupational Therapy TREATMENT Patient: Re Mckeon (48 y.o. male) Date: 1/3/2019 Diagnosis: LGI bleed LGI bleed Procedure(s) (LRB): 
COLONOSCOPY (N/A) Precautions:   
Chart, occupational therapy assessment, plan of care, and goals were reviewed. PLOF: Independent ASSESSMENT: 
Pt standing upon entry, requesting to use bathroom. Pt requires CGA and hand held assist to bathroom and performs toileting ADL w/SBA. Pt does not tolerate standing sinkside to perform hand hygiene. Pt requires set-up at chair level. Pt demonstrates energy conservation techniques w/LB dressing task, donning/doffing socks. Pt left w/all needs within reach. EDUCATION Pt educated on energy conservation techniques w/aDLs and safety w/RW and functional mobility. Progression toward goals: 
[x]          Improving appropriately and progressing toward goals 
[]          Improving slowly and progressing toward goals []          Not making progress toward goals and plan of care will be adjusted PLAN: 
Patient continues to benefit from skilled intervention to address the above impairments. Continue treatment per established plan of care. Discharge Recommendations:  Home Health Further Equipment Recommendations for Discharge:  shower chair SUBJECTIVE:  
Patient stated I can dress myself, it just takes me a little bit longer.  OBJECTIVE DATA SUMMARY: 
  
G CODES:  Self Care  Current  CI= 1-19%  Goal  CI= 1-19%. The severity rating is based on the Other functional assessmentCognitive/Behavioral Status: 
Neurologic State: Alert Orientation Level: Oriented X4 Cognition: Follows commands Safety/Judgement: Awareness of environment Functional Mobility and Transfers for ADLs: 
 Transfers: 
Sit to Stand: Stand-by assistance Bed to Chair: Stand-by assistance(w/RW) Toilet Transfer : Stand-by assistance(w/grab bar) Bathroom Mobility: Contact guard assistance(w/o AD and HHA) Balance: 
Sitting: Intact Standing: Impaired; With support Standing - Static: Good Standing - Dynamic : Fair(fair/fair plus) ADL Intervention: 
Grooming Washing Hands: Supervision/set-up Lower Body Dressing Assistance Socks: Stand-by assistance Leg Crossed Method Used: Yes Position Performed: Seated in chair Cues: Don;Doff Toileting Toileting Assistance: Stand-by assistance Bowel Hygiene: Stand-by assistance Clothing Management: Stand-by assistance Cognitive Retraining Safety/Judgement: Awareness of environment Pain: 
Pre Treatment:0 Post Treatment:0 Pain Scale 1: Numeric (0 - 10) Pain Intensity 1: 0 Activity Tolerance:   
Fair, pt fatigues easily Please refer to the flowsheet for vital signs taken during this treatment. After treatment:  
[x]  Patient left in no apparent distress sitting up in chair 
[]  Patient left in no apparent distress in bed 
[x]  Call bell left within reach [x]  Nursing notified 
[]  Caregiver present 
[]  Bed alarm activated Rose Gutierrez Time Calculation: 23 mins

## 2019-01-08 PROBLEM — A41.9 SEPSIS (HCC): Status: ACTIVE | Noted: 2019-01-01

## 2019-01-08 NOTE — ED PROVIDER NOTES
EMERGENCY DEPARTMENT HISTORY AND PHYSICAL EXAM 
 
Date: 1/8/2019 Patient Name: Nadine Anton History of Presenting Illness Chief Complaint Patient presents with  Altered mental status History Provided By: Patient, Patient's Son, Patient's Daughter and EMS Chief Complaint: Dizziness Duration: 3 Days Timing:  Worsening Location: generalized Quality: \"feel like I am going to pass out\" Severity: Moderate Modifying Factors: Worse with any position change Associated Symptoms: CP, cough, SOB, rectal bleeding Additional History (Context): Nadine Anton is a 80 y.o. male with hypertension, hyperlipidemia and CMO, A. Fib, CABG, hypothyroidism, PVD, renal artery stenosis, SSS, and GI bleed who presents via EMS for multiple complaints. Per pt he has had worsening dizziness and lightheadedness x 3 days, worse with position change. Today he has also had CP, bilateral shoulder pain, and SOB. Notes dry, occasional cough x 1 week. Today had normal BM but noted some scant BRBPR. [facility] he was taken off eliquis, has not restarted. Per family they called EMS d/t pt being confused and difficult to arouse today. EMS states pt told them dizziness and CP as well. Per family pt has also had an ongoing issue with low temperature since hospital stay, also concerned pt may have fallen at some point while back home - unsure as pt lives with elderly wife. Also reports he is occasionally on 2L O2 nasal cannula PRN at home. Patient denies head or other injury or fall, syncope, HA, weakness, facial droop, numbness, wheezing, worsening leg swelling, dysuria, abd pain, N/V/D/C, or any other complaints at this time. PCP: Alonso Matias MD 
 
Current Facility-Administered Medications Medication Dose Route Frequency Provider Last Rate Last Dose  sodium chloride (NS) flush 5-10 mL  5-10 mL IntraVENous PRN Dania Garcia PA-C      
  levoFLOXacin (LEVAQUIN) 750 mg in D5W IVPB  750 mg IntraVENous Q48H Heriberto Garcia  mL/hr at 01/08/19 1918 750 mg at 01/08/19 1918  [START ON 1/9/2019] aztreonam (AZACTAM) 1 g in 0.9% sodium chloride (MBP/ADV) 100 mL MBP  1 g IntraVENous Q8H Heriberto Garcia MD      
 lidocaine (URO-JET) 2 % jelly   Urethral ONCE Tere Salazar PA-C   Stopped at 01/08/19 2044  [START ON 1/10/2019] vancomycin (VANCOCIN) 1,000 mg in 0.9% sodium chloride (MBP/ADV) 250 mL adv  1,000 mg IntraVENous Q36H Heriberto Garcia MD      
 VANCOMYCIN INFORMATION NOTE 1 Each  1 Each Other Rx Dosing/Monitoring Heriberto Garcia MD      
 NOREPINephrine (LEVOPHED) 8 mg in 0.9% NS 250ml infusion  2-16 mcg/min IntraVENous TITRATE Heriberto Garcia MD      
 [facility] ON 1/10/2019] VANCOMYCIN INFORMATION NOTE   Other ONCE Heriberto Garcia MD      
 
Current Outpatient Medications Medication Sig Dispense Refill  ALPRAZolam (XANAX) 0.25 mg tablet Take 1 Tab by mouth two (2) times daily as needed for Anxiety. Max Daily Amount: 0.5 mg. 30 Tab 1  
 ELIQUIS 5 mg tablet TAKE 1 TABLET BY MOUTH TWICE A DAY  3  
 traMADol (ULTRAM) 50 mg tablet TAKE 1 TABLET EVERY 6 HOURS AS NEEDED  0  
 furosemide (LASIX) 40 mg tablet 40 mg.    
 omeprazole (PRILOSEC) 40 mg capsule Take 40 mg by mouth daily.  OXYGEN-AIR DELIVERY SYSTEMS by Does Not Apply route.  amLODIPine (NORVASC) 5 mg tablet Take 1 Tab by mouth daily. 60 Tab 0  
 metoprolol succinate (TOPROL XL) 50 mg XL tablet Take 1 Tab by mouth daily. 60 Tab 0  
 nitroglycerin (NITROSTAT) 0.4 mg SL tablet 1 Tab by SubLINGual route every five (5) minutes as needed for Chest Pain (up to 3 total 1 every 5 min). 60 Tab 0  
 enalapril (VASOTEC) 20 mg tablet Take 20 mg by mouth daily. Past History Past Medical History: 
Past Medical History:  
Diagnosis Date  Atrial fibrillation CHADS score 3  (+CHF, +HTN, +AGE, -DM, -CVA)  CABG   
 2008   LIMA - LAD,   SVG - RCA  Cardiomyopathy EF 30-35% (ECHO 6/14)  Coronary artery disease  Dyslipidemia  Hypertension  Hypothyroid  Pacemaker  Peripheral vascular disease  Renal artery stenosis   
 bilateral stents  Sick sinus syndrome Past Surgical History: 
Past Surgical History:  
Procedure Laterality Date  HX CORONARY ARTERY BYPASS GRAFT    
 2008   LIMA - LAD,   SVG - RCA  OR INSJ 1 TRANSVNS ELTRD PERM PACEMAKER/IMPLTBL DFB N/A 10/1/2018 Insert Or Replace Single Lead performed by Bernard Tolliver MD at [address] LAB Family History: No family history on file. Social History: 
Social History Tobacco Use  Smoking status: Former Smoker  Smokeless tobacco: Never Used Substance Use Topics  Alcohol use: No  
 Drug use: No  
 
 
Allergies: Allergies Allergen Reactions  Beta-Blockers (Beta-Adrenergic Blocking Agts) Drowsiness  Penicillins Swelling  Statins-Hmg-Coa Reductase Inhibitors Drowsiness Review of Systems Review of Systems All Other Systems Negative Physical Exam  
 
Vitals:  
 01/08/19 2145 01/08/19 2200 01/08/19 2215 01/08/19 2228 BP: (!) 83/61 (!) 76/57 94/67 97/64 Pulse: 80 80 80 80 Resp: 21 26 25 30 Temp:    (!) 85.6 °F (29.8 °C) SpO2: 92% 93% (!) 70% 93% Weight:      
Height:      
 
Physical Exam  
Constitutional: He is oriented to person, place, and time. He appears well-developed. He is cooperative. He is easily aroused. He has a sickly appearance. No distress. HENT:  
Head: Normocephalic and atraumatic. Head is without raccoon's eyes, without Simons's sign, without abrasion and without contusion. Right Ear: Tympanic membrane, external ear and ear canal normal.  
Left Ear: Tympanic membrane, external ear and ear canal normal.  
Nose: No rhinorrhea. No epistaxis. Mouth/Throat: Uvula is midline.  Mucous membranes are pale, dry and not cyanotic. No oropharyngeal exudate, posterior oropharyngeal edema, posterior oropharyngeal erythema or tonsillar abscesses. Eyes: Conjunctivae, EOM and lids are normal. Pupils are equal, round, and reactive to light. Right eye exhibits normal extraocular motion. Left eye exhibits normal extraocular motion. Neck: Normal range of motion. Neck supple. No spinous process tenderness and no muscular tenderness present. No neck rigidity. Normal range of motion present. Cardiovascular: Normal rate and regular rhythm. Paced. Bilateral radial and PT pulses palpable. Cap refill 3-4 seconds bilaterally. Pulmonary/Chest: No respiratory distress. He has no wheezes. He has no rhonchi. He has no rales. Abdominal: There is tenderness (Mild to deep palpation verbalized) in the epigastric area. There is no rigidity, no rebound, no guarding and no CVA tenderness. Genitourinary: Rectal exam shows guaiac positive stool. Rectal exam shows no external hemorrhoid, no internal hemorrhoid, no fissure, no mass, no tenderness and anal tone normal.  
Genitourinary Comments: ED tech standby, no devyn blood noted rectally. Neurological: He is oriented to person, place, and time and easily aroused. He is not disoriented. He displays no tremor. No cranial nerve deficit or sensory deficit. He exhibits normal muscle tone. He displays no seizure activity. GCS eye subscore is 3. GCS verbal subscore is 5. GCS motor subscore is 6. Bilateral upper and lower strength equal. Somnolent but easily aroused, oriented to person, place, time. Follows all commands without issues, answering all questions appropriately. Skin: Skin is dry. Bruising (Noted to bilateral arms, c/w peripheral IV attempts) noted. He is not diaphoretic. There is pallor. Diagnostic Study Results Labs - Recent Results (from the past 12 hour(s)) CBC WITH AUTOMATED DIFF Collection Time: 01/08/19  6:50 PM  
Result Value Ref Range WBC 15.4 (H) 4.6 - 13.2 K/uL  
 RBC 3.87 (L) 4.70 - 5.50 M/uL HGB 8.8 (L) 13.0 - 16.0 g/dL HCT 30.8 (L) 36.0 - 48.0 % MCV 79.6 74.0 - 97.0 FL  
 MCH 22.7 (L) 24.0 - 34.0 PG  
 MCHC 28.6 (L) 31.0 - 37.0 g/dL RDW 20.0 (H) 11.6 - 14.5 % PLATELET 90 (L) 860 - 420 K/uL NEUTROPHILS 94 (H) 40 - 73 % LYMPHOCYTES 3 (L) 21 - 52 % MONOCYTES 3 3 - 10 % EOSINOPHILS 0 0 - 5 % BASOPHILS 0 0 - 2 %  
 ABS. NEUTROPHILS 14.5 (H) 1.8 - 8.0 K/UL  
 ABS. LYMPHOCYTES 0.5 (L) 0.9 - 3.6 K/UL  
 ABS. MONOCYTES 0.4 0.05 - 1.2 K/UL  
 ABS. EOSINOPHILS 0.0 0.0 - 0.4 K/UL  
 ABS. BASOPHILS 0.0 0.0 - 0.1 K/UL  
 DF AUTOMATED METABOLIC PANEL, COMPREHENSIVE Collection Time: 01/08/19  6:50 PM  
Result Value Ref Range Sodium 137 136 - 145 mmol/L Potassium 4.4 3.5 - 5.5 mmol/L Chloride 102 100 - 108 mmol/L  
 CO2 20 (L) 21 - 32 mmol/L Anion gap 15 3.0 - 18 mmol/L Glucose 88 74 - 99 mg/dL BUN 54 (H) 7.0 - 18 MG/DL Creatinine 2.36 (H) 0.6 - 1.3 MG/DL  
 BUN/Creatinine ratio 23 (H) 12 - 20 GFR est AA 32 (L) >60 ml/min/1.73m2 GFR est non-AA 26 (L) >60 ml/min/1.73m2 Calcium 9.1 8.5 - 10.1 MG/DL Bilirubin, total 1.1 (H) 0.2 - 1.0 MG/DL  
 ALT (SGPT) 34 16 - 61 U/L  
 AST (SGOT) 53 (H) 15 - 37 U/L Alk. phosphatase 179 (H) 45 - 117 U/L Protein, total 7.0 6.4 - 8.2 g/dL Albumin 2.8 (L) 3.4 - 5.0 g/dL Globulin 4.2 (H) 2.0 - 4.0 g/dL A-G Ratio 0.7 (L) 0.8 - 1.7 CARDIAC PANEL,(CK, CKMB & TROPONIN) Collection Time: 01/08/19  6:50 PM  
Result Value Ref Range  39 - 308 U/L  
 CK - MB 21.0 (H) <3.6 ng/ml CK-MB Index 14.7 (H) 0.0 - 4.0 % Troponin-I, QT <0.02 0.0 - 0.045 NG/ML  
PROTHROMBIN TIME + INR Collection Time: 01/08/19  6:50 PM  
Result Value Ref Range Prothrombin time 23.3 (H) 11.5 - 15.2 sec INR 2.1 (H) 0.8 - 1.2 PTT Collection Time: 01/08/19  6:50 PM  
Result Value Ref Range aPTT 41.9 (H) 23.0 - 36.4 SEC NT-PRO BNP  
 Collection Time: 01/08/19  6:50 PM  
Result Value Ref Range NT pro-BNP 4,394 (H) 0 - 1,800 PG/ML  
TYPE & SCREEN Collection Time: 01/08/19  6:50 PM  
Result Value Ref Range Crossmatch Expiration 01/11/2019 ABO/Rh(D) A POSITIVE Antibody screen NEG   
TSH 3RD GENERATION Collection Time: 01/08/19  6:50 PM  
Result Value Ref Range TSH 9.90 (H) 0.36 - 3.74 uIU/mL T3, FREE Collection Time: 01/08/19  6:50 PM  
Result Value Ref Range Triiodothyronine (T3), free 1.1 (L) 2.18 - 3.98 PG/ML  
T4, FREE Collection Time: 01/08/19  6:50 PM  
Result Value Ref Range T4, Free 0.9 0.7 - 1.5 NG/DL  
POC LACTIC ACID Collection Time: 01/08/19  6:59 PM  
Result Value Ref Range Lactic Acid (POC) 7.13 (HH) 0.40 - 2.00 mmol/L  
URINALYSIS W/ RFLX MICROSCOPIC Collection Time: 01/08/19  8:50 PM  
Result Value Ref Range Color DARK YELLOW Appearance CLOUDY Specific gravity 1.018 1.005 - 1.030    
 pH (UA) 5.0 5.0 - 8.0 Protein 30 (A) NEG mg/dL Glucose NEGATIVE  NEG mg/dL Ketone NEGATIVE  NEG mg/dL Bilirubin NEGATIVE  NEG Blood SMALL (A) NEG Urobilinogen 1.0 0.2 - 1.0 EU/dL Nitrites NEGATIVE  NEG Leukocyte Esterase TRACE (A) NEG URINE MICROSCOPIC ONLY Collection Time: 01/08/19  8:50 PM  
Result Value Ref Range WBC 2 to 3 0 - 4 /hpf  
 RBC 4 to 6 0 - 5 /hpf Epithelial cells FEW 0 - 5 /lpf Bacteria 1+ (A) NEG /hpf Amorphous Crystals 2+ (A) NEG  
CARDIAC PANEL,(CK, CKMB & TROPONIN) Collection Time: 01/08/19 10:00 PM  
Result Value Ref Range  39 - 308 U/L  
 CK - MB 19.6 (H) <3.6 ng/ml CK-MB Index 18.1 (H) 0.0 - 4.0 % Troponin-I, QT 0.02 0.0 - 0.045 NG/ML  
POC LACTIC ACID Collection Time: 01/08/19 10:13 PM  
Result Value Ref Range Lactic Acid (POC) 4.78 (HH) 0.40 - 2.00 mmol/L Radiologic Studies -  
XR CHEST PORT    (Results Pending) CT HEAD WO CONT    (Results Pending) CT Results  (Last 48 hours) None  
  
CT head negative for acute intracranial bleeding or abnormality. CXR Results  (Last 48 hours) None No evidence of definite consolidation or fluid overload. Medical Decision Making I am the first provider for this patient. I reviewed the vital signs, available nursing notes, past medical history, past surgical history, family history and social history. Vital Signs-Reviewed the patient's vital signs. Pulse Oximetry Analysis - 89% on RA - changed to 2L nasal cannula, now 94% Cardiac Monitor: 
Rate: 80 Rhythm:paced EKG: Rate: 80 Rhythm: Ventricular paced Interpretation: No acute abnormalities Comparison: No change Records Reviewed: Nursing Notes, Old Medical Records, Previous electrocardiograms, Ambulance Run Sheet, Previous Radiology Studies and Previous Laboratory Studies Procedures: 
Procedures Provider Notes (Medical Decision Making):  
6:50 PM Pt c/o dizziness, CP, SOB, recently admitted and discharge to home d/t GI hemorrhage. Pt with low temp, norm HR, BP low. Sepsis bundle initiated but without full weight based bolus d/t pt h/o CHF. D/w Dr. Pritesh Bradley who agrees with this. Also recommends ordering CT head, TSH, type and screen in addition. S/s d/t sepsis vs acute blood loss. Warm blankets placed, per Dr. Pritesh Bradley no Cory hugger at this time. IV antibx started. Temperature alvarez placed. Repeat lung exam without acute changes or crackles. Continue fluids. 07:30 PM 
 
Repeat lung exam without acute changes or crackles. Continue fluids. 08:30 PM 
 
Repeat lung exam unchanged, fluids continued. D/w Dr. Pritesh Bradley, recommends admission at this time, he will also place order for levophed as continued HTN. Next lactic due 2300. Repeat troponin placed, T3 and T4 sent as well. Recommends consult with hospitalist for admission. Consult:   
9:28 PM  
Discussed care with Dr. Fabián Pineda .   Standard discussion; including history of patients chief complaint, available diagnostic results, and treatment course. PLAN: Admit to ICU. Consult:   
10:25 PM  
Discussed care with Dr. Katerine Daley, ICU. Standard discussion; including history of patients chief complaint, available diagnostic results, and treatment course. PLAN: Admit. Repeat lactic acid improving. Temp not improved, will place on heather hugger. Sepsis Re-Assessment Documentation:  
 
Date: 1/8/2019 Time: 10:33 PM 
 
 
Capillary Refill: Normal/Brisk Cardiopulmonary Evaluation: Clear Assessment of Heart: Regular rate and rhythm (RRR)(Paced) Peripheral Pulses: Present Skin Exam: Pale Sepsis Shock Capillary Refill: Normal/Brisk Cardiopulmonary Evaluation: Clear Assessment of Heart: Regular rate and rhythm (RRR)(Paced) Peripheral Pulses: Present Skin Exam: Pale Vital Signs Level of Consciousness: Alert Temp: (!) 85.6 °F (29.8 °C) Temp Source: Bladder Pulse (Heart Rate): 80 
Resp Rate: 30 BP: 97/64 MAP (Monitor): 74 MAP (Calculated): 75 
MEWS Score: 6 MED RECONCILIATION: 
Current Facility-Administered Medications Medication Dose Route Frequency  sodium chloride (NS) flush 5-10 mL  5-10 mL IntraVENous PRN  
 levoFLOXacin (LEVAQUIN) 750 mg in D5W IVPB  750 mg IntraVENous Q48H  
 [START ON 1/9/2019] aztreonam (AZACTAM) 1 g in 0.9% sodium chloride (MBP/ADV) 100 mL MBP  1 g IntraVENous Q8H  
 lidocaine (URO-JET) 2 % jelly   Urethral ONCE  
 [START ON 1/10/2019] vancomycin (VANCOCIN) 1,000 mg in 0.9% sodium chloride (MBP/ADV) 250 mL adv  1,000 mg IntraVENous Q36H  VANCOMYCIN INFORMATION NOTE 1 Each  1 Each Other Rx Dosing/Monitoring  NOREPINephrine (LEVOPHED) 8 mg in 0.9% NS 250ml infusion  2-16 mcg/min IntraVENous TITRATE  [START ON 1/10/2019] VANCOMYCIN INFORMATION NOTE   Other ONCE Current Outpatient Medications Medication Sig  ALPRAZolam (XANAX) 0.25 mg tablet Take 1 Tab by mouth two (2) times daily as needed for Anxiety. Max Daily Amount: 0.5 mg.  
 ELIQUIS 5 mg tablet TAKE 1 TABLET BY MOUTH TWICE A DAY  traMADol (ULTRAM) 50 mg tablet TAKE 1 TABLET EVERY 6 HOURS AS NEEDED  furosemide (LASIX) 40 mg tablet 40 mg.  
 omeprazole (PRILOSEC) 40 mg capsule Take 40 mg by mouth daily.  OXYGEN-AIR DELIVERY SYSTEMS by Does Not Apply route.  amLODIPine (NORVASC) 5 mg tablet Take 1 Tab by mouth daily.  metoprolol succinate (TOPROL XL) 50 mg XL tablet Take 1 Tab by mouth daily.  nitroglycerin (NITROSTAT) 0.4 mg SL tablet 1 Tab by SubLINGual route every five (5) minutes as needed for Chest Pain (up to 3 total 1 every 5 min).  enalapril (VASOTEC) 20 mg tablet Take 20 mg by mouth daily. Disposition: Admit Follow-up Information None Current Discharge Medication List  
  
 
 
 
Core Measures: 
 
Critical Care Time:  
Critical Care Time:  
I have spent 60 minutes of critical care time involved in lab review, consultations with specialist, family decision-making, and documentation. During this entire length of time I was immediately available to the patient. 
  
Critical Care: The reason for providing this level of medical care for this critically ill patient was due a critical illness that impaired one or more vital organ systems such that there was a high probability of imminent or life threatening deterioration in the patients condition. This care involved high complexity decision making to assess, manipulate, and support vital system functions, to treat this degreee vital organ system failure and to prevent further life threatening deterioration of the patients condition. For Hospitalized Patients: 
 
1. Hospitalization Decision Time: The decision to hospitalize the patient was made by Dr. Eddie Rahman at 09:28 on 1/8/2019 Diagnosis Clinical Impression: 1. Septic shock ([facility] [address] 2. Hypotension, unspecified hypotension type 3. Anemia, unspecified type 4. Rectal bleeding 5. Acute renal failure superimposed on chronic kidney disease, unspecified CKD stage, unspecified [facility] 6. Chest pain, unspecified type 7. SOB (shortness of breath) 8.  Elevated TSH

## 2019-01-08 NOTE — ED TRIAGE NOTES
Patient was recently discharged. Returned with complaints of dizziness and chest pain. Per EMS, patient was not complaining of chest pain.

## 2019-01-09 PROBLEM — N39.0 UTI (URINARY TRACT INFECTION): Status: ACTIVE | Noted: 2019-01-01

## 2019-01-09 PROBLEM — I10 HTN (HYPERTENSION): Status: ACTIVE | Noted: 2019-01-01

## 2019-01-09 PROBLEM — J96.00 ACUTE RESPIRATORY FAILURE (HCC): Status: ACTIVE | Noted: 2019-01-01

## 2019-01-09 PROBLEM — I25.10 CAD (CORONARY ARTERY DISEASE): Status: ACTIVE | Noted: 2019-01-01

## 2019-01-09 NOTE — CDMP QUERY
Please review the following query regarding the patient's significant hypotension. The medical record reflects the following: 
 
Risk: Recent GI bleed, CMO, h/o SSS w/ pacemaker, CABG Clinical Indicators: Patient presented to ER with hypothermia and hypotension and significant dizziness. VS: T 85.6, BP 83/61  H/H 8.8/30.8 Further drop in BP noted down to 60-70's/40-50's, MAP 58-61 WBC 15.4, 94% neuts ER physician states \"Septic shock. [place] Sepsis bundle initiated but without full weight based bolus d/t pt h/o CHF. \" 
H&P states \"Sepsis. Jeni Sp [place] Hypotension - started on IV Levophed \" Treatment: IV NS bolus 1500cc in ER, IV levophed gtt, IV Vanco, Azactam, Levaquin, 500cc bolus LR in ICU Please clarify if this patient is being treated/managed for: 
 
=>Septic shock requiring IV fluid bolus and levophed gtt  
=>Other Explanation of clinical findings =>Unable to Determine (no explanation of clinical findings) Please clarify and document your clinical opinion in the progress notes and discharge summary including the definitive and/or presumptive diagnosis, (suspected or probable), related to the above clinical findings. Please include clinical findings supporting your diagnosis. If you DECLINE this query or would like to communicate with [facility], please utilize the \"[email or web address] message box\" at the TOP of the Progress Note on the right. Thank you, Maulik Luciano [facility], [address] [place] [address] Ne

## 2019-01-09 NOTE — H&P
[address] [place], [place] [address] History & Physical 
Patient: Silvia Sanches MRN: [id number]  CSN: [id number] YOB: 1930  Age: 80 y.o. Sex: male DOA: 1/8/2019 LOS:  LOS: 0 days DOA: 1/8/2019 Assessment/Plan Active Problems: 
  Sepsis ([address] (1/8/2019) Plan: 1. Sepsis - IV Abx - broad spectrum - pt recently admitted to hosp for LGIB  
2. ? UTI - IV Abx 3. Lactic acidosis - likely from 1&2 - monitor levels 4. H/o CAD post CABG 2008 5. H/o CHF - is on lasix at home - hold currently 2y to hypotension 6. Hypotension - started on IV Levophed - Admit to ICU - PCCM consulted 7. H/o A fib s/p PPM  
8. Recent admission for LGIB - off eliquis 9. Dizziness & light headedness - now Hypotensive - has h/o HTN - hold all PO meds 10. Acute on CKD 3 - monitor creatinine , gentle hydration 11. HypoT4 - received IV Levothyroxine in ER  
DVT Px - SCD  
DNR  
 
 
 
 
 
 
 
HPI:  
 
Silvia Sanches is a 80 y.o. male who has multiple medical problems - CAD s/p CABG, HTN , A fib s/p PPM, Recent LGIB, CKD3, HypoT4 presents to the ER with c/o dizziness, difficult to arouse per family He lives with his wife - his daughter is an ICU nurse at [employer] Per daughter he was recently admitted for LGIB - doing well post discharge She mentions he has been taking his BP meds & also his lasix daily given that he was significantly fluid overloaded. She mentions the lasix has helped decrease his swelling but today his BP is also low & his creatinine is elevated. Usually he takes his lasix prn  
ER eval - pt found to be hypotensive , elevated lactic acid , leukocytosis Will admit pt to the ICU Past Medical History:  
Diagnosis Date  Atrial fibrillation CHADS score 3  (+CHF, +HTN, +AGE, -DM, -CVA)  CABG   
 2008   LIMA - LAD,   SVG - RCA  Cardiomyopathy EF 30-35% (ECHO 6/14)  Coronary artery disease  Dyslipidemia  Hypertension  Hypothyroid  Pacemaker  Peripheral vascular disease  Renal artery stenosis   
 bilateral stents  Sick sinus syndrome Past Surgical History:  
Procedure Laterality Date  HX CORONARY ARTERY BYPASS GRAFT    
 2008   LIMA - LAD,   SVG - RCA  MA INSJ 1 TRANSVNS ELTRD PERM PACEMAKER/IMPLTBL DFB N/A 10/1/2018 Insert Or Replace Single Lead performed by Reed Dennis MD at [address] [place] [address] No family history on file. Social History Socioeconomic History  Marital status:  Spouse name: Not on file  Number of children: Not on file  Years of education: Not on file  Highest education level: Not on file Tobacco Use  Smoking status: Former Smoker  Smokeless tobacco: Never Used Substance and Sexual Activity  Alcohol use: No  
 Drug use: No  
 
 
Prior to Admission medications Medication Sig Start Date End Date Taking? Authorizing Provider ALPRAZolam (XANAX) 0.25 mg tablet Take 1 Tab by mouth two (2) times daily as needed for Anxiety. Max Daily Amount: 0.5 mg. 12/18/18   Adla Lobo MD  
ELIQUIS 5 mg tablet TAKE 1 TABLET BY MOUTH TWICE A DAY 11/7/18   Provider, Historical  
traMADol (ULTRAM) 50 mg tablet TAKE 1 TABLET EVERY 6 HOURS AS NEEDED 9/27/18   Provider, Historical  
furosemide (LASIX) 40 mg tablet 40 mg. 12/26/17   Provider, Historical  
omeprazole (PRILOSEC) 40 mg capsule Take 40 mg by mouth daily. Provider, Historical  
OXYGEN-AIR DELIVERY SYSTEMS by Does Not Apply route. Provider, Historical  
amLODIPine (NORVASC) 5 mg tablet Take 1 Tab by mouth daily. 2/8/18   Prasanna Avalos MD  
metoprolol succinate (TOPROL XL) 50 mg XL tablet Take 1 Tab by mouth daily. 2/8/18   Prasanna Avalos MD  
nitroglycerin (NITROSTAT) 0.4 mg SL tablet 1 Tab by SubLINGual route every five (5) minutes as needed for Chest Pain (up to 3 total 1 every 5 min).  2/7/18   Prasanna Avalos MD  
 enalapril (VASOTEC) 20 mg tablet Take 20 mg by mouth daily. Other, MD Dustin  
 
 
Allergies Allergen Reactions  Beta-Blockers (Beta-Adrenergic Blocking Agts) Drowsiness  Penicillins Swelling  Statins-Hmg-Coa Reductase Inhibitors Drowsiness Review of Systems Review of systems not obtained due to patient factors. Physical Exam:  
  
Visit Vitals BP (!) 70/51 Pulse 80 Temp (!) 89.9 °F (32.2 °C) Resp 16 Ht 5' 9\" (1.753 m) Wt 68 kg (150 lb) SpO2 96% BMI 22.15 kg/m² Physical Exam: 
 
Gen: In general, this is a thinly built male in no acute distress HEENT: Sclerae nonicteric. Oral mucous membranes dry. Dentition poor Neck: Supple with midline trachea. CV: RRR without murmur or rub appreciated. Resp:Respirations are unlabored without use of accessory muscles. Lung fields B/L without wheezes or rhonchi. Abd: Soft, nontender, nondistended. Extrem: Extremities are warm, without cyanosis or clubbing. 1+ chronic pitting pretibial edema Skin: Warm, no visible rashes. Neuro: Patient is alert, oriented, and cooperative. No obvious focal defects. Moves all 4 extremities. Labs Reviewed: 
 
Recent Results (from the past 24 hour(s)) CBC WITH AUTOMATED DIFF Collection Time: 01/08/19  6:50 PM  
Result Value Ref Range WBC 15.4 (H) 4.6 - 13.2 K/uL  
 RBC 3.87 (L) 4.70 - 5.50 M/uL HGB 8.8 (L) 13.0 - 16.0 g/dL HCT 30.8 (L) 36.0 - 48.0 % MCV 79.6 74.0 - 97.0 FL  
 MCH 22.7 (L) 24.0 - 34.0 PG  
 MCHC 28.6 (L) 31.0 - 37.0 g/dL RDW 20.0 (H) 11.6 - 14.5 % PLATELET 90 (L) 994 - 420 K/uL NEUTROPHILS 94 (H) 40 - 73 % LYMPHOCYTES 3 (L) 21 - 52 % MONOCYTES 3 3 - 10 % EOSINOPHILS 0 0 - 5 % BASOPHILS 0 0 - 2 %  
 ABS. NEUTROPHILS 14.5 (H) 1.8 - 8.0 K/UL  
 ABS. LYMPHOCYTES 0.5 (L) 0.9 - 3.6 K/UL  
 ABS. MONOCYTES 0.4 0.05 - 1.2 K/UL  
 ABS. EOSINOPHILS 0.0 0.0 - 0.4 K/UL  
 ABS. BASOPHILS 0.0 0.0 - 0.1 K/UL  
 DF AUTOMATED METABOLIC PANEL, COMPREHENSIVE Collection Time: 01/08/19  6:50 PM  
Result Value Ref Range Sodium 137 136 - 145 mmol/L Potassium 4.4 3.5 - 5.5 mmol/L Chloride 102 100 - 108 mmol/L  
 CO2 20 (L) 21 - 32 mmol/L Anion gap 15 3.0 - 18 mmol/L Glucose 88 74 - 99 mg/dL BUN 54 (H) 7.0 - 18 MG/DL Creatinine 2.36 (H) 0.6 - 1.3 MG/DL  
 BUN/Creatinine ratio 23 (H) 12 - 20 GFR est AA 32 (L) >60 ml/min/1.73m2 GFR est non-AA 26 (L) >60 ml/min/1.73m2 Calcium 9.1 8.5 - 10.1 MG/DL Bilirubin, total 1.1 (H) 0.2 - 1.0 MG/DL  
 ALT (SGPT) 34 16 - 61 U/L  
 AST (SGOT) 53 (H) 15 - 37 U/L Alk. phosphatase 179 (H) 45 - 117 U/L Protein, total 7.0 6.4 - 8.2 g/dL Albumin 2.8 (L) 3.4 - 5.0 g/dL Globulin 4.2 (H) 2.0 - 4.0 g/dL A-G Ratio 0.7 (L) 0.8 - 1.7 CARDIAC PANEL,(CK, CKMB & TROPONIN) Collection Time: 01/08/19  6:50 PM  
Result Value Ref Range  39 - 308 U/L  
 CK - MB 21.0 (H) <3.6 ng/ml CK-MB Index 14.7 (H) 0.0 - 4.0 % Troponin-I, QT <0.02 0.0 - 0.045 NG/ML  
PROTHROMBIN TIME + INR Collection Time: 01/08/19  6:50 PM  
Result Value Ref Range Prothrombin time 23.3 (H) 11.5 - 15.2 sec INR 2.1 (H) 0.8 - 1.2 PTT Collection Time: 01/08/19  6:50 PM  
Result Value Ref Range aPTT 41.9 (H) 23.0 - 36.4 SEC NT-PRO BNP Collection Time: 01/08/19  6:50 PM  
Result Value Ref Range NT pro-BNP 4,394 (H) 0 - 1,800 PG/ML  
TYPE & SCREEN Collection Time: 01/08/19  6:50 PM  
Result Value Ref Range Crossmatch Expiration 01/11/2019 ABO/Rh(D) A POSITIVE Antibody screen NEG   
TSH 3RD GENERATION Collection Time: 01/08/19  6:50 PM  
Result Value Ref Range TSH 9.90 (H) 0.36 - 3.74 uIU/mL T3, FREE Collection Time: 01/08/19  6:50 PM  
Result Value Ref Range Triiodothyronine (T3), free 1.1 (L) 2.18 - 3.98 PG/ML  
T4, FREE Collection Time: 01/08/19  6:50 PM  
Result Value Ref Range  T4, Free 0.9 0.7 - 1.5 NG/DL  
POC LACTIC ACID  
 Collection Time: 01/08/19  6:59 PM  
Result Value Ref Range Lactic Acid (POC) 7.13 (HH) 0.40 - 2.00 mmol/L  
URINALYSIS W/ RFLX MICROSCOPIC Collection Time: 01/08/19  8:50 PM  
Result Value Ref Range Color DARK YELLOW Appearance CLOUDY Specific gravity 1.018 1.005 - 1.030    
 pH (UA) 5.0 5.0 - 8.0 Protein 30 (A) NEG mg/dL Glucose NEGATIVE  NEG mg/dL Ketone NEGATIVE  NEG mg/dL Bilirubin NEGATIVE  NEG Blood SMALL (A) NEG Urobilinogen 1.0 0.2 - 1.0 EU/dL Nitrites NEGATIVE  NEG Leukocyte Esterase TRACE (A) NEG URINE MICROSCOPIC ONLY Collection Time: 01/08/19  8:50 PM  
Result Value Ref Range WBC 2 to 3 0 - 4 /hpf  
 RBC 4 to 6 0 - 5 /hpf Epithelial cells FEW 0 - 5 /lpf Bacteria 1+ (A) NEG /hpf Amorphous Crystals 2+ (A) NEG Imaging Reviewed: CXR - report pending Brodie Whitaker MD 
1/8/2019, 9:40 PM

## 2019-01-09 NOTE — ED NOTES
Report received. Lactic Acid 7. 13. Repeat lactic needed at 2300. Guaiac stool positive per Alexandra White Ala. Patient is hypotensive and hypothermic. Physician stated to hold heather hugger blanket at this time. O2 sats read clearer on ear pulse ox. Daughter and son in law at bedside. Needs urine sample. All labs, vitals, and initial interventions performed by charge nurse and techs while this nurse was assisting another nurse and patient.

## 2019-01-09 NOTE — ADVANCED PRACTICE NURSE
0100 SBAR report received from ER RN Vega Gallo. Patient blood pressure is still low, Levophed is on hold until an MD can place a central line. [id number] [address] [facility] Patient came up to unit on stretcher, however, there was an order for a CT of the chest and the CT tech called and would like to have patient come down now. Vega Gallo has taken the patient to CT and then will bring him to room 2703. I spoke with patient to make him aware of what was going on and where he was going. He was alert and understood what was going on. Very calm and cooperative. Levophed is infusing at this time. [address] Patient in room now, transferred patient from stretcher to bed without problems. Washed patient with chlorhexidine soap and placed Mepilex on intact sacral area. Had to place pulse ox on right ear due to cold extremities. Cory hugger placed on patient due to hypothermia. Denies pain or nausea. States that he has some numbing and tingling on bottoms of feet that comes and goes and has on and off for over a year. 57 [facility] Spot checked blood sugar = 77 
 
0236 Provided patient with water, no problems assessed with swallowing 
 
0368 Radiology resident- ?121 [facility] called to give me results of CT of chest.  I then relayed those findings to Dr. Gulshan Chaudhari. No orders provided at this time. 0700 Patient states he his having a hard time breathing and he feels as though he is going to pas out. Tried to lay him back however he states that it makes things worse for him. Crackles can be heard in bases. [facility] Bedside SBAR report given to incoming KATELIN Chinchilla.

## 2019-01-09 NOTE — PROGRESS NOTES
Progress Note Patient: Siri Khalil               Sex: male          DOA: 1/8/2019 YOB: 1930      Age:  80 y.o.        LOS:  LOS: 1 day CHIEF COMPLAINT:  Sepsis, Acute respiratory failure Subjective:  
 
Patient is on BiPap Continues with Vasopressor Blood cultures positive Objective:  
  
Visit Vitals BP 98/54 (BP 1 Location: Left arm, BP Patient Position: At rest) Pulse 80 Temp 98 °F (36.7 °C) Resp 25 Ht 5' 9\" (1.753 m) Wt 68 kg (150 lb) SpO2 100% BMI 22.15 kg/m² Physical Exam: 
Gen:  Patient is in no distress. No complaint HEENT and Neck:  PERRL, No cervical tenderness or masses Lungs:  Clear bilaterally. No rales, no wheeze. Normal effort, tolerating BiPap well Heart:  Regular Rate and Rhythm. No murmur or gallop. No JVD. No edema. Abdomen:  Soft, non tender, no masses, no Hepatosplenomegally Extremities:  No digital clubbing, no cyanosis Neuro:  Normal tone, interacting. No seizure activity Lab/Data Reviewed: 
BMP:  
Lab Results Component Value Date/Time  01/09/2019 03:30 AM  
 K 4.6 01/09/2019 03:30 AM  
  01/09/2019 03:30 AM  
 CO2 22 01/09/2019 03:30 AM  
 AGAP 13 01/09/2019 03:30 AM  
 GLU 80 01/09/2019 03:30 AM  
 BUN 56 (H) 01/09/2019 03:30 AM  
 CREA 2.16 (H) 01/09/2019 03:30 AM  
 GFRAA 35 (L) 01/09/2019 03:30 AM  
 GFRNA 29 (L) 01/09/2019 03:30 AM  
 
CBC:  
Lab Results Component Value Date/Time WBC 16.1 (H) 01/09/2019 03:30 AM  
 HGB 8.7 (L) 01/09/2019 03:30 AM  
 HCT 29.4 (L) 01/09/2019 03:30 AM  
  (L) 01/09/2019 03:30 AM  
 
 
 
 
Assessment/Plan Principal Problem: 
  Sepsis ([address] (1/8/2019) Active Problems: 
  Atrial fibrillation () Overview: CHADS score 3  (+CHF, +HTN, +AGE, -DM, -CVA) UTI (urinary tract infection) (1/9/2019) Acute respiratory failure ([facility] (1/9/2019) CAD (coronary artery disease) (1/9/2019) HTN (hypertension) (1/9/2019) Diastolic CHF, chronic ([facility] (9/26/2018) Atherosclerotic NAHED (renal artery stenosis), bilateral ([facility] (8/25/2015) Overview: S/P stenting R and L Plan: 
Continue with IV antibiotics Follow BP culture, ID and Sens BP control with vasopressor Follow renal function BS control DVT prophylaxis.

## 2019-01-09 NOTE — PROGRESS NOTES
Procedure for PICC placement to right external jugular vein started at 1147. Right side neck cleaned cleaned and draped per sterile protocol. Lidocaine 1% 4ml SQ to insertion site. Right external jugular vein accessed with 20g angiocath PIV.  0.018\" guidewire placed through PIV.  20g PIV removed. Dilator placed over the wire about 1cm then removed. Microintroducer and dilator placed over the wire. Guidewire removed and microintroducer capped. 5FR Power PICC Solo cut to length of 19cm. All lumens flushed with normal saline. Dilator removed and PICC inserted through [address] [facility] [address]. I observed PICC moving through neck vein towards right shoulder. PICC pulled back and when advanced would moved towards right shoulder. PICC removed and pressure held until hemostasis. Right EJ accessed with 20g angiocath PIV distal to first site. 0.018: guidewire placed through PIV.   20g PIV removed. Dilator placed over the wire about 1cm then removed. Microintroducer and dilator placed over the wire. Guidewire removed and microintroducer capped. 5FR Power PICC Solo placed through [address] [facility] [address]. Resistance felt when PICC was in subclavian vein. PICC did not move towards brachiocephalic vein. After a few attempts to get PICC to pass, PICC removed and pressure held until hemostasis. On the third attempt, resistance felt when PICC was at subclavian. PICC removed and pressure held until hemostasis. 4x4 gauze and tegaderm placed over site. PICC attempt unsuccessful.

## 2019-01-09 NOTE — ED NOTES
TRANSFER - ED to INPATIENT REPORT: 
 
SBAR report made available to receiving floor on this patient being transferred to  [phone] /[phone])  for routine progression of care Admitting diagnosis Sepsis ([facility] [address] [A41.9] Information from the following report(s) SBAR, Kardex, ED Summary and MAR was made available to receiving floor. Lines:  
Peripheral IV 01/08/19 Right Forearm (Active) Site Assessment Clean, dry, & intact 1/8/2019  7:21 PM  
Phlebitis Assessment 0 1/8/2019  7:21 PM  
Infiltration Assessment 0 1/8/2019  7:21 PM  
Dressing Status New 1/8/2019  7:21 PM  
Dressing Type Transparent 1/8/2019  7:21 PM  
Hub Color/Line Status Green 1/8/2019  7:21 PM  
Action Taken Blood drawn 1/8/2019  7:21 PM  
Alcohol Cap Used No 1/8/2019  7:21 PM  
   
Peripheral IV 01/08/19 Left Antecubital (Active) Site Assessment Clean, dry, & intact 1/8/2019  7:22 PM  
Phlebitis Assessment 0 1/8/2019  7:22 PM  
Infiltration Assessment 0 1/8/2019  7:22 PM  
Dressing Status Clean, dry, & intact 1/8/2019  7:22 PM  
Dressing Type Transparent 1/8/2019  7:22 PM  
Hub Color/Line Status Green 1/8/2019  7:22 PM  
Alcohol Cap Used No 1/8/2019  7:22 PM  
   
Peripheral IV 01/08/19 Right Antecubital (Active) Site Assessment Clean, dry, & intact 1/8/2019  7:23 PM  
Phlebitis Assessment 0 1/8/2019  7:23 PM  
Infiltration Assessment 0 1/8/2019  7:23 PM  
Dressing Status Clean, dry, & intact 1/8/2019  7:23 PM  
Hub Color/Line Status Pink 1/8/2019  7:23 PM  
Alcohol Cap Used No 1/8/2019  7:23 PM  
  
 
Medication list confirmed with patient Opportunity for questions and clarification was provided. Patient is oriented to time, place, person and situation High alert med Patient is  incontinent and non-ambulatory Valuables transported with patient Patient transported with: 
 Monitor O2 @ 6 liters Registered Nurse Tech Vitals w/ MEWS Score (last day) Date/Time MEWS Score Pulse Resp Temp BP Level of Consciousness SpO2  
 01/09/19 0015    80  20  90.2 °F (32.3 °C)  (Abnormal)   74/50  (Abnormal)     98 % 01/08/19 2345    80  18  89.8 °F (32.1 °C)  (Abnormal)   75/46  (Abnormal)     95 % 01/08/19 2330    80  27  89.6 °F (32 °C)  (Abnormal)   91/57    94 % 01/08/19 2315    80  24  89.3 °F (31.8 °C)  (Abnormal)   85/59  (Abnormal)     88 %  (Abnormal) 01/08/19 2300    80  18  89.2 °F (31.8 °C)  (Abnormal)   80/49  (Abnormal)     95 % 01/08/19 2245    80  22  88.9 °F (31.6 °C)  (Abnormal)   75/39  (Abnormal)     96 % 01/08/19 2230    82  29  87.9 °F (31.1 °C)  (Abnormal)   87/58  (Abnormal)     85 %  (Abnormal) 01/08/19 22:28:10  6  80  30  85.6 °F (29.8 °C)  (Abnormal)   97/64  Alert  93 % 01/08/19 2215    80  25    94/67    70 %  (Abnormal) 01/08/19 2200    80  26    76/57  (Abnormal)     93 % 01/08/19 2145    80  21    83/61  (Abnormal)     92 % 01/08/19 2130    80  24    90/63    92 % 01/08/19 2115    80  26    79/57  (Abnormal)     89 %  (Abnormal) 01/08/19 2100    80  26    82/51  (Abnormal)     94 % 01/08/19 2045    80  24    77/44  (Abnormal)     94 % 01/08/19 2030    80  25    85/63  (Abnormal)     93 % 01/08/19 2015    80  22    67/40  (Abnormal)     95 % 01/08/19 2008    80  16    70/51  (Abnormal)     96 % 01/08/19 1930    80  15        90 % 01/08/19 1920    80  19    92/71      
 01/08/19 1915    80  18    88/60  (Abnormal)       
 01/08/19 1910    80  16          
 01/08/19 1900    80  16        90 % 01/08/19 1850    80  21  89.9 °F (32.2 °C)  (Abnormal)       92 % 01/08/19 1845    80  24    92/67    85 %  (Abnormal) 01/08/19 1840    80  22        85 %  (Abnormal) 01/08/19 1839    80  23        87 %  (Abnormal) 01/08/19 1835    82  22        87 %  (Abnormal) 01/08/19 1830    80  21    82/65  (Abnormal)     88 %  (Abnormal)

## 2019-01-09 NOTE — PROGRESS NOTES
Physical Exam  
Pulmonary/Chest: Rales: Levophed as bp allows. Skin:  
 
  
0800 Assessment completed. Pt is alert, oriented x 4. Moves all extremities. Paced on the monitor. On Levophed gtt at 12 mcg/min. Lungs sound diminished on auscultation especially on the bases. Pt is dyspneic. Abdomen soft and non tender. Benites intact with darci colored urine. Skin as above. 0835 Put on BIPAP by Resp therapist as ordered by MD 
 
0841  ml bolus ongoing. 80 Pt's daughter in law at bedside. 1215 PICC insertion in progress. 1300 PICC insertion by PICC NURSE is unsuccessful. Pt's daughter in law is aware. 1400 Dr. Laurita Gowers will attempt to insert Femoral CVL. 1444 Phone consent For CVL obtained. Pts wife stated also that Max Thacker wants her Daughter in law Jeronimo Hollins to be one of the POA\" 1500 Dr. Laurita Gowers at bedside but he did not  attempt to insert CVL due to pt is verbalizing he wants to die . Jeronimo Hollins made aware. 1600 Reassessment done. Pt is alert, responding appropriately. 1810 Tapering down Levophed gtt as bp allows. PIV site with no signs of infiltration. 1920 Bedside and Verbal shift change report given to 1540 Angeli Parson (oncoming nurse) by Leisa Opitz, RN 
 (offgoing nurse). Report included the following information SBAR, Kardex, Intake/Output, MAR, Recent Results and Cardiac Rhythm Paced beat.

## 2019-01-09 NOTE — ED NOTES
Report to KATELIN Aquino. Antibiotics hung. Lactic acid repeat due at 2300. Patient remains alert and oriented. Denies pain at this time.  Requested to rest.

## 2019-01-09 NOTE — PROGRESS NOTES
conducted an initial consultation and Spiritual Assessment for Cheng Mayorga, who is a 80 y. o.,male. Patients Primary Language is: [place]. According to the patients EMR Jain Affiliation is: Other. The reason the Patient came to the hospital is:  
Patient Active Problem List  
 Diagnosis Date Noted  Sepsis ([facility] [address] 01/08/2019  Non-rheumatic tricuspid valve insufficiency 01/01/2019  LGI bleed 12/31/2018  Diastolic CHF, chronic ([address] 09/26/2018  Cardiac pacemaker in situ 09/16/2018  Syncope 07/11/2018  Aortic stenosis, moderate 01/28/2018  Abnormal chest CT 01/27/2018  Pleural effusion, right 01/26/2018  SOB (shortness of breath) on exertion 01/25/2018  Cardiac syncope 01/19/2018  Celiac artery stenosis ([address] 01/10/2018  Osteoarthritis of both knees 08/20/2017  Statin intolerance 05/03/2017  Essential hypertension 02/27/2017  Anxiety 02/27/2017  Chronic systolic congestive heart failure ([address] 01/25/2017  S/P CABG x 2 08/25/2015  Atherosclerotic NAHED (renal artery stenosis), bilateral ([address] 08/25/2015  Dyslipidemia  Coronary artery disease of native artery of native heart with stable angina pectoris ([address]  Cardiomyopathy  Atrial fibrillation  Sick sinus syndrome The  provided the following Interventions: 
Initiated a relationship of care and support with patient in room [id number] today. Listened closely as patient was on the C-ap machine and hearing was hard. Patient had requested information about an Advance Directive form but found that he already had one in his files and chart here. Provided information about Spiritual Care Services. Offered prayer and assurance of continued prayers on patients behalf. The following outcomes were achieved: 
Patient shared limited information about his  medical narrative, but was very clear when he talked about his desire to have no heroics done. Patient processed feeling about current hospitalization. Patient expressed gratitude for pastoral care visit. Assessment: 
Patient does not have any Mormonism/cultural needs that will affect patients preferences in health care. There are no further spiritual or Mormonism issues which require Spiritual Care Services interventions at this time. Plan: 
Chaplains will continue to follow and will provide pastoral care on an as needed/requested basis Jyoti Pastor 3 Board Certified [employer] Spiritual Care  
[phone]

## 2019-01-09 NOTE — ED NOTES
Patient's family states the patient was pursuing a DNR when he was in the hospital previously, provider made aware of the patient's interest.  
Family at the bedside

## 2019-01-09 NOTE — CONSULTS
Critical Care Consult Note Indication:  Hypotension History 60-year-old gentleman presents to the emergency room with a chief complaint of near syncope and syncope. The patient was discharged from the hospital on 1/3/19 after an admission for GI bleeding. He was previously on Eliquis for chronic atrial fibrillation. Over the past week he has been more short of breath. He becomes dizzy with standing. He has found himself losing consciousness for what he believes to be only a matter of seconds. He has fallen a couple times but has not sustained any significant injury in doing so. He has some vague chest discomfort, but was not complaining of chest pain according to EMS. The patient has an extensive cardiac history that will be outlined below. The greatest note is that he has aortic stenosis that has progressed from 2 years ago. In addition to the aortic stenosis he has severe pulmonary hypertension. The patient was found to be hypothermic on arrival.  The etiology of this is unclear. In reviewing old x-rays he has chronic atelectasis or opacifications at the bases. His last chest CT from 7/12/18 showed bilateral lower lobe infiltrates that are concerning for aspiration pneumonia given its location. In the emergency room today he attempted to take pills with water and coughed and choked. The patient says that he does have problems with swallowing. The patient is also been hypotensive in the emergency room. He has required IV fluid and low doses of norepinephrine. He denies any neurologic deficits. He has not known of any witnessed seizure at activity. He is not complaining of intractable headaches. He has a difficult time belching. He has not had any esophageal or gastric surgery in the past.  His stool is slightly guaiac positive but his hemoglobin is minimally changed. Review of systems He has not had any upper respiratory tract complaints.   He denies any regular cough or sputum production. He has no lower chest pain or pleuritic chest pain. He has not vomited. He denies significant lower extremity edema. He has no fevers or chills. The review of systems was otherwise unremarkable considering his baseline age and health. Past medical history Chronic atrial fibrillation Moderate aortic stenosis with mild to moderate aortic regurgitation on echocardiogram 7/12/18 Severe pulmonary hypertension Single chamber pacemaker placement with RV lead revision 10/1/18. Left innominate vein obstruction Severe native coronary artery disease with unremarkable cardiac catheterization 9/9/15. 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 Now he finds this too taxing. He quit smoking 50 years ago this month Family history No family history of heart valve disease Exam 
He is alert, oriented and in no respiratory distress at rest.  Affect is normal. 
Blood pressure (!) 74/50, pulse 80, temperature (!) 90.2 °F (32.3 °C), resp. rate 20, height 5' 9\" (1.753 m), weight 68 kg (150 lb), SpO2 98 %. HEENT: Head is atraumatic. Sclera are not injected. Oral mucosa is slightly dry. Neck: Trachea is midline Lungs: Dull to percussion at the bases with slightly hollowed breath sounds. There are no wheezes, rales or rhonchi Heart: Regular rate and rhythm.   No appreciable murmur given the background noise in the ER 
 Abdomen: Soft nontender nondistended. Sclera are anicteric Extremities: No cyanosis or clubbing. No pitting edema Neuro: Extraocular muscles appear intact. Gaze is conjugate. No roving eye movements. Able to move all fours. I did not walk the patient. Labs: WBC 15.4 without bands. Hemoglobin is 8.8. On 1/2/19 it was 9.2. Platelets are 90. Sodium is 137, potassium 4.4, chloride 102 and HCO3 is 20. BUN is 54 and creatinine is 2.36. Albumin is 2.8.  TSH is 9.90. Free T4 0.9. ProBNP was 4390 CT scan findings from 9/26/18 showing basilar infiltrates and cardiomegaly Assessment Hypothermia with leukocytosis Dysphasia Evidence of bilateral lower lobe consolidations suggesting the possibility of aspiration Aortic stenosis Recent syncope, near syncope Recent GI bleeding Chronic renal insufficiency. Given the patient's age the elevated creatinine represents more significant disease than the number alone would suggest 
 
Plan Repeat echocardiogram 
Repeat chest CT without contrast 
Antibiotics Low-dose pressors are reasonable Speech therapy evaluation The patient is persistently hypotensive and hypothermic. He has a history suggestive of significant valvular disease. He is critically ill with organ failure and high risk for further decompensation. Total critical care time without physician overlap or procedure time included 50 minutes.

## 2019-01-09 NOTE — PROGRESS NOTES
Problem: Falls - Risk of 
Goal: *Absence of Falls Document Mary Howard Fall Risk and appropriate interventions in the flowsheet. Outcome: Progressing Towards Goal 
Fall Risk Interventions: 
Mobility Interventions: Assess mobility with egress test, Bed/chair exit alarm, Strengthening exercises (ROM-active/passive) Medication Interventions: Bed/chair exit alarm, Assess postural VS orthostatic hypotension Elimination Interventions: Bed/chair exit alarm, Patient to call for help with toileting needs, Toileting schedule/hourly rounds History of Falls Interventions: Bed/chair exit alarm Problem: Impaired Skin Integrity/Pressure Injury Treatment Goal: *Prevention of pressure injury Document Alec Scale and appropriate interventions in the flowsheet. Outcome: Progressing Towards Goal 
Pressure Injury Interventions: Activity Interventions: PT/OT evaluation Mobility Interventions: Float heels, HOB 30 degrees or less Nutrition Interventions: Document food/fluid/supplement intake Friction and Shear Interventions: Foam dressings/transparent film/skin sealants, Minimize layers

## 2019-01-09 NOTE — PROGRESS NOTES
[facility] ICU Progress Note Name: Antony Arevalo : 11/10/1930 MRN: [id number] Date: 2019 1:29 PM 
  
[x]I have reviewed the flowsheet and previous days notes. Events overnight reviewed and discussed with nursing staff. Vital signs and records reviewed. HPI 
81 y/o male w/ PMHX of A fib, CABG, cardiomyopathy, CAD, PVD, HTN, and PVD admitted for near syncope, hypotension, and hypothermia w/ leukocytosis. Subjective: 
19 No new events overnight. Placed on BIPAP this morning for shortness of breath [x]The patient is critically ill on     
[]Mechanical ventilation []Pressors  
[x]BiPAP [] ROS:A comprehensive review of systems was negative except for that written in the HPI. Medication Review: · Pressors - levo · Sedation - none · Antibiotics - Azactam, Levaquin, Vanc · Pain - PRN tylenol · GI/ DVT - protonix · Others (other gtts)- IVF Safety Bundles: CAUTI/ Severe Sepsis Protocol/ Electrolyte Replacement Protocol Vital Signs:   
Visit Vitals BP 96/59 Pulse 80 Temp 98.1 °F (36.7 °C) Resp 23 Ht 5' 9\" (1.753 m) Wt 68 kg (150 lb) SpO2 100% BMI 22.15 kg/m² O2 Device: BIPAP  
O2 Flow Rate (L/min): 7 l/min Temp (24hrs), Av.8 °F (34.3 °C), Min:85.6 °F (29.8 °C), Max:98.1 °F (36.7 °C) Intake/Output:  
Last shift:      701 - 1900 In: 412.5 [I.V.:412.5] Out: 60 [Urine:60] Last 3 shifts: 1901 -  0700 In: 203 [P.O.:240; I.V.:428] Out: 100 [Urine:100] Intake/Output Summary (Last 24 hours) at 2019 1329 Last data filed at 2019 1300 Gross per 24 hour Intake 1080.53 ml Output 160 ml Net 920.53 ml Ventilator Settings: 
  
  
Ventilator Volumes Vt Spont (ml): 418 ml Ve Observed (l/min): 14.3 l/min Physical Exam: 
 
General: Awake on BIPAP HEENT:  Anicteric sclerae; pink palpebral conjunctivae; mucosa moist 
 Resp:  Clear bilaterally to auscultation; BLL diminished; Symmetrical chest expansion, no accessory muscle use; good airway entry; no rales/ wheezing/ rhonchi noted CV:  S1, S2 present; paced rhythm GI:  Abdomen soft, non-tender; (+) active bowel sounds Extremities:  +2 pulses on all extremities; no edema/ cyanosis/ clubbing noted; normal capillary refill Skin:  Warm; no rashes/ lesions noted Neurologic:  Non-focal 
Devices: Benites DATA:  
 
Current Facility-Administered Medications Medication Dose Route Frequency  acetaminophen (TYLENOL) tablet 650 mg  650 mg Oral Q6H PRN  
 ondansetron (ZOFRAN) injection 4 mg  4 mg IntraVENous Q6H PRN  
 0.9% sodium chloride infusion  50 mL/hr IntraVENous CONTINUOUS  
 pantoprazole (PROTONIX) injection 40 mg  40 mg IntraVENous DAILY  sodium chloride (NS) flush 5-10 mL  5-10 mL IntraVENous PRN  
 levoFLOXacin (LEVAQUIN) 750 mg in D5W IVPB  750 mg IntraVENous Q48H  
 aztreonam (AZACTAM) 1 g in 0.9% sodium chloride (MBP/ADV) 100 mL MBP  1 g IntraVENous Q8H  
 [START ON 1/10/2019] vancomycin (VANCOCIN) 1,000 mg in 0.9% sodium chloride (MBP/ADV) 250 mL adv  1,000 mg IntraVENous Q36H  VANCOMYCIN INFORMATION NOTE 1 Each  1 Each Other Rx Dosing/Monitoring  NOREPINephrine (LEVOPHED) 8 mg in 0.9% NS 250ml infusion  2-16 mcg/min IntraVENous TITRATE  [START ON 1/10/2019] VANCOMYCIN INFORMATION NOTE   Other ONCE Labs: Results:  
   
Chemistry Recent Labs 01/09/19 
0330 01/08/19 
1850 GLU 80 88  137  
K 4.6 4.4  
 102 CO2 22 20* BUN 56* 54* CREA 2.16* 2.36* CA 8.5 9.1 AGAP 13 15 BUCR 26* 23* * 179* TP 6.4 7.0 ALB 2.8* 2.8*  
GLOB 3.6 4.2* AGRAT 0.8 0.7* CBC w/Diff Recent Labs 01/09/19 
0330 01/08/19 
1850 WBC 16.1* 15.4*  
RBC 3.80* 3.87* HGB 8.7* 8.8* HCT 29.4* 30.8* * 90* GRANS  --  94* LYMPH  --  3* EOS  --  0 Coagulation Recent Labs 01/09/19 
0330 01/08/19 
0585 PTP  --  23.3* INR  --  2.1* APTT 47.7* 41.9* Liver Enzymes Recent Labs 01/09/19 
0330 TP 6.4 ALB 2.8* * SGOT 81* ABG No results found for: PH, PHI, PCO2, PCO2I, PO2, PO2I, HCO3, HCO3I, FIO2, FIO2I Microbiology Recent Labs 01/08/19 
1850 01/08/19 
1835 CULT CULTURE IN PROGRESS,FURTHER UPDATES TO FOLLOW  Sent to [facility] for ID/Susceptibility if indicated CULTURE IN [address] UPDATES TO FOLLOW  Sent to [facility] for ID/Susceptibility if indicated Telemetry: []Sinus []A-flutter [x]Paced []A-fib []Multiple PVCs Imaging: CXR Results  (Last 48 hours) 01/08/19 1836  XR CHEST PORT Final result Impression:  Impression: 1. Cardiomegaly, interstitial and alveolar edema, and bilateral pleural  
effusions overall similar to prior. Narrative:  Chest, single view Indication: Dizziness, chest pain Comparison: Several prior exams, most recently 12/31/2018 Findings:  Portable upright AP view of the chest was obtained. Right subclavian  
a left subclavian approach pacemaker is redemonstrated. Mild interstitial  
opacities noted bilaterally with low lung volumes, small bilateral pleural  
effusions, and faint left retrocardiac density. No pneumothorax. Enlargement of  
the cardiac silhouette present, unchanged. Prior median sternotomy and coronary  
arterial bypass grafting. Sternal wires intact and normally aligned. No acute  
osseous abnormality. CT Results  (Last 48 hours) None IMPRESSION:  
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  
· Resp - Stable on BIPAP. O2 goal > 90. Aspiration precautions. Pulmonary hygiene. · ID - Afebrile but leukocytosis. Trend temp and WBC curve. BCx +GNR. Continue ABx. Trend lactic until < 2. Monitor for signs of infection. · CVS - BP requiring pressor. MAP goal > 65. Monitor HD status. · Heme/onc - Daily CBC. Monitor H/H and platelets. Monitor for signs of bleeding. · Metabolic - Daily BMP. Trend and replace electrolytes per replacement protocol. · Renal - Trend renal indices. Benites for strict I/O. 
· Endocrine - Glycemic control per protocol. · Neuro/ Pain/ Sedation - Monitor neuro status. · GI - NPO while on BIPAP. · Prophylaxis - DVT, GI- protonix · Discussed in interdisciplinary rounds · Code status- DNR The patient is: [x] acutely ill Risk of deterioration: [] moderate  
 [] critically ill  [x] high  
 
[x]See my orders for details My assessment/plan was discussed with: 
[x]nursing []PT/OT [x]respiratory therapy [x]Dr. Orestes Clay  
[]family [] Anabel Stevenson NP

## 2019-01-09 NOTE — PROGRESS NOTES
Patient place on bipap per Dr. Elina Osuna for sob and high rr spo2 98 hr 80 patient stable at this time will continue to monitor

## 2019-01-09 NOTE — PROGRESS NOTES
Reason for Readmission:     Sepsis RRAT Score and Risk Level:   31 Level of Readmission:  Level 1 Care Conference scheduled:    
    
Resources/supports as identified by patient/family:   Julio Cesar Plummer spouse  or . Daughter in law Tati Willis Top Challenges facing patient (as identified by patient/family and CM): Finances/Medication cost?    Medicare and blue cross Transportation     family or bls Support system or lack thereof? See above Living arrangements? Lives with spouse Self-care/ADLs/Cognition? AAo x3 Current Advanced Directive/Advance Care Plan: On file Plan for utilizing home health:   As indictaed Likelihood of additional readmission:   high Transition of Care Plan:    Based on readmission, the patient's previous Plan of Care 
 has been evaluated and/or modified. The current Transition of Care Plan is:      
     Spoke with pt, he lives with spouse, [place]  or . Med poa on file, naming spouse. States he is independent with adls, amb with walker, has a cane also. Has home O2 provided by [employer]. Son will transport home. His daughter in law Tati Willis works here at [employer]. His pcp is Dr Deepti Mobley. Demographics correct. Patient was recently discharged from [email or web address] Jan 1, 2019. He would like rehab at discharge. SNF list provided, [facility] signed. Matched to rehab facilities of choice. Would like [facility] as first choice. Care Management Interventions PCP Verified by CM: Yes 
Palliative Care Criteria Met (RRAT>21 & CHF Dx)?: No 
Mode of Transport at Discharge: Other (see comment)(family) Transition of Care Consult (CM Consult): SNF Partner SNF: No 
Reason Why Partner SNF Not Chosen: Location MyChart Signup: No 
Discharge Durable Medical Equipment: No 
Health Maintenance Reviewed: Yes Physical Therapy Consult: No 
Occupational Therapy Consult: No 
 Speech Therapy Consult: No 
Current Support Network: Lives with Spouse Confirm Follow Up Transport: Family Plan discussed with Pt/Family/Caregiver: Yes Freedom of Choice Offered: Yes [employer] Information Provided?: No 
Discharge Location Discharge Placement: Other:(snf)

## 2019-01-09 NOTE — PROGRESS NOTES
-1554-Pt on Bipap 10/5, FIO2-40% . O2 Sats-100% HR-80, RR-23. Pt. Is resting comfortably. -[address]

## 2019-01-10 NOTE — PROGRESS NOTES
Problem: Falls - Risk of 
Goal: *Absence of Falls Document Elias Salcedo Fall Risk and appropriate interventions in the flowsheet. Outcome: Progressing Towards Goal 
Fall Risk Interventions: 
Mobility Interventions: Bed/chair exit alarm, Strengthening exercises (ROM-active/passive) Medication Interventions: Bed/chair exit alarm, Evaluate medications/consider consulting pharmacy Elimination Interventions: Bed/chair exit alarm, Call light in reach, Toileting schedule/hourly rounds History of Falls Interventions: Bed/chair exit alarm, Evaluate medications/consider consulting pharmacy Problem: Impaired Skin Integrity/Pressure Injury Treatment Goal: *Prevention of pressure injury Document Alec Scale and appropriate interventions in the flowsheet. Outcome: Progressing Towards Goal 
Pressure Injury Interventions: Activity Interventions: Pressure redistribution bed/mattress(bed type) Mobility Interventions: Float heels, HOB 30 degrees or less Nutrition Interventions: Discuss nutritional consult with provider Friction and Shear Interventions: Apply protective barrier, creams and emollients, Foam dressings/transparent film/skin sealants, HOB 30 degrees or less

## 2019-01-10 NOTE — PROGRESS NOTES
1915 
Bedside SBAR report taken from offgoing RN. Patient is quiet until you walk near his room then he moans and points and tries to talk, he is anxious and according to the day nurse he is ready to die. Patient is a DNR. He is on Bipap, lungs are diminished, but clear, he is being paced at 80 bpm, he still has the three IV's in which one of them has Levophed infusing to keep MAP greater than 65. His temperature is better than yesterday. 96.5 F shows on the monitor. No family  or visitors in at present. 2030 Performed mouth care, alvarez care and performed assessment 2100 Called Dr. Jessica Roper to make him aware that patient is very anxious and \"wants to die\", \"just wants to get it over with\". Tried to talk to him about why he feels this way and he said because he feels as though he cannot breathe and does not want to be in hospital anymore. Told patient that we would speak with his family tomorrow to see if they will allow him to die at home. He said \"okay\" Dr. Jessica Roper would not order xanax for patient due to BP issues, but did provide other orders requested [address] Provided patient with Biotene spray for patient's dry mouth 2501 [facility] Took Bipap off to perform mouth care. Patient states he wants to die. I asked him why and he said because he feels as though he cannot breathe. Patient was maintaining sats greater than 96% on 6 liters NC thus I left him off Bipap. He stated he felt better and soon fell asleep. Called Lab to come and draw the lactic that is due 
 
0400 [address] cath care performed and noticed patient has scrotal swelling that he did not have earlier in the night. Washed area and dried well and placed scrotum on pillow case for elevation Desmond 59 SBAR report given to incoming RN Serene Cuello. Patient has slept well since coming off of bipap. Has not voiced that he wants to die.

## 2019-01-10 NOTE — PROGRESS NOTES
Physical Exam  
Skin:  
 
  
Dual Skin assessment with Yohana THOMASON 
 
0800 Assessment completed. Pt is complaining of feeling miserable. Still supported with Levophed gtt. On nasal cannula. Lungs sound diminished on auscultation. Abdomen soft and non tender. Benites intact. Pt with edema on bilateral extremities. With 4+ scrotal edema . Skin as above. 0914 Pt continues to complaint feeling miserable. Fentanyl 25 mcg IVP given for generalized pain as ordered by YANA Luna. 1006 Interdisciplinary rounds done with Dr. Freddy Evans, YANA Dhaliwal, Care mgt, Ethics ; Nurse and RT. Pt's daughter in law also present. Discussed pt's concern with his daughter in law. 1015 Pt is resting. VSS. 
 
1100 pt is seen by Speech therapist. 
 
1200 Reassessment done. Pt is resting with eyes closed but easily awakens. 1310 Tapering down pressors as bp allows. 1500 Awake. Assisted pt on his lunch. Coughs with food.; but pt still wants to eat. Suctioned orally as needed. Pt ate @ least 50%. 1630 Pt requested pain medicine for general discomfort  \" stated makes him feel rested. Fentanyl 25 mcg IVP given. [id number] Pt's sons at bedside. Updated of pt's condition . 1750 Resting with eyes closed. Naila Newman is here. Will have pt eat dinner when he wakes up 1829 Pt's bp is low. Levophed increased to previous rate of 4 mcg/min. 1900 Pt is not in acute distress. Resting. 1915 Bedside and Verbal shift change report given to 2825 Dexter Allison (oncoming nurse) by Esme Woods RN 
 (offgoing nurse). Report included the following information SBAR, Kardex, Intake/Output, MAR, Recent Results and Cardiac Rhythm Paced.

## 2019-01-10 NOTE — PROGRESS NOTES
[facility] ICU Progress Note Name: Gisel Barrett : 11/10/1930 MRN: [id number] Date: 1/10/2019 1:29 PM 
  
[x]I have reviewed the flowsheet and previous days notes. Events overnight reviewed and discussed with nursing staff. Vital signs and records reviewed. HPI 
79 y/o male w/ PMHX of A fib, CABG, cardiomyopathy, CAD, PVD, HTN, and PVD admitted for near syncope, hypotension, and hypothermia w/ leukocytosis. Subjective: 
1/10/19 No new events overnight. Remained off BIPAP overnight. Pleaded w/ nurse overnight and today that he \"wants to die\" ROS:A comprehensive review of systems was negative except for that written in the HPI. Medication Review: · Pressors - levo · Sedation - none · Antibiotics - Azactam, Levaquin, Vanc · Pain - PRN tylenol · GI/ DVT - protonix · Others (other gtts)- IVF Safety Bundles: CAUTI/ Severe Sepsis Protocol/ Electrolyte Replacement Protocol Vital Signs:   
Visit Vitals /53 Pulse 80 Temp 96.9 °F (36.1 °C) Resp 20 Ht 5' 9\" (1.753 m) Wt 68 kg (150 lb) SpO2 99% BMI 22.15 kg/m² O2 Device: Nasal cannula O2 Flow Rate (L/min): 4 l/min Temp (24hrs), Av.1 °F (36.2 °C), Min:94.1 °F (34.5 °C), Max:98.1 °F (36.7 °C) Intake/Output:  
Last shift:      01/10 0701 - 01/10 1900 In: 48 [I.V.:50] Out: - Last 3 shifts: 1901 - 01/10 0700 In: 3421.5 [P.O.:240; I.V.:3181.5] Out: 1035 [[id number]] Intake/Output Summary (Last 24 hours) at 1/10/2019 9174 Last data filed at 1/10/2019 0800 Gross per 24 hour Intake 2730.94 ml Output 905 ml Net 1825.94 ml Ventilator Settings: 
  
  
Ventilator Volumes Vt Spont (ml): 505 ml Ve Observed (l/min): 14.4 l/min Physical Exam: 
 
General: Awake, appears uncomfortable HEENT:  Anicteric sclerae; pink palpebral conjunctivae; mucosa moist 
Resp:  Diminished bilaterally to auscultation; Symmetrical chest expansion, no accessory muscle use; good airway entry; no rales/ wheezing/ rhonchi noted CV: systolic murmur; paced rhythm GI:  Abdomen soft, non-tender; (+) active bowel sounds Extremities:  +2 pulses on all extremities; no edema/ cyanosis/ clubbing noted; normal capillary refill Skin:  Warm; no rashes/ lesions noted Neurologic:  Non-focal 
Devices: Benites DATA:  
 
Current Facility-Administered Medications Medication Dose Route Frequency  fentaNYL citrate (PF) injection 25 mcg  25 mcg IntraVENous ONCE  
 ondansetron (ZOFRAN) injection 4 mg  4 mg IntraVENous Q6H PRN  
 0.9% sodium chloride infusion  50 mL/hr IntraVENous CONTINUOUS  
 pantoprazole (PROTONIX) injection 40 mg  40 mg IntraVENous DAILY  hydrocortisone Sod Succ (PF) (SOLU-CORTEF) injection 100 mg  100 mg IntraVENous Q12H  
 acetaminophen (TYLENOL) suppository 650 mg  650 mg Rectal Q6H PRN  
 saliva stimulant (BIOTENE) 1 Spray  1 Spray Oral PRN  
 sodium chloride (NS) flush 5-10 mL  5-10 mL IntraVENous PRN  
 levoFLOXacin (LEVAQUIN) 750 mg in D5W IVPB  750 mg IntraVENous Q48H  
 aztreonam (AZACTAM) 1 g in 0.9% sodium chloride (MBP/ADV) 100 mL MBP  1 g IntraVENous Q8H  
 vancomycin (VANCOCIN) 1,000 mg in 0.9% sodium chloride (MBP/ADV) 250 mL adv  1,000 mg IntraVENous Q36H  VANCOMYCIN INFORMATION NOTE 1 Each  1 Each Other Rx Dosing/Monitoring  NOREPINephrine (LEVOPHED) 8 mg in 0.9% NS 250ml infusion  2-16 mcg/min IntraVENous TITRATE Labs: Results:  
   
Chemistry Recent Labs  
  01/10/19 
0400 01/09/19 
0330 01/08/19 
1850 * 80 88  138 137  
K 3.8 4.6 4.4  
 103 102 CO2 24 22 20* BUN 68* 56* 54* CREA 2.16* 2.16* 2.36* CA 8.4* 8.5 9.1 AGAP 10 13 15 BUCR 31* 26* 23* AP  --  171* 179* TP  --  6.4 7.0 ALB  --  2.8* 2.8*  
GLOB  --  3.6 4.2* AGRAT  --  0.8 0.7* CBC w/Diff Recent Labs  
  01/10/19 
0400 01/09/19 
0330 01/08/19 
1850 WBC 14.9* 16.1* 15.4*  
 RBC 3.93* 3.80* 3.87* HGB 9.1* 8.7* 8.8* HCT 30.9* 29.4* 30.8* * 115* 90* GRANS 94*  --  94* LYMPH 1*  --  3*  
EOS 0  --  0 Coagulation Recent Labs 01/09/19 
0330 01/08/19 1850 PTP  --  23.3* INR  --  2.1* APTT 47.7* 41.9* Liver Enzymes Recent Labs 01/09/19 
0330 TP 6.4 ALB 2.8* * SGOT 81* ABG No results found for: PH, PHI, PCO2, PCO2I, PO2, PO2I, HCO3, HCO3I, FIO2, FIO2I Microbiology Recent Labs 01/08/19 1850 01/08/19 1835 CULT AEROBIC AND ANAEROBIC BOTTLES STREPTOCOCCI,NON HEMOLYTIC* AEROBIC AND ANAEROBIC BOTTLES STREPTOCOCCI,NON HEMOLYTIC* Telemetry: []Sinus []A-flutter [x]Paced []A-fib []Multiple PVCs Imaging: CXR Results  (Last 48 hours) 01/10/19 0818  XR CHEST PORT Final result Impression:  Impression: 1. Mild progressive central vascular congestion with posteriorly layering  
pleural effusions, more conspicuous on today's examination with considerable  
cardiomegaly unchanged from prior. Narrative:  Chest, single view Indication: Difficulty breathing Comparison: Several prior chest radiographs, most recently 1/8/2019 at 1818  
hours Findings:  Portable upright AP view of the chest was obtained. Interval increase  
in central vascular congestion is present along with hazy posteriorly layering  
bilateral pleural effusions. No pneumothorax. Left retrocardiac and right  
basilar opacity similar to prior. Bilateral subclavian approach pacemaker is  
noted in stable position. Considerable enlargement of the cardiac silhouette  
noted, unchanged with postop changes of median sternotomy and coronary arterial  
bypass grafting are redemonstrated. No acute osseous abnormality. 01/08/19 1836  XR CHEST PORT Final result Impression:  Impression: 1. Cardiomegaly, interstitial and alveolar edema, and bilateral pleural  
effusions overall similar to prior. Narrative:  Chest, single view Indication: Dizziness, chest pain Comparison: Several prior exams, most recently 12/31/2018 Findings:  Portable upright AP view of the chest was obtained. Right subclavian  
a left subclavian approach pacemaker is redemonstrated. Mild interstitial  
opacities noted bilaterally with low lung volumes, small bilateral pleural  
effusions, and faint left retrocardiac density. No pneumothorax. Enlargement of  
the cardiac silhouette present, unchanged. Prior median sternotomy and coronary  
arterial bypass grafting. Sternal wires intact and normally aligned. No acute  
osseous abnormality. CT Results  (Last 48 hours) None IMPRESSION:  
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  
· Resp - Stable on BIPAP. O2 goal > 90. Aspiration precautions. Pulmonary hygiene. · ID - Afebrile but leukocytosis. Trend temp and WBC curve. BCx +GNR. Continue ABx. Trend lactic until < 2. Monitor for signs of infection. · CVS - BP requiring pressor. MAP goal > 65. Monitor HD status. · Heme/onc - Daily CBC. Monitor H/H and platelets. Monitor for signs of bleeding. · Metabolic - Daily BMP. Trend and replace electrolytes per replacement protocol. · Renal - Trend renal indices. Benites for strict I/O. 
· Endocrine - Glycemic control per protocol. · Neuro/ Pain/ Sedation - Monitor neuro status. Start PRN ativan and fentanyl for anxiety and comfort. · GI - Dysphagia mechanical altered diet . · Prophylaxis - DVT, GI- protonix · Discussed in interdisciplinary rounds-  
· Code status- DNR The patient is: [x] acutely ill Risk of deterioration: [] moderate  
 [] critically ill  [x] high  
 
[x]See my orders for details My assessment/plan was discussed with: 
[x]nursing []PT/OT [x]respiratory therapy [x]Dr. Roxi Alexandra  
[]family [] Bruce Salinas NP

## 2019-01-10 NOTE — PROGRESS NOTES
Progress Note Patient: Lilly De Leon               Sex: male          DOA: 1/8/2019 YOB: 1930      Age:  80 y.o.        LOS:  LOS: 2 days CHIEF COMPLAINT:  Sepsis, acute respiratory failure improving Subjective:  
 
Awake and talking No pain Remains on vasopressor Objective:  
  
Visit Vitals /56 Pulse 81 Temp 96.1 °F (35.6 °C) Resp 25 Ht 5' 9\" (1.753 m) Wt 68 kg (150 lb) SpO2 96% BMI 22.15 kg/m² Physical Exam: 
Gen:  Patient is in no distress. No complaint HEENT and Neck:  PERRL, No cervical tenderness or masses Lungs:  Clear bilaterally. No rales, no wheeze. Normal effort. Heart:  Regular Rate and Rhythm. No murmur or gallop. No JVD. No edema. Abdomen:  Soft, non tender, no masses, no Hepatosplenomegally Extremities:  No digital clubbing, no cyanosis Neuro:  Alert and oriented, Normal affect, Cranial nerves intact, No sensory deficits Lab/Data Reviewed: 
BMP:  
Lab Results Component Value Date/Time  01/10/2019 04:00 AM  
 K 3.8 01/10/2019 04:00 AM  
  01/10/2019 04:00 AM  
 CO2 24 01/10/2019 04:00 AM  
 AGAP 10 01/10/2019 04:00 AM  
  (H) 01/10/2019 04:00 AM  
 BUN 68 (H) 01/10/2019 04:00 AM  
 CREA 2.16 (H) 01/10/2019 04:00 AM  
 GFRAA 35 (L) 01/10/2019 04:00 AM  
 GFRNA 29 (L) 01/10/2019 04:00 AM  
 
CBC:  
Lab Results Component Value Date/Time WBC 14.9 (H) 01/10/2019 04:00 AM  
 HGB 9.1 (L) 01/10/2019 04:00 AM  
 HCT 30.9 (L) 01/10/2019 04:00 AM  
  (L) 01/10/2019 04:00 AM  
 
 
 
 
Assessment/Plan Principal Problem: 
  Sepsis ([address] (1/8/2019) Active Problems: 
  Atrial fibrillation () Overview: CHADS score 3  (+CHF, +HTN, +AGE, -DM, -CVA) UTI (urinary tract infection) (1/9/2019) Acute respiratory failure ([facility] (1/9/2019) CAD (coronary artery disease) (1/9/2019) HTN (hypertension) (1/9/2019) Diastolic CHF, chronic ([facility] (9/26/2018) Atherosclerotic NAHED (renal artery stenosis), bilateral ([facility] (8/25/2015) Overview: S/P stenting R and L Plan: 
Continue with IV antibiotics Continue with vasopressor support for BP Follow renal function Monitor C+S for cultures Monitor cardiac rhythm DVT prophylaxis.

## 2019-01-10 NOTE — PROGRESS NOTES
2030,received pt on BIPAP resting with eyes open no distress noted at this time will monitor though the shift.

## 2019-01-10 NOTE — PROGRESS NOTES
-0800-Received patient off Bipap and O2 via nasal cannula at 4lpm. O2 Sats-97% HR-80 RR-25. -DMH 
 
-1600-Pt. Remains on O2 via nasal cannula at 4lpm. O2 Sats-96% HR-80, RR-28. Pt. Is currently eating. -[address]

## 2019-01-10 NOTE — PROGRESS NOTES
Pharmacy Dosing Services: Vancomycin Indication: CAP Day of therapy: 2 Other Antimicrobials (Include dose, start day & day of therapy): 
Aztreonam 1 gm IV every 8 hours Levofloxacin 750 mg IV every 48 hours Loading dose (date given): 1500 mg 
Current Maintenance dose: 1000 mg IV every 36 hours Goal Vancomycin Level: 15-20 
(Trough 15-20 for most infections, 20 for meningitis/osteomyelitis, pre-HD level ~25) Vancomycin Level (if drawn):  
1/10: 10.9 (11 hrs post 1.5 g x1) Significant Cultures: pending Renal function stable? (unstable defined as SCr increase of 0.5 mg/dL or > 50% increase from baseline, whichever is greater) (Y/N): Y (chronic renal insufficiency) CAPD, Hemodialysis or Renal Replacement Therapy (Y/N): Y Recent Labs  
  01/10/19 
0400 19 
0330 19 
1850 CREA 2.16* 2.16* 2.36* BUN 68* 56* 54* WBC 14.9* 16.1* 15.4* Temp (24hrs), Av °F (36.1 °C), Min:94.1 °F (34.5 °C), Max:98.1 °F (36.7 °C) Creatinine Clearance (Creatinine Clearance (ml/min)): 23 ml/min Regimen assessment: level after loading dose OK Maintenance dose: change to 750 mg iv q24h Next scheduled level:  at 1030 Pharmacy will follow daily and adjust medications as appropriate for renal function and/or serum levels.  
 
Thank you, 
Ashanti Connors, PHD 
 
 
 
 
 yes

## 2019-01-10 NOTE — PROGRESS NOTES
Family has elected SNF as discharge plan. Patient will need PT and OT evaluations when medically stable to participate. Case management following.  Yara peraza RN

## 2019-01-10 NOTE — CDMP QUERY
Per the Pulmonary consult note on 1/9 and 1/0, the patient also has \"Bilateral lower lobe consolidations suggesting aspiration pneumonia\". If you agree with this diagnosis, please document in the progress notes. The medical record reflects the following: 
 
Risk: h/o CMP, CAD, CABG, pacemaker, a.fib, Clinical Indicators: Patient presented w/ severe hypothermia 85.6 and hypotension 83/60 and diagnosed with sepsis. CXR: Cardiomegaly, interstitial and alveolar edema, and bilateral pleural 
effusions overall similar to prior CT chest: Groundglass opacities are present within both lungs with septal thickening, 
in keeping with interstitial edema. Some of these groundglass opacities have a 
more patchy appearance and superimposed infection cannot be excluded Pulmonary consult: B/L aspiration pneumonitis/possible pneumonia - Dense consolidations B/L  - On vanc/aztreonam 
 
Treatment:  IV levaquin, Azactam, Vanco, Pulmonary consult Please clarify if this patient is being treated/managed for: 
 
=>Aspiration Pneumonia POA, treated w/ IV Vanco, Aztreonam and levaquin 
=>Other Explanation of clinical findings =>Unable to Determine (no explanation of clinical findings) Please clarify and document your clinical opinion in the progress notes and discharge summary including the definitive and/or presumptive diagnosis, (suspected or probable), related to the above clinical findings. Please include clinical findings supporting your diagnosis. If you DECLINE this query or would like to communicate with [facility], please utilize the \"[facility] message box\" at the TOP of the Progress Note on the right. Thank you, [facility], [address]

## 2019-01-10 NOTE — PROGRESS NOTES
Problem: Dysphagia (Adult) Goal: *Acute Goals and Plan of Care (Insert Text) Recommendations: 
Diet: mech soft / thin Meds: one at a time Aspiration Precautions Oral Care TID Goals:  Patient will: 1. Tolerate PO trials with 0 s/s overt distress in 4/5 trials 2. Utilize compensatory swallow strategies/maneuvers (decrease bite/sip, size/rate, alt. liq/sol) with min cues in 4/5 trials 3. Complete an objective swallow study (i.e., MBSS) to assess swallow integrity, r/o aspiration, and determine of safest LRD, min A Outcome: Progressing Towards Goal 
 
Speech LAnguage Pathology bedside swallow  
evaluation & TREATMENT Patient: Lesli Purdy (51 y.o. male) Date: 1/10/2019 Primary Diagnosis: Sepsis ([address] [A41.9] Precautions: Aspiration PLOF: Pt lives with family ASSESSMENT : 
Based on the objective data described below, the patient presents with oropharyngeal dysphagia in the setting of general debility. Pt seen upright in bed, A&Ox4. Oral-motor exam revealed pt with limited dentition; however, all other structures grossly intact for mastication and deglutition. Accepted trials of thin liquid +straw with delayed swallow timing/reflex and weak hyolaryngeal elevation to palpation. Delayed weak cough noted with serial swallows, resolved with single sips in 75% of trials increasing to 100% of trials with use of double swallow. Cup sip attempted, however, pt with inadequate strength and coordination to execute. Puree and regular solid with prolonged oral prep and transit, however, pt able to manipulate and clear with 0 clinical s/sx aspiration. Pt safe at this time for this liquids via single sip and mech-soft solid with aspiration precautions; meds should be presented one at a time. Of note, pt stated he does not want to participate in therapy to improve swallow function as he is ready to die. SLP will follow for pt/family education and diet tolerance. D/w NPCheryl and RN, Raoul Call. Skilled therapy initiated with min verbal cues for x2 effortful swallows for each single sip to clear suspected pharyngeal residue and reduce aspiration risk; (+) response. Educated pt on aspiration precautions and importance of compensatory swallow techniques to decrease aspiration risk (decrease rate of intake & sip/bite size, upright @HOB for all po intake and ~30 minutes after po); verbalized comprehension. Patient will benefit from skilled intervention to address the above impairments. Patients rehabilitation potential is considered to be Fair Factors which may influence rehabilitation potential include:  
[]            None noted []            Mental ability/status [x]            Medical condition []            Home/family situation and support systems 
[]            Safety awareness 
[]            Pain tolerance/management []            Other: PLAN : 
Recommendations and Planned Interventions: 
mech soft / thin liquid via single sips; meds one at a time Frequency/Duration: Patient will be followed by speech-language pathology 1-2 times per day/4-7 days per week to address goals. Discharge Recommendations: Home Health SUBJECTIVE:  
Patient stated I'm so uncomfortable, I just want it to be over. OBJECTIVE:  
 
Past Medical History:  
Diagnosis Date  Atrial fibrillation CHADS score 3  (+CHF, +HTN, +AGE, -DM, -CVA)  CABG   
 2008   LIMA - LAD,   SVG - RCA  Cardiomyopathy EF 30-35% (ECHO 6/14)  Coronary artery disease  Dyslipidemia  Hypertension  Hypothyroid  Pacemaker  Peripheral vascular disease  Renal artery stenosis   
 bilateral stents  Sick sinus syndrome Past Surgical History:  
Procedure Laterality Date  HX CORONARY ARTERY BYPASS GRAFT    
 2008   LIMA - LAD,   SVG - RCA  NE INSJ 1 TRANSVNS ELTRD PERM PACEMAKER/IMPLTBL DFB N/A 10/1/2018  Insert Or Replace Single Lead performed by Sae Mata MD at [facility] CARDIAC CATH LAB Prior Level of Function/Home Situation: Per below Home Situation Home Environment: Private residence One/Two Story Residence: Two story(he stays on first floor) Living Alone: No 
Support Systems: Child(jay), Family member(s), Friends \ neighbors, Spouse/Significant Other/Partner, [employer] / angel[employer] community Patient Expects to be Discharged to[de-identified] Private residence Current DME Used/Available at Home: [employer], straight, Walker, Oxygen, portable, Shower chair Diet prior to admission: soft solid / thin liquid Current Diet:  [facility] soft / thin Cognitive and Communication Status: 
Neurologic State: Alert Orientation Level: Oriented X4 Cognition: Follows commands Perception: Appears intact Perseveration: No perseveration noted Safety/Judgement: Awareness of environment Oral Assessment: 
Oral Assessment Labial: No impairment Dentition: Limited;Natural 
Oral Hygiene: fair Lingual: Decreased rate;Decreased strength Velum: No impairment Mandible: No impairment P.O. Trials: 
Patient Position: HOB 90 Vocal quality prior to P.O.: Breathy Consistency Presented: Thin liquid; Solid;Puree How Presented: Self-fed/presented;Spoon;Straw;Successive swallows;SLP-fed/presented;Cup/sip Bolus Acceptance: No impairment Bolus Formation/Control: Impaired Type of Impairment: Delayed;Mastication Propulsion: Delayed (# of seconds) Oral Residue: Less than 10% of bolus Initiation of Swallow: Delayed (# of seconds) Laryngeal Elevation: Weak Aspiration Signs/Symptoms: Delayed cough/throat clear Pharyngeal Phase Characteristics: Multiple swallows; Poor endurance; Suspected pharyngeal residue Effective Modifications: Small sips and bites; Alternate liquids/solids Cues for Modifications: Minimal-moderate Oral Phase Severity: Mild Pharyngeal Phase Severity : Mild GCODESwallowing:  Swallow Current Status CJ= 20-39%  Swallow Goal Status CI= 1-19% The severity rating is based on the following outcomes: HEBERT Noms Swallow Level 5 Clinical Judgement PAIN: 
Start of Eval/Tx: 0 End of Eval/Tx: 0 After treatment:  
[]            Patient left in no apparent distress sitting up in chair 
[x]            Patient left in no apparent distress in bed 
[x]            Call bell left within reach [x]            Nursing notified []            Family present [x]            Caregiver present 
[]            Bed alarm activated COMMUNICATION/EDUCATION:  
[x]            Aspiration precautions; swallow safety; compensatory techniques. [x]            Patient/family have participated as able in goal setting and plan of care. [x]            Patient/family agree to work toward stated goals and plan of care. []            Patient understands intent and goals of therapy; neutral about participation. []            Patient unable to participate in goal setting/plan of care; educ ongoing with interdisciplinary staff 
[]         Posted safety precautions in patient's room. Thank you for this referral. 
YANA Coppola Time Calculation: 30 mins Evaluation Time: 20 minutes Treatment Time: 10 minutes

## 2019-01-11 PROBLEM — A41.9 SEVERE SEPSIS (HCC): Status: ACTIVE | Noted: 2019-01-01

## 2019-01-11 PROBLEM — A41.9 SEPSIS (HCC): Status: RESOLVED | Noted: 2019-01-01 | Resolved: 2019-01-01

## 2019-01-11 PROBLEM — R65.20 SEVERE SEPSIS (HCC): Status: ACTIVE | Noted: 2019-01-01

## 2019-01-11 PROBLEM — R57.9 SHOCK (HCC): Status: ACTIVE | Noted: 2019-01-01

## 2019-01-11 NOTE — PROGRESS NOTES
Family has elected to make the patient Hospice Care. Hospice orders received. I have called [facility] and made Sheryl aware of the referral., she states Jesus Montero was aware.  José Luis Ibarra RN

## 2019-01-11 NOTE — HOSPICE
Called and spoke with wife, Marce Mobley. She stated she was not aware of Hospice consult nor ready to talk about Hospice as \"they don't know anything else. \"  Will continue to monitor. Thank you for the referral to [facility] to care for Pt and family. Any questions or concerns please contact [phone].

## 2019-01-11 NOTE — DIABETES MGMT
Nutrition: 
Noted poor po intake at breakfast this morning. Diet:  Mechanical soft, thin liquids. IVF:  NS at 50 ml/jr Pt agreeable to try Ensure. Will send one bottle daily for now. Fany Carr RD, CDE Office:  [phone] [place] Pager:  [phone]

## 2019-01-11 NOTE — PROGRESS NOTES
Discussed on LOS. Patient has indicated to medical and nursing staff that he would like to consider Hospice. He has been matched and accepted, pending therapy evaluations, to [facility] and [facility] as requested initially. I have explained to family if he elects Hospice in a facility he will be responsible for room and board. Medicare does not cover Room and Board for Hospice patients in a facility. Plan is to continue current treatment and evaluate response. Patient aspirates with po, however,  he has declined a PEG for feeding tube. Discharge plan is SNF for rehab depending upon his progress.  Hossein Espana RN

## 2019-01-11 NOTE — DIABETES MGMT
NUTRITIONAL ASSESSMENT AND GLYCEMIC CONTROL PLAN OF CARE Nga Zuñiga           88 y.o.           1/8/2019 1. Septic shock ([address] 2. Hypotension, unspecified hypotension type 3. Anemia, unspecified type 4. Rectal bleeding 5. Acute renal failure superimposed on chronic kidney disease, unspecified CKD stage, unspecified acute renal failure type ([address] 6. Chest pain, unspecified type 7. SOB (shortness of breath) 8. Elevated TSH [x]  No Cultural, Episcopal or ethnic dietary need identified. []  Cultural, Episcopal and ethnic food preferences identified and addressed [x]  Participated in discharge planning/Interdisciplinary rounds Food allergies: [x]  No        []  Yes- 
ASSESSMENT:  
Pt is underweight related to inadequate caloric intake as evidenced by 94% ideal weight and BMI= 22kg/m2 . Pt is at  Nutritional risk based on BMI<23 and age >71. Pt's intake has been reduced since admission r/t bipap. INTERVENTIONS/PLAN:  
Ensure supplement daily. Encouraged increased intake at meals to meet calorie and protein requirements. SUBJECTIVE/OBJECTIVE: Information obtained from:pt Diet: [facility] soft Patient Vitals for the past 100 hrs: 
 % Diet Eaten 01/10/19 1600 50 % Medications: [x]                Reviewed Most Recent POC Glucose:  
Recent Labs  
  01/10/19 
0400 01/09/19 
0330 01/08/19 
1850 * 80 88 Labs:  
No results found for: HBA1C, HGBE8, BTT5JPSA Lab Results Component Value Date/Time  Sodium 140 01/10/2019 04:00 AM  
 Potassium 3.8 01/10/2019 04:00 AM  
 Chloride 106 01/10/2019 04:00 AM  
 CO2 24 01/10/2019 04:00 AM  
 Anion gap 10 01/10/2019 04:00 AM  
 Glucose 100 (H) 01/10/2019 04:00 AM  
 BUN 68 (H) 01/10/2019 04:00 AM  
 Creatinine 2.16 (H) 01/10/2019 04:00 AM  
 Calcium 8.4 (L) 01/10/2019 04:00 AM  
 Magnesium 2.1 09/26/2018 07:35 PM  
 Phosphorus 3.4 03/01/2011 03:35 AM  
 Albumin 2.8 (L) 01/09/2019 03:30 AM  
 
 
Anthropometrics: IBW : 72.6 kg (160 lb), % IBW (Calculated): 93.75 %, BMI (calculated): 22kg/m2 Wt Readings from Last 1 Encounters:  
01/09/19 68 kg (150 lb) Ht Readings from Last 1 Encounters:  
01/09/19 5' 9\" (1.753 m) Estimated Nutrition Needs: 2040 Kcals/day, Protein (g): 82 g Fluid (ml): 1800 ml Based on:   [x]          Actual BW    []          IBW   []            Adjusted BW   
 
 
Nutrition Diagnoses: As stated above. Nutrition Interventions: as stated above Goal:  
 Weight gain or maintenance by 1/20. Intake will meet >75% of energy and protein requirements by 1/15. Glucose will be within target range of 70-180mg/dl by  1/13-met. Nutrition Monitoring and Evaluation []     Monitor po intake on meal rounds 
[x]     Continue inpatient monitoring and intervention 
[]     Other: 
 
 
Nutrition Risk:  []   High     [x]  Moderate    []  Minimal/Uncompromised Graciela Hough, RD 
pgr [phone]

## 2019-01-11 NOTE — PROGRESS NOTES
Problem: Dysphagia (Adult) Goal: *Acute Goals and Plan of Care (Insert Text) Recommendations: 
Diet: mech soft / thin Meds: one at a time Aspiration Precautions Oral Care TID Goals:  Patient will: 1. Tolerate PO trials with 0 s/s overt distress in 4/5 trials 2. Utilize compensatory swallow strategies/maneuvers (decrease bite/sip, size/rate, alt. liq/sol) with min cues in 4/5 trials 3. Complete an objective swallow study (i.e., MBSS) to assess swallow integrity, r/o aspiration, and determine of safest LRD, min A Outcome: Resolved/Met Date Met: 01/11/19 Speech language pathology dysphagia treatment & discharge Patient: Lilly De Leon (89 y.o. male) Date: 1/11/2019 Diagnosis: Sepsis ([facility] [A41.9] Sepsis ([facility] [address] Precautions: Aspiration ASSESSMENT: 
Pt seen for dysphagia f/u this am; A&Ox4. Per pt and RN report, he has been tolerating mech soft / thin liquid diet. Observed with thin liquid +straw; pt demo oral bolus holding with delayed swallow timing/reflex; independently utilized single sips. Pt with poor endurance and delayed, weak cough. Pt continues to reject therapy for swallow rehabilitation as his desire is comfort care. Per pt's family, comfort care to be initiated. Recommend QOL/comfort diet of mech soft / thin. Pt not appropriate for ST, accordingly, SLP to d/c intervention at this time. Progression toward goals: 
[]         Improving appropriately - goals met/approximated 
[x]         Not making progress/Not appropriate - will d/c POC PLAN: 
Recommendations and Planned Interventions: 
Maximum therapeutic gains met; safest, least restrictive diet achieved. D/C ST intervention at this time. Discharge Recommendations:  Comfort care SUBJECTIVE:  
Patient stated I'm just ready. OBJECTIVE:  
Cognitive and Communication Status: 
Neurologic State: Alert Orientation Level: Oriented X4 Cognition: Follows commands Perception: Appears intact Perseveration: No perseveration noted Safety/Judgement: Awareness of environment Dysphagia Treatment: 
Oral Assessment: 
Oral Assessment Labial: No impairment Dentition: Limited, Natural 
Oral Hygiene: fair Lingual: Decreased rate, Decreased strength Velum: No impairment Mandible: No impairment P.O. Trials: 
 Patient Position: \[facility] [place] [facility]\"" 90 Vocal quality prior to P.O.: Breathy Consistency Presented: Thin liquid, Solid, Puree How Presented: Self-fed/presented, Spoon, Straw, Successive swallows, SLP-fed/presented, Cup/sip Bolus Acceptance: No impairment Bolus Formation/Control: Impaired Type of Impairment: Delayed, Mastication Propulsion: Delayed (# of seconds) Oral Residue: Less than 10% of bolus Initiation of Swallow: Delayed (# of seconds) Laryngeal Elevation: Weak Aspiration Signs/Symptoms: Delayed cough/throat clear Pharyngeal Phase Characteristics: Multiple swallows, Poor endurance, Suspected pharyngeal residue Effective Modifications: Small sips and bites, Alternate liquids/solids Cues for Modifications: Minimal-moderate Oral Phase Severity: Mild Pharyngeal Phase Severity : Mild PAIN: 
Start of Tx: 0 End of Tx: 0  
 
GCODESwallowing:  Swallow Current Status CI= 1-19%  Swallow Goal Status CH= 0% 
 Swallow D/C Status CI= 1-19% The severity rating is based on the following outcomes: HEBERT Noms Swallow Level 6 Clinical Judgement After treatment:  
[]              Patient left in no apparent distress sitting up in chair 
[x]              Patient left in no apparent distress in bed 
[x]              Call bell left within reach [x]              Nursing notified 
[]              Caregiver present 
[]              Bed alarm activated COMMUNICATION/EDUCATION:  
[x] Aspiration precautions; swallow safety; compensatory techniques []        Patient unable to participate in education; education ongoing with staff []  Posted safety precautions in patient's room. [] Oral-motor/laryngeal strengthening exercises Arnulfo Hamman, SLP Time Calculation: 15 mins

## 2019-01-11 NOTE — PROGRESS NOTES
Progress Note Patient: Sarabjit Cosme               Sex: male          DOA: 1/8/2019 YOB: 1930      Age:  80 y.o.        LOS:  LOS: 3 days CHIEF COMPLAINT:  Sepsis, A fib Subjective:  
 
Patient awake and interactive Objective:  
  
Visit Vitals /63 Pulse 80 Temp 96 °F (35.6 °C) Resp 19 Ht 5' 9\" (1.753 m) Wt 76.7 kg (169 lb) SpO2 99% BMI 24.96 kg/m² Physical Exam: 
Gen:  No distress, no complaint Lungs:  Clear bilaterally, no wheeze or rhonchi Heart:  Regular rate and rhythm, no murmurs or gallops Abdomen:  Soft, non-tender, normal bowel sounds Neuro:  Awake and alert Lab/Data Reviewed: 
BMP:  
Lab Results Component Value Date/Time  01/11/2019 03:15 AM  
 K 3.7 01/11/2019 03:15 AM  
  01/11/2019 03:15 AM  
 CO2 22 01/11/2019 03:15 AM  
 AGAP 9 01/11/2019 03:15 AM  
  (H) 01/11/2019 03:15 AM  
 BUN 72 (H) 01/11/2019 03:15 AM  
 CREA 2.20 (H) 01/11/2019 03:15 AM  
 GFRAA 34 (L) 01/11/2019 03:15 AM  
 GFRNA 28 (L) 01/11/2019 03:15 AM  
 
CBC:  
Lab Results Component Value Date/Time WBC 15.0 (H) 01/11/2019 03:15 AM  
 HGB 9.1 (L) 01/11/2019 03:15 AM  
 HCT 30.4 (L) 01/11/2019 03:15 AM  
  (L) 01/11/2019 03:15 AM  
 
 
 
 
Assessment/Plan Principal Problem: 
  Severe sepsis ([address] (1/11/2019) Active Problems: 
  Atrial fibrillation () Overview: CHADS score 3  (+CHF, +HTN, +AGE, -DM, -CVA) UTI (urinary tract infection) (1/9/2019) Acute respiratory failure ([address] (1/9/2019) Shock ([address] (1/11/2019) CAD (coronary artery disease) (1/9/2019) HTN (hypertension) (1/9/2019) Diastolic CHF, chronic ([facility] (9/26/2018) Atherosclerotic NAHED (renal artery stenosis), bilateral ([facility] (8/25/2015) Overview: S/P stenting R and L Plan: 
Patient is moving in comfort care direction Pain control BP control Discussed with family at the bedside.

## 2019-01-11 NOTE — PROGRESS NOTES
Requested by Dr. Jonette Fothergill to see for ID consultation but has just been transitioned to comfort care so will forego.

## 2019-01-11 NOTE — PROGRESS NOTES
Rec'd pt resting in bed awake and alert. Has gemeralized weakness throughout. Follows simple commands. No c/o SOB at current time. N/C at 4 liters with sats 98% range. 2100  Warmed up pureed diet, pt has poor apetite but trying to eat. Has occasional productive cough. Continuing to wean off lev to keep map of 65. 
 
 
0200  Pt wrapped up in warm blankets due to falling temp of 95 range. Will continue to monitor. 0530  Pt temp slowly climbing in 96.0 range.

## 2019-01-11 NOTE — PROGRESS NOTES
[facility] ICU Progress Note Name: Aimee Johnson : 11/10/1930 MRN: [id number] Date: 2019 1:29 PM 
  
[x]I have reviewed the flowsheet and previous days notes. Events overnight reviewed and discussed with nursing staff. Vital signs and records reviewed. HPI 
79 y/o male w/ PMHX of A fib, CABG, cardiomyopathy, CAD, PVD, HTN, and PVD admitted for near syncope, hypotension, and hypothermia w/ leukocytosis. Subjective: 
19 No new events overnight. AOX4 Continues to request to be placed on comfort care and discontinue aggressive treatment ROS:A comprehensive review of systems was negative except for that written in the HPI. Medication Review: · Pressors - levo · Sedation - none · Antibiotics - Azactam, Levaquin, Vanc · Pain - PRN tylenol · GI/ DVT - protonix · Others (other gtts)- IVF Safety Bundles: CAUTI/ Severe Sepsis Protocol/ Electrolyte Replacement Protocol Vital Signs:   
Visit Vitals /62 Pulse 80 Temp 96.1 °F (35.6 °C) Resp 24 Ht 5' 9\" (1.753 m) Wt 76.7 kg (169 lb) SpO2 99% BMI 24.96 kg/m² O2 Device: Nasal cannula O2 Flow Rate (L/min): 4 l/min Temp (24hrs), Av °F (35.6 °C), Min:94.5 °F (34.7 °C), Max:97.4 °F (36.3 °C) Intake/Output:  
Last shift:      701 - 1900 In: -  
Out: 356 [POIGP:735] Last 3 shifts: 1901 -  0700 In: 3312.6 [P.O.:545; I.V.:2767.6] Out: 1440 [JFWGZ:5702] Intake/Output Summary (Last 24 hours) at 2019 6744 Last data filed at 2019 0900 Gross per 24 hour Intake 1418.26 ml Output 950 ml Net 468.26 ml Physical Exam: 
 
General: Awake, appears uncomfortable HEENT:  Anicteric sclerae; pink palpebral conjunctivae; mucosa moist 
Resp:  Diminished bilaterally to auscultation; Symmetrical chest expansion, no accessory muscle use; good airway entry; no rales/ wheezing/ rhonchi noted CV: systolic murmur; paced rhythm GI:  Abdomen soft, non-tender; (+) active bowel sounds Extremities:  +2 pulses on all extremities; no edema/ cyanosis/ clubbing noted; normal capillary refill Skin:  Warm; no rashes/ lesions noted Neurologic:  Non-focal 
Devices: Benites DATA:  
 
Current Facility-Administered Medications Medication Dose Route Frequency  vancomycin (VANCOCIN) 750 mg in 0.9% sodium chloride (MBP/ADV) 250 mL ADV  750 mg IntraVENous Q24H  
 fentaNYL citrate (PF) injection 25 mcg  25 mcg IntraVENous Q4H PRN  
 LORazepam (ATIVAN) injection 0.5 mg  0.5 mg IntraVENous Q4H PRN  
 [START ON 1/12/2019] VANCOMYCIN INFORMATION NOTE   Other ONCE  
 ondansetron (ZOFRAN) injection 4 mg  4 mg IntraVENous Q6H PRN  
 0.9% sodium chloride infusion  50 mL/hr IntraVENous CONTINUOUS  
 pantoprazole (PROTONIX) injection 40 mg  40 mg IntraVENous DAILY  hydrocortisone Sod Succ (PF) (SOLU-CORTEF) injection 100 mg  100 mg IntraVENous Q12H  
 acetaminophen (TYLENOL) suppository 650 mg  650 mg Rectal Q6H PRN  
 saliva stimulant (BIOTENE) 1 Spray  1 Spray Oral PRN  
 sodium chloride (NS) flush 5-10 mL  5-10 mL IntraVENous PRN  
 levoFLOXacin (LEVAQUIN) 750 mg in D5W IVPB  750 mg IntraVENous Q48H  
 aztreonam (AZACTAM) 1 g in 0.9% sodium chloride (MBP/ADV) 100 mL MBP  1 g IntraVENous Q8H  
 VANCOMYCIN INFORMATION NOTE 1 Each  1 Each Other Rx Dosing/Monitoring  NOREPINephrine (LEVOPHED) 8 mg in 0.9% NS 250ml infusion  2-16 mcg/min IntraVENous TITRATE Labs: Results:  
   
Chemistry Recent Labs  
  01/11/19 
0315 01/10/19 
0400 01/09/19 
0330 01/08/19 
1850 * 100* 80 88  140 138 137  
K 3.7 3.8 4.6 4.4  
 106 103 102 CO2 22 24 22 20* BUN 72* 68* 56* 54* CREA 2.20* 2.16* 2.16* 2.36* CA 8.4* 8.4* 8.5 9.1 AGAP 9 10 13 15 BUCR 33* 31* 26* 23* AP  --   --  171* 179* TP  --   --  6.4 7.0 ALB  --   --  2.8* 2.8*  
GLOB  --   --  3.6 4.2* AGRAT  --   --  0.8 0.7* CBC w/Diff Recent Labs 01/11/19 
0315 01/10/19 
0400 01/09/19 
0330 01/08/19 
1850 WBC 15.0* 14.9* 16.1* 15.4*  
RBC 3.94* 3.93* 3.80* 3.87* HGB 9.1* 9.1* 8.7* 8.8* HCT 30.4* 30.9* 29.4* 30.8* * 100* 115* 90* GRANS 93* 94*  --  94* LYMPH 2* 1*  --  3*  
EOS 0 0  --  0 Coagulation Recent Labs 01/09/19 
0330 01/08/19 1850 PTP  --  23.3* INR  --  2.1* APTT 47.7* 41.9* Liver Enzymes Recent Labs 01/09/19 0330 TP 6.4 ALB 2.8* * SGOT 81* ABG No results found for: PH, PHI, PCO2, PCO2I, PO2, PO2I, HCO3, HCO3I, FIO2, FIO2I Microbiology Recent Labs 01/08/19 2050 01/08/19 
1850 01/08/19 
1835 CULT >100,000 COLONIES/mL ENTEROCOCCUS FAECALIS GROUP D* AEROBIC AND ANAEROBIC BOTTLES ENTEROCOCCUS FAECALIS GROUP D*  For Susceptibility Refer to Culture [id number] AEROBIC AND ANAEROBIC BOTTLES ENTEROCOCCUS FAECALIS GROUP D* Telemetry: []Sinus []A-flutter [x]Paced []A-fib []Multiple PVCs Imaging: CXR Results  (Last 48 hours) 01/10/19 0818  XR CHEST PORT Final result Impression:  Impression: 1. Mild progressive central vascular congestion with posteriorly layering  
pleural effusions, more conspicuous on today's examination with considerable  
cardiomegaly unchanged from prior. Narrative:  Chest, single view Indication: Difficulty breathing Comparison: Several prior chest radiographs, most recently 1/8/2019 at 1818  
hours Findings:  Portable upright AP view of the chest was obtained. Interval increase  
in central vascular congestion is present along with hazy posteriorly layering  
bilateral pleural effusions. No pneumothorax. Left retrocardiac and right  
basilar opacity similar to prior. Bilateral subclavian approach pacemaker is  
noted in stable position.  Considerable enlargement of the cardiac silhouette  
noted, unchanged with postop changes of median sternotomy and coronary arterial  
 bypass grafting are redemonstrated. No acute osseous abnormality. CT Results  (Last 48 hours) None IMPRESSION:  
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  
· Resp - Stable on 4L NC. O2 goal > 90. Aspiration precautions. Pulmonary hygiene. · ID - Afebrile but leukocytosis. Trend temp and WBC curve. BCx and UCx +Enterococus Faecalis Group D. Continue ABx. Monitor for signs of infection. · CVS - BP requiring pressor. MAP goal > 65. Monitor HD status. · Heme/onc - Daily CBC. Monitor H/H and platelets. Monitor for signs of bleeding. · Metabolic - Daily BMP. Trend and replace electrolytes per replacement protocol. · Renal - Trend renal indices. Benites for strict I/O. 
· Endocrine - Glycemic control per protocol. · Neuro/ Pain/ Sedation - Monitor neuro status. Start PRN ativan and fentanyl for anxiety and comfort. · GI - Dysphagia mechanical altered diet . · Prophylaxis - DVT, GI- protonix · Discussed in interdisciplinary rounds- Family to arrive soon. D/C aggressive measures and place on comfort care · Code status- DNR The patient is: [x] acutely ill Risk of deterioration: [] moderate  
 [] critically ill  [x] high  
 
[x]See my orders for details My assessment/plan was discussed with: 
[x]nursing []PT/OT [x]respiratory therapy [x]Dr. Alexey Romo  
[]family [] Edil Gallo NP

## 2019-01-12 NOTE — PROGRESS NOTES
responded to a call back for family support of patient in 2702 as he was dying. Offered prayer and support to patient and family. Patient continues to hang on at this point. Reiseñor 3 Board Certified [employer] [facility]  
[phone]

## 2019-01-12 NOTE — PROGRESS NOTES
DEATH PRONOUNCEMENT Called to patient's room to evaluate non-responsive state. Patient does not respond to tactile or verbal stimulus. He has no respiratory effort or breath sounds. He has no heart tones or pulse. He is pronounced dead as of 11:19.

## 2019-01-12 NOTE — PROGRESS NOTES
IV's , alvarez catheter, B/P cuff, heart monitor removed. Toe tag placed, patient placed in shroud, zipper tag placed. [employer] cart obtained. Security notified. Patient taken to [facility]. Copies of death paperwork copied in triplicate Dilaudid PCA  17 cc wasted with Pharmacist.

## 2019-01-12 NOTE — PROGRESS NOTES
Rec'd pt resting in bed awake and alert oriented x 2-3 though is drousy, sleeping intermittently and then awakens to loud voice. Pt denies pain. Continuing to follow simple commands though still very drowsy. Continues to want 02 n/c off.  
 
2200  Repositioned, still awakens / very sleepy in between. Off monitor-random blood pressure 96/54  o2 sat 73% reading-though pt skin is cooler/may cause 02 sat to read inaccurate. RR 10 minute. 3107-7850  coughing increasing, though was refusing suctioning. Fentanyl 25 mics /ativan 1mg given for pain and restlessness. Will continue to monitor. 0100  Pt appears comfortable, quiet, still slightly arouses to tactile stimulation or loud verbal stimulus. RR approx  4-5/ minute. Notifed and verified pacer [address] of pts pacer that may need to be deactivated,-later rec'd return call from tech and verified pacer was carried by this company and was filed by this company that informed us if pt has pacer only than you do not need to deactivate it if this pt's heart would stop. She also informed us that eventually the pacer would stop on its own once it realized the pts heart was not responding. 0400  No coughing, appears comfortable, repositioned, RR remains 3-4 minute. Lianna Patel 38. into see pt after notifying of pts status. Notified Carlosausten Ram, pts daughter of pts current status.

## 2019-01-12 NOTE — PROGRESS NOTES
Rec'd pt resting in bed awake and alert oriented x 2-3 though is drousy, sleeping intermittently and then awakens to loud voice. Pt denies pain. Continuing to follow simple commands though still very drowsy. Continues to want 02 n/c off.  
 
2200  Repositioned, still awakens / very sleepy in between. Off monitor-random blood pressure 96/54  o2 sat 73% reading-though pt skin is cooler/may cause 02 sat to read inaccurate. RR 10 minute. 1642-5469  coughing increasing, though was refusing suctioning. Fentanyl 25 mics /ativan 1mg given for pain and restlessness. Will continue to monitor. 0100  Pt appears comfortable, quiet, still slightly arouses to tactile stimulation or loud verbal stimulus. RR approx  4-5/ minute. Notifed and verified pacer [address] of pts pacer that may need to be deactivated,-later rec'd return call from tech and verified pacer was carried by this company and was filed by this company that informed us if pt has pacer only than you do not need to deactivate it if this pt's heart would stop. She also informed us that eventually the pacer would stop on its own once it realized the pts heart was not responding. 0400  No coughing, appears comfortable, repositioned, RR down 4/ minute. B/P 80/40.  
 
 
 
0545  Agonal breathing. Notified and updated [facility] on pt status- awaiting return call for confirmation or will notify of death. [facility] updated on pt status.

## 2019-01-17 NOTE — PROGRESS NOTES
Clarification:  Patient had septic shock present on admission, likely present from aspiration pneumonia.

## 2019-01-18 NOTE — DISCHARGE SUMMARY
[facility] DIVISION DISCHARGE SUMMARY Jasmina Holder 
MR#: [id number] : 11/10/1930 ACCOUNT #: [de-identified] ADMIT DATE: 2019 DISCHARGE DATE: 2019 ADMITTING DIAGNOSES:  Severe sepsis and acute respiratory failure. HISTORY OF PRESENT ILLNESS:  The patient is an 80-year-old male who presented to the emergency department on 2019 with decrease in mental status, preceded by dizziness. In the emergency room, he was found to have severe sepsis with hypotension and acute respiratory failure. He was admitted for ongoing management. HOSPITAL COURSE:  The patient was intubated and admitted to the intensive care unit. He was treated with IV vasopressor as well as broad-spectrum antibiotics. He was given maximal supportive care. This continued until his family approached the treatment team and said that this was not consistent with his wishes. Family conference was held and the family requested withdrawal of care. We proceeded with compassionate extubation. The patient was given comfort care measures. He  on  as a result of his severe sepsis likely from urinary tract infection. OTHER DIAGNOSES:  Include 1. Severe sepsis. 2.  Shock. 3.  Urinary tract infection. 4.  Metabolic encephalopathy. 5.  Coronary artery disease. 6.  Congestive heart failure. 7.  Atrial fibrillation. This is a summary of a complex hospitalization. Please also see electronic medical record for further details regarding his care as needed. DISPOSITION:  Death. MD PAMELA Trimble/[place] 
D: 2019 12:56    
T: 2019 14:06 JOB #: [id number]

## 2019-04-16 NOTE — PROGRESS NOTES
0730 Bedside and Verbal shift change report given to Marly Rowe RN (oncoming nurse) by Kerline Golden RN (offgoing nurse). Report included the following informationSBAR, Procedure Summary, Intake/Output, MAR, Recent Results and Cardiac Rhythm V-PACED  . Pt resting in bed, no c/o pain, NAD. Bed locked and lowered, siderails up x3. Call bell at bedside, will continue to monitor. 1139 Pt sleeping in bed, Assessment completed, scheduled meds provided. Call bell in reach, will continue to monitor. 1237 IDRs completed. 1430 Pt sleeping in bed, NAD. Call bell in reach, will continue to monitor. 1 Pt down for PVL. 1751 Pt returned to floor, alert and stable, O2 titrated down per verbal order via Sergio Wilson NP. Scheduled meds provided. Pt sitting up in bed eating dinner. 2010  Bedside and Verbal shift change report given to Kerline Golden RN  (oncoming nurse) by Marly Rowe RN (offgoing nurse). Report included the following information SBAR, Procedure Summary, Intake/Output, MAR, Recent Results and Cardiac Rhythm V-PACED. Pt resting in bed, no c/o pain, NAD. Bed locked and lowered, siderails up x3. Call bell at bedside. Removal of VA ECMO via right axillary artery graft cannula and right femoral venous cannula

## 2020-09-01 NOTE — TELEPHONE ENCOUNTER
Pt would like to change his pharmacy over to the [facility] Please update in CC. No refills needed at this time.
Updated pharmacy in [address] [place] [address]
86

## 2021-04-26 NOTE — PROGRESS NOTES
Nutrition initial assessment/Plan of care RECOMMENDATIONS:  
1. Cardiac Diet 2. Monitor weight, labs and PO intake 3. RD to follow GOALS:  
1. PO intake meets >75% of protein/calorie needs by 10/4 
2. Weight Maintenance (+/- 1-2 lb) by 10/4 ASSESSMENT:  
Wt status is classified as normal per BMI of 20.1. However, Pt at nutrition risk d/t BMI <23 and age >65 years. Adequate PO intake per vitals. Labs noted. Pt w/ elevated BNP (4942), elevated LFTs and elevated BUN/Cr; GFR (43). Nutrition recommendations listed. RD to follow. Nutrition Diagnoses: Altered nutrition related lab values related to CHF as evidenced by an elevated BNP (4942). Nutrition Risk:  [] High  [] Moderate [x]  Low SUBJECTIVE/OBJECTIVE:  
Pt admitted for syncope and collapse. PMHx includes CKD 3, Afib, pacemaker, CAD s/p CABG, CHF, HLD and HTN. Familiar w/ Pt from previous admissions. Denies having any food allergies or problems chewing/swallowing and wt was stable PTA. -140 lb, but wt fluctuates d/t fluid retention. Noted Pt w/ trace edema to BLE. Pt seen in room after lunch; observed 100% of meal consumed. Reports having a good appetite and doing well. Nothing to address at this time. Will monitor,. Information Obtained from:  
 [x] Chart Review [x] Patient 
 [] Family/Caregiver 
 [] Nurse/Physician 
 [] Interdisciplinary Meeting/Rounds Diet: Cardiac Diet Medications: [x] Reviewed Norvasc, Eliquis, Lasix, Protonix Allergies: [x] Reviewed Patient Active Problem List  
Diagnosis Code  Dyslipidemia E78.5  Coronary artery disease of native artery of native heart with stable angina pectoris (HCC) I25.118  
 Cardiomyopathy I42.9  Atrial fibrillation I48.91  
 Sick sinus syndrome I49.5  S/P CABG x 2 Z95.1  Atherosclerotic NAHED (renal artery stenosis), bilateral (HCC) I70.1  Chronic systolic congestive heart failure (HCC) I50.22  
 Essential hypertension I10  
 Anxiety F41.9  Statin intolerance Z78.9  Osteoarthritis of both knees M17.0  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  Syncope and collapse R55  
 SOB (shortness of breath) on exertion R06.02  
 Pleural effusion, right J90  Abnormal chest CT R93.8  Aortic stenosis, moderate I35.0  Syncope R55  Cardiac pacemaker in situ Z95.0  Diastolic CHF, chronic (HCC) I50.32 Past Medical History:  
Diagnosis Date  Atrial fibrillation CHADS score 3  (+CHF, +HTN, +AGE, -DM, -CVA)  CABG   
 2008   LIMA - LAD,   SVG - RCA  Cardiomyopathy EF 30-35% (ECHO 6/14)  Coronary artery disease  Dyslipidemia  Hypertension  Hypothyroid  Pacemaker  Peripheral vascular disease  Renal artery stenosis   
 bilateral stents  Sick sinus syndrome Labs:   
Lab Results Component Value Date/Time Sodium 140 09/27/2018 03:59 AM  
 Potassium 4.3 09/27/2018 03:59 AM  
 Chloride 103 09/27/2018 03:59 AM  
 CO2 27 09/27/2018 03:59 AM  
 Anion gap 10 09/27/2018 03:59 AM  
 Glucose 115 (H) 09/27/2018 03:59 AM  
 BUN 31 (H) 09/27/2018 03:59 AM  
 Creatinine 1.54 (H) 09/27/2018 03:59 AM  
 Calcium 8.5 09/27/2018 03:59 AM  
 Magnesium 2.1 09/26/2018 07:35 PM  
 Phosphorus 3.4 03/01/2011 03:35 AM  
 Albumin 3.7 09/26/2018 07:35 PM  
 
Anthropometrics: BMI (calculated): 20.1 Last 3 Recorded Weights in this Encounter  
 09/27/18 0341 Weight: 61.7 kg (136 lb 1.6 oz) Ht Readings from Last 1 Encounters:  
09/27/18 5' 9\" (1.753 m) Weight Metrics 9/27/2018 9/5/2018 7/12/2018 6/6/2018 5/4/2018 4/26/2018 2/19/2018 Weight 136 lb 1.6 oz 142 lb 138 lb 143 lb 140 lb 140 lb 141 lb BMI 20.1 kg/m2 20.97 kg/m2 20.38 kg/m2 21.12 kg/m2 20.67 kg/m2 20.67 kg/m2 20.82 kg/m2 Patient Vitals for the past 100 hrs: 
 % Diet Eaten  
09/27/18 0214 100 % IBW: 160 lb %IBW: 85% UBW: 138-140 lb  
[] Weight Loss [] Weight Gain [x] Weight Stable Estimated Nutrition Needs: [x] MSJ RMR: 1329 Kcal 
 Calories: 4216-1367 kcal Based on:   [x] Actual BW   
Protein:   62-74 g Based on:   [x] Actual BW Fluid:       8620-1076 ml Based on:   [x] Actual BW  
 
 [x] No Cultural, Mu-ism or ethnic dietary need identified. [] Cultural, Mu-ism and ethnic food preferences identified and addressed Wt Status:  [x] Normal (18.6 - 24.9) [] Underweight (<18.5) [] Overweight (25 - 29.9) [] Mild Obesity (30 - 34.9)  [] Moderate Obesity (35 - 39.9) [] Morbid Obesity (40+)  
 :  
 
Nutrition Problems Identified:  
[] Suboptimal PO intake  
[] Food Allergies [] Difficulty chewing/swallowing/poor dentition 
[] Constipation/Diarrhea  
[] Nausea/Vomiting  
[x] None 
[] Other:  
 
Plan:  
[x] Therapeutic Diet 
[]  Obtained/adjusted food preferences/tolerances and/or snacks options  
[]  Supplements added  
[] Occupational therapy following for feeding techniques []  HS snack added  
[]  Modify diet texture  
[]  Modify diet for food allergies []  Educate patient  
[]  Assist with menu selection  
[x]  Monitor PO intake on meal rounds  
[x]  Continue inpatient monitoring and intervention  
[]  Participated in discharge planning/Interdisciplinary rounds/Team meetings  
[]  Other:  
 
Education Needs: 
 [] Not appropriate for teaching at this time due to: 
 [x] Identified and addressed Nutrition Monitoring and Evaluation: 
[x] Continue ongoing monitoring and intervention 
[] Other Radha Jalloh lock

## 2021-05-04 NOTE — PROGRESS NOTES
RT met with pt to complete the initial activity interview. Presented supine in bed, A&O X 4. Pt was very pleasant and cooperative during activity interview. With minimal prompting pt was able to ID past leisure interests such as involvement in the [employer] and performing/ promoting for the [employer]. Pt mentioned that his leisure interests have changed due to aging and complicity of getting out in to the community. Pt ID reading, music, watching TV, and taking care of pets as current leisure interests. Educated pt on leisure cabinet and unit activities. RT offered to provide pt with reading materials. Pt stated his son is going to bring some of his subscriptions from home. RT will transport pt to activities of interest in the dining room. English

## 2021-11-24 NOTE — IP AVS SNAPSHOT
[address] Patient: Adams Emerson MRN: [id number] MEB:[id number] A check wilner indicates which time of day the medication should be taken. My Medications START taking these medications Instructions Each Dose to Equal  
 Morning Noon Evening Bedtime  
 guaiFENesin  mg ER tablet Commonly known as:  Abelino & Abelino Your last dose was: Your next dose is: Take 1 Tab by mouth every twelve (12) hours. 600 mg  
    
   
   
   
  
 tiotropium 18 mcg inhalation capsule Commonly known as:  Darlene Kim Your last dose was: Your next dose is: Take 1 Cap by inhalation every twenty-four (24) hours. 1 Cap CHANGE how you take these medications Instructions Each Dose to Equal  
 Morning Noon Evening Bedtime  
 chlorpheniramine-HYDROcodone 10-8 mg/5 mL suspension Commonly known as:  Jennet Minus What changed:  when to take this Your last dose was: Your next dose is: Take 5 mL by mouth nightly as needed for Cough. Max Daily Amount: 5 mL. 5 mL  
    
   
   
   
  
 furosemide 20 mg tablet Commonly known as:  LASIX Start taking on:  2/8/2018 What changed:   
- medication strength 
- how much to take 
- how to take this - when to take this 
- additional instructions Your last dose was: Your next dose is: One pill once or twice a day  
     
   
   
   
  
 nitroglycerin 0.4 mg SL tablet Commonly known as:  NITROSTAT What changed:   
- medication strength 
- how much to take Your last dose was: Your next dose is:    
   
   
 1 Tab by SubLINGual route every five (5) minutes as needed for Chest Pain (up to 3 total 1 every 5 min). 0.4 mg  
    
   
   
   
  
 pantoprazole 40 mg tablet Commonly known as:  PROTONIX Start taking on:  2/8/2018 Isak Zepeda Patient Age: 45 year old  MESSAGE: Interpreting service used: No      IM/FP- Medication Question-     [facility] of the [facility]: [facility]     Name of the medication: CHANTIX    Question about: Directions       Select provider's [facility] site [place] Route message to provider's clinical support pool       ALLERGIES:  Aspartame   (food or med)  Current Outpatient Medications   Medication   • varenicline (Chantix Starting Month Connor) 0.5 MG X 11 & 1 MG X 42 tablet   • [START ON 12/19/2021] varenicline (Chantix Continuing Month Connor) 1 MG tablet   • azithromycin (ZITHROMAX) 250 MG tablet   • triamcinolone (ARISTOCORT) 0.1 % ointment     No current facility-administered medications for this visit.     PHARMACY to use: please request preferred pharmacy from pt if needed             Pharmacy preference(s) on file:   [id number] [place], [place] - [facility] [address] AT [facility] [address]  [facility] [address]  [facility] [address]  Phone: [phone] Fax: [phone]      CALL BACK INFO: DO NOT LEAVE A MESSAGE - contact patient directly      PCP: Luzma Nichols MD         INS: Payor: BLUE CROSS BLUE [place]ELD [place] / Plan: BLUE CHOICE OPT [id number] / Product Type: PPO MISC   PATIENT ADDRESS:  [address] [place] [id number]       What changed:  See the new instructions. Your last dose was: Your next dose is: Take 1 Tab by mouth Daily (before breakfast). 40 mg CONTINUE taking these medications Instructions Each Dose to Equal  
 Morning Noon Evening Bedtime  
 amLODIPine 5 mg tablet Commonly known as:  Kelley Han Start taking on:  2/8/2018 Your last dose was: Your next dose is: Take 1 Tab by mouth daily. 5 mg  
    
   
   
   
  
 apixaban 5 mg tablet Commonly known as:  Thomas Matson Your last dose was: Your next dose is: Take 1 Tab by mouth two (2) times a day. 5 mg  
    
   
   
   
  
 isosorbide mononitrate ER 30 mg tablet Commonly known as:  IMDUR Start taking on:  2/8/2018 Your last dose was: Your next dose is: Take 1 Tab by mouth daily. 30 mg  
    
   
   
   
  
 metoprolol succinate 50 mg XL tablet Commonly known as:  TOPROL XL Start taking on:  2/8/2018 Your last dose was: Your next dose is: Take 1 Tab by mouth daily. 50 mg  
    
   
   
   
  
 ranolazine  mg SR tablet Commonly known as:  RANEXA Your last dose was: Your next dose is: Take 1 Tab by mouth two (2) times a day. Indications: Chronic Stable Angina Pectoris 500 mg ASK your doctor about these medications Instructions Each Dose to Equal  
 Morning Noon Evening Bedtime  
 enalapril 20 mg tablet Commonly known as:  Earlene Spencer Your last dose was: Your next dose is: Take 20 mg by mouth daily. 20 mg LORazepam 1 mg tablet Commonly known as:  ATIVAN Your last dose was: Your next dose is: Take 1 Tab by mouth every six (6) hours as needed for Anxiety. Max Daily Amount: 4 mg.  
 1 mg Where to Get Your Medications Information on where to get these meds will be given to you by the nurse or doctor. ! Ask your nurse or doctor about these medications  
  amLODIPine 5 mg tablet  
 apixaban 5 mg tablet  
 chlorpheniramine-HYDROcodone 10-8 mg/5 mL suspension  
 furosemide 20 mg tablet  
 guaiFENesin  mg ER tablet  
 isosorbide mononitrate ER 30 mg tablet  
 metoprolol succinate 50 mg XL tablet  
 nitroglycerin 0.4 mg SL tablet  
 pantoprazole 40 mg tablet  
 ranolazine  mg SR tablet  
 tiotropium 18 mcg inhalation capsule

## 2022-01-14 NOTE — ED NOTES
----- Message from Isabel Juarez MD sent at 1/13/2022  8:53 PM CST -----  Notify of labs - blood counts normal.  Metabolic panel with elevation in Creatinine- marking decrease in kidney function.    Repeat lab only in 1-2 weeks.  Blood and urine.    Orders in place.  Be sure to stay hydrated.    Be sure not taking excess creatine with weight lifting activity.   Bedside shift change report given to Tyron Monroe (oncoming nurse) by Jaziel Guillen (offgoing nurse). Report included the following information SBAR.

## 2022-04-20 NOTE — PROGRESS NOTES
Patient and/or next of kin has been given and has signed the [facility] Outpatient Observation  Notification letter and all questions answered. Copy of this notice given to patient and copy placed on chart. Patient and/or next of kin has been given the Outpatient Observation Information and Notification letter and all questions answered. [address]  PHYSICAL THERAPY PLAN OF CARE   [address], [address] [facility]         Ph: [phone]  Fax: [phone]    [] Certification  [] Recertification []  Plan of Care  [] Progress Note [x] Discharge      To:    Solis Swanson MD       From:  Chalra Fam PT  Patient: Nneka Goyal     : 1955        Date: 2022  Treatment Diagnosis: R knee pain, decreased R>L LE strength, decreased R hip and knee AROM, decreased B SLS stability, decreased functional activity tolerance, and impaired gait    Plan of Care/Certification Expiration Date: 06/10/22  Progress Report Period from:  2022  to 2022    Total # of Visits to Date: 10   No Show: 0    Canceled Appointment: 0     OBJECTIVE:   Short Term Goals - Time Frame for Short term goals: 2-3 weeks    Goals Current/Discharge status  Met   Short term goal 1: The pt will have decreased R knee pain </= 2/10 at worst in order to increase ease with ADL's  Not Met,In progress (Pain ranges from 2/10 @ Best to 4/10 @ worse) [x] yes  [x] no       [] yes  [] no       [] yes  [] no       [] yes  [] no      []  yes  []  no     Long Term Goals - Time Frame for Long term goals : 4-6 weeks  Goals Current/ Discharge status Met   Long term goal 1: The pt will have a increase in LEFS score >/=65/80 points in order to increase functional activity tolerance (corrected to reflect appropriate outcome tool for LE vs UE- JR) LEFS Score 56/80  19 point improvement   *Progressing  [x] yes  [x] no   Long term goal 2: The pt will be indep/compliant with HEP in order to self-manage symptoms upon D/C Pt reports good compliance and understanding of HEP  [x] yes  [] no   Long term goal 3: The pt will demo improved B SLS >/=10 seconds to increase ease with ambulation (Met 22) SLS of 20 sec without UE support  [x] yes  [] no   Long term goal 4: The pt will demo improved B LE strength  in order to safely ambulate unlimited distances with decreased pain/limitations  Strength RLE  Comment: Right LE knee Flex/Ext 5/5, Hip Flex, ABD,IR,ER 4+/5  Strength LLE  Comment: Left LE knee Flex/Ext 5/5, Hip Flex,ABD,Ext,IR,ER  4+/5. *Progressing  [x] yes  [x] no   Long term goal 5: The pt will demo improved R knee AROM >/=-5-115, R hip flex >/=100, Ext >/=5, IR >/=20, ER >/=40 in order to increase ease with ADL's RLE General AROM: knee 0 -124 in supine; hip flex 93  *Progressing  [x] yes  [x] no       [] yes  [] no       [] yes  [] no        Body Structures, Functions, Activity Limitations Requiring Skilled Therapeutic Intervention: Decreased functional mobility ,Increased pain,Decreased ADL status,Decreased ROM,Decreased strength,Decreased balance,Decreased high-level IADLs  Assessment: Pt has met or continues to progress towards all long term goals, 19 pt improvement on LEFS score, good compliance with HEP and understanding of continued compliance following DC. Therapy Prognosis: Good           PLAN: [] Evaluate and Treat  Frequency/Duration:  Plan  Current Treatment Recommendations: Strengthening,ROM,Balance training,Manual Therapy - Soft Tissue Mobilization,Manual Therapy - Joint Manipulation,Gait training,Stair training,Pain management,Functional mobility training,Neuromuscular re-education,Patient/Caregiver education & training,Home exercise program  Plan Comment: DC from therapy, continued compliane with HEP     Precautions:                            Patient Status:[] Continue/ Initiate plan of Care    [x] Discharge PT. Recommend pt continue with HEP. [] Additional visits requested, Please re-certify for additional visits:          Signature: Objective information by: Electronically signed by Kyle Godoy PTA on 4/20/22 at 11:59 AM EDT      If you have any questions or concerns, please don't hesitate to call.   Thank you for your referral.    I have reviewed this plan of care and certify a need for medically necessary rehabilitation services.     Physician Signature:__________________________________________________________  Date:  Please sign and return

## 2024-07-15 NOTE — ED NOTES
4 Eyes Skin Assessment     NAME:  Eva Schultz  YOB: 1945  MEDICAL RECORD NUMBER:  [id number]    The patient is being assessed for  Admission    I agree that at least one RN has performed a thorough Head to Toe Skin Assessment on the patient. ALL assessment sites listed below have been assessed.      Areas assessed by both nurses:    Head, Face, Ears, Shoulders, Back, Chest, Arms, Elbows, Hands, Sacrum. Buttock, Coccyx, Ischium, Legs. Feet and Heels, and Under Medical Devices         Does the Patient have a Wound? No noted wound(s)       Marvel Prevention initiated by RN: Yes  Wound Care Orders initiated by RN: Yes    Pressure Injury (Stage 3,4, Unstageable, DTI, NWPT, and Complex wounds) if present, place Wound referral order by RN under : No    New Ostomies, if present place, Ostomy referral order under : No     Nurse 1 eSignature: Electronically signed by Linette Marie on 7/15/24 at 5:58 PM EDT    **SHARE this note so that the co-signing nurse can place an eSignature**    Nurse 2 eSignature: Electronically signed by Eboni Monroy RN on 7/15/24 at 6:09 PM EDT     Pacemaker data sent to [facility] on call is coming to evaluate device.

## 2024-08-15 NOTE — ED NOTES
Received patient report from Val Thomas.KATELIN.  Assuming care of patient at this time [Negative] : Heme/Lymph

## (undated) DEVICE — ELECTRD ECG DEFIB/MULT-USE AD --

## (undated) DEVICE — PERCUTANEOUS ENTRY THINWALL NEEDLE  ONE-PART: Brand: COOK

## (undated) DEVICE — GOWN,PREVENTION PLUS,XL,ST,24/CS: Brand: MEDLINE

## (undated) DEVICE — 3M™ STERI-STRIP™ REINFORCED ADHESIVE SKIN CLOSURES, R1541, 1/4 IN X 3 IN (6 MM X 75 MM), 3 STRIPS/ENVELOPE: Brand: 3M™ STERI-STRIP™

## (undated) DEVICE — CABLE PACE ALGTR CLP SAF 12FT --

## (undated) DEVICE — HI-TORQUE VERSACORE FLOPPY GUIDE WIRE SYSTEM 145 CM: Brand: HI-TORQUE VERSACORE

## (undated) DEVICE — REM POLYHESIVE ADULT PATIENT RETURN ELECTRODE: Brand: VALLEYLAB

## (undated) DEVICE — INTENDED FOR TISSUE SEPARATION, AND OTHER PROCEDURES THAT REQUIRE A SHARP SURGICAL BLADE TO PUNCTURE OR CUT.: Brand: BARD-PARKER ®  SAFETY SCALPED

## (undated) DEVICE — SUTURE ABSORBABLE BRAIDED 2-0 CT-1 27 IN UD VICRYL J259H

## (undated) DEVICE — MEDI-VAC NON-CONDUCTIVE SUCTION TUBING 6MM X 6.1M (20 FT.) L: Brand: CARDINAL HEALTH

## (undated) DEVICE — SUTURE MCRYL SZ 4-0 L18IN ABSRB UD L19MM PS-2 3/8 CIR PRIM Y496G

## (undated) DEVICE — NEEDLE HYPO 25GA L1.5IN BVL ORIENTED ECLIPSE

## (undated) DEVICE — VALVE ANGIO ID0.11IN HEMSTAT MTL GUID WIRE INSRT TOOL AND

## (undated) DEVICE — INTRO SHTH 7FR 13X20CM -- TEARAWAY

## (undated) DEVICE — HI-TORQUE BALANCE MIDDLEWEIGHT GUIDE WIRE W/HYDROCOAT .014 STRAIGHT TIP 3.0 CM X 190 CM: Brand: HI-TORQUE BALANCE MIDDLEWEIGHT

## (undated) DEVICE — DRAPE MICSCP FOR VM900

## (undated) DEVICE — SUTURE PERMA HND SZ 0 L18IN NONABSORBABLE BLK L30MM PSL REV 580H

## (undated) DEVICE — PACEMAKER PACK: Brand: MEDLINE INDUSTRIES, INC.

## (undated) DEVICE — STERILE POLYISOPRENE POWDER-FREE SURGICAL GLOVES: Brand: PROTEXIS

## (undated) DEVICE — SYR 10ML CTRL LR LCK NSAF LF --